# Patient Record
Sex: MALE | Race: WHITE | Employment: OTHER | URBAN - METROPOLITAN AREA
[De-identification: names, ages, dates, MRNs, and addresses within clinical notes are randomized per-mention and may not be internally consistent; named-entity substitution may affect disease eponyms.]

---

## 2017-01-13 ENCOUNTER — TELEPHONE (OUTPATIENT)
Dept: ORTHOPEDIC SURGERY | Age: 62
End: 2017-01-13

## 2017-01-13 ENCOUNTER — OFFICE VISIT (OUTPATIENT)
Dept: ORTHOPEDIC SURGERY | Age: 62
End: 2017-01-13

## 2017-01-13 ENCOUNTER — TELEPHONE (OUTPATIENT)
Dept: FAMILY MEDICINE CLINIC | Age: 62
End: 2017-01-13

## 2017-01-13 VITALS — BODY MASS INDEX: 38.64 KG/M2 | WEIGHT: 276 LBS | HEIGHT: 71 IN

## 2017-01-13 DIAGNOSIS — M48.10 DISH (DIFFUSE IDIOPATHIC SKELETAL HYPEROSTOSIS): Primary | ICD-10-CM

## 2017-01-13 PROCEDURE — 99203 OFFICE O/P NEW LOW 30 MIN: CPT | Performed by: ORTHOPAEDIC SURGERY

## 2017-01-18 ENCOUNTER — OFFICE VISIT (OUTPATIENT)
Dept: PAIN MANAGEMENT | Age: 62
End: 2017-01-18

## 2017-01-18 ENCOUNTER — TELEPHONE (OUTPATIENT)
Dept: FAMILY MEDICINE CLINIC | Age: 62
End: 2017-01-18

## 2017-01-18 VITALS
DIASTOLIC BLOOD PRESSURE: 83 MMHG | BODY MASS INDEX: 36.4 KG/M2 | HEART RATE: 83 BPM | WEIGHT: 260 LBS | SYSTOLIC BLOOD PRESSURE: 138 MMHG | HEIGHT: 71 IN

## 2017-01-18 DIAGNOSIS — F51.01 PRIMARY INSOMNIA: ICD-10-CM

## 2017-01-18 DIAGNOSIS — M47.816 FACET ARTHROPATHY, LUMBAR: ICD-10-CM

## 2017-01-18 DIAGNOSIS — M50.30 DDD (DEGENERATIVE DISC DISEASE), CERVICAL: ICD-10-CM

## 2017-01-18 DIAGNOSIS — M51.9 LUMBAR DISC DISEASE: ICD-10-CM

## 2017-01-18 DIAGNOSIS — M51.35 DDD (DEGENERATIVE DISC DISEASE), THORACOLUMBAR: ICD-10-CM

## 2017-01-18 DIAGNOSIS — F11.20 CHRONIC NARCOTIC DEPENDENCE (HCC): ICD-10-CM

## 2017-01-18 DIAGNOSIS — G89.4 CHRONIC PAIN SYNDROME: Primary | ICD-10-CM

## 2017-01-18 PROCEDURE — 99215 OFFICE O/P EST HI 40 MIN: CPT | Performed by: NURSE PRACTITIONER

## 2017-01-18 RX ORDER — QUETIAPINE FUMARATE 25 MG/1
25 TABLET, FILM COATED ORAL 2 TIMES DAILY
Qty: 60 TABLET | Refills: 0 | Status: SHIPPED | OUTPATIENT
Start: 2017-01-18 | End: 2017-02-15

## 2017-01-18 RX ORDER — GABAPENTIN 400 MG/1
400 CAPSULE ORAL 4 TIMES DAILY
Qty: 90 CAPSULE | Refills: 3 | Status: SHIPPED | OUTPATIENT
Start: 2017-01-18 | End: 2017-02-15 | Stop reason: SDUPTHER

## 2017-01-18 RX ORDER — OXYCODONE HYDROCHLORIDE AND ACETAMINOPHEN 5; 325 MG/1; MG/1
1 TABLET ORAL EVERY 6 HOURS PRN
Qty: 64 TABLET | Refills: 0 | Status: SHIPPED | OUTPATIENT
Start: 2017-01-18 | End: 2017-02-15 | Stop reason: SDUPTHER

## 2017-01-30 DIAGNOSIS — I10 ESSENTIAL HYPERTENSION: ICD-10-CM

## 2017-01-31 RX ORDER — METFORMIN HYDROCHLORIDE 500 MG/1
TABLET, EXTENDED RELEASE ORAL
Qty: 360 TABLET | Refills: 1 | Status: SHIPPED | OUTPATIENT
Start: 2017-01-31 | End: 2017-05-08 | Stop reason: SDUPTHER

## 2017-01-31 RX ORDER — VALSARTAN AND HYDROCHLOROTHIAZIDE 320; 12.5 MG/1; MG/1
TABLET, FILM COATED ORAL
Qty: 90 TABLET | Refills: 1 | Status: SHIPPED | OUTPATIENT
Start: 2017-01-31 | End: 2017-05-08 | Stop reason: SDUPTHER

## 2017-01-31 RX ORDER — CARVEDILOL 3.12 MG/1
TABLET ORAL
Qty: 180 TABLET | Refills: 1 | Status: SHIPPED | OUTPATIENT
Start: 2017-01-31 | End: 2017-05-08 | Stop reason: SDUPTHER

## 2017-01-31 RX ORDER — ALPRAZOLAM 0.5 MG/1
TABLET ORAL
Qty: 270 TABLET | Refills: 0 | Status: SHIPPED | OUTPATIENT
Start: 2017-01-31 | End: 2017-05-16 | Stop reason: SDUPTHER

## 2017-01-31 RX ORDER — ATORVASTATIN CALCIUM 80 MG/1
TABLET, FILM COATED ORAL
Qty: 90 TABLET | Refills: 1 | Status: SHIPPED | OUTPATIENT
Start: 2017-01-31 | End: 2017-05-08 | Stop reason: SDUPTHER

## 2017-02-15 ENCOUNTER — OFFICE VISIT (OUTPATIENT)
Dept: PAIN MANAGEMENT | Age: 62
End: 2017-02-15

## 2017-02-15 VITALS
HEART RATE: 100 BPM | DIASTOLIC BLOOD PRESSURE: 69 MMHG | SYSTOLIC BLOOD PRESSURE: 116 MMHG | BODY MASS INDEX: 37.52 KG/M2 | WEIGHT: 269 LBS

## 2017-02-15 DIAGNOSIS — M50.30 DDD (DEGENERATIVE DISC DISEASE), CERVICAL: ICD-10-CM

## 2017-02-15 DIAGNOSIS — F41.9 ANXIETY: ICD-10-CM

## 2017-02-15 DIAGNOSIS — M51.35 DDD (DEGENERATIVE DISC DISEASE), THORACOLUMBAR: ICD-10-CM

## 2017-02-15 DIAGNOSIS — M48.10 DISH (DIFFUSE IDIOPATHIC SKELETAL HYPEROSTOSIS): ICD-10-CM

## 2017-02-15 DIAGNOSIS — M51.9 LUMBAR DISC DISEASE: ICD-10-CM

## 2017-02-15 DIAGNOSIS — G89.4 CHRONIC PAIN SYNDROME: Primary | ICD-10-CM

## 2017-02-15 DIAGNOSIS — M47.816 FACET ARTHROPATHY, LUMBAR: ICD-10-CM

## 2017-02-15 DIAGNOSIS — F51.01 PRIMARY INSOMNIA: ICD-10-CM

## 2017-02-15 PROCEDURE — 99213 OFFICE O/P EST LOW 20 MIN: CPT | Performed by: NURSE PRACTITIONER

## 2017-02-15 RX ORDER — TRAZODONE HYDROCHLORIDE 50 MG/1
50 TABLET ORAL NIGHTLY
Qty: 60 TABLET | Refills: 0 | Status: SHIPPED | OUTPATIENT
Start: 2017-02-15 | End: 2017-03-15

## 2017-02-15 RX ORDER — GABAPENTIN 400 MG/1
400 CAPSULE ORAL 4 TIMES DAILY
Qty: 90 CAPSULE | Refills: 3 | Status: SHIPPED | OUTPATIENT
Start: 2017-02-15 | End: 2017-03-15 | Stop reason: SDUPTHER

## 2017-02-15 RX ORDER — OXYCODONE HYDROCHLORIDE AND ACETAMINOPHEN 5; 325 MG/1; MG/1
1 TABLET ORAL EVERY 6 HOURS PRN
Qty: 84 TABLET | Refills: 0 | Status: SHIPPED | OUTPATIENT
Start: 2017-02-15 | End: 2017-03-15 | Stop reason: SDUPTHER

## 2017-03-14 RX ORDER — CARISOPRODOL 350 MG/1
TABLET ORAL
Qty: 90 TABLET | Refills: 0 | Status: SHIPPED | OUTPATIENT
Start: 2017-03-14 | End: 2017-04-14

## 2017-03-15 ENCOUNTER — OFFICE VISIT (OUTPATIENT)
Dept: PAIN MANAGEMENT | Age: 62
End: 2017-03-15

## 2017-03-15 ENCOUNTER — TELEPHONE (OUTPATIENT)
Dept: PAIN MANAGEMENT | Age: 62
End: 2017-03-15

## 2017-03-15 VITALS
BODY MASS INDEX: 39.05 KG/M2 | HEART RATE: 92 BPM | WEIGHT: 280 LBS | SYSTOLIC BLOOD PRESSURE: 140 MMHG | DIASTOLIC BLOOD PRESSURE: 84 MMHG

## 2017-03-15 DIAGNOSIS — G89.4 CHRONIC PAIN SYNDROME: Primary | ICD-10-CM

## 2017-03-15 DIAGNOSIS — F11.20 CHRONIC NARCOTIC DEPENDENCE (HCC): ICD-10-CM

## 2017-03-15 DIAGNOSIS — M51.35 DDD (DEGENERATIVE DISC DISEASE), THORACOLUMBAR: ICD-10-CM

## 2017-03-15 DIAGNOSIS — M47.816 FACET ARTHROPATHY, LUMBAR: ICD-10-CM

## 2017-03-15 DIAGNOSIS — N52.1 ERECTILE DYSFUNCTION DUE TO DISEASES CLASSIFIED ELSEWHERE: ICD-10-CM

## 2017-03-15 DIAGNOSIS — F41.9 ANXIETY: ICD-10-CM

## 2017-03-15 DIAGNOSIS — M51.9 LUMBAR DISC DISEASE: ICD-10-CM

## 2017-03-15 DIAGNOSIS — M50.30 DDD (DEGENERATIVE DISC DISEASE), CERVICAL: ICD-10-CM

## 2017-03-15 PROCEDURE — 99213 OFFICE O/P EST LOW 20 MIN: CPT | Performed by: NURSE PRACTITIONER

## 2017-03-15 RX ORDER — GABAPENTIN 400 MG/1
400 CAPSULE ORAL 4 TIMES DAILY
Qty: 90 CAPSULE | Refills: 3 | Status: SHIPPED | OUTPATIENT
Start: 2017-03-15 | End: 2017-04-12 | Stop reason: SDUPTHER

## 2017-03-15 RX ORDER — QUETIAPINE FUMARATE 25 MG/1
25-50 TABLET, FILM COATED ORAL NIGHTLY
Qty: 60 TABLET | Refills: 0 | Status: SHIPPED | OUTPATIENT
Start: 2017-03-15 | End: 2017-04-12

## 2017-03-15 RX ORDER — OXYCODONE HYDROCHLORIDE AND ACETAMINOPHEN 5; 325 MG/1; MG/1
1 TABLET ORAL EVERY 6 HOURS PRN
Qty: 112 TABLET | Refills: 0 | Status: SHIPPED | OUTPATIENT
Start: 2017-03-15 | End: 2017-04-12 | Stop reason: SDUPTHER

## 2017-03-15 RX ORDER — COVID-19 ANTIGEN TEST
1 KIT MISCELLANEOUS 2 TIMES DAILY PRN
Qty: 60 CAPSULE | Refills: 0
Start: 2017-03-15 | End: 2017-06-07 | Stop reason: SDUPTHER

## 2017-03-20 RX ORDER — NORTRIPTYLINE HYDROCHLORIDE 50 MG/1
50 CAPSULE ORAL NIGHTLY
Qty: 30 CAPSULE | Refills: 0 | Status: SHIPPED | OUTPATIENT
Start: 2017-03-20 | End: 2017-04-12

## 2017-04-10 RX ORDER — MOMETASONE FUROATE 1 MG/G
OINTMENT TOPICAL
Qty: 90 G | Refills: 5 | Status: SHIPPED | OUTPATIENT
Start: 2017-04-10 | End: 2019-08-29

## 2017-04-12 ENCOUNTER — OFFICE VISIT (OUTPATIENT)
Dept: PAIN MANAGEMENT | Age: 62
End: 2017-04-12

## 2017-04-12 VITALS
HEART RATE: 73 BPM | SYSTOLIC BLOOD PRESSURE: 159 MMHG | BODY MASS INDEX: 39.22 KG/M2 | WEIGHT: 281.2 LBS | DIASTOLIC BLOOD PRESSURE: 93 MMHG

## 2017-04-12 DIAGNOSIS — F51.01 PRIMARY INSOMNIA: ICD-10-CM

## 2017-04-12 DIAGNOSIS — M47.816 FACET ARTHROPATHY, LUMBAR: ICD-10-CM

## 2017-04-12 DIAGNOSIS — S46.811A SUBSCAPULARIS (MUSCLE) SPRAIN, RIGHT, INITIAL ENCOUNTER: ICD-10-CM

## 2017-04-12 DIAGNOSIS — M50.30 DDD (DEGENERATIVE DISC DISEASE), CERVICAL: ICD-10-CM

## 2017-04-12 DIAGNOSIS — M51.35 DDD (DEGENERATIVE DISC DISEASE), THORACOLUMBAR: ICD-10-CM

## 2017-04-12 DIAGNOSIS — G89.4 CHRONIC PAIN SYNDROME: Primary | ICD-10-CM

## 2017-04-12 PROCEDURE — 99213 OFFICE O/P EST LOW 20 MIN: CPT | Performed by: NURSE PRACTITIONER

## 2017-04-12 RX ORDER — OXYCODONE HYDROCHLORIDE AND ACETAMINOPHEN 5; 325 MG/1; MG/1
1 TABLET ORAL EVERY 6 HOURS PRN
Qty: 112 TABLET | Refills: 0 | Status: SHIPPED | OUTPATIENT
Start: 2017-04-12 | End: 2017-05-10 | Stop reason: SDUPTHER

## 2017-04-12 RX ORDER — GABAPENTIN 400 MG/1
400 CAPSULE ORAL 4 TIMES DAILY
Qty: 90 CAPSULE | Refills: 3 | Status: SHIPPED | OUTPATIENT
Start: 2017-04-12 | End: 2017-05-10 | Stop reason: SDUPTHER

## 2017-04-14 RX ORDER — CARISOPRODOL 350 MG/1
TABLET ORAL
Qty: 90 TABLET | Refills: 2 | Status: SHIPPED | OUTPATIENT
Start: 2017-04-14 | End: 2017-05-17 | Stop reason: SDUPTHER

## 2017-04-28 ENCOUNTER — TELEPHONE (OUTPATIENT)
Dept: PAIN MANAGEMENT | Age: 62
End: 2017-04-28

## 2017-05-01 RX ORDER — SILDENAFIL CITRATE 20 MG/1
20 TABLET ORAL PRN
Qty: 30 TABLET | Refills: 0 | OUTPATIENT
Start: 2017-05-01 | End: 2017-07-05

## 2017-05-08 DIAGNOSIS — I10 ESSENTIAL HYPERTENSION: ICD-10-CM

## 2017-05-08 RX ORDER — VALSARTAN AND HYDROCHLOROTHIAZIDE 320; 12.5 MG/1; MG/1
TABLET, FILM COATED ORAL
Qty: 90 TABLET | Refills: 1 | Status: SHIPPED | OUTPATIENT
Start: 2017-05-08 | End: 2017-12-27 | Stop reason: SDUPTHER

## 2017-05-08 RX ORDER — ATORVASTATIN CALCIUM 80 MG/1
TABLET, FILM COATED ORAL
Qty: 90 TABLET | Refills: 1 | Status: SHIPPED | OUTPATIENT
Start: 2017-05-08 | End: 2018-01-13 | Stop reason: SDUPTHER

## 2017-05-08 RX ORDER — METFORMIN HYDROCHLORIDE 500 MG/1
TABLET, EXTENDED RELEASE ORAL
Qty: 360 TABLET | Refills: 1 | Status: SHIPPED | OUTPATIENT
Start: 2017-05-08 | End: 2018-01-13 | Stop reason: SDUPTHER

## 2017-05-08 RX ORDER — CARVEDILOL 3.12 MG/1
TABLET ORAL
Qty: 180 TABLET | Refills: 1 | Status: SHIPPED | OUTPATIENT
Start: 2017-05-08 | End: 2017-12-27 | Stop reason: SDUPTHER

## 2017-05-10 ENCOUNTER — OFFICE VISIT (OUTPATIENT)
Dept: PAIN MANAGEMENT | Age: 62
End: 2017-05-10

## 2017-05-10 VITALS
DIASTOLIC BLOOD PRESSURE: 88 MMHG | HEART RATE: 90 BPM | WEIGHT: 281 LBS | SYSTOLIC BLOOD PRESSURE: 136 MMHG | BODY MASS INDEX: 39.19 KG/M2

## 2017-05-10 DIAGNOSIS — N52.1 ERECTILE DYSFUNCTION DUE TO DISEASES CLASSIFIED ELSEWHERE: ICD-10-CM

## 2017-05-10 DIAGNOSIS — F41.9 ANXIETY: ICD-10-CM

## 2017-05-10 DIAGNOSIS — F11.20 CHRONIC NARCOTIC DEPENDENCE (HCC): ICD-10-CM

## 2017-05-10 DIAGNOSIS — M51.9 LUMBAR DISC DISEASE: ICD-10-CM

## 2017-05-10 DIAGNOSIS — M50.30 DDD (DEGENERATIVE DISC DISEASE), CERVICAL: ICD-10-CM

## 2017-05-10 DIAGNOSIS — G89.4 CHRONIC PAIN SYNDROME: Primary | ICD-10-CM

## 2017-05-10 DIAGNOSIS — M47.816 FACET ARTHROPATHY, LUMBAR: ICD-10-CM

## 2017-05-10 DIAGNOSIS — E66.01 OBESITY, CLASS III, BMI 40-49.9 (MORBID OBESITY) (HCC): ICD-10-CM

## 2017-05-10 DIAGNOSIS — M51.35 DDD (DEGENERATIVE DISC DISEASE), THORACOLUMBAR: ICD-10-CM

## 2017-05-10 PROCEDURE — 99213 OFFICE O/P EST LOW 20 MIN: CPT | Performed by: NURSE PRACTITIONER

## 2017-05-10 RX ORDER — OXYCODONE HYDROCHLORIDE AND ACETAMINOPHEN 5; 325 MG/1; MG/1
1 TABLET ORAL EVERY 6 HOURS PRN
Qty: 112 TABLET | Refills: 0 | Status: SHIPPED | OUTPATIENT
Start: 2017-05-10 | End: 2017-06-07 | Stop reason: SDUPTHER

## 2017-05-10 RX ORDER — SILDENAFIL 50 MG/1
50 TABLET, FILM COATED ORAL PRN
Qty: 2 TABLET | Refills: 0 | Status: SHIPPED | COMMUNITY
Start: 2017-05-10 | End: 2017-05-10 | Stop reason: SDUPTHER

## 2017-05-10 RX ORDER — GABAPENTIN 400 MG/1
400 CAPSULE ORAL 4 TIMES DAILY
Qty: 90 CAPSULE | Refills: 3 | Status: SHIPPED | OUTPATIENT
Start: 2017-05-10 | End: 2017-06-07 | Stop reason: SDUPTHER

## 2017-05-10 RX ORDER — SILDENAFIL 50 MG/1
50 TABLET, FILM COATED ORAL PRN
Qty: 2 TABLET | Refills: 0 | Status: SHIPPED | COMMUNITY
Start: 2017-05-10 | End: 2017-06-07

## 2017-05-12 ENCOUNTER — TELEPHONE (OUTPATIENT)
Dept: PAIN MANAGEMENT | Age: 62
End: 2017-05-12

## 2017-05-17 RX ORDER — ALPRAZOLAM 0.5 MG/1
TABLET ORAL
Qty: 270 TABLET | Refills: 0 | Status: SHIPPED | OUTPATIENT
Start: 2017-05-17 | End: 2017-06-07 | Stop reason: SDUPTHER

## 2017-05-18 RX ORDER — ALPRAZOLAM 0.5 MG/1
TABLET ORAL
Qty: 270 TABLET | Refills: 0 | Status: SHIPPED | OUTPATIENT
Start: 2017-05-18 | End: 2017-08-08 | Stop reason: SDUPTHER

## 2017-05-18 RX ORDER — CARISOPRODOL 350 MG/1
TABLET ORAL
Qty: 90 TABLET | Refills: 2 | Status: SHIPPED | OUTPATIENT
Start: 2017-05-18 | End: 2017-07-21

## 2017-05-23 ENCOUNTER — TELEPHONE (OUTPATIENT)
Dept: FAMILY MEDICINE CLINIC | Age: 62
End: 2017-05-23

## 2017-05-30 ENCOUNTER — OFFICE VISIT (OUTPATIENT)
Dept: FAMILY MEDICINE CLINIC | Age: 62
End: 2017-05-30

## 2017-05-30 VITALS
TEMPERATURE: 98.3 F | RESPIRATION RATE: 18 BRPM | DIASTOLIC BLOOD PRESSURE: 80 MMHG | BODY MASS INDEX: 38.22 KG/M2 | HEIGHT: 71 IN | SYSTOLIC BLOOD PRESSURE: 124 MMHG | WEIGHT: 273 LBS | HEART RATE: 106 BPM

## 2017-05-30 DIAGNOSIS — R10.9 LEFT FLANK PAIN: ICD-10-CM

## 2017-05-30 DIAGNOSIS — M17.12 ARTHRITIS OF LEFT KNEE: Primary | ICD-10-CM

## 2017-05-30 LAB
CONTROL: POSITIVE
HEMOCCULT STL QL: NEGATIVE

## 2017-05-30 PROCEDURE — 99213 OFFICE O/P EST LOW 20 MIN: CPT | Performed by: NURSE PRACTITIONER

## 2017-05-30 ASSESSMENT — ENCOUNTER SYMPTOMS
COUGH: 0
SHORTNESS OF BREATH: 0
BACK PAIN: 1

## 2017-05-31 ENCOUNTER — TELEPHONE (OUTPATIENT)
Dept: PAIN MANAGEMENT | Age: 62
End: 2017-05-31

## 2017-06-01 ENCOUNTER — TELEPHONE (OUTPATIENT)
Dept: FAMILY MEDICINE CLINIC | Age: 62
End: 2017-06-01

## 2017-06-07 ENCOUNTER — OFFICE VISIT (OUTPATIENT)
Dept: PAIN MANAGEMENT | Age: 62
End: 2017-06-07

## 2017-06-07 VITALS
DIASTOLIC BLOOD PRESSURE: 73 MMHG | BODY MASS INDEX: 38.35 KG/M2 | WEIGHT: 275 LBS | SYSTOLIC BLOOD PRESSURE: 124 MMHG | HEART RATE: 95 BPM

## 2017-06-07 DIAGNOSIS — M51.35 DDD (DEGENERATIVE DISC DISEASE), THORACOLUMBAR: ICD-10-CM

## 2017-06-07 DIAGNOSIS — F11.20 CHRONIC NARCOTIC DEPENDENCE (HCC): ICD-10-CM

## 2017-06-07 DIAGNOSIS — F41.9 ANXIETY: ICD-10-CM

## 2017-06-07 DIAGNOSIS — M51.9 LUMBAR DISC DISEASE: ICD-10-CM
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
## 2017-06-07 DIAGNOSIS — G89.4 CHRONIC PAIN SYNDROME: Primary | ICD-10-CM
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
## 2017-06-07 DIAGNOSIS — M25.562 ACUTE PAIN OF LEFT KNEE: ICD-10-CM

## 2017-06-07 DIAGNOSIS — N52.1 ERECTILE DYSFUNCTION DUE TO DISEASES CLASSIFIED ELSEWHERE: ICD-10-CM

## 2017-06-07 DIAGNOSIS — M50.30 DDD (DEGENERATIVE DISC DISEASE), CERVICAL: ICD-10-CM

## 2017-06-07 DIAGNOSIS — M47.816 FACET ARTHROPATHY, LUMBAR: ICD-10-CM

## 2017-06-07 PROCEDURE — 99213 OFFICE O/P EST LOW 20 MIN: CPT | Performed by: NURSE PRACTITIONER

## 2017-06-07 RX ORDER — SILDENAFIL 50 MG/1
50 TABLET, FILM COATED ORAL PRN
Qty: 30 TABLET | Refills: 0 | Status: SHIPPED | OUTPATIENT
Start: 2017-06-07 | End: 2017-07-05

## 2017-06-07 RX ORDER — GABAPENTIN 400 MG/1
400 CAPSULE ORAL 4 TIMES DAILY
Qty: 90 CAPSULE | Refills: 3 | Status: SHIPPED | OUTPATIENT
Start: 2017-06-07 | End: 2017-09-13 | Stop reason: SDUPTHER

## 2017-06-07 RX ORDER — COVID-19 ANTIGEN TEST
1 KIT MISCELLANEOUS 2 TIMES DAILY PRN
Qty: 60 CAPSULE | Refills: 0 | Status: SHIPPED | OUTPATIENT
Start: 2017-06-07 | End: 2017-07-05 | Stop reason: SDUPTHER

## 2017-06-07 RX ORDER — OXYCODONE HYDROCHLORIDE AND ACETAMINOPHEN 5; 325 MG/1; MG/1
1 TABLET ORAL EVERY 6 HOURS PRN
Qty: 112 TABLET | Refills: 0 | Status: SHIPPED | OUTPATIENT
Start: 2017-06-07 | End: 2017-07-05 | Stop reason: SDUPTHER

## 2017-06-22 ENCOUNTER — TELEPHONE (OUTPATIENT)
Dept: PAIN MANAGEMENT | Age: 62
End: 2017-06-22

## 2017-07-05 ENCOUNTER — OFFICE VISIT (OUTPATIENT)
Dept: PAIN MANAGEMENT | Age: 62
End: 2017-07-05

## 2017-07-05 VITALS
WEIGHT: 278 LBS | HEART RATE: 76 BPM | BODY MASS INDEX: 38.77 KG/M2 | SYSTOLIC BLOOD PRESSURE: 136 MMHG | DIASTOLIC BLOOD PRESSURE: 86 MMHG

## 2017-07-05 DIAGNOSIS — N52.1 ERECTILE DYSFUNCTION DUE TO DISEASES CLASSIFIED ELSEWHERE: ICD-10-CM

## 2017-07-05 DIAGNOSIS — M50.30 DDD (DEGENERATIVE DISC DISEASE), CERVICAL: ICD-10-CM

## 2017-07-05 DIAGNOSIS — M47.816 FACET ARTHROPATHY, LUMBAR: ICD-10-CM

## 2017-07-05 DIAGNOSIS — M51.35 DDD (DEGENERATIVE DISC DISEASE), THORACOLUMBAR: ICD-10-CM

## 2017-07-05 DIAGNOSIS — F51.01 PRIMARY INSOMNIA: ICD-10-CM

## 2017-07-05 DIAGNOSIS — M48.10 DISH (DIFFUSE IDIOPATHIC SKELETAL HYPEROSTOSIS): ICD-10-CM

## 2017-07-05 DIAGNOSIS — F11.20 CHRONIC NARCOTIC DEPENDENCE (HCC): ICD-10-CM

## 2017-07-05 DIAGNOSIS — F41.9 ANXIETY: ICD-10-CM

## 2017-07-05 DIAGNOSIS — G89.4 CHRONIC PAIN SYNDROME: Primary | ICD-10-CM

## 2017-07-05 DIAGNOSIS — G47.30 SLEEP APNEA, UNSPECIFIED TYPE: ICD-10-CM

## 2017-07-05 DIAGNOSIS — E66.01 OBESITY, CLASS III, BMI 40-49.9 (MORBID OBESITY) (HCC): ICD-10-CM

## 2017-07-05 PROCEDURE — 99213 OFFICE O/P EST LOW 20 MIN: CPT | Performed by: NURSE PRACTITIONER

## 2017-07-05 RX ORDER — COVID-19 ANTIGEN TEST
1 KIT MISCELLANEOUS 2 TIMES DAILY PRN
Qty: 60 CAPSULE | Refills: 0 | Status: ON HOLD | OUTPATIENT
Start: 2017-07-05 | End: 2019-03-25 | Stop reason: ALTCHOICE

## 2017-07-05 RX ORDER — OXYCODONE HYDROCHLORIDE AND ACETAMINOPHEN 5; 325 MG/1; MG/1
1 TABLET ORAL EVERY 6 HOURS PRN
Qty: 140 TABLET | Refills: 0 | Status: SHIPPED | OUTPATIENT
Start: 2017-07-05 | End: 2017-08-09 | Stop reason: SDUPTHER

## 2017-07-21 RX ORDER — CARISOPRODOL 350 MG/1
TABLET ORAL
Qty: 90 TABLET | Refills: 1 | Status: SHIPPED | OUTPATIENT
Start: 2017-07-21 | End: 2017-09-22 | Stop reason: SDUPTHER

## 2017-08-03 PROBLEM — Z78.9 HEPATITIS B IMMUNE: Status: ACTIVE | Noted: 2017-08-03

## 2017-08-03 PROBLEM — Z11.1 TUBERCULOSIS SCREENING: Status: ACTIVE | Noted: 2017-08-03

## 2017-08-04 ENCOUNTER — OFFICE VISIT (OUTPATIENT)
Dept: FAMILY MEDICINE CLINIC | Age: 62
End: 2017-08-04

## 2017-08-04 VITALS
HEART RATE: 90 BPM | WEIGHT: 278 LBS | SYSTOLIC BLOOD PRESSURE: 128 MMHG | TEMPERATURE: 98.7 F | HEIGHT: 71 IN | RESPIRATION RATE: 18 BRPM | BODY MASS INDEX: 38.92 KG/M2 | DIASTOLIC BLOOD PRESSURE: 80 MMHG

## 2017-08-04 DIAGNOSIS — E78.5 HYPERLIPIDEMIA WITH TARGET LDL LESS THAN 100: ICD-10-CM

## 2017-08-04 DIAGNOSIS — Z11.4 SCREENING FOR HIV (HUMAN IMMUNODEFICIENCY VIRUS): ICD-10-CM

## 2017-08-04 DIAGNOSIS — E11.9 TYPE 2 DIABETES MELLITUS WITHOUT COMPLICATION, WITHOUT LONG-TERM CURRENT USE OF INSULIN (HCC): ICD-10-CM

## 2017-08-04 DIAGNOSIS — R74.8 ELEVATED LIVER ENZYMES: ICD-10-CM

## 2017-08-04 DIAGNOSIS — I25.10 CORONARY ARTERY DISEASE INVOLVING NATIVE HEART WITHOUT ANGINA PECTORIS, UNSPECIFIED VESSEL OR LESION TYPE: ICD-10-CM

## 2017-08-04 DIAGNOSIS — Z11.59 NEED FOR HEPATITIS C SCREENING TEST: ICD-10-CM

## 2017-08-04 DIAGNOSIS — I10 ESSENTIAL HYPERTENSION: ICD-10-CM

## 2017-08-04 DIAGNOSIS — R76.8 HEPATITIS B SURFACE ANTIGEN POSITIVE: Primary | ICD-10-CM

## 2017-08-04 DIAGNOSIS — R76.8 HEPATITIS B SURFACE ANTIGEN POSITIVE: ICD-10-CM

## 2017-08-04 LAB
A/G RATIO: 1.5 (ref 1.1–2.2)
ALBUMIN SERPL-MCNC: 4.1 G/DL (ref 3.4–5)
ALP BLD-CCNC: 95 U/L (ref 40–129)
ALT SERPL-CCNC: 93 U/L (ref 10–40)
ANION GAP SERPL CALCULATED.3IONS-SCNC: 16 MMOL/L (ref 3–16)
AST SERPL-CCNC: 59 U/L (ref 15–37)
BILIRUB SERPL-MCNC: 0.6 MG/DL (ref 0–1)
BUN BLDV-MCNC: 19 MG/DL (ref 7–20)
CALCIUM SERPL-MCNC: 9 MG/DL (ref 8.3–10.6)
CHLORIDE BLD-SCNC: 96 MMOL/L (ref 99–110)
CO2: 23 MMOL/L (ref 21–32)
CREAT SERPL-MCNC: 0.7 MG/DL (ref 0.8–1.3)
GFR AFRICAN AMERICAN: >60
GFR NON-AFRICAN AMERICAN: >60
GLOBULIN: 2.7 G/DL
GLUCOSE BLD-MCNC: 282 MG/DL (ref 70–99)
POTASSIUM SERPL-SCNC: 4.5 MMOL/L (ref 3.5–5.1)
SODIUM BLD-SCNC: 135 MMOL/L (ref 136–145)
TOTAL PROTEIN: 6.8 G/DL (ref 6.4–8.2)

## 2017-08-04 PROCEDURE — 99214 OFFICE O/P EST MOD 30 MIN: CPT | Performed by: FAMILY MEDICINE

## 2017-08-05 LAB
ESTIMATED AVERAGE GLUCOSE: 294.8 MG/DL
HBA1C MFR BLD: 11.9 %
HBV SURFACE AB TITR SER: 286.2 MUL/ML
HEPATITIS B SURFACE ANTIGEN INTERPRETATION: REACTIVE
HEPATITIS C ANTIBODY INTERPRETATION: NORMAL

## 2017-08-06 LAB
HAV AB SERPL IA-ACNC: NEGATIVE
HEPATITIS BE ANTIBODY: NEGATIVE

## 2017-08-07 LAB
HEPATITIS BE ANTIGEN: POSITIVE
HIV-1 AND HIV-2 ANTIBODIES: NORMAL

## 2017-08-08 LAB — HEPATITIS B SURFACE ANTIGEN CONFIRMATION: POSITIVE

## 2017-08-09 ENCOUNTER — OFFICE VISIT (OUTPATIENT)
Dept: PAIN MANAGEMENT | Age: 62
End: 2017-08-09

## 2017-08-09 VITALS
DIASTOLIC BLOOD PRESSURE: 82 MMHG | BODY MASS INDEX: 39.61 KG/M2 | SYSTOLIC BLOOD PRESSURE: 139 MMHG | WEIGHT: 284 LBS | HEART RATE: 88 BPM

## 2017-08-09 DIAGNOSIS — M51.9 LUMBAR DISC DISEASE: ICD-10-CM

## 2017-08-09 DIAGNOSIS — L40.9 PSORIASIS: ICD-10-CM

## 2017-08-09 DIAGNOSIS — F11.20 CHRONIC NARCOTIC DEPENDENCE (HCC): ICD-10-CM

## 2017-08-09 DIAGNOSIS — N52.1 ERECTILE DYSFUNCTION DUE TO DISEASES CLASSIFIED ELSEWHERE: ICD-10-CM

## 2017-08-09 DIAGNOSIS — E66.01 OBESITY, CLASS III, BMI 40-49.9 (MORBID OBESITY) (HCC): ICD-10-CM

## 2017-08-09 DIAGNOSIS — F41.9 ANXIETY: ICD-10-CM

## 2017-08-09 DIAGNOSIS — M50.30 DDD (DEGENERATIVE DISC DISEASE), CERVICAL: ICD-10-CM

## 2017-08-09 DIAGNOSIS — Z78.9 HEPATITIS B IMMUNE: Primary | ICD-10-CM

## 2017-08-09 DIAGNOSIS — M47.816 FACET ARTHROPATHY, LUMBAR: ICD-10-CM

## 2017-08-09 DIAGNOSIS — M51.35 DDD (DEGENERATIVE DISC DISEASE), THORACOLUMBAR: ICD-10-CM

## 2017-08-09 DIAGNOSIS — G89.4 CHRONIC PAIN SYNDROME: Primary | ICD-10-CM

## 2017-08-09 LAB
HEPATITIS B CORE IGM ANTIBODY: NORMAL
HEPATITIS B CORE TOTAL ANTIBODY: POSITIVE

## 2017-08-09 PROCEDURE — 99213 OFFICE O/P EST LOW 20 MIN: CPT | Performed by: NURSE PRACTITIONER

## 2017-08-09 RX ORDER — OXYCODONE HYDROCHLORIDE AND ACETAMINOPHEN 5; 325 MG/1; MG/1
1 TABLET ORAL EVERY 6 HOURS PRN
Qty: 140 TABLET | Refills: 0 | Status: SHIPPED | OUTPATIENT
Start: 2017-08-09 | End: 2017-09-13 | Stop reason: SDUPTHER

## 2017-08-09 RX ORDER — ALPRAZOLAM 0.5 MG/1
TABLET ORAL
Qty: 270 TABLET | Refills: 0 | Status: SHIPPED | OUTPATIENT
Start: 2017-08-09 | End: 2017-10-11 | Stop reason: SDUPTHER

## 2017-08-10 ENCOUNTER — OFFICE VISIT (OUTPATIENT)
Dept: FAMILY MEDICINE CLINIC | Age: 62
End: 2017-08-10

## 2017-08-10 VITALS
DIASTOLIC BLOOD PRESSURE: 68 MMHG | TEMPERATURE: 98.1 F | SYSTOLIC BLOOD PRESSURE: 126 MMHG | HEART RATE: 96 BPM | HEIGHT: 71 IN | RESPIRATION RATE: 16 BRPM | WEIGHT: 280 LBS | BODY MASS INDEX: 39.2 KG/M2

## 2017-08-10 DIAGNOSIS — B18.1 CHRONIC ACTIVE VIRAL HEPATITIS B (HCC): ICD-10-CM

## 2017-08-10 DIAGNOSIS — B18.1 CHRONIC ACTIVE VIRAL HEPATITIS B (HCC): Primary | ICD-10-CM

## 2017-08-10 LAB
APTT: 35.9 SEC (ref 24.1–34.9)
BASOPHILS ABSOLUTE: 0 K/UL (ref 0–0.2)
BASOPHILS RELATIVE PERCENT: 0.5 %
EOSINOPHILS ABSOLUTE: 0.2 K/UL (ref 0–0.6)
EOSINOPHILS RELATIVE PERCENT: 2.8 %
FERRITIN: 412.5 NG/ML (ref 30–400)
HCT VFR BLD CALC: 44.4 % (ref 40.5–52.5)
HEMOGLOBIN: 15.2 G/DL (ref 13.5–17.5)
INR BLD: 0.97 (ref 0.85–1.15)
LYMPHOCYTES ABSOLUTE: 1.7 K/UL (ref 1–5.1)
LYMPHOCYTES RELATIVE PERCENT: 24.2 %
MCH RBC QN AUTO: 32.2 PG (ref 26–34)
MCHC RBC AUTO-ENTMCNC: 34.2 G/DL (ref 31–36)
MCV RBC AUTO: 94.1 FL (ref 80–100)
MONOCYTES ABSOLUTE: 0.6 K/UL (ref 0–1.3)
MONOCYTES RELATIVE PERCENT: 8.6 %
NEUTROPHILS ABSOLUTE: 4.4 K/UL (ref 1.7–7.7)
NEUTROPHILS RELATIVE PERCENT: 63.9 %
PDW BLD-RTO: 13.1 % (ref 12.4–15.4)
PLATELET # BLD: 165 K/UL (ref 135–450)
PMV BLD AUTO: 8.3 FL (ref 5–10.5)
PROTHROMBIN TIME: 11 SEC (ref 9.6–13)
RBC # BLD: 4.72 M/UL (ref 4.2–5.9)
WBC # BLD: 6.9 K/UL (ref 4–11)

## 2017-08-10 PROCEDURE — 90471 IMMUNIZATION ADMIN: CPT | Performed by: FAMILY MEDICINE

## 2017-08-10 PROCEDURE — 99214 OFFICE O/P EST MOD 30 MIN: CPT | Performed by: FAMILY MEDICINE

## 2017-08-10 PROCEDURE — 90632 HEPA VACCINE ADULT IM: CPT | Performed by: FAMILY MEDICINE

## 2017-08-12 LAB
AFP: 4.9 UG/L
HBV DNA QNT I/U: ABNORMAL IU/ML
HEPATITIS B DNA, PCR (QUANT): DETECTED
LOG 10 HBV AS IU/ML: >8.2 LOG IU

## 2017-08-14 ENCOUNTER — TELEPHONE (OUTPATIENT)
Dept: PAIN MANAGEMENT | Age: 62
End: 2017-08-14

## 2017-08-14 RX ORDER — SILDENAFIL CITRATE 20 MG/1
TABLET ORAL
Qty: 30 TABLET | Refills: 0 | OUTPATIENT
Start: 2017-08-14

## 2017-08-14 RX ORDER — SILDENAFIL CITRATE 20 MG/1
20 TABLET ORAL DAILY
Qty: 30 TABLET | Refills: 0 | Status: SHIPPED | OUTPATIENT
Start: 2017-08-14 | End: 2017-10-11 | Stop reason: SDUPTHER

## 2017-08-15 ENCOUNTER — HOSPITAL ENCOUNTER (OUTPATIENT)
Dept: ULTRASOUND IMAGING | Age: 62
Discharge: HOME | End: 2017-08-15
Attending: FAMILY MEDICINE | Admitting: FAMILY MEDICINE

## 2017-08-15 DIAGNOSIS — B18.1 CHRONIC ACTIVE VIRAL HEPATITIS B (HCC): ICD-10-CM

## 2017-08-15 DIAGNOSIS — B18.1 CHRONIC VIRAL HEPATITIS B WITHOUT DELTA-AGENT (HCC): ICD-10-CM

## 2017-09-13 ENCOUNTER — OFFICE VISIT (OUTPATIENT)
Dept: PAIN MANAGEMENT | Age: 62
End: 2017-09-13

## 2017-09-13 VITALS
WEIGHT: 276 LBS | SYSTOLIC BLOOD PRESSURE: 118 MMHG | BODY MASS INDEX: 38.49 KG/M2 | DIASTOLIC BLOOD PRESSURE: 78 MMHG | HEART RATE: 93 BPM

## 2017-09-13 DIAGNOSIS — M50.30 DDD (DEGENERATIVE DISC DISEASE), CERVICAL: ICD-10-CM

## 2017-09-13 DIAGNOSIS — M47.816 FACET ARTHROPATHY, LUMBAR: ICD-10-CM

## 2017-09-13 DIAGNOSIS — F41.9 ANXIETY: ICD-10-CM

## 2017-09-13 DIAGNOSIS — E66.01 OBESITY, CLASS III, BMI 40-49.9 (MORBID OBESITY) (HCC): ICD-10-CM

## 2017-09-13 DIAGNOSIS — M51.35 DDD (DEGENERATIVE DISC DISEASE), THORACOLUMBAR: ICD-10-CM

## 2017-09-13 DIAGNOSIS — G89.4 CHRONIC PAIN SYNDROME: Primary | ICD-10-CM

## 2017-09-13 PROCEDURE — 99213 OFFICE O/P EST LOW 20 MIN: CPT | Performed by: NURSE PRACTITIONER

## 2017-09-13 RX ORDER — GABAPENTIN 400 MG/1
400 CAPSULE ORAL 3 TIMES DAILY
Qty: 90 CAPSULE | Refills: 0 | Status: SHIPPED | OUTPATIENT
Start: 2017-09-13 | End: 2017-10-11 | Stop reason: SDUPTHER

## 2017-09-13 RX ORDER — OXYCODONE HYDROCHLORIDE AND ACETAMINOPHEN 5; 325 MG/1; MG/1
1 TABLET ORAL EVERY 6 HOURS PRN
Qty: 112 TABLET | Refills: 0 | Status: SHIPPED | OUTPATIENT
Start: 2017-09-13 | End: 2017-10-11 | Stop reason: SDUPTHER

## 2017-09-18 ENCOUNTER — TELEPHONE (OUTPATIENT)
Dept: FAMILY MEDICINE CLINIC | Age: 62
End: 2017-09-18

## 2017-09-19 ENCOUNTER — TELEPHONE (OUTPATIENT)
Dept: FAMILY MEDICINE CLINIC | Age: 62
End: 2017-09-19

## 2017-09-23 RX ORDER — CARISOPRODOL 350 MG/1
TABLET ORAL
Qty: 90 TABLET | Refills: 1 | Status: SHIPPED | OUTPATIENT
Start: 2017-09-23 | End: 2017-11-21 | Stop reason: SDUPTHER

## 2017-10-09 ENCOUNTER — TELEPHONE (OUTPATIENT)
Dept: FAMILY MEDICINE CLINIC | Age: 62
End: 2017-10-09

## 2017-10-09 NOTE — TELEPHONE ENCOUNTER
Patient advised that samples are ready for  at the  and that it is ok to cancel appt for 10/10/17 due to having appt on 11/16/2017.

## 2017-10-11 ENCOUNTER — OFFICE VISIT (OUTPATIENT)
Dept: PAIN MANAGEMENT | Age: 62
End: 2017-10-11

## 2017-10-11 VITALS
WEIGHT: 282 LBS | SYSTOLIC BLOOD PRESSURE: 122 MMHG | BODY MASS INDEX: 39.33 KG/M2 | HEART RATE: 92 BPM | DIASTOLIC BLOOD PRESSURE: 79 MMHG

## 2017-10-11 DIAGNOSIS — G89.4 CHRONIC PAIN SYNDROME: Primary | ICD-10-CM

## 2017-10-11 DIAGNOSIS — L40.9 PSORIASIS: ICD-10-CM

## 2017-10-11 DIAGNOSIS — M50.30 DDD (DEGENERATIVE DISC DISEASE), CERVICAL: ICD-10-CM

## 2017-10-11 DIAGNOSIS — M47.816 FACET ARTHROPATHY, LUMBAR: ICD-10-CM

## 2017-10-11 DIAGNOSIS — M51.9 LUMBAR DISC DISEASE: ICD-10-CM

## 2017-10-11 DIAGNOSIS — M51.35 DDD (DEGENERATIVE DISC DISEASE), THORACOLUMBAR: ICD-10-CM

## 2017-10-11 DIAGNOSIS — E66.01 OBESITY, CLASS III, BMI 40-49.9 (MORBID OBESITY) (HCC): ICD-10-CM

## 2017-10-11 DIAGNOSIS — F41.9 ANXIETY: ICD-10-CM

## 2017-10-11 PROCEDURE — 99213 OFFICE O/P EST LOW 20 MIN: CPT | Performed by: NURSE PRACTITIONER

## 2017-10-11 RX ORDER — OXYCODONE HYDROCHLORIDE AND ACETAMINOPHEN 5; 325 MG/1; MG/1
1 TABLET ORAL EVERY 6 HOURS PRN
Qty: 112 TABLET | Refills: 0 | Status: SHIPPED | OUTPATIENT
Start: 2017-10-11 | End: 2017-11-08 | Stop reason: SDUPTHER

## 2017-10-11 RX ORDER — GABAPENTIN 400 MG/1
400 CAPSULE ORAL 3 TIMES DAILY
Qty: 90 CAPSULE | Refills: 0 | Status: SHIPPED | OUTPATIENT
Start: 2017-10-11 | End: 2017-11-08 | Stop reason: SDUPTHER

## 2017-10-11 RX ORDER — SILDENAFIL CITRATE 20 MG/1
TABLET ORAL
Qty: 30 TABLET | Refills: 0 | Status: SHIPPED | OUTPATIENT
Start: 2017-10-11 | End: 2017-11-11 | Stop reason: SDUPTHER

## 2017-10-11 RX ORDER — ALPRAZOLAM 0.5 MG/1
TABLET ORAL
Qty: 270 TABLET | Refills: 0
Start: 2017-10-11 | End: 2018-01-22 | Stop reason: SDUPTHER

## 2017-10-11 NOTE — PATIENT INSTRUCTIONS
Patient Education        Back Stretches: Exercises  Your Care Instructions  Here are some examples of exercises for stretching your back. Start each exercise slowly. Ease off the exercise if you start to have pain. Your doctor or physical therapist will tell you when you can start these exercises and which ones will work best for you. How to do the exercises  Overhead stretch    1. Stand comfortably with your feet shoulder-width apart. 2. Looking straight ahead, raise both arms over your head and reach toward the ceiling. Do not allow your head to tilt back. 3. Hold for 15 to 30 seconds, then lower your arms to your sides. 4. Repeat 2 to 4 times. Side stretch    1. Stand comfortably with your feet shoulder-width apart. 2. Raise one arm over your head, and then lean to the other side. 3. Slide your hand down your leg as you let the weight of your arm gently stretch your side muscles. Hold for 15 to 30 seconds. 4. Repeat 2 to 4 times on each side. Press-up    1. Lie on your stomach, supporting your body with your forearms. 2. Press your elbows down into the floor to raise your upper back. As you do this, relax your stomach muscles and allow your back to arch without using your back muscles. As your press up, do not let your hips or pelvis come off the floor. 3. Hold for 15 to 30 seconds, then relax. 4. Repeat 2 to 4 times. Relax and rest    1. Lie on your back with a rolled towel under your neck and a pillow under your knees. Extend your arms comfortably to your sides. 2. Relax and breathe normally. 3. Remain in this position for about 10 minutes. 4. If you can, do this 2 or 3 times each day. Follow-up care is a key part of your treatment and safety. Be sure to make and go to all appointments, and call your doctor if you are having problems. It's also a good idea to know your test results and keep a list of the medicines you take. Where can you learn more?   Go to [email or web address] and sign in to your [employer] account. Enter E416 in the addwishhire box to learn more about \"Back Stretches: Exercises. \"     If you do not have an account, please click on the \"Sign Up Now\" link. Current as of: March 21, 2017  Content Version: 11.3  © 0278-0911 [employer], [employer]. Care instructions adapted under license by [facility], [place] ([facility]). If you have questions about a medical condition or this instruction, always ask your healthcare professional. [address] any warranty or liability for your use of this information.

## 2017-10-12 NOTE — PROGRESS NOTES
Delene Boast  1955  [id number]      HISTORY OF PRESENT ILLNESS:  Mr. Venessa Carr is a 65 y.o. male returns for a follow up visit for pain management  He has a diagnosis of   1. Chronic pain syndrome    2. Lumbar disc disease    3. Facet arthropathy, lumbar ([facility] [address]    4. DDD (degenerative disc disease), thoracolumbar    5. DDD (degenerative disc disease), cervical    6. Anxiety    7. Psoriasis    8. Obesity, Class III, BMI 40-49.9 (morbid obesity) ([address]    . He complains of pain in the neck, lower back. He rates the pain 6/10 and describes it as aching with increased physical activities. Current treatment regimen has helped relieve about 40% of the pain. He denies any side effects from the current pain regimen. Patient reports that since the last follow up visit the physical functioning is unchanged, family/social relationships are unchanged, mood is unchanged sleep patterns are unchanged, and that the overall functioning is unchanged. Patient denies misusing/abusing his narcotic pain medications or using any illegal drugs. Patient states that pain is manageable on current pain therapy. He reports that stress is low now that his wife has had a knee replacement surgery, and is on the mend. His mood is stable without anxiety, sleep is fair with an average of 5 hours. He is tolerating activities with moderate tenderness to the lower back. He denies having issues with constipation    ALLERGIES: Patients list of allergies were reviewed     MEDICATIONS: Mr. Venessa Carr list of medications were reviewed. His current medications are   Outpatient Medications Prior to Visit   Medication Sig Dispense Refill    carisoprodol (SOMA) 350 MG tablet Take 1 tablet by mouth 3 times daily as needed for Muscle spasms 90 tablet 1    Naproxen Sodium (ALEVE) 220 MG CAPS Take 1 tablet by mouth 2 times daily as needed for Pain 60 capsule 0    diclofenac sodium (VOLTAREN) 1 % GEL Apply 2-4 g topically 2 times daily 5 Tube 0    valsartan-hydrochlorothiazide (DIOVAN-HCT) 320-12.5 MG per tablet TAKE 1 TABLET EVERY DAY 90 tablet 1    atorvastatin (LIPITOR) 80 MG tablet TAKE 1 TABLET EVERY DAY 90 tablet 1    carvedilol (COREG) 3.125 MG tablet TAKE 1 TABLET TWICE DAILY WITH MEALS 180 tablet 1    metFORMIN (GLUCOPHAGE-XR) 500 MG extended release tablet TAKE 4 TABLETS EVERY DAY WITH BREAKFAST 360 tablet 1    mometasone (ELOCON) 0.1 % ointment Apply topically twice daily. 90 g 5    canagliflozin (INVOKANA) 300 MG TABS tablet Take 1 tablet by mouth every morning (before breakfast) 30 tablet 2    Insulin Pen Needle (B-D UF III MINI PEN NEEDLES) 31G X 5 MM MISC 1 each by Does not apply route 2 times daily. 200 each 6    aspirin EC 81 MG EC tablet Take 1 tablet by mouth daily. 30 tablet 11    glucose blood VI test strips (ASCENSIA AUTODISC VI;ONE TOUCH ULTRA TEST VI) strip Apply 1 each topically 3 times daily. Onetouch Ultra 2 test strips  Dx: 250.00 300 strip 3    ONETOUCH DELICA LANCETS MISC 1 each by Does not apply route 3 times daily. 300 each 3    oxyCODONE-acetaminophen (PERCOCET) 5-325 MG per tablet Take 1 tablet by mouth every 6 hours as needed for Pain .  112 tablet 0    gabapentin (NEURONTIN) 400 MG capsule Take 1 capsule by mouth 3 times daily 90 capsule 0    sildenafil (REVATIO) 20 MG tablet Take 1 tablet by mouth daily 30 tablet 0    ALPRAZolam (XANAX) 0.5 MG tablet TAKE 1 TABLET BY MOUTH THREE TIMES DAILY AS NEEDED FOR SLEEP OR ANXIETY 270 tablet 0    Dulaglutide 0.75 MG/0.5ML SOPN Inject 0.75 mg into the skin once a week For a month then increase to 1.5 4 Pen 0    Dulaglutide 1.5 MG/0.5ML SOPN Inject 1.5 mg into the skin once a week (START after 4 weeks on the 0.75) 4 Pen 2    albuterol sulfate  (90 BASE) MCG/ACT inhaler Inhale 2 puffs into the lungs every 4 hours as needed for Wheezing May substitute for insurance preferred (Ventolin, Proventil, ProAir) 1 Inhaler 3    fluocinonide (LIDEX) 0.05 % cream Apply topically 2 times daily. 200 g 3     No facility-administered medications prior to visit. SOCIAL/FAMILY/PAST MEDICAL HISTORY: Mr. Danni Diallo, family and past medical history was reviewed. REVIEW OF SYSTEMS:    Respiratory: Negative for apnea, chest tightness and shortness of breath or change in baseline breathing. Gastrointestinal: Negative for nausea, vomiting, abdominal pain, diarrhea, constipation, blood in stool and abdominal distention. PHYSICAL EXAM:   Nursing note and vitals reviewed. /79   Pulse 92   Wt 282 lb (127.9 kg)   BMI 39.33 kg/m²   Constitutional: He appears well-developed and well-nourished. No acute distress. Skin: Skin is warm and dry, good turgor. No rash noted. He is not diaphoretic. Cardiovascular: Normal rate, regular rhythm, normal heart sounds, and does not have murmur. Pulmonary/Chest: Effort normal. No respiratory distress. He does not have wheezes in the lung fields. He has no rales. Neurological/Psychiatric:He is alert and oriented to person, place, and time. Coordination is  normal. His mood isAppropriate and affect is Flat/blunted . IMPRESSION:   1. Chronic pain syndrome    2. Lumbar disc disease    3. Facet arthropathy, lumbar ([address]    4. DDD (degenerative disc disease), thoracolumbar    5. DDD (degenerative disc disease), cervical    6. Anxiety    7. Psoriasis    8. Obesity, Class III, BMI 40-49.9 (morbid obesity) ([facility])        PLAN:  Informed verbal consent was obtained  -Continue with Percocet, Neurontin  -Decreased Xanax to 0.5 mg po daily PRN, He reports taking it only as needed.   -Education provided on taking benzodiazepines with narcotics, as the side effects can be dangerous as benzodiazepines intensify the effects of opioids, which can lead to central nervous depression, confusion or delirium.  Patient verbalized understanding.  -He was advised weight reduction, diet changes- 800-1200 jarrod diet, diet diary, exercising, nutritional consult increased physical activity as tolerated   -CBT techniques- relaxation therapies such as biofeedback, mindfulness based stress reduction, imagery, cognitive restructuring, problem solving discussed with patient  -Last UDS passed  -Return in about 4 weeks (around 11/8/2017). Current Outpatient Prescriptions   Medication Sig Dispense Refill    oxyCODONE-acetaminophen (PERCOCET) 5-325 MG per tablet Take 1 tablet by mouth every 6 hours as needed for Pain . 112 tablet 0    gabapentin (NEURONTIN) 400 MG capsule Take 1 capsule by mouth 3 times daily 90 capsule 0    ALPRAZolam (XANAX) 0.5 MG tablet TAKE 1 TABLET BY MOUTH ONCE DAILY AS NEEDED FOR ANXIETY 270 tablet 0    carisoprodol (SOMA) 350 MG tablet Take 1 tablet by mouth 3 times daily as needed for Muscle spasms 90 tablet 1    Naproxen Sodium (ALEVE) 220 MG CAPS Take 1 tablet by mouth 2 times daily as needed for Pain 60 capsule 0    diclofenac sodium (VOLTAREN) 1 % GEL Apply 2-4 g topically 2 times daily 5 Tube 0    valsartan-hydrochlorothiazide (DIOVAN-HCT) 320-12.5 MG per tablet TAKE 1 TABLET EVERY DAY 90 tablet 1    atorvastatin (LIPITOR) 80 MG tablet TAKE 1 TABLET EVERY DAY 90 tablet 1    carvedilol (COREG) 3.125 MG tablet TAKE 1 TABLET TWICE DAILY WITH MEALS 180 tablet 1    metFORMIN (GLUCOPHAGE-XR) 500 MG extended release tablet TAKE 4 TABLETS EVERY DAY WITH BREAKFAST 360 tablet 1    mometasone (ELOCON) 0.1 % ointment Apply topically twice daily. 90 g 5    canagliflozin (INVOKANA) 300 MG TABS tablet Take 1 tablet by mouth every morning (before breakfast) 30 tablet 2    Insulin Pen Needle (B-D UF III MINI PEN NEEDLES) 31G X 5 MM MISC 1 each by Does not apply route 2 times daily. 200 each 6    aspirin EC 81 MG EC tablet Take 1 tablet by mouth daily. 30 tablet 11    glucose blood VI test strips (ASCENSIA AUTODISC VI;ONE TOUCH ULTRA TEST VI) strip Apply 1 each topically 3 times daily.  Onetouch Ultra 2 test strips  Dx: 250.00 300 strip 3   

## 2017-11-08 ENCOUNTER — OFFICE VISIT (OUTPATIENT)
Dept: PAIN MANAGEMENT | Age: 62
End: 2017-11-08

## 2017-11-08 VITALS
HEART RATE: 105 BPM | SYSTOLIC BLOOD PRESSURE: 120 MMHG | BODY MASS INDEX: 38.63 KG/M2 | DIASTOLIC BLOOD PRESSURE: 84 MMHG | WEIGHT: 277 LBS

## 2017-11-08 DIAGNOSIS — M50.30 DDD (DEGENERATIVE DISC DISEASE), CERVICAL: ICD-10-CM

## 2017-11-08 DIAGNOSIS — F41.9 ANXIETY: ICD-10-CM

## 2017-11-08 DIAGNOSIS — M51.9 LUMBAR DISC DISEASE: ICD-10-CM

## 2017-11-08 DIAGNOSIS — G89.4 CHRONIC PAIN SYNDROME: Primary | ICD-10-CM

## 2017-11-08 DIAGNOSIS — M47.816 FACET ARTHROPATHY, LUMBAR: ICD-10-CM

## 2017-11-08 DIAGNOSIS — M51.35 DDD (DEGENERATIVE DISC DISEASE), THORACOLUMBAR: ICD-10-CM

## 2017-11-08 DIAGNOSIS — E66.01 OBESITY, CLASS III, BMI 40-49.9 (MORBID OBESITY) (HCC): ICD-10-CM

## 2017-11-08 PROCEDURE — 99213 OFFICE O/P EST LOW 20 MIN: CPT | Performed by: NURSE PRACTITIONER

## 2017-11-08 PROCEDURE — 1036F TOBACCO NON-USER: CPT | Performed by: NURSE PRACTITIONER

## 2017-11-08 PROCEDURE — G8484 FLU IMMUNIZE NO ADMIN: HCPCS | Performed by: NURSE PRACTITIONER

## 2017-11-08 PROCEDURE — 3017F COLORECTAL CA SCREEN DOC REV: CPT | Performed by: NURSE PRACTITIONER

## 2017-11-08 PROCEDURE — G8417 CALC BMI ABV UP PARAM F/U: HCPCS | Performed by: NURSE PRACTITIONER

## 2017-11-08 PROCEDURE — G8599 NO ASA/ANTIPLAT THER USE RNG: HCPCS | Performed by: NURSE PRACTITIONER

## 2017-11-08 PROCEDURE — G8427 DOCREV CUR MEDS BY ELIG CLIN: HCPCS | Performed by: NURSE PRACTITIONER

## 2017-11-08 RX ORDER — OXYCODONE HYDROCHLORIDE AND ACETAMINOPHEN 5; 325 MG/1; MG/1
1 TABLET ORAL EVERY 6 HOURS PRN
Qty: 112 TABLET | Refills: 0 | Status: SHIPPED | OUTPATIENT
Start: 2017-11-08 | End: 2017-12-06 | Stop reason: SDUPTHER

## 2017-11-08 RX ORDER — GABAPENTIN 400 MG/1
400 CAPSULE ORAL 3 TIMES DAILY
Qty: 90 CAPSULE | Refills: 0 | Status: SHIPPED | OUTPATIENT
Start: 2017-11-08 | End: 2017-12-06 | Stop reason: SDUPTHER

## 2017-11-08 NOTE — PATIENT INSTRUCTIONS
Patient Education        Back Stretches: Exercises  Your Care Instructions  Here are some examples of exercises for stretching your back. Start each exercise slowly. Ease off the exercise if you start to have pain. Your doctor or physical therapist will tell you when you can start these exercises and which ones will work best for you. How to do the exercises  Overhead stretch    1. Stand comfortably with your feet shoulder-width apart. 2. Looking straight ahead, raise both arms over your head and reach toward the ceiling. Do not allow your head to tilt back. 3. Hold for 15 to 30 seconds, then lower your arms to your sides. 4. Repeat 2 to 4 times. Side stretch    1. Stand comfortably with your feet shoulder-width apart. 2. Raise one arm over your head, and then lean to the other side. 3. Slide your hand down your leg as you let the weight of your arm gently stretch your side muscles. Hold for 15 to 30 seconds. 4. Repeat 2 to 4 times on each side. Press-up    1. Lie on your stomach, supporting your body with your forearms. 2. Press your elbows down into the floor to raise your upper back. As you do this, relax your stomach muscles and allow your back to arch without using your back muscles. As your press up, do not let your hips or pelvis come off the floor. 3. Hold for 15 to 30 seconds, then relax. 4. Repeat 2 to 4 times. Relax and rest    1. Lie on your back with a rolled towel under your neck and a pillow under your knees. Extend your arms comfortably to your sides. 2. Relax and breathe normally. 3. Remain in this position for about 10 minutes. 4. If you can, do this 2 or 3 times each day. Follow-up care is a key part of your treatment and safety. Be sure to make and go to all appointments, and call your doctor if you are having problems. It's also a good idea to know your test results and keep a list of the medicines you take. Where can you learn more?   Go to

## 2017-11-08 NOTE — PROGRESS NOTES
(ASCENSIA AUTODISC VI;ONE TOUCH ULTRA TEST VI) strip Apply 1 each topically 3 times daily. Onetouch Ultra 2 test strips  Dx: 250.00 300 strip 3    ONETOUCH DELICA LANCETS MISC 1 each by Does not apply route 3 times daily. 300 each 3     No current facility-administered medications for this visit. I will continue his current medication regimen  which is part of the above treatment schedule. It has been helping with Mr. Alcon High chronic  medical problems which for this visit include: The primary encounter diagnosis was Chronic pain syndrome. Diagnoses of DDD (degenerative disc disease), thoracolumbar, Facet arthropathy, lumbar, DDD (degenerative disc disease), cervical, Lumbar disc disease, Anxiety, and Obesity, Class III, BMI 40-49.9 (morbid obesity) ([facility] [address] were also pertinent to this visit. Risks and benefits of the medications and other alternative treatments  including no treatment were discussed with the patient. The common side effects of these medications were also explained to the patient. Informed verbal consent was obtained. Goals of current treatment regimen include improvement in pain, restoration of functioning- with focus on improvement in physical performance, general activity, work or disability,emotional distress, health care utilization and  decreased medication consumption. Will continue to monitor progress towards achieving/maintaining therapeutic goals with special emphasis on  1. Improvement in perceived interfernce  of pain with ADL's. Ability to do home exercises independently. Ability to do household chores indoor and/or outdoor work and social and leisure activities. Improve psychosocial and physical functioning. - he is showing progression towards this treatment goal with the current regimen.     He was advised against drinking alcohol with the narcotic pain medicines, advised against driving or handling machinery while adjusting the dose of medicines or if having cognitive  issues

## 2017-11-11 DIAGNOSIS — G89.4 CHRONIC PAIN SYNDROME: Primary | ICD-10-CM

## 2017-11-11 DIAGNOSIS — I10 ESSENTIAL HYPERTENSION: ICD-10-CM

## 2017-11-13 ENCOUNTER — TELEPHONE (OUTPATIENT)
Dept: FAMILY MEDICINE CLINIC | Age: 62
End: 2017-11-13

## 2017-11-13 RX ORDER — SILDENAFIL CITRATE 20 MG/1
TABLET ORAL
Qty: 30 TABLET | Refills: 0 | Status: SHIPPED | OUTPATIENT
Start: 2017-11-13 | End: 2017-12-14 | Stop reason: SDUPTHER

## 2017-11-16 ENCOUNTER — OFFICE VISIT (OUTPATIENT)
Dept: FAMILY MEDICINE CLINIC | Age: 62
End: 2017-11-16

## 2017-11-16 VITALS
RESPIRATION RATE: 16 BRPM | BODY MASS INDEX: 39.62 KG/M2 | DIASTOLIC BLOOD PRESSURE: 60 MMHG | HEIGHT: 71 IN | TEMPERATURE: 99.1 F | SYSTOLIC BLOOD PRESSURE: 112 MMHG | HEART RATE: 86 BPM | WEIGHT: 283 LBS

## 2017-11-16 DIAGNOSIS — I25.10 CORONARY ARTERY DISEASE INVOLVING NATIVE HEART WITHOUT ANGINA PECTORIS, UNSPECIFIED VESSEL OR LESION TYPE: ICD-10-CM

## 2017-11-16 DIAGNOSIS — Z12.5 SCREENING PSA (PROSTATE SPECIFIC ANTIGEN): ICD-10-CM

## 2017-11-16 DIAGNOSIS — E66.01 OBESITY, CLASS III, BMI 40-49.9 (MORBID OBESITY) (HCC): ICD-10-CM

## 2017-11-16 DIAGNOSIS — Z23 NEED FOR TETANUS BOOSTER: ICD-10-CM

## 2017-11-16 DIAGNOSIS — B18.1 CHRONIC ACTIVE VIRAL HEPATITIS B (HCC): ICD-10-CM

## 2017-11-16 DIAGNOSIS — I10 ESSENTIAL HYPERTENSION: ICD-10-CM

## 2017-11-16 DIAGNOSIS — K76.0 FATTY LIVER: ICD-10-CM

## 2017-11-16 DIAGNOSIS — F11.20 CHRONIC NARCOTIC DEPENDENCE (HCC): ICD-10-CM

## 2017-11-16 DIAGNOSIS — R74.8 ELEVATED LIVER ENZYMES: ICD-10-CM

## 2017-11-16 DIAGNOSIS — E78.5 HYPERLIPIDEMIA WITH TARGET LDL LESS THAN 100: ICD-10-CM

## 2017-11-16 DIAGNOSIS — Z00.00 WELL ADULT HEALTH CHECK: Primary | ICD-10-CM

## 2017-11-16 DIAGNOSIS — Z23 NEEDS FLU SHOT: ICD-10-CM

## 2017-11-16 PROCEDURE — G0008 ADMIN INFLUENZA VIRUS VAC: HCPCS | Performed by: FAMILY MEDICINE

## 2017-11-16 PROCEDURE — G0439 PPPS, SUBSEQ VISIT: HCPCS | Performed by: FAMILY MEDICINE

## 2017-11-16 PROCEDURE — 3017F COLORECTAL CA SCREEN DOC REV: CPT | Performed by: FAMILY MEDICINE

## 2017-11-16 PROCEDURE — G8427 DOCREV CUR MEDS BY ELIG CLIN: HCPCS | Performed by: FAMILY MEDICINE

## 2017-11-16 PROCEDURE — 90630 INFLUENZA, QUADV, 18-64 YRS, ID, PF, MICRO INJ, 0.1ML (FLUZONE QUADV, PF): CPT | Performed by: FAMILY MEDICINE

## 2017-11-16 PROCEDURE — 99214 OFFICE O/P EST MOD 30 MIN: CPT | Performed by: FAMILY MEDICINE

## 2017-11-16 PROCEDURE — 3046F HEMOGLOBIN A1C LEVEL >9.0%: CPT | Performed by: FAMILY MEDICINE

## 2017-11-16 PROCEDURE — 90471 IMMUNIZATION ADMIN: CPT | Performed by: FAMILY MEDICINE

## 2017-11-16 PROCEDURE — 1036F TOBACCO NON-USER: CPT | Performed by: FAMILY MEDICINE

## 2017-11-16 PROCEDURE — G8417 CALC BMI ABV UP PARAM F/U: HCPCS | Performed by: FAMILY MEDICINE

## 2017-11-16 PROCEDURE — 90715 TDAP VACCINE 7 YRS/> IM: CPT | Performed by: FAMILY MEDICINE

## 2017-11-16 PROCEDURE — G8599 NO ASA/ANTIPLAT THER USE RNG: HCPCS | Performed by: FAMILY MEDICINE

## 2017-11-16 PROCEDURE — G8484 FLU IMMUNIZE NO ADMIN: HCPCS | Performed by: FAMILY MEDICINE

## 2017-11-16 RX ORDER — TRIAMCINOLONE ACETONIDE 1 MG/G
CREAM TOPICAL 2 TIMES DAILY
COMMUNITY
End: 2017-11-16 | Stop reason: SDUPTHER

## 2017-11-16 ASSESSMENT — PATIENT HEALTH QUESTIONNAIRE - PHQ9: SUM OF ALL RESPONSES TO PHQ QUESTIONS 1-9: 0

## 2017-11-16 ASSESSMENT — LIFESTYLE VARIABLES: HOW OFTEN DO YOU HAVE A DRINK CONTAINING ALCOHOL: 0

## 2017-11-16 ASSESSMENT — ANXIETY QUESTIONNAIRES: GAD7 TOTAL SCORE: 0

## 2017-11-16 NOTE — PATIENT INSTRUCTIONS
FYI: While Medicare provides you with a FREE ANNUAL PREVENTIVE PHYSICAL, this visit does NOT include management of chronic medical problems or physical examination. Dr. Mirian Guthrie usually does a combination visit if you have other medical problems so you don't have to come back for another visit. However, this means that there will be a co-pay. INSTRUCTIONS  NEXT APPOINTMENT: Please schedule check-up in 3 months. PLEASE GET FASTING BLOODWORK DRAWN SOON. Lab is on first floor in [address]. Hours Monday to Friday 7 AM to 5 PM.  Take orders with you. RESULTS- most blood tests back in couple days. We will call you if any problems. If bloodwork good, you will get letter in mail or notified thru [address] (if signed up) within 2 weeks. If you do not, please call office. Call with name of hep B medication. Will see if able to get shingles vaccine  Please get annual dilated eye exam to screen for diabetic retinopathy which can lead to vision loss. Ask for report to be faxed to [phone].   ·   Patient Education        Personalized Preventive Plan  Health Maintenance Due   Topic Date Due    Diabetic retinal exam  1966    DTaP/Tdap/Td vaccine (1 - Tdap) 2006    Zostavax vaccine  2016    Diabetic foot exam  2016    Lipid screen  2017    Diabetic microalbuminuria test  2017    PSA counseling  2017    Flu vaccine (1) 2017    Diabetic hemoglobin A1C test  2017     Other Preventive Recommendations:  · A preventive eye exam performed by an eye specialist is recommended every 1-2 years to screen for glaucoma; cataracts, macular degeneration, and other eye disorders  · A preventive dental visit is recommended every 6 months  · Try to get at least 150 minutes of exercise per week or 10,000 steps per day on a pedometer  · Order FREE \"Exercise and Physical Activity\" book from the [employer] on Agin1-245.240.1170 or fluids given through a vein (IV). Understanding these treatments will help you decide whether you want them. · You may choose to have these life-supporting treatments for a limited time. This allows a trial period to see whether they will help you. You may also decide that you want your doctor to take only certain measures to keep you alive. It is important to spell out these conditions so that your doctor and family understand them. · Talk to your doctor about how long you are likely to live. He or she may be able to give you an idea of what usually happens with your specific illness. · Think about preparing papers that state your wishes. This way there will not be any confusion about what you want. You can change your instructions at any time. Which papers should you prepare? Advance directives are legal papers that tell doctors how you want to be cared for at the end of your life. You do not need a  to write these papers. Ask your doctor or your state [facility] department for information on how to write your advance directives. They may have the forms for each of these types of papers. Make sure your doctor has a copy of these on file, and give a copy to a family member or close friend. · Consider a do-not-resuscitate order (DNR). This order asks that no extra treatments be done if your heart stops or you stop breathing. Extra treatments may include cardiopulmonary resuscitation (CPR), electrical shock to restart your heart, or a machine to breathe for you. If you decide to have a DNR order, ask your doctor to explain and write it. Place the order in your home where everyone can easily see it. · Consider a living will. A living will explains your wishes about life support and other treatments at the end of your life if you become unable to speak for yourself. Living emery tell doctors to use or not use treatments that would keep you alive.  You must have one or two witnesses or a notary present when you sign this form. · Consider a durable power of  for health care. This allows you to name a person to make decisions about your care if you are not able to. Most people ask a close friend or family member. Talk to this person about the kinds of treatments you want and those that you do not want. Make sure this person understands your wishes. These legal papers are simple to change. Tell your doctor what you want to change, and ask him or her to make a note in your medical file. Give your family updated copies of the papers. Where can you learn more? Go to [email or web address] and sign in to your [employer] account. Enter P184 in the [facility] box to learn more about \"Advance Care Planning: Care Instructions. \"     If you do not have an account, please click on the \"Sign Up Now\" link. Current as of: November 17, 2016  Content Version: 11.3  © 9840-6822 "[employer]. Care instructions adapted under license by [facility] [place] ([facility]). If you have questions about a medical condition or this instruction, always ask your healthcare professional. [place] [address] any warranty or liability for your use of this information. Patient Education        Advance Care Planning: Care Instructions  Your Care Instructions  It can be hard to live with an illness that cannot be cured. But if your health is getting worse, you may want to make decisions about end-of-life care. Planning for the end of your life does not mean that you are giving up. It is a way to make sure that your wishes are met. Clearly stating your wishes can make it easier for your loved ones. Making plans while you are still able may also ease your mind and make your final days less stressful and more meaningful. Follow-up care is a key part of your treatment and safety. Be sure to make and go to all appointments, and call your doctor if you are having problems.  It's also a good idea to know your test results and keep a list of the medicines you take. What can you do to plan for the end of life? · You can bring these issues up with your doctor. You do not need to wait until your doctor starts the conversation. You might start with \"I would not be willing to live with . Chales Minus Chales Minus Chales Minus \" When you complete this sentence it helps your doctor understand your wishes. · Talk openly and honestly with your doctor. This is the best way to understand the decisions you will need to make as your health changes. Know that you can always change your mind. · Ask your doctor about commonly used life-support measures. These include tube feedings, breathing machines, and fluids given through a vein (IV). Understanding these treatments will help you decide whether you want them. · You may choose to have these life-supporting treatments for a limited time. This allows a trial period to see whether they will help you. You may also decide that you want your doctor to take only certain measures to keep you alive. It is important to spell out these conditions so that your doctor and family understand them. · Talk to your doctor about how long you are likely to live. He or she may be able to give you an idea of what usually happens with your specific illness. · Think about preparing papers that state your wishes. This way there will not be any confusion about what you want. You can change your instructions at any time. Which papers should you prepare? Advance directives are legal papers that tell doctors how you want to be cared for at the end of your life. You do not need a  to write these papers. Ask your doctor or your state health department for information on how to write your advance directives. They may have the forms for each of these types of papers. Make sure your doctor has a copy of these on file, and give a copy to a family member or close friend. · Consider a do-not-resuscitate order (DNR).  This order asks that no extra treatments be done if your heart stops or you stop breathing. Extra treatments may include cardiopulmonary resuscitation (CPR), electrical shock to restart your heart, or a machine to breathe for you. If you decide to have a DNR order, ask your doctor to explain and write it. Place the order in your home where everyone can easily see it. · Consider a living will. A living will explains your wishes about life support and other treatments at the end of your life if you become unable to speak for yourself. Living emery tell doctors to use or not use treatments that would keep you alive. You must have one or two witnesses or a notary present when you sign this form. · Consider a durable power of  for health care. This allows you to name a person to make decisions about your care if you are not able to. Most people ask a close friend or family member. Talk to this person about the kinds of treatments you want and those that you do not want. Make sure this person understands your wishes. These legal papers are simple to change. Tell your doctor what you want to change, and ask him or her to make a note in your medical file. Give your family updated copies of the papers. Where can you learn more? Go to [email or web address] and sign in to your [employer] account. Enter P184 in the [facility] box to learn more about \"Advance Care Planning: Care Instructions. \"     If you do not have an account, please click on the \"Sign Up Now\" link. Current as of: November 17, 2016  Content Version: 11.3  © 2010-0133 [employer]. Care instructions adapted under license by [facility] ([facility]). If you have questions about a medical condition or this instruction, always ask your healthcare professional. [place] [address] any warranty or liability for your use of this information.        Patient Education        Advance Care Planning: Care Instructions  Your Care in to your [employer] account. Enter P184 in the [facility] box to learn more about \"Advance Care Planning: Care Instructions. \"     If you do not have an account, please click on the \"Sign Up Now\" link. Current as of: November 17, 2016  Content Version: 11.3  © 6592-3669 [employer] Care instructions adapted under license by [facility] ([facility]). If you have questions about a medical condition or this instruction, always ask your healthcare professional. [address] any warranty or liability for your use of this information. Patient Education        Preventing Falls: Care Instructions  Your Care Instructions  Getting around your home safely can be a challenge if you have injuries or health problems that make it easy for you to fall. Loose rugs and furniture in walkways are among the dangers for many older people who have problems walking or who have poor eyesight. People who have conditions such as arthritis, osteoporosis, or dementia also have to be careful not to fall. You can make your home safer with a few simple measures. Follow-up care is a key part of your treatment and safety. Be sure to make and go to all appointments, and call your doctor if you are having problems. It's also a good idea to know your test results and keep a list of the medicines you take. How can you care for yourself at home? Taking care of yourself  · You may get dizzy if you do not drink enough water. To prevent dehydration, drink plenty of fluids, enough so that your urine is light yellow or clear like water. Choose water and other caffeine-free clear liquids. If you have kidney, heart, or liver disease and have to limit fluids, talk with your doctor before you increase the amount of fluids you drink. · Exercise regularly to improve your strength, muscle tone, and balance. Walk if you can. Swimming may be a good choice if you cannot walk easily.   · Have your vision and hearing English
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
## 2017-11-17 ENCOUNTER — TELEPHONE (OUTPATIENT)
Dept: FAMILY MEDICINE CLINIC | Age: 62
End: 2017-11-17

## 2017-11-17 DIAGNOSIS — Z12.5 SCREENING PSA (PROSTATE SPECIFIC ANTIGEN): ICD-10-CM

## 2017-11-17 DIAGNOSIS — E78.5 HYPERLIPIDEMIA WITH TARGET LDL LESS THAN 100: ICD-10-CM

## 2017-11-17 LAB
CHOLESTEROL, TOTAL: 156 MG/DL (ref 0–199)
CREATININE URINE: 54.9 MG/DL (ref 39–259)
ESTIMATED AVERAGE GLUCOSE: 214.5 MG/DL
HBA1C MFR BLD: 9.1 %
HDLC SERPL-MCNC: 35 MG/DL (ref 40–60)
LDL CHOLESTEROL CALCULATED: 77 MG/DL
MICROALBUMIN UR-MCNC: <1.2 MG/DL
MICROALBUMIN/CREAT UR-RTO: NORMAL MG/G (ref 0–30)
PROSTATE SPECIFIC ANTIGEN: 2.47 NG/ML (ref 0–4)
TRIGL SERPL-MCNC: 218 MG/DL (ref 0–150)
VLDLC SERPL CALC-MCNC: 44 MG/DL

## 2017-11-17 RX ORDER — ENTECAVIR 1 MG/1
1 TABLET, FILM COATED ORAL DAILY
Qty: 30 TABLET | Refills: 3 | COMMUNITY
Start: 2017-11-17 | End: 2019-01-21

## 2017-11-17 RX ORDER — TRIAMCINOLONE ACETONIDE 1 MG/G
CREAM TOPICAL 2 TIMES DAILY
Qty: 80 G | Refills: 3 | Status: SHIPPED | OUTPATIENT
Start: 2017-11-17 | End: 2020-01-08

## 2017-11-17 NOTE — TELEPHONE ENCOUNTER
PATIENT STOPPED BY REQUESTING Rx:      DYLON ENTECAVIR TABLETS 0.5 MG (30 TABS)         triamcinolone (KENALOG) 0.1 % cream      PLEASE ADVISE   THANK YOU

## 2017-11-17 NOTE — TELEPHONE ENCOUNTER
triamcinolone (KENALOG) 0.1 % cream     Pt said Dr. Promise Bennett was going to call in a cream for him. I see this in his chart but it says hist selma Bobo is checking with Dr. Promise Bennett for me.     Pt is in the lab and

## 2017-11-21 ENCOUNTER — TELEPHONE (OUTPATIENT)
Dept: FAMILY MEDICINE CLINIC | Age: 62
End: 2017-11-21

## 2017-11-22 RX ORDER — CARISOPRODOL 350 MG/1
TABLET ORAL
Qty: 90 TABLET | Refills: 1 | Status: SHIPPED | OUTPATIENT
Start: 2017-11-22 | End: 2017-12-22 | Stop reason: SDUPTHER

## 2017-12-06 ENCOUNTER — OFFICE VISIT (OUTPATIENT)
Dept: PAIN MANAGEMENT | Age: 62
End: 2017-12-06

## 2017-12-06 VITALS — SYSTOLIC BLOOD PRESSURE: 125 MMHG | OXYGEN SATURATION: 93 % | DIASTOLIC BLOOD PRESSURE: 77 MMHG | HEART RATE: 74 BPM

## 2017-12-06 DIAGNOSIS — G47.30 SLEEP APNEA, UNSPECIFIED TYPE: ICD-10-CM

## 2017-12-06 DIAGNOSIS — E66.01 OBESITY, CLASS III, BMI 40-49.9 (MORBID OBESITY) (HCC): ICD-10-CM

## 2017-12-06 DIAGNOSIS — M50.30 DDD (DEGENERATIVE DISC DISEASE), CERVICAL: ICD-10-CM

## 2017-12-06 DIAGNOSIS — M51.35 DDD (DEGENERATIVE DISC DISEASE), THORACOLUMBAR: ICD-10-CM

## 2017-12-06 DIAGNOSIS — G89.4 CHRONIC PAIN SYNDROME: Primary | ICD-10-CM

## 2017-12-06 DIAGNOSIS — F41.9 ANXIETY: ICD-10-CM

## 2017-12-06 DIAGNOSIS — M47.816 FACET ARTHROPATHY, LUMBAR: ICD-10-CM

## 2017-12-06 PROCEDURE — 1036F TOBACCO NON-USER: CPT | Performed by: NURSE PRACTITIONER

## 2017-12-06 PROCEDURE — 3017F COLORECTAL CA SCREEN DOC REV: CPT | Performed by: NURSE PRACTITIONER

## 2017-12-06 PROCEDURE — G8599 NO ASA/ANTIPLAT THER USE RNG: HCPCS | Performed by: NURSE PRACTITIONER

## 2017-12-06 PROCEDURE — G8427 DOCREV CUR MEDS BY ELIG CLIN: HCPCS | Performed by: NURSE PRACTITIONER

## 2017-12-06 PROCEDURE — G8417 CALC BMI ABV UP PARAM F/U: HCPCS | Performed by: NURSE PRACTITIONER

## 2017-12-06 PROCEDURE — G8484 FLU IMMUNIZE NO ADMIN: HCPCS | Performed by: NURSE PRACTITIONER

## 2017-12-06 PROCEDURE — 99213 OFFICE O/P EST LOW 20 MIN: CPT | Performed by: NURSE PRACTITIONER

## 2017-12-06 RX ORDER — OXYCODONE HYDROCHLORIDE AND ACETAMINOPHEN 5; 325 MG/1; MG/1
1 TABLET ORAL EVERY 6 HOURS PRN
Qty: 112 TABLET | Refills: 0 | Status: SHIPPED | OUTPATIENT
Start: 2017-12-06 | End: 2018-01-03

## 2017-12-06 RX ORDER — GABAPENTIN 400 MG/1
400 CAPSULE ORAL 3 TIMES DAILY
Qty: 90 CAPSULE | Refills: 0 | Status: SHIPPED | OUTPATIENT
Start: 2017-12-06 | End: 2018-01-03 | Stop reason: SDUPTHER

## 2017-12-07 NOTE — PROGRESS NOTES
Apply topically 2 times daily Apply topically 2 times daily. 80 g 3    entecavir (BARACLUDE) 1 MG tablet Take 1 tablet by mouth daily 30 tablet 3    sildenafil (REVATIO) 20 MG tablet TAKE 1 TABLET BY MOUTH DAILY AS NEEDED 30 MINUTES PRIOR TO ACTIVITY 30 tablet 0    ALPRAZolam (XANAX) 0.5 MG tablet TAKE 1 TABLET BY MOUTH ONCE DAILY AS NEEDED FOR ANXIETY 270 tablet 0    Naproxen Sodium (ALEVE) 220 MG CAPS Take 1 tablet by mouth 2 times daily as needed for Pain 60 capsule 0    diclofenac sodium (VOLTAREN) 1 % GEL Apply 2-4 g topically 2 times daily 5 Tube 0    valsartan-hydrochlorothiazide (DIOVAN-HCT) 320-12.5 MG per tablet TAKE 1 TABLET EVERY DAY 90 tablet 1    atorvastatin (LIPITOR) 80 MG tablet TAKE 1 TABLET EVERY DAY 90 tablet 1    carvedilol (COREG) 3.125 MG tablet TAKE 1 TABLET TWICE DAILY WITH MEALS 180 tablet 1    metFORMIN (GLUCOPHAGE-XR) 500 MG extended release tablet TAKE 4 TABLETS EVERY DAY WITH BREAKFAST 360 tablet 1    mometasone (ELOCON) 0.1 % ointment Apply topically twice daily. 90 g 5    canagliflozin (INVOKANA) 300 MG TABS tablet Take 1 tablet by mouth every morning (before breakfast) 30 tablet 2    aspirin EC 81 MG EC tablet Take 1 tablet by mouth daily. 30 tablet 11    glucose blood VI test strips (ASCENSIA AUTODISC VI;ONE TOUCH ULTRA TEST VI) strip Apply 1 each topically 3 times daily. Onetouch Ultra 2 test strips  Dx: 250.00 300 strip 3    ONETOUCH DELICA LANCETS MISC 1 each by Does not apply route 3 times daily. 300 each 3    oxyCODONE-acetaminophen (PERCOCET) 5-325 MG per tablet Take 1 tablet by mouth every 6 hours as needed for Pain . 112 tablet 0    gabapentin (NEURONTIN) 400 MG capsule Take 1 capsule by mouth 3 times daily 90 capsule 0     No facility-administered medications prior to visit. SOCIAL/FAMILY/PAST MEDICAL HISTORY: Mr. Margareth Cunningham, family and past medical history was reviewed.      REVIEW OF SYSTEMS:    Respiratory: Negative for apnea, chest tightness and shortness of breath or change in baseline breathing. Gastrointestinal: Negative for nausea, vomiting, abdominal pain, diarrhea, constipation, blood in stool and abdominal distention. PHYSICAL EXAM:   Nursing note and vitals reviewed. /77   Pulse 74   SpO2 93%   Constitutional: He appears well-developed and well-nourished. No acute distress. Skin: Skin is warm and dry, good turgor. No rash noted. He is not diaphoretic. Cardiovascular: Normal rate, regular rhythm, normal heart sounds, and does not have murmur. Pulmonary/Chest: Effort normal. No respiratory distress. He does not have wheezes in the lung fields. He has no rales. Neurological/Psychiatric:He is alert and oriented to person, place, and time. Coordination is  normal. His mood isAppropriate and affect is Flat/blunted . IMPRESSION:   1. Chronic pain syndrome    2. DDD (degenerative disc disease), cervical    3. DDD (degenerative disc disease), thoracolumbar    4. Facet arthropathy, lumbar    5. Anxiety    6. Sleep apnea, unspecified type    7. Obesity, Class III, BMI 40-49.9 (morbid obesity) ([facility])        PLAN:  Informed verbal consent was obtained  -Continue with Percocet, Neurontin, Xanax, Aleve, v.gel  -D/c Revatio  -Opted for home exercises  -He was advised weight reduction, diet changes- 800-1200 jarord diet, diet diary, exercising, nutritional  consult increased physical activity as tolerated  -CBT techniques- relaxation therapies such as biofeedback, mindfulness based stress reduction, imagery, cognitive restructuring, problem solving discussed with patient  -Last UDS consistent  -Return in about 4 weeks (around 1/3/2018). Current Outpatient Prescriptions   Medication Sig Dispense Refill    oxyCODONE-acetaminophen (PERCOCET) 5-325 MG per tablet Take 1 tablet by mouth every 6 hours as needed for Pain .  112 tablet 0    gabapentin (NEURONTIN) 400 MG capsule Take 1 capsule by mouth 3 times daily 90 capsule 0    terms of it's contents,there may have been some errors made inadvertently. Thank you for allowing me to participate in the care of this patient. Marveen Cooks CNP.     Cc: Alma Garcia MD

## 2017-12-14 DIAGNOSIS — G89.4 CHRONIC PAIN SYNDROME: ICD-10-CM

## 2017-12-14 DIAGNOSIS — I10 ESSENTIAL HYPERTENSION: ICD-10-CM

## 2017-12-14 RX ORDER — SILDENAFIL CITRATE 20 MG/1
TABLET ORAL
Qty: 30 TABLET | Refills: 0 | Status: SHIPPED | OUTPATIENT
Start: 2017-12-14 | End: 2018-01-31 | Stop reason: SDUPTHER

## 2017-12-19 ENCOUNTER — TELEPHONE (OUTPATIENT)
Dept: FAMILY MEDICINE CLINIC | Age: 62
End: 2017-12-19

## 2017-12-19 NOTE — TELEPHONE ENCOUNTER
Pt is calling to see if he can  some invakana samples. If so can you please call the pt. Pt CB# [phone]    Please advise,    Thanks.

## 2017-12-22 RX ORDER — CARISOPRODOL 350 MG/1
TABLET ORAL
Qty: 90 TABLET | Refills: 1 | Status: SHIPPED | OUTPATIENT
Start: 2017-12-22 | End: 2018-02-23 | Stop reason: SDUPTHER

## 2017-12-22 NOTE — TELEPHONE ENCOUNTER
Pt will like to  a hard copy of this medication to take to his pharmacy please. carisoprodol (SOMA) 350 MG tablet     Please advise,    Pt will like a call back when this is ready to be picked up please.     CB# [phone]

## 2017-12-27 DIAGNOSIS — I10 ESSENTIAL HYPERTENSION: ICD-10-CM

## 2017-12-27 RX ORDER — CARVEDILOL 3.12 MG/1
TABLET ORAL
Qty: 180 TABLET | Refills: 1 | Status: SHIPPED | OUTPATIENT
Start: 2017-12-27 | End: 2018-06-28 | Stop reason: SDUPTHER

## 2017-12-27 RX ORDER — VALSARTAN AND HYDROCHLOROTHIAZIDE 320; 12.5 MG/1; MG/1
TABLET, FILM COATED ORAL
Qty: 90 TABLET | Refills: 1 | Status: SHIPPED | OUTPATIENT
Start: 2017-12-27 | End: 2018-06-28 | Stop reason: SDUPTHER

## 2018-01-03 ENCOUNTER — OFFICE VISIT (OUTPATIENT)
Dept: PAIN MANAGEMENT | Age: 63
End: 2018-01-03

## 2018-01-03 VITALS — HEART RATE: 83 BPM | SYSTOLIC BLOOD PRESSURE: 119 MMHG | OXYGEN SATURATION: 95 % | DIASTOLIC BLOOD PRESSURE: 73 MMHG

## 2018-01-03 DIAGNOSIS — M54.41 CHRONIC BILATERAL LOW BACK PAIN WITH BILATERAL SCIATICA: ICD-10-CM

## 2018-01-03 DIAGNOSIS — M51.35 DDD (DEGENERATIVE DISC DISEASE), THORACOLUMBAR: ICD-10-CM

## 2018-01-03 DIAGNOSIS — G89.29 CHRONIC BILATERAL LOW BACK PAIN WITH BILATERAL SCIATICA: ICD-10-CM

## 2018-01-03 DIAGNOSIS — E66.01 OBESITY, CLASS III, BMI 40-49.9 (MORBID OBESITY) (HCC): ICD-10-CM

## 2018-01-03 DIAGNOSIS — M47.816 FACET ARTHROPATHY, LUMBAR: ICD-10-CM

## 2018-01-03 DIAGNOSIS — G89.4 CHRONIC PAIN SYNDROME: Primary | ICD-10-CM

## 2018-01-03 DIAGNOSIS — M54.42 CHRONIC BILATERAL LOW BACK PAIN WITH BILATERAL SCIATICA: ICD-10-CM

## 2018-01-03 DIAGNOSIS — F41.9 ANXIETY: ICD-10-CM

## 2018-01-03 DIAGNOSIS — M50.30 DDD (DEGENERATIVE DISC DISEASE), CERVICAL: ICD-10-CM

## 2018-01-03 PROCEDURE — G8599 NO ASA/ANTIPLAT THER USE RNG: HCPCS | Performed by: NURSE PRACTITIONER

## 2018-01-03 PROCEDURE — G8427 DOCREV CUR MEDS BY ELIG CLIN: HCPCS | Performed by: NURSE PRACTITIONER

## 2018-01-03 PROCEDURE — G8484 FLU IMMUNIZE NO ADMIN: HCPCS | Performed by: NURSE PRACTITIONER

## 2018-01-03 PROCEDURE — 1036F TOBACCO NON-USER: CPT | Performed by: NURSE PRACTITIONER

## 2018-01-03 PROCEDURE — G8417 CALC BMI ABV UP PARAM F/U: HCPCS | Performed by: NURSE PRACTITIONER

## 2018-01-03 PROCEDURE — 3017F COLORECTAL CA SCREEN DOC REV: CPT | Performed by: NURSE PRACTITIONER

## 2018-01-03 PROCEDURE — 99213 OFFICE O/P EST LOW 20 MIN: CPT | Performed by: NURSE PRACTITIONER

## 2018-01-03 RX ORDER — OXYCODONE AND ACETAMINOPHEN 7.5; 325 MG/1; MG/1
1 TABLET ORAL EVERY 6 HOURS PRN
Qty: 112 TABLET | Refills: 0 | Status: SHIPPED | OUTPATIENT
Start: 2018-01-03 | End: 2018-01-31 | Stop reason: SDUPTHER

## 2018-01-03 RX ORDER — GABAPENTIN 400 MG/1
400 CAPSULE ORAL 3 TIMES DAILY
Qty: 84 CAPSULE | Refills: 0 | Status: SHIPPED | OUTPATIENT
Start: 2018-01-03 | End: 2018-01-31 | Stop reason: SDUPTHER

## 2018-01-03 NOTE — PROGRESS NOTES
TAKE 1 TABLET EVERY DAY 90 tablet 1    carisoprodol (SOMA) 350 MG tablet Take 1 tablet by mouth 3 times daily as needed for Muscle spasms. 90 tablet 1    sildenafil (REVATIO) 20 MG tablet TAKE 1 TABLET BY MOUTH DAILY AS NEEDED 30 MINUTES PRIOR TO ACTIVITY 30 tablet 0    insulin detemir (LEVEMIR FLEXTOUCH) 100 UNIT/ML injection pen Inject 15 Units into the skin nightly 5 Pen 3    Insulin Pen Needle (B-D UF III MINI PEN NEEDLES) 31G X 5 MM MISC 1 each by Does not apply route daily 100 each 6    triamcinolone (KENALOG) 0.1 % cream Apply topically 2 times daily Apply topically 2 times daily. 80 g 3    entecavir (BARACLUDE) 1 MG tablet Take 1 tablet by mouth daily 30 tablet 3    ALPRAZolam (XANAX) 0.5 MG tablet TAKE 1 TABLET BY MOUTH ONCE DAILY AS NEEDED FOR ANXIETY 270 tablet 0    Naproxen Sodium (ALEVE) 220 MG CAPS Take 1 tablet by mouth 2 times daily as needed for Pain 60 capsule 0    diclofenac sodium (VOLTAREN) 1 % GEL Apply 2-4 g topically 2 times daily 5 Tube 0    atorvastatin (LIPITOR) 80 MG tablet TAKE 1 TABLET EVERY DAY 90 tablet 1    metFORMIN (GLUCOPHAGE-XR) 500 MG extended release tablet TAKE 4 TABLETS EVERY DAY WITH BREAKFAST 360 tablet 1    mometasone (ELOCON) 0.1 % ointment Apply topically twice daily. 90 g 5    canagliflozin (INVOKANA) 300 MG TABS tablet Take 1 tablet by mouth every morning (before breakfast) 30 tablet 2    aspirin EC 81 MG EC tablet Take 1 tablet by mouth daily. 30 tablet 11    glucose blood VI test strips (ASCENSIA AUTODISC VI;ONE TOUCH ULTRA TEST VI) strip Apply 1 each topically 3 times daily. Onetouch Ultra 2 test strips  Dx: 250.00 300 strip 3    ONETOUCH DELICA LANCETS MISC 1 each by Does not apply route 3 times daily. 300 each 3    oxyCODONE-acetaminophen (PERCOCET) 5-325 MG per tablet Take 1 tablet by mouth every 6 hours as needed for Pain .  112 tablet 0    gabapentin (NEURONTIN) 400 MG capsule Take 1 capsule by mouth 3 times daily 90 capsule 0     No facility-administered medications prior to visit. SOCIAL/FAMILY/PAST MEDICAL HISTORY: Mr. Ghassan White, family and past medical history was reviewed. REVIEW OF SYSTEMS:    Respiratory: Negative for apnea, chest tightness and shortness of breath or change in baseline breathing. Gastrointestinal: Negative for nausea, vomiting, abdominal pain, diarrhea, constipation, blood in stool and abdominal distention. PHYSICAL EXAM:   Nursing note and vitals reviewed. /73   Pulse 83   SpO2 95%   Constitutional: He appears well-developed and well-nourished. No acute distress. Skin: Skin is warm and dry, good turgor. No rash noted. He is not diaphoretic. Cardiovascular: Normal rate, regular rhythm, normal heart sounds, and does not have murmur. Pulmonary/Chest: Effort normal. No respiratory distress. He does not have wheezes in the lung fields. He has no rales. Neurological/Psychiatric:He is alert and oriented to person, place, and time. Coordination is  normal. Tenderness noted mainly to the lumbar region with palpation, flexion. His mood isAppropriate and affect is Flat/blunted . IMPRESSION:   1. Chronic pain syndrome    2. DDD (degenerative disc disease), thoracolumbar    3. DDD (degenerative disc disease), cervical    4. Facet arthropathy, lumbar    5. Chronic bilateral low back pain with bilateral sciatica    6. Anxiety    7.  Obesity, Class III, BMI 40-49.9 (morbid obesity) ([facility] [address]        PLAN:  Informed verbal consent was obtained  -Continue with Neurontin  -Take Percocet 7.5-325 mg po no more than 4 times a day  -Start PT  -Education provided on an CHEPE's, patient reports he has had them in the past and it provided no relief of pain  -He was advised weight reduction, diet changes- 800-1200 jarrod diet, diet diary, exercising, nutritional  consult increased physical activity as tolerated  -CBT techniques- relaxation therapies such as biofeedback, mindfulness based stress reduction,

## 2018-01-08 ENCOUNTER — HOSPITAL ENCOUNTER (OUTPATIENT)
Dept: OTHER | Age: 63
Discharge: HOME | End: 2018-01-31
Attending: NURSE PRACTITIONER | Admitting: NURSE PRACTITIONER

## 2018-01-08 NOTE — PLAN OF CARE
Outpatient Physical Therapy  [] [facility]    Phone: [phone]   Fax: [phone]   [x] [facility]  Phone: [phone]              Fax: [phone]  [] [facility]   Phone: [phone]   Fax: [phone]     To: Referring Practitioner: Charlene Field      Patient: Vic Arias   : 1955   MRN: [id number]  Evaluation Date: 2018      Diagnosis Information:  · Diagnosis: Chronic pain syndrome (G89.4 ICD-10-.4 ICD-9-CM); DDD (degenerative disc disease), thoracolumbar (M51.35 ICD-10-.51 ICD-9-CM); DDD (degenerative disc disease), cervical (M50.30 ICD-10-.4 ICD-9-CM); Facet arthropathy, lumbar (M12.88 ICD-10-.3 ICD-9-CM)   · Treatment Diagnosis: ADL dysfunction due to deconditioned, weak and hypomobile TLS spine/ core     Physical Therapy Certification/Re-Certification Form  Dear Charlene Field  The following patient has been evaluated for physical therapy services and for therapy to continue, Medicare requires monthly physician review of the treatment plan. Please review the attached evaluation and/or summary of the patient's plan of care, and verify that you agree therapy should continue by signing the attached document and sending it back to our office. Plan of Care/Treatment to date:  [x] Therapeutic Exercise    [x] Modalities:  [x] Therapeutic Activity     [x] Ultrasound  [x] Electrical Stimulation  [] Gait Training      [] Cervical Traction [x] Lumbar Traction  [x] Neuromuscular Re-education    [x] Cold/hotpack [] Iontophoresis   [x] Instruction in HEP     Other:  [x] Manual Therapy      []             [] Aquatic Therapy      []           ? Frequency/Duration:  # Days per week: [] 1 day # Weeks: [] 1 week [] 5 weeks     [x] 2 days?    [] 2 weeks [] 6 weeks     [] 3 days   [] 3 weeks [] 7 weeks     [] 4 days   [x] 4 weeks [] 8 weeks    Rehab Potential: [] Excellent [] Good [x] Fair  [x] Poor       Electronically signed by:  Tre Adam, PT      If you have any questions or concerns, please don't hesitate to call.   Thank you for your referral.      Physician Signature:________________________________Date:__________________  By signing above, therapists plan is approved by physician

## 2018-01-08 NOTE — PLAN OF CARE
Oswestry Disability Index 2.0    Patient:  Shara Wood     :   1955      MRN:  [id number]  Date: 2018  Electronically Signed by: Flora James [id number]    Please Read: Could you please complete this questionnaire. It is designed to give us information as to how your back (or leg) trouble has affected your ability to manage in everyday life. Please answer every section. Mynor one box only in each section that most closely describes you today  SECTION 1 - Pain Intensity  [ ]A. The pain comes and goes and is very mild. [ ]B. The pain is mild and does not vary much. [ Lelo Greene. The pain comes and goes and moderate.  [ ]D. The pain is moderate and does not vary much.  [ ]E. The pain comes and goes and is severe. [x ]F. The pain is severe and does not vary much. SECTION 6 - Standing   [ ]A. I can stand as long as I want without extra pain  [ ]B. I have some pain while standing, but it does not increase with time  [x ]C. I cannot stand for longer than one hour without increasing pain. [ ]D. I cannot stand for longer than 1/2 hour without increasing pain. [ Roberto Lima. I cannot stand for longer than ten minutes without increasing pain. [ ]F. I avoid standing, because it increases the pain straight away   SECTION 2 - Personal Care [employer], etc.)  [x ]A. I would not have to change my way of washing or dressing in order to avoid pain  [ ]B. I do not normally change my way of washing or dressing even thought it causes some pain  [ Lelo Greene. Washing and dressing increases pain, but I manage not to change my way of doing it.   [ ]D. Washing and dressing increases pain and I find it necessary to change my way of doing it.   [ ]E. Because of pain, I am unable to do some washing and dressing without help  [ ]F. Because of pain, I am unable to do any washing and dressing without help SECTION 7 - Sleeping  [ ]A. I get no pain in bed. [ ]B. I get pain in bed, but it does not prevent me from sleeping well.   [ Lelo Greene.

## 2018-01-08 NOTE — FLOWSHEET NOTE
Resistance/Repetitions Other comments                       PPT  With alt march           Fall outs                              NS Activity     Seated  Upright in chair without support of chairs back 01/08/18    Standing ADD                        HEP     LTR mid rom 2 min 01/08   PPT 10\" x 10 01/08    SKTC 30\" x 3 L/R  01/08   Partial bridges     Seated hams stretch     Partial sit ups            Other Therapeutic Activities:  01/08/18 The revised Oswestry was discussed with and applied to the pt. The patient both verbalized function and understanding of it's purpose. 01/08/18 Neutral Spine (NS) Instruction. The patient demonstrated good tolerance, technique  and verbalized understanding with and of NS instruction respectively. Home Exercise Program:    01/08/18 The patient demonstrated good tolerance to and understanding of the HEP. Manual Treatments:    DTM with alyce to dorsal LS PVM's    Modalities:  PTx 65# 2 steps up/ down 12 min total      Timed Code Treatment Minutes:  40    Total Treatment Minutes: 75     Treatment/Activity Tolerance:  [x] Patient tolerated treatment well [] Patient limited by fatigue  [] Patient limited by pain  [] Patient limited by other medical complications  [x] Other:  Pt reports he is not motivated to exercise or for PT for that matter    Prognosis: [] Good [x] Fair  [x] Poor    Patient Requires Follow-up: [] Yes  [] No    Plan:   [x] Continue per plan of care [] Alter current plan (see comments)  [x] Plan of care initiated [] Hold pending MD visit [] Discharge    Plan for Next Session:  Review/ add to HEP, man Rx may include hams  And piriformis stretching, review and add to NS activity.     Electronically signed by:  Payton Kimbrough PT

## 2018-01-08 NOTE — PROGRESS NOTES
1: Independent with seated neutral spine  Short term goal 2: decrease c/o LBP 25% with standing and walking  Long term goals  Time Frame for Long term goals : 4 weeks  Long term goal 1: independent with neutral spine standing and carrying 10#  Long term goal 2: Decrease c/o pain 50% or better with standing and walking  Long term goal 3: improve core strength to 4/5 or better   Patient Goals   Patient goals :  To ease back pain       Therapy Time   Individual Concurrent Group Co-treatment   Time In  1530         Time Out  1645         Minutes  [address] [place] [address]

## 2018-01-10 DIAGNOSIS — G89.4 CHRONIC PAIN SYNDROME: ICD-10-CM

## 2018-01-10 DIAGNOSIS — I10 ESSENTIAL HYPERTENSION: ICD-10-CM

## 2018-01-10 RX ORDER — SILDENAFIL CITRATE 20 MG/1
TABLET ORAL
Qty: 30 TABLET | Refills: 0 | OUTPATIENT
Start: 2018-01-10

## 2018-01-15 RX ORDER — ATORVASTATIN CALCIUM 80 MG/1
TABLET, FILM COATED ORAL
Qty: 90 TABLET | Refills: 1 | Status: SHIPPED | OUTPATIENT
Start: 2018-01-15 | End: 2018-08-28 | Stop reason: SDUPTHER

## 2018-01-15 RX ORDER — METFORMIN HYDROCHLORIDE 500 MG/1
TABLET, EXTENDED RELEASE ORAL
Qty: 360 TABLET | Refills: 1 | Status: SHIPPED | OUTPATIENT
Start: 2018-01-15 | End: 2018-11-02 | Stop reason: SDUPTHER

## 2018-01-23 RX ORDER — ALPRAZOLAM 0.5 MG/1
TABLET ORAL
Qty: 270 TABLET | Refills: 0 | Status: SHIPPED | OUTPATIENT
Start: 2018-01-23 | End: 2018-04-14 | Stop reason: SDUPTHER

## 2018-01-26 ENCOUNTER — TELEPHONE (OUTPATIENT)
Dept: FAMILY MEDICINE CLINIC | Age: 63
End: 2018-01-26

## 2018-01-26 NOTE — TELEPHONE ENCOUNTER
canagliflozin (INVOKANA) 300 MG TABS tablet     Wants to know if we have any samples he can get for this. Please call pt back.

## 2018-01-31 ENCOUNTER — OFFICE VISIT (OUTPATIENT)
Dept: PAIN MANAGEMENT | Age: 63
End: 2018-01-31

## 2018-01-31 VITALS
HEART RATE: 90 BPM | BODY MASS INDEX: 40.31 KG/M2 | OXYGEN SATURATION: 97 % | DIASTOLIC BLOOD PRESSURE: 90 MMHG | SYSTOLIC BLOOD PRESSURE: 150 MMHG | WEIGHT: 289 LBS

## 2018-01-31 DIAGNOSIS — G89.4 CHRONIC PAIN SYNDROME: ICD-10-CM

## 2018-01-31 DIAGNOSIS — I10 ESSENTIAL HYPERTENSION: ICD-10-CM

## 2018-01-31 DIAGNOSIS — M54.42 CHRONIC BILATERAL LOW BACK PAIN WITH BILATERAL SCIATICA: ICD-10-CM

## 2018-01-31 DIAGNOSIS — M54.41 CHRONIC BILATERAL LOW BACK PAIN WITH BILATERAL SCIATICA: ICD-10-CM

## 2018-01-31 DIAGNOSIS — M47.816 FACET ARTHROPATHY, LUMBAR: ICD-10-CM

## 2018-01-31 DIAGNOSIS — G89.4 CHRONIC PAIN SYNDROME: Primary | ICD-10-CM

## 2018-01-31 DIAGNOSIS — M50.30 DDD (DEGENERATIVE DISC DISEASE), CERVICAL: ICD-10-CM

## 2018-01-31 DIAGNOSIS — M51.35 DDD (DEGENERATIVE DISC DISEASE), THORACOLUMBAR: ICD-10-CM

## 2018-01-31 DIAGNOSIS — E66.01 OBESITY, CLASS III, BMI 40-49.9 (MORBID OBESITY) (HCC): ICD-10-CM

## 2018-01-31 DIAGNOSIS — F41.9 ANXIETY: ICD-10-CM

## 2018-01-31 DIAGNOSIS — G89.29 CHRONIC BILATERAL LOW BACK PAIN WITH BILATERAL SCIATICA: ICD-10-CM

## 2018-01-31 PROCEDURE — G8427 DOCREV CUR MEDS BY ELIG CLIN: HCPCS | Performed by: NURSE PRACTITIONER

## 2018-01-31 PROCEDURE — 1036F TOBACCO NON-USER: CPT | Performed by: NURSE PRACTITIONER

## 2018-01-31 PROCEDURE — G8484 FLU IMMUNIZE NO ADMIN: HCPCS | Performed by: NURSE PRACTITIONER

## 2018-01-31 PROCEDURE — 99213 OFFICE O/P EST LOW 20 MIN: CPT | Performed by: NURSE PRACTITIONER

## 2018-01-31 PROCEDURE — G8417 CALC BMI ABV UP PARAM F/U: HCPCS | Performed by: NURSE PRACTITIONER

## 2018-01-31 PROCEDURE — G8599 NO ASA/ANTIPLAT THER USE RNG: HCPCS | Performed by: NURSE PRACTITIONER

## 2018-01-31 PROCEDURE — 3017F COLORECTAL CA SCREEN DOC REV: CPT | Performed by: NURSE PRACTITIONER

## 2018-01-31 RX ORDER — GABAPENTIN 400 MG/1
400 CAPSULE ORAL 3 TIMES DAILY
Qty: 84 CAPSULE | Refills: 0 | Status: SHIPPED | OUTPATIENT
Start: 2018-01-31 | End: 2018-02-28 | Stop reason: SDUPTHER

## 2018-01-31 RX ORDER — SILDENAFIL CITRATE 20 MG/1
TABLET ORAL
Qty: 30 TABLET | Refills: 0 | Status: SHIPPED | OUTPATIENT
Start: 2018-01-31 | End: 2018-03-29

## 2018-01-31 RX ORDER — OXYCODONE AND ACETAMINOPHEN 7.5; 325 MG/1; MG/1
1 TABLET ORAL EVERY 6 HOURS PRN
Qty: 112 TABLET | Refills: 0 | Status: SHIPPED | OUTPATIENT
Start: 2018-01-31 | End: 2018-02-28 | Stop reason: SDUPTHER

## 2018-01-31 NOTE — PROGRESS NOTES
completed for his work  -Continue to f/u with PCP for weight loss options  -Monitor BP, f/u with PCP if it continues to stay elevated or he becomes symptomatic with SOB, CP, syncopy. he is currently asymptomatic  -Diet changes- 800-1200 jarrod diet, diet diary, exercising, nutritional  consult increased physical activity as tolerated were recommended  -CBT techniques- relaxation therapies such as biofeedback, mindfulness based stress reduction, imagery, cognitive restructuring, problem solving discussed with patient  -[place] consistent  -Return in about 4 weeks (around 2/28/2018). Current Outpatient Prescriptions   Medication Sig Dispense Refill    gabapentin (NEURONTIN) 400 MG capsule Take 1 capsule by mouth 3 times daily for 28 days. 84 capsule 0    oxyCODONE-acetaminophen (PERCOCET) 7.5-325 MG per tablet Take 1 tablet by mouth every 6 hours as needed for Pain for up to 28 days. 112 tablet 0    ALPRAZolam (XANAX) 0.5 MG tablet TAKE 1 TABLET BY MOUTH THREE TIMES DAILY AS NEEDED FOR SLEEP OR ANXIETY 270 tablet 0    metFORMIN (GLUCOPHAGE-XR) 500 MG extended release tablet TAKE 4 TABLETS EVERY DAY WITH BREAKFAST 360 tablet 1    atorvastatin (LIPITOR) 80 MG tablet TAKE 1 TABLET EVERY DAY 90 tablet 1    carvedilol (COREG) 3.125 MG tablet TAKE 1 TABLET TWICE DAILY WITH MEALS 180 tablet 1    valsartan-hydrochlorothiazide (DIOVAN-HCT) 320-12.5 MG per tablet TAKE 1 TABLET EVERY DAY 90 tablet 1    carisoprodol (SOMA) 350 MG tablet Take 1 tablet by mouth 3 times daily as needed for Muscle spasms. 90 tablet 1    insulin detemir (LEVEMIR FLEXTOUCH) 100 UNIT/ML injection pen Inject 15 Units into the skin nightly 5 Pen 3    Insulin Pen Needle (B-D UF III MINI PEN NEEDLES) 31G X 5 MM MISC 1 each by Does not apply route daily 100 each 6    triamcinolone (KENALOG) 0.1 % cream Apply topically 2 times daily Apply topically 2 times daily.  80 g 3    entecavir (BARACLUDE) 1 MG tablet Take 1 tablet by mouth daily 30 tablet 3

## 2018-02-01 ENCOUNTER — HOSPITAL ENCOUNTER (OUTPATIENT)
Dept: OTHER | Age: 63
Discharge: HOME | End: 2018-02-28
Attending: NURSE PRACTITIONER | Admitting: NURSE PRACTITIONER

## 2018-02-19 ENCOUNTER — OFFICE VISIT (OUTPATIENT)
Dept: FAMILY MEDICINE CLINIC | Age: 63
End: 2018-02-19

## 2018-02-19 VITALS
BODY MASS INDEX: 38.36 KG/M2 | HEART RATE: 78 BPM | RESPIRATION RATE: 16 BRPM | WEIGHT: 274 LBS | DIASTOLIC BLOOD PRESSURE: 70 MMHG | TEMPERATURE: 98.5 F | HEIGHT: 71 IN | SYSTOLIC BLOOD PRESSURE: 118 MMHG

## 2018-02-19 DIAGNOSIS — I25.10 CORONARY ARTERY DISEASE INVOLVING NATIVE HEART WITHOUT ANGINA PECTORIS, UNSPECIFIED VESSEL OR LESION TYPE: ICD-10-CM

## 2018-02-19 DIAGNOSIS — B18.1 CHRONIC ACTIVE VIRAL HEPATITIS B (HCC): ICD-10-CM

## 2018-02-19 DIAGNOSIS — F11.20 CHRONIC NARCOTIC DEPENDENCE (HCC): ICD-10-CM

## 2018-02-19 DIAGNOSIS — I10 ESSENTIAL HYPERTENSION: ICD-10-CM

## 2018-02-19 DIAGNOSIS — E78.5 HYPERLIPIDEMIA WITH TARGET LDL LESS THAN 100: ICD-10-CM

## 2018-02-19 DIAGNOSIS — F11.20 NARCOTIC DEPENDENCE (HCC): ICD-10-CM

## 2018-02-19 LAB
A/G RATIO: 1.5 (ref 1.1–2.2)
ALBUMIN SERPL-MCNC: 4.4 G/DL (ref 3.4–5)
ALP BLD-CCNC: 79 U/L (ref 40–129)
ALT SERPL-CCNC: 65 U/L (ref 10–40)
ANION GAP SERPL CALCULATED.3IONS-SCNC: 16 MMOL/L (ref 3–16)
AST SERPL-CCNC: 36 U/L (ref 15–37)
BILIRUB SERPL-MCNC: 0.6 MG/DL (ref 0–1)
BUN BLDV-MCNC: 24 MG/DL (ref 7–20)
CALCIUM SERPL-MCNC: 9.7 MG/DL (ref 8.3–10.6)
CHLORIDE BLD-SCNC: 98 MMOL/L (ref 99–110)
CO2: 22 MMOL/L (ref 21–32)
CREAT SERPL-MCNC: 0.8 MG/DL (ref 0.8–1.3)
GFR AFRICAN AMERICAN: >60
GFR NON-AFRICAN AMERICAN: >60
GLOBULIN: 2.9 G/DL
GLUCOSE BLD-MCNC: 275 MG/DL (ref 70–99)
POTASSIUM SERPL-SCNC: 4.4 MMOL/L (ref 3.5–5.1)
SODIUM BLD-SCNC: 136 MMOL/L (ref 136–145)
TOTAL PROTEIN: 7.3 G/DL (ref 6.4–8.2)

## 2018-02-19 PROCEDURE — 3017F COLORECTAL CA SCREEN DOC REV: CPT | Performed by: FAMILY MEDICINE

## 2018-02-19 PROCEDURE — 1036F TOBACCO NON-USER: CPT | Performed by: FAMILY MEDICINE

## 2018-02-19 PROCEDURE — G8417 CALC BMI ABV UP PARAM F/U: HCPCS | Performed by: FAMILY MEDICINE

## 2018-02-19 PROCEDURE — G8427 DOCREV CUR MEDS BY ELIG CLIN: HCPCS | Performed by: FAMILY MEDICINE

## 2018-02-19 PROCEDURE — 3046F HEMOGLOBIN A1C LEVEL >9.0%: CPT | Performed by: FAMILY MEDICINE

## 2018-02-19 PROCEDURE — G8599 NO ASA/ANTIPLAT THER USE RNG: HCPCS | Performed by: FAMILY MEDICINE

## 2018-02-19 PROCEDURE — G8484 FLU IMMUNIZE NO ADMIN: HCPCS | Performed by: FAMILY MEDICINE

## 2018-02-19 PROCEDURE — 99214 OFFICE O/P EST MOD 30 MIN: CPT | Performed by: FAMILY MEDICINE

## 2018-02-19 NOTE — PATIENT INSTRUCTIONS
INSTRUCTIONS  NEXT APPOINTMENT: Please schedule check-up in 3 months. · PLEASE TAKE THIS FORM TO CHECK-OUT WINDOW TO SCHEDULE NEXT VISIT.   · REFILL POLICY:  If not getting refills today, then PLEASE make next appointment on way out today. Will need to see that future appointment scheudled when pharmacy contacts Dr. Saeid Nino for a refill. · PLEASE GET BLOODWORK DRAWN TODAY ON FIRST FLOOR in 170. Take orders with you. RESULTS- most blood tests back in couple days. We will call you if any problems. If bloodwork good, you will get letter in mail or notified thru [address] (if signed up) within 2 weeks. If you do not, please call office. · Please get annual dilated eye exam to screen for diabetic retinopathy which can lead to vision loss. Ask for report to be faxed to [phone]. · Medicare part D patients:  Please take Rx for Zostavax to pharmacy (such as [email or web address] or [facility]) to get shingles vaccine.

## 2018-02-19 NOTE — PROGRESS NOTES
long-term current use of insulin (HCC)    Fatty liver    Chronic active viral hepatitis B ([facility] [address]     Current Outpatient Prescriptions   Medication Sig Dispense Refill    zoster vaccine live, PF, (ZOSTAVAX) 35699 UNT/0.65ML injection Inject 0.65 mLs into the skin once for 1 dose Medicare part D patients:  Please take Rx for Zostavax to pharmacy (such as [facility] or [facility]) to get shingles vaccine. 0.65 mL 0    gabapentin (NEURONTIN) 400 MG capsule Take 1 capsule by mouth 3 times daily for 28 days. 84 capsule 0    oxyCODONE-acetaminophen (PERCOCET) 7.5-325 MG per tablet Take 1 tablet by mouth every 6 hours as needed for Pain for up to 28 days. 112 tablet 0    sildenafil (REVATIO) 20 MG tablet TAKE 1 TABLET BY MOUTH DAILY AS NEEDED 30 MINUTES PRIOR TO ACTIVITY 30 tablet 0    ALPRAZolam (XANAX) 0.5 MG tablet TAKE 1 TABLET BY MOUTH THREE TIMES DAILY AS NEEDED FOR SLEEP OR ANXIETY 270 tablet 0    metFORMIN (GLUCOPHAGE-XR) 500 MG extended release tablet TAKE 4 TABLETS EVERY DAY WITH BREAKFAST 360 tablet 1    atorvastatin (LIPITOR) 80 MG tablet TAKE 1 TABLET EVERY DAY 90 tablet 1    carvedilol (COREG) 3.125 MG tablet TAKE 1 TABLET TWICE DAILY WITH MEALS 180 tablet 1    valsartan-hydrochlorothiazide (DIOVAN-HCT) 320-12.5 MG per tablet TAKE 1 TABLET EVERY DAY 90 tablet 1    carisoprodol (SOMA) 350 MG tablet Take 1 tablet by mouth 3 times daily as needed for Muscle spasms. 90 tablet 1    insulin detemir (LEVEMIR FLEXTOUCH) 100 UNIT/ML injection pen Inject 15 Units into the skin nightly 5 Pen 3    Insulin Pen Needle (B-D UF III MINI PEN NEEDLES) 31G X 5 MM MISC 1 each by Does not apply route daily 100 each 6    triamcinolone (KENALOG) 0.1 % cream Apply topically 2 times daily Apply topically 2 times daily.  80 g 3    entecavir (BARACLUDE) 1 MG tablet Take 1 tablet by mouth daily 30 tablet 3    Naproxen Sodium (ALEVE) 220 MG CAPS Take 1 tablet by mouth 2 times daily as needed for Pain 60 capsule 0    diclofenac Pressure: 118 mmHg      Is BP treated: Yes      HDL Cholesterol: 35 mg/dL      Total Cholesterol: 156 mg/dL    Subjective:      Chief Complaint   Patient presents with    Diabetes     Check up      Martha Cole is an 59 y.o. male who presents for follow up of following chronic problems:  1. Uncontrolled type 2 diabetes mellitus without complication, without long-term current use of insulin ([facility]    2. Coronary artery disease involving native heart without angina pectoris, unspecified vessel or lesion type    3. Essential hypertension    4. Chronic narcotic dependence ([facility]- needs contact and drug testing    5. Hyperlipidemia with target LDL less than 100    6. Chronic active viral hepatitis B ([facility]      · Patient checks sugars Test once every 2 weeks   time(s) daily. Average: N/A. Range: N/A. · Patent follows diabetic diet? Sometimes  · Exercise: walking intermittently  · Taking medicines daily as directed? Yes  · Is the patient reporting any side effects of medications? No  · Patient checks feet daily? Yes  · Is aspirin on med list? No  · Tobacco history updated:  reports that he has never smoked. He has never used smokeless tobacco.  · Blood pressure taken correctly and will be repeated if > 130/80  · See Health maintenance list above for last retinal exam and microalbumin. ROS:  General ROS: fever? No   night sweats? No  Ophthalmic ROS:blurry vision or decreased vision? No  Endocrine ROS:malaise/lethargy? No   unexpected weight changes? No  Respiratory ROS: cough? No   shortness of breath? No  Cardiovascular ROS:chest pain? No   shortness of breath with exertion? No  Gastrointestinal ROS: abdominal pain? No   change in stools? No  Genito-Urinary ROS: painful urination? No   trouble voiding? No  Neurological ROS: TIA or stroke symptoms? No   numbness/tingling in feet? No  Dermatological ROS: rash or sores on feet?  No     HISTORY:  Patient's medications, allergies, past medical, surgical, social and family histories were reviewed and updated as appropriate (See above). Objective:   PHYSICAL EXAM   /70 (Site: Right Arm, Position: Sitting, Cuff Size: Large Adult)   Pulse 78   Temp 98.5 °F (36.9 °C) (Oral)   Resp 16   Ht 5' 11\" (1.803 m)   Wt 274 lb (124.3 kg)   BMI 38.22 kg/m²   Blood pressure is good. BP Readings from Last 5 Encounters:   02/19/18 118/70   01/31/18 (!) 150/90   01/03/18 119/73   12/06/17 125/77   11/16/17 112/60     Weight is decreased. Wt Readings from Last 5 Encounters:   02/19/18 274 lb (124.3 kg)   01/31/18 289 lb (131.1 kg)   11/16/17 283 lb (128.4 kg)   11/08/17 277 lb (125.6 kg)   10/11/17 282 lb (127.9 kg)      GENERAL:   · well-developed, well-nourished, alert, no distress. EYES:   · External findings: lids and lashes normal and conjunctivae and sclerae normal  LUNGS:    · Breathing unlabored  · clear to auscultation bilaterally and good air movement  · Palpation: Normal  CARDIOVASC:   · regular rate and rhythm, S1, S2 normal. No murmur, click, rub or gallop  · Apical impulse normal  · LEGS:  Lower extremity edema: none    SKIN: warm and dry  PSYCH:    · Alert and oriented  · Normal reasoning, insight good  · Facial expressions full, mood appropriate  · No memory disturbance noted  MUSCULOSKEL:    · No significant finger or nail findings  NEURO:   · CN 2-12 intact  Diabetic Foot Exam  · Foot exam reveals normal cap refill  · Feet normal skin color, some callouses, diffuse keratoses and SKs  · Feet- no deformities  · sensation grossly normal to light touch in feet. FEET:Monofilament Sensation:  normal      Assessment and Plan:     1. Uncontrolled type 2 diabetes mellitus without complication, without long-term current use of insulin ([facility])   DIABETES FOOT EXAM     DIABETES EYE EXAM    Comprehensive Metabolic Panel    Hemoglobin A1C   2. Coronary artery disease involving native heart without angina pectoris, unspecified vessel or lesion type     3.  Essential

## 2018-02-20 ENCOUNTER — HOSPITAL ENCOUNTER (OUTPATIENT)
Dept: OTHER | Age: 63
Discharge: HOME | End: 2018-02-28
Attending: FAMILY MEDICINE | Admitting: FAMILY MEDICINE

## 2018-02-20 LAB
ESTIMATED AVERAGE GLUCOSE: 217.3 MG/DL
HBA1C MFR BLD: 9.2 %

## 2018-02-21 ENCOUNTER — TELEPHONE (OUTPATIENT)
Dept: FAMILY MEDICINE CLINIC | Age: 63
End: 2018-02-21

## 2018-02-22 LAB
HBV DNA QNT I/U: ABNORMAL IU/ML
HEPATITIS B DNA, PCR (QUANT): DETECTED
LOG 10 HBV AS IU/ML: 3.7 LOG IU

## 2018-02-23 ENCOUNTER — TELEPHONE (OUTPATIENT)
Dept: FAMILY MEDICINE CLINIC | Age: 63
End: 2018-02-23

## 2018-02-23 RX ORDER — CARISOPRODOL 350 MG/1
TABLET ORAL
Qty: 90 TABLET | Refills: 0 | Status: SHIPPED | OUTPATIENT
Start: 2018-02-23 | End: 2018-03-28 | Stop reason: SDUPTHER

## 2018-02-28 ENCOUNTER — OFFICE VISIT (OUTPATIENT)
Dept: PAIN MANAGEMENT | Age: 63
End: 2018-02-28

## 2018-02-28 VITALS
HEART RATE: 89 BPM | WEIGHT: 279 LBS | SYSTOLIC BLOOD PRESSURE: 134 MMHG | DIASTOLIC BLOOD PRESSURE: 86 MMHG | BODY MASS INDEX: 38.91 KG/M2 | OXYGEN SATURATION: 93 %

## 2018-02-28 DIAGNOSIS — M54.42 CHRONIC BILATERAL LOW BACK PAIN WITH BILATERAL SCIATICA: ICD-10-CM

## 2018-02-28 DIAGNOSIS — M50.30 DDD (DEGENERATIVE DISC DISEASE), CERVICAL: ICD-10-CM

## 2018-02-28 DIAGNOSIS — F41.9 ANXIETY: ICD-10-CM

## 2018-02-28 DIAGNOSIS — E66.01 OBESITY, CLASS III, BMI 40-49.9 (MORBID OBESITY) (HCC): ICD-10-CM

## 2018-02-28 DIAGNOSIS — G89.4 CHRONIC PAIN SYNDROME: Primary | ICD-10-CM

## 2018-02-28 DIAGNOSIS — M47.816 FACET ARTHROPATHY, LUMBAR: ICD-10-CM

## 2018-02-28 DIAGNOSIS — M54.41 CHRONIC BILATERAL LOW BACK PAIN WITH BILATERAL SCIATICA: ICD-10-CM

## 2018-02-28 DIAGNOSIS — M51.35 DDD (DEGENERATIVE DISC DISEASE), THORACOLUMBAR: ICD-10-CM

## 2018-02-28 DIAGNOSIS — G89.29 CHRONIC BILATERAL LOW BACK PAIN WITH BILATERAL SCIATICA: ICD-10-CM

## 2018-02-28 PROCEDURE — 3017F COLORECTAL CA SCREEN DOC REV: CPT | Performed by: NURSE PRACTITIONER

## 2018-02-28 PROCEDURE — G8484 FLU IMMUNIZE NO ADMIN: HCPCS | Performed by: NURSE PRACTITIONER

## 2018-02-28 PROCEDURE — G8417 CALC BMI ABV UP PARAM F/U: HCPCS | Performed by: NURSE PRACTITIONER

## 2018-02-28 PROCEDURE — 99213 OFFICE O/P EST LOW 20 MIN: CPT | Performed by: NURSE PRACTITIONER

## 2018-02-28 PROCEDURE — G8427 DOCREV CUR MEDS BY ELIG CLIN: HCPCS | Performed by: NURSE PRACTITIONER

## 2018-02-28 PROCEDURE — G8599 NO ASA/ANTIPLAT THER USE RNG: HCPCS | Performed by: NURSE PRACTITIONER

## 2018-02-28 PROCEDURE — 1036F TOBACCO NON-USER: CPT | Performed by: NURSE PRACTITIONER

## 2018-02-28 RX ORDER — OXYCODONE AND ACETAMINOPHEN 7.5; 325 MG/1; MG/1
1 TABLET ORAL EVERY 6 HOURS PRN
Qty: 112 TABLET | Refills: 0 | Status: SHIPPED | OUTPATIENT
Start: 2018-02-28 | End: 2018-03-28 | Stop reason: SDUPTHER

## 2018-02-28 RX ORDER — GABAPENTIN 400 MG/1
400 CAPSULE ORAL 3 TIMES DAILY
Qty: 84 CAPSULE | Refills: 0 | Status: SHIPPED | OUTPATIENT
Start: 2018-02-28 | End: 2018-03-28 | Stop reason: SDUPTHER

## 2018-02-28 NOTE — PATIENT INSTRUCTIONS
Patient Education        Back Stretches: Exercises  Your Care Instructions  Here are some examples of exercises for stretching your back. Start each exercise slowly. Ease off the exercise if you start to have pain. Your doctor or physical therapist will tell you when you can start these exercises and which ones will work best for you. How to do the exercises  Overhead stretch    1. Stand comfortably with your feet shoulder-width apart. 2. Looking straight ahead, raise both arms over your head and reach toward the ceiling. Do not allow your head to tilt back. 3. Hold for 15 to 30 seconds, then lower your arms to your sides. 4. Repeat 2 to 4 times. Side stretch    1. Stand comfortably with your feet shoulder-width apart. 2. Raise one arm over your head, and then lean to the other side. 3. Slide your hand down your leg as you let the weight of your arm gently stretch your side muscles. Hold for 15 to 30 seconds. 4. Repeat 2 to 4 times on each side. Press-up    1. Lie on your stomach, supporting your body with your forearms. 2. Press your elbows down into the floor to raise your upper back. As you do this, relax your stomach muscles and allow your back to arch without using your back muscles. As your press up, do not let your hips or pelvis come off the floor. 3. Hold for 15 to 30 seconds, then relax. 4. Repeat 2 to 4 times. Relax and rest    1. Lie on your back with a rolled towel under your neck and a pillow under your knees. Extend your arms comfortably to your sides. 2. Relax and breathe normally. 3. Remain in this position for about 10 minutes. 4. If you can, do this 2 or 3 times each day. Follow-up care is a key part of your treatment and safety. Be sure to make and go to all appointments, and call your doctor if you are having problems. It's also a good idea to know your test results and keep a list of the medicines you take. Where can you learn more?   Go to [email or web address] and sign in to your [employer] account. Enter D826 in the [employer] box to learn more about \"Back Stretches: Exercises. \"     If you do not have an account, please click on the \"Sign Up Now\" link. Current as of: March 21, 2017  Content Version: 11.5  © 6607-1990 Healthwise, [employer]. Care instructions adapted under license by [facility] ([facility]). If you have questions about a medical condition or this instruction, always ask your healthcare professional. [address] any warranty or liability for your use of this information.

## 2018-03-01 ENCOUNTER — HOSPITAL ENCOUNTER (OUTPATIENT)
Dept: OTHER | Age: 63
Discharge: HOME | End: 2018-03-31
Attending: FAMILY MEDICINE | Admitting: FAMILY MEDICINE

## 2018-03-01 NOTE — PROGRESS NOTES
sildenafil (REVATIO) 20 MG tablet TAKE 1 TABLET BY MOUTH DAILY AS NEEDED 30 MINUTES PRIOR TO ACTIVITY 30 tablet 0    ALPRAZolam (XANAX) 0.5 MG tablet TAKE 1 TABLET BY MOUTH THREE TIMES DAILY AS NEEDED FOR SLEEP OR ANXIETY 270 tablet 0    metFORMIN (GLUCOPHAGE-XR) 500 MG extended release tablet TAKE 4 TABLETS EVERY DAY WITH BREAKFAST 360 tablet 1    atorvastatin (LIPITOR) 80 MG tablet TAKE 1 TABLET EVERY DAY 90 tablet 1    carvedilol (COREG) 3.125 MG tablet TAKE 1 TABLET TWICE DAILY WITH MEALS 180 tablet 1    valsartan-hydrochlorothiazide (DIOVAN-HCT) 320-12.5 MG per tablet TAKE 1 TABLET EVERY DAY 90 tablet 1    Insulin Pen Needle (B-D UF III MINI PEN NEEDLES) 31G X 5 MM MISC 1 each by Does not apply route daily 100 each 6    triamcinolone (KENALOG) 0.1 % cream Apply topically 2 times daily Apply topically 2 times daily. 80 g 3    entecavir (BARACLUDE) 1 MG tablet Take 1 tablet by mouth daily 30 tablet 3    Naproxen Sodium (ALEVE) 220 MG CAPS Take 1 tablet by mouth 2 times daily as needed for Pain 60 capsule 0    diclofenac sodium (VOLTAREN) 1 % GEL Apply 2-4 g topically 2 times daily 5 Tube 0    mometasone (ELOCON) 0.1 % ointment Apply topically twice daily. 90 g 5    canagliflozin (INVOKANA) 300 MG TABS tablet Take 1 tablet by mouth every morning (before breakfast) 30 tablet 2    aspirin EC 81 MG EC tablet Take 1 tablet by mouth daily. 30 tablet 11    glucose blood VI test strips (ASCENSIA AUTODISC VI;ONE TOUCH ULTRA TEST VI) strip Apply 1 each topically 3 times daily. Onetouch Ultra 2 test strips  Dx: 250.00 300 strip 3    ONETOUCH DELICA LANCETS MISC 1 each by Does not apply route 3 times daily. 300 each 3    gabapentin (NEURONTIN) 400 MG capsule Take 1 capsule by mouth 3 times daily for 28 days. 84 capsule 0    oxyCODONE-acetaminophen (PERCOCET) 7.5-325 MG per tablet Take 1 tablet by mouth every 6 hours as needed for Pain for up to 28 days.  112 tablet 0     No facility-administered medications prior to visit. SOCIAL/FAMILY/PAST MEDICAL HISTORY: Mr. Jerod Lynn, family and past medical history was reviewed. REVIEW OF SYSTEMS:    Respiratory: Negative for apnea, chest tightness and shortness of breath or change in baseline breathing. Gastrointestinal: Negative for nausea, vomiting, abdominal pain, diarrhea, constipation, blood in stool and abdominal distention. PHYSICAL EXAM:   Nursing note and vitals reviewed. /86   Pulse 89   Wt 279 lb (126.6 kg)   SpO2 93%   BMI 38.91 kg/m²   Constitutional: He appears well-developed and well-nourished. No acute distress. Skin: Skin is warm and dry, good turgor. No rash noted. He is not diaphoretic. Cardiovascular: Normal rate, regular rhythm, normal heart sounds, and does not have murmur. Pulmonary/Chest: Effort normal. No respiratory distress. He does not have wheezes in the lung fields. He has no rales. Neurological/Psychiatric:He is alert and oriented to person, place, and time. Coordination is  normal. His mood isAppropriate and affect is Neutral/Euthymic(normal) . IMPRESSION:   1. Chronic pain syndrome    2. DDD (degenerative disc disease), thoracolumbar    3. Facet arthropathy, lumbar    4. Chronic bilateral low back pain with bilateral sciatica    5. DDD (degenerative disc disease), cervical    6. Anxiety    7.  Obesity, Class III, BMI 40-49.9 (morbid obesity) ([facility])        PLAN:  Informed verbal consent was obtained  -Continue with Percocet, Neurontin, Aleve, Voltaren gel  -D/c Revatio  -Opted for home exercises, Renard exercise as recommended  -He was advised weight reduction, diet changes- 800-1200 jarrod diet, diet diary, exercising, nutritional  consult increased physical activity as tolerated  -CBT techniques- relaxation therapies such as biofeedback, mindfulness based stress reduction, imagery, cognitive restructuring, problem solving discussed with patient  -Last UDS consistent  -Return in about 4 weeks (around (VOLTAREN) 1 % GEL Apply 2-4 g topically 2 times daily 5 Tube 0    mometasone (ELOCON) 0.1 % ointment Apply topically twice daily. 90 g 5    canagliflozin (INVOKANA) 300 MG TABS tablet Take 1 tablet by mouth every morning (before breakfast) 30 tablet 2    aspirin EC 81 MG EC tablet Take 1 tablet by mouth daily. 30 tablet 11    glucose blood VI test strips (ASCENSIA AUTODISC VI;ONE TOUCH ULTRA TEST VI) strip Apply 1 each topically 3 times daily. Onetouch Ultra 2 test strips  Dx: 250.00 300 strip 3    ONETOUCH DELICA LANCETS MISC 1 each by Does not apply route 3 times daily. 300 each 3     No current facility-administered medications for this visit. I will continue his current medication regimen  which is part of the above treatment schedule. It has been helping with Mr. Andi Lo chronic  medical problems which for this visit include: The primary encounter diagnosis was Chronic pain syndrome. Diagnoses of DDD (degenerative disc disease), thoracolumbar, Facet arthropathy, lumbar, Chronic bilateral low back pain with bilateral sciatica, DDD (degenerative disc disease), cervical, Anxiety, and Obesity, Class III, BMI 40-49.9 (morbid obesity) ([facility] [address] were also pertinent to this visit. Risks and benefits of the medications and other alternative treatments  including no treatment were discussed with the patient. The common side effects of these medications were also explained to the patient. Informed verbal consent was obtained. Goals of current treatment regimen include improvement in pain, restoration of functioning- with focus on improvement in physical performance, general activity, work or disability,emotional distress, health care utilization and  decreased medication consumption. Will continue to monitor progress towards achieving/maintaining therapeutic goals with special emphasis on  1. Improvement in perceived interfernce  of pain with ADL's. Ability to do home exercises independently.  Ability to do household chores indoor and/or outdoor work and social and leisure activities. Improve psychosocial and physical functioning. - he is showing progression towards this treatment goal with the current regimen. He was advised against drinking alcohol with the narcotic pain medicines, advised against driving or handling machinery while adjusting the dose of medicines or if having cognitive  issues related to the current medications. Risk of overdose and death, if medicines not taken as prescribed, were also discussed. If the patient develops new symptoms or if the symptoms worsen, the patient should call the office. While transcribing every attempt was made to maintain the accuracy of the note in terms of it's contents,there may have been some errors made inadvertently. Thank you for allowing me to participate in the care of this patient. Jay Bumpers CNP.     Cc: Rika Marley MD

## 2018-03-09 ENCOUNTER — TELEPHONE (OUTPATIENT)
Dept: FAMILY MEDICINE CLINIC | Age: 63
End: 2018-03-09

## 2018-03-09 NOTE — TELEPHONE ENCOUNTER
Spoke with pt. We do have samples of Invokana, samples signed out and placed up front. Pt. Fer Goddard he will come to  samples today.

## 2018-03-28 ENCOUNTER — OFFICE VISIT (OUTPATIENT)
Dept: PAIN MANAGEMENT | Age: 63
End: 2018-03-28

## 2018-03-28 VITALS — SYSTOLIC BLOOD PRESSURE: 146 MMHG | DIASTOLIC BLOOD PRESSURE: 91 MMHG | HEART RATE: 90 BPM | OXYGEN SATURATION: 94 %

## 2018-03-28 DIAGNOSIS — M50.30 DDD (DEGENERATIVE DISC DISEASE), CERVICAL: ICD-10-CM

## 2018-03-28 DIAGNOSIS — M54.41 CHRONIC BILATERAL LOW BACK PAIN WITH BILATERAL SCIATICA: ICD-10-CM

## 2018-03-28 DIAGNOSIS — M54.42 CHRONIC BILATERAL LOW BACK PAIN WITH BILATERAL SCIATICA: ICD-10-CM

## 2018-03-28 DIAGNOSIS — M47.816 FACET ARTHROPATHY, LUMBAR: ICD-10-CM

## 2018-03-28 DIAGNOSIS — M51.35 DDD (DEGENERATIVE DISC DISEASE), THORACOLUMBAR: ICD-10-CM

## 2018-03-28 DIAGNOSIS — F41.9 ANXIETY: ICD-10-CM

## 2018-03-28 DIAGNOSIS — G89.29 CHRONIC BILATERAL LOW BACK PAIN WITH BILATERAL SCIATICA: ICD-10-CM

## 2018-03-28 DIAGNOSIS — M48.10 DISH (DIFFUSE IDIOPATHIC SKELETAL HYPEROSTOSIS): ICD-10-CM

## 2018-03-28 DIAGNOSIS — G89.4 CHRONIC PAIN SYNDROME: Primary | ICD-10-CM

## 2018-03-28 DIAGNOSIS — E66.01 OBESITY, CLASS III, BMI 40-49.9 (MORBID OBESITY) (HCC): ICD-10-CM

## 2018-03-28 PROCEDURE — G8482 FLU IMMUNIZE ORDER/ADMIN: HCPCS | Performed by: NURSE PRACTITIONER

## 2018-03-28 PROCEDURE — 99213 OFFICE O/P EST LOW 20 MIN: CPT | Performed by: NURSE PRACTITIONER

## 2018-03-28 PROCEDURE — G8427 DOCREV CUR MEDS BY ELIG CLIN: HCPCS | Performed by: NURSE PRACTITIONER

## 2018-03-28 PROCEDURE — G8417 CALC BMI ABV UP PARAM F/U: HCPCS | Performed by: NURSE PRACTITIONER

## 2018-03-28 PROCEDURE — 1036F TOBACCO NON-USER: CPT | Performed by: NURSE PRACTITIONER

## 2018-03-28 PROCEDURE — 3017F COLORECTAL CA SCREEN DOC REV: CPT | Performed by: NURSE PRACTITIONER

## 2018-03-28 PROCEDURE — G8599 NO ASA/ANTIPLAT THER USE RNG: HCPCS | Performed by: NURSE PRACTITIONER

## 2018-03-28 RX ORDER — CARISOPRODOL 350 MG/1
TABLET ORAL
Qty: 90 TABLET | Refills: 3 | Status: SHIPPED | OUTPATIENT
Start: 2018-03-28 | End: 2018-04-27

## 2018-03-28 RX ORDER — OXYCODONE AND ACETAMINOPHEN 7.5; 325 MG/1; MG/1
1 TABLET ORAL EVERY 6 HOURS PRN
Qty: 112 TABLET | Refills: 0 | Status: SHIPPED | OUTPATIENT
Start: 2018-03-28 | End: 2018-05-01 | Stop reason: SDUPTHER

## 2018-03-28 RX ORDER — GABAPENTIN 400 MG/1
400 CAPSULE ORAL 3 TIMES DAILY
Qty: 84 CAPSULE | Refills: 0 | Status: SHIPPED | OUTPATIENT
Start: 2018-03-28 | End: 2018-05-03 | Stop reason: SDUPTHER

## 2018-03-28 NOTE — PATIENT INSTRUCTIONS
[email or web address] and sign in to your [employer] account. Enter U381 in the [employer] box to learn more about \"Back Stretches: Exercises. \"     If you do not have an account, please click on the \"Sign Up Now\" link. Current as of: March 21, 2017  Content Version: 11.5  © 6457-4922 Healthwise, [employer]. Care instructions adapted under license by [facility] ([facility]). If you have questions about a medical condition or this instruction, always ask your healthcare professional. [address] any warranty or liability for your use of this information.

## 2018-03-28 NOTE — PROGRESS NOTES
Olga Curry  1955  [id number]      HISTORY OF PRESENT ILLNESS: Mr. Homar Lucas is a 59 y.o. male returns for a follow up visit for pain management  He has a diagnosis of   1. Chronic pain syndrome    2. DDD (degenerative disc disease), thoracolumbar    3. Facet arthropathy, lumbar    4. Chronic bilateral low back pain with bilateral sciatica    5. DDD (degenerative disc disease), cervical    6. DISH (diffuse idiopathic skeletal hyperostosis)    7. Anxiety    8. Obesity, Class III, BMI 40-49.9 (morbid obesity) ([facility] [address]    . As per Information Obtained from the PADT (Patient Assessment and Documentation Tool)    He complains of pain in the neck, lower back. He rates the pain 4/10 and describes it as aching, burning with increased physical activities. Current treatment regimen has helped relieve about 40% of the pain. He denies any side effects from the current pain regimen. Patient reports that since the last follow up visit the physical functioning is unchanged, family/social relationships are unchanged, mood is unchanged sleep patterns are unchanged, and that the overall functioning is unchanged. Patient denies misusing/abusing his narcotic pain medications or using any illegal drugs. Upon obtaining medical history from Mr. Stover states that pain is manageable on current pain therapy. Reports that he gets pain in the joints with increased physical activities, otherwise stable on pain medication. Mood is stable without anxiety. Sleep is fair with an average of 5-6 hours. Denies having issues of constipation. Tolerating activities/house chores with moderate tenderness to the lower back. Overall fairing well    ALLERGIES: Patients list of allergies were reviewed     MEDICATIONS: Mr. Homar Lucas list of medications were reviewed. His current medications are   Outpatient Medications Prior to Visit   Medication Sig Dispense Refill    insulin detemir (LEVEMIR FLEXTOUCH) 100 UNIT/ML injection pen Inject 30 Units into the skin nightly 10 pen 3    sildenafil (REVATIO) 20 MG tablet TAKE 1 TABLET BY MOUTH DAILY AS NEEDED 30 MINUTES PRIOR TO ACTIVITY 30 tablet 0    ALPRAZolam (XANAX) 0.5 MG tablet TAKE 1 TABLET BY MOUTH THREE TIMES DAILY AS NEEDED FOR SLEEP OR ANXIETY 270 tablet 0    metFORMIN (GLUCOPHAGE-XR) 500 MG extended release tablet TAKE 4 TABLETS EVERY DAY WITH BREAKFAST 360 tablet 1    atorvastatin (LIPITOR) 80 MG tablet TAKE 1 TABLET EVERY DAY 90 tablet 1    carvedilol (COREG) 3.125 MG tablet TAKE 1 TABLET TWICE DAILY WITH MEALS 180 tablet 1    valsartan-hydrochlorothiazide (DIOVAN-HCT) 320-12.5 MG per tablet TAKE 1 TABLET EVERY DAY 90 tablet 1    Insulin Pen Needle (B-D UF III MINI PEN NEEDLES) 31G X 5 MM MISC 1 each by Does not apply route daily 100 each 6    triamcinolone (KENALOG) 0.1 % cream Apply topically 2 times daily Apply topically 2 times daily. 80 g 3    entecavir (BARACLUDE) 1 MG tablet Take 1 tablet by mouth daily 30 tablet 3    Naproxen Sodium (ALEVE) 220 MG CAPS Take 1 tablet by mouth 2 times daily as needed for Pain 60 capsule 0    diclofenac sodium (VOLTAREN) 1 % GEL Apply 2-4 g topically 2 times daily 5 Tube 0    mometasone (ELOCON) 0.1 % ointment Apply topically twice daily. 90 g 5    canagliflozin (INVOKANA) 300 MG TABS tablet Take 1 tablet by mouth every morning (before breakfast) 30 tablet 2    aspirin EC 81 MG EC tablet Take 1 tablet by mouth daily. 30 tablet 11    glucose blood VI test strips (ASCENSIA AUTODISC VI;ONE TOUCH ULTRA TEST VI) strip Apply 1 each topically 3 times daily. Onetouch Ultra 2 test strips  Dx: 250.00 300 strip 3    ONETOUCH DELICA LANCETS MISC 1 each by Does not apply route 3 times daily. 300 each 3    oxyCODONE-acetaminophen (PERCOCET) 7.5-325 MG per tablet Take 1 tablet by mouth every 6 hours as needed for Pain for up to 28 days. 112 tablet 0    gabapentin (NEURONTIN) 400 MG capsule Take 1 capsule by mouth 3 times daily for 28 days.  84 capsule 0     No

## 2018-04-02 ENCOUNTER — TELEPHONE (OUTPATIENT)
Dept: FAMILY MEDICINE CLINIC | Age: 63
End: 2018-04-02

## 2018-04-03 ENCOUNTER — TELEPHONE (OUTPATIENT)
Dept: FAMILY MEDICINE CLINIC | Age: 63
End: 2018-04-03

## 2018-04-05 DIAGNOSIS — G89.4 CHRONIC PAIN SYNDROME: ICD-10-CM

## 2018-04-05 DIAGNOSIS — I10 ESSENTIAL HYPERTENSION: ICD-10-CM

## 2018-04-05 RX ORDER — SILDENAFIL CITRATE 20 MG/1
TABLET ORAL
Qty: 30 TABLET | Refills: 0 | OUTPATIENT
Start: 2018-04-05

## 2018-04-05 RX ORDER — SILDENAFIL CITRATE 20 MG/1
20 TABLET ORAL DAILY
Qty: 30 TABLET | Refills: 0 | Status: ON HOLD | OUTPATIENT
Start: 2018-04-05 | End: 2019-03-25 | Stop reason: ALTCHOICE

## 2018-04-06 DIAGNOSIS — G89.4 CHRONIC PAIN SYNDROME: ICD-10-CM

## 2018-04-06 DIAGNOSIS — I10 ESSENTIAL HYPERTENSION: ICD-10-CM

## 2018-04-06 RX ORDER — SILDENAFIL CITRATE 20 MG/1
TABLET ORAL
Qty: 30 TABLET | Refills: 0 | Status: SHIPPED | OUTPATIENT
Start: 2018-04-06 | End: 2018-06-14 | Stop reason: SDUPTHER

## 2018-04-12 PROBLEM — Z11.1 TUBERCULOSIS SCREENING: Status: RESOLVED | Noted: 2017-08-03 | Resolved: 2018-04-12

## 2018-04-16 RX ORDER — ALPRAZOLAM 0.5 MG/1
TABLET ORAL
Qty: 270 TABLET | Refills: 0 | Status: SHIPPED | OUTPATIENT
Start: 2018-04-16 | End: 2018-06-28 | Stop reason: SDUPTHER

## 2018-05-01 ENCOUNTER — OFFICE VISIT (OUTPATIENT)
Dept: PAIN MANAGEMENT | Age: 63
End: 2018-05-01

## 2018-05-01 VITALS
SYSTOLIC BLOOD PRESSURE: 116 MMHG | DIASTOLIC BLOOD PRESSURE: 80 MMHG | OXYGEN SATURATION: 94 % | HEART RATE: 93 BPM | WEIGHT: 277 LBS | BODY MASS INDEX: 38.63 KG/M2

## 2018-05-01 DIAGNOSIS — G89.4 CHRONIC PAIN SYNDROME: Primary | ICD-10-CM

## 2018-05-01 DIAGNOSIS — M50.30 DDD (DEGENERATIVE DISC DISEASE), CERVICAL: ICD-10-CM

## 2018-05-01 DIAGNOSIS — G89.29 CHRONIC BILATERAL LOW BACK PAIN WITH BILATERAL SCIATICA: ICD-10-CM

## 2018-05-01 DIAGNOSIS — M54.41 CHRONIC BILATERAL LOW BACK PAIN WITH BILATERAL SCIATICA: ICD-10-CM

## 2018-05-01 DIAGNOSIS — M54.42 CHRONIC BILATERAL LOW BACK PAIN WITH BILATERAL SCIATICA: ICD-10-CM

## 2018-05-01 DIAGNOSIS — M51.35 DDD (DEGENERATIVE DISC DISEASE), THORACOLUMBAR: ICD-10-CM

## 2018-05-01 DIAGNOSIS — M48.10 DISH (DIFFUSE IDIOPATHIC SKELETAL HYPEROSTOSIS): ICD-10-CM

## 2018-05-01 DIAGNOSIS — F41.9 ANXIETY: ICD-10-CM

## 2018-05-01 DIAGNOSIS — E66.01 OBESITY, CLASS III, BMI 40-49.9 (MORBID OBESITY) (HCC): ICD-10-CM

## 2018-05-01 DIAGNOSIS — M47.816 FACET ARTHROPATHY, LUMBAR: ICD-10-CM

## 2018-05-01 PROCEDURE — 1036F TOBACCO NON-USER: CPT | Performed by: NURSE PRACTITIONER

## 2018-05-01 PROCEDURE — G8427 DOCREV CUR MEDS BY ELIG CLIN: HCPCS | Performed by: NURSE PRACTITIONER

## 2018-05-01 PROCEDURE — G8599 NO ASA/ANTIPLAT THER USE RNG: HCPCS | Performed by: NURSE PRACTITIONER

## 2018-05-01 PROCEDURE — 3017F COLORECTAL CA SCREEN DOC REV: CPT | Performed by: NURSE PRACTITIONER

## 2018-05-01 PROCEDURE — 99213 OFFICE O/P EST LOW 20 MIN: CPT | Performed by: NURSE PRACTITIONER

## 2018-05-01 PROCEDURE — G8417 CALC BMI ABV UP PARAM F/U: HCPCS | Performed by: NURSE PRACTITIONER

## 2018-05-01 RX ORDER — OXYCODONE AND ACETAMINOPHEN 7.5; 325 MG/1; MG/1
1 TABLET ORAL EVERY 6 HOURS PRN
Qty: 28 TABLET | Refills: 0 | Status: SHIPPED | OUTPATIENT
Start: 2018-05-01 | End: 2018-06-05 | Stop reason: SDUPTHER

## 2018-05-01 RX ORDER — OXYCODONE AND ACETAMINOPHEN 7.5; 325 MG/1; MG/1
1 TABLET ORAL EVERY 6 HOURS PRN
Qty: 120 TABLET | Refills: 0 | Status: SHIPPED | OUTPATIENT
Start: 2018-05-01 | End: 2018-05-01 | Stop reason: SDUPTHER

## 2018-05-03 ENCOUNTER — TELEPHONE (OUTPATIENT)
Dept: PAIN MANAGEMENT | Age: 63
End: 2018-05-03

## 2018-05-03 DIAGNOSIS — M54.41 CHRONIC BILATERAL LOW BACK PAIN WITH BILATERAL SCIATICA: ICD-10-CM

## 2018-05-03 DIAGNOSIS — G89.4 CHRONIC PAIN SYNDROME: ICD-10-CM

## 2018-05-03 DIAGNOSIS — G89.29 CHRONIC BILATERAL LOW BACK PAIN WITH BILATERAL SCIATICA: ICD-10-CM

## 2018-05-03 DIAGNOSIS — M54.42 CHRONIC BILATERAL LOW BACK PAIN WITH BILATERAL SCIATICA: ICD-10-CM

## 2018-05-03 RX ORDER — GABAPENTIN 400 MG/1
400 CAPSULE ORAL 3 TIMES DAILY
Qty: 90 CAPSULE | Refills: 0 | Status: SHIPPED | OUTPATIENT
Start: 2018-05-03 | End: 2018-06-05 | Stop reason: SDUPTHER

## 2018-05-10 ENCOUNTER — TELEPHONE (OUTPATIENT)
Dept: FAMILY MEDICINE CLINIC | Age: 63
End: 2018-05-10

## 2018-06-05 ENCOUNTER — OFFICE VISIT (OUTPATIENT)
Dept: PAIN MANAGEMENT | Age: 63
End: 2018-06-05

## 2018-06-05 VITALS
DIASTOLIC BLOOD PRESSURE: 83 MMHG | OXYGEN SATURATION: 95 % | HEART RATE: 76 BPM | SYSTOLIC BLOOD PRESSURE: 137 MMHG | WEIGHT: 272 LBS | BODY MASS INDEX: 37.94 KG/M2

## 2018-06-05 DIAGNOSIS — M50.30 DDD (DEGENERATIVE DISC DISEASE), CERVICAL: ICD-10-CM

## 2018-06-05 DIAGNOSIS — M54.41 CHRONIC BILATERAL LOW BACK PAIN WITH BILATERAL SCIATICA: ICD-10-CM

## 2018-06-05 DIAGNOSIS — F41.9 ANXIETY: ICD-10-CM

## 2018-06-05 DIAGNOSIS — M47.816 FACET ARTHROPATHY, LUMBAR: ICD-10-CM

## 2018-06-05 DIAGNOSIS — E66.01 OBESITY, CLASS III, BMI 40-49.9 (MORBID OBESITY) (HCC): ICD-10-CM

## 2018-06-05 DIAGNOSIS — G89.29 CHRONIC BILATERAL LOW BACK PAIN WITH BILATERAL SCIATICA: ICD-10-CM

## 2018-06-05 DIAGNOSIS — M54.42 CHRONIC BILATERAL LOW BACK PAIN WITH BILATERAL SCIATICA: ICD-10-CM

## 2018-06-05 DIAGNOSIS — M51.35 DDD (DEGENERATIVE DISC DISEASE), THORACOLUMBAR: ICD-10-CM

## 2018-06-05 DIAGNOSIS — M48.10 DISH (DIFFUSE IDIOPATHIC SKELETAL HYPEROSTOSIS): ICD-10-CM

## 2018-06-05 DIAGNOSIS — G89.4 CHRONIC PAIN SYNDROME: Primary | ICD-10-CM

## 2018-06-05 PROCEDURE — G8599 NO ASA/ANTIPLAT THER USE RNG: HCPCS | Performed by: NURSE PRACTITIONER

## 2018-06-05 PROCEDURE — G8427 DOCREV CUR MEDS BY ELIG CLIN: HCPCS | Performed by: NURSE PRACTITIONER

## 2018-06-05 PROCEDURE — 3017F COLORECTAL CA SCREEN DOC REV: CPT | Performed by: NURSE PRACTITIONER

## 2018-06-05 PROCEDURE — 1036F TOBACCO NON-USER: CPT | Performed by: NURSE PRACTITIONER

## 2018-06-05 PROCEDURE — 99213 OFFICE O/P EST LOW 20 MIN: CPT | Performed by: NURSE PRACTITIONER

## 2018-06-05 PROCEDURE — G8417 CALC BMI ABV UP PARAM F/U: HCPCS | Performed by: NURSE PRACTITIONER

## 2018-06-05 RX ORDER — OXYCODONE AND ACETAMINOPHEN 7.5; 325 MG/1; MG/1
1 TABLET ORAL EVERY 6 HOURS PRN
Qty: 112 TABLET | Refills: 0 | Status: SHIPPED | OUTPATIENT
Start: 2018-06-05 | End: 2018-07-03 | Stop reason: SDUPTHER

## 2018-06-05 RX ORDER — GABAPENTIN 400 MG/1
400 CAPSULE ORAL 3 TIMES DAILY
Qty: 90 CAPSULE | Refills: 0 | Status: SHIPPED | OUTPATIENT
Start: 2018-06-05 | End: 2018-08-01 | Stop reason: SDUPTHER

## 2018-06-12 DIAGNOSIS — Z78.9 HEPATITIS B IMMUNE: ICD-10-CM

## 2018-06-12 LAB — HEPATITIS B CORE IGM ANTIBODY: NORMAL

## 2018-06-14 ENCOUNTER — OFFICE VISIT (OUTPATIENT)
Dept: FAMILY MEDICINE CLINIC | Age: 63
End: 2018-06-14

## 2018-06-14 VITALS
SYSTOLIC BLOOD PRESSURE: 112 MMHG | HEART RATE: 88 BPM | OXYGEN SATURATION: 96 % | HEIGHT: 71 IN | WEIGHT: 273 LBS | DIASTOLIC BLOOD PRESSURE: 78 MMHG | BODY MASS INDEX: 38.22 KG/M2 | RESPIRATION RATE: 16 BRPM

## 2018-06-14 DIAGNOSIS — I25.10 CORONARY ARTERY DISEASE INVOLVING NATIVE HEART WITHOUT ANGINA PECTORIS, UNSPECIFIED VESSEL OR LESION TYPE: ICD-10-CM

## 2018-06-14 DIAGNOSIS — Z12.11 SCREENING FOR COLON CANCER: ICD-10-CM

## 2018-06-14 DIAGNOSIS — B18.1 CHRONIC ACTIVE VIRAL HEPATITIS B (HCC): ICD-10-CM

## 2018-06-14 DIAGNOSIS — E66.01 OBESITY, CLASS III, BMI 40-49.9 (MORBID OBESITY) (HCC): ICD-10-CM

## 2018-06-14 DIAGNOSIS — I10 ESSENTIAL HYPERTENSION: ICD-10-CM

## 2018-06-14 LAB — HBA1C MFR BLD: 8 %

## 2018-06-14 PROCEDURE — G8417 CALC BMI ABV UP PARAM F/U: HCPCS | Performed by: FAMILY MEDICINE

## 2018-06-14 PROCEDURE — 3017F COLORECTAL CA SCREEN DOC REV: CPT | Performed by: FAMILY MEDICINE

## 2018-06-14 PROCEDURE — 99214 OFFICE O/P EST MOD 30 MIN: CPT | Performed by: FAMILY MEDICINE

## 2018-06-14 PROCEDURE — 3045F PR MOST RECENT HEMOGLOBIN A1C LEVEL 7.0-9.0%: CPT | Performed by: FAMILY MEDICINE

## 2018-06-14 PROCEDURE — 83036 HEMOGLOBIN GLYCOSYLATED A1C: CPT | Performed by: FAMILY MEDICINE

## 2018-06-14 PROCEDURE — 1036F TOBACCO NON-USER: CPT | Performed by: FAMILY MEDICINE

## 2018-06-14 PROCEDURE — 2022F DILAT RTA XM EVC RTNOPTHY: CPT | Performed by: FAMILY MEDICINE

## 2018-06-14 PROCEDURE — G8427 DOCREV CUR MEDS BY ELIG CLIN: HCPCS | Performed by: FAMILY MEDICINE

## 2018-06-14 PROCEDURE — G8599 NO ASA/ANTIPLAT THER USE RNG: HCPCS | Performed by: FAMILY MEDICINE

## 2018-06-15 LAB — HEPATITIS B CORE TOTAL ANTIBODY: POSITIVE

## 2018-06-28 DIAGNOSIS — I10 ESSENTIAL HYPERTENSION: ICD-10-CM

## 2018-06-28 DIAGNOSIS — F41.9 ANXIETY: Primary | ICD-10-CM

## 2018-06-29 RX ORDER — ALPRAZOLAM 0.5 MG/1
TABLET ORAL
Qty: 270 TABLET | Refills: 0 | Status: SHIPPED | OUTPATIENT
Start: 2018-06-29 | End: 2018-07-03 | Stop reason: SDUPTHER

## 2018-06-29 RX ORDER — VALSARTAN AND HYDROCHLOROTHIAZIDE 320; 12.5 MG/1; MG/1
TABLET, FILM COATED ORAL
Qty: 90 TABLET | Refills: 1 | Status: SHIPPED | OUTPATIENT
Start: 2018-06-29 | End: 2019-01-03

## 2018-06-29 RX ORDER — CARVEDILOL 3.12 MG/1
TABLET ORAL
Qty: 180 TABLET | Refills: 1 | Status: ON HOLD | OUTPATIENT
Start: 2018-06-29 | End: 2019-04-03 | Stop reason: HOSPADM

## 2018-07-03 ENCOUNTER — OFFICE VISIT (OUTPATIENT)
Dept: PAIN MANAGEMENT | Age: 63
End: 2018-07-03

## 2018-07-03 VITALS
OXYGEN SATURATION: 94 % | DIASTOLIC BLOOD PRESSURE: 78 MMHG | HEART RATE: 87 BPM | WEIGHT: 280 LBS | BODY MASS INDEX: 39.05 KG/M2 | SYSTOLIC BLOOD PRESSURE: 129 MMHG

## 2018-07-03 DIAGNOSIS — E66.01 OBESITY, CLASS III, BMI 40-49.9 (MORBID OBESITY) (HCC): ICD-10-CM

## 2018-07-03 DIAGNOSIS — M51.35 DDD (DEGENERATIVE DISC DISEASE), THORACOLUMBAR: ICD-10-CM

## 2018-07-03 DIAGNOSIS — G89.29 CHRONIC BILATERAL LOW BACK PAIN WITH BILATERAL SCIATICA: ICD-10-CM

## 2018-07-03 DIAGNOSIS — M47.816 FACET ARTHROPATHY, LUMBAR: ICD-10-CM

## 2018-07-03 DIAGNOSIS — M48.10 DISH (DIFFUSE IDIOPATHIC SKELETAL HYPEROSTOSIS): ICD-10-CM

## 2018-07-03 DIAGNOSIS — M50.30 DDD (DEGENERATIVE DISC DISEASE), CERVICAL: ICD-10-CM

## 2018-07-03 DIAGNOSIS — G89.4 CHRONIC PAIN SYNDROME: Primary | ICD-10-CM

## 2018-07-03 DIAGNOSIS — M54.41 CHRONIC BILATERAL LOW BACK PAIN WITH BILATERAL SCIATICA: ICD-10-CM

## 2018-07-03 DIAGNOSIS — F41.9 ANXIETY: ICD-10-CM

## 2018-07-03 DIAGNOSIS — M54.42 CHRONIC BILATERAL LOW BACK PAIN WITH BILATERAL SCIATICA: ICD-10-CM

## 2018-07-03 PROCEDURE — 99213 OFFICE O/P EST LOW 20 MIN: CPT | Performed by: NURSE PRACTITIONER

## 2018-07-03 PROCEDURE — G8599 NO ASA/ANTIPLAT THER USE RNG: HCPCS | Performed by: NURSE PRACTITIONER

## 2018-07-03 PROCEDURE — 3017F COLORECTAL CA SCREEN DOC REV: CPT | Performed by: NURSE PRACTITIONER

## 2018-07-03 PROCEDURE — G8417 CALC BMI ABV UP PARAM F/U: HCPCS | Performed by: NURSE PRACTITIONER

## 2018-07-03 PROCEDURE — 1036F TOBACCO NON-USER: CPT | Performed by: NURSE PRACTITIONER

## 2018-07-03 PROCEDURE — G8427 DOCREV CUR MEDS BY ELIG CLIN: HCPCS | Performed by: NURSE PRACTITIONER

## 2018-07-03 RX ORDER — ALPRAZOLAM 0.5 MG/1
0.5 TABLET ORAL DAILY PRN
Qty: 30 TABLET | Refills: 0
Start: 2018-07-03 | End: 2018-08-02

## 2018-07-03 RX ORDER — OXYCODONE AND ACETAMINOPHEN 7.5; 325 MG/1; MG/1
1 TABLET ORAL EVERY 6 HOURS PRN
Qty: 112 TABLET | Refills: 0 | Status: SHIPPED | OUTPATIENT
Start: 2018-07-03 | End: 2018-07-31

## 2018-07-03 NOTE — PROGRESS NOTES
oxycodone in her UDS. Will continue to monitor closely and call them in for pill count  -He was advised weight reduction, diet changes- 800-1200 jarrod diet, diet diary, exercising, nutritional  consult increased physical activity as tolerated  -CBT techniques- relaxation therapies such as biofeedback, mindfulness based stress reduction, imagery, cognitive restructuring, problem solving discussed with patient  -Last UDS consistent  -Return in about 4 weeks (around 7/31/2018). Current Outpatient Prescriptions   Medication Sig Dispense Refill    oxyCODONE-acetaminophen (PERCOCET) 7.5-325 MG per tablet Take 1 tablet by mouth every 6 hours as needed for Pain for up to 28 days. . 112 tablet 0    ALPRAZolam (XANAX) 0.5 MG tablet Take 1 tablet by mouth daily as needed for Anxiety for up to 30 days. . 30 tablet 0    valsartan-hydrochlorothiazide (DIOVAN-HCT) 320-12.5 MG per tablet TAKE 1 TABLET EVERY DAY 90 tablet 1    carvedilol (COREG) 3.125 MG tablet TAKE 1 TABLET TWICE DAILY WITH MEALS 180 tablet 1    insulin detemir (LEVEMIR FLEXTOUCH) 100 UNIT/ML injection pen Inject 25 Units into the skin nightly 10 pen 3    gabapentin (NEURONTIN) 400 MG capsule Take 1 capsule by mouth 3 times daily for 30 days. . 90 capsule 0    sildenafil (REVATIO) 20 MG tablet Take 1 tablet by mouth daily 30 tablet 0    metFORMIN (GLUCOPHAGE-XR) 500 MG extended release tablet TAKE 4 TABLETS EVERY DAY WITH BREAKFAST 360 tablet 1    atorvastatin (LIPITOR) 80 MG tablet TAKE 1 TABLET EVERY DAY 90 tablet 1    Insulin Pen Needle (B-D UF III MINI PEN NEEDLES) 31G X 5 MM MISC 1 each by Does not apply route daily 100 each 6    triamcinolone (KENALOG) 0.1 % cream Apply topically 2 times daily Apply topically 2 times daily.  80 g 3    entecavir (BARACLUDE) 1 MG tablet Take 1 tablet by mouth daily 30 tablet 3    Naproxen Sodium (ALEVE) 220 MG CAPS Take 1 tablet by mouth 2 times daily as needed for Pain 60 capsule 0    diclofenac sodium (VOLTAREN) 1 % GEL Apply 2-4 g topically 2 times daily 5 Tube 0    mometasone (ELOCON) 0.1 % ointment Apply topically twice daily. 90 g 5    canagliflozin (INVOKANA) 300 MG TABS tablet Take 1 tablet by mouth every morning (before breakfast) 30 tablet 2    aspirin EC 81 MG EC tablet Take 1 tablet by mouth daily. 30 tablet 11    glucose blood VI test strips (ASCENSIA AUTODISC VI;ONE TOUCH ULTRA TEST VI) strip Apply 1 each topically 3 times daily. Onetouch Ultra 2 test strips  Dx: 250.00 300 strip 3    ONETOUCH DELICA LANCETS MISC 1 each by Does not apply route 3 times daily. 300 each 3     No current facility-administered medications for this visit. I will continue his current medication regimen  which is part of the above treatment schedule. It has been helping with Mr. Gm Suarez chronic  medical problems which for this visit include: The primary encounter diagnosis was Chronic pain syndrome. Diagnoses of DDD (degenerative disc disease), thoracolumbar, Facet arthropathy, lumbar, DDD (degenerative disc disease), cervical, Chronic bilateral low back pain with bilateral sciatica, DISH (diffuse idiopathic skeletal hyperostosis), Anxiety, and Obesity, Class III, BMI 40-49.9 (morbid obesity) ([facility] [address] were also pertinent to this visit. Risks and benefits of the medications and other alternative treatments  including no treatment were discussed with the patient. The common side effects of these medications were also explained to the patient. Informed verbal consent was obtained. Goals of current treatment regimen include improvement in pain, restoration of functioning- with focus on improvement in physical performance, general activity, work or disability,emotional distress, health care utilization and  decreased medication consumption. Will continue to monitor progress towards achieving/maintaining therapeutic goals with special emphasis on  1. Improvement in perceived interfernce  of pain with ADL's.  Ability to do home exercises independently. Ability to do household chores indoor and/or outdoor work and social and leisure activities. Improve psychosocial and physical functioning. - he is showing progression towards this treatment goal with the current regimen. He was advised against drinking alcohol with the narcotic pain medicines, advised against driving or handling machinery while adjusting the dose of medicines or if having cognitive  issues related to the current medications. Risk of overdose and death, if medicines not taken as prescribed, were also discussed. If the patient develops new symptoms or if the symptoms worsen, the patient should call the office. While transcribing every attempt was made to maintain the accuracy of the note in terms of it's contents,there may have been some errors made inadvertently. Thank you for allowing me to participate in the care of this patient.     Ori Vidales CNP    Cc: Nila Wagner MD

## 2018-07-10 ENCOUNTER — TELEPHONE (OUTPATIENT)
Dept: FAMILY MEDICINE CLINIC | Age: 63
End: 2018-07-10

## 2018-07-10 NOTE — TELEPHONE ENCOUNTER
Pt is calling to see if we have any invokana samples for him, if so the pt will like to  some if this is ok. Can we please reach out to the pt when this is ready for . CB# [phone]    Please advise,     Thank you.

## 2018-07-14 PROBLEM — Z12.11 SCREENING FOR COLON CANCER: Status: RESOLVED | Noted: 2018-06-14 | Resolved: 2018-07-14

## 2018-07-18 ENCOUNTER — TELEPHONE (OUTPATIENT)
Dept: PAIN MANAGEMENT | Age: 63
End: 2018-07-18

## 2018-07-18 NOTE — LETTER
[facility]  [facility]  [address]  [address] [facility] [place] [address]  Dept: [phone]  Dept Fax: [phone]      7/23/2018    Dear Angeli Dai,    I find it necessary to inform you that I must withdraw my professional commitment to you as a pain physician due to a breakdown in the doctor/patient relationship. It is essential that you continue to receive medical care for your condition. Therefore, I recommend you make immediate arrangements with another physician to provide the needed care. For emergency medical services, please go to the nearest emergency room for treatment. If you wish, I will continue to treat your urgent medical needs which may develop for the following thirty (30) days from today's date. Enclosed is a form authorizing me to release a copy of your medical records to your new treating physician. I will forward your records promptly upon receipt of this form, signed by you, with completed name and address of the physician to receive your records. If you have any questions regarding the contents of this letter, you may reach me at my office during normal business hours.     Respectfully,        CARRIE ELIZABETH, KARYN - CNP

## 2018-07-19 NOTE — TELEPHONE ENCOUNTER
This is the second attempt to call this pt into the office for a pill count. He will need to come into the office today with ALL his medications, pain medications included, for review. If he is unable to come in today, he will need to come into the [employer] office tomorrow, or the [employer] office the day after that. In the event he is out of town or is unable to get to the office, he will need to go to his local pharmacy to complete the pill count there. Results will need to be on pharmacy letter head with the name and signature of the pharmacist performing the count and will need to be faxed to [phone]. He was not available, a message was left.

## 2018-07-20 NOTE — TELEPHONE ENCOUNTER
Pt calling, states the earliest he can get there is Monday since [facility] will not be there all day today. He is picking his mother up from the hospital and will not be able to make it by noon today.

## 2018-07-23 NOTE — TELEPHONE ENCOUNTER
Pt calling, wanted to advise that his wife was not coming in for her pill count and wanted to check when his appt was. Advised when his appt was and he said ok and was about to get off the phone, then I asked him if he was still coming in today. He asked why he would come in. Advised that I spoke with him Friday about coming in today for his pill count. As soon as I said this, he opted to cancel upcoming appt and no longer be seen then come in for a pill count. Should a dismissal letter be sent?

## 2018-07-31 ENCOUNTER — TELEPHONE (OUTPATIENT)
Dept: FAMILY MEDICINE CLINIC | Age: 63
End: 2018-07-31

## 2018-07-31 DIAGNOSIS — G89.29 CHRONIC BILATERAL LOW BACK PAIN WITH BILATERAL SCIATICA: ICD-10-CM

## 2018-07-31 DIAGNOSIS — G89.4 CHRONIC PAIN SYNDROME: ICD-10-CM

## 2018-07-31 DIAGNOSIS — M54.41 CHRONIC BILATERAL LOW BACK PAIN WITH BILATERAL SCIATICA: ICD-10-CM

## 2018-07-31 DIAGNOSIS — M54.42 CHRONIC BILATERAL LOW BACK PAIN WITH BILATERAL SCIATICA: ICD-10-CM

## 2018-07-31 NOTE — TELEPHONE ENCOUNTER
Patient advised of that Cindy Chilel is out of the office until 8/1/2018. Advised that she will address this request at that time. Patient states that he can not afford to keep seeing the pain management doctor at this time.

## 2018-08-01 DIAGNOSIS — Z12.11 SCREENING FOR COLON CANCER: ICD-10-CM

## 2018-08-01 LAB
CONTROL: NORMAL
HEMOCCULT STL QL: NORMAL

## 2018-08-01 PROCEDURE — 82274 ASSAY TEST FOR BLOOD FECAL: CPT | Performed by: FAMILY MEDICINE

## 2018-08-01 RX ORDER — GABAPENTIN 400 MG/1
400 CAPSULE ORAL 3 TIMES DAILY
Qty: 90 CAPSULE | Refills: 3 | Status: SHIPPED | OUTPATIENT
Start: 2018-08-01 | End: 2018-09-13 | Stop reason: SDUPTHER

## 2018-08-10 ENCOUNTER — TELEPHONE (OUTPATIENT)
Dept: FAMILY MEDICINE CLINIC | Age: 63
End: 2018-08-10

## 2018-08-28 LAB
ALBUMIN SERPL-MCNC: 4.6 G/DL (ref 3.4–5)
ALP BLD-CCNC: 96 U/L (ref 40–129)
ALT SERPL-CCNC: 68 U/L (ref 10–40)
AST SERPL-CCNC: 41 U/L (ref 15–37)
BILIRUB SERPL-MCNC: 0.5 MG/DL (ref 0–1)
BILIRUBIN DIRECT: <0.2 MG/DL (ref 0–0.3)
BILIRUBIN, INDIRECT: ABNORMAL MG/DL (ref 0–1)
INR BLD: 0.96 (ref 0.86–1.14)
PROTHROMBIN TIME: 11 SEC (ref 9.8–13)
TOTAL PROTEIN: 7.6 G/DL (ref 6.4–8.2)

## 2018-08-29 LAB
HBV SURFACE AB TITR SER: 144.3 MIU/ML
HEPATITIS B SURFACE ANTIGEN INTERPRETATION: REACTIVE

## 2018-08-29 RX ORDER — ATORVASTATIN CALCIUM 80 MG/1
TABLET, FILM COATED ORAL
Qty: 90 TABLET | Refills: 1 | Status: SHIPPED | OUTPATIENT
Start: 2018-08-29 | End: 2019-01-21

## 2018-08-30 LAB — HEPATITIS B SURFACE ANTIGEN CONFIRMATION: POSITIVE

## 2018-09-01 LAB
HBV DNA QNT I/U: ABNORMAL IU/ML
HEPATITIS B DNA, PCR (QUANT): DETECTED
LOG 10 HBV AS IU/ML: 3.2 LOG IU

## 2018-09-05 ENCOUNTER — OFFICE VISIT (OUTPATIENT)
Dept: PAIN MANAGEMENT | Age: 63
End: 2018-09-05

## 2018-09-05 VITALS
SYSTOLIC BLOOD PRESSURE: 122 MMHG | OXYGEN SATURATION: 94 % | WEIGHT: 276.6 LBS | HEART RATE: 91 BPM | BODY MASS INDEX: 38.58 KG/M2 | DIASTOLIC BLOOD PRESSURE: 84 MMHG

## 2018-09-05 DIAGNOSIS — M54.41 CHRONIC BILATERAL LOW BACK PAIN WITH BILATERAL SCIATICA: ICD-10-CM

## 2018-09-05 DIAGNOSIS — G89.4 CHRONIC PAIN SYNDROME: ICD-10-CM

## 2018-09-05 DIAGNOSIS — M48.10 DISH (DIFFUSE IDIOPATHIC SKELETAL HYPEROSTOSIS): ICD-10-CM

## 2018-09-05 DIAGNOSIS — M47.816 FACET ARTHROPATHY, LUMBAR: ICD-10-CM

## 2018-09-05 DIAGNOSIS — M54.42 CHRONIC BILATERAL LOW BACK PAIN WITH BILATERAL SCIATICA: ICD-10-CM

## 2018-09-05 DIAGNOSIS — M51.35 DDD (DEGENERATIVE DISC DISEASE), THORACOLUMBAR: ICD-10-CM

## 2018-09-05 DIAGNOSIS — F41.9 ANXIETY: ICD-10-CM

## 2018-09-05 DIAGNOSIS — G89.29 CHRONIC BILATERAL LOW BACK PAIN WITH BILATERAL SCIATICA: ICD-10-CM

## 2018-09-05 DIAGNOSIS — M50.30 DDD (DEGENERATIVE DISC DISEASE), CERVICAL: ICD-10-CM

## 2018-09-05 DIAGNOSIS — E66.01 OBESITY, CLASS III, BMI 40-49.9 (MORBID OBESITY) (HCC): ICD-10-CM

## 2018-09-05 PROCEDURE — 3017F COLORECTAL CA SCREEN DOC REV: CPT | Performed by: NURSE PRACTITIONER

## 2018-09-05 PROCEDURE — G8427 DOCREV CUR MEDS BY ELIG CLIN: HCPCS | Performed by: NURSE PRACTITIONER

## 2018-09-05 PROCEDURE — 1036F TOBACCO NON-USER: CPT | Performed by: NURSE PRACTITIONER

## 2018-09-05 PROCEDURE — G8417 CALC BMI ABV UP PARAM F/U: HCPCS | Performed by: NURSE PRACTITIONER

## 2018-09-05 PROCEDURE — G8599 NO ASA/ANTIPLAT THER USE RNG: HCPCS | Performed by: NURSE PRACTITIONER

## 2018-09-05 PROCEDURE — 99213 OFFICE O/P EST LOW 20 MIN: CPT | Performed by: NURSE PRACTITIONER

## 2018-09-05 RX ORDER — BUPRENORPHINE 10 UG/H
1 PATCH TRANSDERMAL WEEKLY
Qty: 4 PATCH | Refills: 0 | Status: SHIPPED | OUTPATIENT
Start: 2018-09-05 | End: 2018-10-03

## 2018-09-05 NOTE — PATIENT INSTRUCTIONS
Patient Education        Back Stretches: Exercises  Your Care Instructions  Here are some examples of exercises for stretching your back. Start each exercise slowly. Ease off the exercise if you start to have pain. Your doctor or physical therapist will tell you when you can start these exercises and which ones will work best for you. How to do the exercises  Overhead stretch    1. Stand comfortably with your feet shoulder-width apart. 2. Looking straight ahead, raise both arms over your head and reach toward the ceiling. Do not allow your head to tilt back. 3. Hold for 15 to 30 seconds, then lower your arms to your sides. 4. Repeat 2 to 4 times. Side stretch    1. Stand comfortably with your feet shoulder-width apart. 2. Raise one arm over your head, and then lean to the other side. 3. Slide your hand down your leg as you let the weight of your arm gently stretch your side muscles. Hold for 15 to 30 seconds. 4. Repeat 2 to 4 times on each side. Press-up    1. Lie on your stomach, supporting your body with your forearms. 2. Press your elbows down into the floor to raise your upper back. As you do this, relax your stomach muscles and allow your back to arch without using your back muscles. As your press up, do not let your hips or pelvis come off the floor. 3. Hold for 15 to 30 seconds, then relax. 4. Repeat 2 to 4 times. Relax and rest    1. Lie on your back with a rolled towel under your neck and a pillow under your knees. Extend your arms comfortably to your sides. 2. Relax and breathe normally. 3. Remain in this position for about 10 minutes. 4. If you can, do this 2 or 3 times each day. Follow-up care is a key part of your treatment and safety. Be sure to make and go to all appointments, and call your doctor if you are having problems. It's also a good idea to know your test results and keep a list of the medicines you take. Where can you learn more?   Go to [email or web address] and sign in to your [employer] account. Enter A722 in the [employer] box to learn more about \"Back Stretches: Exercises. \"     If you do not have an account, please click on the \"Sign Up Now\" link. Current as of: November 29, 2017  Content Version: 11.7  © 4318-3456 [employer]. Care instructions adapted under license by [facility] ([facility]). If you have questions about a medical condition or this instruction, always ask your healthcare professional. [address] any warranty or liability for your use of this information.

## 2018-09-05 NOTE — PROGRESS NOTES
[facility]  1955  [id number]      HISTORY OF PRESENT ILLNESS:  Mr. Stephenie Grady is a 59 y.o. male returns for a follow up visit for pain management  He has a diagnosis of   1. Chronic pain syndrome    2. DDD (degenerative disc disease), thoracolumbar    3. Facet arthropathy, lumbar ([facility] [address]    4. DDD (degenerative disc disease), cervical    5. Chronic bilateral low back pain with bilateral sciatica    6. DISH (diffuse idiopathic skeletal hyperostosis)    7. Anxiety    8. Obesity, Class III, BMI 40-49.9 (morbid obesity) ([facility] [address]    . He complains of pain in the bilateral lower back, bilateral mid-back and bilateral upper back He rates the pain 8/10 and describes it as sharp, aching. Current treatment regimen has helped relieve about 50% of the pain. He denies any side effects from the current pain regimen. Patient reports that since the last follow up visit the physical functioning is worse, family/social relationships are worse, mood is worse sleep patterns are worse, and that the overall functioning is worse. Patient denies misusing/abusing his narcotic pain medications or using any illegal drugs. There are No indicators for possible drug abuse, addiction or diversion problems. Upon obtaining medical history from Mr. Stover states that pain is not manageable on current pain therapy. Reports increased stress due to his mothers poor health related to pancreatic cancer. He was not seen at the clinic last week, and failed to appear for a pill count. Mood is stable without anxiety. Sleep is fair with an average of 5-6 hours. Denies to having issues of constipation. Tolerating activities/house chores with moderate tenderness to the lower back. ALLERGIES: Patients list of allergies were reviewed     MEDICATIONS: Mr. Stephenie Grady list of medications were reviewed. His current medications are   Outpatient Medications Prior to Visit   Medication Sig Dispense Refill    atorvastatin (LIPITOR) 80 MG tablet TAKE 1 TABLET breathing. Gastrointestinal: Negative for nausea, vomiting, abdominal pain, diarrhea, constipation, blood in stool and abdominal distention. PHYSICAL EXAM:   Nursing note and vitals reviewed. /84   Pulse 91   Wt 276 lb 9.6 oz (125.5 kg)   SpO2 94%   BMI 38.58 kg/m²   Constitutional: He appears well-developed and well-nourished. No acute distress. Skin: Skin is warm and dry, good turgor. No rash noted. He is not diaphoretic. Cardiovascular: Normal rate, regular rhythm, normal heart sounds, and does not have murmur. Pulmonary/Chest: Effort normal. No respiratory distress. He does not have wheezes in the lung fields. He has no rales. Neurological/Psychiatric:He is alert and oriented to person, place, and time. Coordination is  normal. His mood isAppropriate and affect is Flat/blunted . IMPRESSION:   1. Chronic pain syndrome    2. DDD (degenerative disc disease), thoracolumbar    3. Facet arthropathy, lumbar ([address]    4. DDD (degenerative disc disease), cervical    5. Chronic bilateral low back pain with bilateral sciatica    6. DISH (diffuse idiopathic skeletal hyperostosis)    7. Anxiety    8. Obesity, Class III, BMI 40-49.9 (morbid obesity) ([facility])        PLAN:  Informed verbal consent was obtained  -Start taking Butrans 10 mcg/hr on to the skin weekly  -Discontinue Percocet  -Renard exercises  -Discussed positive copying skills for stress related to his mothers health  -Coaching completed on failed pill count, he and his wife failed to appear for pill count. Reports that he was caught in the middle of running up and down with his mother, and decided that his wife probably does not need opioids any more. He will no longer be a candidate for oral opioids under my care given the failed pill count. He cn otherwise be treated with non-oral opioids.  He verbalized understanding  -He was advised weight reduction, diet changes- 800-1200 jarrod diet, diet diary, exercising, nutritional  consult increased physical activity as tolerated  -CBT techniques- relaxation therapies such as biofeedback, mindfulness based stress reduction, imagery, cognitive restructuring, problem solving discussed with patient  -Last UDS consistent/UDS today  -Return in about 4 weeks (around 10/3/2018). -[place] record was obtained and reviewed  for the last one year and no indicators of drug misuse  were found. Any other controlled substance prescriptions  seen on the record have been accounted for, I am aware of the patient receiving these medications. Roge Martinez [place] record will be rechecked as part of office protocol. Current Outpatient Prescriptions   Medication Sig Dispense Refill    buprenorphine (BUPRENEX) 10 MCG/HR PTWK Place 1 patch onto the skin once a week for 30 days. . 4 patch 0    atorvastatin (LIPITOR) 80 MG tablet TAKE 1 TABLET EVERY DAY 90 tablet 1    valsartan-hydrochlorothiazide (DIOVAN-HCT) 320-12.5 MG per tablet TAKE 1 TABLET EVERY DAY 90 tablet 1    carvedilol (COREG) 3.125 MG tablet TAKE 1 TABLET TWICE DAILY WITH MEALS 180 tablet 1    insulin detemir (LEVEMIR FLEXTOUCH) 100 UNIT/ML injection pen Inject 25 Units into the skin nightly 10 pen 3    sildenafil (REVATIO) 20 MG tablet Take 1 tablet by mouth daily 30 tablet 0    metFORMIN (GLUCOPHAGE-XR) 500 MG extended release tablet TAKE 4 TABLETS EVERY DAY WITH BREAKFAST 360 tablet 1    Insulin Pen Needle (B-D UF III MINI PEN NEEDLES) 31G X 5 MM MISC 1 each by Does not apply route daily 100 each 6    triamcinolone (KENALOG) 0.1 % cream Apply topically 2 times daily Apply topically 2 times daily. 80 g 3    entecavir (BARACLUDE) 1 MG tablet Take 1 tablet by mouth daily 30 tablet 3    Naproxen Sodium (ALEVE) 220 MG CAPS Take 1 tablet by mouth 2 times daily as needed for Pain 60 capsule 0    diclofenac sodium (VOLTAREN) 1 % GEL Apply 2-4 g topically 2 times daily 5 Tube 0    mometasone (ELOCON) 0.1 % ointment Apply topically twice daily.  90 g 5    canagliflozin (INVOKANA) 300 MG TABS tablet Take 1 tablet by mouth every morning (before breakfast) 30 tablet 2    aspirin EC 81 MG EC tablet Take 1 tablet by mouth daily. 30 tablet 11    glucose blood VI test strips (ASCENSIA AUTODISC VI;ONE TOUCH ULTRA TEST VI) strip Apply 1 each topically 3 times daily. Onetouch Ultra 2 test strips  Dx: 250.00 300 strip 3    ONETOUCH DELICA LANCETS MISC 1 each by Does not apply route 3 times daily. 300 each 3    gabapentin (NEURONTIN) 400 MG capsule Take 1 capsule by mouth 3 times daily for 30 days. . 90 capsule 3     No current facility-administered medications for this visit. I will continue his current medication regimen  which is part of the above treatment schedule. It has been helping with Mr. Da Curry chronic  medical problems which for this visit include:   Diagnoses of Chronic pain syndrome, DDD (degenerative disc disease), thoracolumbar, Facet arthropathy, lumbar ([address] DDD (degenerative disc disease), cervical, Chronic bilateral low back pain with bilateral sciatica, DISH (diffuse idiopathic skeletal hyperostosis), Anxiety, and Obesity, Class III, BMI 40-49.9 (morbid obesity) ([address] were pertinent to this visit. Risks and benefits of the medications and other alternative treatments  including no treatment were discussed with the patient. The common side effects of these medications were also explained to the patient. Informed verbal consent was obtained. Goals of current treatment regimen include improvement in pain, restoration of functioning- with focus on improvement in physical performance, general activity, work or disability,emotional distress, health care utilization and  decreased medication consumption. Will continue to monitor progress towards achieving/maintaining therapeutic goals with special emphasis on  1. Improvement in perceived interfernce  of pain with ADL's. Ability to do home exercises independently.  Ability to do household chores indoor and/or outdoor work and social and leisure activities. Improve psychosocial and physical functioning. - he is not showing any significant progress/or showing regression  towards this goal and reassessment and adjustment of goals/treatment have been made. He was advised against drinking alcohol with the narcotic pain medicines, advised against driving or handling machinery while adjusting the dose of medicines or if having cognitive  issues related to the current medications. Risk of overdose and death, if medicines not taken as prescribed, were also discussed. If the patient develops new symptoms or if the symptoms worsen, the patient should call the office. While transcribing every attempt was made to maintain the accuracy of the note in terms of it's contents,there may have been some errors made inadvertently. Thank you for allowing me to participate in the care of this patient. Mariano Walls. Sav COBOS.     Cc: Alen Torres MD

## 2018-09-06 ENCOUNTER — TELEPHONE (OUTPATIENT)
Dept: FAMILY MEDICINE CLINIC | Age: 63
End: 2018-09-06

## 2018-09-07 ENCOUNTER — TELEPHONE (OUTPATIENT)
Dept: PAIN MANAGEMENT | Age: 63
End: 2018-09-07

## 2018-09-07 NOTE — TELEPHONE ENCOUNTER
Prior Authorization(s) completed and submitted on CMM. The insurance company should provide an automated phone call to the patient as well as fax a determination to the PA department.

## 2018-09-10 DIAGNOSIS — F41.9 ANXIETY: ICD-10-CM

## 2018-09-11 RX ORDER — ALPRAZOLAM 0.5 MG/1
TABLET ORAL
Qty: 270 TABLET | Refills: 0 | Status: SHIPPED | OUTPATIENT
Start: 2018-09-11 | End: 2018-12-10

## 2018-09-13 DIAGNOSIS — M54.42 CHRONIC BILATERAL LOW BACK PAIN WITH BILATERAL SCIATICA: ICD-10-CM

## 2018-09-13 DIAGNOSIS — G89.4 CHRONIC PAIN SYNDROME: ICD-10-CM

## 2018-09-13 DIAGNOSIS — G89.29 CHRONIC BILATERAL LOW BACK PAIN WITH BILATERAL SCIATICA: ICD-10-CM

## 2018-09-13 DIAGNOSIS — M54.41 CHRONIC BILATERAL LOW BACK PAIN WITH BILATERAL SCIATICA: ICD-10-CM

## 2018-09-14 RX ORDER — GABAPENTIN 400 MG/1
400 CAPSULE ORAL 3 TIMES DAILY
Qty: 90 CAPSULE | Refills: 3 | Status: SHIPPED | OUTPATIENT
Start: 2018-09-14 | End: 2019-01-03 | Stop reason: SDUPTHER

## 2018-10-01 ENCOUNTER — TELEPHONE (OUTPATIENT)
Dept: FAMILY MEDICINE CLINIC | Age: 63
End: 2018-10-01

## 2018-10-03 ENCOUNTER — OFFICE VISIT (OUTPATIENT)
Dept: PAIN MANAGEMENT | Age: 63
End: 2018-10-03
Payer: MEDICARE

## 2018-10-03 ENCOUNTER — OFFICE VISIT (OUTPATIENT)
Dept: FAMILY MEDICINE CLINIC | Age: 63
End: 2018-10-03
Payer: MEDICARE

## 2018-10-03 VITALS
DIASTOLIC BLOOD PRESSURE: 81 MMHG | WEIGHT: 281.2 LBS | SYSTOLIC BLOOD PRESSURE: 139 MMHG | HEART RATE: 93 BPM | BODY MASS INDEX: 39.22 KG/M2 | OXYGEN SATURATION: 94 %

## 2018-10-03 VITALS
DIASTOLIC BLOOD PRESSURE: 78 MMHG | RESPIRATION RATE: 18 BRPM | SYSTOLIC BLOOD PRESSURE: 124 MMHG | BODY MASS INDEX: 39.34 KG/M2 | TEMPERATURE: 98.6 F | HEIGHT: 71 IN | WEIGHT: 281 LBS | HEART RATE: 84 BPM

## 2018-10-03 DIAGNOSIS — M50.30 DDD (DEGENERATIVE DISC DISEASE), CERVICAL: ICD-10-CM

## 2018-10-03 DIAGNOSIS — M76.61 ACHILLES TENDINITIS OF RIGHT LOWER EXTREMITY: ICD-10-CM

## 2018-10-03 DIAGNOSIS — E66.01 OBESITY, CLASS III, BMI 40-49.9 (MORBID OBESITY) (HCC): ICD-10-CM

## 2018-10-03 DIAGNOSIS — M47.816 FACET ARTHROPATHY, LUMBAR: ICD-10-CM

## 2018-10-03 DIAGNOSIS — M54.42 CHRONIC BILATERAL LOW BACK PAIN WITH BILATERAL SCIATICA: ICD-10-CM

## 2018-10-03 DIAGNOSIS — M54.41 CHRONIC BILATERAL LOW BACK PAIN WITH BILATERAL SCIATICA: ICD-10-CM

## 2018-10-03 DIAGNOSIS — I10 ESSENTIAL HYPERTENSION: ICD-10-CM

## 2018-10-03 DIAGNOSIS — G89.29 CHRONIC BILATERAL LOW BACK PAIN WITH BILATERAL SCIATICA: ICD-10-CM

## 2018-10-03 DIAGNOSIS — M48.10 DISH (DIFFUSE IDIOPATHIC SKELETAL HYPEROSTOSIS): ICD-10-CM

## 2018-10-03 DIAGNOSIS — Z23 NEEDS FLU SHOT: ICD-10-CM

## 2018-10-03 DIAGNOSIS — G89.4 CHRONIC PAIN SYNDROME: ICD-10-CM

## 2018-10-03 DIAGNOSIS — M51.35 DDD (DEGENERATIVE DISC DISEASE), THORACOLUMBAR: ICD-10-CM

## 2018-10-03 DIAGNOSIS — E78.5 HYPERLIPIDEMIA WITH TARGET LDL LESS THAN 100: ICD-10-CM

## 2018-10-03 DIAGNOSIS — F41.9 ANXIETY: ICD-10-CM

## 2018-10-03 PROCEDURE — 3045F PR MOST RECENT HEMOGLOBIN A1C LEVEL 7.0-9.0%: CPT | Performed by: FAMILY MEDICINE

## 2018-10-03 PROCEDURE — G8599 NO ASA/ANTIPLAT THER USE RNG: HCPCS | Performed by: NURSE PRACTITIONER

## 2018-10-03 PROCEDURE — G8417 CALC BMI ABV UP PARAM F/U: HCPCS | Performed by: FAMILY MEDICINE

## 2018-10-03 PROCEDURE — G8417 CALC BMI ABV UP PARAM F/U: HCPCS | Performed by: NURSE PRACTITIONER

## 2018-10-03 PROCEDURE — 83036 HEMOGLOBIN GLYCOSYLATED A1C: CPT | Performed by: FAMILY MEDICINE

## 2018-10-03 PROCEDURE — 1036F TOBACCO NON-USER: CPT | Performed by: FAMILY MEDICINE

## 2018-10-03 PROCEDURE — G8482 FLU IMMUNIZE ORDER/ADMIN: HCPCS | Performed by: FAMILY MEDICINE

## 2018-10-03 PROCEDURE — 1036F TOBACCO NON-USER: CPT | Performed by: NURSE PRACTITIONER

## 2018-10-03 PROCEDURE — G8427 DOCREV CUR MEDS BY ELIG CLIN: HCPCS | Performed by: FAMILY MEDICINE

## 2018-10-03 PROCEDURE — 2022F DILAT RTA XM EVC RTNOPTHY: CPT | Performed by: FAMILY MEDICINE

## 2018-10-03 PROCEDURE — G8427 DOCREV CUR MEDS BY ELIG CLIN: HCPCS | Performed by: NURSE PRACTITIONER

## 2018-10-03 PROCEDURE — 99214 OFFICE O/P EST MOD 30 MIN: CPT | Performed by: FAMILY MEDICINE

## 2018-10-03 PROCEDURE — 3017F COLORECTAL CA SCREEN DOC REV: CPT | Performed by: NURSE PRACTITIONER

## 2018-10-03 PROCEDURE — 99213 OFFICE O/P EST LOW 20 MIN: CPT | Performed by: NURSE PRACTITIONER

## 2018-10-03 PROCEDURE — G0008 ADMIN INFLUENZA VIRUS VAC: HCPCS | Performed by: FAMILY MEDICINE

## 2018-10-03 PROCEDURE — G8482 FLU IMMUNIZE ORDER/ADMIN: HCPCS | Performed by: NURSE PRACTITIONER

## 2018-10-03 PROCEDURE — 90682 RIV4 VACC RECOMBINANT DNA IM: CPT | Performed by: FAMILY MEDICINE

## 2018-10-03 PROCEDURE — G8599 NO ASA/ANTIPLAT THER USE RNG: HCPCS | Performed by: FAMILY MEDICINE

## 2018-10-03 PROCEDURE — 3017F COLORECTAL CA SCREEN DOC REV: CPT | Performed by: FAMILY MEDICINE

## 2018-10-03 RX ORDER — INSULIN GLARGINE 100 [IU]/ML
40 INJECTION, SOLUTION SUBCUTANEOUS NIGHTLY
Qty: 3 VIAL | Refills: 3 | Status: SHIPPED | OUTPATIENT
Start: 2018-10-03 | End: 2018-10-08 | Stop reason: SDUPTHER

## 2018-10-03 RX ORDER — BUPRENORPHINE 15 UG/H
1 PATCH TRANSDERMAL WEEKLY
Qty: 4 PATCH | Refills: 0 | Status: SHIPPED | OUTPATIENT
Start: 2018-10-03 | End: 2018-11-06 | Stop reason: SDUPTHER

## 2018-10-03 RX ORDER — IBUPROFEN 200 MG
1 TABLET ORAL DAILY
Qty: 100 EACH | Refills: 3 | Status: SHIPPED | OUTPATIENT
Start: 2018-10-03 | End: 2019-08-23 | Stop reason: SDUPTHER

## 2018-10-03 RX ORDER — INSULIN GLARGINE 100 [IU]/ML
40 INJECTION, SOLUTION SUBCUTANEOUS NIGHTLY
Qty: 3 VIAL | Refills: 3 | Status: SHIPPED | OUTPATIENT
Start: 2018-10-03 | End: 2018-10-03 | Stop reason: SDUPTHER

## 2018-10-03 ASSESSMENT — PATIENT HEALTH QUESTIONNAIRE - PHQ9
1. LITTLE INTEREST OR PLEASURE IN DOING THINGS: 0
SUM OF ALL RESPONSES TO PHQ QUESTIONS 1-9: 0
2. FEELING DOWN, DEPRESSED OR HOPELESS: 0
SUM OF ALL RESPONSES TO PHQ QUESTIONS 1-9: 0
SUM OF ALL RESPONSES TO PHQ9 QUESTIONS 1 & 2: 0

## 2018-10-03 NOTE — PROGRESS NOTES
Vaccine Information Sheet, \"Influenza - Inactivated\"  given to Emilio Lvey, or parent/legal guardian of  Emilio Levy and verbalized understanding. Patient responses:    Have you ever had a reaction to a flu vaccine? No  Are you able to eat eggs without adverse effects? Yes  Do you have any current illness? No  Have you ever had Guillian Bandana Syndrome? No    Flu vaccine given per order. Please see immunization tab.
lashes normal and conjunctivae and sclerae normal  LUNGS:    · Breathing unlabored  · clear to auscultation bilaterally and good air movement  CARDIOVASC:   · regular rate and rhythm, S1, S2 normal. No murmur, click, rub or gallop  · Apical impulse normal  · LEGS:  Lower extremity edema: none    SKIN: warm and dry  PSYCH:    · Alert and oriented  · Normal reasoning, insight good  · Facial expressions full, mood appropriate  · No memory disturbance noted  MUSCULOSKEL:    · No significant finger or nail findings  NEURO:   · CN 2-12 intact     Assessment and Plan:      Diagnosis Orders   1. Uncontrolled type 2 diabetes mellitus without complication, without long-term current use of insulin (HCC)  Improved but still not to goal. Increase insulin  HM DIABETES EYE EXAM    POCT glycosylated hemoglobin (Hb A1C)   2. Hyperlipidemia with target LDL less than 100  Last test showed control to be well controlled. No significant medication side effects noted. Continue current therapy. Labs as below. 3. Essential hypertension  Good control. Current treatment plan is effective, continue same. 4. Needs flu shot  INFLUENZA, QUADV, RECOMBINANT, 18 YRS AND OLDER, IM, PF, PREFILL SYR OR SDV, 0.5ML (FLUBLOK QUADV, PF)   5. Achilles tendinitis of right lower extremity  NEW- try home exercise program    Previous labs reviewed as above.     INSTRUCTIONS  NEXT APPOINTMENT: Please schedule annual complete physical (30 minutes) in 3 months. · PLEASE TAKE THIS FORM TO CHECK-OUT WINDOW TO SCHEDULE NEXT VISIT. PLEASE GET FASTING BLOODWORK DRAWN SOON. Lab is on first floor in [address]. Hours Monday to Friday 7 AM to 5 PM.  Take orders with you. · Please get annual dilated eye exam to screen for diabetic retinopathy which can lead to vision loss. Ask for report to be faxed to [phone]. · Use heat 20 minutes on painful joint/muscle. Then do stretches. May ice any sore spots or for swelling afterwards.     · INCREASE Lantus to
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
## 2018-10-08 LAB — HBA1C MFR BLD: 8.5 %

## 2018-10-08 RX ORDER — INSULIN GLARGINE 100 [IU]/ML
50 INJECTION, SOLUTION SUBCUTANEOUS NIGHTLY
Qty: 3 VIAL | Refills: 3
Start: 2018-10-08 | End: 2019-01-21

## 2018-10-23 ENCOUNTER — OFFICE VISIT (OUTPATIENT)
Dept: FAMILY MEDICINE CLINIC | Age: 63
End: 2018-10-23
Payer: MEDICARE

## 2018-10-23 VITALS
DIASTOLIC BLOOD PRESSURE: 80 MMHG | HEIGHT: 71 IN | HEART RATE: 80 BPM | TEMPERATURE: 98.4 F | RESPIRATION RATE: 18 BRPM | SYSTOLIC BLOOD PRESSURE: 124 MMHG | WEIGHT: 263 LBS | BODY MASS INDEX: 36.82 KG/M2

## 2018-10-23 DIAGNOSIS — R51.9 TEMPLE TENDERNESS: Primary | ICD-10-CM

## 2018-10-23 DIAGNOSIS — R51.9 TEMPLE TENDERNESS: ICD-10-CM

## 2018-10-23 LAB
C-REACTIVE PROTEIN: 1.2 MG/L (ref 0–5.1)
SEDIMENTATION RATE, ERYTHROCYTE: 25 MM/HR (ref 0–20)

## 2018-10-23 PROCEDURE — G8417 CALC BMI ABV UP PARAM F/U: HCPCS | Performed by: FAMILY MEDICINE

## 2018-10-23 PROCEDURE — 1036F TOBACCO NON-USER: CPT | Performed by: FAMILY MEDICINE

## 2018-10-23 PROCEDURE — 3017F COLORECTAL CA SCREEN DOC REV: CPT | Performed by: FAMILY MEDICINE

## 2018-10-23 PROCEDURE — G8427 DOCREV CUR MEDS BY ELIG CLIN: HCPCS | Performed by: FAMILY MEDICINE

## 2018-10-23 PROCEDURE — 99213 OFFICE O/P EST LOW 20 MIN: CPT | Performed by: FAMILY MEDICINE

## 2018-10-23 PROCEDURE — G8599 NO ASA/ANTIPLAT THER USE RNG: HCPCS | Performed by: FAMILY MEDICINE

## 2018-10-23 PROCEDURE — G8482 FLU IMMUNIZE ORDER/ADMIN: HCPCS | Performed by: FAMILY MEDICINE

## 2018-10-23 NOTE — PROGRESS NOTES
Scribe documentation: Shana Paul am scribing for Gladis Yi MD. Electronically signed by Diana Kwan  on 10/23/2018 at 11:39 AM  MD Attestation: Batsheva Pipo,  personally performed the services described in this documentation, as scribed by the user listed above, and it is both accurate and complete. CHART REVIEW  Health Maintenance   Topic Date Due    Diabetic retinal exam  01/03/1966    Shingles Vaccine (1 of 2 - 2 Dose Series) 01/03/2006    PSA counseling  11/17/2018    Diabetic microalbuminuria test  11/17/2018    Lipid screen  11/17/2018    Diabetic foot exam  02/19/2019    Potassium monitoring  02/19/2019    Creatinine monitoring  02/19/2019    Colon Cancer Screen FIT/FOBT  08/01/2019    A1C test (Diabetic or Prediabetic)  10/03/2019    DTaP/Tdap/Td vaccine (2 - Td) 11/16/2027    Flu vaccine  Completed    Pneumococcal med risk  Completed    Hepatitis C screen  Completed    HIV screen  Completed     Social History     Social History    Marital status:      Spouse name: N/A    Number of children: N/A    Years of education: N/A     Occupational History    Disabled       Social History Main Topics    Smoking status: Never Smoker    Smokeless tobacco: Never Used      Comment: advised not to start    Alcohol use Yes      Comment: rare    Drug use: No    Sexual activity: Yes      Comment:      Other Topics Concern    None     Social History Narrative    Lives with spouse .     Exercise: walking every other day    Seatbelt use: Always    Living will: no,   additional information provided    Self-testicular exams: Yes      The 10-year ASCVD risk score ([address] et al., 2013) is: 21%    Values used to calculate the score:      Age: 59 years      Sex: Male      Is Non- : No      Diabetic: Yes      Tobacco smoker: No      Systolic Blood Pressure: 016 mmHg      Is BP treated: Yes      HDL Cholesterol: 35 mg/dL

## 2018-11-02 ENCOUNTER — TELEPHONE (OUTPATIENT)
Dept: FAMILY MEDICINE CLINIC | Age: 63
End: 2018-11-02

## 2018-11-02 RX ORDER — METFORMIN HYDROCHLORIDE 500 MG/1
TABLET, EXTENDED RELEASE ORAL
Qty: 120 TABLET | Refills: 0 | Status: SHIPPED | OUTPATIENT
Start: 2018-11-02 | End: 2018-11-02 | Stop reason: SDUPTHER

## 2018-11-02 RX ORDER — METFORMIN HYDROCHLORIDE 500 MG/1
TABLET, EXTENDED RELEASE ORAL
Qty: 360 TABLET | Refills: 0 | Status: SHIPPED | OUTPATIENT
Start: 2018-11-02 | End: 2018-11-06 | Stop reason: SDUPTHER

## 2018-11-06 ENCOUNTER — OFFICE VISIT (OUTPATIENT)
Dept: PAIN MANAGEMENT | Age: 63
End: 2018-11-06
Payer: MEDICARE

## 2018-11-06 ENCOUNTER — TELEPHONE (OUTPATIENT)
Dept: FAMILY MEDICINE CLINIC | Age: 63
End: 2018-11-06

## 2018-11-06 VITALS
DIASTOLIC BLOOD PRESSURE: 80 MMHG | BODY MASS INDEX: 38.8 KG/M2 | OXYGEN SATURATION: 92 % | HEART RATE: 84 BPM | WEIGHT: 278.2 LBS | SYSTOLIC BLOOD PRESSURE: 152 MMHG

## 2018-11-06 DIAGNOSIS — F41.9 ANXIETY: ICD-10-CM

## 2018-11-06 DIAGNOSIS — M54.42 CHRONIC BILATERAL LOW BACK PAIN WITH BILATERAL SCIATICA: ICD-10-CM

## 2018-11-06 DIAGNOSIS — M50.30 DDD (DEGENERATIVE DISC DISEASE), CERVICAL: ICD-10-CM

## 2018-11-06 DIAGNOSIS — M54.41 CHRONIC BILATERAL LOW BACK PAIN WITH BILATERAL SCIATICA: ICD-10-CM

## 2018-11-06 DIAGNOSIS — G89.29 CHRONIC BILATERAL LOW BACK PAIN WITH BILATERAL SCIATICA: ICD-10-CM

## 2018-11-06 DIAGNOSIS — M47.816 FACET ARTHROPATHY, LUMBAR: ICD-10-CM

## 2018-11-06 DIAGNOSIS — G89.4 CHRONIC PAIN SYNDROME: ICD-10-CM

## 2018-11-06 DIAGNOSIS — M48.10 DISH (DIFFUSE IDIOPATHIC SKELETAL HYPEROSTOSIS): ICD-10-CM

## 2018-11-06 DIAGNOSIS — M51.35 DDD (DEGENERATIVE DISC DISEASE), THORACOLUMBAR: ICD-10-CM

## 2018-11-06 DIAGNOSIS — E66.01 OBESITY, CLASS III, BMI 40-49.9 (MORBID OBESITY) (HCC): ICD-10-CM

## 2018-11-06 PROCEDURE — G8427 DOCREV CUR MEDS BY ELIG CLIN: HCPCS | Performed by: NURSE PRACTITIONER

## 2018-11-06 PROCEDURE — 1036F TOBACCO NON-USER: CPT | Performed by: NURSE PRACTITIONER

## 2018-11-06 PROCEDURE — G8482 FLU IMMUNIZE ORDER/ADMIN: HCPCS | Performed by: NURSE PRACTITIONER

## 2018-11-06 PROCEDURE — G8417 CALC BMI ABV UP PARAM F/U: HCPCS | Performed by: NURSE PRACTITIONER

## 2018-11-06 PROCEDURE — 3017F COLORECTAL CA SCREEN DOC REV: CPT | Performed by: NURSE PRACTITIONER

## 2018-11-06 PROCEDURE — G8599 NO ASA/ANTIPLAT THER USE RNG: HCPCS | Performed by: NURSE PRACTITIONER

## 2018-11-06 PROCEDURE — 99213 OFFICE O/P EST LOW 20 MIN: CPT | Performed by: NURSE PRACTITIONER

## 2018-11-06 RX ORDER — BUPRENORPHINE 15 UG/H
1 PATCH TRANSDERMAL WEEKLY
Qty: 4 PATCH | Refills: 0 | Status: SHIPPED | OUTPATIENT
Start: 2018-11-06 | End: 2018-12-04 | Stop reason: SDUPTHER

## 2018-11-06 RX ORDER — METFORMIN HYDROCHLORIDE 500 MG/1
TABLET, EXTENDED RELEASE ORAL
Qty: 360 TABLET | Refills: 0 | Status: SHIPPED | OUTPATIENT
Start: 2018-11-06 | End: 2019-04-09 | Stop reason: SDUPTHER

## 2018-11-06 NOTE — PROGRESS NOTES
apply  -A list with other pain providers was givent o the patient. Advised to maintain compliance with pain guidelines should he get in to another pain clinic  -He was advised weight reduction, diet changes- 800-1200 jarrod diet, diet diary, exercising, nutritional  consult increased physical activity as tolerated  -CBT techniques- relaxation therapies such as biofeedback, mindfulness based stress reduction, imagery, cognitive restructuring, problem solving discussed with patient  -Last UDS consistent  -Return in about 4 weeks (around 12/4/2018). Current Outpatient Prescriptions   Medication Sig Dispense Refill    buprenorphine (BUPRENEX) 15 MCG/HR PTWK Place 1 patch onto the skin once a week for 30 days. . 4 patch 0    insulin glargine (LANTUS) 100 UNIT/ML injection vial Inject 50 Units into the skin nightly 3 vial 3    INSULIN SYRINGE .5CC/29G ([employer] INS SYR .5CC/29G) 29G X 1/2\" 0.5 ML MISC 1 each by Does not apply route daily 100 each 3    gabapentin (NEURONTIN) 400 MG capsule Take 1 capsule by mouth 3 times daily for 30 days. . 90 capsule 3    ALPRAZolam (XANAX) 0.5 MG tablet TAKE 1 TABLET BY MOUTH THREE TIMES DAILY AS NEEDED FOR SLEEP OR ANXIETY 270 tablet 0    atorvastatin (LIPITOR) 80 MG tablet TAKE 1 TABLET EVERY DAY 90 tablet 1    valsartan-hydrochlorothiazide (DIOVAN-HCT) 320-12.5 MG per tablet TAKE 1 TABLET EVERY DAY 90 tablet 1    carvedilol (COREG) 3.125 MG tablet TAKE 1 TABLET TWICE DAILY WITH MEALS 180 tablet 1    sildenafil (REVATIO) 20 MG tablet Take 1 tablet by mouth daily 30 tablet 0    Insulin Pen Needle (B-D UF III MINI PEN NEEDLES) 31G X 5 MM MISC 1 each by Does not apply route daily 100 each 6    triamcinolone (KENALOG) 0.1 % cream Apply topically 2 times daily Apply topically 2 times daily.  80 g 3    entecavir (BARACLUDE) 1 MG tablet Take 1 tablet by mouth daily 30 tablet 3    Naproxen Sodium (ALEVE) 220 MG CAPS Take 1 tablet by mouth 2 times daily as needed for Pain 60 capsule 0

## 2018-12-04 ENCOUNTER — OFFICE VISIT (OUTPATIENT)
Dept: PAIN MANAGEMENT | Age: 63
End: 2018-12-04
Payer: MEDICARE

## 2018-12-04 VITALS
DIASTOLIC BLOOD PRESSURE: 66 MMHG | BODY MASS INDEX: 38.72 KG/M2 | OXYGEN SATURATION: 93 % | HEART RATE: 71 BPM | SYSTOLIC BLOOD PRESSURE: 136 MMHG | WEIGHT: 277.6 LBS

## 2018-12-04 DIAGNOSIS — M50.30 DDD (DEGENERATIVE DISC DISEASE), CERVICAL: ICD-10-CM

## 2018-12-04 DIAGNOSIS — M51.35 DDD (DEGENERATIVE DISC DISEASE), THORACOLUMBAR: ICD-10-CM

## 2018-12-04 DIAGNOSIS — M54.41 CHRONIC BILATERAL LOW BACK PAIN WITH BILATERAL SCIATICA: ICD-10-CM

## 2018-12-04 DIAGNOSIS — G89.4 CHRONIC PAIN SYNDROME: ICD-10-CM

## 2018-12-04 DIAGNOSIS — E66.01 OBESITY, CLASS III, BMI 40-49.9 (MORBID OBESITY) (HCC): ICD-10-CM

## 2018-12-04 DIAGNOSIS — F41.9 ANXIETY: ICD-10-CM

## 2018-12-04 DIAGNOSIS — M47.816 FACET ARTHROPATHY, LUMBAR: ICD-10-CM

## 2018-12-04 DIAGNOSIS — M54.42 CHRONIC BILATERAL LOW BACK PAIN WITH BILATERAL SCIATICA: ICD-10-CM

## 2018-12-04 DIAGNOSIS — G89.29 CHRONIC BILATERAL LOW BACK PAIN WITH BILATERAL SCIATICA: ICD-10-CM

## 2018-12-04 DIAGNOSIS — M48.10 DISH (DIFFUSE IDIOPATHIC SKELETAL HYPEROSTOSIS): ICD-10-CM

## 2018-12-04 PROCEDURE — G8482 FLU IMMUNIZE ORDER/ADMIN: HCPCS | Performed by: NURSE PRACTITIONER

## 2018-12-04 PROCEDURE — G8427 DOCREV CUR MEDS BY ELIG CLIN: HCPCS | Performed by: NURSE PRACTITIONER

## 2018-12-04 PROCEDURE — 99213 OFFICE O/P EST LOW 20 MIN: CPT | Performed by: NURSE PRACTITIONER

## 2018-12-04 PROCEDURE — 1036F TOBACCO NON-USER: CPT | Performed by: NURSE PRACTITIONER

## 2018-12-04 PROCEDURE — G8417 CALC BMI ABV UP PARAM F/U: HCPCS | Performed by: NURSE PRACTITIONER

## 2018-12-04 PROCEDURE — G8599 NO ASA/ANTIPLAT THER USE RNG: HCPCS | Performed by: NURSE PRACTITIONER

## 2018-12-04 PROCEDURE — 3017F COLORECTAL CA SCREEN DOC REV: CPT | Performed by: NURSE PRACTITIONER

## 2018-12-04 RX ORDER — BUPRENORPHINE 15 UG/H
1 PATCH TRANSDERMAL WEEKLY
Qty: 4 PATCH | Refills: 0 | Status: SHIPPED | OUTPATIENT
Start: 2018-12-04 | End: 2019-01-03

## 2018-12-17 DIAGNOSIS — F41.9 ANXIETY: Primary | ICD-10-CM

## 2018-12-17 RX ORDER — ALPRAZOLAM 0.5 MG/1
0.5 TABLET ORAL 3 TIMES DAILY
COMMUNITY
End: 2018-12-17 | Stop reason: SDUPTHER

## 2018-12-19 RX ORDER — ALPRAZOLAM 0.5 MG/1
0.5 TABLET ORAL 3 TIMES DAILY
Qty: 90 TABLET | Refills: 2 | Status: SHIPPED | OUTPATIENT
Start: 2018-12-19 | End: 2019-01-21 | Stop reason: SDUPTHER

## 2018-12-20 ENCOUNTER — TELEPHONE (OUTPATIENT)
Dept: PAIN MANAGEMENT | Age: 63
End: 2018-12-20

## 2018-12-20 DIAGNOSIS — G89.4 CHRONIC PAIN SYNDROME: ICD-10-CM

## 2018-12-20 NOTE — TELEPHONE ENCOUNTER
Per PKA she needs to know what is going on first, did patient have a fall? If this is a urgent issue, patient needs to go to the ER for evaluation so that they can give the correct test needed. Called patient to advise, no answer, left message to call the office back.

## 2018-12-26 DIAGNOSIS — G89.4 CHRONIC PAIN SYNDROME: ICD-10-CM

## 2018-12-26 RX ORDER — BACLOFEN 10 MG/1
10 TABLET ORAL DAILY
Qty: 30 TABLET | Refills: 0 | Status: SHIPPED | OUTPATIENT
Start: 2018-12-26 | End: 2018-12-26 | Stop reason: SDUPTHER

## 2019-01-03 ENCOUNTER — TELEPHONE (OUTPATIENT)
Dept: FAMILY MEDICINE CLINIC | Age: 64
End: 2019-01-03

## 2019-01-03 DIAGNOSIS — G89.29 CHRONIC BILATERAL LOW BACK PAIN WITH BILATERAL SCIATICA: ICD-10-CM

## 2019-01-03 DIAGNOSIS — M54.42 CHRONIC BILATERAL LOW BACK PAIN WITH BILATERAL SCIATICA: ICD-10-CM

## 2019-01-03 DIAGNOSIS — M54.41 CHRONIC BILATERAL LOW BACK PAIN WITH BILATERAL SCIATICA: ICD-10-CM

## 2019-01-03 DIAGNOSIS — G89.4 CHRONIC PAIN SYNDROME: ICD-10-CM

## 2019-01-03 RX ORDER — GABAPENTIN 400 MG/1
CAPSULE ORAL
Qty: 90 CAPSULE | Refills: 0 | Status: SHIPPED | OUTPATIENT
Start: 2019-01-03 | End: 2019-01-28 | Stop reason: SDUPTHER

## 2019-01-03 RX ORDER — HYDROCHLOROTHIAZIDE 12.5 MG/1
12.5 CAPSULE, GELATIN COATED ORAL EVERY MORNING
Qty: 90 CAPSULE | Refills: 0 | Status: ON HOLD | OUTPATIENT
Start: 2019-01-03 | End: 2019-03-25

## 2019-01-03 RX ORDER — CANDESARTAN 32 MG/1
32 TABLET ORAL DAILY
Qty: 30 TABLET | Refills: 5 | Status: SHIPPED | OUTPATIENT
Start: 2019-01-03 | End: 2019-01-03 | Stop reason: SDUPTHER

## 2019-01-03 RX ORDER — CANDESARTAN 32 MG/1
32 TABLET ORAL DAILY
Qty: 90 TABLET | Refills: 0 | Status: ON HOLD | OUTPATIENT
Start: 2019-01-03 | End: 2019-03-25 | Stop reason: ALTCHOICE

## 2019-01-03 RX ORDER — HYDROCHLOROTHIAZIDE 12.5 MG/1
12.5 CAPSULE, GELATIN COATED ORAL EVERY MORNING
Qty: 90 CAPSULE | Refills: 1 | Status: SHIPPED | OUTPATIENT
Start: 2019-01-03 | End: 2019-01-03 | Stop reason: SDUPTHER

## 2019-01-07 ENCOUNTER — TELEPHONE (OUTPATIENT)
Dept: FAMILY MEDICINE CLINIC | Age: 64
End: 2019-01-07

## 2019-01-10 ENCOUNTER — TELEPHONE (OUTPATIENT)
Dept: FAMILY MEDICINE CLINIC | Age: 64
End: 2019-01-10

## 2019-01-14 RX ORDER — BACLOFEN 10 MG/1
10 TABLET ORAL DAILY
Qty: 90 TABLET | Refills: 0 | Status: SHIPPED | OUTPATIENT
Start: 2019-01-14 | End: 2019-01-21

## 2019-01-15 ENCOUNTER — TELEPHONE (OUTPATIENT)
Dept: PAIN MANAGEMENT | Age: 64
End: 2019-01-15

## 2019-01-21 ENCOUNTER — OFFICE VISIT (OUTPATIENT)
Dept: FAMILY MEDICINE CLINIC | Age: 64
End: 2019-01-21
Payer: MEDICARE

## 2019-01-21 VITALS
WEIGHT: 281 LBS | HEART RATE: 98 BPM | RESPIRATION RATE: 18 BRPM | BODY MASS INDEX: 39.34 KG/M2 | SYSTOLIC BLOOD PRESSURE: 130 MMHG | HEIGHT: 71 IN | TEMPERATURE: 97.9 F | DIASTOLIC BLOOD PRESSURE: 80 MMHG

## 2019-01-21 DIAGNOSIS — E78.5 HYPERLIPIDEMIA WITH TARGET LDL LESS THAN 100: ICD-10-CM

## 2019-01-21 DIAGNOSIS — G89.29 CHRONIC BILATERAL LOW BACK PAIN WITH BILATERAL SCIATICA: ICD-10-CM

## 2019-01-21 DIAGNOSIS — I10 ESSENTIAL HYPERTENSION: ICD-10-CM

## 2019-01-21 DIAGNOSIS — M54.42 CHRONIC BILATERAL LOW BACK PAIN WITH BILATERAL SCIATICA: ICD-10-CM

## 2019-01-21 DIAGNOSIS — B18.1 CHRONIC ACTIVE VIRAL HEPATITIS B (HCC): ICD-10-CM

## 2019-01-21 DIAGNOSIS — Z23 NEED FOR ZOSTER VACCINATION: ICD-10-CM

## 2019-01-21 DIAGNOSIS — E66.01 OBESITY, CLASS III, BMI 40-49.9 (MORBID OBESITY) (HCC): ICD-10-CM

## 2019-01-21 DIAGNOSIS — K76.0 FATTY LIVER: ICD-10-CM

## 2019-01-21 DIAGNOSIS — I25.10 CORONARY ARTERY DISEASE INVOLVING NATIVE HEART WITHOUT ANGINA PECTORIS, UNSPECIFIED VESSEL OR LESION TYPE: ICD-10-CM

## 2019-01-21 DIAGNOSIS — Z12.5 ENCOUNTER FOR SCREENING FOR MALIGNANT NEOPLASM OF PROSTATE: ICD-10-CM

## 2019-01-21 DIAGNOSIS — R74.8 ELEVATED LIVER ENZYMES: ICD-10-CM

## 2019-01-21 DIAGNOSIS — M54.41 CHRONIC BILATERAL LOW BACK PAIN WITH BILATERAL SCIATICA: ICD-10-CM

## 2019-01-21 DIAGNOSIS — F41.9 ANXIETY: ICD-10-CM

## 2019-01-21 DIAGNOSIS — Z00.00 MEDICARE ANNUAL WELLNESS VISIT, SUBSEQUENT: Primary | ICD-10-CM

## 2019-01-21 LAB — HBA1C MFR BLD: 8.7 %

## 2019-01-21 PROCEDURE — 3045F PR MOST RECENT HEMOGLOBIN A1C LEVEL 7.0-9.0%: CPT | Performed by: FAMILY MEDICINE

## 2019-01-21 PROCEDURE — G8417 CALC BMI ABV UP PARAM F/U: HCPCS | Performed by: FAMILY MEDICINE

## 2019-01-21 PROCEDURE — 2022F DILAT RTA XM EVC RTNOPTHY: CPT | Performed by: FAMILY MEDICINE

## 2019-01-21 PROCEDURE — G8427 DOCREV CUR MEDS BY ELIG CLIN: HCPCS | Performed by: FAMILY MEDICINE

## 2019-01-21 PROCEDURE — 83036 HEMOGLOBIN GLYCOSYLATED A1C: CPT | Performed by: FAMILY MEDICINE

## 2019-01-21 PROCEDURE — G0439 PPPS, SUBSEQ VISIT: HCPCS | Performed by: FAMILY MEDICINE

## 2019-01-21 PROCEDURE — 1036F TOBACCO NON-USER: CPT | Performed by: FAMILY MEDICINE

## 2019-01-21 PROCEDURE — G8599 NO ASA/ANTIPLAT THER USE RNG: HCPCS | Performed by: FAMILY MEDICINE

## 2019-01-21 PROCEDURE — 99214 OFFICE O/P EST MOD 30 MIN: CPT | Performed by: FAMILY MEDICINE

## 2019-01-21 PROCEDURE — 3017F COLORECTAL CA SCREEN DOC REV: CPT | Performed by: FAMILY MEDICINE

## 2019-01-21 PROCEDURE — G8482 FLU IMMUNIZE ORDER/ADMIN: HCPCS | Performed by: FAMILY MEDICINE

## 2019-01-21 RX ORDER — ALPRAZOLAM 0.5 MG/1
0.5 TABLET ORAL 3 TIMES DAILY
Qty: 90 TABLET | Refills: 0 | Status: SHIPPED | OUTPATIENT
Start: 2019-01-21 | End: 2019-04-21

## 2019-01-21 RX ORDER — ENTECAVIR 1 MG/1
1 TABLET, FILM COATED ORAL DAILY
Qty: 30 TABLET | Refills: 3 | COMMUNITY
Start: 2019-01-21

## 2019-01-21 ASSESSMENT — PATIENT HEALTH QUESTIONNAIRE - PHQ9
SUM OF ALL RESPONSES TO PHQ QUESTIONS 1-9: 0
SUM OF ALL RESPONSES TO PHQ QUESTIONS 1-9: 0

## 2019-01-21 ASSESSMENT — LIFESTYLE VARIABLES: HOW OFTEN DO YOU HAVE A DRINK CONTAINING ALCOHOL: 0

## 2019-01-21 ASSESSMENT — ANXIETY QUESTIONNAIRES: GAD7 TOTAL SCORE: 0

## 2019-01-28 DIAGNOSIS — M54.41 CHRONIC BILATERAL LOW BACK PAIN WITH BILATERAL SCIATICA: ICD-10-CM

## 2019-01-28 DIAGNOSIS — G89.29 CHRONIC BILATERAL LOW BACK PAIN WITH BILATERAL SCIATICA: ICD-10-CM

## 2019-01-28 DIAGNOSIS — M54.42 CHRONIC BILATERAL LOW BACK PAIN WITH BILATERAL SCIATICA: ICD-10-CM

## 2019-01-28 DIAGNOSIS — G89.4 CHRONIC PAIN SYNDROME: ICD-10-CM

## 2019-01-30 RX ORDER — GABAPENTIN 400 MG/1
CAPSULE ORAL
Qty: 90 CAPSULE | Refills: 0 | Status: SHIPPED | OUTPATIENT
Start: 2019-01-30 | End: 2019-02-19 | Stop reason: SDUPTHER

## 2019-02-19 DIAGNOSIS — G89.4 CHRONIC PAIN SYNDROME: ICD-10-CM

## 2019-02-19 DIAGNOSIS — M54.42 CHRONIC BILATERAL LOW BACK PAIN WITH BILATERAL SCIATICA: ICD-10-CM

## 2019-02-19 DIAGNOSIS — M54.41 CHRONIC BILATERAL LOW BACK PAIN WITH BILATERAL SCIATICA: ICD-10-CM

## 2019-02-19 DIAGNOSIS — G89.29 CHRONIC BILATERAL LOW BACK PAIN WITH BILATERAL SCIATICA: ICD-10-CM

## 2019-02-20 RX ORDER — GABAPENTIN 400 MG/1
CAPSULE ORAL
Qty: 90 CAPSULE | Refills: 0 | Status: SHIPPED | OUTPATIENT
Start: 2019-02-20 | End: 2019-03-18 | Stop reason: SDUPTHER

## 2019-02-25 ENCOUNTER — TELEPHONE (OUTPATIENT)
Dept: FAMILY MEDICINE CLINIC | Age: 64
End: 2019-02-25

## 2019-03-18 DIAGNOSIS — G89.4 CHRONIC PAIN SYNDROME: ICD-10-CM

## 2019-03-18 DIAGNOSIS — M54.42 CHRONIC BILATERAL LOW BACK PAIN WITH BILATERAL SCIATICA: ICD-10-CM

## 2019-03-18 DIAGNOSIS — M54.41 CHRONIC BILATERAL LOW BACK PAIN WITH BILATERAL SCIATICA: ICD-10-CM

## 2019-03-18 DIAGNOSIS — G89.29 CHRONIC BILATERAL LOW BACK PAIN WITH BILATERAL SCIATICA: ICD-10-CM

## 2019-03-19 RX ORDER — GABAPENTIN 400 MG/1
CAPSULE ORAL
Qty: 90 CAPSULE | Refills: 0 | Status: SHIPPED | OUTPATIENT
Start: 2019-03-19 | End: 2019-04-29 | Stop reason: SDUPTHER

## 2019-03-24 PROCEDURE — 93005 ELECTROCARDIOGRAM TRACING: CPT | Performed by: FAMILY MEDICINE

## 2019-03-25 ENCOUNTER — APPOINTMENT (OUTPATIENT)
Dept: GENERAL RADIOLOGY | Age: 64
DRG: 232 | End: 2019-03-25
Payer: MEDICARE

## 2019-03-25 ENCOUNTER — ANESTHESIA EVENT (OUTPATIENT)
Dept: OPERATING ROOM | Age: 64
DRG: 232 | End: 2019-03-25
Payer: MEDICARE

## 2019-03-25 ENCOUNTER — HOSPITAL ENCOUNTER (INPATIENT)
Age: 64
LOS: 9 days | Discharge: HOME | DRG: 232 | End: 2019-04-03
Attending: FAMILY MEDICINE | Admitting: THORACIC SURGERY (CARDIOTHORACIC VASCULAR SURGERY)
Payer: MEDICARE

## 2019-03-25 DIAGNOSIS — I21.3 ST ELEVATION MYOCARDIAL INFARCTION (STEMI), UNSPECIFIED ARTERY (HCC): ICD-10-CM

## 2019-03-25 DIAGNOSIS — Z95.1 S/P CABG X 5: Primary | ICD-10-CM

## 2019-03-25 PROBLEM — I21.19 ACUTE INFERIOR MYOCARDIAL INFARCTION (HCC): Status: ACTIVE | Noted: 2019-03-25

## 2019-03-25 LAB
A/G RATIO: 1.2 (ref 1.1–2.2)
ABO/RH: NORMAL
ALBUMIN SERPL-MCNC: 4.2 G/DL (ref 3.4–5)
ALP BLD-CCNC: 93 U/L (ref 40–129)
ALT SERPL-CCNC: 68 U/L (ref 10–40)
ANION GAP SERPL CALCULATED.3IONS-SCNC: 18 MMOL/L (ref 3–16)
ANTIBODY SCREEN: NORMAL
APTT: 56.1 SEC (ref 26–36)
APTT: 60.7 SEC (ref 26–36)
AST SERPL-CCNC: 47 U/L (ref 15–37)
BASE EXCESS ARTERIAL: 1.9 MMOL/L (ref -3–3)
BASOPHILS ABSOLUTE: 0.1 K/UL (ref 0–0.2)
BASOPHILS RELATIVE PERCENT: 0.5 %
BILIRUB SERPL-MCNC: 0.4 MG/DL (ref 0–1)
BILIRUBIN URINE: NEGATIVE
BLOOD, URINE: NEGATIVE
BUN BLDV-MCNC: 19 MG/DL (ref 7–20)
CALCIUM SERPL-MCNC: 9.8 MG/DL (ref 8.3–10.6)
CARBOXYHEMOGLOBIN ARTERIAL: 1.2 % (ref 0–1.5)
CHLORIDE BLD-SCNC: 94 MMOL/L (ref 99–110)
CHOLESTEROL, TOTAL: 229 MG/DL (ref 0–199)
CLARITY: CLEAR
CO2: 24 MMOL/L (ref 21–32)
COLOR: YELLOW
CREAT SERPL-MCNC: 0.8 MG/DL (ref 0.8–1.3)
D DIMER: <200 NG/ML DDU (ref 0–229)
EOSINOPHILS ABSOLUTE: 0.2 K/UL (ref 0–0.6)
EOSINOPHILS RELATIVE PERCENT: 2 %
EPITHELIAL CELLS, UA: 1 /HPF (ref 0–5)
ESTIMATED AVERAGE GLUCOSE: 188.6 MG/DL
FIBRINOGEN: 389 MG/DL (ref 200–397)
GFR AFRICAN AMERICAN: >60
GFR NON-AFRICAN AMERICAN: >60
GLOBULIN: 3.6 G/DL
GLUCOSE BLD-MCNC: 124 MG/DL (ref 70–99)
GLUCOSE BLD-MCNC: 134 MG/DL (ref 70–99)
GLUCOSE BLD-MCNC: 139 MG/DL (ref 70–99)
GLUCOSE BLD-MCNC: 149 MG/DL (ref 70–99)
GLUCOSE BLD-MCNC: 165 MG/DL (ref 70–99)
GLUCOSE URINE: >=1000 MG/DL
HBA1C MFR BLD: 8.2 %
HCO3 ARTERIAL: 26.5 MMOL/L (ref 21–29)
HCT VFR BLD CALC: 51.1 % (ref 40.5–52.5)
HDLC SERPL-MCNC: 39 MG/DL (ref 40–60)
HEMOGLOBIN, ART, EXTENDED: 16.1 G/DL (ref 13.5–17.5)
HEMOGLOBIN: 17.3 G/DL (ref 13.5–17.5)
HYALINE CASTS: 0 /LPF (ref 0–8)
INR BLD: 0.93 (ref 0.86–1.14)
KETONES, URINE: 15 MG/DL
LDL CHOLESTEROL CALCULATED: ABNORMAL MG/DL
LDL CHOLESTEROL DIRECT: 114 MG/DL
LEFT VENTRICULAR EJECTION FRACTION HIGH VALUE: 55 %
LEFT VENTRICULAR EJECTION FRACTION MODE: NORMAL
LEUKOCYTE ESTERASE, URINE: ABNORMAL
LIPASE: 78 U/L (ref 13–60)
LYMPHOCYTES ABSOLUTE: 2.6 K/UL (ref 1–5.1)
LYMPHOCYTES RELATIVE PERCENT: 26.5 %
MAGNESIUM: 2.1 MG/DL (ref 1.8–2.4)
MCH RBC QN AUTO: 32.7 PG (ref 26–34)
MCHC RBC AUTO-ENTMCNC: 33.8 G/DL (ref 31–36)
MCV RBC AUTO: 96.7 FL (ref 80–100)
METHEMOGLOBIN ARTERIAL: 0.9 %
MICROSCOPIC EXAMINATION: YES
MONOCYTES ABSOLUTE: 0.8 K/UL (ref 0–1.3)
MONOCYTES RELATIVE PERCENT: 8.2 %
NEUTROPHILS ABSOLUTE: 6 K/UL (ref 1.7–7.7)
NEUTROPHILS RELATIVE PERCENT: 62.8 %
NITRITE, URINE: NEGATIVE
O2 CONTENT ARTERIAL: 21 ML/DL
O2 SAT, ARTERIAL: 95.1 %
O2 THERAPY: ABNORMAL
PCO2 ARTERIAL: 43 MMHG (ref 35–45)
PDW BLD-RTO: 13.5 % (ref 12.4–15.4)
PERFORMED ON: ABNORMAL
PH ARTERIAL: 7.41 (ref 7.35–7.45)
PH UA: 5.5 (ref 5–8)
PLATELET # BLD: 209 K/UL (ref 135–450)
PMV BLD AUTO: 8.2 FL (ref 5–10.5)
PO2 ARTERIAL: 72.3 MMHG (ref 75–108)
POC ACT LR: 219 SEC
POC ACT LR: 222 SEC
POC ACT LR: 266 SEC
POTASSIUM SERPL-SCNC: 4.4 MMOL/L (ref 3.5–5.1)
PRO-BNP: 825 PG/ML (ref 0–124)
PROTEIN UA: NEGATIVE MG/DL
PROTHROMBIN TIME: 10.6 SEC (ref 9.8–13)
RBC # BLD: 5.28 M/UL (ref 4.2–5.9)
RBC UA: 1 /HPF (ref 0–4)
SODIUM BLD-SCNC: 136 MMOL/L (ref 136–145)
SPECIFIC GRAVITY UA: >1.03 (ref 1–1.03)
TCO2 ARTERIAL: 27.8 MMOL/L
TOTAL PROTEIN: 7.8 G/DL (ref 6.4–8.2)
TRIGL SERPL-MCNC: 456 MG/DL (ref 0–150)
TROPONIN: 0.16 NG/ML
URINE TYPE: ABNORMAL
UROBILINOGEN, URINE: 0.2 E.U./DL
VLDLC SERPL CALC-MCNC: ABNORMAL MG/DL
WBC # BLD: 9.6 K/UL (ref 4–11)
WBC UA: 16 /HPF (ref 0–5)
YEAST: PRESENT /HPF

## 2019-03-25 PROCEDURE — 99223 1ST HOSP IP/OBS HIGH 75: CPT | Performed by: THORACIC SURGERY (CARDIOTHORACIC VASCULAR SURGERY)

## 2019-03-25 PROCEDURE — 94799 UNLISTED PULMONARY SVC/PX: CPT

## 2019-03-25 PROCEDURE — 6360000002 HC RX W HCPCS: Performed by: ANESTHESIOLOGY

## 2019-03-25 PROCEDURE — 86900 BLOOD TYPING SEROLOGIC ABO: CPT

## 2019-03-25 PROCEDURE — 84484 ASSAY OF TROPONIN QUANT: CPT

## 2019-03-25 PROCEDURE — C1887 CATHETER, GUIDING: HCPCS

## 2019-03-25 PROCEDURE — 93458 L HRT ARTERY/VENTRICLE ANGIO: CPT

## 2019-03-25 PROCEDURE — 85379 FIBRIN DEGRADATION QUANT: CPT

## 2019-03-25 PROCEDURE — 02703ZZ DILATION OF CORONARY ARTERY, ONE ARTERY, PERCUTANEOUS APPROACH: ICD-10-PCS | Performed by: INTERNAL MEDICINE

## 2019-03-25 PROCEDURE — 6370000000 HC RX 637 (ALT 250 FOR IP): Performed by: INTERNAL MEDICINE

## 2019-03-25 PROCEDURE — 4A023N7 MEASUREMENT OF CARDIAC SAMPLING AND PRESSURE, LEFT HEART, PERCUTANEOUS APPROACH: ICD-10-PCS | Performed by: INTERNAL MEDICINE

## 2019-03-25 PROCEDURE — 94664 DEMO&/EVAL PT USE INHALER: CPT

## 2019-03-25 PROCEDURE — 99152 MOD SED SAME PHYS/QHP 5/>YRS: CPT

## 2019-03-25 PROCEDURE — 6360000002 HC RX W HCPCS

## 2019-03-25 PROCEDURE — C1894 INTRO/SHEATH, NON-LASER: HCPCS

## 2019-03-25 PROCEDURE — 92941 PRQ TRLML REVSC TOT OCCL AMI: CPT

## 2019-03-25 PROCEDURE — 36415 COLL VENOUS BLD VENIPUNCTURE: CPT

## 2019-03-25 PROCEDURE — 86901 BLOOD TYPING SEROLOGIC RH(D): CPT

## 2019-03-25 PROCEDURE — 6360000004 HC RX CONTRAST MEDICATION: Performed by: INTERNAL MEDICINE

## 2019-03-25 PROCEDURE — 027135Z DILATION OF CORONARY ARTERY, TWO ARTERIES WITH TWO DRUG-ELUTING INTRALUMINAL DEVICES, PERCUTANEOUS APPROACH: ICD-10-PCS | Performed by: INTERNAL MEDICINE

## 2019-03-25 PROCEDURE — 93970 EXTREMITY STUDY: CPT

## 2019-03-25 PROCEDURE — 6370000000 HC RX 637 (ALT 250 FOR IP)

## 2019-03-25 PROCEDURE — 96365 THER/PROPH/DIAG IV INF INIT: CPT

## 2019-03-25 PROCEDURE — 87081 CULTURE SCREEN ONLY: CPT

## 2019-03-25 PROCEDURE — 85347 COAGULATION TIME ACTIVATED: CPT

## 2019-03-25 PROCEDURE — 99024 POSTOP FOLLOW-UP VISIT: CPT | Performed by: INTERNAL MEDICINE

## 2019-03-25 PROCEDURE — 96366 THER/PROPH/DIAG IV INF ADDON: CPT

## 2019-03-25 PROCEDURE — 2580000003 HC RX 258: Performed by: FAMILY MEDICINE

## 2019-03-25 PROCEDURE — 6370000000 HC RX 637 (ALT 250 FOR IP): Performed by: THORACIC SURGERY (CARDIOTHORACIC VASCULAR SURGERY)

## 2019-03-25 PROCEDURE — B2111ZZ FLUOROSCOPY OF MULTIPLE CORONARY ARTERIES USING LOW OSMOLAR CONTRAST: ICD-10-PCS | Performed by: INTERNAL MEDICINE

## 2019-03-25 PROCEDURE — 2100000000 HC CCU R&B

## 2019-03-25 PROCEDURE — C8929 TTE W OR WO FOL WCON,DOPPLER: HCPCS

## 2019-03-25 PROCEDURE — C1769 GUIDE WIRE: HCPCS

## 2019-03-25 PROCEDURE — 83721 ASSAY OF BLOOD LIPOPROTEIN: CPT

## 2019-03-25 PROCEDURE — 85025 COMPLETE CBC W/AUTO DIFF WBC: CPT

## 2019-03-25 PROCEDURE — 2709999900 HC NON-CHARGEABLE SUPPLY

## 2019-03-25 PROCEDURE — 80061 LIPID PANEL: CPT

## 2019-03-25 PROCEDURE — 6360000004 HC RX CONTRAST MEDICATION: Performed by: THORACIC SURGERY (CARDIOTHORACIC VASCULAR SURGERY)

## 2019-03-25 PROCEDURE — 80053 COMPREHEN METABOLIC PANEL: CPT

## 2019-03-25 PROCEDURE — 6360000002 HC RX W HCPCS: Performed by: INTERNAL MEDICINE

## 2019-03-25 PROCEDURE — 6360000002 HC RX W HCPCS: Performed by: FAMILY MEDICINE

## 2019-03-25 PROCEDURE — 81001 URINALYSIS AUTO W/SCOPE: CPT

## 2019-03-25 PROCEDURE — 85610 PROTHROMBIN TIME: CPT

## 2019-03-25 PROCEDURE — 99153 MOD SED SAME PHYS/QHP EA: CPT

## 2019-03-25 PROCEDURE — 83735 ASSAY OF MAGNESIUM: CPT

## 2019-03-25 PROCEDURE — 71045 X-RAY EXAM CHEST 1 VIEW: CPT

## 2019-03-25 PROCEDURE — 2500000003 HC RX 250 WO HCPCS

## 2019-03-25 PROCEDURE — 96375 TX/PRO/DX INJ NEW DRUG ADDON: CPT

## 2019-03-25 PROCEDURE — C1760 CLOSURE DEV, VASC: HCPCS

## 2019-03-25 PROCEDURE — 2500000003 HC RX 250 WO HCPCS: Performed by: FAMILY MEDICINE

## 2019-03-25 PROCEDURE — C1725 CATH, TRANSLUMIN NON-LASER: HCPCS

## 2019-03-25 PROCEDURE — 83690 ASSAY OF LIPASE: CPT

## 2019-03-25 PROCEDURE — 86850 RBC ANTIBODY SCREEN: CPT

## 2019-03-25 PROCEDURE — 99291 CRITICAL CARE FIRST HOUR: CPT

## 2019-03-25 PROCEDURE — 93880 EXTRACRANIAL BILAT STUDY: CPT

## 2019-03-25 PROCEDURE — 83036 HEMOGLOBIN GLYCOSYLATED A1C: CPT

## 2019-03-25 PROCEDURE — 82803 BLOOD GASES ANY COMBINATION: CPT

## 2019-03-25 PROCEDURE — 6370000000 HC RX 637 (ALT 250 FOR IP): Performed by: FAMILY MEDICINE

## 2019-03-25 PROCEDURE — C1874 STENT, COATED/COV W/DEL SYS: HCPCS

## 2019-03-25 PROCEDURE — 99223 1ST HOSP IP/OBS HIGH 75: CPT | Performed by: INTERNAL MEDICINE

## 2019-03-25 PROCEDURE — 85730 THROMBOPLASTIN TIME PARTIAL: CPT

## 2019-03-25 PROCEDURE — 94760 N-INVAS EAR/PLS OXIMETRY 1: CPT

## 2019-03-25 PROCEDURE — 83880 ASSAY OF NATRIURETIC PEPTIDE: CPT

## 2019-03-25 PROCEDURE — 2580000003 HC RX 258: Performed by: INTERNAL MEDICINE

## 2019-03-25 PROCEDURE — 93005 ELECTROCARDIOGRAM TRACING: CPT | Performed by: FAMILY MEDICINE

## 2019-03-25 PROCEDURE — 93458 L HRT ARTERY/VENTRICLE ANGIO: CPT | Performed by: INTERNAL MEDICINE

## 2019-03-25 PROCEDURE — 36600 WITHDRAWAL OF ARTERIAL BLOOD: CPT

## 2019-03-25 PROCEDURE — 85384 FIBRINOGEN ACTIVITY: CPT

## 2019-03-25 RX ORDER — INSULIN GLARGINE 100 [IU]/ML
25 INJECTION, SOLUTION SUBCUTANEOUS NIGHTLY
Status: DISCONTINUED | OUTPATIENT
Start: 2019-03-25 | End: 2019-03-26

## 2019-03-25 RX ORDER — ATORVASTATIN CALCIUM 40 MG/1
40 TABLET, FILM COATED ORAL NIGHTLY
Status: DISCONTINUED | OUTPATIENT
Start: 2019-03-25 | End: 2019-03-26

## 2019-03-25 RX ORDER — DEXTROSE MONOHYDRATE 25 G/50ML
12.5 INJECTION, SOLUTION INTRAVENOUS PRN
Status: DISCONTINUED | OUTPATIENT
Start: 2019-03-25 | End: 2019-03-25

## 2019-03-25 RX ORDER — DEXTROSE MONOHYDRATE 25 G/50ML
12.5 INJECTION, SOLUTION INTRAVENOUS PRN
Status: DISCONTINUED | OUTPATIENT
Start: 2019-03-25 | End: 2019-03-26 | Stop reason: SDUPTHER

## 2019-03-25 RX ORDER — ASPIRIN 81 MG/1
81 TABLET ORAL DAILY
Status: DISCONTINUED | OUTPATIENT
Start: 2019-03-25 | End: 2019-03-25 | Stop reason: SDUPTHER

## 2019-03-25 RX ORDER — NITROGLYCERIN 20 MG/100ML
5 INJECTION INTRAVENOUS CONTINUOUS
Status: DISCONTINUED | OUTPATIENT
Start: 2019-03-25 | End: 2019-03-26 | Stop reason: SDUPTHER

## 2019-03-25 RX ORDER — ASPIRIN 325 MG
325 TABLET ORAL DAILY
Status: DISCONTINUED | OUTPATIENT
Start: 2019-03-26 | End: 2019-03-26

## 2019-03-25 RX ORDER — HEPARIN SODIUM AND DEXTROSE 10000; 5 [USP'U]/100ML; G/100ML
1000 INJECTION INTRAVENOUS CONTINUOUS
Status: DISCONTINUED | OUTPATIENT
Start: 2019-03-25 | End: 2019-03-25 | Stop reason: SDUPTHER

## 2019-03-25 RX ORDER — CALCIUM CARBONATE 200(500)MG
500 TABLET,CHEWABLE ORAL 3 TIMES DAILY PRN
Status: DISCONTINUED | OUTPATIENT
Start: 2019-03-25 | End: 2019-03-26

## 2019-03-25 RX ORDER — MORPHINE SULFATE 2 MG/ML
2 INJECTION, SOLUTION INTRAMUSCULAR; INTRAVENOUS
Status: ACTIVE | OUTPATIENT
Start: 2019-03-25 | End: 2019-03-25

## 2019-03-25 RX ORDER — ACETAMINOPHEN 325 MG/1
650 TABLET ORAL EVERY 4 HOURS PRN
Status: DISCONTINUED | OUTPATIENT
Start: 2019-03-25 | End: 2019-03-26

## 2019-03-25 RX ORDER — CARVEDILOL 3.12 MG/1
3.12 TABLET ORAL 2 TIMES DAILY WITH MEALS
Status: DISCONTINUED | OUTPATIENT
Start: 2019-03-25 | End: 2019-03-26

## 2019-03-25 RX ORDER — SODIUM CHLORIDE, SODIUM LACTATE, POTASSIUM CHLORIDE, CALCIUM CHLORIDE 600; 310; 30; 20 MG/100ML; MG/100ML; MG/100ML; MG/100ML
INJECTION, SOLUTION INTRAVENOUS CONTINUOUS
Status: DISCONTINUED | OUTPATIENT
Start: 2019-03-26 | End: 2019-03-26

## 2019-03-25 RX ORDER — ALPRAZOLAM 0.5 MG/1
0.5 TABLET ORAL 3 TIMES DAILY PRN
Status: DISCONTINUED | OUTPATIENT
Start: 2019-03-25 | End: 2019-03-25

## 2019-03-25 RX ORDER — HEPARIN SODIUM 10000 [USP'U]/100ML
10 INJECTION, SOLUTION INTRAVENOUS CONTINUOUS
Status: DISCONTINUED | OUTPATIENT
Start: 2019-03-25 | End: 2019-03-26

## 2019-03-25 RX ORDER — ALPRAZOLAM 0.5 MG/1
0.5 TABLET ORAL 3 TIMES DAILY
Status: DISCONTINUED | OUTPATIENT
Start: 2019-03-25 | End: 2019-03-26

## 2019-03-25 RX ORDER — ATORVASTATIN CALCIUM 80 MG/1
80 TABLET, FILM COATED ORAL DAILY
COMMUNITY
End: 2019-04-09 | Stop reason: SDUPTHER

## 2019-03-25 RX ORDER — 0.9 % SODIUM CHLORIDE 0.9 %
1000 INTRAVENOUS SOLUTION INTRAVENOUS ONCE
Status: COMPLETED | OUTPATIENT
Start: 2019-03-25 | End: 2019-03-25

## 2019-03-25 RX ORDER — GABAPENTIN 400 MG/1
400 CAPSULE ORAL 3 TIMES DAILY
Status: DISCONTINUED | OUTPATIENT
Start: 2019-03-25 | End: 2019-04-03 | Stop reason: HOSPADM

## 2019-03-25 RX ORDER — SODIUM CHLORIDE 0.9 % (FLUSH) 0.9 %
10 SYRINGE (ML) INJECTION PRN
Status: DISCONTINUED | OUTPATIENT
Start: 2019-03-25 | End: 2019-03-26

## 2019-03-25 RX ORDER — METOCLOPRAMIDE HYDROCHLORIDE 5 MG/ML
10 INJECTION INTRAMUSCULAR; INTRAVENOUS ONCE
Status: COMPLETED | OUTPATIENT
Start: 2019-03-26 | End: 2019-03-26

## 2019-03-25 RX ORDER — ONDANSETRON 2 MG/ML
4 INJECTION INTRAMUSCULAR; INTRAVENOUS EVERY 6 HOURS PRN
Status: DISCONTINUED | OUTPATIENT
Start: 2019-03-25 | End: 2019-03-26 | Stop reason: SDUPTHER

## 2019-03-25 RX ORDER — SODIUM CHLORIDE 9 MG/ML
INJECTION, SOLUTION INTRAVENOUS CONTINUOUS
Status: ACTIVE | OUTPATIENT
Start: 2019-03-25 | End: 2019-03-25

## 2019-03-25 RX ORDER — INSULIN GLARGINE 100 [IU]/ML
50 INJECTION, SOLUTION SUBCUTANEOUS NIGHTLY
COMMUNITY
End: 2019-08-23

## 2019-03-25 RX ORDER — CHLORHEXIDINE GLUCONATE 0.12 MG/ML
15 RINSE ORAL ONCE
Status: COMPLETED | OUTPATIENT
Start: 2019-03-26 | End: 2019-03-26

## 2019-03-25 RX ORDER — HEPARIN SODIUM 1000 [USP'U]/ML
5000 INJECTION, SOLUTION INTRAVENOUS; SUBCUTANEOUS ONCE
Status: COMPLETED | OUTPATIENT
Start: 2019-03-25 | End: 2019-03-25

## 2019-03-25 RX ORDER — SODIUM CHLORIDE 0.9 % (FLUSH) 0.9 %
10 SYRINGE (ML) INJECTION EVERY 12 HOURS SCHEDULED
Status: DISCONTINUED | OUTPATIENT
Start: 2019-03-25 | End: 2019-03-25 | Stop reason: SDUPTHER

## 2019-03-25 RX ORDER — DEXTROSE MONOHYDRATE 50 MG/ML
100 INJECTION, SOLUTION INTRAVENOUS PRN
Status: DISCONTINUED | OUTPATIENT
Start: 2019-03-25 | End: 2019-03-25

## 2019-03-25 RX ORDER — FAMOTIDINE 20 MG/1
20 TABLET, FILM COATED ORAL 2 TIMES DAILY
Status: DISCONTINUED | OUTPATIENT
Start: 2019-03-25 | End: 2019-03-26

## 2019-03-25 RX ORDER — NICOTINE POLACRILEX 4 MG
15 LOZENGE BUCCAL PRN
Status: DISCONTINUED | OUTPATIENT
Start: 2019-03-25 | End: 2019-03-26 | Stop reason: SDUPTHER

## 2019-03-25 RX ORDER — SODIUM CHLORIDE 0.9 % (FLUSH) 0.9 %
10 SYRINGE (ML) INJECTION EVERY 12 HOURS SCHEDULED
Status: DISCONTINUED | OUTPATIENT
Start: 2019-03-25 | End: 2019-03-26

## 2019-03-25 RX ORDER — FENTANYL CITRATE 50 UG/ML
25 INJECTION, SOLUTION INTRAMUSCULAR; INTRAVENOUS ONCE
Status: COMPLETED | OUTPATIENT
Start: 2019-03-25 | End: 2019-03-25

## 2019-03-25 RX ORDER — VALSARTAN AND HYDROCHLOROTHIAZIDE 320; 12.5 MG/1; MG/1
1 TABLET, FILM COATED ORAL DAILY
Status: ON HOLD | COMMUNITY
End: 2019-04-03 | Stop reason: HOSPADM

## 2019-03-25 RX ORDER — ATROPINE SULFATE 0.4 MG/ML
0.5 AMPUL (ML) INJECTION
Status: ACTIVE | OUTPATIENT
Start: 2019-03-25 | End: 2019-03-25

## 2019-03-25 RX ORDER — ENTECAVIR 1 MG/1
1 TABLET, FILM COATED ORAL DAILY
Status: DISCONTINUED | OUTPATIENT
Start: 2019-03-25 | End: 2019-04-03 | Stop reason: HOSPADM

## 2019-03-25 RX ORDER — DEXTROSE MONOHYDRATE 50 MG/ML
100 INJECTION, SOLUTION INTRAVENOUS PRN
Status: DISCONTINUED | OUTPATIENT
Start: 2019-03-25 | End: 2019-03-26 | Stop reason: SDUPTHER

## 2019-03-25 RX ORDER — HEPARIN SODIUM 1000 [USP'U]/ML
2000 INJECTION, SOLUTION INTRAVENOUS; SUBCUTANEOUS ONCE
Status: COMPLETED | OUTPATIENT
Start: 2019-03-25 | End: 2019-03-25

## 2019-03-25 RX ORDER — ASPIRIN 81 MG/1
324 TABLET, CHEWABLE ORAL ONCE
Status: COMPLETED | OUTPATIENT
Start: 2019-03-25 | End: 2019-03-25

## 2019-03-25 RX ORDER — NICOTINE POLACRILEX 4 MG
15 LOZENGE BUCCAL PRN
Status: DISCONTINUED | OUTPATIENT
Start: 2019-03-25 | End: 2019-03-25

## 2019-03-25 RX ORDER — NITROGLYCERIN 0.4 MG/1
0.4 TABLET SUBLINGUAL EVERY 5 MIN PRN
Status: DISCONTINUED | OUTPATIENT
Start: 2019-03-25 | End: 2019-03-26

## 2019-03-25 RX ORDER — CHLORHEXIDINE GLUCONATE 4 G/100ML
SOLUTION TOPICAL 2 TIMES DAILY
Status: DISCONTINUED | OUTPATIENT
Start: 2019-03-25 | End: 2019-03-26

## 2019-03-25 RX ORDER — ATORVASTATIN CALCIUM 80 MG/1
80 TABLET, FILM COATED ORAL ONCE
Status: COMPLETED | OUTPATIENT
Start: 2019-03-25 | End: 2019-03-25

## 2019-03-25 RX ADMIN — GABAPENTIN 400 MG: 400 CAPSULE ORAL at 10:26

## 2019-03-25 RX ADMIN — HEPARIN SODIUM 1000 ML/HR: 10000 INJECTION, SOLUTION INTRAVENOUS at 06:24

## 2019-03-25 RX ADMIN — ANTACID TABLETS 500 MG: 500 TABLET, CHEWABLE ORAL at 14:51

## 2019-03-25 RX ADMIN — ALPRAZOLAM 0.5 MG: 0.5 TABLET ORAL at 09:06

## 2019-03-25 RX ADMIN — CARVEDILOL 3.12 MG: 3.12 TABLET, FILM COATED ORAL at 17:35

## 2019-03-25 RX ADMIN — TIROFIBAN 0.15 MCG/KG/MIN: 5 INJECTION, SOLUTION INTRAVENOUS at 09:37

## 2019-03-25 RX ADMIN — PERFLUTREN 3 ML: 6.52 INJECTION, SUSPENSION INTRAVENOUS at 14:09

## 2019-03-25 RX ADMIN — HEPARIN SODIUM 10 ML/HR: 10000 INJECTION, SOLUTION INTRAVENOUS at 21:52

## 2019-03-25 RX ADMIN — ALPRAZOLAM 0.5 MG: 0.5 TABLET ORAL at 14:51

## 2019-03-25 RX ADMIN — ATORVASTATIN CALCIUM 40 MG: 40 TABLET, FILM COATED ORAL at 21:52

## 2019-03-25 RX ADMIN — NITROGLYCERIN 5 MCG/MIN: 20 INJECTION INTRAVENOUS at 02:19

## 2019-03-25 RX ADMIN — HEPARIN SODIUM 2000 UNITS: 1000 INJECTION INTRAVENOUS; SUBCUTANEOUS at 06:23

## 2019-03-25 RX ADMIN — INSULIN LISPRO 2 UNITS: 100 INJECTION, SOLUTION INTRAVENOUS; SUBCUTANEOUS at 09:06

## 2019-03-25 RX ADMIN — SODIUM CHLORIDE: 9 INJECTION, SOLUTION INTRAVENOUS at 10:23

## 2019-03-25 RX ADMIN — ATORVASTATIN CALCIUM 80 MG: 80 TABLET, FILM COATED ORAL at 02:09

## 2019-03-25 RX ADMIN — INSULIN GLARGINE 25 UNITS: 100 INJECTION, SOLUTION SUBCUTANEOUS at 21:52

## 2019-03-25 RX ADMIN — MUPIROCIN: 20 OINTMENT TOPICAL at 21:52

## 2019-03-25 RX ADMIN — IOPAMIDOL 290 ML: 755 INJECTION, SOLUTION INTRAVENOUS at 04:28

## 2019-03-25 RX ADMIN — FENTANYL CITRATE 25 MCG: 50 INJECTION, SOLUTION INTRAMUSCULAR; INTRAVENOUS at 02:29

## 2019-03-25 RX ADMIN — SODIUM CHLORIDE 1000 ML: 9 INJECTION, SOLUTION INTRAVENOUS at 02:00

## 2019-03-25 RX ADMIN — SODIUM CHLORIDE: 9 INJECTION, SOLUTION INTRAVENOUS at 05:01

## 2019-03-25 RX ADMIN — CARVEDILOL 3.12 MG: 3.12 TABLET, FILM COATED ORAL at 09:06

## 2019-03-25 RX ADMIN — GABAPENTIN 400 MG: 400 CAPSULE ORAL at 14:51

## 2019-03-25 RX ADMIN — FAMOTIDINE 20 MG: 20 TABLET ORAL at 21:52

## 2019-03-25 RX ADMIN — SODIUM CHLORIDE 1000 ML: 9 INJECTION, SOLUTION INTRAVENOUS at 01:59

## 2019-03-25 RX ADMIN — ASPIRIN 81 MG 324 MG: 81 TABLET ORAL at 01:55

## 2019-03-25 RX ADMIN — FAMOTIDINE 20 MG: 20 TABLET ORAL at 09:06

## 2019-03-25 RX ADMIN — ANTISEPTIC SURGICAL SCRUB: 0.04 SOLUTION TOPICAL at 22:45

## 2019-03-25 RX ADMIN — HEPARIN SODIUM 5000 UNITS: 1000 INJECTION INTRAVENOUS; SUBCUTANEOUS at 02:07

## 2019-03-25 RX ADMIN — ENTECAVIR 1 MG: 1 TABLET ORAL at 11:36

## 2019-03-25 RX ADMIN — ALPRAZOLAM 0.5 MG: 0.5 TABLET ORAL at 21:52

## 2019-03-25 RX ADMIN — FENTANYL CITRATE 25 MCG: 50 INJECTION, SOLUTION INTRAMUSCULAR; INTRAVENOUS at 02:08

## 2019-03-25 RX ADMIN — GABAPENTIN 400 MG: 400 CAPSULE ORAL at 21:52

## 2019-03-25 RX ADMIN — ANTACID TABLETS 500 MG: 500 TABLET, CHEWABLE ORAL at 17:35

## 2019-03-25 RX ADMIN — TIROFIBAN 0.15 MCG/KG/MIN: 5 INJECTION, SOLUTION INTRAVENOUS at 20:25

## 2019-03-25 ASSESSMENT — PAIN DESCRIPTION - PAIN TYPE
TYPE: ACUTE PAIN
TYPE: ACUTE PAIN

## 2019-03-25 ASSESSMENT — PAIN DESCRIPTION - ORIENTATION: ORIENTATION: LEFT

## 2019-03-25 ASSESSMENT — PAIN SCALES - GENERAL
PAINLEVEL_OUTOF10: 0
PAINLEVEL_OUTOF10: 6
PAINLEVEL_OUTOF10: 9
PAINLEVEL_OUTOF10: 0
PAINLEVEL_OUTOF10: 6
PAINLEVEL_OUTOF10: 9
PAINLEVEL_OUTOF10: 0
PAINLEVEL_OUTOF10: 0

## 2019-03-25 ASSESSMENT — LIFESTYLE VARIABLES: SMOKING_STATUS: 0

## 2019-03-25 ASSESSMENT — PAIN - FUNCTIONAL ASSESSMENT: PAIN_FUNCTIONAL_ASSESSMENT: ACTIVITIES ARE NOT PREVENTED

## 2019-03-25 ASSESSMENT — PAIN DESCRIPTION - DESCRIPTORS
DESCRIPTORS: ACHING;PRESSURE
DESCRIPTORS: PATIENT UNABLE TO DESCRIBE

## 2019-03-25 ASSESSMENT — PAIN DESCRIPTION - FREQUENCY: FREQUENCY: CONTINUOUS

## 2019-03-25 ASSESSMENT — PAIN DESCRIPTION - LOCATION
LOCATION: CHEST
LOCATION: CHEST

## 2019-03-25 ASSESSMENT — PAIN DESCRIPTION - PROGRESSION: CLINICAL_PROGRESSION: NOT CHANGED

## 2019-03-25 ASSESSMENT — ENCOUNTER SYMPTOMS: SHORTNESS OF BREATH: 0

## 2019-03-25 NOTE — PROGRESS NOTES
Clinical Pharmacy Note  Heparin Dosing       Lab Results   Component Value Date    APTT 56.1 03/25/2019     Lab Results   Component Value Date    HGB 17.3 03/25/2019    HCT 51.1 03/25/2019     03/25/2019    INR 0.93 03/25/2019       Current Infusion Rate: 10 mL/hr    Plan:  Rate: 10 mL/hr  Next aPTT: 0600 3/26/19    Pharmacy will continue to monitor and adjust based on aPTT results.

## 2019-03-25 NOTE — PROGRESS NOTES
8228- report given by Juwan Fuentes from cath lab  9211- patient arrived at the unit, NAD, RA, PIVx 2 -NS/agrosta, NSR, Ax4,denies any pain, SOB, N&V, R groin site assessed-no hematoma, bleeding, redness noted-site closed with angio seal, peripheral pulses palpable, denies numbness/tingling, 4 eyes with Octavio Martines RN, initial vitals by [facility], patient weighed. 0500- assessment complete, VSS, NAD, denies pain, SOB, R groin site without ay complications. 0- Dr Dayana Chowdhury at bedside with family.   0530- new orders placed, patient to be NPO  0535- Dr Dayana Chowdhury called for labs and verified new orders  [phone]- Dr Vivienne Bourbon called for new orders  0600- patient in bed asleep  0715- handoff with Neha Welch RN
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
## 2019-03-25 NOTE — PROGRESS NOTES
Medication Reconciliation    List of medications patient is currently taking is complete. Source(s) of information:   1. Conversation with patient at bedside  2. [facility] records     Allergies: Buspar [buspirone] and Zoloft     Notes regarding home medications:   1. Has not started taking aspirin yet. 2. Patient was taking Candesartan + HCTZ for a short period of time, but reports being back on valsartan/HCTZ. 3. Reports no longer taking sildenafil (last taken 10-12 months ago) - removed from medication list.  4. Plavix stopped 2 years ago. 5. Patient stopped all medications for chronic pain except gabapentin several months ago.  Patient understands that he will receive analgesics/opioids during the perioperative period and agrees to take them if needed while he is here in the hospital.    Tia Do, PharmD, [facility]

## 2019-03-25 NOTE — PROCEDURES
[address] [place] [address]                            CARDIAC CATHETERIZATION    PATIENT NAME: Rosa Chand                      :        1955  MED REC NO:   [id number]                          ROOM:       [address] [place] [address]  ACCOUNT NO:   [de-identified]                           ADMIT DATE: 2019  PROVIDER:     Geraldo Dumont. Goyo Conley MD    DATE OF PROCEDURE:  2019    PROCEDURE PERFORMED:  Diagnostic angiography, percutaneous coronary  intervention. INDICATION FOR PROCEDURE:  The patient presents with acute inferior wall  MI. He was in the emergency room initially. He had no ST elevation on  his initial EKG around midnight. Then he had another electrocardiogram  gathered at 0200 because his pain became worse and he showed inferior ST  elevation. The Cath Lab was summoned. PROCEDURE:  Prepped and draped in the sterile manner, locally  anesthetized with 2% lidocaine in the right femoral artery area. a  6-[place] sheath was placed in a retrograde manner in the right femoral  artery. With guidewire, JL4, JR4, and pigtail catheters were used  during the procedure. A 6-[place] JL4 guide was used during the  procedure for more diagnostic injections. A 6-[place] JR4 guide was used  for the interventional procedure. Angled pigtail catheter was used. An  AL1 diagnostic catheter was used with a straight wire to get across the  aortic valve as well. FINDINGS:  The left main coronary artery has a 60% ostial stenosis. The  ostial circumflex has a 98% stenosis. There is STIVEN 3 flow. The  remainder of the circumflex is relatively unremarkable. The LAD  proximally has diffuse moderate disease. There appeared to be stents in  the LAD. In the mid-distal LAD, he has a 98% stenosis. STIVEN 3 flow is  still present. The 6-[place] JR4 guiding catheter was used to engage the right coronary  artery.   Injection shows a dominant right coronary artery. There are  multiple stents in the right coronary artery. In the mid vessel, there  is an 80% in-stent stenotic lesion. Proximal to that, there is 80%  focal stenosis, and then in the distal right coronary artery, there is a  98% stenosis, and beyond that stenosis, there is STIVEN grade 2 flow. The  artery flow is not as robust into the right posterior descending  coronary artery and the right posterolateral branch of the right  coronary artery. The right posterolateral branch has a focal 75%  stenosis. There is STIVEN 3 flow there. That did not seem to be the  culprit in my medical opinion. I elected to proceed with wiring this  with a [employer] guidewire. We gave heparin and Aggrastat per protocol. The  190 BMW guidewire was advanced with difficulty beyond the 99% stenosis  in the distal right coronary artery. I elected to use a 2.5 mm x 12-mm  Euphora balloon. The Euphora balloon was placed across the lesion. There were two inflations to 12 atmospheres x30 seconds each. STIVEN 3  flow was reestablished. I then had some time to evaluate the artery and  I decided that I needed a rather long stent. I chose a 3.0 x 23-mm  length Xience Darcy drug-eluting stent. I was able to get it into the  distal right coronary artery with some difficulty and maneuvering of the  guide. After positioning was confirmed, the stent was deployed at 16  atmospheres x30 seconds. There was STIVEN grade 3 flow with nearly 0%  residual stenosis. Next, my attention was turned to the proximal  lesion. On a bend, there appeared to be an 80% stenosis. I decided to  primarily stent it with a 3.5 mm x 12-mm Xience Darcy drug-eluting  stent. After positioning was confirmed that we were covering the  blockage, the stenosis, it was deployed at 14 atmospheres x30 seconds. There was STIVEN 3 flow with 0% residual stenosis.   Just beyond that,  there appeared to be a stent in the right coronary artery, and in the  distal aspect of that stent, there was in-stent stenosis. I elected to  take that 3.5-mm balloon from the last deployed stent and I placed it  within the previous stent and inflated it to 12 atmospheres then 14  atmospheres with repositioning. We were ensured that we were within the  stent before we deployed the balloon once again. There was STIVEN 3 blood  flow with less than 10% residual stenosis at the in-stent stenotic  segment. After that, the angioplasty hardware was removed and the  patient's chest pain was 0/10. I was concerned that he needed further  diagnostic picture, so I took a JL4, 6-[place] guide and placed it in the  left main coronary artery. Injections revealed that there was some  ventricularization of pressure with deep seating of the guide and there  was almost normal seating of the guide and there was what appeared to be  a 60% stenosis of the distal left main. I elected to conclude the  coronary angiography procedure at that juncture. Next, I tried to get across the aortic valve into the left ventricle and  I could not do that with the ordinary measures. I had to use an AL1  diagnostic catheter for directionality and a straight wire and then I  was able to get into the left ventricle. I then placed the AL1 into the  left ventricle. I used an exchange length J-wire and then I removed the  AL1 leaving the wire in the left ventricle and putting an angled pigtail  catheter into the left ventricle. The LVEDP was about 10 mmHg. Power  injection AMOR projection shows LV ejection fraction of 50%. The basal  inferior wall was nearly akinetic. The remaining segments have nearly  normal contractility. There was a 10-mm gradient peak-to-peak upon  pullback across the aortic valve. There was no mitral regurgitation  seen. Injection of the femoral sheath showed that it was above the bifurcation  and a normal right common femoral artery.   For patient safety and  comfort, a 6-[place] Angio-[facility]al Doc#: [id number]    CC:  Vitor Jordan.  MD Celeste Chakraborty MD       [facility] Emergency Room
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
## 2019-03-25 NOTE — PROGRESS NOTES
3/25/19  1930-Shift handoff completed with Rodríguez Garcia RN. R groin site assessed. Site soft, no bleeding, oozing, or hematoma. Dressing intact with old drainage. All infusing gtts signed off at bedside. Pt denies needs at this time. Call light within reach. 1942-Urinal emptied. Output noted. 2139-Assessment completed, see flow sheet. VSS. Pt alert and oriented x4. Denies pain, indigestion, SOB, N/V. Heparin gtt infusing @ 10 mL/hr, Nitro gtt infusing @ 20 mcg/min, and Aggrastat gtt infusing @ 0.15 mcg/kg/min. Pt voids per urinal, which is at bedside within reach. R groin site continues to ooze, unchanged from previous assessment. Hub applied to 18g PIV in L hand. [id number], No Insulin admin per order parameters. 2152-Scheduled PM medications explained and admin. 2200-Pt assisted up to bathroom to have BM. Tolerated well. Remains pain and indigestion free. Hibiclens scrub completed in bathroom. Gown and complete linen change performed. Pt assisted back to bed. Tolerated well, no issues. Call light in reach. Instructed to call for assistance, verbalizes understanding. Will monitor. 3/26/19  0000-Assessment completed, no changes. VSS. Pt continues to deny pain, SOB, N/V. Drinks taken away, Pt informed he is now NPO for surgery in the AM, verbalizes understanding. Aggrastat gtt stopped per order. Pt asked more questions about surgery and visiting policy. This RN answered all questions and reassured pt that he will be under General Anesthesia and will not feel or remember anything during the actual surgery. Informed pt that this RN will inform family in the morning of visitation policy post-op, no further questions at this time. Call light within reach. Will cont to monitor. 0513-Assessment unchanged from previous. VSS. Pt denies any type of pain or discomfort. No changes to R groin site. Oral care performed, Upper dentures removed. Chlorhexidine Rinse admin.   Lactated Ringers started @ 50 mL/hr. Reglan and Bactroban admin per order. Urinal emptied. Intake documented, IV pumps cleared. Wedding ring removed; placed in bag with cell phone and glasses. Pt has no complaints at this time. Call light in reach. Will cont to monitor. 0630-Pt transferred to lining room, 1303. Connected to monitors. Bedside report given to CVOR RN, Sofi.  All questions answered. Pt left in stable condition.     Electronically signed by Elaine Dailey RN on 3/26/2019 at 6:45 AM

## 2019-03-25 NOTE — PROGRESS NOTES
4 Eyes Skin Assessment     The patient is being assess for  Admission    I agree that 2 RN's have performed a thorough Head to Toe Skin Assessment on the patient. ALL assessment sites listed below have been assessed. Areas assessed by both nurses: Juan C/Evelin  [x]   Head, Face, and Ears   [x]   Shoulders, Back, and Chest  [x]   Arms, Elbows, and Hands   [x]   Coccyx, Sacrum, and IschIum  [x]   Legs, Feet, and Heels        Does the Patient have Skin Breakdown?   No         [place] Prevention initiated:  Yes   Wound Care Orders initiated:  No      [facility] nurse consulted for Pressure Injury (Stage 3,4, Unstageable, DTI, NWPT, and Complex wounds), New and Established Ostomies:  No      Nurse 1 eSignature: Electronically signed by Laurence Beltran RN on 3/25/19 at 5:43 AM    **SHARE this note so that the co-signing nurse is able to place an eSignature**    Nurse 2 eSignature: Electronically signed by Clive Cole RN on 3/25/19 at 5:44 AM

## 2019-03-25 NOTE — ED NOTES
EKG shows ACUTE MI. Cath lab notified. This nurse removed remaining clothing from pt and placed shoes, socks, shorts, shirt, and phone in belonging bag. Pt hooked up to crash cart. Second IV started to left hand by fransico Ocampo. NSS pressure bagged into pt per verbal order from Dr. Vianca Whitman.       Ashley Munoz RN  03/25/19 [id number]

## 2019-03-25 NOTE — H&P
[address]                              HISTORY AND PHYSICAL    PATIENT NAME: Jacquelynne Epley                      :        1955  MED REC NO:   [id number]                          ROOM:       [address] [place] [address]  ACCOUNT NO:   [de-identified]                           ADMIT DATE: 2019  PROVIDER:     Thierno Arboleda. Candido hSay MD    INDICATION FOR PROCEDURE:  The patient presented apparently around  midnight to the emergency room complaining of indigestion. He had been  triaged and initial EKG did not show any acute ST-elevation. On further  evaluation, his chest pain became worse and another EKG was gathered at  about 0200 which showed inferior ST elevation. At that point, I was  summoned and the cath lab team was called. PAST MEDICAL HISTORY:  Including coronary artery disease with stenting,  diabetes mellitus, hyperlipidemia, hypertension. Other problems include  chronic pain syndrome, low back pain, back injury. He apparently has an  old history of myocardial infarction as well. He has had coronary  artery stenting. His other medical problem is psoriasis. CURRENT MEDICATIONS:  His medicines include 50 units of Lantus  subcutaneously nightly, 70/30 insulin 25 units 2 times a day before  meals, Baraclude 1 mg daily, alprazolam 0.5 mg t.i.d., Atacand 32 mg  daily, hydrochlorothiazide 12.5 mg daily, apparently Glucophage had been  a medicine, Coreg 3.125 mg a day, triamcinolone and Elocon cream,  Invokana 300 mg daily before breakfast, aspirin. PHYSICAL EXAMINATION:  GENERAL:  He is an age-appropriate male in moderate to severe distress. VITAL SIGNS:  Blood pressure is about 120/80, heart rate is 100,  respiratory rate is 22, afebrile. HEENT:  Nonfocal.  Normocephalic, atraumatic. Mucous membranes moist.   Poor dentition. Extraocular muscles intact. NECK:  No lymphadenopathy.   CARDIAC:  S1, S2 normal. Regular rate and rhythm, tachycardic slightly. LUNGS:  Clear anteriorly and laterally. ABDOMEN:  Protuberant, nondistended. Hypoactive bowel sounds. EXTREMITIES:  Trace to 1+ edema. No cyanosis. NEUROLOGIC:  Nonfocal.  Cranial nerves appear to be grossly intact. No  gross motor or sensory deficits. The patient was examined on the table briefly. LABORATORY DATA:  EKG: Inferior ST elevation at 0200 with sinus rhythm. IMPRESSION:  Acute inferior wall ST-elevation MI, history of coronary  artery disease with stenting, multiple risk factors including diabetes,  hypertension, hyperlipidemia. I presume he has an occluded right  coronary artery. PLAN:  My plan will be to try and recanalize his right coronary artery. He is considered critically ill and should proceed with ICU admission  post procedure.           Kenzie Alfonso MD    D: 03/25/2019 4:35:46       T: 03/25/2019 7:38:33     [email or web address]  Job#: [id number]     Doc#: [id number]    CC:

## 2019-03-25 NOTE — PROGRESS NOTES
0730- Report received from Nelson Watts, patient resting quietly in bed. Denies pain or SOB at this time. Vital signs stable. Right groin stable, small amount of oozing noted, no hematoma. 0830- Dr Arie Hodgkin at bedside for eval. Home medications ordered. Patient without needs at this time. Awaiting Dr Janine Matthews to see patient. 56- Dr Janine Matthews at bedside for eval and discussion of bypass surgery. Dressing to right groin changed. 1200- assessment unchanged from previous. Patient resting quietly in bed. Pre-op testing completed at bedside. Vital signs remain stable. Patient able to eat meal, tolerating well. Will monitor. 1400- Family at bedside. Patient complaining of indigestion, similar to when he presented to ED. Concern this is related to cardiac issues, NTG drip started at 10 mcg/min, then titrated up to 20 mcg/min. Patient would also like to try oral medication of indigestion, message sent to hospitalist.     1600- patient states indigestion improved. States would like to get up to take a shower. Discussed with patient need to maintain IV medications. Discussed typical pre-op routine. 1825- patient resting quietly in bed. Consents for procedure, blood, and anesthesia obtained. Patient denies questions about surgery. Will monitor. 1341- report given to Danuta Chao RN to assume care.
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
## 2019-03-25 NOTE — PROGRESS NOTES
Clinical Pharmacy Note  Heparin Dosing       Lab Results   Component Value Date    APTT 60.7 03/25/2019     Lab Results   Component Value Date    HGB 17.3 03/25/2019    HCT 51.1 03/25/2019     03/25/2019    INR 0.93 03/25/2019       Current Infusion Rate: 10 mL/hr    Plan:  Continue same rate: 10 mL/hr  Next aPTT: 3/25/19 1800    Pharmacy will continue to monitor and adjust based on aPTT results.
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
## 2019-03-25 NOTE — ED NOTES
Spoke with cath lab at this time. Gave update on pt and status. Cath lab ready for pt.       Mireya العراقي RN  03/25/19 0366

## 2019-03-25 NOTE — ED NOTES
This nurse in room to give pt ordered medications. This nurse noticed on the monitor that pt's rhythm had changed, showing ST elevation. Pt states that the pain in his chest is much more severe than it's been, 9/10. This nurse called tech to do a STAT EKG and alerted Dr. Raquel Arnold that I believed pt was actively having an MI. Pt placed on 2L NC.       Michele Giang RN  03/25/19 8192

## 2019-03-25 NOTE — CONSULTS
Medication Sig Start Date End Date Taking? Authorizing Provider   insulin glargine (LANTUS) 100 UNIT/ML injection vial Inject 50 Units into the skin nightly   Yes Historical Provider, MD   gabapentin (NEURONTIN) 400 MG capsule TAKE 1 CAPSULE BY MOUTH THREE TIMES DAILY 3/19/19 4/18/19 Yes Teri Sinha APRN - CNP   insulin 70-30 (HUMULIN 70/30) (70-30) 100 UNIT per ML injection vial Inject 25 Units into the skin 2 times daily (before meals) 1/21/19  Yes Renate Rahman MD   entecavir (BARACLUDE) 1 MG tablet Take 1 tablet by mouth daily 1/21/19  Yes Renate Rahman MD   ALPRAZolam [place]) 0.5 MG tablet Take 1 tablet by mouth three times daily for 90 days. . 1/21/19 4/21/19 Yes Renate Rahman MD   candesartan (ATACAND) 32 MG tablet Take 1 tablet by mouth daily 1/3/19  Yes Beau Patrick MD   hydrochlorothiazide (MICROZIDE) 12.5 MG capsule Take 1 capsule by mouth every morning 1/3/19  Yes Rayna Acosta MD   metFORMIN (GLUCOPHAGE-XR) 500 MG extended release tablet TAKE 4 TABLETS BY MOUTH EVERY DAY WITH BREAKFAST 11/6/18  Yes Rneate Rahman MD   carvedilol (COREG) 3.125 MG tablet TAKE 1 TABLET TWICE DAILY WITH MEALS 6/29/18  Yes Renate Rahman MD   triamcinolone (KENALOG) 0.1 % cream Apply topically 2 times daily Apply topically 2 times daily. 11/17/17  Yes Renate Rahman MD   mometasone (ELOCON) 0.1 % ointment Apply topically twice daily.  4/10/17  Yes Renate Rahman MD   canagliflozin (INVOKANA) 300 MG TABS tablet Take 1 tablet by mouth every morning (before breakfast) 12/8/16  Yes Renate Rahman MD   INSULIN SYRINGE .5CC/29G (Marry Marcia INS SYR .5CC/29G) 29G X 1/2\" 0.5 ML MISC 1 each by Does not apply route daily 10/3/18   Renate Rahman MD   sildenafil (REVATIO) 20 MG tablet Take 1 tablet by mouth daily 4/5/18   KARYN Newton - CNP   Insulin Pen Needle (B-D UF III MINI PEN NEEDLES) 31G X 5 MM MISC 1 each by Does not apply route daily 11/21/17   Renate Rahman MD   Naproxen Sodium (ALEVE) 220 MG CAPS Take 1 tablet by mouth 2 47*   ALT 68*   BILITOT 0.4   ALKPHOS 93     No results for input(s): INR in the last 72 hours. Recent Labs     03/25/19  0050   TROPONINI 0.16*       Urinalysis:    No results found for: [place] Bud[place], BACTERIA, RBCUA, BLOODU, SPECGRAV, GLUCOSEU    Radiology:         XR CHEST PORTABLE   Final Result   No acute process. ASSESSMENT:    Active Hospital Problems    Diagnosis Date Noted    Acute inferior myocardial infarction ([facility] [address] [I21.19] 03/25/2019         PLAN:    1. STEMI, status post stenting, cardiothoracic surgery consulted for further recommendation. 2.  Diabetes mellitus, patient on Lantus and metformin, hold metformin for now, patient is nothing by mouth at this time we'll use 50% of his Lantus at this time. 3.  Hepatitis B, resume treatment. 4.  Anxiety, on home Xanax, will resume same dose. 5.  Essential hypertension, by mouth medication. Diet: DIET CARDIAC;  Code Status: Full Code        Dispo - icu       Patricia Amos MD    Thank you John Ferrer MD for the opportunity to be involved in this patient's care. If you have any questions or concerns please feel free to contact me at [phone].

## 2019-03-25 NOTE — PROGRESS NOTES
[facility]  Inpatient Progress Note    CC:       STEMI       HPI:   This is a 62 y.o. male was admitted with severe indigestion. He was found to have RCA occlusion that was stented. In addition he has left main and circumflex as well as LAD disease disease. Patient is presently on heparin Nitrostat and scheduled to undergo CABG tomorrow    Had mild indigestion this morning. Nitroglycerin drip was started. He is doing fine          Review of Systems -   Constitutional: Negative for weight gain/loss; malaise, fever  Respiratory: Negative for Asthma;  cough and hemoptysis  Cardiovascular: Negative for palpitations,dizziness   Gastrointestinal: Negative for abd.pain; constipation/diarrhea;    Genitourinary: Negative for stones; hematuria; frequency hesitancy  Integumentt: Negative for rash or pruritis  Hematologic/lymphatic: Negative for blood dyscrasia; leukemia/lymphoma  Musculoskeletal: Negative for Connective tissue disease  Neurological:  Negative for Seizure   Behavioral/Psych:Negative for Bipolar disorder, Schizophrenia; Dementia  Endocrine: negative for thyroid, parathyroid disease      Intake/Output Summary (Last 24 hours) at 3/25/2019 1753  Last data filed at 3/25/2019 1530  Gross per 24 hour   Intake 649.7 ml   Output 2500 ml   Net -1850.3 ml         Physical Examination:    /75   Pulse 84   Temp 97.8 °F (36.6 °C) (Oral)   Resp 19   Ht 6' (1.829 m)   Wt 282 lb 6.6 oz (128.1 kg)   SpO2 95%   BMI 38.30 kg/m²   HEENT:  Face: Atraumatic, Conjunctiva: Pink; non icteric,  Mucous Memb:  Moist, No thyromegaly or Lymphadenopathy  Respiratory:  Resp Assessment: normal, Resp Auscultation: clear   Cardiovascular: Auscultation: nl S1 & S2, Palpation:  Nl PMI;  No heaves or thrills, JVP:  normal  Abdomen: Soft, non-tender, Normal bowel sounds,  No organomegaly  Extremities: No Cyanosis or Clubbing; Edema none  Neurological: Oriented to time, place, and person, Non-anxious  Psychiatric: Normal mood and affect  Skin: Warm and dry,  No rash seen    Current Facility-Administered Medications: aluminum & magnesium hydroxide-simethicone (MAALOX) 30 mL, lidocaine viscous (XYLOCAINE) 5 mL (GI COCKTAIL), , Oral, Once  nitroGLYCERIN 50 mg in dextrose 5% 250 mL infusion, 5 mcg/min, Intravenous, Continuous  carvedilol (COREG) tablet 3.125 mg, 3.125 mg, Oral, BID WC  sodium chloride flush 0.9 % injection 10 mL, 10 mL, Intravenous, 2 times per day  sodium chloride flush 0.9 % injection 10 mL, 10 mL, Intravenous, PRN  acetaminophen (TYLENOL) tablet 650 mg, 650 mg, Oral, Q4H PRN  atropine injection 0.5 mg, 0.5 mg, Intravenous, Once PRN  morphine (PF) injection 2 mg, 2 mg, Intravenous, Once PRN  magnesium hydroxide (MILK OF MAGNESIA) 400 MG/5ML suspension 30 mL, 30 mL, Oral, Daily PRN  ondansetron (ZOFRAN) injection 4 mg, 4 mg, Intravenous, Q6H PRN  famotidine (PEPCID) tablet 20 mg, 20 mg, Oral, BID  atorvastatin (LIPITOR) tablet 40 mg, 40 mg, Oral, Nightly  [START ON 3/26/2019] aspirin tablet 325 mg, 325 mg, Oral, Daily  tirofiban (AGGRASTAT) 50 mcg/mL infusion, 0.15 mcg/kg/min, Intravenous, Continuous  insulin lispro (HUMALOG) injection vial 0-12 Units, 0-12 Units, Subcutaneous, TID WC  insulin lispro (HUMALOG) injection vial 0-6 Units, 0-6 Units, Subcutaneous, Nightly  glucose (GLUTOSE) 40 % oral gel 15 g, 15 g, Oral, PRN  dextrose 50 % solution 12.5 g, 12.5 g, Intravenous, PRN  glucagon (rDNA) injection 1 mg, 1 mg, Intramuscular, PRN  dextrose 5 % solution, 100 mL/hr, Intravenous, PRN  nitroglycerin (NITRO-BID) 2 % ointment 1 inch, 1 inch, Topical, PRN  heparin 25,000 units in dextrose 5% 250 mL infusion, 10 mL/hr, Intravenous, Continuous  insulin glargine (LANTUS) injection vial 25 Units, 25 Units, Subcutaneous, Nightly  ALPRAZolam (XANAX) tablet 0.5 mg, 0.5 mg, Oral, TID  entecavir (BARACLUDE) tablet 1 mg, 1 mg, Oral, Daily  gabapentin (NEURONTIN) capsule 400 mg, 400 mg, Oral, TID  [START ON 3/26/2019] lactated ringers infusion, , Intravenous, Continuous  sodium chloride flush 0.9 % injection 10 mL, 10 mL, Intravenous, 2 times per day  sodium chloride flush 0.9 % injection 10 mL, 10 mL, Intravenous, PRN  nitroGLYCERIN (NITROSTAT) SL tablet 0.4 mg, 0.4 mg, Sublingual, Q5 Min PRN  calcium carbonate (TUMS) chewable tablet 500 mg, 500 mg, Oral, TID PRN         Labs:   Recent Labs     03/25/19 0050   WBC 9.6   HGB 17.3   HCT 51.1        Recent Labs     03/25/19 0050      K 4.4   CO2 24   BUN 19   CREATININE 0.8   LABGLOM >60     No results for input(s): BNP in the last 72 hours. Recent Labs     03/25/19 0050   PROTIME 10.6   INR 0.93     Recent Labs     03/25/19 0050   TROPONINI 0.16*             ECHO:   Left ventricular cavity size is normal.  There is mild concentric left ventricular hypertrophy and low normal  systolic function. Ejection fraction is visually estimated to be 50%. There is mild hypokinesis of the inferior wall. The right ventricle is not well visualized but appears to have normal size  and function. There are no significant valvular abnormalities appreciated in this study. Corornary angiogram  & Intervention:  The left main coronary artery has a 60% ostial stenosis. The ostial circumflex has a 98% stenosis. There is STIVEN 3 flow. The  remainder of the circumflex is relatively unremarkable. The LAD proximally has diffuse moderate disease. There appeared to be stents in  the LAD. In the mid-distal LAD, he has a 98% stenosis. STIVEN 3 flow is still present. 1.  Successful recanalization of a STIVEN 1 to 2 flow, distal right coronary artery, with stenting of a 99% distal right coronary artery  lesion with a 3.0 x 23-mm length Xience Darcy drug-eluting stent. In the early mid-right coronary artery, there was a focal 80% stenosis that  was stented with a 3.5 mm x 12-mm Xience Darcy drug-eluting stent and this stent was deployed at 14 atmospheres with nearly 0% residual  stenosis. That same 3.5-mm balloon was then used to deliver therapy and reduce an in-stent stenotic lesion of about 80% to less than 10%  residual stenosis after inflation of that balloon to 12 atmospheres and then 14 atmospheres x30 seconds each. STIVEN 3 flow resulted. 2.  There appears to be a 60% ostial left main stenosis. There is a 99% ostial circumflex stenosis. There is a mid to distal 95% LAD stenosis. 3.  LV ejection fraction of 50% with inferior basal akinesia in the AMOR projection. 4. LVEDP 10 mmHg with a 10-mm gradient peak-to-peak upon pullback across the aortic valve. 5.  Successful Angio-Seal, right femoral arteriotomy.          ASSESSMENT AND PLAN:      81-year-old gentleman with severe multivessel disease presenting with inferior MI treated with primary PCI and stenting  Has been found to have left main and multivessel disease requiring CABG, which is scheduled for tomorrow  He is on heparin, Aggrastat and nitroglycerin drip and stable      Sven Martinez M.D  3/25/2019

## 2019-03-26 ENCOUNTER — ANESTHESIA (OUTPATIENT)
Dept: OPERATING ROOM | Age: 64
DRG: 232 | End: 2019-03-26
Payer: MEDICARE

## 2019-03-26 ENCOUNTER — APPOINTMENT (OUTPATIENT)
Dept: GENERAL RADIOLOGY | Age: 64
DRG: 232 | End: 2019-03-26
Payer: MEDICARE

## 2019-03-26 VITALS — RESPIRATION RATE: 18 BRPM | OXYGEN SATURATION: 99 % | TEMPERATURE: 99.3 F

## 2019-03-26 LAB
ANION GAP SERPL CALCULATED.3IONS-SCNC: 13 MMOL/L (ref 3–16)
ANION GAP SERPL CALCULATED.3IONS-SCNC: 16 MMOL/L (ref 3–16)
APTT: 35.5 SEC (ref 26–36)
BUN BLDV-MCNC: 14 MG/DL (ref 7–20)
BUN BLDV-MCNC: 16 MG/DL (ref 7–20)
CALCIUM SERPL-MCNC: 7.7 MG/DL (ref 8.3–10.6)
CALCIUM SERPL-MCNC: 8.7 MG/DL (ref 8.3–10.6)
CHLORIDE BLD-SCNC: 101 MMOL/L (ref 99–110)
CHLORIDE BLD-SCNC: 106 MMOL/L (ref 99–110)
CO2: 20 MMOL/L (ref 21–32)
CO2: 25 MMOL/L (ref 21–32)
CREAT SERPL-MCNC: 0.7 MG/DL (ref 0.8–1.3)
CREAT SERPL-MCNC: 0.8 MG/DL (ref 0.8–1.3)
EKG ATRIAL RATE: 90 BPM
EKG ATRIAL RATE: 93 BPM
EKG ATRIAL RATE: 97 BPM
EKG DIAGNOSIS: NORMAL
EKG P AXIS: 20 DEGREES
EKG P AXIS: 21 DEGREES
EKG P AXIS: 71 DEGREES
EKG P-R INTERVAL: 128 MS
EKG P-R INTERVAL: 152 MS
EKG P-R INTERVAL: 160 MS
EKG Q-T INTERVAL: 332 MS
EKG Q-T INTERVAL: 336 MS
EKG Q-T INTERVAL: 352 MS
EKG QRS DURATION: 66 MS
EKG QRS DURATION: 74 MS
EKG QRS DURATION: 82 MS
EKG QTC CALCULATION (BAZETT): 411 MS
EKG QTC CALCULATION (BAZETT): 421 MS
EKG QTC CALCULATION (BAZETT): 437 MS
EKG R AXIS: -16 DEGREES
EKG R AXIS: 12 DEGREES
EKG R AXIS: 9 DEGREES
EKG T AXIS: 38 DEGREES
EKG T AXIS: 57 DEGREES
EKG T AXIS: 76 DEGREES
EKG VENTRICULAR RATE: 90 BPM
EKG VENTRICULAR RATE: 93 BPM
EKG VENTRICULAR RATE: 97 BPM
GFR AFRICAN AMERICAN: >60
GFR AFRICAN AMERICAN: >60
GFR NON-AFRICAN AMERICAN: >60
GFR NON-AFRICAN AMERICAN: >60
GLUCOSE BLD-MCNC: 126 MG/DL (ref 70–99)
GLUCOSE BLD-MCNC: 177 MG/DL (ref 70–99)
HCT VFR BLD CALC: 34 % (ref 40.5–52.5)
HCT VFR BLD CALC: 45.5 % (ref 40.5–52.5)
HEMOGLOBIN: 11.4 G/DL (ref 13.5–17.5)
HEMOGLOBIN: 15.4 G/DL (ref 13.5–17.5)
INR BLD: 1.24 (ref 0.86–1.14)
MAGNESIUM: 2.3 MG/DL (ref 1.8–2.4)
MCH RBC QN AUTO: 32.8 PG (ref 26–34)
MCH RBC QN AUTO: 32.9 PG (ref 26–34)
MCHC RBC AUTO-ENTMCNC: 33.4 G/DL (ref 31–36)
MCHC RBC AUTO-ENTMCNC: 33.8 G/DL (ref 31–36)
MCV RBC AUTO: 97.2 FL (ref 80–100)
MCV RBC AUTO: 98.3 FL (ref 80–100)
PDW BLD-RTO: 13.6 % (ref 12.4–15.4)
PDW BLD-RTO: 13.9 % (ref 12.4–15.4)
PHOSPHORUS: 3.8 MG/DL (ref 2.5–4.9)
PLATELET # BLD: 111 K/UL (ref 135–450)
PLATELET # BLD: 179 K/UL (ref 135–450)
PMV BLD AUTO: 7.8 FL (ref 5–10.5)
PMV BLD AUTO: 7.9 FL (ref 5–10.5)
POTASSIUM REFLEX MAGNESIUM: 4.1 MMOL/L (ref 3.5–5.1)
POTASSIUM SERPL-SCNC: 4.1 MMOL/L (ref 3.5–5.1)
PROTHROMBIN TIME: 14.1 SEC (ref 9.8–13)
RBC # BLD: 3.46 M/UL (ref 4.2–5.9)
RBC # BLD: 4.68 M/UL (ref 4.2–5.9)
SODIUM BLD-SCNC: 139 MMOL/L (ref 136–145)
SODIUM BLD-SCNC: 142 MMOL/L (ref 136–145)
WBC # BLD: 17 K/UL (ref 4–11)
WBC # BLD: 7 K/UL (ref 4–11)

## 2019-03-26 PROCEDURE — 6370000000 HC RX 637 (ALT 250 FOR IP): Performed by: THORACIC SURGERY (CARDIOTHORACIC VASCULAR SURGERY)

## 2019-03-26 PROCEDURE — 2700000000 HC OXYGEN THERAPY PER DAY

## 2019-03-26 PROCEDURE — 33521 CABG ARTERY-VEIN FOUR: CPT | Performed by: THORACIC SURGERY (CARDIOTHORACIC VASCULAR SURGERY)

## 2019-03-26 PROCEDURE — 2500000003 HC RX 250 WO HCPCS

## 2019-03-26 PROCEDURE — 2720000010 HC SURG SUPPLY STERILE: Performed by: THORACIC SURGERY (CARDIOTHORACIC VASCULAR SURGERY)

## 2019-03-26 PROCEDURE — 2580000003 HC RX 258: Performed by: ANESTHESIOLOGY

## 2019-03-26 PROCEDURE — 2580000003 HC RX 258: Performed by: NURSE PRACTITIONER

## 2019-03-26 PROCEDURE — 36415 COLL VENOUS BLD VENIPUNCTURE: CPT

## 2019-03-26 PROCEDURE — 84100 ASSAY OF PHOSPHORUS: CPT

## 2019-03-26 PROCEDURE — 82330 ASSAY OF CALCIUM: CPT

## 2019-03-26 PROCEDURE — 84295 ASSAY OF SERUM SODIUM: CPT

## 2019-03-26 PROCEDURE — 5A1221Z PERFORMANCE OF CARDIAC OUTPUT, CONTINUOUS: ICD-10-PCS | Performed by: THORACIC SURGERY (CARDIOTHORACIC VASCULAR SURGERY)

## 2019-03-26 PROCEDURE — 94664 DEMO&/EVAL PT USE INHALER: CPT

## 2019-03-26 PROCEDURE — 85730 THROMBOPLASTIN TIME PARTIAL: CPT

## 2019-03-26 PROCEDURE — 94002 VENT MGMT INPAT INIT DAY: CPT

## 2019-03-26 PROCEDURE — 3700000001 HC ADD 15 MINUTES (ANESTHESIA): Performed by: THORACIC SURGERY (CARDIOTHORACIC VASCULAR SURGERY)

## 2019-03-26 PROCEDURE — 2500000003 HC RX 250 WO HCPCS: Performed by: ANESTHESIOLOGY

## 2019-03-26 PROCEDURE — 82947 ASSAY GLUCOSE BLOOD QUANT: CPT

## 2019-03-26 PROCEDURE — 85610 PROTHROMBIN TIME: CPT

## 2019-03-26 PROCEDURE — 33572 OPEN CORONARY ENDARTERECTOMY: CPT | Performed by: THORACIC SURGERY (CARDIOTHORACIC VASCULAR SURGERY)

## 2019-03-26 PROCEDURE — 4A133BC MONITORING OF ARTERIAL PRESSURE, CORONARY, PERCUTANEOUS APPROACH: ICD-10-PCS | Performed by: ANESTHESIOLOGY

## 2019-03-26 PROCEDURE — 86923 COMPATIBILITY TEST ELECTRIC: CPT

## 2019-03-26 PROCEDURE — 7100000001 HC PACU RECOVERY - ADDTL 15 MIN

## 2019-03-26 PROCEDURE — 6360000002 HC RX W HCPCS: Performed by: ANESTHESIOLOGY

## 2019-03-26 PROCEDURE — 6370000000 HC RX 637 (ALT 250 FOR IP): Performed by: NURSE PRACTITIONER

## 2019-03-26 PROCEDURE — 6360000002 HC RX W HCPCS

## 2019-03-26 PROCEDURE — 93010 ELECTROCARDIOGRAM REPORT: CPT | Performed by: INTERNAL MEDICINE

## 2019-03-26 PROCEDURE — 94762 N-INVAS EAR/PLS OXIMTRY CONT: CPT

## 2019-03-26 PROCEDURE — 36592 COLLECT BLOOD FROM PICC: CPT

## 2019-03-26 PROCEDURE — 3700000000 HC ANESTHESIA ATTENDED CARE: Performed by: THORACIC SURGERY (CARDIOTHORACIC VASCULAR SURGERY)

## 2019-03-26 PROCEDURE — 6360000002 HC RX W HCPCS: Performed by: THORACIC SURGERY (CARDIOTHORACIC VASCULAR SURGERY)

## 2019-03-26 PROCEDURE — 2580000003 HC RX 258: Performed by: THORACIC SURGERY (CARDIOTHORACIC VASCULAR SURGERY)

## 2019-03-26 PROCEDURE — 85027 COMPLETE CBC AUTOMATED: CPT

## 2019-03-26 PROCEDURE — 2100000000 HC CCU R&B

## 2019-03-26 PROCEDURE — 82803 BLOOD GASES ANY COMBINATION: CPT

## 2019-03-26 PROCEDURE — 85347 COAGULATION TIME ACTIVATED: CPT

## 2019-03-26 PROCEDURE — C1713 ANCHOR/SCREW BN/BN,TIS/BN: HCPCS | Performed by: THORACIC SURGERY (CARDIOTHORACIC VASCULAR SURGERY)

## 2019-03-26 PROCEDURE — 84132 ASSAY OF SERUM POTASSIUM: CPT

## 2019-03-26 PROCEDURE — C1751 CATH, INF, PER/CENT/MIDLINE: HCPCS | Performed by: THORACIC SURGERY (CARDIOTHORACIC VASCULAR SURGERY)

## 2019-03-26 PROCEDURE — 02100Z9 BYPASS CORONARY ARTERY, ONE ARTERY FROM LEFT INTERNAL MAMMARY, OPEN APPROACH: ICD-10-PCS | Performed by: THORACIC SURGERY (CARDIOTHORACIC VASCULAR SURGERY)

## 2019-03-26 PROCEDURE — 3600000008 HC SURGERY OHS BASE: Performed by: THORACIC SURGERY (CARDIOTHORACIC VASCULAR SURGERY)

## 2019-03-26 PROCEDURE — 80048 BASIC METABOLIC PNL TOTAL CA: CPT

## 2019-03-26 PROCEDURE — 3600000018 HC SURGERY OHS ADDTL 15MIN: Performed by: THORACIC SURGERY (CARDIOTHORACIC VASCULAR SURGERY)

## 2019-03-26 PROCEDURE — 83735 ASSAY OF MAGNESIUM: CPT

## 2019-03-26 PROCEDURE — 2500000003 HC RX 250 WO HCPCS: Performed by: THORACIC SURGERY (CARDIOTHORACIC VASCULAR SURGERY)

## 2019-03-26 PROCEDURE — 85014 HEMATOCRIT: CPT

## 2019-03-26 PROCEDURE — 7100000000 HC PACU RECOVERY - FIRST 15 MIN

## 2019-03-26 PROCEDURE — 6360000002 HC RX W HCPCS: Performed by: NURSE PRACTITIONER

## 2019-03-26 PROCEDURE — 021309W BYPASS CORONARY ARTERY, FOUR OR MORE ARTERIES FROM AORTA WITH AUTOLOGOUS VENOUS TISSUE, OPEN APPROACH: ICD-10-PCS | Performed by: THORACIC SURGERY (CARDIOTHORACIC VASCULAR SURGERY)

## 2019-03-26 PROCEDURE — 33533 CABG ARTERIAL SINGLE: CPT | Performed by: THORACIC SURGERY (CARDIOTHORACIC VASCULAR SURGERY)

## 2019-03-26 PROCEDURE — 06BP4ZZ EXCISION OF RIGHT SAPHENOUS VEIN, PERCUTANEOUS ENDOSCOPIC APPROACH: ICD-10-PCS | Performed by: THORACIC SURGERY (CARDIOTHORACIC VASCULAR SURGERY)

## 2019-03-26 PROCEDURE — 33508 ENDOSCOPIC VEIN HARVEST: CPT | Performed by: THORACIC SURGERY (CARDIOTHORACIC VASCULAR SURGERY)

## 2019-03-26 PROCEDURE — 2580000003 HC RX 258

## 2019-03-26 PROCEDURE — 88305 TISSUE EXAM BY PATHOLOGIST: CPT

## 2019-03-26 PROCEDURE — P9041 ALBUMIN (HUMAN),5%, 50ML: HCPCS

## 2019-03-26 PROCEDURE — B246ZZ4 ULTRASONOGRAPHY OF RIGHT AND LEFT HEART, TRANSESOPHAGEAL: ICD-10-PCS | Performed by: THORACIC SURGERY (CARDIOTHORACIC VASCULAR SURGERY)

## 2019-03-26 PROCEDURE — C1729 CATH, DRAINAGE: HCPCS | Performed by: THORACIC SURGERY (CARDIOTHORACIC VASCULAR SURGERY)

## 2019-03-26 PROCEDURE — 71045 X-RAY EXAM CHEST 1 VIEW: CPT

## 2019-03-26 PROCEDURE — 2500000003 HC RX 250 WO HCPCS: Performed by: NURSE PRACTITIONER

## 2019-03-26 PROCEDURE — 83605 ASSAY OF LACTIC ACID: CPT

## 2019-03-26 PROCEDURE — 93005 ELECTROCARDIOGRAM TRACING: CPT | Performed by: INTERNAL MEDICINE

## 2019-03-26 PROCEDURE — 2709999900 HC NON-CHARGEABLE SUPPLY: Performed by: THORACIC SURGERY (CARDIOTHORACIC VASCULAR SURGERY)

## 2019-03-26 PROCEDURE — 94640 AIRWAY INHALATION TREATMENT: CPT

## 2019-03-26 DEVICE — IMPLANTABLE DEVICE: Type: IMPLANTABLE DEVICE | Site: STERNUM | Status: FUNCTIONAL

## 2019-03-26 DEVICE — MATERIAL IMPL BNE HEMSTAS 3.5 GM ALKYLENE STRL OSTENE: Type: IMPLANTABLE DEVICE | Site: CHEST | Status: FUNCTIONAL

## 2019-03-26 DEVICE — PLATE BNE 100DEG 4 H L SHP STERNALOCK BLU PRI CLSR SYS: Type: IMPLANTABLE DEVICE | Site: STERNUM | Status: FUNCTIONAL

## 2019-03-26 RX ORDER — CALCIUM CHLORIDE 100 MG/ML
INJECTION INTRAVENOUS; INTRAVENTRICULAR PRN
Status: DISCONTINUED | OUTPATIENT
Start: 2019-03-26 | End: 2019-03-26 | Stop reason: SDUPTHER

## 2019-03-26 RX ORDER — KETOROLAC TROMETHAMINE 15 MG/ML
15 INJECTION, SOLUTION INTRAMUSCULAR; INTRAVENOUS EVERY 6 HOURS
Status: DISCONTINUED | OUTPATIENT
Start: 2019-03-26 | End: 2019-03-27

## 2019-03-26 RX ORDER — SODIUM CHLORIDE 0.9 % (FLUSH) 0.9 %
10 SYRINGE (ML) INJECTION PRN
Status: DISCONTINUED | OUTPATIENT
Start: 2019-03-26 | End: 2019-04-03 | Stop reason: HOSPADM

## 2019-03-26 RX ORDER — FUROSEMIDE 10 MG/ML
40 INJECTION INTRAMUSCULAR; INTRAVENOUS PRN
Status: DISCONTINUED | OUTPATIENT
Start: 2019-03-26 | End: 2019-04-02

## 2019-03-26 RX ORDER — ACETAMINOPHEN 325 MG/1
650 TABLET ORAL EVERY 4 HOURS PRN
Status: DISCONTINUED | OUTPATIENT
Start: 2019-03-26 | End: 2019-04-03 | Stop reason: HOSPADM

## 2019-03-26 RX ORDER — ROCURONIUM BROMIDE 10 MG/ML
INJECTION, SOLUTION INTRAVENOUS PRN
Status: DISCONTINUED | OUTPATIENT
Start: 2019-03-26 | End: 2019-03-26 | Stop reason: SDUPTHER

## 2019-03-26 RX ORDER — DEXTROSE MONOHYDRATE 25 G/50ML
12.5 INJECTION, SOLUTION INTRAVENOUS PRN
Status: DISCONTINUED | OUTPATIENT
Start: 2019-03-26 | End: 2019-04-03 | Stop reason: HOSPADM

## 2019-03-26 RX ORDER — FONDAPARINUX SODIUM 2.5 MG/.5ML
2.5 INJECTION SUBCUTANEOUS DAILY
Status: DISCONTINUED | OUTPATIENT
Start: 2019-03-27 | End: 2019-03-28

## 2019-03-26 RX ORDER — REMIFENTANIL HYDROCHLORIDE 1 MG/ML
INJECTION, POWDER, LYOPHILIZED, FOR SOLUTION INTRAVENOUS CONTINUOUS PRN
Status: DISCONTINUED | OUTPATIENT
Start: 2019-03-26 | End: 2019-03-26 | Stop reason: SDUPTHER

## 2019-03-26 RX ORDER — PROTAMINE SULFATE 10 MG/ML
50 INJECTION, SOLUTION INTRAVENOUS
Status: ACTIVE | OUTPATIENT
Start: 2019-03-26 | End: 2019-03-26

## 2019-03-26 RX ORDER — 0.9 % SODIUM CHLORIDE 0.9 %
500 INTRAVENOUS SOLUTION INTRAVENOUS CONTINUOUS PRN
Status: DISCONTINUED | OUTPATIENT
Start: 2019-03-26 | End: 2019-04-03 | Stop reason: HOSPADM

## 2019-03-26 RX ORDER — MAGNESIUM SULFATE IN WATER 40 MG/ML
2 INJECTION, SOLUTION INTRAVENOUS PRN
Status: DISCONTINUED | OUTPATIENT
Start: 2019-03-26 | End: 2019-04-03 | Stop reason: HOSPADM

## 2019-03-26 RX ORDER — ATORVASTATIN CALCIUM 40 MG/1
40 TABLET, FILM COATED ORAL NIGHTLY
Status: DISCONTINUED | OUTPATIENT
Start: 2019-03-27 | End: 2019-03-26

## 2019-03-26 RX ORDER — SODIUM CHLORIDE 0.9 % (FLUSH) 0.9 %
10 SYRINGE (ML) INJECTION EVERY 12 HOURS SCHEDULED
Status: DISCONTINUED | OUTPATIENT
Start: 2019-03-26 | End: 2019-04-03 | Stop reason: HOSPADM

## 2019-03-26 RX ORDER — LORAZEPAM 2 MG/ML
1 INJECTION INTRAMUSCULAR ONCE
Status: DISCONTINUED | OUTPATIENT
Start: 2019-03-26 | End: 2019-03-26

## 2019-03-26 RX ORDER — KETAMINE HCL IN NACL, ISO-OSM 100MG/10ML
SYRINGE (ML) INJECTION PRN
Status: DISCONTINUED | OUTPATIENT
Start: 2019-03-26 | End: 2019-03-26 | Stop reason: SDUPTHER

## 2019-03-26 RX ORDER — CEFAZOLIN SODIUM 1 G/3ML
INJECTION, POWDER, FOR SOLUTION INTRAMUSCULAR; INTRAVENOUS PRN
Status: DISCONTINUED | OUTPATIENT
Start: 2019-03-26 | End: 2019-03-26 | Stop reason: SDUPTHER

## 2019-03-26 RX ORDER — MIDAZOLAM HYDROCHLORIDE 1 MG/ML
INJECTION INTRAMUSCULAR; INTRAVENOUS PRN
Status: DISCONTINUED | OUTPATIENT
Start: 2019-03-26 | End: 2019-03-26 | Stop reason: SDUPTHER

## 2019-03-26 RX ORDER — ALBUMIN, HUMAN INJ 5% 5 %
SOLUTION INTRAVENOUS
Status: COMPLETED
Start: 2019-03-26 | End: 2019-03-26

## 2019-03-26 RX ORDER — SODIUM CHLORIDE, SODIUM LACTATE, POTASSIUM CHLORIDE, CALCIUM CHLORIDE 600; 310; 30; 20 MG/100ML; MG/100ML; MG/100ML; MG/100ML
INJECTION, SOLUTION INTRAVENOUS CONTINUOUS PRN
Status: DISCONTINUED | OUTPATIENT
Start: 2019-03-26 | End: 2019-03-26 | Stop reason: SDUPTHER

## 2019-03-26 RX ORDER — ONDANSETRON 2 MG/ML
4 INJECTION INTRAMUSCULAR; INTRAVENOUS EVERY 8 HOURS PRN
Status: DISCONTINUED | OUTPATIENT
Start: 2019-03-26 | End: 2019-04-03 | Stop reason: HOSPADM

## 2019-03-26 RX ORDER — POTASSIUM CHLORIDE 29.8 MG/ML
20 INJECTION INTRAVENOUS PRN
Status: DISCONTINUED | OUTPATIENT
Start: 2019-03-26 | End: 2019-03-29

## 2019-03-26 RX ORDER — LIDOCAINE HYDROCHLORIDE 10 MG/ML
INJECTION, SOLUTION EPIDURAL; INFILTRATION; INTRACAUDAL; PERINEURAL
Status: DISCONTINUED
Start: 2019-03-26 | End: 2019-03-26

## 2019-03-26 RX ORDER — FAMOTIDINE 20 MG/1
20 TABLET, FILM COATED ORAL 2 TIMES DAILY
Status: DISCONTINUED | OUTPATIENT
Start: 2019-03-27 | End: 2019-03-27

## 2019-03-26 RX ORDER — BUPIVACAINE HYDROCHLORIDE 5 MG/ML
INJECTION, SOLUTION EPIDURAL; INTRACAUDAL
Status: COMPLETED | OUTPATIENT
Start: 2019-03-26 | End: 2019-03-26

## 2019-03-26 RX ORDER — MIDAZOLAM HYDROCHLORIDE 1 MG/ML
1 INJECTION INTRAMUSCULAR; INTRAVENOUS
Status: ACTIVE | OUTPATIENT
Start: 2019-03-26 | End: 2019-03-27

## 2019-03-26 RX ORDER — IPRATROPIUM BROMIDE AND ALBUTEROL SULFATE 2.5; .5 MG/3ML; MG/3ML
1 SOLUTION RESPIRATORY (INHALATION)
Status: DISCONTINUED | OUTPATIENT
Start: 2019-03-26 | End: 2019-04-03 | Stop reason: HOSPADM

## 2019-03-26 RX ORDER — POTASSIUM CHLORIDE 750 MG/1
10 TABLET, FILM COATED, EXTENDED RELEASE ORAL
Status: DISCONTINUED | OUTPATIENT
Start: 2019-03-28 | End: 2019-04-03 | Stop reason: HOSPADM

## 2019-03-26 RX ORDER — OXYCODONE HYDROCHLORIDE 5 MG/1
5 TABLET ORAL EVERY 4 HOURS PRN
Status: DISCONTINUED | OUTPATIENT
Start: 2019-03-26 | End: 2019-04-03 | Stop reason: HOSPADM

## 2019-03-26 RX ORDER — LISINOPRIL 5 MG/1
2.5 TABLET ORAL
Status: DISCONTINUED | OUTPATIENT
Start: 2019-03-27 | End: 2019-03-27

## 2019-03-26 RX ORDER — HEPARIN SODIUM 1000 [USP'U]/ML
INJECTION, SOLUTION INTRAVENOUS; SUBCUTANEOUS PRN
Status: DISCONTINUED | OUTPATIENT
Start: 2019-03-26 | End: 2019-03-26 | Stop reason: SDUPTHER

## 2019-03-26 RX ORDER — MEPERIDINE HYDROCHLORIDE 50 MG/ML
25 INJECTION INTRAMUSCULAR; INTRAVENOUS; SUBCUTANEOUS
Status: ACTIVE | OUTPATIENT
Start: 2019-03-26 | End: 2019-03-26

## 2019-03-26 RX ORDER — FENTANYL CITRATE 50 UG/ML
25 INJECTION, SOLUTION INTRAMUSCULAR; INTRAVENOUS
Status: DISCONTINUED | OUTPATIENT
Start: 2019-03-26 | End: 2019-04-03 | Stop reason: HOSPADM

## 2019-03-26 RX ORDER — PROTAMINE SULFATE 10 MG/ML
INJECTION, SOLUTION INTRAVENOUS PRN
Status: DISCONTINUED | OUTPATIENT
Start: 2019-03-26 | End: 2019-03-26 | Stop reason: SDUPTHER

## 2019-03-26 RX ORDER — KETOROLAC TROMETHAMINE 30 MG/ML
30 INJECTION, SOLUTION INTRAMUSCULAR; INTRAVENOUS ONCE
Status: COMPLETED | OUTPATIENT
Start: 2019-03-26 | End: 2019-03-26

## 2019-03-26 RX ORDER — MILRINONE LACTATE 0.2 MG/ML
INJECTION, SOLUTION INTRAVENOUS CONTINUOUS PRN
Status: DISCONTINUED | OUTPATIENT
Start: 2019-03-26 | End: 2019-03-26 | Stop reason: SDUPTHER

## 2019-03-26 RX ORDER — MILRINONE LACTATE 0.2 MG/ML
0.38 INJECTION, SOLUTION INTRAVENOUS CONTINUOUS PRN
Status: DISCONTINUED | OUTPATIENT
Start: 2019-03-26 | End: 2019-03-27

## 2019-03-26 RX ORDER — NITROGLYCERIN 40 MG/1
1 PATCH TRANSDERMAL DAILY
Status: COMPLETED | OUTPATIENT
Start: 2019-03-27 | End: 2019-03-29

## 2019-03-26 RX ORDER — SUFENTANIL CITRATE 50 UG/ML
INJECTION EPIDURAL; INTRAVENOUS PRN
Status: DISCONTINUED | OUTPATIENT
Start: 2019-03-26 | End: 2019-03-26 | Stop reason: SDUPTHER

## 2019-03-26 RX ORDER — LANOLIN ALCOHOL/MO/W.PET/CERES
400 CREAM (GRAM) TOPICAL 2 TIMES DAILY
Status: DISCONTINUED | OUTPATIENT
Start: 2019-03-27 | End: 2019-04-03 | Stop reason: HOSPADM

## 2019-03-26 RX ORDER — DIPHENHYDRAMINE HCL 25 MG
25 TABLET ORAL NIGHTLY PRN
Status: DISCONTINUED | OUTPATIENT
Start: 2019-03-27 | End: 2019-04-03 | Stop reason: HOSPADM

## 2019-03-26 RX ORDER — LORAZEPAM 2 MG/ML
INJECTION INTRAMUSCULAR
Status: COMPLETED
Start: 2019-03-26 | End: 2019-03-26

## 2019-03-26 RX ORDER — SODIUM CHLORIDE 9 MG/ML
INJECTION, SOLUTION INTRAVENOUS CONTINUOUS
Status: DISCONTINUED | OUTPATIENT
Start: 2019-03-26 | End: 2019-03-27

## 2019-03-26 RX ORDER — INSULIN GLARGINE 100 [IU]/ML
0.15 INJECTION, SOLUTION SUBCUTANEOUS NIGHTLY
Status: DISCONTINUED | OUTPATIENT
Start: 2019-03-27 | End: 2019-04-02

## 2019-03-26 RX ORDER — CALCIUM CHLORIDE 100 MG/ML
1 INJECTION INTRAVENOUS; INTRAVENTRICULAR ONCE
Status: COMPLETED | OUTPATIENT
Start: 2019-03-26 | End: 2019-03-26

## 2019-03-26 RX ORDER — FENTANYL CITRATE 50 UG/ML
INJECTION, SOLUTION INTRAMUSCULAR; INTRAVENOUS PRN
Status: DISCONTINUED | OUTPATIENT
Start: 2019-03-26 | End: 2019-03-26 | Stop reason: SDUPTHER

## 2019-03-26 RX ORDER — CARVEDILOL 3.12 MG/1
3.12 TABLET ORAL 2 TIMES DAILY
Status: DISCONTINUED | OUTPATIENT
Start: 2019-03-27 | End: 2019-03-27

## 2019-03-26 RX ORDER — FUROSEMIDE 10 MG/ML
40 INJECTION INTRAMUSCULAR; INTRAVENOUS 2 TIMES DAILY
Status: DISCONTINUED | OUTPATIENT
Start: 2019-03-27 | End: 2019-03-27

## 2019-03-26 RX ORDER — PROPOFOL 10 MG/ML
INJECTION, EMULSION INTRAVENOUS PRN
Status: DISCONTINUED | OUTPATIENT
Start: 2019-03-26 | End: 2019-03-26 | Stop reason: SDUPTHER

## 2019-03-26 RX ORDER — NICOTINE POLACRILEX 4 MG
15 LOZENGE BUCCAL PRN
Status: DISCONTINUED | OUTPATIENT
Start: 2019-03-26 | End: 2019-04-03 | Stop reason: HOSPADM

## 2019-03-26 RX ORDER — OXYCODONE HYDROCHLORIDE 10 MG/1
10 TABLET ORAL EVERY 4 HOURS PRN
Status: DISCONTINUED | OUTPATIENT
Start: 2019-03-26 | End: 2019-04-03 | Stop reason: HOSPADM

## 2019-03-26 RX ORDER — NITROGLYCERIN 20 MG/100ML
10 INJECTION INTRAVENOUS CONTINUOUS PRN
Status: DISCONTINUED | OUTPATIENT
Start: 2019-03-26 | End: 2019-03-29

## 2019-03-26 RX ORDER — CHLORHEXIDINE GLUCONATE 0.12 MG/ML
15 RINSE ORAL 2 TIMES DAILY
Status: DISCONTINUED | OUTPATIENT
Start: 2019-03-26 | End: 2019-04-03 | Stop reason: HOSPADM

## 2019-03-26 RX ORDER — SUCCINYLCHOLINE/SOD CL,ISO/PF 200MG/10ML
SYRINGE (ML) INTRAVENOUS PRN
Status: DISCONTINUED | OUTPATIENT
Start: 2019-03-26 | End: 2019-03-26 | Stop reason: SDUPTHER

## 2019-03-26 RX ORDER — AMINOCAPROIC ACID 250 MG/ML
INJECTION, SOLUTION INTRAVENOUS PRN
Status: DISCONTINUED | OUTPATIENT
Start: 2019-03-26 | End: 2019-03-26 | Stop reason: SDUPTHER

## 2019-03-26 RX ORDER — DEXTROSE MONOHYDRATE 50 MG/ML
100 INJECTION, SOLUTION INTRAVENOUS PRN
Status: DISCONTINUED | OUTPATIENT
Start: 2019-03-26 | End: 2019-04-03 | Stop reason: HOSPADM

## 2019-03-26 RX ORDER — DOCUSATE SODIUM 100 MG/1
100 CAPSULE, LIQUID FILLED ORAL 2 TIMES DAILY
Status: DISCONTINUED | OUTPATIENT
Start: 2019-03-26 | End: 2019-04-03 | Stop reason: HOSPADM

## 2019-03-26 RX ORDER — METOCLOPRAMIDE HYDROCHLORIDE 5 MG/ML
10 INJECTION INTRAMUSCULAR; INTRAVENOUS EVERY 6 HOURS PRN
Status: DISCONTINUED | OUTPATIENT
Start: 2019-03-26 | End: 2019-04-03 | Stop reason: HOSPADM

## 2019-03-26 RX ORDER — PAPAVERINE HYDROCHLORIDE 30 MG/ML
INJECTION INTRAMUSCULAR; INTRAVENOUS
Status: COMPLETED | OUTPATIENT
Start: 2019-03-26 | End: 2019-03-26

## 2019-03-26 RX ORDER — IPRATROPIUM BROMIDE AND ALBUTEROL SULFATE 2.5; .5 MG/3ML; MG/3ML
1 SOLUTION RESPIRATORY (INHALATION) EVERY 4 HOURS PRN
Status: DISCONTINUED | OUTPATIENT
Start: 2019-03-26 | End: 2019-04-03 | Stop reason: HOSPADM

## 2019-03-26 RX ORDER — HYDRALAZINE HYDROCHLORIDE 20 MG/ML
5 INJECTION INTRAMUSCULAR; INTRAVENOUS
Status: DISCONTINUED | OUTPATIENT
Start: 2019-03-26 | End: 2019-04-03 | Stop reason: HOSPADM

## 2019-03-26 RX ORDER — ALBUMIN, HUMAN INJ 5% 5 %
25 SOLUTION INTRAVENOUS PRN
Status: DISCONTINUED | OUTPATIENT
Start: 2019-03-26 | End: 2019-03-31

## 2019-03-26 RX ORDER — MILRINONE LACTATE 0.2 MG/ML
0.38 INJECTION, SOLUTION INTRAVENOUS CONTINUOUS PRN
Status: DISCONTINUED | OUTPATIENT
Start: 2019-03-26 | End: 2019-03-26

## 2019-03-26 RX ORDER — LORAZEPAM 2 MG/ML
0.5 INJECTION INTRAMUSCULAR ONCE
Status: COMPLETED | OUTPATIENT
Start: 2019-03-26 | End: 2019-03-26

## 2019-03-26 RX ADMIN — SUFENTANIL CITRATE 100 MCG: 50 INJECTION EPIDURAL; INTRAVENOUS at 08:43

## 2019-03-26 RX ADMIN — REMIFENTANIL HYDROCHLORIDE 0.15 MCG/KG/MIN: 1 INJECTION, POWDER, LYOPHILIZED, FOR SOLUTION INTRAVENOUS at 12:55

## 2019-03-26 RX ADMIN — ALBUMIN (HUMAN) 50 G: 12.5 INJECTION, SOLUTION INTRAVENOUS at 17:19

## 2019-03-26 RX ADMIN — FENTANYL CITRATE 25 MCG: 50 INJECTION INTRAMUSCULAR; INTRAVENOUS at 15:45

## 2019-03-26 RX ADMIN — DOCUSATE SODIUM 100 MG: 100 CAPSULE, LIQUID FILLED ORAL at 20:16

## 2019-03-26 RX ADMIN — METOCLOPRAMIDE 10 MG: 5 INJECTION, SOLUTION INTRAMUSCULAR; INTRAVENOUS at 05:14

## 2019-03-26 RX ADMIN — VANCOMYCIN HYDROCHLORIDE 2000 MG: 1 INJECTION, POWDER, LYOPHILIZED, FOR SOLUTION INTRAVENOUS at 08:00

## 2019-03-26 RX ADMIN — KETOROLAC TROMETHAMINE 15 MG: 15 INJECTION, SOLUTION INTRAMUSCULAR; INTRAVENOUS at 20:16

## 2019-03-26 RX ADMIN — Medication 30 MG: at 08:43

## 2019-03-26 RX ADMIN — LORAZEPAM 0.5 MG: 2 INJECTION INTRAMUSCULAR at 16:31

## 2019-03-26 RX ADMIN — Medication 3 G: at 20:16

## 2019-03-26 RX ADMIN — MILRINONE LACTATE IN DEXTROSE 0.3 MCG/KG/MIN: 200 INJECTION, SOLUTION INTRAVENOUS at 18:44

## 2019-03-26 RX ADMIN — FENTANYL CITRATE 25 MCG: 50 INJECTION INTRAMUSCULAR; INTRAVENOUS at 18:55

## 2019-03-26 RX ADMIN — SODIUM CHLORIDE 500 ML: 9 INJECTION, SOLUTION INTRAVENOUS at 18:52

## 2019-03-26 RX ADMIN — OXYCODONE HYDROCHLORIDE 10 MG: 10 TABLET ORAL at 18:21

## 2019-03-26 RX ADMIN — MIDAZOLAM 5 MG: 1 INJECTION INTRAMUSCULAR; INTRAVENOUS at 12:55

## 2019-03-26 RX ADMIN — Medication 50 MEQ: at 17:58

## 2019-03-26 RX ADMIN — Medication 50 MEQ: at 17:06

## 2019-03-26 RX ADMIN — CEFAZOLIN SODIUM 3000 MG: 1 INJECTION, POWDER, FOR SOLUTION INTRAMUSCULAR; INTRAVENOUS at 12:57

## 2019-03-26 RX ADMIN — ROCURONIUM BROMIDE 50 MG: 10 INJECTION INTRAVENOUS at 12:55

## 2019-03-26 RX ADMIN — KETOROLAC TROMETHAMINE 30 MG: 30 INJECTION, SOLUTION INTRAMUSCULAR at 15:58

## 2019-03-26 RX ADMIN — ROCURONIUM BROMIDE 100 MG: 10 INJECTION INTRAVENOUS at 10:39

## 2019-03-26 RX ADMIN — IPRATROPIUM BROMIDE AND ALBUTEROL SULFATE 1 AMPULE: .5; 3 SOLUTION RESPIRATORY (INHALATION) at 15:58

## 2019-03-26 RX ADMIN — MILRINONE LACTATE IN DEXTROSE 0.38 MCG/KG/MIN: 200 INJECTION, SOLUTION INTRAVENOUS at 12:55

## 2019-03-26 RX ADMIN — Medication 50 MEQ: at 15:20

## 2019-03-26 RX ADMIN — Medication 3 G: at 08:44

## 2019-03-26 RX ADMIN — ROCURONIUM BROMIDE 100 MG: 10 INJECTION INTRAVENOUS at 08:51

## 2019-03-26 RX ADMIN — FENTANYL CITRATE 25 MCG: 50 INJECTION INTRAMUSCULAR; INTRAVENOUS at 21:20

## 2019-03-26 RX ADMIN — SODIUM BICARBONATE 50 MEQ: 84 INJECTION, SOLUTION INTRAVENOUS at 13:54

## 2019-03-26 RX ADMIN — SODIUM CHLORIDE, POTASSIUM CHLORIDE, SODIUM LACTATE AND CALCIUM CHLORIDE: 600; 310; 30; 20 INJECTION, SOLUTION INTRAVENOUS at 05:14

## 2019-03-26 RX ADMIN — POTASSIUM CHLORIDE 20 MEQ: 29.8 INJECTION, SOLUTION INTRAVENOUS at 17:06

## 2019-03-26 RX ADMIN — CHLORHEXIDINE GLUCONATE 0.12% ORAL RINSE 15 ML: 1.2 LIQUID ORAL at 20:29

## 2019-03-26 RX ADMIN — SUGAMMADEX 200 MG: 100 INJECTION, SOLUTION INTRAVENOUS at 15:32

## 2019-03-26 RX ADMIN — AMINOCAPROIC ACID 5000 MG: 250 INJECTION, SOLUTION INTRAVENOUS at 13:44

## 2019-03-26 RX ADMIN — CALCIUM CHLORIDE 1 G: 100 INJECTION, SOLUTION INTRAVENOUS at 17:06

## 2019-03-26 RX ADMIN — GABAPENTIN 400 MG: 400 CAPSULE ORAL at 20:15

## 2019-03-26 RX ADMIN — OXYCODONE HYDROCHLORIDE 10 MG: 10 TABLET ORAL at 22:52

## 2019-03-26 RX ADMIN — FENTANYL CITRATE 25 MCG: 50 INJECTION INTRAMUSCULAR; INTRAVENOUS at 17:06

## 2019-03-26 RX ADMIN — FAMOTIDINE 20 MG: 10 INJECTION, SOLUTION INTRAVENOUS at 20:16

## 2019-03-26 RX ADMIN — SODIUM CHLORIDE, SODIUM LACTATE, POTASSIUM CHLORIDE, AND CALCIUM CHLORIDE: .6; .31; .03; .02 INJECTION, SOLUTION INTRAVENOUS at 08:30

## 2019-03-26 RX ADMIN — HEPARIN SODIUM 47000 UNITS: 1000 INJECTION, SOLUTION INTRAVENOUS; SUBCUTANEOUS at 10:08

## 2019-03-26 RX ADMIN — PROTAMINE SULFATE 400 MG: 10 INJECTION, SOLUTION INTRAVENOUS at 13:37

## 2019-03-26 RX ADMIN — FENTANYL CITRATE 100 MCG: 50 INJECTION INTRAMUSCULAR; INTRAVENOUS at 13:59

## 2019-03-26 RX ADMIN — Medication 200 MG: at 08:43

## 2019-03-26 RX ADMIN — IPRATROPIUM BROMIDE AND ALBUTEROL SULFATE 1 AMPULE: .5; 3 SOLUTION RESPIRATORY (INHALATION) at 20:54

## 2019-03-26 RX ADMIN — CALCIUM CHLORIDE 1 G: 100 INJECTION, SOLUTION INTRAVENOUS; INTRAVENTRICULAR at 13:37

## 2019-03-26 RX ADMIN — Medication 30 MG: at 13:13

## 2019-03-26 RX ADMIN — AMINOCAPROIC ACID 5000 MG: 250 INJECTION, SOLUTION INTRAVENOUS at 09:05

## 2019-03-26 RX ADMIN — ALBUMIN, HUMAN INJ 5% 50 G: 5 SOLUTION at 17:19

## 2019-03-26 RX ADMIN — FENTANYL CITRATE 25 MCG: 50 INJECTION INTRAMUSCULAR; INTRAVENOUS at 18:04

## 2019-03-26 RX ADMIN — LORAZEPAM 0.5 MG: 2 INJECTION INTRAMUSCULAR; INTRAVENOUS at 16:31

## 2019-03-26 RX ADMIN — Medication 2 UNITS/HR: at 09:13

## 2019-03-26 RX ADMIN — VANCOMYCIN HYDROCHLORIDE 2000 MG: 1 INJECTION, POWDER, LYOPHILIZED, FOR SOLUTION INTRAVENOUS at 21:04

## 2019-03-26 RX ADMIN — MIDAZOLAM 5 MG: 1 INJECTION INTRAMUSCULAR; INTRAVENOUS at 08:43

## 2019-03-26 RX ADMIN — Medication 15 ML: at 05:13

## 2019-03-26 RX ADMIN — PROPOFOL 50 MG: 10 INJECTION, EMULSION INTRAVENOUS at 08:43

## 2019-03-26 RX ADMIN — SUFENTANIL CITRATE 100 MCG: 50 INJECTION EPIDURAL; INTRAVENOUS at 09:03

## 2019-03-26 RX ADMIN — MUPIROCIN: 20 OINTMENT TOPICAL at 05:13

## 2019-03-26 RX ADMIN — SODIUM CHLORIDE: 9 INJECTION, SOLUTION INTRAVENOUS at 15:30

## 2019-03-26 RX ADMIN — MUPIROCIN: 20 OINTMENT TOPICAL at 20:16

## 2019-03-26 RX ADMIN — CALCIUM CHLORIDE 1 G: 100 INJECTION, SOLUTION INTRAVENOUS at 18:39

## 2019-03-26 RX ADMIN — HEPARIN SODIUM 2500 UNITS: 1000 INJECTION, SOLUTION INTRAVENOUS; SUBCUTANEOUS at 09:05

## 2019-03-26 ASSESSMENT — PULMONARY FUNCTION TESTS
PIF_VALUE: 21
PIF_VALUE: 25
PIF_VALUE: 1
PIF_VALUE: 19
PIF_VALUE: 1
PIF_VALUE: 1
PIF_VALUE: 25
PIF_VALUE: 1
PIF_VALUE: 2
PIF_VALUE: 22
PIF_VALUE: 27
PIF_VALUE: 1
PIF_VALUE: 24
PIF_VALUE: 1
PIF_VALUE: 24
PIF_VALUE: 1
PIF_VALUE: 1
PIF_VALUE: 21
PIF_VALUE: 24
PIF_VALUE: 19
PIF_VALUE: 24
PIF_VALUE: 20
PIF_VALUE: 1
PIF_VALUE: 19
PIF_VALUE: 23
PIF_VALUE: 27
PIF_VALUE: 26
PIF_VALUE: 24
PIF_VALUE: 1
PIF_VALUE: 26
PIF_VALUE: 23
PIF_VALUE: 1
PIF_VALUE: 25
PIF_VALUE: 24
PIF_VALUE: 19
PIF_VALUE: 1
PIF_VALUE: 19
PIF_VALUE: 27
PIF_VALUE: 1
PIF_VALUE: 28
PIF_VALUE: 4
PIF_VALUE: 21
PIF_VALUE: 24
PIF_VALUE: 23
PIF_VALUE: 1
PIF_VALUE: 25
PIF_VALUE: 1
PIF_VALUE: 1
PIF_VALUE: 2
PIF_VALUE: 27
PIF_VALUE: 25
PIF_VALUE: 1
PIF_VALUE: 1
PIF_VALUE: 21
PIF_VALUE: 21
PIF_VALUE: 1
PIF_VALUE: 18
PIF_VALUE: 1
PIF_VALUE: 24
PIF_VALUE: 19
PIF_VALUE: 19
PIF_VALUE: 24
PIF_VALUE: 1
PIF_VALUE: 21
PIF_VALUE: 2
PIF_VALUE: 19
PIF_VALUE: 21
PIF_VALUE: 1
PIF_VALUE: 21
PIF_VALUE: 1
PIF_VALUE: 22
PIF_VALUE: 1
PIF_VALUE: 28
PIF_VALUE: 1
PIF_VALUE: 19
PIF_VALUE: 24
PIF_VALUE: 25
PIF_VALUE: 1
PIF_VALUE: 19
PIF_VALUE: 1
PIF_VALUE: 27
PIF_VALUE: 21
PIF_VALUE: 1
PIF_VALUE: 19
PIF_VALUE: 21
PIF_VALUE: 1
PIF_VALUE: 1
PIF_VALUE: 23
PIF_VALUE: 22
PIF_VALUE: 24
PIF_VALUE: 26
PIF_VALUE: 24
PIF_VALUE: 21
PIF_VALUE: 27
PIF_VALUE: 28
PIF_VALUE: 18
PIF_VALUE: 21
PIF_VALUE: 1
PIF_VALUE: 25
PIF_VALUE: 21
PIF_VALUE: 19
PIF_VALUE: 1
PIF_VALUE: 2
PIF_VALUE: 23
PIF_VALUE: 1
PIF_VALUE: 27
PIF_VALUE: 27
PIF_VALUE: 1
PIF_VALUE: 19
PIF_VALUE: 21
PIF_VALUE: 1
PIF_VALUE: 1
PIF_VALUE: 25
PIF_VALUE: 1
PIF_VALUE: 24
PIF_VALUE: 22
PIF_VALUE: 1
PIF_VALUE: 19
PIF_VALUE: 24
PIF_VALUE: 1
PIF_VALUE: 26
PIF_VALUE: 1
PIF_VALUE: 19
PIF_VALUE: 21
PIF_VALUE: 22
PIF_VALUE: 21
PIF_VALUE: 1
PIF_VALUE: 25
PIF_VALUE: 22
PIF_VALUE: 27
PIF_VALUE: 1
PIF_VALUE: 24
PIF_VALUE: 2
PIF_VALUE: 21
PIF_VALUE: 19
PIF_VALUE: 19
PIF_VALUE: 25
PIF_VALUE: 20
PIF_VALUE: 24
PIF_VALUE: 23
PIF_VALUE: 1
PIF_VALUE: 1
PIF_VALUE: 21
PIF_VALUE: 27
PIF_VALUE: 24
PIF_VALUE: 23
PIF_VALUE: 1
PIF_VALUE: 18
PIF_VALUE: 22
PIF_VALUE: 23
PIF_VALUE: 1
PIF_VALUE: 25
PIF_VALUE: 24
PIF_VALUE: 1
PIF_VALUE: 1
PIF_VALUE: 23
PIF_VALUE: 1
PIF_VALUE: 23
PIF_VALUE: 19
PIF_VALUE: 1
PIF_VALUE: 1
PIF_VALUE: 24
PIF_VALUE: 25
PIF_VALUE: 25
PIF_VALUE: 19
PIF_VALUE: 1
PIF_VALUE: 28
PIF_VALUE: 2
PIF_VALUE: 1
PIF_VALUE: 1
PIF_VALUE: 2
PIF_VALUE: 25
PIF_VALUE: 22
PIF_VALUE: 19
PIF_VALUE: 18
PIF_VALUE: 24
PIF_VALUE: 1
PIF_VALUE: 1
PIF_VALUE: 23
PIF_VALUE: 21
PIF_VALUE: 24
PIF_VALUE: 18
PIF_VALUE: 24
PIF_VALUE: 27
PIF_VALUE: 23
PIF_VALUE: 19
PIF_VALUE: 1
PIF_VALUE: 19
PIF_VALUE: 25
PIF_VALUE: 26
PIF_VALUE: 22
PIF_VALUE: 24
PIF_VALUE: 1
PIF_VALUE: 27
PIF_VALUE: 25
PIF_VALUE: 25
PIF_VALUE: 22
PIF_VALUE: 2
PIF_VALUE: 23
PIF_VALUE: 22
PIF_VALUE: 18
PIF_VALUE: 25
PIF_VALUE: 1
PIF_VALUE: 25
PIF_VALUE: 24
PIF_VALUE: 19
PIF_VALUE: 25
PIF_VALUE: 1
PIF_VALUE: 23
PIF_VALUE: 19
PIF_VALUE: 18
PIF_VALUE: 20
PIF_VALUE: 1
PIF_VALUE: 1
PIF_VALUE: 21
PIF_VALUE: 1
PIF_VALUE: 24
PIF_VALUE: 21
PIF_VALUE: 19
PIF_VALUE: 1
PIF_VALUE: 1
PIF_VALUE: 23
PIF_VALUE: 19
PIF_VALUE: 21
PIF_VALUE: 26
PIF_VALUE: 21
PIF_VALUE: 18
PIF_VALUE: 21
PIF_VALUE: 20
PIF_VALUE: 1
PIF_VALUE: 1
PIF_VALUE: 33
PIF_VALUE: 21
PIF_VALUE: 18
PIF_VALUE: 1
PIF_VALUE: 1
PIF_VALUE: 24
PIF_VALUE: 22
PIF_VALUE: 2
PIF_VALUE: 24
PIF_VALUE: 1
PIF_VALUE: 19
PIF_VALUE: 1
PIF_VALUE: 25
PIF_VALUE: 21
PIF_VALUE: 1
PIF_VALUE: 17
PIF_VALUE: 21
PIF_VALUE: 1
PIF_VALUE: 21
PIF_VALUE: 21
PIF_VALUE: 29
PIF_VALUE: 18
PIF_VALUE: 1
PIF_VALUE: 2
PIF_VALUE: 3
PIF_VALUE: 19
PIF_VALUE: 1
PIF_VALUE: 19
PIF_VALUE: 24
PIF_VALUE: 1
PIF_VALUE: 19
PIF_VALUE: 1
PIF_VALUE: 19
PIF_VALUE: 21
PIF_VALUE: 21
PIF_VALUE: 20
PIF_VALUE: 25
PIF_VALUE: 24
PIF_VALUE: 1
PIF_VALUE: 1
PIF_VALUE: 19
PIF_VALUE: 1
PIF_VALUE: 21
PIF_VALUE: 2
PIF_VALUE: 21
PIF_VALUE: 24
PIF_VALUE: 19
PIF_VALUE: 27
PIF_VALUE: 25
PIF_VALUE: 23
PIF_VALUE: 20
PIF_VALUE: 23
PIF_VALUE: 4
PIF_VALUE: 26
PIF_VALUE: 19
PIF_VALUE: 2
PIF_VALUE: 1
PIF_VALUE: 1
PIF_VALUE: 22
PIF_VALUE: 19
PIF_VALUE: 24
PIF_VALUE: 19
PIF_VALUE: 25
PIF_VALUE: 1
PIF_VALUE: 21
PIF_VALUE: 2
PIF_VALUE: 23
PIF_VALUE: 21
PIF_VALUE: 1
PIF_VALUE: 27
PIF_VALUE: 19
PIF_VALUE: 23
PIF_VALUE: 23
PIF_VALUE: 1
PIF_VALUE: 1
PIF_VALUE: 27
PIF_VALUE: 24
PIF_VALUE: 26
PIF_VALUE: 1
PIF_VALUE: 2
PIF_VALUE: 1
PIF_VALUE: 1
PIF_VALUE: 21
PIF_VALUE: 1
PIF_VALUE: 22
PIF_VALUE: 22
PIF_VALUE: 25
PIF_VALUE: 1
PIF_VALUE: 18
PIF_VALUE: 24
PIF_VALUE: 24
PIF_VALUE: 22
PIF_VALUE: 19
PIF_VALUE: 20
PIF_VALUE: 23
PIF_VALUE: 2
PIF_VALUE: 21
PIF_VALUE: 1
PIF_VALUE: 1
PIF_VALUE: 19
PIF_VALUE: 29
PIF_VALUE: 27
PIF_VALUE: 24
PIF_VALUE: 1
PIF_VALUE: 27
PIF_VALUE: 1
PIF_VALUE: 21
PIF_VALUE: 1
PIF_VALUE: 22
PIF_VALUE: 6
PIF_VALUE: 20
PIF_VALUE: 25
PIF_VALUE: 18
PIF_VALUE: 24
PIF_VALUE: 2
PIF_VALUE: 21
PIF_VALUE: 28
PIF_VALUE: 21
PIF_VALUE: 2
PIF_VALUE: 1
PIF_VALUE: 23
PIF_VALUE: 1
PIF_VALUE: 25
PIF_VALUE: 26
PIF_VALUE: 1
PIF_VALUE: 19
PIF_VALUE: 19
PIF_VALUE: 21
PIF_VALUE: 1
PIF_VALUE: 6
PIF_VALUE: 1
PIF_VALUE: 21
PIF_VALUE: 24
PIF_VALUE: 1
PIF_VALUE: 24
PIF_VALUE: 21
PIF_VALUE: 19
PIF_VALUE: 1
PIF_VALUE: 21
PIF_VALUE: 1
PIF_VALUE: 26
PIF_VALUE: 19
PIF_VALUE: 1
PIF_VALUE: 21

## 2019-03-26 ASSESSMENT — PAIN DESCRIPTION - LOCATION
LOCATION: CHEST;ARM
LOCATION: STERNUM

## 2019-03-26 ASSESSMENT — PAIN DESCRIPTION - ORIENTATION
ORIENTATION: ANTERIOR;MID
ORIENTATION: ANTERIOR
ORIENTATION: ANTERIOR;MID
ORIENTATION: ANTERIOR;MID

## 2019-03-26 ASSESSMENT — PAIN DESCRIPTION - PAIN TYPE
TYPE: SURGICAL PAIN

## 2019-03-26 ASSESSMENT — PAIN DESCRIPTION - ONSET
ONSET: GRADUAL

## 2019-03-26 ASSESSMENT — PAIN SCALES - GENERAL
PAINLEVEL_OUTOF10: 6
PAINLEVEL_OUTOF10: 7
PAINLEVEL_OUTOF10: 7
PAINLEVEL_OUTOF10: 5
PAINLEVEL_OUTOF10: 0
PAINLEVEL_OUTOF10: 10
PAINLEVEL_OUTOF10: 7
PAINLEVEL_OUTOF10: 10
PAINLEVEL_OUTOF10: 6
PAINLEVEL_OUTOF10: 7
PAINLEVEL_OUTOF10: 0
PAINLEVEL_OUTOF10: 9
PAINLEVEL_OUTOF10: 10
PAINLEVEL_OUTOF10: 8

## 2019-03-26 ASSESSMENT — PAIN DESCRIPTION - DESCRIPTORS
DESCRIPTORS: ACHING;PRESSURE

## 2019-03-26 ASSESSMENT — PAIN DESCRIPTION - PROGRESSION
CLINICAL_PROGRESSION: NOT CHANGED

## 2019-03-26 ASSESSMENT — PAIN DESCRIPTION - FREQUENCY
FREQUENCY: CONTINUOUS

## 2019-03-26 ASSESSMENT — PAIN - FUNCTIONAL ASSESSMENT: PAIN_FUNCTIONAL_ASSESSMENT: ACTIVITIES ARE NOT PREVENTED

## 2019-03-26 NOTE — PROGRESS NOTES
INPATIENT CONSULTATION:    IDENTIFYING DATA/REASON FOR CONSULTATION   PATIENT:  Gonzalez Borges  MRN:  [id number]  ADMIT DATE: 3/25/2019  TIME OF EVALUATION: 3/26/2019 1:00 PM  HOSPITAL STAY:   LOS: 1 day   CONSULTING PHYSICIAN: No att. providers found   REASON FOR CONSULTATION: hepatitis B    Subjective:    Patient seen in follow up this morning. Pt had no complaints. CABG scheduled today    MEDICATIONS   SCHEDULED:    lidocaine PF     albumin human     GI cocktail  Once   carvedilol 3.125 mg BID WC   famotidine 20 mg BID   atorvastatin 40 mg Nightly   aspirin 325 mg Daily   insulin lispro 0-12 Units TID WC   insulin lispro 0-6 Units Nightly   insulin glargine 25 Units Nightly   ALPRAZolam 0.5 mg TID   entecavir 1 mg Daily   gabapentin 400 mg TID   sodium chloride flush 10 mL 2 times per day   chlorhexidine  BID     FLUIDS/DRIPS:     nitroGLYCERIN Stopped (03/26/19 0828)    dextrose      heparin (porcine) Stopped (03/26/19 0828)    lactated ringers Stopped (03/26/19 6161)     PRNs:   sodium chloride flush 10 mL PRN   acetaminophen 650 mg Q4H PRN   magnesium hydroxide 30 mL Daily PRN   ondansetron 4 mg Q6H PRN   glucose 15 g PRN   dextrose 12.5 g PRN   glucagon (rDNA) 1 mg PRN   dextrose 100 mL/hr PRN   nitroglycerin 1 inch PRN   sodium chloride flush 10 mL PRN   nitroGLYCERIN 0.4 mg Q5 Min PRN   calcium carbonate 500 mg TID PRN     ALLERGIES:    Allergies   Allergen Reactions    Buspar [Buspirone]      Not work    Zoloft      hyper         PHYSICAL EXAM     Vitals:    03/26/19 0810 03/26/19 0815 03/26/19 0820 03/26/19 0825   BP:       Pulse: 86 87 86 87   Resp: 17 14 15 21   Temp:       TempSrc:       SpO2: 98% 97% 98% 98%   Weight:       Height:           I/O last 3 completed shifts: In: 3481.4 [P.O.:1200; I.V.:1837.1; Other:444.3]  Out: 3400 [Urine:3400]    Physical Exam:  Gen: Resting in bed, NAD   HEENT: normocephalic, atraumatic. No scleral icterus.    CV: RRR no MRG   Pul: CTAB   Abd: Good bowel sounds throughout, soft, NT/ND, no masses, no HSM   Ext: No edema   Neuro: No asterixis   Skin: No jaundice, spider angiomas, dickson erythema     LABS AND IMAGING     Recent Results (from the past 24 hour(s))   Blood gas, arterial    Collection Time: 03/25/19  1:13 PM   Result Value Ref Range    pH, Arterial 7.406 7.350 - 7.450    pCO2, Arterial 43.0 35.0 - 45.0 mmHg    pO2, Arterial 72.3 (L) 75.0 - 108.0 mmHg    HCO3, Arterial 26.5 21.0 - 29.0 mmol/L    Base Excess, Arterial 1.9 -3.0 - 3.0 mmol/L    Hemoglobin, Art, Extended 16.1 13.5 - 17.5 g/dL    O2 Sat, Arterial 95.1 >92 %    Carboxyhgb, Arterial 1.2 0.0 - 1.5 %    Methemoglobin, Arterial 0.9 <1.5 %    TCO2, Arterial 27.8 Not Established mmol/L    O2 Content, Arterial 21 Not Established mL/dL    O2 Therapy See comment    MRSA Screening Culture Only    Collection Time: 03/25/19  2:15 PM   Result Value Ref Range    MRSA Culture Only Further report to follow    Urinalysis    Collection Time: 03/25/19  3:35 PM   Result Value Ref Range    Color, UA YELLOW Straw/Yellow    Clarity, UA Clear Clear    Glucose, Ur >=1000 (A) Negative mg/dL    Bilirubin Urine Negative Negative    Ketones, Urine 15 (A) Negative mg/dL    Specific Gravity, UA >1.030 1.005 - 1.030    Blood, Urine Negative Negative    pH, UA 5.5 5.0 - 8.0    Protein, UA Negative Negative mg/dL    Urobilinogen, Urine 0.2 <2.0 E.U./dL    Nitrite, Urine Negative Negative    Leukocyte Esterase, Urine SMALL (A) Negative    Microscopic Examination YES     Urine Type Not Specified    Microscopic Urinalysis    Collection Time: 03/25/19  3:35 PM   Result Value Ref Range    Yeast, UA Present (A) /HPF    Hyaline Casts, UA 0 0 - 8 /LPF    WBC, UA 16 (H) 0 - 5 /HPF    RBC, UA 1 0 - 4 /HPF    Epi Cells 1 0 - 5 /HPF   POCT Glucose    Collection Time: 03/25/19  5:32 PM   Result Value Ref Range    POC Glucose 139 (H) 70 - 99 mg/dl    Performed on ACCU-CHEK    APTT    Collection Time: 03/25/19  5:35 PM   Result Value Ref Range    aPTT 56.1 (H) 26.0 - 36.0 sec   POCT Glucose    Collection Time: 03/25/19  9:44 PM   Result Value Ref Range    POC Glucose 134 (H) 70 - 99 mg/dl    Performed on ACCU-CHEK    EKG 12 lead    Collection Time: 03/26/19  4:54 AM   Result Value Ref Range    Ventricular Rate 93 BPM    Atrial Rate 93 BPM    P-R Interval 160 ms    QRS Duration 82 ms    Q-T Interval 352 ms    QTc Calculation (Bazett) 437 ms    P Axis 71 degrees    R Axis -16 degrees    T Axis 57 degrees    Diagnosis       Normal sinus rhythmInferior infarctConfirmed by Sonal GIBBONS MD ([id number]) on 3/26/2019 10:40:16 AM   Basic Metabolic Panel w/ Reflex to MG    Collection Time: 03/26/19  5:03 AM   Result Value Ref Range    Sodium 139 136 - 145 mmol/L    Potassium reflex Magnesium 4.1 3.5 - 5.1 mmol/L    Chloride 101 99 - 110 mmol/L    CO2 25 21 - 32 mmol/L    Anion Gap 13 3 - 16    Glucose 126 (H) 70 - 99 mg/dL    BUN 16 7 - 20 mg/dL    CREATININE 0.7 (L) 0.8 - 1.3 mg/dL    GFR Non-African American >60 >60    GFR African American >60 >60    Calcium 8.7 8.3 - 10.6 mg/dL   CBC    Collection Time: 03/26/19  5:03 AM   Result Value Ref Range    WBC 7.0 4.0 - 11.0 K/uL    RBC 4.68 4.20 - 5.90 M/uL    Hemoglobin 15.4 13.5 - 17.5 g/dL    Hematocrit 45.5 40.5 - 52.5 %    MCV 97.2 80.0 - 100.0 fL    MCH 32.9 26.0 - 34.0 pg    MCHC 33.8 31.0 - 36.0 g/dL    RDW 13.9 12.4 - 15.4 %    Platelets 376 916 - 654 K/uL    MPV 7.8 5.0 - 10.5 fL     Other Labs    Imaging  Vascular pre-op vein mapping         Vascular carotid duplex bilateral         XR CHEST PORTABLE   Final Result   No acute process. Endoscopy      ASSESSMENT AND RECOMMENDATIONS   Dena Cormier is a 62 y.o. male with PMH of Chronic Hep B, CAD with stenting, DM, HTN, HLD, obesity, sleep apnea and psoriasis who presented on 3/25/2019 with STEMI. Underwent PCI to RCA and is scheduled for CABG. We have been consulted regarding management of his Hep B therapy. Impression:  1.  Chronic Hepatitis B:  On Entecavir 1 mg daily. His last viral load 9/2018 was 1500 IU (from 4700 IU 2/2018). He has no s/s of confusion/encephalopathy. 2. Hepatic fibrosis:  Elastrography 11/2017 with F3 (severe) fibrosis. No prior liver bx so unknown if cirrhotic. Synthetic liver function appears intact- no jaundice or encephalopathy, coags and platelet count normal.  Alb 4.2.    3. STEMI  4. CAD:  CABG today    RECOMMENDATIONS:    Continue Entecavir 1 mg daily  Monitoring for s/s of liver decompensation    If you have any questions or need any further information, please feel free to contact us [phone]. Thank you for allowing us to participate in the care of Paulie Dawson. Tom WYNN    Attending physician addendum:    I have personally seen and examined the patient, reviewed the patient's medical record and pertinent labs and clinical imaging. I have personally staffed the case with Tom WYNN. I agree with her consultation note, exam findings, assessment and plans  as written above. I have made appropriate modifications and edited her assessment and plan where needed to reflect my impression and plans for this patient. Paulie Dawson is a 63 YO male with a PMH of Chronic Hep B, CAD with stenting, DM, HTN, HLD, obesity, sleep apnea and psoriasis who presented on 3/25/2019 with STEMI. Underwent PCI to RCA and is scheduled for CABG tomorrow. We have been consulted regarding management of his Hep B therapy. Patient has a history of chronic Hepatitis B on Entecavir. Followed by Dr. Allen Art. He has previously had a Fibroscan with F3 fibrosis in 11/17. Dr. Allen Art had recommended a biopsy at that time but patient refused due to cost so unknown if truly cirrhotic. Patient has no signs or symptoms of advanced liver disease or decompensated cirrhosis. Recommend continuing on Entecavir on a daily basis. Hep B PCR was low level when last checked in 9/18. Patient is scheduled for CABG today.  Will follow post-op. Thank you for allowing me to participate in this patient's care. If there are any questions or concerns regarding this patient, or the plan we have set in place, please feel free to contact me at [phone].      Hilary Willoughby MD

## 2019-03-26 NOTE — BRIEF OP NOTE
Date: 3/26/2019  Patient:  Guadalupe Calero    Brief Op Note    Pre-op Diagnosis:  cad    Post-op Diagnosis:  same    Procedure:    1. MELISSA  2. R greater saphenous EVH  3. Urgent cabgx5 (LIMA-LAD, YAI-H9-OA-OM1, SVG-PDA)  4. LAD endarterectomy  5. Sternal stabilization ([employer] SternaLock)    Surgeon: Katja Wray    Assistant(s):  Lisette Lopez    Anesthesia:  General    EBL: n/a    XC: 120        CPB:  147    Specimens:  LAD plaque - pathology    Findings:  preEF 27, postEF 39    Drains:  Left pleural, mediastinal    Drips:  Milrinone, norepi, epi    Complications:  none    Disposition: To CVU in stable condition    Post-Op Note:  Dictated.      Monica Ward MD  3/26/2019  2:47 PM

## 2019-03-26 NOTE — PLAN OF CARE
Problem: Pain:  Description  Pain management should include both nonpharmacologic and pharmacologic interventions. Goal: Pain level will decrease  Description  Pain level will decrease  Outcome: Met This Shift  Note:   Pt alert and oriented x4. Able to recognize and communicate pain appropriately. Pt has denied chest pain, SOB, indigestion, abdominal pain, and headache throughout shift. Will monitor. Goal: Control of acute pain  Description  Control of acute pain  Outcome: Met This Shift  Goal: Control of chronic pain  Description  Control of chronic pain  3/26/2019 0245 by Kelly Mathur RN  Outcome: Met This Shift  3/25/2019 2014 by Gage Bernard RN  Note:   Pt alert and oriented. Pt able to communicate present pain and use the pain scale appropriately. Nonpharmacological pain reducers and pain medication offered as needed. Will cont to monitor. Problem: Falls - Risk of:  Goal: Will remain free from falls  Description  Will remain free from falls  3/26/2019 0245 by Kelly Mathur RN  Outcome: Met This Shift  Note:   Pt free of falls this shift. Fall risk precautions in place. Bed in low and locked position. Nonskid footwear on at all times. [employer] dome light illuminated. Pt instructed to call for assistance, verbalizes understanding. Will monitor. 3/25/2019 2014 by Gage Bernard RN  Note:   Pt remains free of falls. Fall risk protocol in place. Bed locked in lowest position. Call light in reach. Pt instructed to call for assistance, verbalizes understanding. Will continue to monitor. Goal: Absence of physical injury  Description  Absence of physical injury  Outcome: Met This Shift     Problem: Preoperative Routine:  Description  WHO Universal Protocol. Consider the implementation of a low glycemic index diet prior to surgery. Consider the implementation of an infection control program.Consider the preoperative use of intra-aortic balloon pump counterpulsation.   Goal: Will comply with preparation for surgery or procedure  Description  Will comply with preparation for surgery or procedure  3/26/2019 0245 by Morelia Cross RN  Outcome: Completed  Note:   Pre-Op education completed with pt. Total of 45 minutes spent explaining lining room procedures, incisions, medications, lines/tubes, post-op care. All questions answered.   3/25/2019 2014 by Leonid Maya RN  Note:   Discussed typical pre-op routine, testing completed     Electronically signed by Morelia Cross RN on 3/26/2019 at 2:49 AM

## 2019-03-26 NOTE — PLAN OF CARE
Problem: Preoperative Routine:  Description  WHO Universal Protocol. Consider the implementation of a low glycemic index diet prior to surgery. Consider the implementation of an infection control program.Consider the preoperative use of intra-aortic balloon pump counterpulsation. Goal: Will comply with preparation for surgery or procedure  Note:   Discussed typical pre-op routine, testing completed     Problem: Pain:  Description  Pain management should include both nonpharmacologic and pharmacologic interventions. Goal: Control of chronic pain  Note:   Pt alert and oriented. Pt able to communicate present pain and use the pain scale appropriately. Nonpharmacological pain reducers and pain medication offered as needed. Will cont to monitor. Problem: Falls - Risk of:  Goal: Will remain free from falls  Note:   Pt remains free of falls. Fall risk protocol in place. Bed locked in lowest position. Call light in reach. Pt instructed to call for assistance, verbalizes understanding. Will continue to monitor.

## 2019-03-26 NOTE — PROGRESS NOTES
Hospitalist Progress Note      PCP: Yrn Walker MD    Date of Admission: 3/25/2019        Subjective: feels ok, no chest pain, nausea, vomiting or diarrhea, no blurry vision. Medications:  Reviewed    Infusion Medications    nitroGLYCERIN 20 mcg/min (03/25/19 1410)    dextrose      heparin (porcine) 10 mL/hr (03/25/19 2152)    lactated ringers 50 mL/hr at 03/26/19 0514     Scheduled Medications    lidocaine PF        GI cocktail   Oral Once    carvedilol  3.125 mg Oral BID WC    famotidine  20 mg Oral BID    atorvastatin  40 mg Oral Nightly    aspirin  325 mg Oral Daily    insulin lispro  0-12 Units Subcutaneous TID WC    insulin lispro  0-6 Units Subcutaneous Nightly    insulin glargine  25 Units Subcutaneous Nightly    ALPRAZolam  0.5 mg Oral TID    entecavir  1 mg Oral Daily    gabapentin  400 mg Oral TID    sodium chloride flush  10 mL Intravenous 2 times per day    vancomycin (VANCOCIN) IV  2,000 mg Intravenous On Call to OR    chlorhexidine   Topical BID    ceFAZolin (ANCEF) IVPB  3 g Intravenous On Call to OR     PRN Meds: sodium chloride flush, acetaminophen, magnesium hydroxide, ondansetron, glucose, dextrose, glucagon (rDNA), dextrose, nitroglycerin, sodium chloride flush, nitroGLYCERIN, calcium carbonate      Intake/Output Summary (Last 24 hours) at 3/26/2019 0718  Last data filed at 3/26/2019 0513  Gross per 24 hour   Intake 3481.38 ml   Output 3400 ml   Net 81.38 ml       Physical Exam Performed:    /73   Pulse 86   Temp 98.9 °F (37.2 °C) (Oral)   Resp 15   Ht 6' (1.829 m)   Wt 278 lb (126.1 kg)   SpO2 100%   BMI 37.70 kg/m²     General appearance: No apparent distress  Neck: Supple  Respiratory:  Normal respiratory effort. Clear to auscultation, bilaterally without Rales/Wheezes/Rhonchi. Cardiovascular: Regular rate and rhythm with normal S1/S2 without murmurs, rubs or gallops.   Abdomen: Soft, non-tender  Musculoskeletal: No clubbing  Skin: Skin color, texture, turgor normal.  No rashes or lesions. Neurologic:  No focal weakness   Psychiatric: Alert and oriented  Capillary Refill: Brisk,< 3 seconds         Labs:   Recent Labs     03/25/19  0050 03/26/19  0503   WBC 9.6 7.0   HGB 17.3 15.4   HCT 51.1 45.5    179     Recent Labs     03/25/19  0050 03/26/19  0503    139   K 4.4 4.1   CL 94* 101   CO2 24 25   BUN 19 16   CREATININE 0.8 0.7*   CALCIUM 9.8 8.7     Recent Labs     03/25/19  0050   AST 47*   ALT 68*   BILITOT 0.4   ALKPHOS 93     Recent Labs     03/25/19  0050   INR 0.93     Recent Labs     03/25/19  0050   TROPONINI 0.16*       Urinalysis:      Lab Results   Component Value Date    NITRU Negative 03/25/2019    WBCUA 16 03/25/2019    RBCUA 1 03/25/2019    BLOODU Negative 03/25/2019    SPECGRAV >1.030 03/25/2019    GLUCOSEU >=1000 03/25/2019       Radiology:  Vascular pre-op vein mapping         Vascular carotid duplex bilateral         XR CHEST PORTABLE   Final Result   No acute process. Assessment/Plan:    Active Hospital Problems    Diagnosis Date Noted    Acute inferior myocardial infarction [facility]) [I21.19] 03/25/2019    ST elevation myocardial infarction (STEMI) ([facility] [address] [I21.3]      1. STEMI, status post stenting, triple vessel disease for CABG today. 2.  Diabetes mellitus, patient on Lantus and metformin, hold metformin for now. 3.  Hepatitis B, resume treatment. 4.  Anxiety, on home Xanax, will resume same dose. 5.  Essential hypertension, by mouth medication.           Diet: Diet NPO Time Specified  Code Status: Full Code    Dispo - ICU    Germán Guo MD
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
## 2019-03-26 NOTE — PROCEDURES
[facility] Department of Anesthesiology  Procedure Note - Pulmonary Artery Catheter Insertion       Name:  Emily Tello                                         Age:  62 y.o. MRN:  [id number]     ALLERGIES: Buspar [buspirone] and Zoloft    Indication: Perioperative evaluation of cardiac function via cardiac output/index monitoring and evaluation of pulmonary artery and central venous pressures. Central administration of vasoactive medications. Time-Out: A time-out was completed verifying correct patient, procedure, site, positioning, and special equipment if applicable. Procedure: Patient supine. Landmarks identified. Sterile prep and drape. Lidocaine 1% skin wheal over right internal jugular vein approximately 4 cm cephalad of the clavicle. Ultrasound used to locate the IJ vein; an 18G needle was then directed in the same plane into the right internal jugular vein. A guidewire was then passed easily via the needle and the needle was removed. A 9Fr introducer was then passed easily over the wire and the wire and dilator introducer were then removed. The introducer was secured after verifying good blood return and injectability. A pulmonary artery catheter was then introduced and passed easily through the introducer; a pulmonary artery waveform was obtained and the catheter was secured at 50 cm after being withdrawn 2 cm once the catheter balloon was deflated. There were no apparent complications and the procedure was tolerated well. Terry Israel MD  March 26, 2019  7:29 AM      [facility] Department of Anesthesiology  Procedure Note - Arterial Line Placement       Name:  Emily Tello                                         Age:  62 y.o. MRN:  [id number]     ALLERGIES: Buspar [buspirone] and Zoloft    Indication: Perioperative hemodynamic monitoring and access.     Time-Out: A time-out was completed verifying correct patient, procedure, site, positioning, and special equipment if applicable. Procedure: Right brachial artery prepped with ChloraPrep and draped aseptically. 1% lidocaine SW. 20g hollow tip needle used at antecubital region under ultrasound guidance with sterile sleeve, then guide wire applied using Seldinger technique for placement of 5 inch angiocath. Line was secured in place and sterile dressing applied. An appropriate arterial waveform was seen on the monitor and the line withdrew bright red blood and flushed without difficulty.  Perfusion to the extremity distal to the point of catheter insertion was checked and found to be adequate    Complications: none      Dayo Georges MD  March 26, 2019  7:29 AM

## 2019-03-26 NOTE — PROGRESS NOTES
Pt verified information regarding surgery, name, birth date, surgeon, procedure and allergies: Buspar and Zoloft. Consent and labs reviewed. 2 units of RBC's on call to OR. Patient transferred to CV preop for surgery. Appropriate antibiotics ordered: Ancef 3g and Vancomycin 2g. Beta blocker: Coreg 3.125mng. DVT prophyaxis: Heparin drip stopped at 0828. Hair clipped, pt washed with 2% chlorhexidine gluconate skin prep and alcohol wipe down. Mepilex sacral border applied. Patient and family educated about surgery and pain management. Arterial line placed in right brachial at 0708 and TLC introducer in right internal jugular with swan at 0722 by Dr. Yandel Carrington. VSS. Tolerated well. To surgery per bed at 0828.

## 2019-03-27 LAB
ACTIVATED CLOTTING TIME: 100 SEC (ref 99–130)
ACTIVATED CLOTTING TIME: 104 SEC (ref 99–130)
ACTIVATED CLOTTING TIME: 483 SEC (ref 99–130)
ACTIVATED CLOTTING TIME: 493 SEC (ref 99–130)
ACTIVATED CLOTTING TIME: 501 SEC (ref 99–130)
ACTIVATED CLOTTING TIME: 512 SEC (ref 99–130)
ACTIVATED CLOTTING TIME: 521 SEC (ref 99–130)
ACTIVATED CLOTTING TIME: 540 SEC (ref 99–130)
ACTIVATED CLOTTING TIME: 803 SEC (ref 99–130)
ALBUMIN SERPL-MCNC: 3.5 G/DL (ref 3.4–5)
ALP BLD-CCNC: 34 U/L (ref 40–129)
ALT SERPL-CCNC: 31 U/L (ref 10–40)
ANION GAP SERPL CALCULATED.3IONS-SCNC: 10 MMOL/L (ref 3–16)
AST SERPL-CCNC: 87 U/L (ref 15–37)
BASE EXCESS ARTERIAL: -1 (ref -3–3)
BASE EXCESS ARTERIAL: -2 (ref -3–3)
BASE EXCESS ARTERIAL: -4 (ref -3–3)
BASE EXCESS ARTERIAL: -4 (ref -3–3)
BASE EXCESS ARTERIAL: -5 (ref -3–3)
BASE EXCESS ARTERIAL: -5 (ref -3–3)
BASE EXCESS ARTERIAL: 0 (ref -3–3)
BASE EXCESS ARTERIAL: 1 (ref -3–3)
BILIRUB SERPL-MCNC: 0.7 MG/DL (ref 0–1)
BILIRUBIN DIRECT: <0.2 MG/DL (ref 0–0.3)
BILIRUBIN, INDIRECT: ABNORMAL MG/DL (ref 0–1)
BLOOD BANK DISPENSE STATUS: NORMAL
BLOOD BANK PRODUCT CODE: NORMAL
BPU ID: NORMAL
BUN BLDV-MCNC: 9 MG/DL (ref 7–20)
CALCIUM IONIZED: 1.02 MMOL/L (ref 1.12–1.32)
CALCIUM IONIZED: 1.04 MMOL/L (ref 1.12–1.32)
CALCIUM IONIZED: 1.04 MMOL/L (ref 1.12–1.32)
CALCIUM IONIZED: 1.05 MMOL/L (ref 1.12–1.32)
CALCIUM IONIZED: 1.06 MMOL/L (ref 1.12–1.32)
CALCIUM IONIZED: 1.07 MMOL/L (ref 1.12–1.32)
CALCIUM IONIZED: 1.09 MMOL/L (ref 1.12–1.32)
CALCIUM IONIZED: 1.1 MMOL/L (ref 1.12–1.32)
CALCIUM IONIZED: 1.12 MMOL/L (ref 1.12–1.32)
CALCIUM IONIZED: 1.16 MMOL/L (ref 1.12–1.32)
CALCIUM IONIZED: 1.16 MMOL/L (ref 1.12–1.32)
CALCIUM IONIZED: 1.21 MMOL/L (ref 1.12–1.32)
CALCIUM SERPL-MCNC: 9 MG/DL (ref 8.3–10.6)
CHLORIDE BLD-SCNC: 103 MMOL/L (ref 99–110)
CO2: 26 MMOL/L (ref 21–32)
CREAT SERPL-MCNC: 0.9 MG/DL (ref 0.8–1.3)
DESCRIPTION BLOOD BANK: NORMAL
GFR AFRICAN AMERICAN: >60
GFR NON-AFRICAN AMERICAN: >60
GLUCOSE BLD-MCNC: 100 MG/DL (ref 70–99)
GLUCOSE BLD-MCNC: 101 MG/DL (ref 70–99)
GLUCOSE BLD-MCNC: 106 MG/DL (ref 70–99)
GLUCOSE BLD-MCNC: 112 MG/DL (ref 70–99)
GLUCOSE BLD-MCNC: 116 MG/DL (ref 70–99)
GLUCOSE BLD-MCNC: 128 MG/DL (ref 70–99)
GLUCOSE BLD-MCNC: 129 MG/DL (ref 70–99)
GLUCOSE BLD-MCNC: 132 MG/DL (ref 70–99)
GLUCOSE BLD-MCNC: 133 MG/DL (ref 70–99)
GLUCOSE BLD-MCNC: 134 MG/DL (ref 70–99)
GLUCOSE BLD-MCNC: 139 MG/DL (ref 70–99)
GLUCOSE BLD-MCNC: 145 MG/DL (ref 70–99)
GLUCOSE BLD-MCNC: 149 MG/DL (ref 70–99)
GLUCOSE BLD-MCNC: 149 MG/DL (ref 70–99)
GLUCOSE BLD-MCNC: 152 MG/DL (ref 70–99)
GLUCOSE BLD-MCNC: 153 MG/DL (ref 70–99)
GLUCOSE BLD-MCNC: 159 MG/DL (ref 70–99)
GLUCOSE BLD-MCNC: 162 MG/DL (ref 70–99)
GLUCOSE BLD-MCNC: 163 MG/DL (ref 70–99)
GLUCOSE BLD-MCNC: 164 MG/DL (ref 70–99)
GLUCOSE BLD-MCNC: 165 MG/DL (ref 70–99)
GLUCOSE BLD-MCNC: 170 MG/DL (ref 70–99)
GLUCOSE BLD-MCNC: 171 MG/DL (ref 70–99)
GLUCOSE BLD-MCNC: 173 MG/DL (ref 70–99)
GLUCOSE BLD-MCNC: 175 MG/DL (ref 70–99)
GLUCOSE BLD-MCNC: 175 MG/DL (ref 70–99)
GLUCOSE BLD-MCNC: 193 MG/DL (ref 70–99)
GLUCOSE BLD-MCNC: 194 MG/DL (ref 70–99)
GLUCOSE BLD-MCNC: 196 MG/DL (ref 70–99)
GLUCOSE BLD-MCNC: 196 MG/DL (ref 70–99)
GLUCOSE BLD-MCNC: 96 MG/DL (ref 70–99)
GLUCOSE BLD-MCNC: 99 MG/DL (ref 70–99)
HCO3 ARTERIAL: 20.8 MMOL/L (ref 21–29)
HCO3 ARTERIAL: 21.8 MMOL/L (ref 21–29)
HCO3 ARTERIAL: 22.4 MMOL/L (ref 21–29)
HCO3 ARTERIAL: 22.8 MMOL/L (ref 21–29)
HCO3 ARTERIAL: 22.8 MMOL/L (ref 21–29)
HCO3 ARTERIAL: 23.2 MMOL/L (ref 21–29)
HCO3 ARTERIAL: 23.4 MMOL/L (ref 21–29)
HCO3 ARTERIAL: 23.8 MMOL/L (ref 21–29)
HCO3 ARTERIAL: 24.6 MMOL/L (ref 21–29)
HCO3 ARTERIAL: 24.7 MMOL/L (ref 21–29)
HCO3 ARTERIAL: 25 MMOL/L (ref 21–29)
HCO3 ARTERIAL: 25.1 MMOL/L (ref 21–29)
HCO3 ARTERIAL: 25.3 MMOL/L (ref 21–29)
HCT VFR BLD CALC: 29.4 % (ref 40.5–52.5)
HEMOGLOBIN: 10.1 GM/DL (ref 13.5–17.5)
HEMOGLOBIN: 10.2 GM/DL (ref 13.5–17.5)
HEMOGLOBIN: 10.7 GM/DL (ref 13.5–17.5)
HEMOGLOBIN: 10.7 GM/DL (ref 13.5–17.5)
HEMOGLOBIN: 11 GM/DL (ref 13.5–17.5)
HEMOGLOBIN: 11.1 GM/DL (ref 13.5–17.5)
HEMOGLOBIN: 11.1 GM/DL (ref 13.5–17.5)
HEMOGLOBIN: 11.2 GM/DL (ref 13.5–17.5)
HEMOGLOBIN: 11.4 GM/DL (ref 13.5–17.5)
HEMOGLOBIN: 11.5 GM/DL (ref 13.5–17.5)
HEMOGLOBIN: 12.1 GM/DL (ref 13.5–17.5)
HEMOGLOBIN: 14.8 GM/DL (ref 13.5–17.5)
HEMOGLOBIN: 15.1 GM/DL (ref 13.5–17.5)
HEMOGLOBIN: 9.8 G/DL (ref 13.5–17.5)
INR BLD: 1.16 (ref 0.86–1.14)
LACTATE: 0.74 MMOL/L (ref 0.4–2)
LACTATE: 0.94 MMOL/L (ref 0.4–2)
LACTATE: 0.99 MMOL/L (ref 0.4–2)
LACTATE: 1.2 MMOL/L (ref 0.4–2)
LACTATE: 1.3 MMOL/L (ref 0.4–2)
LACTATE: 1.43 MMOL/L (ref 0.4–2)
LACTATE: 1.92 MMOL/L (ref 0.4–2)
LACTATE: 2.77 MMOL/L (ref 0.4–2)
LACTATE: 3.35 MMOL/L (ref 0.4–2)
LACTATE: 3.41 MMOL/L (ref 0.4–2)
LACTATE: 3.59 MMOL/L (ref 0.4–2)
LACTATE: 3.67 MMOL/L (ref 0.4–2)
LACTATE: 4.28 MMOL/L (ref 0.4–2)
MAGNESIUM: 2.2 MG/DL (ref 1.8–2.4)
MCH RBC QN AUTO: 32.5 PG (ref 26–34)
MCHC RBC AUTO-ENTMCNC: 33.4 G/DL (ref 31–36)
MCV RBC AUTO: 97.3 FL (ref 80–100)
MRSA CULTURE ONLY: NORMAL
O2 SAT, ARTERIAL: 100 % (ref 93–100)
O2 SAT, ARTERIAL: 95 % (ref 93–100)
O2 SAT, ARTERIAL: 96 % (ref 93–100)
O2 SAT, ARTERIAL: 98 % (ref 93–100)
O2 SAT, ARTERIAL: 99 % (ref 93–100)
PCO2 ARTERIAL: 35.8 MM HG (ref 35–45)
PCO2 ARTERIAL: 36.5 MM HG (ref 35–45)
PCO2 ARTERIAL: 37.2 MM HG (ref 35–45)
PCO2 ARTERIAL: 38.8 MM HG (ref 35–45)
PCO2 ARTERIAL: 39.8 MM HG (ref 35–45)
PCO2 ARTERIAL: 39.8 MM HG (ref 35–45)
PCO2 ARTERIAL: 40.5 MM HG (ref 35–45)
PCO2 ARTERIAL: 41.3 MM HG (ref 35–45)
PCO2 ARTERIAL: 42.9 MM HG (ref 35–45)
PCO2 ARTERIAL: 43.2 MM HG (ref 35–45)
PCO2 ARTERIAL: 43.3 MM HG (ref 35–45)
PCO2 ARTERIAL: 44.3 MM HG (ref 35–45)
PCO2 ARTERIAL: 47 MM HG (ref 35–45)
PDW BLD-RTO: 13.9 % (ref 12.4–15.4)
PERFORMED ON: ABNORMAL
PERFORMED ON: NORMAL
PERFORMED ON: NORMAL
PH ARTERIAL: 7.29 (ref 7.35–7.45)
PH ARTERIAL: 7.31 (ref 7.35–7.45)
PH ARTERIAL: 7.31 (ref 7.35–7.45)
PH ARTERIAL: 7.36 (ref 7.35–7.45)
PH ARTERIAL: 7.37 (ref 7.35–7.45)
PH ARTERIAL: 7.38 (ref 7.35–7.45)
PH ARTERIAL: 7.39 (ref 7.35–7.45)
PH ARTERIAL: 7.39 (ref 7.35–7.45)
PH ARTERIAL: 7.4 (ref 7.35–7.45)
PH ARTERIAL: 7.41 (ref 7.35–7.45)
PH ARTERIAL: 7.44 (ref 7.35–7.45)
PLATELET # BLD: 76 K/UL (ref 135–450)
PMV BLD AUTO: 8 FL (ref 5–10.5)
PO2 ARTERIAL: 129.7 MM HG (ref 75–108)
PO2 ARTERIAL: 306.1 MM HG (ref 75–108)
PO2 ARTERIAL: 314.9 MM HG (ref 75–108)
PO2 ARTERIAL: 327.8 MM HG (ref 75–108)
PO2 ARTERIAL: 333.7 MM HG (ref 75–108)
PO2 ARTERIAL: 353.7 MM HG (ref 75–108)
PO2 ARTERIAL: 398.3 MM HG (ref 75–108)
PO2 ARTERIAL: 412.3 MM HG (ref 75–108)
PO2 ARTERIAL: 449.1 MM HG (ref 75–108)
PO2 ARTERIAL: 488.1 MM HG (ref 75–108)
PO2 ARTERIAL: 81.9 MM HG (ref 75–108)
PO2 ARTERIAL: 84.9 MM HG (ref 75–108)
PO2 ARTERIAL: 98.1 MM HG (ref 75–108)
POC HEMATOCRIT: 30 % (ref 40.5–52.5)
POC HEMATOCRIT: 30 % (ref 40.5–52.5)
POC HEMATOCRIT: 31 % (ref 40.5–52.5)
POC HEMATOCRIT: 32 % (ref 40.5–52.5)
POC HEMATOCRIT: 32 % (ref 40.5–52.5)
POC HEMATOCRIT: 33 % (ref 40.5–52.5)
POC HEMATOCRIT: 34 % (ref 40.5–52.5)
POC HEMATOCRIT: 34 % (ref 40.5–52.5)
POC HEMATOCRIT: 36 % (ref 40.5–52.5)
POC HEMATOCRIT: 43 % (ref 40.5–52.5)
POC HEMATOCRIT: 44 % (ref 40.5–52.5)
POC PATIENT TEMP: 99
POC PATIENT TEMP: 99
POC POTASSIUM: 3.5 MMOL/L (ref 3.5–5.1)
POC POTASSIUM: 3.6 MMOL/L (ref 3.5–5.1)
POC POTASSIUM: 3.8 MMOL/L (ref 3.5–5.1)
POC POTASSIUM: 3.9 MMOL/L (ref 3.5–5.1)
POC POTASSIUM: 3.9 MMOL/L (ref 3.5–5.1)
POC POTASSIUM: 4 MMOL/L (ref 3.5–5.1)
POC POTASSIUM: 4.1 MMOL/L (ref 3.5–5.1)
POC POTASSIUM: 4.1 MMOL/L (ref 3.5–5.1)
POC POTASSIUM: 4.2 MMOL/L (ref 3.5–5.1)
POC POTASSIUM: 4.2 MMOL/L (ref 3.5–5.1)
POC POTASSIUM: 4.4 MMOL/L (ref 3.5–5.1)
POC POTASSIUM: 5 MMOL/L (ref 3.5–5.1)
POC SAMPLE TYPE: ABNORMAL
POC SODIUM: 136 MMOL/L (ref 136–145)
POC SODIUM: 137 MMOL/L (ref 136–145)
POC SODIUM: 138 MMOL/L (ref 136–145)
POC SODIUM: 140 MMOL/L (ref 136–145)
POC SODIUM: 141 MMOL/L (ref 136–145)
POC SODIUM: 142 MMOL/L (ref 136–145)
POC SODIUM: 144 MMOL/L (ref 136–145)
POC SODIUM: 144 MMOL/L (ref 136–145)
POC SODIUM: 146 MMOL/L (ref 136–145)
POTASSIUM SERPL-SCNC: 4.9 MMOL/L (ref 3.5–5.1)
PROTHROMBIN TIME: 13.2 SEC (ref 9.8–13)
RBC # BLD: 3.02 M/UL (ref 4.2–5.9)
SODIUM BLD-SCNC: 139 MMOL/L (ref 136–145)
TCO2 ARTERIAL: 22 MMOL/L
TCO2 ARTERIAL: 23 MMOL/L
TCO2 ARTERIAL: 24 MMOL/L
TCO2 ARTERIAL: 25 MMOL/L
TCO2 ARTERIAL: 26 MMOL/L
TCO2 ARTERIAL: 27 MMOL/L
TOTAL PROTEIN: 5 G/DL (ref 6.4–8.2)
WBC # BLD: 7.7 K/UL (ref 4–11)

## 2019-03-27 PROCEDURE — 99024 POSTOP FOLLOW-UP VISIT: CPT | Performed by: THORACIC SURGERY (CARDIOTHORACIC VASCULAR SURGERY)

## 2019-03-27 PROCEDURE — 2500000003 HC RX 250 WO HCPCS: Performed by: THORACIC SURGERY (CARDIOTHORACIC VASCULAR SURGERY)

## 2019-03-27 PROCEDURE — 6360000002 HC RX W HCPCS: Performed by: THORACIC SURGERY (CARDIOTHORACIC VASCULAR SURGERY)

## 2019-03-27 PROCEDURE — 84132 ASSAY OF SERUM POTASSIUM: CPT

## 2019-03-27 PROCEDURE — 94640 AIRWAY INHALATION TREATMENT: CPT

## 2019-03-27 PROCEDURE — 85027 COMPLETE CBC AUTOMATED: CPT

## 2019-03-27 PROCEDURE — 94762 N-INVAS EAR/PLS OXIMTRY CONT: CPT

## 2019-03-27 PROCEDURE — 6370000000 HC RX 637 (ALT 250 FOR IP): Performed by: NURSE PRACTITIONER

## 2019-03-27 PROCEDURE — 97530 THERAPEUTIC ACTIVITIES: CPT

## 2019-03-27 PROCEDURE — 2100000000 HC CCU R&B

## 2019-03-27 PROCEDURE — 82947 ASSAY GLUCOSE BLOOD QUANT: CPT

## 2019-03-27 PROCEDURE — 6370000000 HC RX 637 (ALT 250 FOR IP): Performed by: THORACIC SURGERY (CARDIOTHORACIC VASCULAR SURGERY)

## 2019-03-27 PROCEDURE — 85610 PROTHROMBIN TIME: CPT

## 2019-03-27 PROCEDURE — 2700000000 HC OXYGEN THERAPY PER DAY

## 2019-03-27 PROCEDURE — 97162 PT EVAL MOD COMPLEX 30 MIN: CPT

## 2019-03-27 PROCEDURE — 80048 BASIC METABOLIC PNL TOTAL CA: CPT

## 2019-03-27 PROCEDURE — 97116 GAIT TRAINING THERAPY: CPT

## 2019-03-27 PROCEDURE — 83735 ASSAY OF MAGNESIUM: CPT

## 2019-03-27 PROCEDURE — 37799 UNLISTED PX VASCULAR SURGERY: CPT

## 2019-03-27 PROCEDURE — 2580000003 HC RX 258: Performed by: THORACIC SURGERY (CARDIOTHORACIC VASCULAR SURGERY)

## 2019-03-27 PROCEDURE — 82330 ASSAY OF CALCIUM: CPT

## 2019-03-27 PROCEDURE — 6360000002 HC RX W HCPCS: Performed by: NURSE PRACTITIONER

## 2019-03-27 PROCEDURE — 80076 HEPATIC FUNCTION PANEL: CPT

## 2019-03-27 RX ORDER — TRAMADOL HYDROCHLORIDE 50 MG/1
50 TABLET ORAL EVERY 6 HOURS
Status: DISCONTINUED | OUTPATIENT
Start: 2019-03-27 | End: 2019-04-03 | Stop reason: HOSPADM

## 2019-03-27 RX ORDER — CARVEDILOL 3.12 MG/1
3.12 TABLET ORAL 2 TIMES DAILY WITH MEALS
Status: DISCONTINUED | OUTPATIENT
Start: 2019-03-27 | End: 2019-03-30

## 2019-03-27 RX ORDER — FUROSEMIDE 10 MG/ML
40 INJECTION INTRAMUSCULAR; INTRAVENOUS 2 TIMES DAILY
Status: DISCONTINUED | OUTPATIENT
Start: 2019-03-27 | End: 2019-03-30

## 2019-03-27 RX ORDER — ALPRAZOLAM 0.5 MG/1
0.5 TABLET ORAL 3 TIMES DAILY PRN
Status: DISCONTINUED | OUTPATIENT
Start: 2019-03-27 | End: 2019-04-03 | Stop reason: HOSPADM

## 2019-03-27 RX ORDER — PANTOPRAZOLE SODIUM 40 MG/1
40 TABLET, DELAYED RELEASE ORAL
Status: DISCONTINUED | OUTPATIENT
Start: 2019-03-28 | End: 2019-04-03 | Stop reason: HOSPADM

## 2019-03-27 RX ADMIN — GABAPENTIN 400 MG: 400 CAPSULE ORAL at 22:19

## 2019-03-27 RX ADMIN — VANCOMYCIN HYDROCHLORIDE 2000 MG: 1 INJECTION, POWDER, LYOPHILIZED, FOR SOLUTION INTRAVENOUS at 08:01

## 2019-03-27 RX ADMIN — TRAMADOL HYDROCHLORIDE 50 MG: 50 TABLET ORAL at 15:25

## 2019-03-27 RX ADMIN — Medication 10 ML: at 22:19

## 2019-03-27 RX ADMIN — CHLORHEXIDINE GLUCONATE 0.12% ORAL RINSE 15 ML: 1.2 LIQUID ORAL at 22:18

## 2019-03-27 RX ADMIN — OXYCODONE HYDROCHLORIDE 10 MG: 10 TABLET ORAL at 13:22

## 2019-03-27 RX ADMIN — DOCUSATE SODIUM 100 MG: 100 CAPSULE, LIQUID FILLED ORAL at 07:53

## 2019-03-27 RX ADMIN — FENTANYL CITRATE 25 MCG: 50 INJECTION INTRAMUSCULAR; INTRAVENOUS at 20:10

## 2019-03-27 RX ADMIN — Medication 400 MG: at 07:53

## 2019-03-27 RX ADMIN — FENTANYL CITRATE 25 MCG: 50 INJECTION INTRAMUSCULAR; INTRAVENOUS at 14:36

## 2019-03-27 RX ADMIN — FENTANYL CITRATE 25 MCG: 50 INJECTION INTRAMUSCULAR; INTRAVENOUS at 09:05

## 2019-03-27 RX ADMIN — FUROSEMIDE 40 MG: 10 INJECTION, SOLUTION INTRAMUSCULAR; INTRAVENOUS at 12:13

## 2019-03-27 RX ADMIN — IPRATROPIUM BROMIDE AND ALBUTEROL SULFATE 1 AMPULE: .5; 3 SOLUTION RESPIRATORY (INHALATION) at 20:00

## 2019-03-27 RX ADMIN — POTASSIUM CHLORIDE 20 MEQ: 29.8 INJECTION, SOLUTION INTRAVENOUS at 00:21

## 2019-03-27 RX ADMIN — FENTANYL CITRATE 25 MCG: 50 INJECTION INTRAMUSCULAR; INTRAVENOUS at 13:22

## 2019-03-27 RX ADMIN — MUPIROCIN: 20 OINTMENT TOPICAL at 08:00

## 2019-03-27 RX ADMIN — ENTECAVIR 1 MG: 1 TABLET ORAL at 07:54

## 2019-03-27 RX ADMIN — INSULIN GLARGINE 19 UNITS: 100 INJECTION, SOLUTION SUBCUTANEOUS at 22:26

## 2019-03-27 RX ADMIN — GABAPENTIN 400 MG: 400 CAPSULE ORAL at 13:22

## 2019-03-27 RX ADMIN — IPRATROPIUM BROMIDE AND ALBUTEROL SULFATE 1 AMPULE: .5; 3 SOLUTION RESPIRATORY (INHALATION) at 08:09

## 2019-03-27 RX ADMIN — POTASSIUM CHLORIDE 20 MEQ: 29.8 INJECTION, SOLUTION INTRAVENOUS at 03:08

## 2019-03-27 RX ADMIN — FENTANYL CITRATE 25 MCG: 50 INJECTION INTRAMUSCULAR; INTRAVENOUS at 17:49

## 2019-03-27 RX ADMIN — IPRATROPIUM BROMIDE AND ALBUTEROL SULFATE 1 AMPULE: .5; 3 SOLUTION RESPIRATORY (INHALATION) at 12:23

## 2019-03-27 RX ADMIN — SODIUM CHLORIDE 3.3 UNITS/HR: 9 INJECTION, SOLUTION INTRAVENOUS at 06:57

## 2019-03-27 RX ADMIN — Medication 3 G: at 23:04

## 2019-03-27 RX ADMIN — OXYCODONE HYDROCHLORIDE 10 MG: 10 TABLET ORAL at 07:54

## 2019-03-27 RX ADMIN — ALPRAZOLAM 0.5 MG: 0.5 TABLET ORAL at 08:42

## 2019-03-27 RX ADMIN — Medication 3 G: at 06:10

## 2019-03-27 RX ADMIN — FENTANYL CITRATE 25 MCG: 50 INJECTION INTRAMUSCULAR; INTRAVENOUS at 07:54

## 2019-03-27 RX ADMIN — ALPRAZOLAM 0.5 MG: 0.5 TABLET ORAL at 22:22

## 2019-03-27 RX ADMIN — Medication 3 G: at 14:13

## 2019-03-27 RX ADMIN — MUPIROCIN: 20 OINTMENT TOPICAL at 22:20

## 2019-03-27 RX ADMIN — FENTANYL CITRATE 25 MCG: 50 INJECTION INTRAMUSCULAR; INTRAVENOUS at 11:21

## 2019-03-27 RX ADMIN — CALCIUM CHLORIDE 1 G: 100 INJECTION, SOLUTION INTRAVENOUS; INTRAVENTRICULAR at 01:42

## 2019-03-27 RX ADMIN — GABAPENTIN 400 MG: 400 CAPSULE ORAL at 07:54

## 2019-03-27 RX ADMIN — IPRATROPIUM BROMIDE AND ALBUTEROL SULFATE 1 AMPULE: .5; 3 SOLUTION RESPIRATORY (INHALATION) at 16:38

## 2019-03-27 RX ADMIN — Medication 400 MG: at 22:19

## 2019-03-27 RX ADMIN — OXYCODONE HYDROCHLORIDE 10 MG: 10 TABLET ORAL at 03:58

## 2019-03-27 RX ADMIN — KETOROLAC TROMETHAMINE 15 MG: 15 INJECTION, SOLUTION INTRAMUSCULAR; INTRAVENOUS at 01:44

## 2019-03-27 RX ADMIN — TRAMADOL HYDROCHLORIDE 50 MG: 50 TABLET ORAL at 10:01

## 2019-03-27 RX ADMIN — FAMOTIDINE 20 MG: 20 TABLET ORAL at 07:54

## 2019-03-27 RX ADMIN — TRAMADOL HYDROCHLORIDE 50 MG: 50 TABLET ORAL at 22:15

## 2019-03-27 RX ADMIN — DOCUSATE SODIUM 100 MG: 100 CAPSULE, LIQUID FILLED ORAL at 22:18

## 2019-03-27 ASSESSMENT — PAIN DESCRIPTION - DESCRIPTORS
DESCRIPTORS: ACHING;PRESSURE
DESCRIPTORS: ACHING
DESCRIPTORS: ACHING;PRESSURE
DESCRIPTORS: ACHING

## 2019-03-27 ASSESSMENT — PAIN DESCRIPTION - PAIN TYPE
TYPE: SURGICAL PAIN

## 2019-03-27 ASSESSMENT — PAIN DESCRIPTION - FREQUENCY
FREQUENCY: CONTINUOUS

## 2019-03-27 ASSESSMENT — PAIN SCALES - GENERAL
PAINLEVEL_OUTOF10: 9
PAINLEVEL_OUTOF10: 4
PAINLEVEL_OUTOF10: 5
PAINLEVEL_OUTOF10: 8
PAINLEVEL_OUTOF10: 5
PAINLEVEL_OUTOF10: 5
PAINLEVEL_OUTOF10: 8
PAINLEVEL_OUTOF10: 9
PAINLEVEL_OUTOF10: 10
PAINLEVEL_OUTOF10: 7
PAINLEVEL_OUTOF10: 8
PAINLEVEL_OUTOF10: 10
PAINLEVEL_OUTOF10: 10
PAINLEVEL_OUTOF10: 5
PAINLEVEL_OUTOF10: 8
PAINLEVEL_OUTOF10: 3
PAINLEVEL_OUTOF10: 8
PAINLEVEL_OUTOF10: 6
PAINLEVEL_OUTOF10: 8
PAINLEVEL_OUTOF10: 5
PAINLEVEL_OUTOF10: 7
PAINLEVEL_OUTOF10: 8

## 2019-03-27 ASSESSMENT — PAIN DESCRIPTION - LOCATION
LOCATION: STERNUM

## 2019-03-27 ASSESSMENT — PAIN DESCRIPTION - PROGRESSION
CLINICAL_PROGRESSION: NOT CHANGED

## 2019-03-27 ASSESSMENT — PAIN DESCRIPTION - ONSET
ONSET: GRADUAL

## 2019-03-27 ASSESSMENT — PAIN DESCRIPTION - ORIENTATION
ORIENTATION: ANTERIOR;MID
ORIENTATION: MID
ORIENTATION: MID
ORIENTATION: ANTERIOR;MID
ORIENTATION: MID

## 2019-03-27 ASSESSMENT — PAIN - FUNCTIONAL ASSESSMENT
PAIN_FUNCTIONAL_ASSESSMENT: ACTIVITIES ARE NOT PREVENTED

## 2019-03-27 NOTE — PROGRESS NOTES
1506- Patient admitted to CVU from [place] [address] and attached to ventilator and monitors. Report received from anesthesiologist.  Chest x-ray ordered. Labs drawn and sent. Assessment complete. Hemodymanics stable and will continue to monitor, epi/levo/milrinone drips infusing. Chest tubes with sanguinous drainage. 1525- patient awake, fighting ventilator, attempting to pull at ETT. ABG and xray reviewed. Ok per Dr Rajendra Collins to extubate. Patient suctioned and extubated to NRB mask. Tolerated well. O2 saturations stable. Will monitor. 1545- patient with complaints of intense pain in chest and bilateral arms. Medicated with fentanyl per PRN order. 1600- patient continues with intense pain. Ok per Dr Latha Dey to begin toradol IVP as unable to give ofirmev. Will monitor chest tube drainage closely for bleeding. Ok to begin weaning milrinone 1 mL every 2 hours as tolerated. Patient with continued requests for water. Oral care provided. Will begin giving ice chips. Discussed rationale for monitoring for tolerance of clear liquids, patient agreeable. 1700- patient states pain slightly improved, tolerating sips of water. Will continue to medicate for pain as required. Discussed pain control options with patient. Vital signs stable. Continue to wean pressors and inotropic agents as able. 200- Dr Latha Dey at bedside for eval. Discussed weaning of drips, recent ABG/labs. No new orders at this time, will continue with current plan. Patient without needs at this time. 1915- Report given to PRABHAKAR Arciniega to assume care.

## 2019-03-27 NOTE — CONSULTS
Nutrition Assessment    Type and Reason for Visit: Initial, Consult(per heart surgery protocol)    Nutrition Recommendations: Add chocolate Glucerna bid to start  Encouraged pt to contact Dietitian should any questions arise regarding diet    Nutrition Assessment: Pt at risk for nutrition compromise r/t increased needs, altered labs, decreased po post op r/t surgery, cardiac & endocrine dysfunction, surgical pain adversly affecting po. Diet advanced to cardiac / 2000 ml per day. With decreased intake at this time, pt agreed to adding chocolate Glucerna bid to start. Malnutrition Assessment:  · Malnutrition Status: No malnutrition    Nutrition Risk Level: Moderate    Nutrient Needs:  · Estimated Daily Total Kcal: 9890-3782 (8-15 x ABW of 137 kg)  · Estimated Daily Protein (g): 162 (2 x IBW of 81 kg)  · Estimated Daily Total Fluid (ml/day): 2000 per MD    Nutrition Diagnosis:   · Problem: Increased nutrient needs  · Etiology: related to Increased demand for energy/nutrients     Signs and symptoms:  as evidenced by Presence of wounds    Objective Information:  · Nutrition-Focused Physical Findings: BM 3/25. Colace on board. Pt +1 liters. Noted +1 generalized edema.   · Wound Type: Surgical Wound  · Current Nutrition Therapies:  · Oral Diet Orders: Cardiac, Fluid Restriction   · Oral Diet intake: 1-25%(% pre op)  · Anthropometric Measures:  · Ht: 6' (182.9 cm)   · Current Body Wt: 302 lb (137 kg)  · Admission Body Wt: 289 lb (131.1 kg)  · Ideal Body Wt: 178 lb (80.7 kg), % Ideal Body    · BMI Classification: BMI > or equal to 40.0 Obese Class III    Nutrition Interventions:   Continue current diet, Start ONS  Continued Inpatient Monitoring, Education Completed    Nutrition Evaluation:   · Evaluation: Goals set   · Goals: tolerate most appropriate form of nutrition    · Monitoring: Meal Intake, Supplement Intake, Diet Tolerance, Wound Healing, Weight, Pertinent Labs, Diarrhea, Constipation      Electronically signed by Asael Stern RD, NICKY on 3/27/19 at 11:58 AM    Contact Number: [phone]

## 2019-03-27 NOTE — PROGRESS NOTES
0730- Report received from Demian, patient up in chair. Vital signs stable off pressors. Noted to have complaints of pain, discussed typical pain after surgery. Chest tubes in place, sanguinous drainage noted. Discussed plan of care with patient, questions answered. 0930- Dr Talbert Presume at beside for eval, new orders received. Will turn off milrinone now, transition around 1100. Discussed pain control issues, will add ultram as toradol is discontinued. 1030- Patient without needs at this time. Report given to PRABHAKAR Álvarez to assume care.

## 2019-03-27 NOTE — PROGRESS NOTES
INPATIENT CONSULTATION:    IDENTIFYING DATA/REASON FOR CONSULTATION   PATIENT:  Aniya Sommer  MRN:  [id number]  ADMIT DATE: 3/25/2019  TIME OF EVALUATION: 3/27/2019 6:19 AM  HOSPITAL STAY:   LOS: 2 days   CONSULTING PHYSICIAN:  REASON FOR CONSULTATION:    Subjective:    Patient sitting in chair. Patient reports pain in chest and arms but otherwise feeling well.      MEDICATIONS   SCHEDULED:    sodium chloride flush 10 mL 2 times per day   ceFAZolin (ANCEF) IVPB 3 g Q8H   vancomycin (VANCOCIN) IV 2,000 mg Q12H   docusate sodium 100 mg BID   famotidine 20 mg BID   famotidine (PEPCID) injection 20 mg BID   lisinopril 2.5 mg Lunch   chlorhexidine 15 mL BID   furosemide 40 mg BID   magnesium oxide 400 mg BID   mupirocin  BID   nitroGLYCERIN 1 patch Daily   [START ON 3/28/2019] potassium chloride 10 mEq TID WC   fondaparinux 2.5 mg Daily   aspirin 325 mg Daily   insulin glargine 0.15 Units/kg Nightly   [START ON 3/28/2019] insulin lispro 0-12 Units TID WC   [START ON 3/28/2019] insulin lispro 0-6 Units Nightly   carvedilol 3.125 mg BID   ipratropium-albuterol 1 ampule Q4H WA   ketorolac 15 mg Q6H   entecavir 1 mg Daily   gabapentin 400 mg TID     FLUIDS/DRIPS:     sodium chloride 50 mL/hr at 03/26/19 1530    sodium chloride Stopped (03/26/19 1948)    nitroGLYCERIN      niCARdipine      insulin (HUMAN R) non-weight based infusion 3.12 Units/hr (03/27/19 0604)    dextrose      norepinephrine Stopped (03/27/19 0308)    EPINEPHrine infusion Stopped (03/26/19 2226)    milrinone 0.1 mcg/kg/min (03/27/19 0600)     PRNs:   sodium chloride flush 10 mL PRN   potassium chloride 20 mEq PRN   magnesium sulfate 2 g PRN   calcium chloride IVPB 1 g PRN   calcium chloride IVPB 2 g PRN   acetaminophen 650 mg Q4H PRN   oxyCODONE 5 mg Q4H PRN   Or     oxyCODONE 10 mg Q4H PRN   fentanNYL 25 mcg Q1H PRN   midazolam 1 mg Q1H PRN   diphenhydrAMINE 25 mg Nightly PRN   ondansetron 4 mg Q8H PRN   metoclopramide 10 mg Q6H PRN   hydrALAZINE 5 mg Q1H PRN   albumin human 25 g PRN   sodium chloride 500 mL Continuous PRN   furosemide 40 mg PRN   nitroGLYCERIN 10 mcg/min Continuous PRN   niCARdipine 5 mg/hr Continuous PRN   glucose 15 g PRN   dextrose 12.5 g PRN   glucagon (rDNA) 1 mg PRN   dextrose 100 mL/hr PRN   norepinephrine 2 mcg/min Continuous PRN   EPINEPHrine infusion 1 mcg/min Continuous PRN   milrinone 0.375 mcg/kg/min (Order-Specific) Continuous PRN   ipratropium-albuterol 1 ampule Q4H PRN     ALLERGIES:  He [unfilled]      PHYSICAL EXAM   [unfilled]   I/O last 3 completed shifts: In: 3935.1 [P.O.:700; I.V.:588.7; Blood:1050;  Other:96.4; IV Piggyback:1500]  Out: 3121 [Urine:3085; Chest Tube:321]  Oxygen Therapy:  @[email or web address]([id number])@  @[email or web address]([id number])@  @[id number])@    Physical Exam:  Gen: Resting in bed, NAD   CV: RRR no MRG   Pul: CTAB   Abd: Good bowel sounds throughout, no scars, soft, NT/ND, no masses, no HSM   Ext: Trace edema   Neuro: No asterixis   Skin: No jaundice, spider angiomas, dickson erythema     LABS AND IMAGING     Recent Results (from the past 24 hour(s))   CBC    Collection Time: 03/26/19  3:15 PM   Result Value Ref Range    WBC 17.0 (H) 4.0 - 11.0 K/uL    RBC 3.46 (L) 4.20 - 5.90 M/uL    Hemoglobin 11.4 (L) 13.5 - 17.5 g/dL    Hematocrit 34.0 (L) 40.5 - 52.5 %    MCV 98.3 80.0 - 100.0 fL    MCH 32.8 26.0 - 34.0 pg    MCHC 33.4 31.0 - 36.0 g/dL    RDW 13.6 12.4 - 15.4 %    Platelets 683 (L) 695 - 450 K/uL    MPV 7.9 5.0 - 10.5 fL   Protime-INR    Collection Time: 03/26/19  3:15 PM   Result Value Ref Range    Protime 14.1 (H) 9.8 - 13.0 sec    INR 1.24 (H) 0.86 - 1.14   APTT    Collection Time: 03/26/19  3:15 PM   Result Value Ref Range    aPTT 35.5 26.0 - 36.0 sec   Magnesium    Collection Time: 03/26/19  3:15 PM   Result Value Ref Range    Magnesium 2.30 1.80 - 2.40 mg/dL   Basic Metabolic Panel    Collection Time: 03/26/19  3:15 PM   Result Value Ref Range    Sodium 142 136 - 145 mmol/L
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
## 2019-03-27 NOTE — PROGRESS NOTES
Hospitalist Progress Note      PCP: Clau Martinez MD    Date of Admission: 3/25/2019        Subjective: up to chair, feels ok, some chest wall soreness, no abdominal pain, no palpitation, no blurry or double vision.         Medications:  Reviewed    Infusion Medications    sodium chloride 50 mL/hr at 03/26/19 1530    sodium chloride Stopped (03/26/19 1948)    nitroGLYCERIN      niCARdipine      insulin (HUMAN R) non-weight based infusion 3.3 Units/hr (03/27/19 0657)    dextrose      norepinephrine Stopped (03/27/19 0308)    EPINEPHrine infusion Stopped (03/26/19 2226)    milrinone 0.1 mcg/kg/min (03/27/19 0600)     Scheduled Medications    sodium chloride flush  10 mL Intravenous 2 times per day    ceFAZolin (ANCEF) IVPB  3 g Intravenous Q8H    vancomycin (VANCOCIN) IV  2,000 mg Intravenous Q12H    docusate sodium  100 mg Oral BID    famotidine  20 mg Oral BID    famotidine (PEPCID) injection  20 mg Intravenous BID    lisinopril  2.5 mg Oral Lunch    chlorhexidine  15 mL Mouth/Throat BID    furosemide  40 mg Intravenous BID    magnesium oxide  400 mg Oral BID    mupirocin   Nasal BID    nitroGLYCERIN  1 patch Transdermal Daily    [START ON 3/28/2019] potassium chloride  10 mEq Oral TID WC    fondaparinux  2.5 mg Subcutaneous Daily    aspirin  325 mg Oral Daily    insulin glargine  0.15 Units/kg Subcutaneous Nightly    [START ON 3/28/2019] insulin lispro  0-12 Units Subcutaneous TID WC    [START ON 3/28/2019] insulin lispro  0-6 Units Subcutaneous Nightly    carvedilol  3.125 mg Oral BID    ipratropium-albuterol  1 ampule Inhalation Q4H WA    ketorolac  15 mg Intravenous Q6H    entecavir  1 mg Oral Daily    gabapentin  400 mg Oral TID     PRN Meds: sodium chloride flush, potassium chloride, magnesium sulfate, calcium chloride IVPB, calcium chloride IVPB, acetaminophen, oxyCODONE **OR** oxyCODONE, fentanNYL, midazolam, diphenhydrAMINE, ondansetron, metoclopramide, hydrALAZINE, Radiology:  XR CHEST PORTABLE   Final Result   Status post median sternotomy with support apparatus, as described above. Low lung volume study with mild asymmetric left perihilar edema. Vascular pre-op vein mapping   Final Result      Vascular carotid duplex bilateral   Final Result      XR CHEST PORTABLE   Final Result   No acute process. Assessment/Plan:    Active Hospital Problems    Diagnosis Date Noted    S/P CABG x 5 [Z95.1]     Acute inferior myocardial infarction [facility]) [I21.19] 03/25/2019    ST elevation myocardial infarction (STEMI) ([facility] [address] [I21.3]    1.  STEMI, status post stenting, S/P CABG, postop day 1. Doing fine, low grade fever. 2.  Diabetes mellitus, patient on Lantus and metformin, metformin on hold for now. 3.  Hepatitis B, resume treatment. 4.  Anxiety, on home Xanax, will resume same dose. 5.  Essential hypertension, by mouth medication. 6. Anemia, expected    Discussed CTS team, will sign off for now, please call with any question.            Diet: DIET CARDIAC; Daily Fluid Restriction: 2000 ml  Code Status: Full Code        Sylvia Jerez MD

## 2019-03-27 NOTE — OP NOTE
[address] [address]                                OPERATIVE REPORT    PATIENT NAME: Bar Almaguer                      :        1955  MED REC NO:   [id number]                          ROOM:       1310  ACCOUNT NO:   [de-identified]                           ADMIT DATE: 2019  PROVIDER:     Lori Curran MD      DATE OF PROCEDURE:  2019    PREOPERATIVE DIAGNOSIS:  Coronary artery disease. POSTOPERATIVE DIAGNOSIS:  Coronary artery disease. OPERATION PERFORMED:  1.  MELISSA. 2.  Right greater saphenous endoscopic vein harvesting. 3.  Urgent coronary artery bypass grafting x5 (LIMA to LAD, SVG  sequentially to D1, ramus intermedius and OM1, SVG to PDA). 4.  LAD endarterectomy. 5.  Sternal stabilization ([employer] SternaLock). SURGEON:  Lori Curran MD    ASSISTANTS:  Flo Fitzgerald SA; Paulie Arguelles    ANESTHESIA:  General.    INDICATIONS:  The patient is a 81-year-old with history of coronary  artery disease, status post PCI and stenting to the mid and distal LAD  as well as mid and distal RCA in ; PDA the following year, and  circumflex, LAD and diagonal in . The patient presented with three  days of mid epigastric discomfort comparable to that which he had  experienced with prior myocardial infarctions. He ruled in for an  ST-elevation myocardial infarction and underwent cardiac catheterization  demonstrating diffuse three-vessel coronary artery disease. PCI to the  mid and distal RCA was performed. The patient presents today for urgent  coronary artery bypass grafting. He is aware of the risks and possible  complications of the procedure and wishes to proceed. OPERATIVE FINDINGS:  The preprocedural MELISSA demonstrated an ejection  fraction roughly 30%. There was sclerosis of the aortic valve, which  was trileaflet and was in keeping with roughly moderate stenosis.    Saphenous vein conduit was taken from the right leg and was of good  caliber and quality. The left internal mammary artery was a good size  vessel with excellent flow. The bones were noted to be extremely  brittle even in retracting the sternum manually. The manubrium  fractured on the left side. There was also disarticulation of the  costochondral junction of the third rib, which was reduced. The  coronary targets were heavily and diffusely diseased. The posterior  descending artery was grafted in the only available soft spot. It was  about 1.5 mm in diameter. The LAD required endarterectomy. The first  diagonal vessel was about 1.5 to 2 mm in diameter. The ramus  intermedius branch was grafted just as it became intramyocardial and was  about 2.5 mm in diameter. The first obtuse marginal branch was about 2  mm in diameter and was grafted fairly distally beyond visible and  palpable plaque. The completion MELISSA demonstrated some improvement in  the ejection fraction to 45%. There was no valvular insufficiency. OPERATIVE PROCEDURE:  The patient was brought to the operating room and  placed supine on the operating table. General anesthesia was induced  without complication. The patient was prepped and draped in the usual  sterile fashion. After assuring that preoperative antibiotics had been  given, the right greater saphenous vein was harvested endoscopically. The wounds were closed with 0-Vicryl and 2-0 Vicryl suture and 4-0  Monocryl running subcuticular closure. Simultaneously, a median  sternotomy was performed. The left internal mammary artery was  dissected off the anterior chest wall. It was placed in a  papaverine-soaked sponge. The pericardium was opened and the cradle  raised. 3-0 Boston-Raul ascending aortic cannulation stitches were placed  followed by a 2-0 Ethibond atrial cannulation stitch.   After assuring an  adequate ACT, a Soft-Flow aortic cannula was introduced followed by a  dual-stage venous cannula. A retrograde cardioplegia cannula was placed  into the coronary sinus. After preparing the procured conduit, the patient was placed on  cardiopulmonary bypass, and the temperature cooled to 33 degrees. An  ascending aortic cardioplegia root vent was inserted. An aortic  cross-clamp was applied along with topical iced saline. 500 mL of cold  blood antegrade cardioplegia was given with prompt arrest.  This was  followed by an equivalent amount of retrograde cardioplegia. Attention was turned to the lateral wall, where the obtuse marginal  branch was dissected out and opened longitudinally. The saphenous vein  conduit was reversed and anastomosed to it in an end-to-side fashion. A  side-to-side transverse anastomosis was constructed between the same  segment of saphenous vein and the ramus intermedius branch. An  additional dose of retrograde cardioplegia was given. An additional  side-to-side transverse anastomosis was constructed between the same  segment of saphenous vein and the first diagonal vessel. The proximal  anastomosis was then performed to the left side of the ascending aorta  and an additional dose of retrograde cardioplegia was given. After excluding a segment of weepy vein, posterior descending artery was  dissected out and opened longitudinally. The remaining saphenous vein  conduit was reversed and anastomosed to it in an end-to-side fashion. The proximal anastomosis was performed to the right side of the  ascending aorta and an additional dose of retrograde cardioplegia given. The left internal mammary artery was brought through the lateral  pericardium and trimmed and spatulated appropriately. It was  anastomosed to the distal left anterior descending artery in an  end-to-side fashion following 3-cm endarterectomy. The left internal  mammary artery was widely spatulated to act as a patch angioplasty. The  patient was systemically rewarmed.   A hot shot of cardioplegia was given  in a retrograde fashion. The aortic cross-clamp was removed and there  was spontaneous return of sinus rhythm. After an appropriate recovery period, the patient was slowly weaned from  cardiopulmonary bypass on milrinone and norepinephrine drips. Epinephrine was also required. Protamine sulfate was administered  without complication and all cannulas were removed. Cannulation sites  were oversewn with 3-0 Prolene suture. After assuring adequate  hemostasis, left pleural and mediastinal chest tubes were placed in  addition to a ventricular pacing wire. Platelet-poor gel was applied to the mammary bed. It was ensured that  the costochondral cartilage was properly reduced. The pericardium was  closed. The sternum was approximated with #7 sternal wires after  application of platelet-rich gel and FloSeal to the oozing bony edges. The wound was irrigated with warm antibiotic solution. Each plate was  affixed to the manubrium with 14-mm screws. Two L-plates were affixed  to the body of the sternum with 12-mm screws. The wound was irrigated  again with warm antibiotic solution and closed in layers with 0-Vicryl  and 2-0 Vicryl sutures and 4-0 Monocryl running subcuticular closure. Sterile dressings were applied. The patient tolerated the procedure well without any complications. Cross-clamp time was 120 minutes. Cardiopulmonary bypass time was 147  minutes. The patient was transferred intubated in stable condition on  milrinone, norepinephrine, and epinephrine drips to the cardiovascular  unit. Sponge and needle count was correct at the end of the case.         Ct Hanna MD    D: 03/26/2019 15:38:05       T: 03/26/2019 17:07:36     [email or web address]  Job#: [id number]     Doc#: [id number]    CC:

## 2019-03-27 NOTE — PROGRESS NOTES
Progress Note    S/P cabgx5 3/26/19    Vital Signs:                                                 /73   Pulse 108   Temp 98.6 °F (37 °C) (Oral)   Resp 21   Ht 6' (1.829 m)   Wt (!) 302 lb 0.5 oz (137 kg)   SpO2 94%   BMI 40.96 kg/m²  O2 Flow Rate (L/min): 2 L/min   NSR  CVP (Mean): 10 mmHg  PAP: 43/18  PAP (Mean): 28 mmHg  SVR (Using ABP Mean): 541.94 dyne*sec/cm5  CCI: 3.8 L/min  SVO2 (%): 52 %  Admission Weight: 281 lb 15.5 oz (127.9 kg)      Drips:  Mil 0.06, insulin    I/O:      Intake/Output Summary (Last 24 hours) at 3/27/2019 0918  Last data filed at 3/27/2019 0830  Gross per 24 hour   Intake 5995.71 ml   Output 4021 ml   Net 1974.71 ml     Chest Tube: 321, 420    CV: reg, wound c/d/i  Pulm: decreased  Abd: soft  Ext: warm, 1+ edema    Data Review:  CBC:   Recent Labs     03/26/19  0503  03/26/19  1515  03/26/19  1740 03/26/19  1829 03/27/19  0408   WBC 7.0  --  17.0*  --   --   --  7.7   HGB 15.4   < > 11.4*   < > 10.1* 10.7* 9.8*   HCT 45.5  --  34.0*  --   --   --  29.4*   MCV 97.2  --  98.3  --   --   --  97.3     --  111*  --   --   --  76*    < > = values in this interval not displayed. BMP:   Recent Labs     03/25/19  0050 03/26/19  0503 03/26/19  1515 03/27/19  0408    139 142 139   K 4.4 4.1 4.1 4.9   CL 94* 101 106 103   CO2 24 25 20* 26   PHOS  --   --  3.8  --    BUN 19 16 14 9   CREATININE 0.8 0.7* 0.8 0.9   CALCIUM 9.8 8.7 7.7* 9.0   MG 2.10  --  2.30 2.20     Cardiac Enzymes:   Recent Labs     03/25/19  0050   TROPONINI 0.16*     PT/INR:   Recent Labs     03/25/19  0050 03/26/19  1515 03/27/19  0408   PROTIME 10.6 14.1* 13.2*   INR 0.93 1.24* 1.16*     APTT:   Recent Labs     03/25/19  1148 03/25/19  1735 03/26/19  1515   APTT 60.7* 56.1* 35.5       Assessment/Plan:  CV - stable in SR   - wean milrinone, transition later   - hold BB given marginal bp.  No ACE. ntg patch.   - no statin just yet while following liver function  pulm - pulm toilet, wean O2  Renal - Cr nl. Start diuresing later today   - retain Cee for accurate I+O  GI - hep B. LFTs relatively stable. Heme - acute blood loss anemia. - thrombocytopenia. hold ASA, arixtra. D/c pepcid. - scds dvt prophylaxis  Pain - oxy. No more toradol given low plts.  Will try ultram. No acet while follow Chang Lindo MD  3/27/2019  9:18 AM

## 2019-03-27 NOTE — PROGRESS NOTES
1200: Assumed care for patient from [facility]. Bedside handoff complete. Assessment complete. VSS. Orders to transition. Hold BB, given Lasix. Urine lightening in color. Patient medicated for pain. Will monitor.

## 2019-03-28 LAB
ALBUMIN SERPL-MCNC: 3.6 G/DL (ref 3.4–5)
ALP BLD-CCNC: 44 U/L (ref 40–129)
ALT SERPL-CCNC: 46 U/L (ref 10–40)
ANION GAP SERPL CALCULATED.3IONS-SCNC: 7 MMOL/L (ref 3–16)
AST SERPL-CCNC: 103 U/L (ref 15–37)
BILIRUB SERPL-MCNC: 0.4 MG/DL (ref 0–1)
BILIRUBIN DIRECT: <0.2 MG/DL (ref 0–0.3)
BILIRUBIN, INDIRECT: ABNORMAL MG/DL (ref 0–1)
BUN BLDV-MCNC: 17 MG/DL (ref 7–20)
CALCIUM SERPL-MCNC: 8.4 MG/DL (ref 8.3–10.6)
CHLORIDE BLD-SCNC: 100 MMOL/L (ref 99–110)
CO2: 29 MMOL/L (ref 21–32)
CREAT SERPL-MCNC: 0.8 MG/DL (ref 0.8–1.3)
GFR AFRICAN AMERICAN: >60
GFR NON-AFRICAN AMERICAN: >60
GLUCOSE BLD-MCNC: 118 MG/DL (ref 70–99)
GLUCOSE BLD-MCNC: 139 MG/DL (ref 70–99)
GLUCOSE BLD-MCNC: 156 MG/DL (ref 70–99)
GLUCOSE BLD-MCNC: 169 MG/DL (ref 70–99)
GLUCOSE BLD-MCNC: 186 MG/DL (ref 70–99)
GLUCOSE BLD-MCNC: 217 MG/DL (ref 70–99)
GLUCOSE BLD-MCNC: 268 MG/DL (ref 70–99)
GLUCOSE BLD-MCNC: 85 MG/DL (ref 70–99)
GLUCOSE BLD-MCNC: 87 MG/DL (ref 70–99)
HCT VFR BLD CALC: 29.7 % (ref 40.5–52.5)
HEMOGLOBIN: 9.9 G/DL (ref 13.5–17.5)
HEPARIN INDUCED PLATELET ANTIBODY: NEGATIVE
INR BLD: 1.13 (ref 0.86–1.14)
MCH RBC QN AUTO: 32.7 PG (ref 26–34)
MCHC RBC AUTO-ENTMCNC: 33.3 G/DL (ref 31–36)
MCV RBC AUTO: 98 FL (ref 80–100)
PDW BLD-RTO: 14.2 % (ref 12.4–15.4)
PERFORMED ON: ABNORMAL
PERFORMED ON: NORMAL
PLATELET # BLD: 77 K/UL (ref 135–450)
PMV BLD AUTO: 8.4 FL (ref 5–10.5)
POTASSIUM SERPL-SCNC: 4.3 MMOL/L (ref 3.5–5.1)
PROTHROMBIN TIME: 12.9 SEC (ref 9.8–13)
RBC # BLD: 3.03 M/UL (ref 4.2–5.9)
SODIUM BLD-SCNC: 136 MMOL/L (ref 136–145)
TOTAL PROTEIN: 5.7 G/DL (ref 6.4–8.2)
WBC # BLD: 10 K/UL (ref 4–11)

## 2019-03-28 PROCEDURE — 2100000000 HC CCU R&B

## 2019-03-28 PROCEDURE — 80076 HEPATIC FUNCTION PANEL: CPT

## 2019-03-28 PROCEDURE — 2580000003 HC RX 258: Performed by: THORACIC SURGERY (CARDIOTHORACIC VASCULAR SURGERY)

## 2019-03-28 PROCEDURE — 94762 N-INVAS EAR/PLS OXIMTRY CONT: CPT

## 2019-03-28 PROCEDURE — 86022 PLATELET ANTIBODIES: CPT

## 2019-03-28 PROCEDURE — 6370000000 HC RX 637 (ALT 250 FOR IP): Performed by: NURSE PRACTITIONER

## 2019-03-28 PROCEDURE — 85610 PROTHROMBIN TIME: CPT

## 2019-03-28 PROCEDURE — 94640 AIRWAY INHALATION TREATMENT: CPT

## 2019-03-28 PROCEDURE — 37799 UNLISTED PX VASCULAR SURGERY: CPT

## 2019-03-28 PROCEDURE — 99024 POSTOP FOLLOW-UP VISIT: CPT | Performed by: THORACIC SURGERY (CARDIOTHORACIC VASCULAR SURGERY)

## 2019-03-28 PROCEDURE — 85027 COMPLETE CBC AUTOMATED: CPT

## 2019-03-28 PROCEDURE — 80048 BASIC METABOLIC PNL TOTAL CA: CPT

## 2019-03-28 PROCEDURE — 6360000002 HC RX W HCPCS: Performed by: NURSE PRACTITIONER

## 2019-03-28 PROCEDURE — 97116 GAIT TRAINING THERAPY: CPT

## 2019-03-28 PROCEDURE — 6370000000 HC RX 637 (ALT 250 FOR IP): Performed by: THORACIC SURGERY (CARDIOTHORACIC VASCULAR SURGERY)

## 2019-03-28 PROCEDURE — 97110 THERAPEUTIC EXERCISES: CPT

## 2019-03-28 PROCEDURE — 97530 THERAPEUTIC ACTIVITIES: CPT

## 2019-03-28 PROCEDURE — 2700000000 HC OXYGEN THERAPY PER DAY

## 2019-03-28 PROCEDURE — 36592 COLLECT BLOOD FROM PICC: CPT

## 2019-03-28 PROCEDURE — 6360000002 HC RX W HCPCS: Performed by: THORACIC SURGERY (CARDIOTHORACIC VASCULAR SURGERY)

## 2019-03-28 RX ADMIN — ENTECAVIR 1 MG: 1 TABLET ORAL at 08:31

## 2019-03-28 RX ADMIN — FUROSEMIDE 40 MG: 10 INJECTION, SOLUTION INTRAMUSCULAR; INTRAVENOUS at 18:44

## 2019-03-28 RX ADMIN — ALPRAZOLAM 0.5 MG: 0.5 TABLET ORAL at 21:56

## 2019-03-28 RX ADMIN — MUPIROCIN: 20 OINTMENT TOPICAL at 07:55

## 2019-03-28 RX ADMIN — PANTOPRAZOLE SODIUM 40 MG: 40 TABLET, DELAYED RELEASE ORAL at 06:10

## 2019-03-28 RX ADMIN — OXYCODONE HYDROCHLORIDE 10 MG: 10 TABLET ORAL at 12:04

## 2019-03-28 RX ADMIN — CHLORHEXIDINE GLUCONATE 0.12% ORAL RINSE 15 ML: 1.2 LIQUID ORAL at 07:48

## 2019-03-28 RX ADMIN — GABAPENTIN 400 MG: 400 CAPSULE ORAL at 07:48

## 2019-03-28 RX ADMIN — CHLORHEXIDINE GLUCONATE 0.12% ORAL RINSE 15 ML: 1.2 LIQUID ORAL at 21:52

## 2019-03-28 RX ADMIN — INSULIN LISPRO 2 UNITS: 100 INJECTION, SOLUTION INTRAVENOUS; SUBCUTANEOUS at 07:53

## 2019-03-28 RX ADMIN — GABAPENTIN 400 MG: 400 CAPSULE ORAL at 14:15

## 2019-03-28 RX ADMIN — TRAMADOL HYDROCHLORIDE 50 MG: 50 TABLET ORAL at 04:02

## 2019-03-28 RX ADMIN — Medication 10 ML: at 22:04

## 2019-03-28 RX ADMIN — DOCUSATE SODIUM 100 MG: 100 CAPSULE, LIQUID FILLED ORAL at 21:52

## 2019-03-28 RX ADMIN — DOCUSATE SODIUM 100 MG: 100 CAPSULE, LIQUID FILLED ORAL at 07:49

## 2019-03-28 RX ADMIN — TRAMADOL HYDROCHLORIDE 50 MG: 50 TABLET ORAL at 15:55

## 2019-03-28 RX ADMIN — CARVEDILOL 3.12 MG: 3.12 TABLET, FILM COATED ORAL at 16:19

## 2019-03-28 RX ADMIN — INSULIN LISPRO 2 UNITS: 100 INJECTION, SOLUTION INTRAVENOUS; SUBCUTANEOUS at 12:17

## 2019-03-28 RX ADMIN — FENTANYL CITRATE 25 MCG: 50 INJECTION INTRAMUSCULAR; INTRAVENOUS at 05:15

## 2019-03-28 RX ADMIN — INSULIN GLARGINE 19 UNITS: 100 INJECTION, SOLUTION SUBCUTANEOUS at 22:04

## 2019-03-28 RX ADMIN — POTASSIUM CHLORIDE 10 MEQ: 750 TABLET, FILM COATED, EXTENDED RELEASE ORAL at 16:19

## 2019-03-28 RX ADMIN — FENTANYL CITRATE 25 MCG: 50 INJECTION INTRAMUSCULAR; INTRAVENOUS at 00:10

## 2019-03-28 RX ADMIN — INSULIN LISPRO 3 UNITS: 100 INJECTION, SOLUTION INTRAVENOUS; SUBCUTANEOUS at 22:04

## 2019-03-28 RX ADMIN — CHLORHEXIDINE GLUCONATE 0.12% ORAL RINSE 15 ML: 1.2 LIQUID ORAL at 21:50

## 2019-03-28 RX ADMIN — IPRATROPIUM BROMIDE AND ALBUTEROL SULFATE 1 AMPULE: .5; 3 SOLUTION RESPIRATORY (INHALATION) at 08:04

## 2019-03-28 RX ADMIN — TRAMADOL HYDROCHLORIDE 50 MG: 50 TABLET ORAL at 21:50

## 2019-03-28 RX ADMIN — OXYCODONE HYDROCHLORIDE 10 MG: 10 TABLET ORAL at 07:48

## 2019-03-28 RX ADMIN — VANCOMYCIN HYDROCHLORIDE 2000 MG: 1 INJECTION, POWDER, LYOPHILIZED, FOR SOLUTION INTRAVENOUS at 00:12

## 2019-03-28 RX ADMIN — MUPIROCIN: 20 OINTMENT TOPICAL at 22:02

## 2019-03-28 RX ADMIN — Medication 10 ML: at 07:49

## 2019-03-28 RX ADMIN — TRAMADOL HYDROCHLORIDE 50 MG: 50 TABLET ORAL at 10:31

## 2019-03-28 RX ADMIN — OXYCODONE HYDROCHLORIDE 5 MG: 5 TABLET ORAL at 18:42

## 2019-03-28 RX ADMIN — FUROSEMIDE 40 MG: 10 INJECTION, SOLUTION INTRAMUSCULAR; INTRAVENOUS at 07:49

## 2019-03-28 RX ADMIN — IPRATROPIUM BROMIDE AND ALBUTEROL SULFATE 1 AMPULE: .5; 3 SOLUTION RESPIRATORY (INHALATION) at 21:29

## 2019-03-28 RX ADMIN — POTASSIUM CHLORIDE 10 MEQ: 750 TABLET, FILM COATED, EXTENDED RELEASE ORAL at 07:48

## 2019-03-28 RX ADMIN — ALPRAZOLAM 0.5 MG: 0.5 TABLET ORAL at 07:48

## 2019-03-28 RX ADMIN — Medication 400 MG: at 22:04

## 2019-03-28 RX ADMIN — IPRATROPIUM BROMIDE AND ALBUTEROL SULFATE 1 AMPULE: .5; 3 SOLUTION RESPIRATORY (INHALATION) at 15:15

## 2019-03-28 RX ADMIN — Medication 400 MG: at 07:48

## 2019-03-28 RX ADMIN — Medication 3 G: at 06:11

## 2019-03-28 RX ADMIN — CARVEDILOL 3.12 MG: 3.12 TABLET, FILM COATED ORAL at 07:48

## 2019-03-28 RX ADMIN — POTASSIUM CHLORIDE 10 MEQ: 750 TABLET, FILM COATED, EXTENDED RELEASE ORAL at 12:04

## 2019-03-28 RX ADMIN — GABAPENTIN 400 MG: 400 CAPSULE ORAL at 21:52

## 2019-03-28 RX ADMIN — INSULIN LISPRO 12 UNITS: 100 INJECTION, SOLUTION INTRAVENOUS; SUBCUTANEOUS at 16:18

## 2019-03-28 ASSESSMENT — PAIN SCALES - GENERAL
PAINLEVEL_OUTOF10: 4
PAINLEVEL_OUTOF10: 6
PAINLEVEL_OUTOF10: 8
PAINLEVEL_OUTOF10: 6
PAINLEVEL_OUTOF10: 8
PAINLEVEL_OUTOF10: 0
PAINLEVEL_OUTOF10: 8
PAINLEVEL_OUTOF10: 3
PAINLEVEL_OUTOF10: 4
PAINLEVEL_OUTOF10: 8
PAINLEVEL_OUTOF10: 0
PAINLEVEL_OUTOF10: 8
PAINLEVEL_OUTOF10: 6
PAINLEVEL_OUTOF10: 0
PAINLEVEL_OUTOF10: 4

## 2019-03-28 ASSESSMENT — PAIN DESCRIPTION - LOCATION
LOCATION: STERNUM

## 2019-03-28 ASSESSMENT — PAIN DESCRIPTION - PAIN TYPE
TYPE: SURGICAL PAIN

## 2019-03-28 ASSESSMENT — PAIN DESCRIPTION - ORIENTATION
ORIENTATION: MID

## 2019-03-28 ASSESSMENT — PAIN DESCRIPTION - DESCRIPTORS
DESCRIPTORS: ACHING

## 2019-03-28 ASSESSMENT — PAIN DESCRIPTION - FREQUENCY
FREQUENCY: CONTINUOUS
FREQUENCY: CONTINUOUS

## 2019-03-28 NOTE — CARE COORDINATION
LSW reviewed chart. LSW met with  Patient to introduce  role and to initiate discussion regarding DC planning. Patient reports his goal is to return home with wife and his sister. They live in a house that is level entry and one floor plan. He was totally independent prior to admit. He is active with PCP, Dr Cyril Burnett. LSW informed them of possible need for home care visit either by RN or PT.  LSW provided a list of home care agencies for his review and he reports he would use [facility] as they were involved in his mother's care. He states his wife works for [employer], transporter). He is agreeable to what MD orders for him. Wife came into room during this visit and LSW informed them all of recommendation for someone to be with him 24/7. LSW detailed this is for emergency only. The family confirmed they will be able to do this. LSW following for dc disposition.   [facility]     Case Management   [phone]    3/28/2019  4:01 PM

## 2019-03-28 NOTE — PROGRESS NOTES
INPATIENT CONSULTATION:    IDENTIFYING DATA/REASON FOR CONSULTATION   PATIENT:  Zoran Boucher  MRN:  [id number]  ADMIT DATE: 3/25/2019  TIME OF EVALUATION: 3/28/2019 7:40 AM  HOSPITAL STAY:   LOS: 3 days   CONSULTING PHYSICIAN:  REASON FOR CONSULTATION: Hepatitis B    Subjective:    Patient resting in bed. He has no complaints this morning. MEDICATIONS   SCHEDULED:      pantoprazole 40 mg QAM AC   carvedilol 3.125 mg BID WC   furosemide 40 mg BID   traMADol 50 mg Q6H   sodium chloride flush 10 mL 2 times per day   docusate sodium 100 mg BID   chlorhexidine 15 mL BID   magnesium oxide 400 mg BID   mupirocin  BID   nitroGLYCERIN 1 patch Daily   potassium chloride 10 mEq TID WC   insulin glargine 0.15 Units/kg Nightly   insulin lispro 0-12 Units TID WC   insulin lispro 0-6 Units Nightly   ipratropium-albuterol 1 ampule Q4H WA   entecavir 1 mg Daily   gabapentin 400 mg TID     FLUIDS/DRIPS:     sodium chloride Stopped (03/26/19 1948)    nitroGLYCERIN      dextrose      norepinephrine Stopped (03/27/19 0308)     PRNs:     ALPRAZolam 0.5 mg TID PRN   sodium chloride flush 10 mL PRN   potassium chloride 20 mEq PRN   magnesium sulfate 2 g PRN   calcium chloride IVPB 1 g PRN   calcium chloride IVPB 2 g PRN   acetaminophen 650 mg Q4H PRN   oxyCODONE 5 mg Q4H PRN   Or     oxyCODONE 10 mg Q4H PRN   fentanNYL 25 mcg Q1H PRN   diphenhydrAMINE 25 mg Nightly PRN   ondansetron 4 mg Q8H PRN   metoclopramide 10 mg Q6H PRN   hydrALAZINE 5 mg Q1H PRN   albumin human 25 g PRN   sodium chloride 500 mL Continuous PRN   furosemide 40 mg PRN   nitroGLYCERIN 10 mcg/min Continuous PRN   glucose 15 g PRN   dextrose 12.5 g PRN   glucagon (rDNA) 1 mg PRN   dextrose 100 mL/hr PRN   norepinephrine 2 mcg/min Continuous PRN   ipratropium-albuterol 1 ampule Q4H PRN     ALLERGIES:  He [unfilled]      PHYSICAL EXAM   [unfilled]   I/O last 3 completed shifts:   In: 1900.1 [P.O.:780; I.V.:1120.1]  Out: 3115 [Urine:2815; Chest Tube:300]  Oxygen Therapy:  @[id number])@  @[id number])@  @[id number])@    Physical Exam:  Gen: Resting in bed, NAD   CV: RRR no MRG   Pul: CTAB   Abd: Good bowel sounds throughout, no scars, soft, NT/ND, no masses, no HSM   Ext: Trace edema   Neuro: No asterixis   Skin: No jaundice, spider angiomas, dickson erythema     LABS AND IMAGING     Recent Results (from the past 24 hour(s))   POCT Glucose    Collection Time: 03/27/19  7:44 AM   Result Value Ref Range    POC Glucose 133 (H) 70 - 99 mg/dl    Performed on ACCU-CHEK    POCT Glucose    Collection Time: 03/27/19  8:47 AM   Result Value Ref Range    POC Glucose 173 (H) 70 - 99 mg/dl    Performed on ACCU-CHEK    POCT Glucose    Collection Time: 03/27/19  9:57 AM   Result Value Ref Range    POC Glucose 162 (H) 70 - 99 mg/dl    Performed on ACCU-CHEK    POCT Glucose    Collection Time: 03/27/19 11:17 AM   Result Value Ref Range    POC Glucose 132 (H) 70 - 99 mg/dl    Performed on ACCU-CHEK    POCT Glucose    Collection Time: 03/27/19 12:15 PM   Result Value Ref Range    POC Glucose 171 (H) 70 - 99 mg/dl    Performed on ACCU-CHEK    POCT Glucose    Collection Time: 03/27/19  1:11 PM   Result Value Ref Range    POC Glucose 152 (H) 70 - 99 mg/dl    Performed on ACCU-CHEK    POCT Glucose    Collection Time: 03/27/19  2:16 PM   Result Value Ref Range    POC Glucose 129 (H) 70 - 99 mg/dl    Performed on ACCU-CHEK    POCT Glucose    Collection Time: 03/27/19  3:21 PM   Result Value Ref Range    POC Glucose 128 (H) 70 - 99 mg/dl    Performed on ACCU-CHEK    POCT Glucose    Collection Time: 03/27/19  4:43 PM   Result Value Ref Range    POC Glucose 96 70 - 99 mg/dl    Performed on ACCU-CHEK    POCT Glucose    Collection Time: 03/27/19  5:47 PM   Result Value Ref Range    POC Glucose 101 (H) 70 - 99 mg/dl    Performed on ACCU-CHEK    POCT Glucose    Collection Time: 03/27/19  8:54 PM   Result Value Ref Range    POC Glucose 112 (H) 70 - 99 mg/dl    Performed on <0.2 0.0 - 0.3 mg/dL    Bilirubin, Indirect see below 0.0 - 1.0 mg/dL     Other Labs    Imaging    ASSESSMENT AND RECOMMENDATIONS   Guadalupe Calero is a 63 YO male with a PMH of Chronic Hep B, CAD with stenting, DM, HTN, HLD, obesity, sleep apnea and psoriasis who presented on 3/25/2019 with STEMI.  Underwent PCI to RCA. He is s/p CABG on 3/26. We have been consulted regarding management of his Hep B therapy.  Patient has a history of chronic Hepatitis B on Entecavir. Followed by Dr. Gulshan Manrique. He has previously had a Fibroscan with F3 fibrosis in 11/17. Dr. Gulshan Manrique had recommended a biopsy at that time but patient refused due to cost so unknown if truly cirrhotic. Patient has no signs or symptoms of advanced liver disease or decompensated cirrhosis. Impression:  1. Chronic Hepatitis B:  On Entecavir 1 mg daily. His last viral load 9/2018 was 1500 IU (from 4700 IU 2/2018).  He has no s/s of confusion/encephalopathy.    2. Hepatic fibrosis:  Elastrography 11/2017 with F3 (severe) fibrosis.  No prior liver bx so unknown if cirrhotic. Alk phos slightly elevated after surgery which can be expected. Synthetic liver function appears intact- no jaundice or encephalopathy, coags and platelet count normal.  Alb 3.6    3. STEMI  4. CAD:  CABG on 3/26     RECOMMENDATIONS:    -Continue Entecavir 1 mg daily  -Continue to monitor LFTs daily. No signs or symptoms of liver decompensation. If you have any questions or need any further information, please feel free to contact anyone on our consult team.  Thank you for allowing us to participate in the care of Guadalupe Calero. Emilie Fernandez PA-C    Attending physician addendum:    I have personally seen and examined the patient, reviewed the patient's medical record and pertinent labs and clinical imaging. I have personally staffed the case with Emilie WYNN. I agree with her consultation note, exam findings, assessment and plans  as written above.   I have made appropriate modifications and edited her assessment and plan where needed to reflect my impression and plans for this patient. Sarai oMon is a 63 YO male with a PMH of Chronic Hep B, CAD with stenting, DM, HTN, HLD, obesity, sleep apnea and psoriasis who presented on 3/25/2019 with STEMI.  Underwent PCI to RCA. He is s/p CABG on 3/26. We have been consulted regarding management of his Hep B therapy.  Patient has a history of chronic Hepatitis B on Entecavir. Followed by Dr. Liana Madden. He has previously had a Fibroscan with F3 fibrosis in 11/17. Dr. Liana Madden had recommended a biopsy at that time but patient refused due to cost so unknown if truly cirrhotic. Patient has no signs or symptoms of advanced liver disease or decompensated cirrhosis. LFT remain stable. Thrombocytopenia noted. HIT panel sent. Await results. No other signs or symptoms of liver decompensation. Thank you for allowing me to participate in this patient's care. If there are any questions or concerns regarding this patient, or the plan we have set in place, please feel free to contact me at [phone].      Abiodun Conn MD

## 2019-03-28 NOTE — PROGRESS NOTES
Progress Note    S/P cabgx5 3/26/19    Vital Signs:                                                 BP (!) 90/59   Pulse 107   Temp 98.6 °F (37 °C) (Oral)   Resp 20   Ht 6' (1.829 m)   Wt (!) 301 lb 9.4 oz (136.8 kg)   SpO2 96%   BMI 40.90 kg/m²  O2 Flow Rate (L/min): 1 L/min   NSR  Admission Weight: 281 lb 15.5 oz (127.9 kg)      I/O:      Intake/Output Summary (Last 24 hours) at 3/28/2019 0655  Last data filed at 3/28/2019 4285  Gross per 24 hour   Intake 1900.12 ml   Output 3115 ml   Net -1214.88 ml     Chest Tube: 190, 40, 70    CV: reg, wound c/d/i  Pulm: decreased  Abd: soft  Ext: warm, 1+ edema    Data Review:  CBC:   Recent Labs     03/26/19  1515  03/26/19  1829 03/27/19  0408 03/28/19  0348   WBC 17.0*  --   --  7.7 10.0   HGB 11.4*   < > 10.7* 9.8* 9.9*   HCT 34.0*  --   --  29.4* 29.7*   MCV 98.3  --   --  97.3 98.0   *  --   --  76* 77*    < > = values in this interval not displayed. BMP:   Recent Labs     03/26/19  1515 03/27/19  0408 03/28/19  0348    139 136   K 4.1 4.9 4.3    103 100   CO2 20* 26 29   PHOS 3.8  --   --    BUN 14 9 17   CREATININE 0.8 0.9 0.8   CALCIUM 7.7* 9.0 8.4   MG 2.30 2.20  --      Cardiac Enzymes:   No results for input(s): CKTOTAL, CKMB, CKMBINDEX, TROPONINI in the last 72 hours. PT/INR:   Recent Labs     03/26/19  1515 03/27/19  0408 03/28/19  0348   PROTIME 14.1* 13.2* 12.9   INR 1.24* 1.16* 1.13     APTT:   Recent Labs     03/25/19  1148 03/25/19  1735 03/26/19  1515   APTT 60.7* 56.1* 35.5       Assessment/Plan:  CV - stable in SR. Leave pacing wires in for now since hasn't had BB and plts low   - if BB if bp can tolerate. No ACE. ntg patch.   - no statin just yet while following liver function  pulm - pulm toilet, wean O2  Renal - Cr nl.    - retain Cee for accurate I+O  GI - hep B. LFTs relatively stable. Heme - acute blood loss anemia. - thrombocytopenia.  Send HIT panel.   - scds dvt prophylaxis  Pain - oxy,
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
## 2019-03-29 LAB
ALBUMIN SERPL-MCNC: 3.1 G/DL (ref 3.4–5)
ALP BLD-CCNC: 58 U/L (ref 40–129)
ALT SERPL-CCNC: 61 U/L (ref 10–40)
ANION GAP SERPL CALCULATED.3IONS-SCNC: 9 MMOL/L (ref 3–16)
AST SERPL-CCNC: 105 U/L (ref 15–37)
BILIRUB SERPL-MCNC: 0.5 MG/DL (ref 0–1)
BILIRUBIN DIRECT: <0.2 MG/DL (ref 0–0.3)
BILIRUBIN, INDIRECT: ABNORMAL MG/DL (ref 0–1)
BLOOD BANK DISPENSE STATUS: NORMAL
BLOOD BANK DISPENSE STATUS: NORMAL
BLOOD BANK PRODUCT CODE: NORMAL
BLOOD BANK PRODUCT CODE: NORMAL
BPU ID: NORMAL
BPU ID: NORMAL
BUN BLDV-MCNC: 21 MG/DL (ref 7–20)
CALCIUM SERPL-MCNC: 7.7 MG/DL (ref 8.3–10.6)
CHLORIDE BLD-SCNC: 98 MMOL/L (ref 99–110)
CO2: 27 MMOL/L (ref 21–32)
CREAT SERPL-MCNC: 0.7 MG/DL (ref 0.8–1.3)
DESCRIPTION BLOOD BANK: NORMAL
DESCRIPTION BLOOD BANK: NORMAL
EKG ATRIAL RATE: 95 BPM
EKG DIAGNOSIS: NORMAL
EKG P AXIS: 68 DEGREES
EKG P-R INTERVAL: 148 MS
EKG Q-T INTERVAL: 352 MS
EKG QRS DURATION: 100 MS
EKG QTC CALCULATION (BAZETT): 442 MS
EKG R AXIS: 23 DEGREES
EKG T AXIS: 81 DEGREES
EKG VENTRICULAR RATE: 95 BPM
GFR AFRICAN AMERICAN: >60
GFR NON-AFRICAN AMERICAN: >60
GLUCOSE BLD-MCNC: 139 MG/DL (ref 70–99)
GLUCOSE BLD-MCNC: 155 MG/DL (ref 70–99)
GLUCOSE BLD-MCNC: 225 MG/DL (ref 70–99)
GLUCOSE BLD-MCNC: 237 MG/DL (ref 70–99)
GLUCOSE BLD-MCNC: 240 MG/DL (ref 70–99)
HCT VFR BLD CALC: 28.6 % (ref 40.5–52.5)
HEMOGLOBIN: 9.6 G/DL (ref 13.5–17.5)
INR BLD: 1.18 (ref 0.86–1.14)
MAGNESIUM: 2.3 MG/DL (ref 1.8–2.4)
MCH RBC QN AUTO: 33.3 PG (ref 26–34)
MCHC RBC AUTO-ENTMCNC: 33.7 G/DL (ref 31–36)
MCV RBC AUTO: 98.9 FL (ref 80–100)
PDW BLD-RTO: 13.7 % (ref 12.4–15.4)
PERFORMED ON: ABNORMAL
PLATELET # BLD: 95 K/UL (ref 135–450)
PMV BLD AUTO: 8 FL (ref 5–10.5)
POTASSIUM SERPL-SCNC: 4 MMOL/L (ref 3.5–5.1)
PROTHROMBIN TIME: 13.4 SEC (ref 9.8–13)
RBC # BLD: 2.89 M/UL (ref 4.2–5.9)
SODIUM BLD-SCNC: 134 MMOL/L (ref 136–145)
TOTAL PROTEIN: 5.6 G/DL (ref 6.4–8.2)
WBC # BLD: 7.7 K/UL (ref 4–11)

## 2019-03-29 PROCEDURE — 83735 ASSAY OF MAGNESIUM: CPT

## 2019-03-29 PROCEDURE — 85610 PROTHROMBIN TIME: CPT

## 2019-03-29 PROCEDURE — 2100000000 HC CCU R&B

## 2019-03-29 PROCEDURE — 97110 THERAPEUTIC EXERCISES: CPT

## 2019-03-29 PROCEDURE — 2580000003 HC RX 258: Performed by: THORACIC SURGERY (CARDIOTHORACIC VASCULAR SURGERY)

## 2019-03-29 PROCEDURE — 93010 ELECTROCARDIOGRAM REPORT: CPT | Performed by: INTERNAL MEDICINE

## 2019-03-29 PROCEDURE — 93005 ELECTROCARDIOGRAM TRACING: CPT | Performed by: THORACIC SURGERY (CARDIOTHORACIC VASCULAR SURGERY)

## 2019-03-29 PROCEDURE — 6370000000 HC RX 637 (ALT 250 FOR IP): Performed by: THORACIC SURGERY (CARDIOTHORACIC VASCULAR SURGERY)

## 2019-03-29 PROCEDURE — 97116 GAIT TRAINING THERAPY: CPT

## 2019-03-29 PROCEDURE — 2500000003 HC RX 250 WO HCPCS: Performed by: NURSE PRACTITIONER

## 2019-03-29 PROCEDURE — 94664 DEMO&/EVAL PT USE INHALER: CPT

## 2019-03-29 PROCEDURE — 80076 HEPATIC FUNCTION PANEL: CPT

## 2019-03-29 PROCEDURE — 94667 MNPJ CHEST WALL 1ST: CPT

## 2019-03-29 PROCEDURE — 97530 THERAPEUTIC ACTIVITIES: CPT

## 2019-03-29 PROCEDURE — 6370000000 HC RX 637 (ALT 250 FOR IP): Performed by: NURSE PRACTITIONER

## 2019-03-29 PROCEDURE — 94762 N-INVAS EAR/PLS OXIMTRY CONT: CPT

## 2019-03-29 PROCEDURE — 36592 COLLECT BLOOD FROM PICC: CPT

## 2019-03-29 PROCEDURE — 2580000003 HC RX 258: Performed by: NURSE PRACTITIONER

## 2019-03-29 PROCEDURE — 6360000002 HC RX W HCPCS: Performed by: NURSE PRACTITIONER

## 2019-03-29 PROCEDURE — 80048 BASIC METABOLIC PNL TOTAL CA: CPT

## 2019-03-29 PROCEDURE — 2700000000 HC OXYGEN THERAPY PER DAY

## 2019-03-29 PROCEDURE — 94640 AIRWAY INHALATION TREATMENT: CPT

## 2019-03-29 PROCEDURE — 85027 COMPLETE CBC AUTOMATED: CPT

## 2019-03-29 RX ORDER — POTASSIUM CHLORIDE 20 MEQ/1
20 TABLET, EXTENDED RELEASE ORAL ONCE
Status: COMPLETED | OUTPATIENT
Start: 2019-03-29 | End: 2019-03-29

## 2019-03-29 RX ORDER — CARVEDILOL 3.12 MG/1
3.12 TABLET ORAL ONCE
Status: COMPLETED | OUTPATIENT
Start: 2019-03-29 | End: 2019-03-29

## 2019-03-29 RX ORDER — FONDAPARINUX SODIUM 2.5 MG/.5ML
2.5 INJECTION SUBCUTANEOUS DAILY
Status: DISCONTINUED | OUTPATIENT
Start: 2019-03-30 | End: 2019-04-03 | Stop reason: HOSPADM

## 2019-03-29 RX ADMIN — METFORMIN HYDROCHLORIDE 500 MG: 500 TABLET ORAL at 18:26

## 2019-03-29 RX ADMIN — CARVEDILOL 3.12 MG: 3.12 TABLET, FILM COATED ORAL at 08:34

## 2019-03-29 RX ADMIN — Medication 400 MG: at 08:34

## 2019-03-29 RX ADMIN — PANTOPRAZOLE SODIUM 40 MG: 40 TABLET, DELAYED RELEASE ORAL at 06:02

## 2019-03-29 RX ADMIN — Medication 10 ML: at 08:35

## 2019-03-29 RX ADMIN — INSULIN LISPRO 9 UNITS: 100 INJECTION, SOLUTION INTRAVENOUS; SUBCUTANEOUS at 18:26

## 2019-03-29 RX ADMIN — MUPIROCIN: 20 OINTMENT TOPICAL at 20:32

## 2019-03-29 RX ADMIN — DOCUSATE SODIUM 100 MG: 100 CAPSULE, LIQUID FILLED ORAL at 20:32

## 2019-03-29 RX ADMIN — OXYCODONE HYDROCHLORIDE 5 MG: 5 TABLET ORAL at 16:58

## 2019-03-29 RX ADMIN — CHLORHEXIDINE GLUCONATE 0.12% ORAL RINSE 15 ML: 1.2 LIQUID ORAL at 20:32

## 2019-03-29 RX ADMIN — Medication 400 MG: at 20:32

## 2019-03-29 RX ADMIN — POTASSIUM CHLORIDE 10 MEQ: 750 TABLET, FILM COATED, EXTENDED RELEASE ORAL at 08:34

## 2019-03-29 RX ADMIN — GABAPENTIN 400 MG: 400 CAPSULE ORAL at 08:34

## 2019-03-29 RX ADMIN — POTASSIUM CHLORIDE 10 MEQ: 750 TABLET, FILM COATED, EXTENDED RELEASE ORAL at 14:12

## 2019-03-29 RX ADMIN — TRAMADOL HYDROCHLORIDE 50 MG: 50 TABLET ORAL at 11:23

## 2019-03-29 RX ADMIN — OXYCODONE HYDROCHLORIDE 10 MG: 10 TABLET ORAL at 00:15

## 2019-03-29 RX ADMIN — CARVEDILOL 3.12 MG: 3.12 TABLET, FILM COATED ORAL at 12:11

## 2019-03-29 RX ADMIN — INSULIN GLARGINE 19 UNITS: 100 INJECTION, SOLUTION SUBCUTANEOUS at 20:33

## 2019-03-29 RX ADMIN — ENTECAVIR 1 MG: 1 TABLET ORAL at 09:26

## 2019-03-29 RX ADMIN — DOCUSATE SODIUM 100 MG: 100 CAPSULE, LIQUID FILLED ORAL at 08:34

## 2019-03-29 RX ADMIN — INSULIN LISPRO 3 UNITS: 100 INJECTION, SOLUTION INTRAVENOUS; SUBCUTANEOUS at 20:32

## 2019-03-29 RX ADMIN — ALPRAZOLAM 0.5 MG: 0.5 TABLET ORAL at 12:08

## 2019-03-29 RX ADMIN — OXYCODONE HYDROCHLORIDE 5 MG: 5 TABLET ORAL at 11:23

## 2019-03-29 RX ADMIN — IPRATROPIUM BROMIDE AND ALBUTEROL SULFATE 1 AMPULE: .5; 3 SOLUTION RESPIRATORY (INHALATION) at 19:52

## 2019-03-29 RX ADMIN — INSULIN LISPRO 9 UNITS: 100 INJECTION, SOLUTION INTRAVENOUS; SUBCUTANEOUS at 14:15

## 2019-03-29 RX ADMIN — METFORMIN HYDROCHLORIDE 500 MG: 500 TABLET ORAL at 08:34

## 2019-03-29 RX ADMIN — POTASSIUM CHLORIDE 10 MEQ: 750 TABLET, FILM COATED, EXTENDED RELEASE ORAL at 18:25

## 2019-03-29 RX ADMIN — INSULIN LISPRO 3 UNITS: 100 INJECTION, SOLUTION INTRAVENOUS; SUBCUTANEOUS at 08:40

## 2019-03-29 RX ADMIN — CARVEDILOL 3.12 MG: 3.12 TABLET, FILM COATED ORAL at 18:26

## 2019-03-29 RX ADMIN — GABAPENTIN 400 MG: 400 CAPSULE ORAL at 14:11

## 2019-03-29 RX ADMIN — MUPIROCIN: 20 OINTMENT TOPICAL at 08:35

## 2019-03-29 RX ADMIN — OXYCODONE HYDROCHLORIDE 5 MG: 5 TABLET ORAL at 21:20

## 2019-03-29 RX ADMIN — GABAPENTIN 400 MG: 400 CAPSULE ORAL at 20:32

## 2019-03-29 RX ADMIN — Medication 10 ML: at 08:34

## 2019-03-29 RX ADMIN — OXYCODONE HYDROCHLORIDE 5 MG: 5 TABLET ORAL at 06:02

## 2019-03-29 RX ADMIN — TRAMADOL HYDROCHLORIDE 50 MG: 50 TABLET ORAL at 21:55

## 2019-03-29 RX ADMIN — Medication 10 ML: at 20:32

## 2019-03-29 RX ADMIN — ALPRAZOLAM 0.5 MG: 0.5 TABLET ORAL at 21:55

## 2019-03-29 RX ADMIN — CHLORHEXIDINE GLUCONATE 0.12% ORAL RINSE 15 ML: 1.2 LIQUID ORAL at 08:34

## 2019-03-29 RX ADMIN — FUROSEMIDE 40 MG: 10 INJECTION, SOLUTION INTRAMUSCULAR; INTRAVENOUS at 08:34

## 2019-03-29 RX ADMIN — TRAMADOL HYDROCHLORIDE 50 MG: 50 TABLET ORAL at 05:05

## 2019-03-29 RX ADMIN — TRAMADOL HYDROCHLORIDE 50 MG: 50 TABLET ORAL at 16:58

## 2019-03-29 RX ADMIN — CALCIUM CHLORIDE 1 G: 100 INJECTION, SOLUTION INTRAVENOUS; INTRAVENTRICULAR at 07:59

## 2019-03-29 RX ADMIN — POTASSIUM CHLORIDE 20 MEQ: 20 TABLET, EXTENDED RELEASE ORAL at 08:34

## 2019-03-29 RX ADMIN — FUROSEMIDE 40 MG: 10 INJECTION, SOLUTION INTRAMUSCULAR; INTRAVENOUS at 18:26

## 2019-03-29 RX ADMIN — IPRATROPIUM BROMIDE AND ALBUTEROL SULFATE 1 AMPULE: .5; 3 SOLUTION RESPIRATORY (INHALATION) at 11:41

## 2019-03-29 RX ADMIN — IPRATROPIUM BROMIDE AND ALBUTEROL SULFATE 1 AMPULE: .5; 3 SOLUTION RESPIRATORY (INHALATION) at 07:42

## 2019-03-29 ASSESSMENT — PAIN DESCRIPTION - ORIENTATION
ORIENTATION: MID
ORIENTATION: LOWER;MID
ORIENTATION: MID
ORIENTATION: MID

## 2019-03-29 ASSESSMENT — PAIN SCALES - GENERAL
PAINLEVEL_OUTOF10: 5
PAINLEVEL_OUTOF10: 1
PAINLEVEL_OUTOF10: 6
PAINLEVEL_OUTOF10: 4
PAINLEVEL_OUTOF10: 6
PAINLEVEL_OUTOF10: 0
PAINLEVEL_OUTOF10: 0
PAINLEVEL_OUTOF10: 2
PAINLEVEL_OUTOF10: 7
PAINLEVEL_OUTOF10: 5
PAINLEVEL_OUTOF10: 6

## 2019-03-29 ASSESSMENT — PAIN DESCRIPTION - LOCATION
LOCATION: STERNUM
LOCATION: CHEST
LOCATION: STERNUM
LOCATION: CHEST
LOCATION: STERNUM
LOCATION: BACK;CHEST
LOCATION: CHEST

## 2019-03-29 ASSESSMENT — PAIN DESCRIPTION - PAIN TYPE
TYPE: SURGICAL PAIN
TYPE: CHRONIC PAIN;SURGICAL PAIN
TYPE: SURGICAL PAIN

## 2019-03-29 ASSESSMENT — PAIN - FUNCTIONAL ASSESSMENT
PAIN_FUNCTIONAL_ASSESSMENT: ACTIVITIES ARE NOT PREVENTED

## 2019-03-29 ASSESSMENT — PAIN DESCRIPTION - DESCRIPTORS
DESCRIPTORS: SORE
DESCRIPTORS: DISCOMFORT;SORE
DESCRIPTORS: ACHING
DESCRIPTORS: SORE;ACHING
DESCRIPTORS: SORE
DESCRIPTORS: ACHING

## 2019-03-29 ASSESSMENT — PAIN DESCRIPTION - FREQUENCY
FREQUENCY: INTERMITTENT
FREQUENCY: CONTINUOUS

## 2019-03-29 ASSESSMENT — PAIN DESCRIPTION - PROGRESSION: CLINICAL_PROGRESSION: GRADUALLY WORSENING

## 2019-03-29 ASSESSMENT — PAIN DESCRIPTION - ONSET: ONSET: ON-GOING

## 2019-03-29 NOTE — PROGRESS NOTES
Criteria met per clinical pathway to remove epicardial pacing wires & MD order received. Pacing wire site within normal limits. Cleansed site with Chloroprep. Ventricular pacing wires removed per policy without difficulty. Dry sterile dressing applied. Vital signs will be monitored and recorded per open heart protocol (every 15 minutes X 2, every 30 minutes X 1, and every hour X 1). Patient tolerated procedure well, heart tones easily auscultated, oxygen saturation within normal limits. Signs/symtoms for cardiac tamponade will be monitored closely.

## 2019-03-29 NOTE — PROGRESS NOTES
3/27/2019  1930 Bedside handoff completed with [facility]. 2000 Pt resting comfortably in bed. Chest tubes continue to be hooked to -20 suction. Assessment completed. VSS. Pt complains of pain at the surgical site. 2010 Pt given pain meds. See MAR.  2215 Pt complains of pain at the surgical site again. He is given his scheduled Tramadol. 3/28/2019  0010 Pt once again complaining of surgical pain. Pt given pain meds see MAR.   0015 Pt resting in bed. Uncomfortable from pain. Chest tubes continue at -20 suction. Assessment completed. Pt has no other complaints at this time. 0358 Pt resting in bed. Pt once again complains of surgical pain. Chest tubes continue at -20 suction. Assessment completed and unchanged. 0402. Pt is having the same pain as before noted. Pt given scheduled Tramadol. See MAR.   0515 Pt is still having surgical pain. Pt given pain meds See MAR.   0730 Bedside handoff completed with [facility].
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
## 2019-03-29 NOTE — PLAN OF CARE
Problem: Falls - Risk of:  Goal: Will remain free from falls  Description  Will remain free from falls  3/28/2019 2116 by Nallely Webb RN  Outcome: Met This Shift    Goal: Absence of physical injury  Description  Absence of physical injury    Problem: Pain:  Goal: Control of acute pain  Description  Control of acute pain  3/28/2019 2117 by Nallely Webb RN  Outcome: Ongoing   Pain assessed using 0-10 pain scale. Abdominal binder in place around chest and splinting pillow used in addition to scheduled and PRN analgesics.       Problem: Skin Integrity:  Goal: Absence of new skin breakdown  Description  Absence of new skin breakdown  Outcome: Met This Shift

## 2019-03-29 NOTE — PROGRESS NOTES
0700-Report received from [facility] MARYANA BURCH. 0800-Rosangela PT in to work with patient. Patient walked the halls. 0830-Up to chair. Assessment complete. VSS. Attempted to place on room air but o2 sat dropped to 88%. Patient remains on 1 liter with an o2 sat of 94%. Using IS. Pain goal a 4. C/O of some pain at this time after working with therapy. Not scheduled for any pain medication at this time. Asked patient if he wanted the nurse to ask for additional pain med interventions and he stated no and that he was okay to wait for pain medication and take it when its due. 0945-CTS at the bedside. New orders placed. Okay to take out pacer wires with platelet count 95 per Spenser Whitlock NP. Acapella ordered. 1100-Cee removed per order. Assisted patient back to bed. 1145-pacer wires and a-line removed. Patient tolerated well. See notes. TLC dressing changed   1300-Patient resting in bed. VSS. No complaints  1430-Patient ambulated in the fabian. Tolerated well. Up in chair. Lunch ordered. 1600-Patient bathed with chlorohexidine wipes. Gown changed. 1650-Up to bathroom. Voided in toilet. Returned to bed  1730-Patient asleep. Wants to get some rest prior to ordering dinner due to having a late lunch. .      1830-Patient up to walk in the halls. Tolerated well. Patient up to the chair. No complaints at this time. Dinner ordered.     1900-Report given to [employer]

## 2019-03-29 NOTE — PROGRESS NOTES
Progress Note    S/P cabgx5 3/26/19    Vital Signs:                                                 /77   Pulse 94   Temp 98.6 °F (37 °C) (Oral)   Resp 13   Ht 6' (1.829 m)   Wt 296 lb 8.3 oz (134.5 kg)   SpO2 98%   BMI 40.22 kg/m²  O2 Flow Rate (L/min): 1 L/min   NSR  Admission Weight: 281 lb 15.5 oz (127.9 kg)      I/O:      Intake/Output Summary (Last 24 hours) at 3/29/2019 0725  Last data filed at 3/29/2019 0600  Gross per 24 hour   Intake 1920 ml   Output 4105 ml   Net -2185 ml     Chest Tube: 290, 160, 60    General: awake, alert and oriented, c/o sternal incision pain  CV: reg, wound c/d/i  Pulm: decreased  Abd: soft, + BS  Ext: warm, 1+ edema  Neuro: No focal deficits, moves all extremities with equal strength bilat    Data Review:  CBC:   Recent Labs     03/27/19  0408 03/28/19 0348 03/29/19 0447   WBC 7.7 10.0 7.7   HGB 9.8* 9.9* 9.6*   HCT 29.4* 29.7* 28.6*   MCV 97.3 98.0 98.9   PLT 76* 77* 95*     BMP:   Recent Labs     03/26/19  1515 03/27/19  0408 03/28/19 0348 03/29/19 0447 03/29/19 0448    139 136  --  134*   K 4.1 4.9 4.3  --  4.0    103 100  --  98*   CO2 20* 26 29  --  27   PHOS 3.8  --   --   --   --    BUN 14 9 17  --  21*   CREATININE 0.8 0.9 0.8  --  0.7*   CALCIUM 7.7* 9.0 8.4  --  7.7*   MG 2.30 2.20  --  2.30  --      Cardiac Enzymes:   No results for input(s): CKTOTAL, CKMB, CKMBINDEX, TROPONINI in the last 72 hours. PT/INR:   Recent Labs     03/27/19  0408 03/28/19 0348 03/29/19 0447   PROTIME 13.2* 12.9 13.4*   INR 1.16* 1.13 1.18*     APTT:   Recent Labs     03/26/19  1515   APTT 35.5       Assessment/Plan:  CV - stable in SR   - BB, ntg patch   - no statin due to elevated LFT's              - replete calcium  pulm - pulm toilet, wean O2              - remove arterial line              - remove chest tubes when criteria is met  Renal - Cr nl.    - remove cazares catheter  GI - hep B.  LFTs slightly elevated today but stable  Heme - acute blood loss anemia. - thrombocytopenia improving.  HIT negative; remove pacing wires   - scds dvt prophylaxis              - ASA and arixtra later today  Pain - oxy, ultram. No acet due to elevated lfts    Increase activity as tolerated; ambulate in the hallways TID and GONZALEZN      KARYN Melendrez - CNP  3/29/2019  7:25 AM

## 2019-03-29 NOTE — PROGRESS NOTES
INPATIENT CONSULTATION:    IDENTIFYING DATA/REASON FOR CONSULTATION   PATIENT:  Sarai Moon  MRN:  [id number]  ADMIT DATE: 3/25/2019  TIME OF EVALUATION: 3/29/2019 8:09 AM  HOSPITAL STAY:   LOS: 4 days   CONSULTING PHYSICIAN:  REASON FOR CONSULTATION: Hepatitis B    Subjective:    Pt has no GI complaints. No confusion. No abdominal swelling/distention    MEDICATIONS   SCHEDULED:      calcium chloride IVPB 1 g Once   metFORMIN 500 mg BID WC   potassium chloride 20 mEq Once   insulin lispro 0-9 Units Nightly   insulin lispro 0-18 Units TID WC   pantoprazole 40 mg QAM AC   carvedilol 3.125 mg BID WC   furosemide 40 mg BID   traMADol 50 mg Q6H   sodium chloride flush 10 mL 2 times per day   docusate sodium 100 mg BID   chlorhexidine 15 mL BID   magnesium oxide 400 mg BID   mupirocin  BID   nitroGLYCERIN 1 patch Daily   potassium chloride 10 mEq TID WC   insulin glargine 0.15 Units/kg Nightly   ipratropium-albuterol 1 ampule Q4H WA   entecavir 1 mg Daily   gabapentin 400 mg TID     FLUIDS/DRIPS:     sodium chloride Stopped (03/26/19 1948)    dextrose       PRNs:     ALPRAZolam 0.5 mg TID PRN   sodium chloride flush 10 mL PRN   magnesium sulfate 2 g PRN   calcium chloride IVPB 1 g PRN   calcium chloride IVPB 2 g PRN   acetaminophen 650 mg Q4H PRN   oxyCODONE 5 mg Q4H PRN   Or     oxyCODONE 10 mg Q4H PRN   fentanNYL 25 mcg Q1H PRN   diphenhydrAMINE 25 mg Nightly PRN   ondansetron 4 mg Q8H PRN   metoclopramide 10 mg Q6H PRN   hydrALAZINE 5 mg Q1H PRN   albumin human 25 g PRN   sodium chloride 500 mL Continuous PRN   furosemide 40 mg PRN   glucose 15 g PRN   dextrose 12.5 g PRN   glucagon (rDNA) 1 mg PRN   dextrose 100 mL/hr PRN   ipratropium-albuterol 1 ampule Q4H PRN     ALLERGIES:  He [unfilled]      PHYSICAL EXAM   [unfilled]   I/O last 3 completed shifts:   In: 1920 [P.O.:1920]  Out: 4105 [NRMLX:2563; Chest Tube:510]  Oxygen Therapy:  @[email or web address]([id number])@  @[email or web address]([id number])@  @[email or web address]([id number])@    Physical Exam:  Gen: Resting in bed, NAD   CV: RRR no MRG   Pul: CTAB   Abd: Good bowel sounds throughout, no scars, soft, NT/ND, no masses, no HSM   Ext: Trace edema   Neuro: No asterixis   Skin: No jaundice, spider angiomas, dickson erythema     LABS AND IMAGING     Recent Results (from the past 24 hour(s))   HEPARIN INDUCED PLATELET ANTIBODY (HIT SCREENING), REFLEX TO MARY GRACE    Collection Time: 03/28/19  8:15 AM   Result Value Ref Range    Heparin Induced Plt Ab Negative Negative   POCT Glucose    Collection Time: 03/28/19 12:13 PM   Result Value Ref Range    POC Glucose 186 (H) 70 - 99 mg/dl    Performed on ACCU-CHEK    POCT Glucose    Collection Time: 03/28/19  4:15 PM   Result Value Ref Range    POC Glucose 268 (H) 70 - 99 mg/dl    Performed on ACCU-CHEK    POCT Glucose    Collection Time: 03/28/19 10:02 PM   Result Value Ref Range    POC Glucose 217 (H) 70 - 99 mg/dl    Performed on ACCU-CHEK    CBC    Collection Time: 03/29/19  4:47 AM   Result Value Ref Range    WBC 7.7 4.0 - 11.0 K/uL    RBC 2.89 (L) 4.20 - 5.90 M/uL    Hemoglobin 9.6 (L) 13.5 - 17.5 g/dL    Hematocrit 28.6 (L) 40.5 - 52.5 %    MCV 98.9 80.0 - 100.0 fL    MCH 33.3 26.0 - 34.0 pg    MCHC 33.7 31.0 - 36.0 g/dL    RDW 13.7 12.4 - 15.4 %    Platelets 95 (L) 714 - 450 K/uL    MPV 8.0 5.0 - 10.5 fL   Protime-INR    Collection Time: 03/29/19  4:47 AM   Result Value Ref Range    Protime 13.4 (H) 9.8 - 13.0 sec    INR 1.18 (H) 0.86 - 1.14   Magnesium    Collection Time: 03/29/19  4:47 AM   Result Value Ref Range    Magnesium 2.30 1.80 - 2.40 mg/dL   Basic Metabolic Panel    Collection Time: 03/29/19  4:48 AM   Result Value Ref Range    Sodium 134 (L) 136 - 145 mmol/L    Potassium 4.0 3.5 - 5.1 mmol/L    Chloride 98 (L) 99 - 110 mmol/L    CO2 27 21 - 32 mmol/L    Anion Gap 9 3 - 16    Glucose 155 (H) 70 - 99 mg/dL    BUN 21 (H) 7 - 20 mg/dL    CREATININE 0.7 Alk phos slightly elevated after surgery which can be expected. Synthetic liver function appears intact- no jaundice or encephalopathy, coags and platelet count normal.  Alb 3.6   3. Thrombocytopenia:  HIT negative. Possibly due to some liver decompensation. Count improved today  4. STEMI  5. CAD:  CABG on 3/26     RECOMMENDATIONS:    -Continue Entecavir 1 mg daily  -Continue to monitor LFTs daily and for signs or symptoms of liver decompensation. If you have any questions or need any further information, please feel free to contact anyone on our consult team.  Thank you for allowing us to participate in the care of Paulie Dawson. Aniya Fraire PA-C    Attending physician addendum:    I have personally seen and examined the patient, reviewed the patient's medical record and pertinent labs and clinical imaging. I have personally staffed the case with Aniya WYNN. I agree with her consultation note, exam findings, assessment and plans  as written above. I have made appropriate modifications and edited her assessment and plan where needed to reflect my impression and plans for this patient. Paulie Dawson is a 65 YO male with a PMH of Chronic Hep B, CAD with stenting, DM, HTN, HLD, obesity, sleep apnea and psoriasis who presented on 3/25/2019 with STEMI.  Underwent PCI to RCA. He is s/p CABG on 3/26. We have been consulted regarding management of his Hep B therapy.  Patient has a history of chronic Hepatitis B on Entecavir. Followed by Dr. Annie Sanchez. He has previously had a Fibroscan with F3 fibrosis in 11/17. Dr. Annie Sanchez had recommended a biopsy at that time but patient refused due to cost so unknown if truly cirrhotic. Patient has no signs or symptoms of advanced liver disease or decompensated cirrhosis. LFT remain stable. Continue Entecavir. Thrombocytopenia noted. HIT panel negative. Continue to monitor. No other signs or symptoms of liver decompensation.      Thank you for allowing me to

## 2019-03-29 NOTE — PROGRESS NOTES
A-line removed. Pressure held for 15 minutes. No complications, no excessive bleeding. Site assessed for hematoma, negative. Cap refill less than 3 seconds, full sensation in hand. Pressure dressing applied to site.

## 2019-03-30 LAB
ALBUMIN SERPL-MCNC: 3.2 G/DL (ref 3.4–5)
ALP BLD-CCNC: 85 U/L (ref 40–129)
ALT SERPL-CCNC: 62 U/L (ref 10–40)
ANION GAP SERPL CALCULATED.3IONS-SCNC: 8 MMOL/L (ref 3–16)
AST SERPL-CCNC: 77 U/L (ref 15–37)
BILIRUB SERPL-MCNC: 0.4 MG/DL (ref 0–1)
BILIRUBIN DIRECT: <0.2 MG/DL (ref 0–0.3)
BILIRUBIN, INDIRECT: ABNORMAL MG/DL (ref 0–1)
BUN BLDV-MCNC: 17 MG/DL (ref 7–20)
CALCIUM SERPL-MCNC: 8.1 MG/DL (ref 8.3–10.6)
CALCIUM SERPL-MCNC: 8.2 MG/DL (ref 8.3–10.6)
CHLORIDE BLD-SCNC: 97 MMOL/L (ref 99–110)
CO2: 30 MMOL/L (ref 21–32)
CREAT SERPL-MCNC: 0.8 MG/DL (ref 0.8–1.3)
GFR AFRICAN AMERICAN: >60
GFR NON-AFRICAN AMERICAN: >60
GLUCOSE BLD-MCNC: 132 MG/DL (ref 70–99)
GLUCOSE BLD-MCNC: 161 MG/DL (ref 70–99)
GLUCOSE BLD-MCNC: 167 MG/DL (ref 70–99)
GLUCOSE BLD-MCNC: 194 MG/DL (ref 70–99)
GLUCOSE BLD-MCNC: 210 MG/DL (ref 70–99)
HCT VFR BLD CALC: 26.6 % (ref 40.5–52.5)
HEMOGLOBIN: 9.1 G/DL (ref 13.5–17.5)
INR BLD: 1.11 (ref 0.86–1.14)
MAGNESIUM: 2.1 MG/DL (ref 1.8–2.4)
MAGNESIUM: 2.4 MG/DL (ref 1.8–2.4)
MCH RBC QN AUTO: 33.3 PG (ref 26–34)
MCHC RBC AUTO-ENTMCNC: 34.3 G/DL (ref 31–36)
MCV RBC AUTO: 97.1 FL (ref 80–100)
PDW BLD-RTO: 13.6 % (ref 12.4–15.4)
PERFORMED ON: ABNORMAL
PLATELET # BLD: 147 K/UL (ref 135–450)
PMV BLD AUTO: 7.9 FL (ref 5–10.5)
POTASSIUM SERPL-SCNC: 3.9 MMOL/L (ref 3.5–5.1)
POTASSIUM SERPL-SCNC: 4 MMOL/L (ref 3.5–5.1)
PROTHROMBIN TIME: 12.7 SEC (ref 9.8–13)
RBC # BLD: 2.74 M/UL (ref 4.2–5.9)
SODIUM BLD-SCNC: 135 MMOL/L (ref 136–145)
TOTAL PROTEIN: 5.8 G/DL (ref 6.4–8.2)
WBC # BLD: 8.2 K/UL (ref 4–11)

## 2019-03-30 PROCEDURE — 84132 ASSAY OF SERUM POTASSIUM: CPT

## 2019-03-30 PROCEDURE — 2700000000 HC OXYGEN THERAPY PER DAY

## 2019-03-30 PROCEDURE — 2580000003 HC RX 258: Performed by: NURSE PRACTITIONER

## 2019-03-30 PROCEDURE — 6360000002 HC RX W HCPCS: Performed by: NURSE PRACTITIONER

## 2019-03-30 PROCEDURE — 6370000000 HC RX 637 (ALT 250 FOR IP): Performed by: THORACIC SURGERY (CARDIOTHORACIC VASCULAR SURGERY)

## 2019-03-30 PROCEDURE — 2500000003 HC RX 250 WO HCPCS: Performed by: NURSE PRACTITIONER

## 2019-03-30 PROCEDURE — 82310 ASSAY OF CALCIUM: CPT

## 2019-03-30 PROCEDURE — 36592 COLLECT BLOOD FROM PICC: CPT

## 2019-03-30 PROCEDURE — 85610 PROTHROMBIN TIME: CPT

## 2019-03-30 PROCEDURE — 83735 ASSAY OF MAGNESIUM: CPT

## 2019-03-30 PROCEDURE — 94762 N-INVAS EAR/PLS OXIMTRY CONT: CPT

## 2019-03-30 PROCEDURE — 94668 MNPJ CHEST WALL SBSQ: CPT

## 2019-03-30 PROCEDURE — 6370000000 HC RX 637 (ALT 250 FOR IP): Performed by: NURSE PRACTITIONER

## 2019-03-30 PROCEDURE — 80076 HEPATIC FUNCTION PANEL: CPT

## 2019-03-30 PROCEDURE — 80048 BASIC METABOLIC PNL TOTAL CA: CPT

## 2019-03-30 PROCEDURE — 85027 COMPLETE CBC AUTOMATED: CPT

## 2019-03-30 PROCEDURE — 94640 AIRWAY INHALATION TREATMENT: CPT

## 2019-03-30 PROCEDURE — 2580000003 HC RX 258: Performed by: THORACIC SURGERY (CARDIOTHORACIC VASCULAR SURGERY)

## 2019-03-30 PROCEDURE — 2140000000 HC CCU INTERMEDIATE R&B

## 2019-03-30 RX ORDER — SODIUM PHOSPHATE, DIBASIC AND SODIUM PHOSPHATE, MONOBASIC 7; 19 G/133ML; G/133ML
1 ENEMA RECTAL
Status: DISPENSED | OUTPATIENT
Start: 2019-03-30 | End: 2019-03-30

## 2019-03-30 RX ORDER — BISACODYL 10 MG
10 SUPPOSITORY, RECTAL RECTAL DAILY PRN
Status: DISCONTINUED | OUTPATIENT
Start: 2019-03-30 | End: 2019-04-03 | Stop reason: HOSPADM

## 2019-03-30 RX ORDER — POLYETHYLENE GLYCOL 3350 17 G/17G
17 POWDER, FOR SOLUTION ORAL DAILY
Status: DISCONTINUED | OUTPATIENT
Start: 2019-03-30 | End: 2019-04-03 | Stop reason: HOSPADM

## 2019-03-30 RX ORDER — LACTULOSE 10 G/15ML
20 SOLUTION ORAL 3 TIMES DAILY
Status: DISCONTINUED | OUTPATIENT
Start: 2019-03-30 | End: 2019-03-30

## 2019-03-30 RX ORDER — CALCIUM CARBONATE 200(500)MG
500 TABLET,CHEWABLE ORAL 3 TIMES DAILY
Status: DISCONTINUED | OUTPATIENT
Start: 2019-03-30 | End: 2019-04-03 | Stop reason: HOSPADM

## 2019-03-30 RX ORDER — FUROSEMIDE 10 MG/ML
40 INJECTION INTRAMUSCULAR; INTRAVENOUS 3 TIMES DAILY
Status: DISCONTINUED | OUTPATIENT
Start: 2019-03-30 | End: 2019-04-03 | Stop reason: HOSPADM

## 2019-03-30 RX ORDER — POTASSIUM CHLORIDE 750 MG/1
20 TABLET, FILM COATED, EXTENDED RELEASE ORAL ONCE
Status: COMPLETED | OUTPATIENT
Start: 2019-03-30 | End: 2019-03-30

## 2019-03-30 RX ORDER — CARVEDILOL 6.25 MG/1
6.25 TABLET ORAL 2 TIMES DAILY WITH MEALS
Status: DISCONTINUED | OUTPATIENT
Start: 2019-03-30 | End: 2019-04-03 | Stop reason: HOSPADM

## 2019-03-30 RX ADMIN — METFORMIN HYDROCHLORIDE 500 MG: 500 TABLET ORAL at 17:24

## 2019-03-30 RX ADMIN — TRAMADOL HYDROCHLORIDE 50 MG: 50 TABLET ORAL at 09:09

## 2019-03-30 RX ADMIN — CARVEDILOL 3.12 MG: 3.12 TABLET, FILM COATED ORAL at 09:09

## 2019-03-30 RX ADMIN — CALCIUM CHLORIDE 1 G: 100 INJECTION, SOLUTION INTRAVENOUS at 09:10

## 2019-03-30 RX ADMIN — GABAPENTIN 400 MG: 400 CAPSULE ORAL at 21:18

## 2019-03-30 RX ADMIN — CHLORHEXIDINE GLUCONATE 0.12% ORAL RINSE 15 ML: 1.2 LIQUID ORAL at 09:10

## 2019-03-30 RX ADMIN — GABAPENTIN 400 MG: 400 CAPSULE ORAL at 09:09

## 2019-03-30 RX ADMIN — Medication 400 MG: at 09:09

## 2019-03-30 RX ADMIN — CARVEDILOL 6.25 MG: 6.25 TABLET, FILM COATED ORAL at 17:24

## 2019-03-30 RX ADMIN — POTASSIUM CHLORIDE 20 MEQ: 750 TABLET, EXTENDED RELEASE ORAL at 12:26

## 2019-03-30 RX ADMIN — POLYETHYLENE GLYCOL 3350 17 G: 17 POWDER, FOR SOLUTION ORAL at 12:33

## 2019-03-30 RX ADMIN — ALPRAZOLAM 0.5 MG: 0.5 TABLET ORAL at 17:24

## 2019-03-30 RX ADMIN — INSULIN LISPRO 6 UNITS: 100 INJECTION, SOLUTION INTRAVENOUS; SUBCUTANEOUS at 09:19

## 2019-03-30 RX ADMIN — TRAMADOL HYDROCHLORIDE 50 MG: 50 TABLET ORAL at 15:01

## 2019-03-30 RX ADMIN — ENTECAVIR 1 MG: 1 TABLET ORAL at 09:14

## 2019-03-30 RX ADMIN — POTASSIUM CHLORIDE 10 MEQ: 750 TABLET, FILM COATED, EXTENDED RELEASE ORAL at 09:10

## 2019-03-30 RX ADMIN — MUPIROCIN: 20 OINTMENT TOPICAL at 21:19

## 2019-03-30 RX ADMIN — IPRATROPIUM BROMIDE AND ALBUTEROL SULFATE 1 AMPULE: .5; 3 SOLUTION RESPIRATORY (INHALATION) at 12:34

## 2019-03-30 RX ADMIN — Medication 10 ML: at 09:14

## 2019-03-30 RX ADMIN — INSULIN GLARGINE 19 UNITS: 100 INJECTION, SOLUTION SUBCUTANEOUS at 22:16

## 2019-03-30 RX ADMIN — PANTOPRAZOLE SODIUM 40 MG: 40 TABLET, DELAYED RELEASE ORAL at 09:19

## 2019-03-30 RX ADMIN — GABAPENTIN 400 MG: 400 CAPSULE ORAL at 15:01

## 2019-03-30 RX ADMIN — ALPRAZOLAM 0.5 MG: 0.5 TABLET ORAL at 22:18

## 2019-03-30 RX ADMIN — OXYCODONE HYDROCHLORIDE 10 MG: 10 TABLET ORAL at 01:19

## 2019-03-30 RX ADMIN — Medication 10 ML: at 21:19

## 2019-03-30 RX ADMIN — FUROSEMIDE 40 MG: 10 INJECTION, SOLUTION INTRAMUSCULAR; INTRAVENOUS at 09:10

## 2019-03-30 RX ADMIN — METFORMIN HYDROCHLORIDE 500 MG: 500 TABLET ORAL at 09:10

## 2019-03-30 RX ADMIN — IPRATROPIUM BROMIDE AND ALBUTEROL SULFATE 1 AMPULE: .5; 3 SOLUTION RESPIRATORY (INHALATION) at 15:56

## 2019-03-30 RX ADMIN — MUPIROCIN: 20 OINTMENT TOPICAL at 12:26

## 2019-03-30 RX ADMIN — CHLORHEXIDINE GLUCONATE 0.12% ORAL RINSE 15 ML: 1.2 LIQUID ORAL at 21:19

## 2019-03-30 RX ADMIN — FUROSEMIDE 40 MG: 10 INJECTION, SOLUTION INTRAMUSCULAR; INTRAVENOUS at 21:19

## 2019-03-30 RX ADMIN — INSULIN LISPRO 9 UNITS: 100 INJECTION, SOLUTION INTRAVENOUS; SUBCUTANEOUS at 12:26

## 2019-03-30 RX ADMIN — OXYCODONE HYDROCHLORIDE 5 MG: 5 TABLET ORAL at 17:26

## 2019-03-30 RX ADMIN — FONDAPARINUX SODIUM 2.5 MG: 2.5 INJECTION, SOLUTION SUBCUTANEOUS at 09:10

## 2019-03-30 RX ADMIN — ANTACID TABLETS 500 MG: 500 TABLET, CHEWABLE ORAL at 15:01

## 2019-03-30 RX ADMIN — TRAMADOL HYDROCHLORIDE 50 MG: 50 TABLET ORAL at 04:31

## 2019-03-30 RX ADMIN — POTASSIUM CHLORIDE 10 MEQ: 750 TABLET, FILM COATED, EXTENDED RELEASE ORAL at 17:24

## 2019-03-30 RX ADMIN — IPRATROPIUM BROMIDE AND ALBUTEROL SULFATE 1 AMPULE: .5; 3 SOLUTION RESPIRATORY (INHALATION) at 08:02

## 2019-03-30 RX ADMIN — FUROSEMIDE 40 MG: 10 INJECTION, SOLUTION INTRAMUSCULAR; INTRAVENOUS at 15:02

## 2019-03-30 RX ADMIN — ANTACID TABLETS 500 MG: 500 TABLET, CHEWABLE ORAL at 21:19

## 2019-03-30 RX ADMIN — DOCUSATE SODIUM 100 MG: 100 CAPSULE, LIQUID FILLED ORAL at 09:09

## 2019-03-30 RX ADMIN — IPRATROPIUM BROMIDE AND ALBUTEROL SULFATE 1 AMPULE: .5; 3 SOLUTION RESPIRATORY (INHALATION) at 20:39

## 2019-03-30 RX ADMIN — POTASSIUM CHLORIDE 10 MEQ: 750 TABLET, FILM COATED, EXTENDED RELEASE ORAL at 15:01

## 2019-03-30 RX ADMIN — ANTACID TABLETS 500 MG: 500 TABLET, CHEWABLE ORAL at 09:09

## 2019-03-30 RX ADMIN — INSULIN LISPRO 9 UNITS: 100 INJECTION, SOLUTION INTRAVENOUS; SUBCUTANEOUS at 17:24

## 2019-03-30 RX ADMIN — Medication 400 MG: at 21:19

## 2019-03-30 RX ADMIN — OXYCODONE HYDROCHLORIDE 10 MG: 10 TABLET ORAL at 22:18

## 2019-03-30 RX ADMIN — TRAMADOL HYDROCHLORIDE 50 MG: 50 TABLET ORAL at 21:18

## 2019-03-30 RX ADMIN — ASPIRIN 325 MG: 325 TABLET, DELAYED RELEASE ORAL at 09:10

## 2019-03-30 RX ADMIN — DOCUSATE SODIUM 100 MG: 100 CAPSULE, LIQUID FILLED ORAL at 21:18

## 2019-03-30 RX ADMIN — INSULIN LISPRO 5 UNITS: 100 INJECTION, SOLUTION INTRAVENOUS; SUBCUTANEOUS at 22:16

## 2019-03-30 ASSESSMENT — PAIN SCALES - GENERAL
PAINLEVEL_OUTOF10: 8
PAINLEVEL_OUTOF10: 5
PAINLEVEL_OUTOF10: 10
PAINLEVEL_OUTOF10: 8
PAINLEVEL_OUTOF10: 0
PAINLEVEL_OUTOF10: 7
PAINLEVEL_OUTOF10: 6
PAINLEVEL_OUTOF10: 7
PAINLEVEL_OUTOF10: 6
PAINLEVEL_OUTOF10: 6
PAINLEVEL_OUTOF10: 8

## 2019-03-30 ASSESSMENT — PAIN DESCRIPTION - FREQUENCY
FREQUENCY: CONTINUOUS

## 2019-03-30 ASSESSMENT — PAIN DESCRIPTION - ONSET
ONSET: ON-GOING

## 2019-03-30 ASSESSMENT — PAIN DESCRIPTION - DESCRIPTORS
DESCRIPTORS: ACHING
DESCRIPTORS: ACHING
DESCRIPTORS: SORE;ACHING
DESCRIPTORS: SORE;ACHING
DESCRIPTORS: ACHING
DESCRIPTORS: SORE;ACHING

## 2019-03-30 ASSESSMENT — PAIN DESCRIPTION - ORIENTATION
ORIENTATION: MID

## 2019-03-30 ASSESSMENT — PAIN DESCRIPTION - PROGRESSION
CLINICAL_PROGRESSION: GRADUALLY WORSENING

## 2019-03-30 ASSESSMENT — PAIN DESCRIPTION - LOCATION
LOCATION: STERNUM

## 2019-03-30 ASSESSMENT — PAIN DESCRIPTION - PAIN TYPE
TYPE: SURGICAL PAIN

## 2019-03-30 ASSESSMENT — PAIN - FUNCTIONAL ASSESSMENT
PAIN_FUNCTIONAL_ASSESSMENT: ACTIVITIES ARE NOT PREVENTED

## 2019-03-30 NOTE — PROGRESS NOTES
Progress Note    S/P cabgx5 3/26/19    Vital Signs:                                                 /60   Pulse 94   Temp 98.4 °F (36.9 °C) (Oral)   Resp 17   Ht 6' (1.829 m)   Wt 291 lb 14.2 oz (132.4 kg)   SpO2 93%   BMI 39.59 kg/m²  O2 Flow Rate (L/min): 1 L/min   NSR  Admission Weight: 281 lb 15.5 oz (127.9 kg)      I/O:      Intake/Output Summary (Last 24 hours) at 3/30/2019 0654  Last data filed at 3/30/2019 0600  Gross per 24 hour   Intake 1910 ml   Output 3670 ml   Net -1760 ml     Chest Tube: 300, 80, 90, serous, no air leak    General: awake, alert and oriented, no complaints  CV: reg, wound c/d/i  Pulm: decreased  Abd: soft, + BS  Ext: warm, 1+ edema  Neuro: No focal deficits, moves all extremities with equal strength bilat    Data Review:  CBC:   Recent Labs     03/28/19 0348 03/29/19 0447 03/30/19 0428   WBC 10.0 7.7 8.2   HGB 9.9* 9.6* 9.1*   HCT 29.7* 28.6* 26.6*   MCV 98.0 98.9 97.1   PLT 77* 95* 147     BMP:   Recent Labs     03/28/19 0348 03/29/19 0447 03/29/19 0448 03/30/19 0428     --  134* 135*   K 4.3  --  4.0 4.0     --  98* 97*   CO2 29  --  27 30   BUN 17  --  21* 17   CREATININE 0.8  --  0.7* 0.8   CALCIUM 8.4  --  7.7* 8.1*   MG  --  2.30  --  2.40     Cardiac Enzymes:   No results for input(s): CKTOTAL, CKMB, CKMBINDEX, TROPONINI in the last 72 hours. PT/INR:   Recent Labs     03/28/19 0348 03/29/19 0447 03/30/19 0428   PROTIME 12.9 13.4* 12.7   INR 1.13 1.18* 1.11     APTT:   No results for input(s): APTT in the last 72 hours. Assessment/Plan:  CV - stable in SR   - BB, ntg patch, No ACE due to hypotension   - no statin due to elevated LFT's              - replete calcium  pulm - pulm toilet, wean O2              - remove chest tubes when criteria is met  Renal - Cr nl. Diurese  GI - hep B. LFTs improving  Heme - acute blood loss anemia. - thrombocytopenia improving.  HIT negative   - scds dvt prophylaxis              - ASA and

## 2019-03-30 NOTE — PLAN OF CARE
Problem: Falls - Risk of:  Goal: Will remain free from falls  Description  Will remain free from falls  Outcome: Met This Shift  Goal: Absence of physical injury  Description  Absence of physical injury  Outcome: Met This Shift     Problem: Skin Integrity:  Goal: Absence of new skin breakdown  Description  Absence of new skin breakdown  Outcome: Met This Shift  Note:   Pt has Psoriasis, Inter Dry placed under breasts. Bathed daily. Repositioned frequently. Heels and elbows elevated off of bed. No signs of new skin breakdown noted. Will monitor. Problem: Tissue Perfusion - Cardiopulmonary, Altered:  Goal: Absence of angina  Description  Absence of angina  Outcome: Met This Shift  Goal: Will show no evidence of cardiac arrhythmias  Description  Will show no evidence of cardiac arrhythmias  Outcome: Met This Shift     Problem: Pain:  Description  Pain management should include both nonpharmacologic and pharmacologic interventions. Goal: Pain level will decrease  Description  Pain level will decrease  Outcome: Ongoing  Note:   Pt alert and oriented. Pt able to communicate present pain and use the pain scale appropriately. Nonpharmacological pain reducers (repositioning, emotional support, sternal splinting) and pain medication offered as needed. Will cont to monitor. Goal: Control of acute pain  Description  Control of acute pain  Outcome: Ongoing  Goal: Control of chronic pain  Description  Control of chronic pain  Outcome: Ongoing     Problem: Activity Intolerance:  Goal: Able to perform prescribed physical activity  Description  Able to perform prescribed physical activity  Outcome: Ongoing  Goal: Ability to tolerate increased activity will improve  Description  Ability to tolerate increased activity will improve  Outcome: Ongoing  Note:   Pt ambulates in room and fabian x1 assist with four wheel walker. Tolerates well. Continues to improve daily. Up to chair for meals. Will monitor.      Problem: Cardiac Output - Decreased:  Goal: Cardiac output within specified parameters  Description  Cardiac output within specified parameters  Outcome: Ongoing  Goal: Hemodynamic stability will improve  Description  Hemodynamic stability will improve  Outcome: Ongoing     Problem: Fluid Volume - Imbalance:  Goal: Ability to achieve a balanced intake and output will improve  Description  Ability to achieve a balanced intake and output will improve  Outcome: Ongoing  Note:   Pt is following a 2L fluid restriction. Continued education performed throughout shift, pt understands. Will monitor. Goal: Chest tube drainage is within specified parameters  Description  Chest tube drainage is within specified parameters  Outcome: Ongoing     Problem: Gas Exchange - Impaired:  Goal: Levels of oxygenation will improve  Description  Levels of oxygenation will improve  Outcome: Ongoing  Note:   On 1PM nasal cannula to maintain O2 sat> 90%. Pt denies SOB. Dyspnea on exertion noted. Pursed lip breathing encouraged. On room air O2 sat decreased to 87-89%. Will monitor.   Goal: Ability to maintain adequate ventilation will improve  Description  Ability to maintain adequate ventilation will improve  Outcome: Ongoing     Problem: Tissue Perfusion - Cardiopulmonary, Altered:  Goal: Hemodynamic stability will improve  Description  Hemodynamic stability will improve  Outcome: Ongoing     Electronically signed by Osman Ortiz RN on 3/30/2019 at 5:35 AM

## 2019-03-30 NOTE — PROGRESS NOTES
INPATIENT CONSULTATION:    IDENTIFYING DATA/REASON FOR CONSULTATION   PATIENT:  Kayli Alex  MRN:  [id number]  ADMIT DATE: 3/25/2019  TIME OF EVALUATION: 3/30/2019 9:04 AM  HOSPITAL STAY:   LOS: 5 days   CONSULTING PHYSICIAN:  REASON FOR CONSULTATION:    Subjective:    -No acute events overnight. Patient improving. No confusing     MEDICATIONS   SCHEDULED:    calcium chloride IVPB 1 g Once   calcium carbonate 500 mg TID   furosemide 40 mg TID   metFORMIN 500 mg BID WC   aspirin 325 mg Daily   fondaparinux 2.5 mg Daily   insulin lispro 0-9 Units Nightly   insulin lispro 0-18 Units TID WC   pantoprazole 40 mg QAM AC   carvedilol 3.125 mg BID WC   traMADol 50 mg Q6H   sodium chloride flush 10 mL 2 times per day   docusate sodium 100 mg BID   chlorhexidine 15 mL BID   magnesium oxide 400 mg BID   mupirocin  BID   potassium chloride 10 mEq TID WC   insulin glargine 0.15 Units/kg Nightly   ipratropium-albuterol 1 ampule Q4H WA   entecavir 1 mg Daily   gabapentin 400 mg TID     FLUIDS/DRIPS:     sodium chloride Stopped (03/26/19 1948)    dextrose       PRNs:   ALPRAZolam 0.5 mg TID PRN   sodium chloride flush 10 mL PRN   magnesium sulfate 2 g PRN   calcium chloride IVPB 1 g PRN   calcium chloride IVPB 2 g PRN   acetaminophen 650 mg Q4H PRN   oxyCODONE 5 mg Q4H PRN   Or     oxyCODONE 10 mg Q4H PRN   fentanNYL 25 mcg Q1H PRN   diphenhydrAMINE 25 mg Nightly PRN   ondansetron 4 mg Q8H PRN   metoclopramide 10 mg Q6H PRN   hydrALAZINE 5 mg Q1H PRN   albumin human 25 g PRN   sodium chloride 500 mL Continuous PRN   furosemide 40 mg PRN   glucose 15 g PRN   dextrose 12.5 g PRN   glucagon (rDNA) 1 mg PRN   dextrose 100 mL/hr PRN   ipratropium-albuterol 1 ampule Q4H PRN     ALLERGIES:  He [unfilled]      PHYSICAL EXAM   [unfilled]   I/O last 3 completed shifts: In: 1910 [P.O.:1910]  Out: 4377 [Urine:3200;  Chest Tube:470]  Oxygen Therapy:  @[email or web address]([id number])@  @[email or web address]([id number])@  @[id number])@    Physical Exam:  Gen: Resting in bed, NAD   CV: RRR no MRG   Pul: CTAB   Abd: Good bowel sounds throughout, no scars, soft, NT/ND, no masses, no HSM   Ext: Trace edema   Neuro: No asterixis   Skin: No jaundice, spider angiomas, dickson erythema     LABS AND IMAGING     Recent Results (from the past 24 hour(s))   POCT Glucose    Collection Time: 03/29/19  2:10 PM   Result Value Ref Range    POC Glucose 237 (H) 70 - 99 mg/dl    Performed on ACCU-CHEK    POCT Glucose    Collection Time: 03/29/19  6:16 PM   Result Value Ref Range    POC Glucose 225 (H) 70 - 99 mg/dl    Performed on ACCU-CHEK    POCT Glucose    Collection Time: 03/29/19  8:23 PM   Result Value Ref Range    POC Glucose 240 (H) 70 - 99 mg/dl    Performed on ACCU-CHEK    Basic Metabolic Panel    Collection Time: 03/30/19  4:28 AM   Result Value Ref Range    Sodium 135 (L) 136 - 145 mmol/L    Potassium 4.0 3.5 - 5.1 mmol/L    Chloride 97 (L) 99 - 110 mmol/L    CO2 30 21 - 32 mmol/L    Anion Gap 8 3 - 16    Glucose 161 (H) 70 - 99 mg/dL    BUN 17 7 - 20 mg/dL    CREATININE 0.8 0.8 - 1.3 mg/dL    GFR Non-African American >60 >60    GFR African American >60 >60    Calcium 8.1 (L) 8.3 - 10.6 mg/dL   CBC    Collection Time: 03/30/19  4:28 AM   Result Value Ref Range    WBC 8.2 4.0 - 11.0 K/uL    RBC 2.74 (L) 4.20 - 5.90 M/uL    Hemoglobin 9.1 (L) 13.5 - 17.5 g/dL    Hematocrit 26.6 (L) 40.5 - 52.5 %    MCV 97.1 80.0 - 100.0 fL    MCH 33.3 26.0 - 34.0 pg    MCHC 34.3 31.0 - 36.0 g/dL    RDW 13.6 12.4 - 15.4 %    Platelets 100 404 - 452 K/uL    MPV 7.9 5.0 - 10.5 fL   Protime-INR    Collection Time: 03/30/19  4:28 AM   Result Value Ref Range    Protime 12.7 9.8 - 13.0 sec    INR 1.11 0.86 - 1.14   Hepatic Function Panel    Collection Time: 03/30/19  4:28 AM   Result Value Ref Range    Total Protein 5.8 (L) 6.4 - 8.2 g/dL    Alb 3.2 (L) 3.4 - 5.0 g/dL    Alkaline Phosphatase Ck.     Mart Zhao MD  [address] [facility]

## 2019-03-30 NOTE — PROGRESS NOTES
0720: Handoff with A. Luers RN     0900: Dr. Andi Townsend and Ania Hunter NP rounding. New orders to continue to wean O2, and split chest tubes. Lasix frequency increase to TID.     7421: Shift assessment complete and noted in chart. VSS. Pt c/o 7/10 Surgical pain. Is asking when next dose of pain medication is due. Pt has tramadol scheduled at this time and will be given. Blood glucose 132, was given 6 units of coverage per sliding scale that has been increased today. Med pass complete and explained to patient. Chest tubes split with Ania Hunter NP. Medialstinal marked as CT#2 and Pleural marked as CT #1 (there is number to written on the bottom meaning second atrium. Was replaced this am by night shift RN.) Pt breakfast at bedside. Ate %. Wants to hold off on walk for now to let pain subside. No other needs. 1217: reassessment complete at this time. VSS. Remains unchanged. Blood sugar 194, covered with 9 units of insulin. O2 wean to RA. RT at bedside for tx. Pt also OOB. Ambulated in hallway 150 ft. Tolerated well. Back to chair. Lunch ordered. No other needs at this time. 1420: Pt placed call light on needed to have a BM. Pt assisted to the bathroom. 1530: Monitor alarming v-tach. In to assess the situation. Pt is alert and orient. Does not feel like his heart is racing. 30 seconds noted when looking back. Pt remained alert entire time. STAT Mg, K and Ca drawn and sent to lab. Waiting for results. 1645: Dr. Pablo Pen on the floor for a code in another room. Showed strip and informed on assessment. States that it is just artifact.      1900: Handoff with Verdis Bottoms

## 2019-03-30 NOTE — PROGRESS NOTES
3/29/19  1910-Shift report received from [facility]. Pt sitting up in chair watching television. VSS.  2022-Pt up to bathroom to void and returned to chair. Unable to have BM. Output noted. Shift assessment completed, see flow sheet. VSS. Pt alert and oriented x4. ST on monitor. Remains on 2LNC to maintain O2 sat > 90%. Denies SOB, N/V. Rates pain 4/10, no oral pain medications available at this time. Pt verbalizes \"I'll wait, the pain is tolerable right now. \"  Chest tubes x2 to -20 suction, no air leak present. Able to complete IS x10 achieving 1000mL. Strong, non-productive spontaneous cough noted. Surgical incisions healing appropriately, no complications. Will monitor. [id number].  2032-Scheduled PM medications admin per order. 2120-5 mg Oxycodone admin for 6/10 pain per PRN order. 2155-PRN Xanax and scheduled Tramadol admin. 2200-Pt assisted into bed. Tolerated well. Repositioned. Urinal, personal belongings, IS, and call light within reach. Bed alarm active. No needs verbalized. Will cont to monitor. 3/30/19  0020-VSS. Nasal cannula decreased to 1LPM.  Chest tube output noted. Pt rates pain 5/10. Repositioned. Informed he can have oral pain medications @ 0100, verbalizes understanding. No further needs at this time. Call light within reach. Bed alarm active. Will cont to monitor. 0200-Turned and repositioned. 0430-AM labs drawn and sent. Turned and repositioned. Rates pain 6/10; Scheduled Tramadol admin. 0439-VSS. NSR on monitor. Remains on 1LPM.  On room air, O2 decreases to 87-89%. Denies SOB. Assessment unchanged from previous. Site care performed to [facility]. Chest tube dressings changed per protocol. Output noted. Atrium full, @ 2000 mL. Atrium changed; New atrium, #2 labeled and connected to -20 suction. Daily weight obtained. Education provided on fluid restriction, verbalizes understanding. No complaints. Call light within reach.   Will cont to monitor. 0715-Shift report given to oncoming RNCROW. Pt left in stable condition.     Electronically signed by Rolm Mortimer, RN on 3/30/2019 at 7:21 AM

## 2019-03-31 ENCOUNTER — APPOINTMENT (OUTPATIENT)
Dept: GENERAL RADIOLOGY | Age: 64
DRG: 232 | End: 2019-03-31
Payer: MEDICARE

## 2019-03-31 LAB
ALBUMIN SERPL-MCNC: 3.2 G/DL (ref 3.4–5)
ALP BLD-CCNC: 92 U/L (ref 40–129)
ALT SERPL-CCNC: 59 U/L (ref 10–40)
ANION GAP SERPL CALCULATED.3IONS-SCNC: 14 MMOL/L (ref 3–16)
AST SERPL-CCNC: 56 U/L (ref 15–37)
BILIRUB SERPL-MCNC: 0.4 MG/DL (ref 0–1)
BILIRUBIN DIRECT: <0.2 MG/DL (ref 0–0.3)
BILIRUBIN, INDIRECT: ABNORMAL MG/DL (ref 0–1)
BUN BLDV-MCNC: 27 MG/DL (ref 7–20)
CALCIUM SERPL-MCNC: 8.3 MG/DL (ref 8.3–10.6)
CHLORIDE BLD-SCNC: 95 MMOL/L (ref 99–110)
CO2: 28 MMOL/L (ref 21–32)
CREAT SERPL-MCNC: 0.8 MG/DL (ref 0.8–1.3)
GFR AFRICAN AMERICAN: >60
GFR NON-AFRICAN AMERICAN: >60
GLUCOSE BLD-MCNC: 166 MG/DL (ref 70–99)
GLUCOSE BLD-MCNC: 176 MG/DL (ref 70–99)
GLUCOSE BLD-MCNC: 195 MG/DL (ref 70–99)
GLUCOSE BLD-MCNC: 197 MG/DL (ref 70–99)
GLUCOSE BLD-MCNC: 215 MG/DL (ref 70–99)
HCT VFR BLD CALC: 28.7 % (ref 40.5–52.5)
HEMOGLOBIN: 9.8 G/DL (ref 13.5–17.5)
INR BLD: 1.1 (ref 0.86–1.14)
MAGNESIUM: 2.1 MG/DL (ref 1.8–2.4)
MCH RBC QN AUTO: 33.4 PG (ref 26–34)
MCHC RBC AUTO-ENTMCNC: 34.2 G/DL (ref 31–36)
MCV RBC AUTO: 97.6 FL (ref 80–100)
PDW BLD-RTO: 13.9 % (ref 12.4–15.4)
PERFORMED ON: ABNORMAL
PLATELET # BLD: 197 K/UL (ref 135–450)
PMV BLD AUTO: 7.7 FL (ref 5–10.5)
POTASSIUM SERPL-SCNC: 3.6 MMOL/L (ref 3.5–5.1)
PROTHROMBIN TIME: 12.5 SEC (ref 9.8–13)
RBC # BLD: 2.94 M/UL (ref 4.2–5.9)
SODIUM BLD-SCNC: 137 MMOL/L (ref 136–145)
TOTAL PROTEIN: 6.2 G/DL (ref 6.4–8.2)
WBC # BLD: 8.3 K/UL (ref 4–11)

## 2019-03-31 PROCEDURE — 6370000000 HC RX 637 (ALT 250 FOR IP): Performed by: NURSE PRACTITIONER

## 2019-03-31 PROCEDURE — 94640 AIRWAY INHALATION TREATMENT: CPT

## 2019-03-31 PROCEDURE — 94762 N-INVAS EAR/PLS OXIMTRY CONT: CPT

## 2019-03-31 PROCEDURE — 2140000000 HC CCU INTERMEDIATE R&B

## 2019-03-31 PROCEDURE — 85027 COMPLETE CBC AUTOMATED: CPT

## 2019-03-31 PROCEDURE — 2580000003 HC RX 258: Performed by: THORACIC SURGERY (CARDIOTHORACIC VASCULAR SURGERY)

## 2019-03-31 PROCEDURE — 85610 PROTHROMBIN TIME: CPT

## 2019-03-31 PROCEDURE — 6360000002 HC RX W HCPCS: Performed by: THORACIC SURGERY (CARDIOTHORACIC VASCULAR SURGERY)

## 2019-03-31 PROCEDURE — 36592 COLLECT BLOOD FROM PICC: CPT

## 2019-03-31 PROCEDURE — 6370000000 HC RX 637 (ALT 250 FOR IP): Performed by: THORACIC SURGERY (CARDIOTHORACIC VASCULAR SURGERY)

## 2019-03-31 PROCEDURE — 94668 MNPJ CHEST WALL SBSQ: CPT

## 2019-03-31 PROCEDURE — 6360000002 HC RX W HCPCS: Performed by: NURSE PRACTITIONER

## 2019-03-31 PROCEDURE — 80048 BASIC METABOLIC PNL TOTAL CA: CPT

## 2019-03-31 PROCEDURE — 83735 ASSAY OF MAGNESIUM: CPT

## 2019-03-31 PROCEDURE — 80076 HEPATIC FUNCTION PANEL: CPT

## 2019-03-31 PROCEDURE — 71045 X-RAY EXAM CHEST 1 VIEW: CPT

## 2019-03-31 PROCEDURE — 94760 N-INVAS EAR/PLS OXIMETRY 1: CPT

## 2019-03-31 RX ORDER — POTASSIUM CHLORIDE 750 MG/1
40 TABLET, FILM COATED, EXTENDED RELEASE ORAL ONCE
Status: COMPLETED | OUTPATIENT
Start: 2019-03-31 | End: 2019-03-31

## 2019-03-31 RX ADMIN — DOCUSATE SODIUM 100 MG: 100 CAPSULE, LIQUID FILLED ORAL at 09:38

## 2019-03-31 RX ADMIN — GABAPENTIN 400 MG: 400 CAPSULE ORAL at 09:38

## 2019-03-31 RX ADMIN — OXYCODONE HYDROCHLORIDE 10 MG: 10 TABLET ORAL at 22:30

## 2019-03-31 RX ADMIN — TRAMADOL HYDROCHLORIDE 50 MG: 50 TABLET ORAL at 21:03

## 2019-03-31 RX ADMIN — OXYCODONE HYDROCHLORIDE 10 MG: 10 TABLET ORAL at 12:21

## 2019-03-31 RX ADMIN — INSULIN LISPRO 12 UNITS: 100 INJECTION, SOLUTION INTRAVENOUS; SUBCUTANEOUS at 16:03

## 2019-03-31 RX ADMIN — ANTACID TABLETS 500 MG: 500 TABLET, CHEWABLE ORAL at 09:38

## 2019-03-31 RX ADMIN — ALPRAZOLAM 0.5 MG: 0.5 TABLET ORAL at 05:07

## 2019-03-31 RX ADMIN — MUPIROCIN: 20 OINTMENT TOPICAL at 09:40

## 2019-03-31 RX ADMIN — Medication 10 ML: at 21:03

## 2019-03-31 RX ADMIN — FUROSEMIDE 40 MG: 10 INJECTION, SOLUTION INTRAMUSCULAR; INTRAVENOUS at 09:37

## 2019-03-31 RX ADMIN — ENTECAVIR 1 MG: 1 TABLET ORAL at 09:38

## 2019-03-31 RX ADMIN — Medication 10 ML: at 09:40

## 2019-03-31 RX ADMIN — INSULIN LISPRO 9 UNITS: 100 INJECTION, SOLUTION INTRAVENOUS; SUBCUTANEOUS at 09:37

## 2019-03-31 RX ADMIN — IPRATROPIUM BROMIDE AND ALBUTEROL SULFATE 1 AMPULE: .5; 3 SOLUTION RESPIRATORY (INHALATION) at 20:36

## 2019-03-31 RX ADMIN — ANTACID TABLETS 500 MG: 500 TABLET, CHEWABLE ORAL at 21:02

## 2019-03-31 RX ADMIN — POTASSIUM CHLORIDE 40 MEQ: 750 TABLET, EXTENDED RELEASE ORAL at 09:39

## 2019-03-31 RX ADMIN — CARVEDILOL 6.25 MG: 6.25 TABLET, FILM COATED ORAL at 16:03

## 2019-03-31 RX ADMIN — Medication 400 MG: at 21:02

## 2019-03-31 RX ADMIN — IPRATROPIUM BROMIDE AND ALBUTEROL SULFATE 1 AMPULE: .5; 3 SOLUTION RESPIRATORY (INHALATION) at 15:42

## 2019-03-31 RX ADMIN — POTASSIUM CHLORIDE 10 MEQ: 750 TABLET, FILM COATED, EXTENDED RELEASE ORAL at 16:03

## 2019-03-31 RX ADMIN — OXYCODONE HYDROCHLORIDE 10 MG: 10 TABLET ORAL at 06:08

## 2019-03-31 RX ADMIN — POLYETHYLENE GLYCOL 3350 17 G: 17 POWDER, FOR SOLUTION ORAL at 09:37

## 2019-03-31 RX ADMIN — IPRATROPIUM BROMIDE AND ALBUTEROL SULFATE 1 AMPULE: .5; 3 SOLUTION RESPIRATORY (INHALATION) at 11:56

## 2019-03-31 RX ADMIN — ASPIRIN 325 MG: 325 TABLET, DELAYED RELEASE ORAL at 09:38

## 2019-03-31 RX ADMIN — ANTACID TABLETS 500 MG: 500 TABLET, CHEWABLE ORAL at 14:03

## 2019-03-31 RX ADMIN — FENTANYL CITRATE 25 MCG: 50 INJECTION INTRAMUSCULAR; INTRAVENOUS at 08:49

## 2019-03-31 RX ADMIN — GABAPENTIN 400 MG: 400 CAPSULE ORAL at 21:02

## 2019-03-31 RX ADMIN — TRAMADOL HYDROCHLORIDE 50 MG: 50 TABLET ORAL at 09:41

## 2019-03-31 RX ADMIN — PANTOPRAZOLE SODIUM 40 MG: 40 TABLET, DELAYED RELEASE ORAL at 06:08

## 2019-03-31 RX ADMIN — FONDAPARINUX SODIUM 2.5 MG: 2.5 INJECTION, SOLUTION SUBCUTANEOUS at 09:37

## 2019-03-31 RX ADMIN — POTASSIUM CHLORIDE 10 MEQ: 750 TABLET, FILM COATED, EXTENDED RELEASE ORAL at 12:20

## 2019-03-31 RX ADMIN — GABAPENTIN 400 MG: 400 CAPSULE ORAL at 14:03

## 2019-03-31 RX ADMIN — DOCUSATE SODIUM 100 MG: 100 CAPSULE, LIQUID FILLED ORAL at 21:02

## 2019-03-31 RX ADMIN — FUROSEMIDE 40 MG: 10 INJECTION, SOLUTION INTRAMUSCULAR; INTRAVENOUS at 21:03

## 2019-03-31 RX ADMIN — ALPRAZOLAM 0.5 MG: 0.5 TABLET ORAL at 22:30

## 2019-03-31 RX ADMIN — CARVEDILOL 6.25 MG: 6.25 TABLET, FILM COATED ORAL at 09:38

## 2019-03-31 RX ADMIN — CHLORHEXIDINE GLUCONATE 0.12% ORAL RINSE 15 ML: 1.2 LIQUID ORAL at 21:03

## 2019-03-31 RX ADMIN — METFORMIN HYDROCHLORIDE 500 MG: 500 TABLET ORAL at 09:38

## 2019-03-31 RX ADMIN — TRAMADOL HYDROCHLORIDE 50 MG: 50 TABLET ORAL at 04:01

## 2019-03-31 RX ADMIN — CHLORHEXIDINE GLUCONATE 0.12% ORAL RINSE 15 ML: 1.2 LIQUID ORAL at 09:37

## 2019-03-31 RX ADMIN — ACETAMINOPHEN 650 MG: 325 TABLET, FILM COATED ORAL at 06:07

## 2019-03-31 RX ADMIN — INSULIN LISPRO 9 UNITS: 100 INJECTION, SOLUTION INTRAVENOUS; SUBCUTANEOUS at 12:20

## 2019-03-31 RX ADMIN — Medication 10 ML: at 04:39

## 2019-03-31 RX ADMIN — IPRATROPIUM BROMIDE AND ALBUTEROL SULFATE 1 AMPULE: .5; 3 SOLUTION RESPIRATORY (INHALATION) at 08:26

## 2019-03-31 RX ADMIN — METFORMIN HYDROCHLORIDE 500 MG: 500 TABLET ORAL at 16:03

## 2019-03-31 RX ADMIN — OXYCODONE HYDROCHLORIDE 10 MG: 10 TABLET ORAL at 02:06

## 2019-03-31 RX ADMIN — INSULIN GLARGINE 19 UNITS: 100 INJECTION, SOLUTION SUBCUTANEOUS at 22:37

## 2019-03-31 RX ADMIN — ALPRAZOLAM 0.5 MG: 0.5 TABLET ORAL at 14:03

## 2019-03-31 RX ADMIN — FUROSEMIDE 40 MG: 10 INJECTION, SOLUTION INTRAMUSCULAR; INTRAVENOUS at 14:03

## 2019-03-31 RX ADMIN — POTASSIUM CHLORIDE 10 MEQ: 750 TABLET, FILM COATED, EXTENDED RELEASE ORAL at 12:19

## 2019-03-31 RX ADMIN — ACETAMINOPHEN 650 MG: 325 TABLET, FILM COATED ORAL at 02:06

## 2019-03-31 RX ADMIN — INSULIN LISPRO 3 UNITS: 100 INJECTION, SOLUTION INTRAVENOUS; SUBCUTANEOUS at 22:36

## 2019-03-31 RX ADMIN — OXYCODONE HYDROCHLORIDE 10 MG: 10 TABLET ORAL at 18:23

## 2019-03-31 RX ADMIN — Medication 400 MG: at 09:38

## 2019-03-31 RX ADMIN — TRAMADOL HYDROCHLORIDE 50 MG: 50 TABLET ORAL at 16:03

## 2019-03-31 ASSESSMENT — PAIN DESCRIPTION - DESCRIPTORS
DESCRIPTORS: SHARP
DESCRIPTORS: SHARP;OTHER (COMMENT)
DESCRIPTORS: SHARP

## 2019-03-31 ASSESSMENT — PAIN SCALES - GENERAL
PAINLEVEL_OUTOF10: 10
PAINLEVEL_OUTOF10: 7
PAINLEVEL_OUTOF10: 8
PAINLEVEL_OUTOF10: 6
PAINLEVEL_OUTOF10: 0
PAINLEVEL_OUTOF10: 8
PAINLEVEL_OUTOF10: 8
PAINLEVEL_OUTOF10: 7
PAINLEVEL_OUTOF10: 8
PAINLEVEL_OUTOF10: 0
PAINLEVEL_OUTOF10: 7
PAINLEVEL_OUTOF10: 8
PAINLEVEL_OUTOF10: 8
PAINLEVEL_OUTOF10: 0
PAINLEVEL_OUTOF10: 8
PAINLEVEL_OUTOF10: 8

## 2019-03-31 ASSESSMENT — PAIN DESCRIPTION - PAIN TYPE
TYPE: SURGICAL PAIN

## 2019-03-31 ASSESSMENT — PAIN - FUNCTIONAL ASSESSMENT
PAIN_FUNCTIONAL_ASSESSMENT: ACTIVITIES ARE NOT PREVENTED

## 2019-03-31 ASSESSMENT — PAIN DESCRIPTION - ORIENTATION
ORIENTATION: MID
ORIENTATION: MID;LOWER
ORIENTATION: MID;LOWER
ORIENTATION: MID
ORIENTATION: MID
ORIENTATION: MID;LOWER
ORIENTATION: MID

## 2019-03-31 ASSESSMENT — PAIN DESCRIPTION - LOCATION
LOCATION: STERNUM

## 2019-03-31 ASSESSMENT — PAIN DESCRIPTION - ONSET
ONSET: ON-GOING

## 2019-03-31 ASSESSMENT — PAIN DESCRIPTION - FREQUENCY
FREQUENCY: INTERMITTENT
FREQUENCY: CONTINUOUS
FREQUENCY: CONTINUOUS

## 2019-03-31 ASSESSMENT — PAIN DESCRIPTION - PROGRESSION
CLINICAL_PROGRESSION: GRADUALLY IMPROVING
CLINICAL_PROGRESSION: GRADUALLY WORSENING

## 2019-03-31 NOTE — PROGRESS NOTES
03/30/2019 1921 - Bedside shift hand-off done w/ off-going RN CROW Rob. Pt is alert and oriented, SR on the monitor, on O2 @ 1L/min per nasal cannula, not in any distress, w/ chest tubes x 2 on continuous wall suction (-20 cm H2O at the Atrium), and w/ intact RIJ TLC. Call light, urinal, and bedside table are in reach. 2115 - Shift assessment completed. This RN noticed that he uses the sternal splinting pillow appropriately, but there are times he pushes/ pulls himself w/ his hands/ arms. He was re-informed that he can't push or pull himself to promote sternal wound healing and to prevent sternal injury. He agreed. He was able to do an average of 800 cc on the IS. He was re-educated about doing the IS 10 times every hour when he's awake to prevent lung atelectasis/ pulmonary complications. He verbalized understanding. 2218 - Has an \"8\"/10 mid-sternal pain. Oxycodone IR 10 mg PO given. Xanax 0.5 mg PO was also given per his request.    2310 - Asleep. 2343 - Just called-out to ask for help in using the urinal. When asked about his pain, he said that it didn't change since the last time. This RN offered Tylenol while waiting for the PRN Oxycodone to be due. He refused to take the Tylenol and said that he's ok to wait for the next Oxycodone dose @ 2 AM. This RN offered Benadryl to help him sleep. He verbalized, \"I'm not a fan of mixing things. \" He refused to take the Benadryl. No other needs right now. 03/31/2019    0206 - Oxycodone IR 10 mg and Tylenol 650 mg PO given for an \"8\"/10 \"sharp and excruciating\" surgical mid-sternal pain. Warm blanket applied as he requested. 1770 - Surgical mid-sternal pain is gradually improving and is now a \"7\"/10. He was informed that the next pain med is due @ 0400. He is agreeable w/ this. 0330 - PCA assisted him to the bathroom to do AM care/ bath. 3259 - Portable CXR done. 0401 - Reassessment unchanged. 4300 - Sitting on the bedside recliner.     1394 -

## 2019-03-31 NOTE — PLAN OF CARE
Problem: Pain:  Goal: Pain level will decrease  Description  Pain level will decrease  Outcome: Ongoing  A: Pain assessment. Administer scheduled and PRN pain meds. Non-pharmacological interventions such as splinting, rest, repositioning, and emotional support. Pain reassessment after an hour. Informing the MD of unmanaged pain. Problem: Falls - Risk of:  Goal: Will remain free from falls  Description  Will remain free from falls  Outcome: Ongoing  A: Fall prevention education. Call light, bedside table, and urinal are in reach. Non-skid socks on. Bed locked at the lowest position. Bed/ chair alarm activated. Intentional rounding. Room free of clutter.     Electronically signed by Roxie Lennon RN on 3/31/2019 at 12:52 AM

## 2019-03-31 NOTE — PROGRESS NOTES
Progress Note    S/P cabgx5 3/26/19    Vital Signs:                                                 /88   Pulse 92   Temp 97.6 °F (36.4 °C) (Oral)   Resp 17   Ht 5' 11\" (1.803 m)   Wt 290 lb 12.6 oz (131.9 kg)   SpO2 (!) 88%   BMI 40.56 kg/m²  O2 Flow Rate (L/min): 1 L/min   NSR  Admission Weight: 281 lb 15.5 oz (127.9 kg)      I/O:      Intake/Output Summary (Last 24 hours) at 3/31/2019 0701  Last data filed at 3/31/2019 0532  Gross per 24 hour   Intake 1760 ml   Output 4804 ml   Net -3044 ml     Chest Tube:   Med: 80, 84, 136  Pleural: 45, 5, 4    General: awake, alert and oriented  CV: reg, wound c/d/i  Pulm: decreased  Abd: soft, + BS  Ext: warm, 1+ edema  Neuro: No focal deficits, moves all extremities with equal strength bilat    Data Review:  CBC:   Recent Labs     03/29/19 0447 03/30/19 0428 03/31/19 0442   WBC 7.7 8.2 8.3   HGB 9.6* 9.1* 9.8*   HCT 28.6* 26.6* 28.7*   MCV 98.9 97.1 97.6   PLT 95* 147 197     BMP:   Recent Labs     03/29/19  0448 03/30/19  0428 03/30/19  1556 03/31/19  0442   * 135*  --  137   K 4.0 4.0 3.9 3.6   CL 98* 97*  --  95*   CO2 27 30  --  28   BUN 21* 17  --  27*   CREATININE 0.7* 0.8  --  0.8   CALCIUM 7.7* 8.1* 8.2* 8.3   MG  --  2.40 2.10 2.10     Cardiac Enzymes:   No results for input(s): CKTOTAL, CKMB, CKMBINDEX, TROPONINI in the last 72 hours. PT/INR:   Recent Labs     03/29/19 0447 03/30/19 0428 03/31/19  0442   PROTIME 13.4* 12.7 12.5   INR 1.18* 1.11 1.10     APTT:   No results for input(s): APTT in the last 72 hours. Assessment/Plan:  CV - stable in SR   - BB, ntg patch, No ACE due to hypotension   - no statin due to elevated LFT's  pulm - pulm toilet, wean O2              - remove pleural chest tube  Renal - Cr nl. Diurese with TID lasix  GI - hep B. LFTs stable  Heme - acute blood loss anemia. - thrombocytopenia improving.  HIT negative   - scds dvt prophylaxis              - ASA and arixtra   Pain - oxy, ultram. No acet due to elevated lfts  Dispo  - eventual discharge to home with home health      KARYN Martins CNP  3/31/2019  7:01 AM

## 2019-03-31 NOTE — PROGRESS NOTES
0720:Handoff with Walter Walker, pt currently sleeping in bed. 0840: Dr. Kika Hubbard and Lili Orozco NP rounding for CTS. New orders to remove mediastinal chest tube. 0920: Mediastinal removed per order. Lili Orozco NP at bedside for chest tube removal. Pt instructed on procedure. Pt premedicated with 25 mcg of fentanyl. Dressings removed. Incision within normal limits. Site cleansed and prepped per protocol. Chest tubes x1 removed without difficulty. Vaseline gauze and dry sterile dressing applied. Bilateral breath sounds audible. O2Sats 94% on room air. Patient tolerated well. 5871: Shift assessment complete at this time and noted in chart. VSS. P/c of 8/10 pain. Schedule tramadol was given. Blood glucose 194. 9 units of insulin coverage given. Med pass also complete. Pt up OOB and to the chair. Assisted pt to use urinal after getting up. Output noted. Pt repositioned in the chair. No other needs at this time     1215: Vitals obtained. Blood glucose 176. 9 units of coverage given. Requesting pain medication for pain 10/10 triggered by coughing. Was given 10 mg of oxycodone for pain score. No other needs family at bedside. 1400: pt requesting to get back to bed. Also requesting xanax. Pt encouraged to walk. States he doesn't really feel like it but will walked 200 ft. Tolerated well. Assisted to the bathroom. No BM just gas. Repositioned back in bed. Xanax given and other scheduled meds given see mar. No other needs. \    1600: Pt placed on call light to use the urinal, output noted. Reassessment complete. Remains unchanged. Blood sugar 215, covered with 12 units of insulin. Tramadol given for 8/10 pain. No other needs. 1715: Pt o2 sat fluctuating between low 80's-90's. Pt sleeping with mouth open breathing through mouth. Place 1L on while asleep.     1919:Handoff with Aneta Vega RN

## 2019-04-01 LAB
ALBUMIN SERPL-MCNC: 3 G/DL (ref 3.4–5)
ALP BLD-CCNC: 79 U/L (ref 40–129)
ALT SERPL-CCNC: 45 U/L (ref 10–40)
ANION GAP SERPL CALCULATED.3IONS-SCNC: 8 MMOL/L (ref 3–16)
AST SERPL-CCNC: 34 U/L (ref 15–37)
BILIRUB SERPL-MCNC: 0.4 MG/DL (ref 0–1)
BILIRUBIN DIRECT: <0.2 MG/DL (ref 0–0.3)
BILIRUBIN, INDIRECT: ABNORMAL MG/DL (ref 0–1)
BUN BLDV-MCNC: 14 MG/DL (ref 7–20)
CALCIUM SERPL-MCNC: 8.2 MG/DL (ref 8.3–10.6)
CHLORIDE BLD-SCNC: 101 MMOL/L (ref 99–110)
CO2: 31 MMOL/L (ref 21–32)
CREAT SERPL-MCNC: 0.7 MG/DL (ref 0.8–1.3)
GFR AFRICAN AMERICAN: >60
GFR NON-AFRICAN AMERICAN: >60
GLUCOSE BLD-MCNC: 135 MG/DL (ref 70–99)
GLUCOSE BLD-MCNC: 140 MG/DL (ref 70–99)
GLUCOSE BLD-MCNC: 149 MG/DL (ref 70–99)
GLUCOSE BLD-MCNC: 204 MG/DL (ref 70–99)
GLUCOSE BLD-MCNC: 264 MG/DL (ref 70–99)
HCT VFR BLD CALC: 28.4 % (ref 40.5–52.5)
HEMOGLOBIN: 9.4 G/DL (ref 13.5–17.5)
INR BLD: 1.1 (ref 0.86–1.14)
MAGNESIUM: 2.2 MG/DL (ref 1.8–2.4)
MCH RBC QN AUTO: 32.7 PG (ref 26–34)
MCHC RBC AUTO-ENTMCNC: 33.2 G/DL (ref 31–36)
MCV RBC AUTO: 98.4 FL (ref 80–100)
PDW BLD-RTO: 13.8 % (ref 12.4–15.4)
PERFORMED ON: ABNORMAL
PLATELET # BLD: 223 K/UL (ref 135–450)
PMV BLD AUTO: 7.5 FL (ref 5–10.5)
POTASSIUM SERPL-SCNC: 3.9 MMOL/L (ref 3.5–5.1)
PROTHROMBIN TIME: 12.5 SEC (ref 9.8–13)
RBC # BLD: 2.89 M/UL (ref 4.2–5.9)
SODIUM BLD-SCNC: 140 MMOL/L (ref 136–145)
TOTAL PROTEIN: 5.9 G/DL (ref 6.4–8.2)
WBC # BLD: 6.9 K/UL (ref 4–11)

## 2019-04-01 PROCEDURE — 85610 PROTHROMBIN TIME: CPT

## 2019-04-01 PROCEDURE — 80048 BASIC METABOLIC PNL TOTAL CA: CPT

## 2019-04-01 PROCEDURE — 80076 HEPATIC FUNCTION PANEL: CPT

## 2019-04-01 PROCEDURE — 94667 MNPJ CHEST WALL 1ST: CPT

## 2019-04-01 PROCEDURE — 6370000000 HC RX 637 (ALT 250 FOR IP): Performed by: NURSE PRACTITIONER

## 2019-04-01 PROCEDURE — 94762 N-INVAS EAR/PLS OXIMTRY CONT: CPT

## 2019-04-01 PROCEDURE — 6370000000 HC RX 637 (ALT 250 FOR IP): Performed by: THORACIC SURGERY (CARDIOTHORACIC VASCULAR SURGERY)

## 2019-04-01 PROCEDURE — 97116 GAIT TRAINING THERAPY: CPT

## 2019-04-01 PROCEDURE — 97530 THERAPEUTIC ACTIVITIES: CPT

## 2019-04-01 PROCEDURE — 6360000002 HC RX W HCPCS: Performed by: NURSE PRACTITIONER

## 2019-04-01 PROCEDURE — 99024 POSTOP FOLLOW-UP VISIT: CPT | Performed by: THORACIC SURGERY (CARDIOTHORACIC VASCULAR SURGERY)

## 2019-04-01 PROCEDURE — 2140000000 HC CCU INTERMEDIATE R&B

## 2019-04-01 PROCEDURE — 36592 COLLECT BLOOD FROM PICC: CPT

## 2019-04-01 PROCEDURE — 85027 COMPLETE CBC AUTOMATED: CPT

## 2019-04-01 PROCEDURE — 83735 ASSAY OF MAGNESIUM: CPT

## 2019-04-01 PROCEDURE — 2580000003 HC RX 258: Performed by: THORACIC SURGERY (CARDIOTHORACIC VASCULAR SURGERY)

## 2019-04-01 PROCEDURE — 94640 AIRWAY INHALATION TREATMENT: CPT

## 2019-04-01 RX ORDER — LACTULOSE 10 G/15ML
20 SOLUTION ORAL 3 TIMES DAILY
Status: DISCONTINUED | OUTPATIENT
Start: 2019-04-01 | End: 2019-04-03 | Stop reason: HOSPADM

## 2019-04-01 RX ADMIN — Medication 10 ML: at 05:06

## 2019-04-01 RX ADMIN — ANTACID TABLETS 500 MG: 500 TABLET, CHEWABLE ORAL at 16:28

## 2019-04-01 RX ADMIN — INSULIN LISPRO 9 UNITS: 100 INJECTION, SOLUTION INTRAVENOUS; SUBCUTANEOUS at 16:28

## 2019-04-01 RX ADMIN — METFORMIN HYDROCHLORIDE 500 MG: 500 TABLET ORAL at 16:28

## 2019-04-01 RX ADMIN — POLYETHYLENE GLYCOL 3350 17 G: 17 POWDER, FOR SOLUTION ORAL at 09:52

## 2019-04-01 RX ADMIN — INSULIN LISPRO 5 UNITS: 100 INJECTION, SOLUTION INTRAVENOUS; SUBCUTANEOUS at 20:40

## 2019-04-01 RX ADMIN — ENTECAVIR 1 MG: 1 TABLET ORAL at 09:47

## 2019-04-01 RX ADMIN — Medication 400 MG: at 20:47

## 2019-04-01 RX ADMIN — IPRATROPIUM BROMIDE AND ALBUTEROL SULFATE 1 AMPULE: .5; 3 SOLUTION RESPIRATORY (INHALATION) at 20:14

## 2019-04-01 RX ADMIN — Medication 10 ML: at 20:48

## 2019-04-01 RX ADMIN — DOCUSATE SODIUM 100 MG: 100 CAPSULE, LIQUID FILLED ORAL at 09:46

## 2019-04-01 RX ADMIN — CHLORHEXIDINE GLUCONATE 0.12% ORAL RINSE 15 ML: 1.2 LIQUID ORAL at 09:46

## 2019-04-01 RX ADMIN — INSULIN GLARGINE 19 UNITS: 100 INJECTION, SOLUTION SUBCUTANEOUS at 20:40

## 2019-04-01 RX ADMIN — FUROSEMIDE 40 MG: 10 INJECTION, SOLUTION INTRAMUSCULAR; INTRAVENOUS at 09:46

## 2019-04-01 RX ADMIN — IPRATROPIUM BROMIDE AND ALBUTEROL SULFATE 1 AMPULE: .5; 3 SOLUTION RESPIRATORY (INHALATION) at 07:38

## 2019-04-01 RX ADMIN — ASPIRIN 325 MG: 325 TABLET, DELAYED RELEASE ORAL at 09:46

## 2019-04-01 RX ADMIN — ANTACID TABLETS 500 MG: 500 TABLET, CHEWABLE ORAL at 20:47

## 2019-04-01 RX ADMIN — METFORMIN HYDROCHLORIDE 500 MG: 500 TABLET ORAL at 09:45

## 2019-04-01 RX ADMIN — POTASSIUM CHLORIDE 10 MEQ: 750 TABLET, FILM COATED, EXTENDED RELEASE ORAL at 09:46

## 2019-04-01 RX ADMIN — PANTOPRAZOLE SODIUM 40 MG: 40 TABLET, DELAYED RELEASE ORAL at 06:41

## 2019-04-01 RX ADMIN — POTASSIUM CHLORIDE 10 MEQ: 750 TABLET, FILM COATED, EXTENDED RELEASE ORAL at 16:20

## 2019-04-01 RX ADMIN — INSULIN LISPRO 15 UNITS: 100 INJECTION, SOLUTION INTRAVENOUS; SUBCUTANEOUS at 12:45

## 2019-04-01 RX ADMIN — INSULIN LISPRO 9 UNITS: 100 INJECTION, SOLUTION INTRAVENOUS; SUBCUTANEOUS at 09:47

## 2019-04-01 RX ADMIN — OXYCODONE HYDROCHLORIDE 5 MG: 5 TABLET ORAL at 20:47

## 2019-04-01 RX ADMIN — TRAMADOL HYDROCHLORIDE 50 MG: 50 TABLET ORAL at 03:43

## 2019-04-01 RX ADMIN — IPRATROPIUM BROMIDE AND ALBUTEROL SULFATE 1 AMPULE: .5; 3 SOLUTION RESPIRATORY (INHALATION) at 16:48

## 2019-04-01 RX ADMIN — CARVEDILOL 6.25 MG: 6.25 TABLET, FILM COATED ORAL at 16:27

## 2019-04-01 RX ADMIN — TRAMADOL HYDROCHLORIDE 50 MG: 50 TABLET ORAL at 09:46

## 2019-04-01 RX ADMIN — GABAPENTIN 400 MG: 400 CAPSULE ORAL at 20:46

## 2019-04-01 RX ADMIN — TRAMADOL HYDROCHLORIDE 50 MG: 50 TABLET ORAL at 22:37

## 2019-04-01 RX ADMIN — POTASSIUM CHLORIDE 10 MEQ: 750 TABLET, FILM COATED, EXTENDED RELEASE ORAL at 12:45

## 2019-04-01 RX ADMIN — Medication 400 MG: at 09:46

## 2019-04-01 RX ADMIN — Medication 10 ML: at 05:12

## 2019-04-01 RX ADMIN — LACTULOSE 20 G: 20 SOLUTION ORAL at 16:31

## 2019-04-01 RX ADMIN — ANTACID TABLETS 500 MG: 500 TABLET, CHEWABLE ORAL at 09:46

## 2019-04-01 RX ADMIN — GABAPENTIN 400 MG: 400 CAPSULE ORAL at 09:46

## 2019-04-01 RX ADMIN — OXYCODONE HYDROCHLORIDE 10 MG: 10 TABLET ORAL at 06:41

## 2019-04-01 RX ADMIN — OXYCODONE HYDROCHLORIDE 10 MG: 10 TABLET ORAL at 02:24

## 2019-04-01 RX ADMIN — CHLORHEXIDINE GLUCONATE 0.12% ORAL RINSE 15 ML: 1.2 LIQUID ORAL at 20:56

## 2019-04-01 RX ADMIN — ALPRAZOLAM 0.5 MG: 0.5 TABLET ORAL at 03:43

## 2019-04-01 RX ADMIN — FUROSEMIDE 40 MG: 10 INJECTION, SOLUTION INTRAMUSCULAR; INTRAVENOUS at 16:21

## 2019-04-01 RX ADMIN — IPRATROPIUM BROMIDE AND ALBUTEROL SULFATE 1 AMPULE: .5; 3 SOLUTION RESPIRATORY (INHALATION) at 11:58

## 2019-04-01 RX ADMIN — FONDAPARINUX SODIUM 2.5 MG: 2.5 INJECTION, SOLUTION SUBCUTANEOUS at 09:46

## 2019-04-01 RX ADMIN — CARVEDILOL 6.25 MG: 6.25 TABLET, FILM COATED ORAL at 09:45

## 2019-04-01 RX ADMIN — TRAMADOL HYDROCHLORIDE 50 MG: 50 TABLET ORAL at 16:21

## 2019-04-01 RX ADMIN — GABAPENTIN 400 MG: 400 CAPSULE ORAL at 16:21

## 2019-04-01 RX ADMIN — Medication 10 ML: at 09:52

## 2019-04-01 RX ADMIN — LACTULOSE 20 G: 20 SOLUTION ORAL at 09:46

## 2019-04-01 RX ADMIN — FUROSEMIDE 40 MG: 10 INJECTION, SOLUTION INTRAMUSCULAR; INTRAVENOUS at 20:46

## 2019-04-01 RX ADMIN — OXYCODONE HYDROCHLORIDE 5 MG: 5 TABLET ORAL at 12:45

## 2019-04-01 RX ADMIN — ALPRAZOLAM 0.5 MG: 0.5 TABLET ORAL at 12:45

## 2019-04-01 ASSESSMENT — PAIN DESCRIPTION - PAIN TYPE
TYPE: SURGICAL PAIN

## 2019-04-01 ASSESSMENT — PAIN DESCRIPTION - FREQUENCY
FREQUENCY: CONTINUOUS
FREQUENCY: INTERMITTENT
FREQUENCY: CONTINUOUS

## 2019-04-01 ASSESSMENT — PAIN DESCRIPTION - DESCRIPTORS
DESCRIPTORS: STABBING
DESCRIPTORS: DISCOMFORT
DESCRIPTORS: SHARP
DESCRIPTORS: DISCOMFORT
DESCRIPTORS: SHARP
DESCRIPTORS: CONSTANT;SHARP
DESCRIPTORS: SHARP

## 2019-04-01 ASSESSMENT — PAIN DESCRIPTION - ORIENTATION
ORIENTATION: MID;LOWER
ORIENTATION: MID
ORIENTATION: MID
ORIENTATION: MID;LOWER
ORIENTATION: MID
ORIENTATION: MID;LOWER

## 2019-04-01 ASSESSMENT — PAIN SCALES - GENERAL
PAINLEVEL_OUTOF10: 4
PAINLEVEL_OUTOF10: 6
PAINLEVEL_OUTOF10: 4
PAINLEVEL_OUTOF10: 8
PAINLEVEL_OUTOF10: 7
PAINLEVEL_OUTOF10: 6
PAINLEVEL_OUTOF10: 5
PAINLEVEL_OUTOF10: 8
PAINLEVEL_OUTOF10: 2
PAINLEVEL_OUTOF10: 7
PAINLEVEL_OUTOF10: 6
PAINLEVEL_OUTOF10: 7
PAINLEVEL_OUTOF10: 0
PAINLEVEL_OUTOF10: 3

## 2019-04-01 ASSESSMENT — PAIN DESCRIPTION - LOCATION
LOCATION: STERNUM

## 2019-04-01 ASSESSMENT — PAIN - FUNCTIONAL ASSESSMENT
PAIN_FUNCTIONAL_ASSESSMENT: ACTIVITIES ARE NOT PREVENTED

## 2019-04-01 ASSESSMENT — PAIN DESCRIPTION - ONSET
ONSET: ON-GOING
ONSET: ON-GOING

## 2019-04-01 NOTE — PROGRESS NOTES
Progress Note    S/P cabgx5 3/26/19    Vital Signs:                                                 BP (!) 100/58   Pulse 86   Temp 97.7 °F (36.5 °C) (Oral)   Resp 20   Ht 5' 11\" (1.803 m)   Wt 285 lb 15 oz (129.7 kg)   SpO2 95%   BMI 39.88 kg/m²  O2 Flow Rate (L/min): 1 L/min(placed on room air)   NSR  Admission Weight: 281 lb 15.5 oz (127.9 kg)      I/O:      Intake/Output Summary (Last 24 hours) at 4/1/2019 0803  Last data filed at 4/1/2019 1728  Gross per 24 hour   Intake 2124 ml   Output 3845 ml   Net -1721 ml     Chest Tube (pleural): 100, 40, 80    CV: reg, wound c/d/i  Pulm: decreased  Abd: soft  Ext: warm, no edema    Data Review:  CBC:   Recent Labs     03/30/19 0428 03/31/19 0442 04/01/19  0512   WBC 8.2 8.3 6.9   HGB 9.1* 9.8* 9.4*   HCT 26.6* 28.7* 28.4*   MCV 97.1 97.6 98.4    197 223     BMP:   Recent Labs     03/30/19 0428 03/30/19  1556 03/31/19 0442 04/01/19  0512   *  --  137 140   K 4.0 3.9 3.6 3.9   CL 97*  --  95* 101   CO2 30  --  28 31   BUN 17  --  27* 14   CREATININE 0.8  --  0.8 0.7*   CALCIUM 8.1* 8.2* 8.3 8.2*   MG 2.40 2.10 2.10 2.20     Cardiac Enzymes:   No results for input(s): CKTOTAL, CKMB, CKMBINDEX, TROPONINI in the last 72 hours. PT/INR:   Recent Labs     03/30/19 0428 03/31/19 0442 04/01/19  0512   PROTIME 12.7 12.5 12.5   INR 1.11 1.10 1.10     APTT:   No results for input(s): APTT in the last 72 hours. Assessment/Plan:  CV - stable in SR.    - BB. No ACE given marginal bp.   - no statin just yet while following liver function, likely resume at d/c  pulm - pulm toilet, off O2  Renal - Cr nl. GI - hep B. LFTs close to normal.  Heme - acute blood loss anemia.    - scds dvt prophylaxis, arixtra   - ASA  Pain - oxy, ultram. No acet while follow lfts  dispo - d/c planning, likely ready tomorrow      Juan Anaya MD  4/1/2019  8:03 AM

## 2019-04-01 NOTE — PROGRESS NOTES
03/31/2019 1919 - Bedside shift hand-off done w/ off-going PRABHAKAR Navas. Pt. is alert and oriented, SR on the monitor, on O2 @ 1L/min per nasal cannula, and not in any distress. He has one chest tube to continuous wall suction (-20 cm H2O at the Atrium); no air leak seen. Call light, urinal, and bedside table are in reach. He denies any needs right now. 1935 - Shift assessment completed. Plan of care for this shift was explained. He said that his surgical mid-sternal pain is down to a \"6\"/10 from an \"8\" since the Oxycodone was given about an hour ago. 2103 - Scheduled Tramadol 50 mg PO was given, along w/ other bedtime meds. His \"sharp\" surgical pain is at \"8\"/10 right now. He said that he mentioned about his pain when CVTS rounded earlier. 2230 - Oxycodone IR 10 mg PO given for an \"8\"/10 mid-sternal surgical pain. Xanax 0.5 mg PO was also given per his request. He was repositioned in bed. Pt. was informed of when the next scheduled pain med and PRN pain med will be given. He verbalized understanding. He was offered PRN Benadryl for sleep, but he refused. 04/01/2019    0010 - Asleep w/ easy and even respirations. 0224 - Woke-up about 1/2 an hour ago. He requested to have pain med for an \"8\"/10 sharp surgical lower mid-sternal pain. Oxycodone IR 10 mg PO given. He was repositioned in bed. No other needs at the moment. 0789 - He was repositioned, lying on his R side as he requested. Due Tramadol 50 mg PO given. His pain was down to a \"7\"/10 but now, it's \"8\"/10 after repositioning. PRN Xanax 0.5 mg PO was also given. 0505 - O2 sat = 96% on O2 @ 1L. Placed on room air. 0515 - O2 sat = 94% on room air. 0530 - Bath completed w/ Chlorhexidine and bath wipes. 0600 - Resting.    0641 - Oxycodone IR 10 mg PO given for a \"7\"/10 lower mid-sternal surgical pain. He was repositioned in bed. Denies anymore needs. Visitors at the bedside.  He wants to rest in bed a little more.    3367 - Bedside
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
## 2019-04-01 NOTE — PLAN OF CARE
Problem: Falls - Risk of:  Goal: Will remain free from falls  Description  Will remain free from falls  Outcome: Ongoing  A: Fall prevention education. Call light, bedside table, and urinal are in reach. Non-skid socks on. Bed locked at the lowest position. Bed/ chair alarm activated. Intentional rounding. Room free of clutter. Problem: Pain:  Goal: Pain level will decrease  Description  Pain level will decrease  Outcome: Ongoing  A: Pain assessment. Administer scheduled and PRN pain meds. Non-pharmacological interventions such as splinting, rest, repositioning, and emotional support. Pain reassessment after an hour. Informing the MD of unmanaged pain.     Electronically signed by Tristen Kennedy RN on 4/1/2019 at 12:19 AM

## 2019-04-01 NOTE — CARE COORDINATION
[facility] received referral from PT for [address]. Will need PT notes and MD Orders. Will verify patient's insurance and follow up with patient to deliver the ordered item(s) prior to discharge.     Thank you for the referral.  Electronically signed by Phil Stern on 4/1/2019 at 11:19 AM  Cell ph# [phone]

## 2019-04-02 LAB
ALBUMIN SERPL-MCNC: 3.1 G/DL (ref 3.4–5)
ALP BLD-CCNC: 82 U/L (ref 40–129)
ALT SERPL-CCNC: 38 U/L (ref 10–40)
ANION GAP SERPL CALCULATED.3IONS-SCNC: 9 MMOL/L (ref 3–16)
AST SERPL-CCNC: 28 U/L (ref 15–37)
BILIRUB SERPL-MCNC: 0.4 MG/DL (ref 0–1)
BILIRUBIN DIRECT: <0.2 MG/DL (ref 0–0.3)
BILIRUBIN, INDIRECT: ABNORMAL MG/DL (ref 0–1)
BUN BLDV-MCNC: 20 MG/DL (ref 7–20)
CALCIUM SERPL-MCNC: 8.6 MG/DL (ref 8.3–10.6)
CHLORIDE BLD-SCNC: 96 MMOL/L (ref 99–110)
CO2: 31 MMOL/L (ref 21–32)
CREAT SERPL-MCNC: 0.7 MG/DL (ref 0.8–1.3)
GFR AFRICAN AMERICAN: >60
GFR NON-AFRICAN AMERICAN: >60
GLUCOSE BLD-MCNC: 154 MG/DL (ref 70–99)
GLUCOSE BLD-MCNC: 186 MG/DL (ref 70–99)
GLUCOSE BLD-MCNC: 200 MG/DL (ref 70–99)
GLUCOSE BLD-MCNC: 227 MG/DL (ref 70–99)
GLUCOSE BLD-MCNC: 247 MG/DL (ref 70–99)
HCT VFR BLD CALC: 30.5 % (ref 40.5–52.5)
HEMOGLOBIN: 10.3 G/DL (ref 13.5–17.5)
INR BLD: 1.11 (ref 0.86–1.14)
MAGNESIUM: 2.3 MG/DL (ref 1.8–2.4)
MCH RBC QN AUTO: 33.1 PG (ref 26–34)
MCHC RBC AUTO-ENTMCNC: 33.8 G/DL (ref 31–36)
MCV RBC AUTO: 98 FL (ref 80–100)
PDW BLD-RTO: 14 % (ref 12.4–15.4)
PERFORMED ON: ABNORMAL
PLATELET # BLD: 287 K/UL (ref 135–450)
PMV BLD AUTO: 7.2 FL (ref 5–10.5)
POTASSIUM SERPL-SCNC: 4 MMOL/L (ref 3.5–5.1)
PROTHROMBIN TIME: 12.7 SEC (ref 9.8–13)
RBC # BLD: 3.11 M/UL (ref 4.2–5.9)
SODIUM BLD-SCNC: 136 MMOL/L (ref 136–145)
TOTAL PROTEIN: 6.2 G/DL (ref 6.4–8.2)
WBC # BLD: 7.5 K/UL (ref 4–11)

## 2019-04-02 PROCEDURE — 6370000000 HC RX 637 (ALT 250 FOR IP): Performed by: NURSE PRACTITIONER

## 2019-04-02 PROCEDURE — 94640 AIRWAY INHALATION TREATMENT: CPT

## 2019-04-02 PROCEDURE — 80076 HEPATIC FUNCTION PANEL: CPT

## 2019-04-02 PROCEDURE — 2140000000 HC CCU INTERMEDIATE R&B

## 2019-04-02 PROCEDURE — 6370000000 HC RX 637 (ALT 250 FOR IP): Performed by: THORACIC SURGERY (CARDIOTHORACIC VASCULAR SURGERY)

## 2019-04-02 PROCEDURE — 80048 BASIC METABOLIC PNL TOTAL CA: CPT

## 2019-04-02 PROCEDURE — 94760 N-INVAS EAR/PLS OXIMETRY 1: CPT

## 2019-04-02 PROCEDURE — 83735 ASSAY OF MAGNESIUM: CPT

## 2019-04-02 PROCEDURE — 6360000002 HC RX W HCPCS: Performed by: THORACIC SURGERY (CARDIOTHORACIC VASCULAR SURGERY)

## 2019-04-02 PROCEDURE — 99024 POSTOP FOLLOW-UP VISIT: CPT | Performed by: THORACIC SURGERY (CARDIOTHORACIC VASCULAR SURGERY)

## 2019-04-02 PROCEDURE — 2580000003 HC RX 258: Performed by: THORACIC SURGERY (CARDIOTHORACIC VASCULAR SURGERY)

## 2019-04-02 PROCEDURE — 6360000002 HC RX W HCPCS: Performed by: NURSE PRACTITIONER

## 2019-04-02 PROCEDURE — 97116 GAIT TRAINING THERAPY: CPT

## 2019-04-02 PROCEDURE — 94761 N-INVAS EAR/PLS OXIMETRY MLT: CPT

## 2019-04-02 PROCEDURE — 85027 COMPLETE CBC AUTOMATED: CPT

## 2019-04-02 PROCEDURE — 85610 PROTHROMBIN TIME: CPT

## 2019-04-02 PROCEDURE — 97530 THERAPEUTIC ACTIVITIES: CPT

## 2019-04-02 RX ORDER — INSULIN GLARGINE 100 [IU]/ML
25 INJECTION, SOLUTION SUBCUTANEOUS NIGHTLY
Status: DISCONTINUED | OUTPATIENT
Start: 2019-04-02 | End: 2019-04-03 | Stop reason: HOSPADM

## 2019-04-02 RX ORDER — ATORVASTATIN CALCIUM 80 MG/1
80 TABLET, FILM COATED ORAL NIGHTLY
Status: DISCONTINUED | OUTPATIENT
Start: 2019-04-02 | End: 2019-04-03 | Stop reason: HOSPADM

## 2019-04-02 RX ADMIN — DOCUSATE SODIUM 100 MG: 100 CAPSULE, LIQUID FILLED ORAL at 22:26

## 2019-04-02 RX ADMIN — FENTANYL CITRATE 25 MCG: 50 INJECTION INTRAMUSCULAR; INTRAVENOUS at 13:34

## 2019-04-02 RX ADMIN — INSULIN GLARGINE 25 UNITS: 100 INJECTION, SOLUTION SUBCUTANEOUS at 22:25

## 2019-04-02 RX ADMIN — LACTULOSE 20 G: 20 SOLUTION ORAL at 22:24

## 2019-04-02 RX ADMIN — FUROSEMIDE 40 MG: 10 INJECTION, SOLUTION INTRAMUSCULAR; INTRAVENOUS at 13:34

## 2019-04-02 RX ADMIN — GABAPENTIN 400 MG: 400 CAPSULE ORAL at 13:34

## 2019-04-02 RX ADMIN — TRAMADOL HYDROCHLORIDE 50 MG: 50 TABLET ORAL at 03:38

## 2019-04-02 RX ADMIN — TRAMADOL HYDROCHLORIDE 50 MG: 50 TABLET ORAL at 09:15

## 2019-04-02 RX ADMIN — OXYCODONE HYDROCHLORIDE 10 MG: 10 TABLET ORAL at 12:10

## 2019-04-02 RX ADMIN — FONDAPARINUX SODIUM 2.5 MG: 2.5 INJECTION, SOLUTION SUBCUTANEOUS at 09:16

## 2019-04-02 RX ADMIN — PANTOPRAZOLE SODIUM 40 MG: 40 TABLET, DELAYED RELEASE ORAL at 09:14

## 2019-04-02 RX ADMIN — TRAMADOL HYDROCHLORIDE 50 MG: 50 TABLET ORAL at 22:27

## 2019-04-02 RX ADMIN — CHLORHEXIDINE GLUCONATE 0.12% ORAL RINSE 15 ML: 1.2 LIQUID ORAL at 22:39

## 2019-04-02 RX ADMIN — FUROSEMIDE 40 MG: 10 INJECTION, SOLUTION INTRAMUSCULAR; INTRAVENOUS at 22:36

## 2019-04-02 RX ADMIN — IPRATROPIUM BROMIDE AND ALBUTEROL SULFATE 1 AMPULE: .5; 3 SOLUTION RESPIRATORY (INHALATION) at 20:42

## 2019-04-02 RX ADMIN — CHLORHEXIDINE GLUCONATE 0.12% ORAL RINSE 15 ML: 1.2 LIQUID ORAL at 09:17

## 2019-04-02 RX ADMIN — IPRATROPIUM BROMIDE AND ALBUTEROL SULFATE 1 AMPULE: .5; 3 SOLUTION RESPIRATORY (INHALATION) at 09:15

## 2019-04-02 RX ADMIN — INSULIN LISPRO 9 UNITS: 100 INJECTION, SOLUTION INTRAVENOUS; SUBCUTANEOUS at 17:11

## 2019-04-02 RX ADMIN — ANTACID TABLETS 500 MG: 500 TABLET, CHEWABLE ORAL at 09:15

## 2019-04-02 RX ADMIN — POTASSIUM CHLORIDE 10 MEQ: 750 TABLET, FILM COATED, EXTENDED RELEASE ORAL at 09:15

## 2019-04-02 RX ADMIN — INSULIN LISPRO 3 UNITS: 100 INJECTION, SOLUTION INTRAVENOUS; SUBCUTANEOUS at 22:25

## 2019-04-02 RX ADMIN — CARVEDILOL 6.25 MG: 6.25 TABLET, FILM COATED ORAL at 17:11

## 2019-04-02 RX ADMIN — Medication 400 MG: at 09:15

## 2019-04-02 RX ADMIN — METFORMIN HYDROCHLORIDE 500 MG: 500 TABLET ORAL at 17:10

## 2019-04-02 RX ADMIN — IPRATROPIUM BROMIDE AND ALBUTEROL SULFATE 1 AMPULE: .5; 3 SOLUTION RESPIRATORY (INHALATION) at 16:09

## 2019-04-02 RX ADMIN — CARVEDILOL 6.25 MG: 6.25 TABLET, FILM COATED ORAL at 09:15

## 2019-04-02 RX ADMIN — INSULIN LISPRO 12 UNITS: 100 INJECTION, SOLUTION INTRAVENOUS; SUBCUTANEOUS at 09:20

## 2019-04-02 RX ADMIN — METFORMIN HYDROCHLORIDE 500 MG: 500 TABLET ORAL at 09:15

## 2019-04-02 RX ADMIN — ASPIRIN 325 MG: 325 TABLET, DELAYED RELEASE ORAL at 09:15

## 2019-04-02 RX ADMIN — ATORVASTATIN CALCIUM 80 MG: 80 TABLET, FILM COATED ORAL at 22:25

## 2019-04-02 RX ADMIN — GABAPENTIN 400 MG: 400 CAPSULE ORAL at 09:14

## 2019-04-02 RX ADMIN — POTASSIUM CHLORIDE 10 MEQ: 750 TABLET, FILM COATED, EXTENDED RELEASE ORAL at 12:09

## 2019-04-02 RX ADMIN — GABAPENTIN 400 MG: 400 CAPSULE ORAL at 22:36

## 2019-04-02 RX ADMIN — TRAMADOL HYDROCHLORIDE 50 MG: 50 TABLET ORAL at 17:12

## 2019-04-02 RX ADMIN — ANTACID TABLETS 500 MG: 500 TABLET, CHEWABLE ORAL at 13:34

## 2019-04-02 RX ADMIN — FUROSEMIDE 40 MG: 10 INJECTION, SOLUTION INTRAMUSCULAR; INTRAVENOUS at 09:16

## 2019-04-02 RX ADMIN — ANTACID TABLETS 500 MG: 500 TABLET, CHEWABLE ORAL at 22:25

## 2019-04-02 RX ADMIN — INSULIN LISPRO 12 UNITS: 100 INJECTION, SOLUTION INTRAVENOUS; SUBCUTANEOUS at 12:10

## 2019-04-02 RX ADMIN — POTASSIUM CHLORIDE 10 MEQ: 750 TABLET, FILM COATED, EXTENDED RELEASE ORAL at 17:10

## 2019-04-02 RX ADMIN — Medication 10 ML: at 22:27

## 2019-04-02 RX ADMIN — Medication 400 MG: at 22:26

## 2019-04-02 RX ADMIN — ENTECAVIR 1 MG: 1 TABLET ORAL at 09:15

## 2019-04-02 RX ADMIN — IPRATROPIUM BROMIDE AND ALBUTEROL SULFATE 1 AMPULE: .5; 3 SOLUTION RESPIRATORY (INHALATION) at 12:27

## 2019-04-02 ASSESSMENT — PAIN DESCRIPTION - ORIENTATION
ORIENTATION: MID
ORIENTATION: MID;LOWER
ORIENTATION: MID
ORIENTATION: MID

## 2019-04-02 ASSESSMENT — PAIN SCALES - GENERAL
PAINLEVEL_OUTOF10: 1
PAINLEVEL_OUTOF10: 6
PAINLEVEL_OUTOF10: 5
PAINLEVEL_OUTOF10: 6
PAINLEVEL_OUTOF10: 0
PAINLEVEL_OUTOF10: 2
PAINLEVEL_OUTOF10: 0
PAINLEVEL_OUTOF10: 0
PAINLEVEL_OUTOF10: 4

## 2019-04-02 ASSESSMENT — PAIN DESCRIPTION - ONSET
ONSET: ON-GOING
ONSET: ON-GOING

## 2019-04-02 ASSESSMENT — PAIN DESCRIPTION - LOCATION
LOCATION: STERNUM

## 2019-04-02 ASSESSMENT — PAIN DESCRIPTION - FREQUENCY
FREQUENCY: CONTINUOUS

## 2019-04-02 ASSESSMENT — PAIN DESCRIPTION - DESCRIPTORS
DESCRIPTORS: PRESSURE
DESCRIPTORS: SHARP

## 2019-04-02 ASSESSMENT — PAIN DESCRIPTION - PAIN TYPE
TYPE: SURGICAL PAIN

## 2019-04-02 ASSESSMENT — PAIN - FUNCTIONAL ASSESSMENT
PAIN_FUNCTIONAL_ASSESSMENT: ACTIVITIES ARE NOT PREVENTED

## 2019-04-02 NOTE — CARE COORDINATION
[employer] rep notified by SW that patient will not need a wheeled walker. [employer] signing off.     Thank you for the referral.  Electronically signed by Silver Villanueva on 4/2/2019 at 10:28 AM Cell ph# [phone]

## 2019-04-02 NOTE — PLAN OF CARE
Continuing to monitor pain and discomfort. Monitoring pain level on scale of 0-10. Non- pharmacological measures encouraged to reduce discomfort/pain. PRN pain meds administeration continues when/if applicable as ordered by physician. Pt remains free of falls. Fall risk protocol in place. Bed locked in lowest position. Call light in reach. Pt instructed to call for assistance, verbalizes understanding. Will continue to monitor. Skin assessment complete, see flowsheet. Pt turned in bed q2h and as needed. Pt encouraged to change positions frequently. Ca care completed frequently. Moisture . Education given on importance of skin care, frequent turns, and moisture protection, pt verbalized understanding. Will continue to monitor.

## 2019-04-02 NOTE — PROGRESS NOTES
to walked in the hallway first. Pt was walked and assisted back to bed. Only had 20 ml out during walk. Okay to remove chest tube. Med pass complete. Was premedicated with 25mcg of fentanyl. 1345: Pleural chest tube removed at this time with Norbert Parker NP at bedside. Incision is C/D/I, no redness or signs of infection. Pt states \"I felt relief on my back after that tube was taken out\". Lunch tray arrived and was set up.     1500: Pt resting with eyes closed    1708: reassessment complete. VSS. Pt states \"man you were right about those chest tubes. No pain, I only have a small amount of pressure\" which was rate 3/10. Blood sugar 186. Was covered with 9 units of insulin. Med pass complete. Pt refusing tramadol at first. Wanted to \"see if I can go without it\" explained to patient that we rather him take the tramadol over the oxycodone. Dinner tray ordered. No other needs.      1905: Handoff with Juan C RADFORD

## 2019-04-02 NOTE — PROGRESS NOTES
Nutrition Assessment    Type and Reason for Visit: Reassess    Nutrition Recommendations:   Encouraged pt to contact Dietitian should any questions arise regarding diet  Continue chocolate Glucerna bid for now    Nutrition Assessment: Pt with improved status but remains at risk for nutrition compromise r/t increased needs, altered labs r/t surgery, cardiac & endocrine dysfunction with risk for delayed healing. Pt reported improved po at %. Pt would like to continue chocolate Glucernas bid for now. Malnutrition Assessment:  · Malnutrition Status: No malnutrition    Nutrition Risk Level: Moderate(r/t wounds)    Nutrient Needs:  · Estimated Daily Total Kcal: 3991-2676 (8-15 x ABW of 137 kg)  · Estimated Daily Protein (g): 162 (2 x IBW of 81 kg)  · Estimated Daily Total Fluid (ml/day): 2000 per MD    Nutrition Diagnosis:   · Problem: Increased nutrient needs  · Etiology: related to Increased demand for energy/nutrients     Signs and symptoms:  as evidenced by Presence of wounds    Objective Information:  · Nutrition-Focused Physical Findings: BM 4/1. Noted need for colace, glycolax & chronulac. Pt -12 liters. Noted some generalized edema.   · Wound Type: (No issues noted to surgical sites including chest tube)  · Current Nutrition Therapies:  · Oral Diet Orders: Cardiac, Fluid Restriction   · Oral Diet intake: %  · Oral Nutrition Supplement (ONS) Orders: Diabetic Oral Supplement  · ONS intake: 51-75%, %  · Anthropometric Measures:  · Ht: 5' 11\" (180.3 cm)   · Current Body Wt: 283 lb (128.4 kg)  · Admission Body Wt: 289 lb (131.1 kg)  · Ideal Body Wt: 178 lb (80.7 kg), % Ideal Body    · BMI Classification: BMI 35.0 - 39.9 Obese Class II    Nutrition Interventions:   Continue current diet, Continue current ONS  Continued Inpatient Monitoring, Education Completed(Pt declined further ed needs)    Nutrition Evaluation:   · Evaluation: Goal achieved   · Goals: tolerate most appropriate form of nutrition    · Monitoring: Meal Intake, Supplement Intake, Wound Healing, Weight, Pertinent Labs, Diarrhea, Constipation      Electronically signed by Garret Vences RD, NICKY on 4/2/19 at 12:16 PM    Contact Number: [phone]

## 2019-04-02 NOTE — PROGRESS NOTES
4/1/19  1925: Shift report received from Hunter Olivera: Initial assessment completed, pt up in chair, helped ambulated in hallway and back to room, tolerated well, education on importance of ambulation provided, pt verbalized understanding,   Prn pain medication given as ordered, pt helped back in bed. 4/2/19  0000: Reassessment unchanged,   0100: Pt has denies any pain right now. 0430: Reassessment remain unchganged,   Scheduled pain medication given, dressing to chest tube site changed, pt weighed, gown changed, bed pad changed,  0540: Pt helped ambulated in room to bathroom, voided and back to bed. CT drainage of 50ml after ambulation. 4019: Pt helped ambulated in room and up in chair. 8956: Shift report given to Brijesh Stratton RN Pt up with PT ambulating in hallway.    Electronically signed by Triston Cabezas RN on 4/2/2019 at 6:06 AM

## 2019-04-02 NOTE — PROGRESS NOTES
Progress Note    S/P cabgx5 3/26/19    Vital Signs:                                                 /67   Pulse 92   Temp 98.7 °F (37.1 °C) (Oral)   Resp 19   Ht 5' 11\" (1.803 m)   Wt 282 lb 13.6 oz (128.3 kg)   SpO2 92%   BMI 39.45 kg/m²  O2 Flow Rate (L/min): 1 L/min(placed on room air)   NSR  Admission Weight: 281 lb 15.5 oz (127.9 kg)      I/O:      Intake/Output Summary (Last 24 hours) at 4/2/2019 0901  Last data filed at 4/2/2019 0600  Gross per 24 hour   Intake 1260 ml   Output 3460 ml   Net -2200 ml     Chest Tube (pleural): 90, 50, 70    CV: reg, wound c/d/i  Pulm: decreased  Abd: soft  Ext: warm, no edema    Data Review:  CBC:   Recent Labs     03/31/19 0442 04/01/19  0512 04/02/19  0500   WBC 8.3 6.9 7.5   HGB 9.8* 9.4* 10.3*   HCT 28.7* 28.4* 30.5*   MCV 97.6 98.4 98.0    223 287     BMP:   Recent Labs     03/31/19 0442 04/01/19  0512 04/02/19  0500    140 136   K 3.6 3.9 4.0   CL 95* 101 96*   CO2 28 31 31   BUN 27* 14 20   CREATININE 0.8 0.7* 0.7*   CALCIUM 8.3 8.2* 8.6   MG 2.10 2.20 2.30     Cardiac Enzymes:   No results for input(s): CKTOTAL, CKMB, CKMBINDEX, TROPONINI in the last 72 hours. PT/INR:   Recent Labs     03/31/19 0442 04/01/19  0512 04/02/19  0500   PROTIME 12.5 12.5 12.7   INR 1.10 1.10 1.11     APTT:   No results for input(s): APTT in the last 72 hours. Assessment/Plan:  CV - stable in SR.    - BB. No ACE given marginal bp.   - resume statin now that lfts normalized  pulm - pulm toilet, off O2   - hopefully pleural tube removal  Renal - Cr nl. Close to preop weight, diurese. GI - hep B. LFTs back to normal.  Heme - acute blood loss anemia.    - scds dvt prophylaxis, arixtra   - ASA  Pain - oxy, ultram.   dispo - d/c planning      Monica Ward MD  4/2/2019  9:01 AM

## 2019-04-03 VITALS
BODY MASS INDEX: 39.1 KG/M2 | WEIGHT: 279.32 LBS | TEMPERATURE: 98.3 F | SYSTOLIC BLOOD PRESSURE: 144 MMHG | DIASTOLIC BLOOD PRESSURE: 70 MMHG | HEIGHT: 71 IN | RESPIRATION RATE: 16 BRPM | OXYGEN SATURATION: 94 % | HEART RATE: 92 BPM

## 2019-04-03 LAB
ANION GAP SERPL CALCULATED.3IONS-SCNC: 11 MMOL/L (ref 3–16)
BUN BLDV-MCNC: 21 MG/DL (ref 7–20)
CALCIUM SERPL-MCNC: 8.3 MG/DL (ref 8.3–10.6)
CHLORIDE BLD-SCNC: 97 MMOL/L (ref 99–110)
CO2: 31 MMOL/L (ref 21–32)
CREAT SERPL-MCNC: 0.7 MG/DL (ref 0.8–1.3)
GFR AFRICAN AMERICAN: >60
GFR NON-AFRICAN AMERICAN: >60
GLUCOSE BLD-MCNC: 168 MG/DL (ref 70–99)
GLUCOSE BLD-MCNC: 198 MG/DL (ref 70–99)
GLUCOSE BLD-MCNC: 253 MG/DL (ref 70–99)
HCT VFR BLD CALC: 30 % (ref 40.5–52.5)
HEMOGLOBIN: 10.2 G/DL (ref 13.5–17.5)
INR BLD: 1.12 (ref 0.86–1.14)
MAGNESIUM: 2 MG/DL (ref 1.8–2.4)
MCH RBC QN AUTO: 32.7 PG (ref 26–34)
MCHC RBC AUTO-ENTMCNC: 34.1 G/DL (ref 31–36)
MCV RBC AUTO: 96.1 FL (ref 80–100)
PDW BLD-RTO: 14 % (ref 12.4–15.4)
PERFORMED ON: ABNORMAL
PERFORMED ON: ABNORMAL
PLATELET # BLD: 325 K/UL (ref 135–450)
PMV BLD AUTO: 7.2 FL (ref 5–10.5)
POTASSIUM SERPL-SCNC: 3.8 MMOL/L (ref 3.5–5.1)
PROTHROMBIN TIME: 12.8 SEC (ref 9.8–13)
RBC # BLD: 3.12 M/UL (ref 4.2–5.9)
SODIUM BLD-SCNC: 139 MMOL/L (ref 136–145)
WBC # BLD: 6.9 K/UL (ref 4–11)

## 2019-04-03 PROCEDURE — 97116 GAIT TRAINING THERAPY: CPT

## 2019-04-03 PROCEDURE — 6370000000 HC RX 637 (ALT 250 FOR IP): Performed by: NURSE PRACTITIONER

## 2019-04-03 PROCEDURE — 97530 THERAPEUTIC ACTIVITIES: CPT

## 2019-04-03 PROCEDURE — 6360000002 HC RX W HCPCS: Performed by: NURSE PRACTITIONER

## 2019-04-03 PROCEDURE — 83735 ASSAY OF MAGNESIUM: CPT

## 2019-04-03 PROCEDURE — 85610 PROTHROMBIN TIME: CPT

## 2019-04-03 PROCEDURE — 99024 POSTOP FOLLOW-UP VISIT: CPT | Performed by: THORACIC SURGERY (CARDIOTHORACIC VASCULAR SURGERY)

## 2019-04-03 PROCEDURE — 2580000003 HC RX 258: Performed by: THORACIC SURGERY (CARDIOTHORACIC VASCULAR SURGERY)

## 2019-04-03 PROCEDURE — 6370000000 HC RX 637 (ALT 250 FOR IP): Performed by: THORACIC SURGERY (CARDIOTHORACIC VASCULAR SURGERY)

## 2019-04-03 PROCEDURE — 80048 BASIC METABOLIC PNL TOTAL CA: CPT

## 2019-04-03 PROCEDURE — 94667 MNPJ CHEST WALL 1ST: CPT

## 2019-04-03 PROCEDURE — 85027 COMPLETE CBC AUTOMATED: CPT

## 2019-04-03 PROCEDURE — 94640 AIRWAY INHALATION TREATMENT: CPT

## 2019-04-03 PROCEDURE — 94761 N-INVAS EAR/PLS OXIMETRY MLT: CPT

## 2019-04-03 RX ORDER — CALCIUM CARBONATE 200(500)MG
500 TABLET,CHEWABLE ORAL 3 TIMES DAILY
Qty: 21 TABLET | Refills: 0 | Status: SHIPPED | OUTPATIENT
Start: 2019-04-03 | End: 2019-04-10

## 2019-04-03 RX ORDER — PANTOPRAZOLE SODIUM 40 MG/1
40 TABLET, DELAYED RELEASE ORAL
Qty: 30 TABLET | Refills: 3 | Status: SHIPPED | OUTPATIENT
Start: 2019-04-04 | End: 2019-08-12 | Stop reason: ALTCHOICE

## 2019-04-03 RX ORDER — LANOLIN ALCOHOL/MO/W.PET/CERES
400 CREAM (GRAM) TOPICAL 2 TIMES DAILY
Qty: 14 TABLET | Refills: 0 | Status: SHIPPED | OUTPATIENT
Start: 2019-04-03 | End: 2019-05-03 | Stop reason: ALTCHOICE

## 2019-04-03 RX ORDER — POTASSIUM CHLORIDE 750 MG/1
10 TABLET, FILM COATED, EXTENDED RELEASE ORAL ONCE
Status: COMPLETED | OUTPATIENT
Start: 2019-04-03 | End: 2019-04-03

## 2019-04-03 RX ORDER — CARVEDILOL 6.25 MG/1
6.25 TABLET ORAL 2 TIMES DAILY WITH MEALS
Qty: 60 TABLET | Refills: 3 | Status: SHIPPED | OUTPATIENT
Start: 2019-04-03 | End: 2019-06-13 | Stop reason: SDUPTHER

## 2019-04-03 RX ORDER — PSEUDOEPHEDRINE HCL 30 MG
100 TABLET ORAL 2 TIMES DAILY PRN
Qty: 14 CAPSULE | Refills: 0 | Status: SHIPPED | OUTPATIENT
Start: 2019-04-03 | End: 2019-05-09 | Stop reason: ALTCHOICE

## 2019-04-03 RX ORDER — OXYCODONE HYDROCHLORIDE 5 MG/1
5 TABLET ORAL EVERY 4 HOURS PRN
Qty: 42 TABLET | Refills: 0 | Status: SHIPPED | OUTPATIENT
Start: 2019-04-03 | End: 2019-04-10

## 2019-04-03 RX ADMIN — Medication 400 MG: at 08:39

## 2019-04-03 RX ADMIN — INSULIN LISPRO 15 UNITS: 100 INJECTION, SOLUTION INTRAVENOUS; SUBCUTANEOUS at 11:41

## 2019-04-03 RX ADMIN — ASPIRIN 325 MG: 325 TABLET, DELAYED RELEASE ORAL at 08:39

## 2019-04-03 RX ADMIN — CARVEDILOL 6.25 MG: 6.25 TABLET, FILM COATED ORAL at 08:39

## 2019-04-03 RX ADMIN — IPRATROPIUM BROMIDE AND ALBUTEROL SULFATE 1 AMPULE: .5; 3 SOLUTION RESPIRATORY (INHALATION) at 12:13

## 2019-04-03 RX ADMIN — POTASSIUM CHLORIDE 10 MEQ: 750 TABLET, FILM COATED, EXTENDED RELEASE ORAL at 11:50

## 2019-04-03 RX ADMIN — IPRATROPIUM BROMIDE AND ALBUTEROL SULFATE 1 AMPULE: .5; 3 SOLUTION RESPIRATORY (INHALATION) at 07:59

## 2019-04-03 RX ADMIN — OXYCODONE HYDROCHLORIDE 5 MG: 5 TABLET ORAL at 13:26

## 2019-04-03 RX ADMIN — POTASSIUM CHLORIDE 10 MEQ: 750 TABLET, FILM COATED, EXTENDED RELEASE ORAL at 08:39

## 2019-04-03 RX ADMIN — GABAPENTIN 400 MG: 400 CAPSULE ORAL at 08:39

## 2019-04-03 RX ADMIN — TRAMADOL HYDROCHLORIDE 50 MG: 50 TABLET ORAL at 10:06

## 2019-04-03 RX ADMIN — CHLORHEXIDINE GLUCONATE 0.12% ORAL RINSE 15 ML: 1.2 LIQUID ORAL at 08:40

## 2019-04-03 RX ADMIN — METFORMIN HYDROCHLORIDE 500 MG: 500 TABLET ORAL at 08:39

## 2019-04-03 RX ADMIN — FUROSEMIDE 40 MG: 10 INJECTION, SOLUTION INTRAMUSCULAR; INTRAVENOUS at 09:58

## 2019-04-03 RX ADMIN — FONDAPARINUX SODIUM 2.5 MG: 2.5 INJECTION, SOLUTION SUBCUTANEOUS at 08:39

## 2019-04-03 RX ADMIN — ANTACID TABLETS 500 MG: 500 TABLET, CHEWABLE ORAL at 08:39

## 2019-04-03 RX ADMIN — PANTOPRAZOLE SODIUM 40 MG: 40 TABLET, DELAYED RELEASE ORAL at 06:32

## 2019-04-03 RX ADMIN — INSULIN LISPRO 9 UNITS: 100 INJECTION, SOLUTION INTRAVENOUS; SUBCUTANEOUS at 08:58

## 2019-04-03 RX ADMIN — ENTECAVIR 1 MG: 1 TABLET ORAL at 09:05

## 2019-04-03 RX ADMIN — POTASSIUM CHLORIDE 10 MEQ: 750 TABLET, EXTENDED RELEASE ORAL at 09:59

## 2019-04-03 RX ADMIN — Medication 10 ML: at 08:40

## 2019-04-03 RX ADMIN — TRAMADOL HYDROCHLORIDE 50 MG: 50 TABLET ORAL at 04:46

## 2019-04-03 ASSESSMENT — PAIN DESCRIPTION - FREQUENCY
FREQUENCY: CONTINUOUS
FREQUENCY: CONTINUOUS

## 2019-04-03 ASSESSMENT — PAIN DESCRIPTION - PROGRESSION
CLINICAL_PROGRESSION: GRADUALLY IMPROVING

## 2019-04-03 ASSESSMENT — PAIN DESCRIPTION - ORIENTATION
ORIENTATION: MID
ORIENTATION: MID

## 2019-04-03 ASSESSMENT — PAIN SCALES - GENERAL
PAINLEVEL_OUTOF10: 4
PAINLEVEL_OUTOF10: 4
PAINLEVEL_OUTOF10: 6
PAINLEVEL_OUTOF10: 3
PAINLEVEL_OUTOF10: 2
PAINLEVEL_OUTOF10: 4

## 2019-04-03 ASSESSMENT — PAIN - FUNCTIONAL ASSESSMENT
PAIN_FUNCTIONAL_ASSESSMENT: ACTIVITIES ARE NOT PREVENTED
PAIN_FUNCTIONAL_ASSESSMENT: ACTIVITIES ARE NOT PREVENTED

## 2019-04-03 ASSESSMENT — PAIN DESCRIPTION - LOCATION
LOCATION: STERNUM
LOCATION: STERNUM

## 2019-04-03 ASSESSMENT — PAIN DESCRIPTION - DESCRIPTORS
DESCRIPTORS: SHARP;PRESSURE
DESCRIPTORS: SHARP;PRESSURE

## 2019-04-03 ASSESSMENT — PAIN DESCRIPTION - PAIN TYPE
TYPE: SURGICAL PAIN
TYPE: SURGICAL PAIN

## 2019-04-03 ASSESSMENT — PAIN DESCRIPTION - ONSET
ONSET: ON-GOING
ONSET: ON-GOING

## 2019-04-03 NOTE — PROGRESS NOTES
1920- hand off with [address]- patient up in chair, denies any  Pain, SOB, PIV intact, white board updated  2100- shift assessment, VSS  0150- reassessed, no changes from last assessment, patient shaved, CHG bath given, CVC dressing changed  0315-  reassessed, no changes from last assessment,   0445- labs drawn  0630- up to bathroom back in chair  0715- handoff with Samantha Brown RN

## 2019-04-03 NOTE — PROGRESS NOTES
12 Handoff report completed with Emiliana Wakefield RN. Patient ambulating in hallway with PT.    0709 Assessment completed. Alert and oriented x 4. Afebrile. VS stable. SpO2 sats 94% on room air. Utilizing IS. Patient using sternal precautions appropriately to splint. MSI clean,dry, intact. Denies nausea, appetite is good. Rates pain 4/10. States this is a tolerable level. Educated on pain management, including pain medication and non pharmacological interventions. Patient states he does not need pain medication at this time. Repositioned for comfort. States he will notify RN if pain level increases. Voiding adequate amounts per urinal. Call light within reach. Able to make own needs known. 7029 Dr Manuel Toribio at the bedside for patient eval. Plan for discharge home today    1100 Patient ambulated in the hallway with steady gait. Tolerated well. 1115 Right IJ CVC removed. Cleaned per protocol. Pressure held to site and dressing applied. Patient tolerated well. [id number] Discharge instructions reviewed with patient and family member. Patient and family verbalized understanding. All home medications have been reviewed, questions answered and patient voiced understanding. All medication side effects reviewed and patient and family verbalized understanding. Patient given prescriptions, discharge instructions, and appointment times. Unable to find dentures prior to discharge. All other belongings taken home with patient. Patient discharged to home with family via private car. Taken to lobby via wheelchair.

## 2019-04-03 NOTE — PROGRESS NOTES
Progress Note    S/P cabgx5 3/26/19    Vital Signs:                                                 BP (!) 144/70   Pulse 92   Temp 98.3 °F (36.8 °C) (Oral)   Resp 16   Ht 5' 11\" (1.803 m)   Wt 279 lb 5.2 oz (126.7 kg)   SpO2 96%   BMI 38.96 kg/m²  O2 Flow Rate (L/min): 1 L/min(placed on room air)   NSR  Admission Weight: 281 lb 15.5 oz (127.9 kg)      I/O:      Intake/Output Summary (Last 24 hours) at 4/3/2019 0813  Last data filed at 4/3/2019 0634  Gross per 24 hour   Intake 1440 ml   Output 3690 ml   Net -2250 ml     CV: reg, wound c/d/i  Pulm: decreased  Abd: soft  Ext: warm, no edema    Data Review:  CBC:   Recent Labs     04/01/19  0512 04/02/19  0500 04/03/19  0445   WBC 6.9 7.5 6.9   HGB 9.4* 10.3* 10.2*   HCT 28.4* 30.5* 30.0*   MCV 98.4 98.0 96.1    287 325     BMP:   Recent Labs     04/01/19  0512 04/02/19  0500 04/03/19  0445    136 139   K 3.9 4.0 3.8    96* 97*   CO2 31 31 31   BUN 14 20 21*   CREATININE 0.7* 0.7* 0.7*   CALCIUM 8.2* 8.6 8.3   MG 2.20 2.30 2.00     Cardiac Enzymes:   No results for input(s): CKTOTAL, CKMB, CKMBINDEX, TROPONINI in the last 72 hours. PT/INR:   Recent Labs     04/01/19  0512 04/02/19  0500 04/03/19  0445   PROTIME 12.5 12.7 12.8   INR 1.10 1.11 1.12     APTT:   No results for input(s): APTT in the last 72 hours. Assessment/Plan:  CV - stable in SR.    - BB. No ACE given marginal bp.   - resume statin now that lfts normalized  pulm - pulm toilet, off O2  Renal - Cr nl. Very close to preop weight. Shouldn't need lasix after d/c   - replete K  GI - hep B. LFTs back to normal.  Heme - acute blood loss anemia.    - scds dvt prophylaxis, arixtra   - ASA  Pain - oxy, ultram.   dispo - d/c home today      Coral Cuellar MD  4/3/2019  8:13 AM
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
## 2019-04-03 NOTE — PROGRESS NOTES
Access: Level entry  Bathroom Shower/Tub: Tub/Shower unit  Bathroom Toilet: Standard  Bathroom Equipment: Shower chair  Bathroom Accessibility: Accessible  ADL Assistance: Independent  Homemaking Assistance: (Shared with wife. )  Ambulation Assistance: Independent(Without assist device PTA. )  Transfer Assistance: Independent  Active : Yes  Occupation: Retired  Type of occupation: Retired - . Additional Comments: Pt reports adequate assist/support for d/c home. Cognition        Objective                   Sensation  Overall Sensation Status: WFL  Bed mobility  Supine to Sit: Minimal assistance(HOB elevated. Use of Cardiac Pillow. )  Transfers  Sit to Stand: Independent(With Cardiac Pillow. )  Stand to sit: Independent(With Cardiac Pillow.)  Ambulation  Ambulation?: Yes  Ambulation 1  Surface: level tile  Device: No Device  Quality of Gait: Pt amb 250' x 2 without assist device Supervision as distance progressed. Ayleen and Endurance improving. No specific LOB noted. Stairs/Curb  Stairs?: Yes  Stairs  # Steps : 4  Rails: Right ascending  Assistance: Stand by assistance  Comment: Pt able to adhere to Sternal Precautions with use of handrail. Plan   Plan  Times per week: D/C Acute Care PT Services.    Current Treatment Recommendations: Functional Mobility Training, Transfer Training, Gait Training, Stair training, Safety Education & Training, Patient/Caregiver Education & Training  Safety Devices  Type of devices: Call light within reach, Left in chair, Nurse notified(Pt aware to call for assist.  PT spoke with pt's nurse Gudelia Hazel). )    G-Code       OutComes Score                                                  AM-PAC Score  AM-PAC Inpatient Mobility Raw Score : 23  AM-PAC Inpatient T-Scale Score : 56.93  Mobility Inpatient CMS 0-100% Score: 11.2  Mobility Inpatient CMS G-Code Modifier : CI          Goals  Short term goals  Time Frame for Short term goals: Upon d/c acute care setting. Short term goal 1: Bed Mob Min assist, adhere Sternal Precautions. 4/3 Goal met. Short term goal 2: Transfers with/without assist device SBA, adhere Sternal Precautions. 4/3 Goal met. Short term goal 3: Amb with/without assist device 200' SBA.  4/3 Goal met. Patient Goals   Patient goals : Get better and go home with wife/family.         Therapy Time   Individual Concurrent Group Co-treatment   Time In 0645         Time Out 0715         Minutes Carlos 1334 Bruce Velazquez, PT

## 2019-04-03 NOTE — CARE COORDINATION
4/3 Patient to be discharge to home with RN from [facility]. Natasha Lo at [facility] notified of discharge. Pt. Will have 24/7 supervision at home and his Sister will be transporting him to home.  Electronically signed by Rubi Znuiga RN on 4/3/2019 at 10:15 AM

## 2019-04-04 ENCOUNTER — CARE COORDINATION (OUTPATIENT)
Dept: CASE MANAGEMENT | Age: 64
End: 2019-04-04

## 2019-04-04 ENCOUNTER — TELEPHONE (OUTPATIENT)
Dept: FAMILY MEDICINE CLINIC | Age: 64
End: 2019-04-04

## 2019-04-04 NOTE — TELEPHONE ENCOUNTER
[facility] Transitions Initial Follow Up Call    Outreach made within 2 business days of discharge: Yes    Patient: Dave Martinez Patient : 1955   MRN: [id number]  Reason for Admission: There are no discharge diagnoses documented for the most recent discharge. Discharge Date: 4/3/19       Spoke with: lvm    Discharge department/facility: [facility]    TCM Interactive Patient Contact:  Was patient able to fill all prescriptions: NA  Was patient instructed to bring all medications to the follow-up visit: NA  Is patient taking all medications as directed in the discharge summary?  NA  Does patient understand their discharge instructions: NA  Does patient have questions or concerns that need addressed prior to 7-14 day follow up office visit: no    Scheduled appointment with PCP within 7-14 days  lvm for pt to [facility] office  Will make second attempt to reach him  Follow Up  Future Appointments   Date Time Provider Nicanor Ruiz   2019  9:45 AM MD HAN Mariscal   2019 10:15 AM Robb Shoemaker MD [facility]       Clayton Presume

## 2019-04-04 NOTE — CARE COORDINATION
[facility] Transitions Initial Follow Up Call    Call within 2 business days of discharge: Yes    Patient: Sumaya Julien Patient : 1955   MRN: [id number]  Reason for Admission:   Discharge Date: 4/3/19 RARS: Readmission Risk Score: 21       Spoke with: no one    Facility: [address] [place] [address] 24 Hour Call    Care Transitions Interventions         Follow Up: Unable to reach patient. Left message. Contact info provided. Requested return call to [facility]. Call placed to [facility] - they have the referral and will reach out to the patient.   Future Appointments   Date Time Provider Nicanor Ruiz   2019  9:45 AM MD HAN Hernandez   2019 10:15 AM Fariha Gutierres MD [facility]       Gregg Villarreal RN

## 2019-04-09 ENCOUNTER — TELEPHONE (OUTPATIENT)
Dept: FAMILY MEDICINE CLINIC | Age: 64
End: 2019-04-09

## 2019-04-09 RX ORDER — ATORVASTATIN CALCIUM 80 MG/1
80 TABLET, FILM COATED ORAL DAILY
Qty: 90 TABLET | Refills: 1 | Status: SHIPPED | OUTPATIENT
Start: 2019-04-09 | End: 2019-04-10 | Stop reason: SDUPTHER

## 2019-04-09 RX ORDER — METFORMIN HYDROCHLORIDE 500 MG/1
TABLET, EXTENDED RELEASE ORAL
Qty: 360 TABLET | Refills: 0 | Status: CANCELLED | OUTPATIENT
Start: 2019-04-09

## 2019-04-09 RX ORDER — METFORMIN HYDROCHLORIDE 500 MG/1
TABLET, EXTENDED RELEASE ORAL
Qty: 120 TABLET | Refills: 0 | Status: SHIPPED | OUTPATIENT
Start: 2019-04-09 | End: 2019-04-09 | Stop reason: SDUPTHER

## 2019-04-09 RX ORDER — METFORMIN HYDROCHLORIDE 500 MG/1
TABLET, EXTENDED RELEASE ORAL
Qty: 360 TABLET | Refills: 0 | Status: SHIPPED | OUTPATIENT
Start: 2019-04-09 | End: 2019-06-13 | Stop reason: SDUPTHER

## 2019-04-09 RX ORDER — ATORVASTATIN CALCIUM 80 MG/1
80 TABLET, FILM COATED ORAL DAILY
Qty: 30 TABLET | Refills: 0 | Status: SHIPPED | OUTPATIENT
Start: 2019-04-09 | End: 2019-04-09 | Stop reason: SDUPTHER

## 2019-04-09 NOTE — TELEPHONE ENCOUNTER
Pt called said he needs a 30 day supply of:  Metformin and atorvastatin.     He said he will be out before his mail order can fill these     Please advise

## 2019-04-10 ENCOUNTER — OFFICE VISIT (OUTPATIENT)
Dept: FAMILY MEDICINE CLINIC | Age: 64
End: 2019-04-10
Payer: MEDICARE

## 2019-04-10 VITALS
HEIGHT: 71 IN | HEART RATE: 85 BPM | WEIGHT: 273 LBS | DIASTOLIC BLOOD PRESSURE: 80 MMHG | RESPIRATION RATE: 16 BRPM | TEMPERATURE: 97.8 F | BODY MASS INDEX: 38.22 KG/M2 | SYSTOLIC BLOOD PRESSURE: 122 MMHG

## 2019-04-10 DIAGNOSIS — Z95.1 S/P CABG X 5: ICD-10-CM

## 2019-04-10 DIAGNOSIS — I25.10 CORONARY ARTERY DISEASE INVOLVING NATIVE HEART WITHOUT ANGINA PECTORIS, UNSPECIFIED VESSEL OR LESION TYPE: ICD-10-CM

## 2019-04-10 DIAGNOSIS — F33.41 RECURRENT MAJOR DEPRESSIVE DISORDER, IN PARTIAL REMISSION (HCC): ICD-10-CM

## 2019-04-10 DIAGNOSIS — I10 HYPERTENSION, ESSENTIAL: ICD-10-CM

## 2019-04-10 DIAGNOSIS — F41.9 ANXIETY: ICD-10-CM

## 2019-04-10 DIAGNOSIS — E78.5 HYPERLIPIDEMIA LDL GOAL <70: ICD-10-CM

## 2019-04-10 DIAGNOSIS — I21.19 ACUTE INFERIOR MYOCARDIAL INFARCTION (HCC): Primary | ICD-10-CM

## 2019-04-10 PROCEDURE — 99495 TRANSJ CARE MGMT MOD F2F 14D: CPT | Performed by: FAMILY MEDICINE

## 2019-04-10 RX ORDER — ESCITALOPRAM OXALATE 5 MG/1
5 TABLET ORAL DAILY
Qty: 30 TABLET | Refills: 0 | Status: SHIPPED | OUTPATIENT
Start: 2019-04-10 | End: 2019-04-10 | Stop reason: SDUPTHER

## 2019-04-10 RX ORDER — ATORVASTATIN CALCIUM 80 MG/1
80 TABLET, FILM COATED ORAL DAILY
Qty: 30 TABLET | Refills: 0 | Status: SHIPPED | OUTPATIENT
Start: 2019-04-10 | End: 2019-04-10 | Stop reason: SDUPTHER

## 2019-04-10 NOTE — PATIENT INSTRUCTIONS
stress  Anxiety   Back pain   Constipation or diarrhea   Depression   Fatigue   Headaches   High blood pressure   Trouble sleeping or insomnia   Problems with relationships   Shortness of breath   Stiff neck or jaw   Upset stomach   Weight gain or loss     What can I do to manage my stress? The first step is to learn to recognize when you're feeling stressed. Early warning signs of stress include tension in your shoulders and neck, or clenching your hands into fists. The next step is to choose a way to deal with your stress. One way is to avoid the event or thing that leads to your stress--but often this is not possible. A second way is to change how you react to stress. This is often the more practical way. Tips for dealing with stress  Don't worry about things you can't control, such as the weather. Solve the little problems. This can help you gain a feeling of control. Prepare to the best of your ability for events you know may be stressful, such as a job interview. Try to look at change as a positive challenge, not as a threat. Work to resolve conflicts with other people. Talk with a trusted friend, family member or counselor. Set realistic goals at home and at work. Avoid overscheduling. Exercise on a regular basis. Eat regular, well-balanced meals and get enough sleep. Meditate. Participate in something you don't find stressful, such as sports, social events or hobbies. Why is exercise useful? Exercise is a good way to deal with stress because it's a healthy way to relieve your pent-up energy and tension. Exercise is known to release feel-good brain chemicals. It also helps you get in better shape, which makes you feel better overall. Steps to deep breathing  Lie down on a flat surface. Place a hand on your stomach, just above your navel. Place the other hand on your chest.   Breathe in slowly and try to make your stomach rise a little. Hold your breath for a second. Breathe out slowly and let your stomach go back down. What is meditation? Meditation is a form of guided thought. It can take many forms. You can do it with exercise that uses the same motions over and over, like walking or swimming. You can meditate by practicing relaxation training, by stretching or by breathing deeply. Relaxation training is simple. Start with one muscle. Hold it tight for a few seconds then relax the muscle. Do this with each of your muscles, beginning with the toes and feet and working your way up through the rest of your body, one muscle group at a time. Stretching can also help relieve tension. Roll your head in a gentle Kake. Reach toward the ceiling and bend side to side slowly. Roll your shoulders. Deep, relaxed breathing by itself may help relieve stress (see the box to the right). This helps you get plenty of oxygen and activates the relaxation response, the bodys antidote to stress. POSITIVE THINKING: Reduce stress by eliminating negative self-talk    Positive thinking helps with stress management and can even improve your health. Practice overcoming negative self-talk with examples provided. Is your glass half-empty or half-full? How you answer this age-old question about positive thinking may reflect your outlook on life, your attitude toward yourself, and whether you're optimistic or pessimistic -- and it may even affect your health. Indeed, some studies show that personality traits like optimism and pessimism can affect many areas of your health and well-being. The positive thinking that typically comes with optimism is a cortés part of effective stress management. And effective stress management is associated with many health benefits. If you tend to be pessimistic, don't despair -- you can learn positive thinking skills. Here's how.      Understanding positive thinking and self-talk  Positive thinking doesn't mean that you keep your head in the sand and great day at work. You completed your tasks ahead of time and were complimented for doing a speedy and thorough job. But you forgot one minor step. That evening, you focus only on your oversight and forget about the compliments you received. Personalizing. When something bad occurs, you automatically blame yourself. For example, you hear that an evening out with friends is canceled, and you assume that the change in plans is because no one wanted to be around you. Catastrophizing. You automatically anticipate the worst. The drive-through coffee shop gets your order wrong and you automatically think that the rest of your day will be a disaster. Polarizing. You see things only as either good or bad, black or white. There is no middle ground. You feel that you have to be perfect or that you're a total failure. Focusing on positive thinking  You can learn to turn negative thinking into positive thinking. The process is simple, but it does take time and practice -- you're creating a new habit, after all. Here are some ways to think and behave in a more positive and optimistic way: Identify areas to change. If you want to become more optimistic and engage in more positive thinking, first identify areas of your life that you typically think negatively about, whether it's work, your daily commute or a relationship, for example. You can start small by focusing on one area to approach in a more positive way. Check yourself. Periodically during the day, stop and evaluate what you're thinking. If you find that your thoughts are mainly negative, try to find a way to put a positive spin on them. Be open to humor. Give yourself permission to smile or laugh, especially during difficult times. Seek humor in everyday happenings. When you can laugh at life, you feel less stressed. Follow a healthy lifestyle. Exercise at least three times a week to positively affect mood and reduce stress.  Follow a healthy diet to fuel your mind and body. And learn to manage stress. Surround yourself with positive people. Make sure those in your life are positive, supportive people you can depend on to give helpful advice and feedback. Negative people may increase your stress level and make you doubt your ability to manage stress in healthy ways. Practice positive self-talk. Start by following one simple rule: Don't say anything to yourself that you wouldn't say to anyone else. Be gentle and encouraging with yourself. If a negative thought enters your mind, evaluate it rationally and respond with affirmations of what is good about you. Here are some examples of negative self-talk and how you can apply a positive thinking twist to them. Negative self-talk Positive thinking   I've never done it before. It's an opportunity to learn something new. It's too complicated. I'll tackle it from a different angle. I don't have the resources. Necessity is the mother of invention. I'm too lazy to get this done. I wasn't able to fit it into my schedule but can re-examine some priorities. There's no way it will work. I can try to make it work. It's too radical a change. Let's take a chance. No one bothers to communicate with me. I'll see if I can open the channels of communication. I'm not going to get any better at this. I'll give it another try. Practicing positive thinking every day  If you tend to have a negative outlook, don't expect to become an optimist overnight. But with practice, eventually your self-talk will contain less self-criticism and more self-acceptance. You may also become less critical of the world around you. Plus, when you share your positive mood and positive experience, both you and those around you enjoy an emotional boost.   Practicing positive self-talk will improve your outlook. When your state of mind is generally optimistic, you're able to handle everyday stress in a more constructive way.  That ability may contribute to the widely observed health benefits of positive thinking.

## 2019-04-10 NOTE — PROGRESS NOTES
([employer] INS SYR .5CC/29G) 29G X 1/2\" 0.5 ML MISC 1 each by Does not apply route daily Yes Tania Knox MD   Insulin Pen Needle (B-D UF III MINI PEN NEEDLES) 31G X 5 MM MISC 1 each by Does not apply route daily Yes Tania Knox MD   triamcinolone (KENALOG) 0.1 % cream Apply topically 2 times daily Apply topically 2 times daily. Yes Tania Knox MD   mometasone (ELOCON) 0.1 % ointment Apply topically twice daily. Yes Tania Knox MD   canagliflozin (INVOKANA) 300 MG TABS tablet Take 1 tablet by mouth every morning (before breakfast) Yes Tania Knox MD   glucose blood VI test strips (ASCENSIA AUTODISC VI;ONE TOUCH ULTRA TEST VI) strip Apply 1 each topically 3 times daily. Onetouch Ultra 2 test strips  Dx: 250.00 Yes Tania Knox MD   [facility] MISC 1 each by Does not apply route 3 times daily. Yes Tania Knox MD      Family History   Problem Relation Age of Onset    Diabetes Mother     Cancer Mother         pancreatic    Heart Failure Father         began age 76,  age 78     Social History     Tobacco Use    Smoking status: Never Smoker    Smokeless tobacco: Never Used    Tobacco comment: advised not to start   Substance Use Topics    Alcohol use: Not Currently     Comment: rare    Drug use: No      LAST LABS  Cholesterol, Total   Date Value Ref Range Status   2019 229 (H) 0 - 199 mg/dL Final     LDL Calculated   Date Value Ref Range Status   2019 see below <100 mg/dL Final     Comment:     When the triglyceride is <30 mg/dL or >300 mg/dL the  calculated LDL and  VLDL are not valid and a measured  LDL is performed.        HDL   Date Value Ref Range Status   2019 39 (L) 40 - 60 mg/dL Final     Triglycerides   Date Value Ref Range Status   2019 456 (H) 0 - 150 mg/dL Final     Lab Results   Component Value Date    GLUCOSE 168 (H) 2019     Lab Results   Component Value Date     2019    K 3.8 2019    CREATININE 0.7 (L) 2019     Lab Results   Component Value Date    WBC 6.9 04/03/2019    HGB 10.2 (L) 04/03/2019    HCT 30.0 (L) 04/03/2019    MCV 96.1 04/03/2019     04/03/2019     Lab Results   Component Value Date    ALT 38 04/02/2019    AST 28 04/02/2019    ALKPHOS 82 04/02/2019    BILITOT 0.4 04/02/2019     No results found for: TSH  Lab Results   Component Value Date    LABA1C 8.2 03/25/2019     Objective:   PHYSICAL EXAM  /80 (Site: Left Upper Arm, Position: Sitting, Cuff Size: Large Adult)   Pulse 85   Temp 97.8 °F (36.6 °C) (Oral)   Resp 16   Ht 5' 11\" (1.803 m)   Wt 273 lb (123.8 kg)   BMI 38.08 kg/m²   BP Readings from Last 5 Encounters:   04/10/19 122/80   04/03/19 (!) 144/70   01/21/19 130/80   12/04/18 136/66   11/06/18 (!) 152/80     Wt Readings from Last 5 Encounters:   04/10/19 273 lb (123.8 kg)   04/03/19 279 lb 5.2 oz (126.7 kg)   01/21/19 281 lb (127.5 kg)   12/04/18 277 lb 9.6 oz (125.9 kg)   11/06/18 278 lb 3.2 oz (126.2 kg)      GENERAL: well-developed, well-nourished, alert, no distress  LUNGS:  Breathing unlabored  · clear to auscultation bilaterally and good air movement  CARDIOVASC: regular rate and rhythm, S1, S2 normal, no murmur, click, rub or gallop   Apical impulse normal   LEGS:  Lower extremity edema: none    Skin- chest wound and drain sites healing well, no signs of infection    PSYCH:    · Alert and oriented, tearful  · Normal reasoning, insight good  · Facial expressions full, mood appropriate  · No memory disturbance noted      Assessment and Plan:      Diagnosis Orders   1. Acute inferior myocardial infarction ([facility] [address]     2. Coronary artery disease involving native heart without angina pectoris, unspecified vessel or lesion type     3. S/P CABG x 5     4. Uncontrolled type 2 diabetes mellitus without complication, without long-term current use of insulin ([facility] [address]  Ambulatory referral to Diabetic Education   5. Hypertension, essential     6. Anxiety     7. Hyperlipidemia LDL goal <70     8. Recurrent major depressive disorder, in partial remission ([facility] [address]     Plan as above and below. Diagnostic test results reviewed. Patient risk of morbidity and mortality: high  Medical Decision Making: moderate complexity    PLEASE TAKE THIS FORM TO CHECK-OUT WINDOW TO SCHEDULE NEXT VISIT. Please schedule check-up in 2 months FASTING. Return sooner if having any problems. TO DO LIST   PLEASE GET BLOODWORK DRAWN TODAY ON FIRST FLOOR in 170. Lab hours Monday to Friday 7:30 AM for 4 PM.  Take orders with you.  Work on portion size.  Start cardiac rehab when released by surgeon   See diabetic educator   Read handouts about stress.  Start lexapro once a day low dose.

## 2019-04-11 RX ORDER — ATORVASTATIN CALCIUM 80 MG/1
80 TABLET, FILM COATED ORAL DAILY
Qty: 90 TABLET | Refills: 0 | Status: SHIPPED | OUTPATIENT
Start: 2019-04-11 | End: 2020-03-26

## 2019-04-11 RX ORDER — ESCITALOPRAM OXALATE 5 MG/1
5 TABLET ORAL DAILY
Qty: 90 TABLET | Refills: 0 | Status: SHIPPED | OUTPATIENT
Start: 2019-04-11 | End: 2019-06-13 | Stop reason: SDUPTHER

## 2019-04-12 ENCOUNTER — OFFICE VISIT (OUTPATIENT)
Dept: CARDIOTHORACIC SURGERY | Age: 64
End: 2019-04-12

## 2019-04-12 VITALS
TEMPERATURE: 97.9 F | WEIGHT: 273 LBS | SYSTOLIC BLOOD PRESSURE: 138 MMHG | BODY MASS INDEX: 36.98 KG/M2 | HEIGHT: 72 IN | HEART RATE: 89 BPM | DIASTOLIC BLOOD PRESSURE: 76 MMHG | OXYGEN SATURATION: 98 %

## 2019-04-12 DIAGNOSIS — Z09 POSTOP CHECK: Primary | ICD-10-CM

## 2019-04-12 DIAGNOSIS — K76.0 FATTY LIVER: ICD-10-CM

## 2019-04-12 DIAGNOSIS — Z12.5 ENCOUNTER FOR SCREENING FOR MALIGNANT NEOPLASM OF PROSTATE: ICD-10-CM

## 2019-04-12 DIAGNOSIS — I10 ESSENTIAL HYPERTENSION: ICD-10-CM

## 2019-04-12 DIAGNOSIS — E78.5 HYPERLIPIDEMIA WITH TARGET LDL LESS THAN 100: ICD-10-CM

## 2019-04-12 LAB
A/G RATIO: 1.2 (ref 1.1–2.2)
ALBUMIN SERPL-MCNC: 3.8 G/DL (ref 3.4–5)
ALP BLD-CCNC: 113 U/L (ref 40–129)
ALT SERPL-CCNC: 20 U/L (ref 10–40)
ANION GAP SERPL CALCULATED.3IONS-SCNC: 11 MMOL/L (ref 3–16)
AST SERPL-CCNC: 21 U/L (ref 15–37)
BILIRUB SERPL-MCNC: 0.3 MG/DL (ref 0–1)
BUN BLDV-MCNC: 19 MG/DL (ref 7–20)
CALCIUM SERPL-MCNC: 9 MG/DL (ref 8.3–10.6)
CHLORIDE BLD-SCNC: 107 MMOL/L (ref 99–110)
CHOLESTEROL, TOTAL: 126 MG/DL (ref 0–199)
CO2: 23 MMOL/L (ref 21–32)
CREAT SERPL-MCNC: 0.8 MG/DL (ref 0.8–1.3)
CREATININE URINE: 81.2 MG/DL (ref 39–259)
GFR AFRICAN AMERICAN: >60
GFR NON-AFRICAN AMERICAN: >60
GLOBULIN: 3.3 G/DL
GLUCOSE BLD-MCNC: 138 MG/DL (ref 70–99)
HDLC SERPL-MCNC: 39 MG/DL (ref 40–60)
LDL CHOLESTEROL CALCULATED: 61 MG/DL
MICROALBUMIN UR-MCNC: <1.2 MG/DL
MICROALBUMIN/CREAT UR-RTO: NORMAL MG/G (ref 0–30)
POTASSIUM SERPL-SCNC: 4.3 MMOL/L (ref 3.5–5.1)
PROSTATE SPECIFIC ANTIGEN: 4.52 NG/ML (ref 0–4)
SODIUM BLD-SCNC: 141 MMOL/L (ref 136–145)
TOTAL PROTEIN: 7.1 G/DL (ref 6.4–8.2)
TRIGL SERPL-MCNC: 130 MG/DL (ref 0–150)
VLDLC SERPL CALC-MCNC: 26 MG/DL

## 2019-04-12 PROCEDURE — 99024 POSTOP FOLLOW-UP VISIT: CPT | Performed by: THORACIC SURGERY (CARDIOTHORACIC VASCULAR SURGERY)

## 2019-04-12 NOTE — LETTER
04/12/19        [facility] ([facility]) Cardiovascular and Thoracic Surgeons  [address]  [address] [place] [address]        RE:    Alison Damian,   1955    Dear Dr. Goyo Conley,    It was my pleasure to see your patient, Alison Damian, in the office today in follow up of cabgx5 3/26/19 at [facility] ORTHOPEDIC AND SPINE [facility] AT [place].    I have attached a copy of the office evaluation. Thank you for allowing me to participate in the care of this patient. Sincerely,     Coral Cuellar MD    CC: Garth Srivastava MD  [address]  [address] [facility] [address] [facility], [facility] 140, [address]

## 2019-04-12 NOTE — PROGRESS NOTES
Progress Note    S/P CABGx5 3/26/19  MSI, CT sites and right leg incision are healing without redness or purulence. Sutures removed without difficulty. Has an appointment with Dr. Jenny Courtney 5/9/19  Has followed up with Dr. Harper Forget  Is ready to start driving again    Vital Signs:                                                 /76 (Site: Left Upper Arm, Position: Sitting)   Pulse 89   Temp 97.9 °F (36.6 °C) (Oral)   Ht 6' (1.829 m)   Wt 273 lb (123.8 kg)   SpO2 98%   BMI 37.03 kg/m²      Preop weight: 281 lb    CV: reg, wound c/d/i, sternum stable  Pulm: clear, decreased at bases  Abd: soft  Ext: warm. No edema. saph incision c/d/i. Medications:  Prior to Admission medications    Medication Sig Start Date End Date Taking?  Authorizing Provider   atorvastatin (LIPITOR) 80 MG tablet TAKE 1 TABLET BY MOUTH DAILY 4/11/19  Yes Renate Rahman MD   escitalopram (LEXAPRO) 5 MG tablet TAKE 1 TABLET BY MOUTH DAILY 4/11/19  Yes Renate aRhman MD   metFORMIN (GLUCOPHAGE-XR) 500 MG extended release tablet TAKE 4 TABLETS BY MOUTH EVERY DAY WITH BREAKFAST 4/9/19  Yes Renate Rahman MD   aspirin 325 MG EC tablet Take 1 tablet by mouth daily 4/4/19  Yes KARYN Sandhu CNP   carvedilol (COREG) 6.25 MG tablet Take 1 tablet by mouth 2 times daily (with meals) 4/3/19  Yes KARYN Sandhu CNP   docusate sodium (COLACE, DULCOLAX) 100 MG CAPS Take 100 mg by mouth 2 times daily as needed for Constipation 4/3/19 3/17/21 Yes KARYN Sandhu CNP   pantoprazole (PROTONIX) 40 MG tablet Take 1 tablet by mouth every morning (before breakfast) 4/4/19 5/4/19 Yes KARYN Sandhu CNP   insulin glargine (LANTUS) 100 UNIT/ML injection vial Inject 50 Units into the skin nightly   Yes Historical Provider, MD   gabapentin (NEURONTIN) 400 MG capsule TAKE 1 CAPSULE BY MOUTH THREE TIMES DAILY 3/19/19 4/18/19 Yes Teri Heckler, APRN - CNP   entecavir (BARACLUDE) 1 MG tablet Take 1 tablet by mouth daily 1/21/19  Yes Mariana Tillman MD   ALPRAZolam [place]) 0.5 MG tablet Take 1 tablet by mouth three times daily for 90 days. . 1/21/19 4/21/19 Yes Mariana Tillman MD   INSULIN SYRINGE .5CC/29G (Samantha Bahena INS SYR .5CC/29G) 29G X 1/2\" 0.5 ML MISC 1 each by Does not apply route daily 10/3/18  Yes Mariana Tillman MD   Insulin Pen Needle (B-D UF III MINI PEN NEEDLES) 31G X 5 MM MISC 1 each by Does not apply route daily 11/21/17  Yes Mariana Tillman MD   triamcinolone (KENALOG) 0.1 % cream Apply topically 2 times daily Apply topically 2 times daily. 11/17/17  Yes Mariana Tillman MD   mometasone (ELOCON) 0.1 % ointment Apply topically twice daily. 4/10/17  Yes Mariana Tillman MD   canagliflozin [facility]) 300 MG TABS tablet Take 1 tablet by mouth every morning (before breakfast) 12/8/16  Yes Mariana Tillman MD   glucose blood VI test strips (ASCENSIA AUTODISC VI;ONE TOUCH ULTRA TEST VI) strip Apply 1 each topically 3 times daily. Onetouch Ultra 2 test strips  Dx: 250.00 3/20/13  Yes Mariana Tillman MD   [facility] MISC 1 each by Does not apply route 3 times daily.  3/7/13  Yes Mariana Tillman MD   magnesium oxide (MAG-OX) 400 (240 Mg) MG tablet Take 1 tablet by mouth 2 times daily for 7 days 4/3/19 4/10/19  KARYN Bruce - CNP        Data Review:  CBC:   Lab Results   Component Value Date    WBC 6.9 04/03/2019    HGB 10.2 04/03/2019    HCT 30.0 04/03/2019    MCV 96.1 04/03/2019     04/03/2019     BMP:   Lab Results   Component Value Date     04/03/2019    K 3.8 04/03/2019    K 4.1 03/26/2019    CL 97 04/03/2019    CO2 31 04/03/2019    PHOS 3.8 03/26/2019    BUN 21 04/03/2019    CREATININE 0.7 04/03/2019    CALCIUM 8.3 04/03/2019    MG 2.00 04/03/2019     PT/INR:   Lab Results   Component Value Date    PROTIME 12.8 04/03/2019    INR 1.12 04/03/2019       Assessment/Plan:  - lifting limit 5 lbs until 4/26, then 10 lbs  - ok to resume driving  - cont , statin in keeping with AHA guideline for postcabg pts  - goal sbp 120s or below.  resume diovan as taking preop  - cardiac rehab per cardiology  - follow up 3 weeks    Tere Enamorado MD  4/12/2019  10:10 AM

## 2019-04-12 NOTE — PATIENT INSTRUCTIONS
PREVENTION OF RECURRENT ATHEROSCLEROSIS:  A MESSAGE TO PATIENTS AND THEIR LOVED ONES FROM YOUR SURGEON    You have recently had successful surgery for atherosclerosis. Now your real work begins. Atherosclerosis (hardening of the arteries by cholesterol and fat deposits) can be slowed or stopped from further blocking your own arteries or the new bypass grafts by following \"risk factor management\". If you follow these principles carefully, you can reduce your chances of having heart attacks, strokes, and the need for future surgery. Your cardiologist and primary care doctor should follow these concepts, but your surgeons believe that this is so important that we want you to begin thinking about and following these concepts now. These are the important risk factors you and your doctors should follow carefully. The marked ones apply to YOU:    [] SMOKING: Absolutely positively never smoke again. Keep your home and work place smoke­ free. Your doctors can prescribe patches, gum or other medications to help. [x] BLOOD PRESSURE CONTROL: Your blood pressure should be less than 140/90. If you have diabetes or kidney disease, your blood pressure should be less than 130/80. Weight reduction, exercise and medications can help you control high blood pressure. [x] CHOLESTEROL AND FATS: A diet low in saturated fat (< 7%) and low in cholesterol (< 200 mg/day), and weight reduction, and exercise, and medications as necessary can help you achieve these goals:  Low density (bad) cholesterol: <70 mg/dL (the lower, the better)   Triglycerides: <150 mg/dL (the lower, the better)   High density (good) cholesterol: >40 mg/dL (the higher, the better)  Make an appointment to see your primary care physician, Dr. Jack Shay 2 months after your surgery to check your cholesterol profile.   [x] EXERCISE: Your cardiac rehabilitation program will help you work up to a regular make you sweat\" program of at least 30 minutes, at least 6 times per week, preferably daily. Make an appointment with your cardiologist Dr. Joann Campos 3-4 weeks after your surgery. [x] WEIGHT REDUCTION: Your ideal body mass index is 18.5-24.9 kg/m2. Your body mass index is Body mass index is 37.03 kg/m². . You may calculate this by using the following formula: (weight in pounds X 0.45) / (height in inches X 0.0254) squared. A waist circumference of < 40 inches for men and < 35 inches for women is recommended. A 10% weight reduction can lower your risk of future events. [x] DIABETES: Your HbA1c should be <7%. Diet, exercise, weight reduction and proper medications can reduce your body's tendency toward high blood sugar. Check with your PCP for assistance. [x] PROPER MEDICATIONS:  [x] Aspirin  [] ACE inhibitor / ARB  (-opril / -sartan)  [x] Beta-blocker (-olol or -ilol)  [x] Statin    Risk factor management works. The person for whom this is most important is YOU. Post this information on your refrigerator or other prominent place as a reminder to you. Please call or write if you have further questions. We want you and your operation to last for many more years.     MAY LIFT MORE THAN 5 LBS ON 4/26/19

## 2019-04-16 PROBLEM — R97.20 ELEVATED PSA: Status: ACTIVE | Noted: 2019-04-16

## 2019-04-24 DIAGNOSIS — F41.9 ANXIETY: ICD-10-CM

## 2019-04-25 RX ORDER — ALPRAZOLAM 0.5 MG/1
TABLET ORAL
Qty: 90 TABLET | Refills: 0 | Status: SHIPPED | OUTPATIENT
Start: 2019-04-25 | End: 2019-05-22 | Stop reason: SDUPTHER

## 2019-04-29 DIAGNOSIS — M54.41 CHRONIC BILATERAL LOW BACK PAIN WITH BILATERAL SCIATICA: ICD-10-CM

## 2019-04-29 DIAGNOSIS — G89.29 CHRONIC BILATERAL LOW BACK PAIN WITH BILATERAL SCIATICA: ICD-10-CM

## 2019-04-29 DIAGNOSIS — G89.4 CHRONIC PAIN SYNDROME: ICD-10-CM

## 2019-04-29 DIAGNOSIS — M54.42 CHRONIC BILATERAL LOW BACK PAIN WITH BILATERAL SCIATICA: ICD-10-CM

## 2019-04-30 RX ORDER — VALSARTAN AND HYDROCHLOROTHIAZIDE 320; 12.5 MG/1; MG/1
TABLET, FILM COATED ORAL
Qty: 90 TABLET | Refills: 0 | Status: SHIPPED | OUTPATIENT
Start: 2019-04-30 | End: 2019-09-27 | Stop reason: SDUPTHER

## 2019-04-30 RX ORDER — GABAPENTIN 400 MG/1
CAPSULE ORAL
Qty: 90 CAPSULE | Refills: 0 | Status: SHIPPED | OUTPATIENT
Start: 2019-04-30 | End: 2019-08-13 | Stop reason: SDUPTHER

## 2019-05-03 ENCOUNTER — OFFICE VISIT (OUTPATIENT)
Dept: CARDIOTHORACIC SURGERY | Age: 64
End: 2019-05-03

## 2019-05-03 VITALS
WEIGHT: 270 LBS | HEART RATE: 104 BPM | SYSTOLIC BLOOD PRESSURE: 132 MMHG | OXYGEN SATURATION: 98 % | BODY MASS INDEX: 36.57 KG/M2 | DIASTOLIC BLOOD PRESSURE: 84 MMHG | TEMPERATURE: 97.6 F | HEIGHT: 72 IN

## 2019-05-03 DIAGNOSIS — Z09 POSTOP CHECK: Primary | ICD-10-CM

## 2019-05-03 PROCEDURE — 99024 POSTOP FOLLOW-UP VISIT: CPT | Performed by: THORACIC SURGERY (CARDIOTHORACIC VASCULAR SURGERY)

## 2019-05-03 NOTE — LETTER
05/03/19        [facility] ([facility]) Cardiovascular and Thoracic Surgeons  [address]  [address] [place] [address]        RE:    Russell Boyer,   1955    Dear Dr. Fanny Acharya,    It was my pleasure to see your patient, Russell Boyer, in the office today in follow up of cabgx5 3/26/19 at [facility].    I have attached a copy of the office evaluation. Thank you for allowing me to participate in the care of this patient.       Sincerely,     Yesica Powell MD    CC: Delfina Aschoff, [address] [facility]  [address] [facility] [address] [facility], [address] [facility]  [address]

## 2019-05-03 NOTE — PROGRESS NOTES
Progress Note    S/P CABGx5 3/26/19  MSI, CT sites and right leg incision are healing without redness or purulence. Has an appointment with Dr. Vikash Gomez 5/9/19. Would like a refill on his Colace. Lost 20 lbs with lifestyle adjustments    Vital Signs:                                                 /84 (Site: Left Upper Arm, Position: Sitting)   Pulse 104   Temp 97.6 °F (36.4 °C) (Oral)   Ht 6' (1.829 m)   Wt 270 lb (122.5 kg)   SpO2 98%   BMI 36.62 kg/m²      Preop weight: 281 lb    CV: reg, wound c/d/i, sternum stable  Pulm: clear, decreased at bases  Abd: soft  Ext: warm. No edema. saph incision c/d/i. Medications:  Prior to Admission medications    Medication Sig Start Date End Date Taking?  Authorizing Provider   valsartan-hydrochlorothiazide (DIOVAN-HCT) 320-12.5 MG per tablet TAKE 1 TABLET EVERY DAY 4/30/19  Yes Frantz Noel MD   ALPRAZolam [place] Evelin) 0.5 MG tablet TAKE 1 TABLET BY MOUTH THREE TIMES DAILY 4/25/19 5/25/19 Yes Frantz Noel MD   atorvastatin (LIPITOR) 80 MG tablet TAKE 1 TABLET BY MOUTH DAILY 4/11/19  Yes Frantz Noel MD   escitalopram (LEXAPRO) 5 MG tablet TAKE 1 TABLET BY MOUTH DAILY 4/11/19  Yes Frantz Noel MD   metFORMIN (GLUCOPHAGE-XR) 500 MG extended release tablet TAKE 4 TABLETS BY MOUTH EVERY DAY WITH BREAKFAST 4/9/19  Yes Frantz Noel MD   aspirin 325 MG EC tablet Take 1 tablet by mouth daily 4/4/19  Yes KARYN Hsu CNP   carvedilol (COREG) 6.25 MG tablet Take 1 tablet by mouth 2 times daily (with meals) 4/3/19  Yes KARYN Hsu CNP   docusate sodium (COLACE, DULCOLAX) 100 MG CAPS Take 100 mg by mouth 2 times daily as needed for Constipation 4/3/19 3/17/21 Yes KARYN Hsu CNP   pantoprazole (PROTONIX) 40 MG tablet Take 1 tablet by mouth every morning (before breakfast) 4/4/19 5/4/19 Yes KARYN Hsu CNP   insulin glargine (LANTUS) 100 UNIT/ML injection vial Inject 50 Units into the skin nightly   Yes Historical MD Bishnu   entecavir (BARACLUDE) 1 MG tablet Take 1 tablet by mouth daily 1/21/19  Yes Holly Dunbar MD   INSULIN SYRINGE .5CC/29G ([facility] INS SYR .5CC/29G) 29G X 1/2\" 0.5 ML MISC 1 each by Does not apply route daily 10/3/18  Yes Holly Dunbar MD   Insulin Pen Needle (B-D UF III MINI PEN NEEDLES) 31G X 5 MM MISC 1 each by Does not apply route daily 11/21/17  Yes Holly Dunbar MD   triamcinolone (KENALOG) 0.1 % cream Apply topically 2 times daily Apply topically 2 times daily. 11/17/17  Yes Holly Dunbar MD   mometasone (ELOCON) 0.1 % ointment Apply topically twice daily. 4/10/17  Yes Holly Dunbar MD   canagliflozin [facility]) 300 MG TABS tablet Take 1 tablet by mouth every morning (before breakfast) 12/8/16  Yes Holly Dunbar MD   glucose blood VI test strips (ASCENSIA AUTODISC VI;ONE TOUCH ULTRA TEST VI) strip Apply 1 each topically 3 times daily. Onetouch Ultra 2 test strips  Dx: 250.00 3/20/13  Yes Holly Dunbar MD   [facility] MISC 1 each by Does not apply route 3 times daily.  3/7/13  Yes Holly Dunbar MD   gabapentin (NEURONTIN) 400 MG capsule TAKE 1 CAPSULE BY MOUTH THREE TIMES DAILY 4/30/19 4/29/20  Holly Dunbar MD        Data Review:  CBC:   Lab Results   Component Value Date    WBC 6.9 04/03/2019    HGB 10.2 04/03/2019    HCT 30.0 04/03/2019    MCV 96.1 04/03/2019     04/03/2019     BMP:   Lab Results   Component Value Date     04/12/2019    K 4.3 04/12/2019    K 4.1 03/26/2019     04/12/2019    CO2 23 04/12/2019    PHOS 3.8 03/26/2019    BUN 19 04/12/2019    CREATININE 0.8 04/12/2019    CALCIUM 9.0 04/12/2019    MG 2.00 04/03/2019     PT/INR:   Lab Results   Component Value Date    PROTIME 12.8 04/03/2019    INR 1.12 04/03/2019       Assessment/Plan:  - lifting limit 10 lbs until 5/26, then as comfort allows  - cont ASA, statin, BB, ARB   - cardiac rehab per cards  - ok to have a cup of coffee daily  - follow up as needed    Bailey Wheatley MD  5/3/2019  3:15 PM

## 2019-05-06 DIAGNOSIS — E11.9 TYPE 2 DIABETES MELLITUS WITHOUT COMPLICATION, WITHOUT LONG-TERM CURRENT USE OF INSULIN (HCC): ICD-10-CM

## 2019-05-09 ENCOUNTER — OFFICE VISIT (OUTPATIENT)
Dept: CARDIOLOGY CLINIC | Age: 64
End: 2019-05-09
Payer: MEDICARE

## 2019-05-09 VITALS
BODY MASS INDEX: 35.49 KG/M2 | SYSTOLIC BLOOD PRESSURE: 110 MMHG | HEART RATE: 86 BPM | DIASTOLIC BLOOD PRESSURE: 66 MMHG | OXYGEN SATURATION: 98 % | WEIGHT: 262 LBS | HEIGHT: 72 IN

## 2019-05-09 DIAGNOSIS — I25.10 CORONARY ARTERY DISEASE INVOLVING NATIVE HEART WITHOUT ANGINA PECTORIS, UNSPECIFIED VESSEL OR LESION TYPE: Primary | ICD-10-CM

## 2019-05-09 DIAGNOSIS — I10 ESSENTIAL HYPERTENSION: ICD-10-CM

## 2019-05-09 PROCEDURE — 99214 OFFICE O/P EST MOD 30 MIN: CPT | Performed by: INTERNAL MEDICINE

## 2019-05-09 PROCEDURE — G8417 CALC BMI ABV UP PARAM F/U: HCPCS | Performed by: INTERNAL MEDICINE

## 2019-05-09 PROCEDURE — 93000 ELECTROCARDIOGRAM COMPLETE: CPT | Performed by: INTERNAL MEDICINE

## 2019-05-09 PROCEDURE — G8427 DOCREV CUR MEDS BY ELIG CLIN: HCPCS | Performed by: INTERNAL MEDICINE

## 2019-05-09 PROCEDURE — 3017F COLORECTAL CA SCREEN DOC REV: CPT | Performed by: INTERNAL MEDICINE

## 2019-05-09 PROCEDURE — G8598 ASA/ANTIPLAT THER USED: HCPCS | Performed by: INTERNAL MEDICINE

## 2019-05-09 PROCEDURE — 1036F TOBACCO NON-USER: CPT | Performed by: INTERNAL MEDICINE
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
## 2019-05-14 ENCOUNTER — TELEPHONE (OUTPATIENT)
Dept: FAMILY MEDICINE CLINIC | Age: 64
End: 2019-05-14

## 2019-05-14 NOTE — TELEPHONE ENCOUNTER
Called pt and we scheduled him at 3:40 and if he can't make it due to other commitments he will call

## 2019-05-15 ENCOUNTER — OFFICE VISIT (OUTPATIENT)
Dept: FAMILY MEDICINE CLINIC | Age: 64
End: 2019-05-15
Payer: MEDICARE

## 2019-05-15 ENCOUNTER — HOSPITAL ENCOUNTER (OUTPATIENT)
Dept: CARDIAC REHAB | Age: 64
Setting detail: THERAPIES SERIES
Discharge: HOME | End: 2019-05-15
Payer: MEDICARE

## 2019-05-15 VITALS
WEIGHT: 260 LBS | DIASTOLIC BLOOD PRESSURE: 80 MMHG | OXYGEN SATURATION: 98 % | SYSTOLIC BLOOD PRESSURE: 126 MMHG | BODY MASS INDEX: 35.26 KG/M2 | HEART RATE: 100 BPM | RESPIRATION RATE: 18 BRPM

## 2019-05-15 DIAGNOSIS — J98.4 RESTRICTIVE LUNG DISEASE: Primary | ICD-10-CM

## 2019-05-15 DIAGNOSIS — M54.41 CHRONIC BILATERAL LOW BACK PAIN WITH BILATERAL SCIATICA: ICD-10-CM

## 2019-05-15 DIAGNOSIS — R05.3 CHRONIC COUGH: ICD-10-CM

## 2019-05-15 DIAGNOSIS — M54.42 CHRONIC BILATERAL LOW BACK PAIN WITH BILATERAL SCIATICA: ICD-10-CM

## 2019-05-15 DIAGNOSIS — G89.29 CHRONIC BILATERAL LOW BACK PAIN WITH BILATERAL SCIATICA: ICD-10-CM

## 2019-05-15 PROBLEM — I21.19 ACUTE INFERIOR MYOCARDIAL INFARCTION (HCC): Status: RESOLVED | Noted: 2019-03-25 | Resolved: 2019-05-15

## 2019-05-15 PROCEDURE — 93798 PHYS/QHP OP CAR RHAB W/ECG: CPT

## 2019-05-15 PROCEDURE — G8417 CALC BMI ABV UP PARAM F/U: HCPCS | Performed by: FAMILY MEDICINE

## 2019-05-15 PROCEDURE — 3017F COLORECTAL CA SCREEN DOC REV: CPT | Performed by: FAMILY MEDICINE

## 2019-05-15 PROCEDURE — 99213 OFFICE O/P EST LOW 20 MIN: CPT | Performed by: FAMILY MEDICINE

## 2019-05-15 PROCEDURE — 1036F TOBACCO NON-USER: CPT | Performed by: FAMILY MEDICINE

## 2019-05-15 PROCEDURE — G8427 DOCREV CUR MEDS BY ELIG CLIN: HCPCS | Performed by: FAMILY MEDICINE

## 2019-05-15 PROCEDURE — G8598 ASA/ANTIPLAT THER USED: HCPCS | Performed by: FAMILY MEDICINE

## 2019-05-15 RX ORDER — CARISOPRODOL 350 MG/1
350 TABLET ORAL 4 TIMES DAILY PRN
Qty: 120 TABLET | Refills: 1 | Status: SHIPPED | OUTPATIENT
Start: 2019-05-15 | End: 2019-07-11 | Stop reason: SDUPTHER

## 2019-05-15 NOTE — PROGRESS NOTES
PROBLEM VISIT NOTE     Subjective:     Chief Complaint   Patient presents with    Cough     x2 years, dry cough, mostly during the day,      Dena Cormier is a 62 y.o. male who presents with chronic cough. Gets better at times and returns. CXR been OK. No due to meds. Inhalers were some help. May be seasonal.Worse with cooler air. Worse when grass gets cut. Recent PFT with moderate restrictive   Patient's medications, allergies, past medical, and social histories were reviewed and updated as appropriate (see below). Back flaring up past 2-3 days. Review of Systems   General ROS: fever? No,    Night sweats? No  Endocrine ROS:malaise/lethargy? No   Unexpected weight changes? No  Respiratory ROS: cough? Yes   Shortness of breath? No  Cardiovascular ROS:chest pain? Yes- chest wall with cough   Shortness of breath with exertion? No    HISTORY:  Patient's medications, allergies, past medical, and social histories were reviewed and updated as appropriate. CHART REVIEW  Health Maintenance   Topic Date Due    Diabetic retinal exam  01/03/1966    Hepatitis B Vaccine (1 of 3 - Risk 3-dose series) 01/03/1975    Shingles Vaccine (1 of 2) 01/03/2006    Hepatitis A vaccine (2 of 2 - Risk 2-dose series) 02/10/2018    Diabetic foot exam  02/19/2019    Colon Cancer Screen FIT/FOBT  08/01/2019    A1C test (Diabetic or Prediabetic)  03/25/2020    Diabetic microalbuminuria test  04/12/2020    Lipid screen  04/12/2020    Potassium monitoring  04/12/2020    Creatinine monitoring  04/12/2020    PSA counseling  04/12/2020    DTaP/Tdap/Td vaccine (2 - Td) 11/16/2027    Flu vaccine  Completed    Pneumococcal 0-64 years Vaccine  Completed    Hepatitis C screen  Completed    HIV screen  Completed     The ASCVD Risk score (Estela Dejesus, et al., 2013) failed to calculate for the following reasons: The patient has a prior MI or stroke diagnosis  Prior to Visit Medications    Medication Sig Taking?  Authorizing Neelam Molina MD   [facility] MISC 1 each by Does not apply route 3 times daily.  Yes [place] Chaney MD      Family History   Problem Relation Age of Onset    Diabetes Mother     Cancer Mother         pancreatic    Heart Failure Father         began age 76,  age 78     Social History     Tobacco Use    Smoking status: Never Smoker    Smokeless tobacco: Never Used    Tobacco comment: advised not to start   Substance Use Topics    Alcohol use: Not Currently     Comment: rare    Drug use: No      LAST LABS  Cholesterol, Total   Date Value Ref Range Status   2019 126 0 - 199 mg/dL Final     LDL Calculated   Date Value Ref Range Status   2019 61 <100 mg/dL Final     HDL   Date Value Ref Range Status   2019 39 (L) 40 - 60 mg/dL Final     Triglycerides   Date Value Ref Range Status   2019 130 0 - 150 mg/dL Final     Lab Results   Component Value Date    GLUCOSE 138 (H) 2019     Lab Results   Component Value Date     2019    K 4.3 2019    CREATININE 0.8 2019     Lab Results   Component Value Date    WBC 6.9 2019    HGB 10.2 (L) 2019    HCT 30.0 (L) 2019    MCV 96.1 2019     2019     Lab Results   Component Value Date    ALT 20 2019    AST 21 2019    ALKPHOS 113 2019    BILITOT 0.3 2019     No results found for: TSH  Lab Results   Component Value Date    LABA1C 8.2 2019      Objective:    PHYSICAL EXAM   /80 (Site: Right Upper Arm, Position: Sitting, Cuff Size: Large Adult)   Pulse 100   Resp 18   Wt 260 lb (117.9 kg)   SpO2 98%   BMI 35.26 kg/m²   BP Readings from Last 5 Encounters:   05/15/19 126/80   19 110/66   19 132/84   19 138/76   04/10/19 122/80     Wt Readings from Last 5 Encounters:   05/15/19 260 lb (117.9 kg)   19 262 lb (118.8 kg)   19 270 lb (122.5 kg)   19 273 lb (123.8 kg)   04/10/19 273 lb (123.8 kg)      GENERAL: · well-developed, well-nourished, alert, no distress. EYES:   · External findings: lids and lashes normal and conjunctivae and sclerae normal  · Eyes: no periorbital cellulitis. LUNGS:    · Breathing unlabored  · clear to auscultation bilaterally and fair air movement  CARDIOVASC:   · regular rate and rhythm, S1, S2 normal. No murmurs noted. SKIN: warm and dry  · No rashes or suspicious lesions  · No nodules or induration     Assessment and Plan:      Diagnosis Orders   1. Restrictive lung disease  CT Chest WO Contrast    Ambulatory referral to Pulmonology   2. Chronic bilateral low back pain with bilateral sciatica  carisoprodol (SOMA) 350 MG tablet   3. Chronic cough  CT Chest WO Contrast    Ambulatory referral to Pulmonology   Plan below. INSTRUCTIONS  · OK to take 2 aleve twice a day with food. · For pain, may take OTC tylenol arthritis (acetaminophen 8 hour) 2 tabs three times per day  · Can take soma instead of baclofen. · IF aleve and tylenol combo not helping in 1-2 weeks, can switch aleve to meloxicam or celebrex. · Get CT scan. May need to see pulmonary next.
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
## 2019-05-16 ENCOUNTER — TELEPHONE (OUTPATIENT)
Dept: PAIN MANAGEMENT | Age: 64
End: 2019-05-16

## 2019-05-16 ENCOUNTER — TELEPHONE (OUTPATIENT)
Dept: FAMILY MEDICINE CLINIC | Age: 64
End: 2019-05-16

## 2019-05-16 DIAGNOSIS — R05.9 COUGH: Primary | ICD-10-CM

## 2019-05-16 RX ORDER — DEXTROMETHORPHAN HYDROBROMIDE AND PROMETHAZINE HYDROCHLORIDE 15; 6.25 MG/5ML; MG/5ML
5 SYRUP ORAL 4 TIMES DAILY PRN
Qty: 140 ML | Refills: 0 | Status: SHIPPED | OUTPATIENT
Start: 2019-05-16 | End: 2019-05-22 | Stop reason: SDUPTHER

## 2019-05-16 NOTE — TELEPHONE ENCOUNTER
Pt is calling about his cough. He saw dr Raiza Jaime yesterday and was not given cough med. He is using a lot of cough drops and it is making his stomach upset. He cannot get in to see the specialist until mid June.   He states he needs to relief now or he is going to have to go to the hospital.     Please advise    Thanks

## 2019-05-16 NOTE — TELEPHONE ENCOUNTER
Sent in promethazine-DM to take QID PRN. This may cause fatigue so please ask him to be cautious. Should keep appt for CT scan next week.

## 2019-05-20 ENCOUNTER — HOSPITAL ENCOUNTER (OUTPATIENT)
Dept: CARDIAC REHAB | Age: 64
Setting detail: THERAPIES SERIES
Discharge: HOME | End: 2019-05-20
Payer: MEDICARE

## 2019-05-20 LAB
GLUCOSE BLD-MCNC: 129 MG/DL (ref 70–99)
GLUCOSE BLD-MCNC: 181 MG/DL (ref 70–99)
PERFORMED ON: ABNORMAL
PERFORMED ON: ABNORMAL

## 2019-05-20 PROCEDURE — 93798 PHYS/QHP OP CAR RHAB W/ECG: CPT

## 2019-05-21 ENCOUNTER — HOSPITAL ENCOUNTER (OUTPATIENT)
Dept: CT IMAGING | Age: 64
Discharge: HOME | End: 2019-05-21
Payer: MEDICARE

## 2019-05-21 DIAGNOSIS — R05.3 CHRONIC COUGH: ICD-10-CM

## 2019-05-21 DIAGNOSIS — J98.4 RESTRICTIVE LUNG DISEASE: ICD-10-CM

## 2019-05-21 PROCEDURE — 71250 CT THORAX DX C-: CPT

## 2019-05-22 ENCOUNTER — TELEPHONE (OUTPATIENT)
Dept: FAMILY MEDICINE CLINIC | Age: 64
End: 2019-05-22

## 2019-05-22 ENCOUNTER — HOSPITAL ENCOUNTER (OUTPATIENT)
Dept: CARDIAC REHAB | Age: 64
Setting detail: THERAPIES SERIES
Discharge: HOME | End: 2019-05-22
Payer: MEDICARE

## 2019-05-22 DIAGNOSIS — F41.9 ANXIETY: ICD-10-CM

## 2019-05-22 LAB
GLUCOSE BLD-MCNC: 222 MG/DL (ref 70–99)
PERFORMED ON: ABNORMAL

## 2019-05-22 PROCEDURE — 93798 PHYS/QHP OP CAR RHAB W/ECG: CPT

## 2019-05-22 RX ORDER — PREDNISONE 10 MG/1
TABLET ORAL
Qty: 21 TABLET | Refills: 0 | Status: SHIPPED | OUTPATIENT
Start: 2019-05-22 | End: 2019-06-03

## 2019-05-22 RX ORDER — DEXTROMETHORPHAN HYDROBROMIDE AND PROMETHAZINE HYDROCHLORIDE 15; 6.25 MG/5ML; MG/5ML
5 SYRUP ORAL 4 TIMES DAILY PRN
Qty: 140 ML | Refills: 0 | Status: SHIPPED | OUTPATIENT
Start: 2019-05-22 | End: 2019-05-29

## 2019-05-23 ENCOUNTER — HOSPITAL ENCOUNTER (OUTPATIENT)
Dept: PULMONOLOGY | Age: 64
Discharge: HOME | End: 2019-05-23
Payer: MEDICARE

## 2019-05-23 DIAGNOSIS — R05.9 COUGH: ICD-10-CM

## 2019-05-23 PROCEDURE — 94729 DIFFUSING CAPACITY: CPT

## 2019-05-23 PROCEDURE — 94640 AIRWAY INHALATION TREATMENT: CPT

## 2019-05-23 PROCEDURE — 94060 EVALUATION OF WHEEZING: CPT

## 2019-05-23 PROCEDURE — 94726 PLETHYSMOGRAPHY LUNG VOLUMES: CPT

## 2019-05-23 PROCEDURE — 94729 DIFFUSING CAPACITY: CPT | Performed by: INTERNAL MEDICINE

## 2019-05-23 PROCEDURE — 94060 EVALUATION OF WHEEZING: CPT | Performed by: INTERNAL MEDICINE

## 2019-05-23 PROCEDURE — 94664 DEMO&/EVAL PT USE INHALER: CPT

## 2019-05-23 PROCEDURE — 94727 GAS DIL/WSHOT DETER LNG VOL: CPT | Performed by: INTERNAL MEDICINE

## 2019-05-23 PROCEDURE — 6370000000 HC RX 637 (ALT 250 FOR IP): Performed by: INTERNAL MEDICINE

## 2019-05-23 RX ORDER — ALBUTEROL SULFATE 90 UG/1
4 AEROSOL, METERED RESPIRATORY (INHALATION) ONCE
Status: COMPLETED | OUTPATIENT
Start: 2019-05-23 | End: 2019-05-23

## 2019-05-23 RX ORDER — ALPRAZOLAM 0.5 MG/1
TABLET ORAL
Qty: 90 TABLET | Refills: 0 | Status: SHIPPED | OUTPATIENT
Start: 2019-05-23 | End: 2019-06-24 | Stop reason: SDUPTHER

## 2019-05-23 RX ADMIN — ALBUTEROL SULFATE 4 PUFF: 90 AEROSOL, METERED RESPIRATORY (INHALATION) at 08:10

## 2019-05-23 NOTE — PROCEDURES
Spirometry was acceptable and reproducible by ATS standards    Spirometry  1. There is no demonstrable obstructive defect. 2. The FVC is diminished suggesting a possible restrictive defect; suggest lung volumes if clinically indicated. Lung Volumes  3. Lung volumes show mild restrictive defect. Bronchodilator  1. There is no response to bronchodilator demonstrated. [Increase in FEV1 and/or FVC => 12% of control and => 200 ml]    Diffusing Capacity  4. Diffusing capacity isdecreased. [>60% and <LLN]  5. This corrects to 97% when attempting to adjust for lower lung volume on diffusion capacity assessment. 6.         Overall Interpretation  PFT does not demonstrates obstruction with FEV1 of 74 %  without bronchodilator response. There is Mild restriction by Total lung capacity assessment with associated decrease in diffusion capacity. Air trapping is not present. Hyperinflation is not present. This consistent with Interstitial Lung Disease  (ILD) vs restriction from obesity . Clinical correlation needed.                        OBSTRUCTION % Predicted FEV1   MILD >70%   MODERATE 60-69%   MODERATELY-SEVERE 50-59%   SEVERE 35-49%   VERY SEVERE <35%         RESTRICTION % Predicted TLC   MILD Less than LLN but > than 70%   MODERATE 60-69%   MODERATELY-SEVERE <60%                 DIFFUSION CAPACITY DL,CO % Pred   MILD >60% AND < LLN   MODERATE 40-60%   SEVERE <40%       PFT data will be scanned into the procedure tab in epic under this encounter    Codi Faustin MD  [facility]

## 2019-05-24 LAB
DLCO %PRED: 62 %
DLCO PRED: NORMAL ML/MIN/MMHG
DLCO/VA %PRED: NORMAL %
DLCO/VA PRED: NORMAL ML/MIN/MMHG
DLCO/VA: NORMAL ML/MIN/MMHG
DLCO: NORMAL ML/MIN/MMHG
EXPIRATORY TIME-POST: NORMAL SEC
EXPIRATORY TIME: NORMAL SEC
FEF 25-75% %CHNG: NORMAL
FEF 25-75% %PRED-POST: NORMAL %
FEF 25-75% %PRED-PRE: NORMAL L/SEC
FEF 25-75% PRED: NORMAL L/SEC
FEF 25-75%-POST: NORMAL L/SEC
FEF 25-75%-PRE: NORMAL L/SEC
FEV1 %PRED-POST: 78 %
FEV1 %PRED-PRE: 74 %
FEV1 PRED: NORMAL L
FEV1-POST: NORMAL L
FEV1-PRE: NORMAL L
FEV1/FVC %PRED-POST: NORMAL %
FEV1/FVC %PRED-PRE: NORMAL %
FEV1/FVC PRED: NORMAL %
FEV1/FVC-POST: 81 %
FEV1/FVC-PRE: 80 %
FVC %PRED-POST: NORMAL L
FVC %PRED-PRE: NORMAL %
FVC PRED: NORMAL L
FVC-POST: NORMAL L
FVC-PRE: NORMAL L
GAW %PRED: NORMAL %
GAW PRED: NORMAL L/S/CMH2O
GAW: NORMAL L/S/CMH2O
IC %PRED: NORMAL %
IC PRED: NORMAL L
IC: NORMAL L
MEP: NORMAL
MIP: NORMAL
MVV %PRED-PRE: NORMAL %
MVV PRED: NORMAL L/MIN
MVV-PRE: NORMAL L/MIN
PEF %PRED-POST: NORMAL %
PEF %PRED-PRE: NORMAL L/SEC
PEF PRED: NORMAL L/SEC
PEF%CHNG: NORMAL
PEF-POST: NORMAL L/SEC
PEF-PRE: NORMAL L/SEC
RAW %PRED: NORMAL %
RAW PRED: NORMAL CMH2O/L/S
RAW: NORMAL CMH2O/L/S
RV %PRED: NORMAL %
RV PRED: NORMAL L
RV: NORMAL L
SVC %PRED: NORMAL %
SVC PRED: NORMAL L
SVC: NORMAL L
TLC %PRED: 74 %
TLC PRED: NORMAL L
TLC: NORMAL L
VA %PRED: NORMAL %
VA PRED: NORMAL L
VA: NORMAL L
VTG %PRED: NORMAL %
VTG PRED: NORMAL L
VTG: NORMAL L

## 2019-05-24 ASSESSMENT — PULMONARY FUNCTION TESTS
FEV1/FVC_PRE: 80
FEV1_PERCENT_PREDICTED_POST: 78
FEV1/FVC_POST: 81
FEV1_PERCENT_PREDICTED_PRE: 74

## 2019-05-30 ENCOUNTER — OFFICE VISIT (OUTPATIENT)
Dept: PULMONOLOGY | Age: 64
End: 2019-05-30
Payer: MEDICARE

## 2019-05-30 VITALS
SYSTOLIC BLOOD PRESSURE: 126 MMHG | TEMPERATURE: 97.6 F | HEIGHT: 72 IN | BODY MASS INDEX: 36.3 KG/M2 | OXYGEN SATURATION: 96 % | DIASTOLIC BLOOD PRESSURE: 72 MMHG | HEART RATE: 76 BPM | WEIGHT: 268 LBS | RESPIRATION RATE: 16 BRPM

## 2019-05-30 DIAGNOSIS — J98.4 RESTRICTIVE LUNG DISEASE: ICD-10-CM

## 2019-05-30 DIAGNOSIS — G47.30 SLEEP APNEA, UNSPECIFIED TYPE: ICD-10-CM

## 2019-05-30 DIAGNOSIS — R05.3 CHRONIC COUGH: Primary | ICD-10-CM

## 2019-05-30 DIAGNOSIS — J90 PLEURAL EFFUSION ON LEFT: ICD-10-CM

## 2019-05-30 PROCEDURE — G8598 ASA/ANTIPLAT THER USED: HCPCS | Performed by: INTERNAL MEDICINE

## 2019-05-30 PROCEDURE — G8427 DOCREV CUR MEDS BY ELIG CLIN: HCPCS | Performed by: INTERNAL MEDICINE

## 2019-05-30 PROCEDURE — 3017F COLORECTAL CA SCREEN DOC REV: CPT | Performed by: INTERNAL MEDICINE

## 2019-05-30 PROCEDURE — 1036F TOBACCO NON-USER: CPT | Performed by: INTERNAL MEDICINE

## 2019-05-30 PROCEDURE — G8417 CALC BMI ABV UP PARAM F/U: HCPCS | Performed by: INTERNAL MEDICINE

## 2019-05-30 PROCEDURE — 99205 OFFICE O/P NEW HI 60 MIN: CPT | Performed by: INTERNAL MEDICINE

## 2019-05-30 RX ORDER — AZELASTINE 1 MG/ML
2 SPRAY, METERED NASAL 2 TIMES DAILY
Qty: 4 BOTTLE | Refills: 1 | Status: SHIPPED | OUTPATIENT
Start: 2019-05-30 | End: 2019-08-13

## 2019-05-30 RX ORDER — FLUTICASONE PROPIONATE 50 MCG
1 SPRAY, SUSPENSION (ML) NASAL 2 TIMES DAILY
Qty: 1 BOTTLE | Refills: 3 | Status: SHIPPED | OUTPATIENT
Start: 2019-05-30 | End: 2019-08-12 | Stop reason: ALTCHOICE

## 2019-05-30 RX ORDER — ALBUTEROL SULFATE 90 UG/1
2 AEROSOL, METERED RESPIRATORY (INHALATION) EVERY 6 HOURS PRN
Qty: 1 INHALER | Refills: 5 | Status: SHIPPED | OUTPATIENT
Start: 2019-05-30 | End: 2019-08-12 | Stop reason: ALTCHOICE
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
## 2019-05-30 NOTE — ASSESSMENT & PLAN NOTE
-ucnlear etiology, possibly multifactorial.  Favor UACS or post nasal drip at this time, but pt would like to trial an inhaler. I agree with empiric bronchodilators given subjective improvement with systemic steroids.    -flonase and azelastine BID  -albuterol PRN with coughing  -currently on protonix but denies reflux sx

## 2019-05-30 NOTE — ASSESSMENT & PLAN NOTE
-mild seen on PFTS, no ocnerning ILD features on recent CT. It is very mild and I suspect due in part to truncal obesity and small effusion. I do not think it is clincially significant at this time.

## 2019-05-31 ENCOUNTER — TELEPHONE (OUTPATIENT)
Dept: FAMILY MEDICINE CLINIC | Age: 64
End: 2019-05-31

## 2019-05-31 DIAGNOSIS — M54.41 CHRONIC BILATERAL LOW BACK PAIN WITH BILATERAL SCIATICA: Primary | ICD-10-CM

## 2019-05-31 DIAGNOSIS — M54.42 CHRONIC BILATERAL LOW BACK PAIN WITH BILATERAL SCIATICA: Primary | ICD-10-CM

## 2019-05-31 DIAGNOSIS — M50.30 DDD (DEGENERATIVE DISC DISEASE), CERVICAL: ICD-10-CM

## 2019-05-31 DIAGNOSIS — M48.10 DISH (DIFFUSE IDIOPATHIC SKELETAL HYPEROSTOSIS): ICD-10-CM

## 2019-05-31 DIAGNOSIS — G89.29 CHRONIC BILATERAL LOW BACK PAIN WITH BILATERAL SCIATICA: Primary | ICD-10-CM

## 2019-05-31 DIAGNOSIS — M51.35 DDD (DEGENERATIVE DISC DISEASE), THORACOLUMBAR: ICD-10-CM

## 2019-05-31 NOTE — TELEPHONE ENCOUNTER
Pt called and would like to be referred to pain management dr. Leanne Dao for his back      Please advise

## 2019-06-05 ENCOUNTER — HOSPITAL ENCOUNTER (OUTPATIENT)
Dept: CARDIAC REHAB | Age: 64
Setting detail: THERAPIES SERIES
Discharge: HOME | End: 2019-06-05
Payer: MEDICARE

## 2019-06-05 LAB
GLUCOSE BLD-MCNC: 133 MG/DL (ref 70–99)
GLUCOSE BLD-MCNC: 138 MG/DL (ref 70–99)
PERFORMED ON: ABNORMAL
PERFORMED ON: ABNORMAL

## 2019-06-05 PROCEDURE — 93798 PHYS/QHP OP CAR RHAB W/ECG: CPT

## 2019-06-07 ENCOUNTER — TELEPHONE (OUTPATIENT)
Dept: PULMONOLOGY | Age: 64
End: 2019-06-07

## 2019-06-07 ENCOUNTER — TELEPHONE (OUTPATIENT)
Dept: CARDIOLOGY CLINIC | Age: 64
End: 2019-06-07

## 2019-06-07 ENCOUNTER — HOSPITAL ENCOUNTER (OUTPATIENT)
Dept: CARDIAC REHAB | Age: 64
Setting detail: THERAPIES SERIES
Discharge: HOME | End: 2019-06-07
Payer: MEDICARE

## 2019-06-07 LAB
GLUCOSE BLD-MCNC: 104 MG/DL (ref 70–99)
GLUCOSE BLD-MCNC: 146 MG/DL (ref 70–99)
GLUCOSE BLD-MCNC: 86 MG/DL (ref 70–99)
PERFORMED ON: ABNORMAL
PERFORMED ON: ABNORMAL
PERFORMED ON: NORMAL

## 2019-06-07 PROCEDURE — 93798 PHYS/QHP OP CAR RHAB W/ECG: CPT

## 2019-06-07 NOTE — TELEPHONE ENCOUNTER
Jamas Pain stopped by the office this morning stating that he is getting ready to do the  [facility]. The program needs him to have some labs done. He is needing Dr. Sally Peterson to order him the labs. TSH  CBC w/ differential   CMP  Lipid Panel    He will be back to [facility] on Monday 6/10/19 to have Rehab, he is wanting to have the labs drown then.      You can reach Jamas Pain at #[phone]

## 2019-06-07 NOTE — TELEPHONE ENCOUNTER
Left message on vm for the patient that he has labs that still need to be drawn.  He can go to [address] at the medical office bldg

## 2019-06-13 ENCOUNTER — TELEPHONE (OUTPATIENT)
Dept: CARDIOLOGY CLINIC | Age: 64
End: 2019-06-13

## 2019-06-13 ENCOUNTER — OFFICE VISIT (OUTPATIENT)
Dept: FAMILY MEDICINE CLINIC | Age: 64
End: 2019-06-13
Payer: MEDICARE

## 2019-06-13 VITALS
WEIGHT: 261 LBS | HEIGHT: 72 IN | BODY MASS INDEX: 35.35 KG/M2 | SYSTOLIC BLOOD PRESSURE: 118 MMHG | HEART RATE: 71 BPM | DIASTOLIC BLOOD PRESSURE: 82 MMHG | TEMPERATURE: 98.1 F | RESPIRATION RATE: 16 BRPM

## 2019-06-13 DIAGNOSIS — E11.9 TYPE 2 DIABETES MELLITUS WITHOUT COMPLICATION, WITHOUT LONG-TERM CURRENT USE OF INSULIN (HCC): Primary | ICD-10-CM

## 2019-06-13 DIAGNOSIS — B18.1 CHRONIC ACTIVE VIRAL HEPATITIS B (HCC): ICD-10-CM

## 2019-06-13 DIAGNOSIS — F33.41 RECURRENT MAJOR DEPRESSIVE DISORDER, IN PARTIAL REMISSION (HCC): ICD-10-CM

## 2019-06-13 DIAGNOSIS — M51.35 DDD (DEGENERATIVE DISC DISEASE), THORACOLUMBAR: ICD-10-CM

## 2019-06-13 DIAGNOSIS — K76.0 FATTY LIVER: ICD-10-CM

## 2019-06-13 DIAGNOSIS — R05.3 CHRONIC COUGH: ICD-10-CM

## 2019-06-13 DIAGNOSIS — I10 HYPERTENSION, ESSENTIAL: ICD-10-CM

## 2019-06-13 DIAGNOSIS — Z95.1 S/P CABG X 5: ICD-10-CM

## 2019-06-13 DIAGNOSIS — Z23 NEED FOR HEPATITIS A VACCINATION: ICD-10-CM

## 2019-06-13 DIAGNOSIS — R74.8 ELEVATED LIVER ENZYMES: ICD-10-CM

## 2019-06-13 DIAGNOSIS — I25.10 CORONARY ARTERY DISEASE INVOLVING NATIVE CORONARY ARTERY OF NATIVE HEART WITHOUT ANGINA PECTORIS: ICD-10-CM

## 2019-06-13 LAB
BASOPHILS ABSOLUTE: 0 K/UL (ref 0–0.2)
BASOPHILS RELATIVE PERCENT: 0.8 %
EOSINOPHILS ABSOLUTE: 0.1 K/UL (ref 0–0.6)
EOSINOPHILS RELATIVE PERCENT: 2.3 %
HBA1C MFR BLD: 7.3 %
HCT VFR BLD CALC: 44.6 % (ref 40.5–52.5)
HEMOGLOBIN: 14.6 G/DL (ref 13.5–17.5)
LYMPHOCYTES ABSOLUTE: 1.6 K/UL (ref 1–5.1)
LYMPHOCYTES RELATIVE PERCENT: 29.5 %
MCH RBC QN AUTO: 30 PG (ref 26–34)
MCHC RBC AUTO-ENTMCNC: 32.7 G/DL (ref 31–36)
MCV RBC AUTO: 91.5 FL (ref 80–100)
MONOCYTES ABSOLUTE: 0.5 K/UL (ref 0–1.3)
MONOCYTES RELATIVE PERCENT: 9.1 %
NEUTROPHILS ABSOLUTE: 3.2 K/UL (ref 1.7–7.7)
NEUTROPHILS RELATIVE PERCENT: 58.3 %
PDW BLD-RTO: 15.1 % (ref 12.4–15.4)
PLATELET # BLD: 210 K/UL (ref 135–450)
PMV BLD AUTO: 8.3 FL (ref 5–10.5)
RBC # BLD: 4.88 M/UL (ref 4.2–5.9)
WBC # BLD: 5.4 K/UL (ref 4–11)

## 2019-06-13 PROCEDURE — G8598 ASA/ANTIPLAT THER USED: HCPCS | Performed by: FAMILY MEDICINE

## 2019-06-13 PROCEDURE — G8417 CALC BMI ABV UP PARAM F/U: HCPCS | Performed by: FAMILY MEDICINE

## 2019-06-13 PROCEDURE — 90632 HEPA VACCINE ADULT IM: CPT | Performed by: FAMILY MEDICINE

## 2019-06-13 PROCEDURE — G8427 DOCREV CUR MEDS BY ELIG CLIN: HCPCS | Performed by: FAMILY MEDICINE

## 2019-06-13 PROCEDURE — 3045F PR MOST RECENT HEMOGLOBIN A1C LEVEL 7.0-9.0%: CPT | Performed by: FAMILY MEDICINE

## 2019-06-13 PROCEDURE — 99214 OFFICE O/P EST MOD 30 MIN: CPT | Performed by: FAMILY MEDICINE

## 2019-06-13 PROCEDURE — 3017F COLORECTAL CA SCREEN DOC REV: CPT | Performed by: FAMILY MEDICINE

## 2019-06-13 PROCEDURE — 2022F DILAT RTA XM EVC RTNOPTHY: CPT | Performed by: FAMILY MEDICINE

## 2019-06-13 PROCEDURE — 90471 IMMUNIZATION ADMIN: CPT | Performed by: FAMILY MEDICINE

## 2019-06-13 PROCEDURE — 83036 HEMOGLOBIN GLYCOSYLATED A1C: CPT | Performed by: FAMILY MEDICINE

## 2019-06-13 PROCEDURE — 1036F TOBACCO NON-USER: CPT | Performed by: FAMILY MEDICINE

## 2019-06-13 RX ORDER — LANCETS 30 GAUGE
1 EACH MISCELLANEOUS 2 TIMES DAILY
Qty: 200 EACH | Refills: 3 | Status: SHIPPED | OUTPATIENT
Start: 2019-06-13

## 2019-06-13 RX ORDER — CLOPIDOGREL BISULFATE 75 MG/1
75 TABLET ORAL DAILY
Qty: 90 TABLET | Refills: 1 | Status: SHIPPED | OUTPATIENT
Start: 2019-06-13 | End: 2019-12-09 | Stop reason: SDUPTHER

## 2019-06-13 RX ORDER — METFORMIN HYDROCHLORIDE 500 MG/1
TABLET, EXTENDED RELEASE ORAL
Qty: 360 TABLET | Refills: 0 | Status: SHIPPED | OUTPATIENT
Start: 2019-06-13 | End: 2019-11-25 | Stop reason: SDUPTHER

## 2019-06-13 RX ORDER — CARVEDILOL 6.25 MG/1
6.25 TABLET ORAL 2 TIMES DAILY WITH MEALS
Qty: 180 TABLET | Refills: 1 | Status: SHIPPED | OUTPATIENT
Start: 2019-06-13 | End: 2019-12-23

## 2019-06-13 RX ORDER — ESCITALOPRAM OXALATE 5 MG/1
5 TABLET ORAL DAILY
Qty: 90 TABLET | Refills: 0 | Status: SHIPPED | OUTPATIENT
Start: 2019-06-13 | End: 2019-09-28

## 2019-06-13 RX ORDER — GLUCOSAMINE HCL/CHONDROITIN SU 500-400 MG
CAPSULE ORAL
Qty: 200 STRIP | Refills: 3 | Status: SHIPPED | OUTPATIENT
Start: 2019-06-13

## 2019-06-13 NOTE — PATIENT INSTRUCTIONS
MA- offer pt meters we have   INSTRUCTIONS  NEXT APPOINTMENT: Please schedule check-up in 3 months. · PLEASE TAKE THIS FORM TO CHECK-OUT WINDOW TO SCHEDULE NEXT VISIT. · Keep up great work with diet.   Patient Education

## 2019-06-13 NOTE — PROGRESS NOTES
- Risk 2-dose series) 02/10/2018    Diabetic foot exam  02/19/2019    Shingles Vaccine (1 of 2) 06/13/2020 (Originally 1/3/2006)    Colon Cancer Screen FIT/FOBT  08/01/2019    A1C test (Diabetic or Prediabetic)  03/25/2020    Diabetic microalbuminuria test  04/12/2020    Lipid screen  04/12/2020    Potassium monitoring  04/12/2020    Creatinine monitoring  04/12/2020    PSA counseling  04/12/2020    DTaP/Tdap/Td vaccine (2 - Td) 11/16/2027    Flu vaccine  Completed    Pneumococcal 0-64 years Vaccine  Completed    Hepatitis C screen  Completed    HIV screen  Completed    Hepatitis B Vaccine  Discontinued     The ASCVD Risk score (Jessica Shelley, et al., 2013) failed to calculate for the following reasons: The patient has a prior MI or stroke diagnosis  Prior to Visit Medications    Medication Sig Taking? Authorizing Provider   clopidogrel (PLAVIX) 75 MG tablet Take 1 tablet by mouth daily Yes Rosemarie Coffman MD   Lancets MISC 1 each by Does not apply route 2 times daily Yes Rosemarie Coffman MD   blood glucose monitor kit and supplies Test 2 times a day & as needed for symptoms of irregular blood glucose. Yes Rosemarie Coffman MD   blood glucose monitor strips Test 2 times a day & as needed for symptoms of irregular blood glucose.  Yes Rosemarie Coffman MD   fluticasone (FLONASE) 50 MCG/ACT nasal spray 1 spray by Nasal route 2 times daily Yes Lesvia Campos MD   azelastine (ASTELIN) 0.1 % nasal spray 2 sprays by Nasal route 2 times daily Use in each nostril as directed Yes Lesvia Campos MD   albuterol sulfate  (90 Base) MCG/ACT inhaler Inhale 2 puffs into the lungs every 6 hours as needed for Wheezing or Shortness of Breath Yes Lesvia Campos MD   ALPRAZolam (XANAX) 0.5 MG tablet TAKE 1 TABLET BY MOUTH THREE TIMES DAILY Yes Rosemarie Coffman MD   canagliflozin (INVOKANA) 300 MG TABS tablet Take 1 tablet by mouth every morning (before breakfast) Yes Rosemarie Coffman MD   valsartan-hydrochlorothiazide (DIOVAN-HCT) 320-12.5 MG per tablet TAKE 1 TABLET EVERY DAY Yes Efe Guerra MD   gabapentin (NEURONTIN) 400 MG capsule TAKE 1 CAPSULE BY MOUTH THREE TIMES DAILY Yes Efe Guerra MD   atorvastatin (LIPITOR) 80 MG tablet TAKE 1 TABLET BY MOUTH DAILY Yes Efe Guerra MD   escitalopram (LEXAPRO) 5 MG tablet TAKE 1 TABLET BY MOUTH DAILY Yes Efe Guerra MD   metFORMIN (GLUCOPHAGE-XR) 500 MG extended release tablet TAKE 4 TABLETS BY MOUTH EVERY DAY WITH BREAKFAST Yes Efe Guerra MD   aspirin 325 MG EC tablet Take 1 tablet by mouth daily Yes KARYN Ontiveros CNP   carvedilol (COREG) 6.25 MG tablet Take 1 tablet by mouth 2 times daily (with meals) Yes KARYN Ontiveros CNP   insulin glargine (LANTUS) 100 UNIT/ML injection vial Inject 50 Units into the skin nightly Yes August Goetz MD   entecavir (BARACLUDE) 1 MG tablet Take 1 tablet by mouth daily Yes Efe Guerra MD   INSULIN SYRINGE .5CC/29G ([facility] INS SYR .5CC/29G) 29G X 1/2\" 0.5 ML MISC 1 each by Does not apply route daily Yes Efe Guerra MD   triamcinolone (KENALOG) 0.1 % cream Apply topically 2 times daily Apply topically 2 times daily. Yes Efe Guerra MD   mometasone (ELOCON) 0.1 % ointment Apply topically twice daily. Yes Efe Guerra MD   glucose blood VI test strips (ASCENSIA AUTODISC VI;ONE TOUCH ULTRA TEST VI) strip Apply 1 each topically 3 times daily. Onetouch Ultra 2 test strips  Dx: 250.00 Yes Efe Guerra MD   [facility] MISC 1 each by Does not apply route 3 times daily.  Yes Efe Guerra MD   pantoprazole (PROTONIX) 40 MG tablet Take 1 tablet by mouth every morning (before breakfast)  KARYN Ontiveros CNP      Family History   Problem Relation Age of Onset    Diabetes Mother     Cancer Mother         pancreatic    Heart Failure Father         began age 76,  age 78     Social History     Tobacco Use    Smoking status: Never Smoker    Smokeless tobacco: Never Used    Tobacco comment: advised not to start Substance Use Topics    Alcohol use: Not Currently     Comment: rare    Drug use: No      LAST LABS  Cholesterol, Total   Date Value Ref Range Status   04/12/2019 126 0 - 199 mg/dL Final     LDL Calculated   Date Value Ref Range Status   04/12/2019 61 <100 mg/dL Final     HDL   Date Value Ref Range Status   04/12/2019 39 (L) 40 - 60 mg/dL Final     Triglycerides   Date Value Ref Range Status   04/12/2019 130 0 - 150 mg/dL Final     Lab Results   Component Value Date    GLUCOSE 138 (H) 04/12/2019     Lab Results   Component Value Date     04/12/2019    K 4.3 04/12/2019    CREATININE 0.8 04/12/2019     Lab Results   Component Value Date    WBC 6.9 04/03/2019    HGB 10.2 (L) 04/03/2019    HCT 30.0 (L) 04/03/2019    MCV 96.1 04/03/2019     04/03/2019     Lab Results   Component Value Date    ALT 20 04/12/2019    AST 21 04/12/2019    ALKPHOS 113 04/12/2019    BILITOT 0.3 04/12/2019     No results found for: TSH  Lab Results   Component Value Date    LABA1C 8.2 03/25/2019 06/16/19   Objective:   PHYSICAL EXAM   /82 (Site: Right Upper Arm, Position: Sitting, Cuff Size: Large Adult)   Pulse 71   Temp 98.1 °F (36.7 °C) (Oral)   Resp 16   Ht 6' (1.829 m)   Wt 261 lb (118.4 kg)   BMI 35.40 kg/m²   BP Readings from Last 5 Encounters:   06/13/19 118/82   05/30/19 126/72   05/15/19 126/80   05/09/19 110/66   05/03/19 132/84     Wt Readings from Last 20 Encounters:   06/13/19 261 lb (118.4 kg)   05/30/19 268 lb (121.6 kg)   05/15/19 260 lb (117.9 kg)   05/09/19 262 lb (118.8 kg)   05/03/19 270 lb (122.5 kg)   04/12/19 273 lb (123.8 kg)   04/10/19 273 lb (123.8 kg)   04/03/19 279 lb 5.2 oz (126.7 kg)   01/21/19 281 lb (127.5 kg)   12/04/18 277 lb 9.6 oz (125.9 kg)   11/06/18 278 lb 3.2 oz (126.2 kg)   10/23/18 263 lb (119.3 kg)   10/03/18 281 lb (127.5 kg)   10/03/18 281 lb 3.2 oz (127.6 kg)   09/05/18 276 lb 9.6 oz (125.5 kg)   07/03/18 280 lb (127 kg)   06/14/18 273 lb (123.8 kg)   06/05/18 272 lb (123.4 kg)   05/01/18 277 lb (125.6 kg)   02/28/18 279 lb (126.6 kg)          GENERAL:   · well-developed, well-nourished, alert, no distress. EYES:   · External findings: lids and lashes normal and conjunctivae and sclerae normal  LUNGS:    · Breathing unlabored  · clear to auscultation bilaterally and good air movement  CARDIOVASC:   · regular rate and rhythm  · LEGS:  Lower extremity edema: none    SKIN: warm and dry  PSYCH:    · Alert and oriented  · Normal reasoning, insight good  · Facial expressions full, mood appropriate  · No memory disturbance noted  MUSCULOSKEL:    · No significant finger or nail findings  NEURO:   · CN 2-12 intact  Diabetic Foot Exam  · Foot exam reveals normal cap refill  · Feet normal skin color, no ulcers, no venous stasis; some blood below L 5th toenail (old injury). Fungus R 1st toe, callous over 1st MTP  · Feet- no deformities  · sensation grossly normal to light touch in feet. FEET:Monofilament Sensation:  normal      Assessment and Plan:      Diagnosis Orders   1. Type 2 diabetes mellitus without complication, without long-term current use of insulin (HCC)   DIABETES FOOT EXAM    Lancets MISC    blood glucose monitor kit and supplies    blood glucose monitor strips   2. CAD (coronary artery disease)  clopidogrel (PLAVIX) 75 MG tablet   3. Elevated liver enzymes  Hep A Vaccine Adult (VAQTA)   4. DDD (degenerative disc disease), thoracolumbar     5. Fatty liver  Hep A Vaccine Adult (VAQTA)   6. Hypertension, essential     7. S/P CABG x 5     8. Need for hepatitis A vaccination  Hep A Vaccine Adult (VAQTA)   9. Chronic active viral hepatitis B (HCC)  Hep A Vaccine Adult (VAQTA)   Stable. Continue current Tx plan. Any changes marked below. MA- offer pt meters we have   INSTRUCTIONS  NEXT APPOINTMENT: Please schedule check-up in 3 months. · PLEASE TAKE THIS FORM TO CHECK-OUT WINDOW TO SCHEDULE NEXT VISIT. · Keep up great work with diet.

## 2019-06-13 NOTE — TELEPHONE ENCOUNTER
Personally I am not familiar with these drugs so I am uncomfortable seeing anything about them  I generally do not recommend such drugs

## 2019-06-13 NOTE — TELEPHONE ENCOUNTER
Pt called stating that he would like to start taking a appetite suppressant and needs clearance from  per Dr. Dash Bravo.      Ankush Milan can be reached at [phone]      Pt can be reached at [phone]

## 2019-06-13 NOTE — TELEPHONE ENCOUNTER
Dr. Sally Peterson,     Pt with CAD, CABG x 5, HTN and DM     Needing clearance to take appetite suppressants. Pt last seen 5/9/19. Please advise, thanks.

## 2019-06-14 NOTE — TELEPHONE ENCOUNTER
Called patient to notify that if the medication is a stimulant, he probably should not take it with his cardiac history. Also informed him that Dr. Alvino Bamberger is not going to research the medication since he doesn't recommend it with patient's cardiac history. The decision is to be made by the prescribing physician and pt as to whether or not the medication will be started. Patient v/u.

## 2019-06-17 ENCOUNTER — HOSPITAL ENCOUNTER (OUTPATIENT)
Dept: GENERAL RADIOLOGY | Age: 64
Discharge: HOME | End: 2019-06-17
Payer: MEDICARE

## 2019-06-17 ENCOUNTER — HOSPITAL ENCOUNTER (OUTPATIENT)
Age: 64
Discharge: HOME | End: 2019-06-17
Payer: MEDICARE

## 2019-06-17 ENCOUNTER — TELEPHONE (OUTPATIENT)
Dept: FAMILY MEDICINE CLINIC | Age: 64
End: 2019-06-17

## 2019-06-17 DIAGNOSIS — M25.511 CHRONIC RIGHT SHOULDER PAIN: ICD-10-CM

## 2019-06-17 DIAGNOSIS — M25.511 ACUTE PAIN OF RIGHT SHOULDER: Primary | ICD-10-CM

## 2019-06-17 DIAGNOSIS — G89.29 CHRONIC RIGHT SHOULDER PAIN: ICD-10-CM

## 2019-06-17 DIAGNOSIS — M25.511 ACUTE PAIN OF RIGHT SHOULDER: ICD-10-CM

## 2019-06-17 PROCEDURE — 73030 X-RAY EXAM OF SHOULDER: CPT

## 2019-06-17 PROCEDURE — 71046 X-RAY EXAM CHEST 2 VIEWS: CPT

## 2019-06-17 RX ORDER — TRAMADOL HYDROCHLORIDE 50 MG/1
50-100 TABLET ORAL EVERY 6 HOURS PRN
Qty: 20 TABLET | Refills: 0 | Status: SHIPPED | OUTPATIENT
Start: 2019-06-17 | End: 2019-06-24

## 2019-06-17 NOTE — TELEPHONE ENCOUNTER
Had chest CT on 5/21. Put in order for chest and shoulder x-rays. Please get in with orthopedics. Shoulder injection may help.

## 2019-06-17 NOTE — TELEPHONE ENCOUNTER
Patient calling about his right shoulder. He states he is having really bad stabbing pain that is unbearable. He says it is his lower part of his shoulder in his shoulder blade. He is having problems driving and doing every day things. He sates the pain got bad on Friday. He states that he had a drainage tube that was pulled out from open heart surgery and he says that is when he started feeling the pain in his shoulder. He would like to have an MRI or x-ray to see what is going on.     Please advise    Thanks

## 2019-06-20 ENCOUNTER — OFFICE VISIT (OUTPATIENT)
Dept: ORTHOPEDIC SURGERY | Age: 64
End: 2019-06-20
Payer: MEDICARE

## 2019-06-20 VITALS
TEMPERATURE: 99.3 F | WEIGHT: 261 LBS | SYSTOLIC BLOOD PRESSURE: 141 MMHG | HEART RATE: 108 BPM | HEIGHT: 72 IN | DIASTOLIC BLOOD PRESSURE: 83 MMHG | BODY MASS INDEX: 35.35 KG/M2

## 2019-06-20 DIAGNOSIS — M75.81 ROTATOR CUFF TENDINITIS, RIGHT: Primary | ICD-10-CM

## 2019-06-20 PROCEDURE — 20610 DRAIN/INJ JOINT/BURSA W/O US: CPT | Performed by: PHYSICIAN ASSISTANT

## 2019-06-20 PROCEDURE — 1036F TOBACCO NON-USER: CPT | Performed by: PHYSICIAN ASSISTANT

## 2019-06-20 PROCEDURE — G8427 DOCREV CUR MEDS BY ELIG CLIN: HCPCS | Performed by: PHYSICIAN ASSISTANT

## 2019-06-20 PROCEDURE — G8598 ASA/ANTIPLAT THER USED: HCPCS | Performed by: PHYSICIAN ASSISTANT

## 2019-06-20 PROCEDURE — G8417 CALC BMI ABV UP PARAM F/U: HCPCS | Performed by: PHYSICIAN ASSISTANT

## 2019-06-20 PROCEDURE — 99202 OFFICE O/P NEW SF 15 MIN: CPT | Performed by: PHYSICIAN ASSISTANT

## 2019-06-20 PROCEDURE — 3017F COLORECTAL CA SCREEN DOC REV: CPT | Performed by: PHYSICIAN ASSISTANT
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
## 2019-06-21 LAB
ALLERGEN ASPERGILLUS ALTERNATA IGE: <0.1 KU/L
ALLERGEN ASPERGILLUS FUMIGATUS IGE: <0.1 KU/L
ALLERGEN BERMUDA GRASS IGE: <0.1 KU/L
ALLERGEN BIRCH IGE: <0.1 KU/L
ALLERGEN CAT DANDER IGE: <0.1 KU/L
ALLERGEN COMMON SHORT RAGWEED IGE: <0.1 KU/L
ALLERGEN COTTONWOOD: <0.1 KU/L
ALLERGEN COW MILK IGE: <0.1 KU/L
ALLERGEN DOG DANDER IGE: <0.1 KU/L
ALLERGEN ELM IGE: <0.1 KU/L
ALLERGEN FUNGI/MOLD M.RACEMOSUS IGE: <0.1 KU/L
ALLERGEN GERMAN COCKROACH IGE: <0.1 KU/L
ALLERGEN HORMODENDRUM HORDEI IGE: <0.1 KU/L
ALLERGEN MAPLE/BOX ELDER IGE: <0.1 KU/L
ALLERGEN MITE DUST FARINAE IGE: <0.1 KU/L
ALLERGEN MITE DUST PTERONYSSINUS IGE: <0.1 KU/L
ALLERGEN MOUNTAIN CEDAR: <0.1 KU/L
ALLERGEN MOUSE EPITHELIA IGE: <0.1 KU/L
ALLERGEN OAK TREE IGE: <0.1 KU/L
ALLERGEN PEANUT (F13) IGE: <0.1 KU/L
ALLERGEN PECAN TREE IGE: <0.1 KU/L
ALLERGEN PENICILLIUM NOTATUM: <0.1 KU/L
ALLERGEN ROUGH PIGWEED (W14) IGE: <0.1 KU/L
ALLERGEN RUSSIAN THISTLE IGE: <0.1 KU/L
ALLERGEN SEE NOTE: NORMAL
ALLERGEN SHEEP SORREL (W18) IGE: <0.1 KU/L
ALLERGEN TIMOTHY GRASS: <0.1 KU/L
ALLERGEN TREE SYCAMORE: <0.1 KU/L
ALLERGEN WALNUT TREE IGE: <0.1 KU/L
ALLERGEN WHITE MULBERRY TREE, IGE: <0.1 KU/L
ALLERGEN, TREE, WHITE ASH IGE: <0.1 KU/L
IGE: 87 KU/L

## 2019-06-22 ENCOUNTER — TELEPHONE (OUTPATIENT)
Dept: FAMILY MEDICINE CLINIC | Age: 64
End: 2019-06-22

## 2019-06-22 DIAGNOSIS — M25.511 ACUTE PAIN OF RIGHT SHOULDER: Primary | ICD-10-CM

## 2019-06-22 RX ORDER — TRAMADOL HYDROCHLORIDE 50 MG/1
50 TABLET ORAL EVERY 6 HOURS PRN
Qty: 20 TABLET | Refills: 0 | Status: SHIPPED | OUTPATIENT
Start: 2019-06-22 | End: 2019-06-27

## 2019-06-22 NOTE — TELEPHONE ENCOUNTER
Dr. Jeannie Tello saw pt for shoulder pain  Got an xray done on the 17  On Thursday dr. Roberto Galloway office gave a Cortizone injection to his right shoulder  Pt had an xray and all they saw arthritis   The Cortizone shot only work until last night  Pain level today is about a 7 he stated  He would like to have something called into his pharm for the pain  [facility] [place] [address]  He would also like another referral to Dr. Erica Dean office   Please advise

## 2019-06-22 NOTE — TELEPHONE ENCOUNTER
DR Kleber Sousa rx'd a small amount of tramadol for his shoulder pain 6/17. I can refill that for short term opioid treatment for this. Inflammation often increases by the 3rd day after a steroid shot, then starts to improve. So the hope is that he will start feeling less shoulder pain by tomorrow.

## 2019-06-22 NOTE — TELEPHONE ENCOUNTER
Dr. Ara Mccain out of office till Monday. Will get addressed at that time. Patient states he needs something for pain until he can get in for appt.  Can't wait for Monday to get something for pain

## 2019-06-22 NOTE — TELEPHONE ENCOUNTER
Informed patient that at [facility] amount of Tramadol was sent to his pharmacy.  If not better by next week, need to be seen by Dr. Kleber Sousa

## 2019-07-09 ENCOUNTER — HOSPITAL ENCOUNTER (EMERGENCY)
Age: 64
Discharge: HOME | End: 2019-07-09
Payer: MEDICARE

## 2019-07-09 ENCOUNTER — TELEPHONE (OUTPATIENT)
Dept: FAMILY MEDICINE CLINIC | Age: 64
End: 2019-07-09

## 2019-07-09 ENCOUNTER — TELEPHONE (OUTPATIENT)
Dept: CARDIOLOGY CLINIC | Age: 64
End: 2019-07-09

## 2019-07-09 VITALS
HEART RATE: 92 BPM | SYSTOLIC BLOOD PRESSURE: 134 MMHG | RESPIRATION RATE: 18 BRPM | WEIGHT: 255.95 LBS | HEIGHT: 72 IN | BODY MASS INDEX: 34.67 KG/M2 | OXYGEN SATURATION: 95 % | TEMPERATURE: 97.7 F | DIASTOLIC BLOOD PRESSURE: 81 MMHG

## 2019-07-09 DIAGNOSIS — S61.215A LACERATION OF LEFT RING FINGER WITHOUT FOREIGN BODY WITHOUT DAMAGE TO NAIL, INITIAL ENCOUNTER: Primary | ICD-10-CM

## 2019-07-09 DIAGNOSIS — S61.217A LACERATION OF LEFT LITTLE FINGER WITHOUT FOREIGN BODY WITHOUT DAMAGE TO NAIL, INITIAL ENCOUNTER: ICD-10-CM

## 2019-07-09 PROCEDURE — 4500000023 HC ED LEVEL 3 PROCEDURE

## 2019-07-09 PROCEDURE — 99283 EMERGENCY DEPT VISIT LOW MDM: CPT

## 2019-07-09 RX ORDER — HYDROCODONE BITARTRATE AND ACETAMINOPHEN 5; 325 MG/1; MG/1
1 TABLET ORAL EVERY 8 HOURS PRN
Qty: 6 TABLET | Refills: 0 | Status: SHIPPED | OUTPATIENT
Start: 2019-07-09 | End: 2019-07-12

## 2019-07-09 ASSESSMENT — PAIN DESCRIPTION - PAIN TYPE: TYPE: ACUTE PAIN

## 2019-07-09 ASSESSMENT — PAIN DESCRIPTION - LOCATION: LOCATION: FINGER (COMMENT WHICH ONE)

## 2019-07-09 ASSESSMENT — PAIN DESCRIPTION - ORIENTATION: ORIENTATION: LEFT

## 2019-07-09 ASSESSMENT — PAIN DESCRIPTION - DESCRIPTORS: DESCRIPTORS: THROBBING

## 2019-07-09 ASSESSMENT — PAIN SCALES - GENERAL
PAINLEVEL_OUTOF10: 7
PAINLEVEL_OUTOF10: 0

## 2019-07-09 NOTE — TELEPHONE ENCOUNTER
Pt would like to know how long he will have to take the protonix. Pt would like to know if it is for life or will he ever be dc from it. You can reach the pt at [phone].

## 2019-07-09 NOTE — TELEPHONE ENCOUNTER
Pt would like to get a new script for Aquaphor to be sent to his pharm  He states that he needs it for shaving too close to his groin area  [facility] [address]  Please advise

## 2019-07-09 NOTE — TELEPHONE ENCOUNTER
Celso Soni know that we Dr Femi Mock did not prescribe the medication. He says that he doesn't think that he needs it and does not want to get a 90 day supply of this medication if its not needed. I let him know that he could do a trial of not taking this for 1 week and see how he feels. He verbalized understanding.
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
## 2019-07-11 DIAGNOSIS — G89.29 CHRONIC BILATERAL LOW BACK PAIN WITH BILATERAL SCIATICA: ICD-10-CM

## 2019-07-11 DIAGNOSIS — M54.42 CHRONIC BILATERAL LOW BACK PAIN WITH BILATERAL SCIATICA: ICD-10-CM

## 2019-07-11 DIAGNOSIS — M54.41 CHRONIC BILATERAL LOW BACK PAIN WITH BILATERAL SCIATICA: ICD-10-CM

## 2019-07-12 RX ORDER — CARISOPRODOL 350 MG/1
TABLET ORAL
Qty: 120 TABLET | Refills: 0 | Status: SHIPPED | OUTPATIENT
Start: 2019-07-12 | End: 2019-08-13 | Stop reason: SDUPTHER

## 2019-07-22 DIAGNOSIS — F41.9 ANXIETY: ICD-10-CM

## 2019-07-23 RX ORDER — ALPRAZOLAM 0.5 MG/1
TABLET ORAL
Qty: 90 TABLET | Refills: 0 | Status: SHIPPED | OUTPATIENT
Start: 2019-07-23 | End: 2019-08-21 | Stop reason: SDUPTHER

## 2019-07-26 ENCOUNTER — HOSPITAL ENCOUNTER (EMERGENCY)
Age: 64
Discharge: HOME | End: 2019-07-26
Payer: MEDICARE

## 2019-07-26 VITALS
TEMPERATURE: 96.9 F | BODY MASS INDEX: 33.4 KG/M2 | WEIGHT: 252 LBS | SYSTOLIC BLOOD PRESSURE: 102 MMHG | HEART RATE: 96 BPM | DIASTOLIC BLOOD PRESSURE: 72 MMHG | OXYGEN SATURATION: 98 % | RESPIRATION RATE: 16 BRPM | HEIGHT: 73 IN

## 2019-07-26 DIAGNOSIS — S61.215A LACERATION OF LEFT RING FINGER WITHOUT FOREIGN BODY WITHOUT DAMAGE TO NAIL, INITIAL ENCOUNTER: ICD-10-CM

## 2019-07-26 DIAGNOSIS — S61.217D LACERATION OF LEFT LITTLE FINGER WITHOUT FOREIGN BODY WITHOUT DAMAGE TO NAIL, SUBSEQUENT ENCOUNTER: Primary | ICD-10-CM

## 2019-07-26 DIAGNOSIS — Z48.02 VISIT FOR SUTURE REMOVAL: ICD-10-CM

## 2019-07-26 PROCEDURE — 4500000002 HC ER NO CHARGE

## 2019-07-26 ASSESSMENT — PAIN SCALES - GENERAL
PAINLEVEL_OUTOF10: 0
PAINLEVEL_OUTOF10: 7

## 2019-07-26 ASSESSMENT — PAIN DESCRIPTION - ORIENTATION: ORIENTATION: LEFT

## 2019-07-26 ASSESSMENT — PAIN DESCRIPTION - DESCRIPTORS: DESCRIPTORS: ACHING

## 2019-07-26 ASSESSMENT — PAIN - FUNCTIONAL ASSESSMENT: PAIN_FUNCTIONAL_ASSESSMENT: ACTIVITIES ARE NOT PREVENTED

## 2019-07-26 ASSESSMENT — PAIN DESCRIPTION - FREQUENCY: FREQUENCY: CONTINUOUS

## 2019-07-26 ASSESSMENT — PAIN DESCRIPTION - PROGRESSION
CLINICAL_PROGRESSION: RESOLVED
CLINICAL_PROGRESSION: NOT CHANGED

## 2019-07-26 ASSESSMENT — PAIN DESCRIPTION - LOCATION: LOCATION: HAND

## 2019-07-26 ASSESSMENT — PAIN DESCRIPTION - ONSET: ONSET: ON-GOING

## 2019-07-26 ASSESSMENT — PAIN DESCRIPTION - PAIN TYPE: TYPE: ACUTE PAIN

## 2019-07-26 NOTE — ED PROVIDER NOTES
1    fluticasone (FLONASE) 50 MCG/ACT nasal spray 1 spray by Nasal route 2 times daily 1 Bottle 3    azelastine (ASTELIN) 0.1 % nasal spray 2 sprays by Nasal route 2 times daily Use in each nostril as directed 4 Bottle 1    albuterol sulfate  (90 Base) MCG/ACT inhaler Inhale 2 puffs into the lungs every 6 hours as needed for Wheezing or Shortness of Breath 1 Inhaler 5    canagliflozin (INVOKANA) 300 MG TABS tablet Take 1 tablet by mouth every morning (before breakfast) 30 tablet 2    valsartan-hydrochlorothiazide (DIOVAN-HCT) 320-12.5 MG per tablet TAKE 1 TABLET EVERY DAY 90 tablet 0    gabapentin (NEURONTIN) 400 MG capsule TAKE 1 CAPSULE BY MOUTH THREE TIMES DAILY 90 capsule 0    atorvastatin (LIPITOR) 80 MG tablet TAKE 1 TABLET BY MOUTH DAILY 90 tablet 0    aspirin 325 MG EC tablet Take 1 tablet by mouth daily 30 tablet 3    pantoprazole (PROTONIX) 40 MG tablet Take 1 tablet by mouth every morning (before breakfast) 30 tablet 3    insulin glargine (LANTUS) 100 UNIT/ML injection vial Inject 50 Units into the skin nightly      entecavir (BARACLUDE) 1 MG tablet Take 1 tablet by mouth daily 30 tablet 3    INSULIN SYRINGE .5CC/29G ([employer] INS SYR .5CC/29G) 29G X 1/2\" 0.5 ML MISC 1 each by Does not apply route daily 100 each 3    triamcinolone (KENALOG) 0.1 % cream Apply topically 2 times daily Apply topically 2 times daily. 80 g 3    mometasone (ELOCON) 0.1 % ointment Apply topically twice daily. 90 g 5    glucose blood VI test strips (ASCENSIA AUTODISC VI;ONE TOUCH ULTRA TEST VI) strip Apply 1 each topically 3 times daily. Onetouch Ultra 2 test strips  Dx: 250.00 300 strip 3    ONETOUCH DELICA LANCETS MISC 1 each by Does not apply route 3 times daily.  300 each 3       ALLERGIES    Allergies   Allergen Reactions    Buspar [Buspirone]      Not work    Zoloft      hyper       PHYSICAL EXAM    VITAL SIGNS: /72   Pulse 96   Resp 16   Ht 6' 1\" (1.854 m)   SpO2 98%   BMI 33.77 kg/m²

## 2019-08-05 ENCOUNTER — TELEPHONE (OUTPATIENT)
Dept: FAMILY MEDICINE CLINIC | Age: 64
End: 2019-08-05

## 2019-08-06 ENCOUNTER — OFFICE VISIT (OUTPATIENT)
Dept: FAMILY MEDICINE CLINIC | Age: 64
End: 2019-08-06
Payer: MEDICARE

## 2019-08-06 VITALS
BODY MASS INDEX: 33.78 KG/M2 | WEIGHT: 256 LBS | RESPIRATION RATE: 18 BRPM | SYSTOLIC BLOOD PRESSURE: 130 MMHG | DIASTOLIC BLOOD PRESSURE: 80 MMHG | HEART RATE: 83 BPM

## 2019-08-06 DIAGNOSIS — S91.101A OPEN WOUND OF RIGHT GREAT TOE, INITIAL ENCOUNTER: Primary | ICD-10-CM

## 2019-08-06 PROCEDURE — G8427 DOCREV CUR MEDS BY ELIG CLIN: HCPCS | Performed by: NURSE PRACTITIONER

## 2019-08-06 PROCEDURE — 3017F COLORECTAL CA SCREEN DOC REV: CPT | Performed by: NURSE PRACTITIONER

## 2019-08-06 PROCEDURE — 1036F TOBACCO NON-USER: CPT | Performed by: NURSE PRACTITIONER

## 2019-08-06 PROCEDURE — 99213 OFFICE O/P EST LOW 20 MIN: CPT | Performed by: NURSE PRACTITIONER

## 2019-08-06 PROCEDURE — G8598 ASA/ANTIPLAT THER USED: HCPCS | Performed by: NURSE PRACTITIONER

## 2019-08-06 PROCEDURE — G8417 CALC BMI ABV UP PARAM F/U: HCPCS | Performed by: NURSE PRACTITIONER

## 2019-08-06 ASSESSMENT — ENCOUNTER SYMPTOMS
SHORTNESS OF BREATH: 0
NAUSEA: 0
VOMITING: 0
COUGH: 0

## 2019-08-06 NOTE — PROGRESS NOTES
TIMES DAILY 90 tablet 0    carisoprodol (SOMA) 350 MG tablet TAKE 1 TABLET BY MOUTH 4 TIMES DAILY AS NEEDED 120 tablet 0    Emollient (AQUAPHOR ADVANCED THERAPY) OINT Apply three times per day as needed 396 g 2    clopidogrel (PLAVIX) 75 MG tablet Take 1 tablet by mouth daily 90 tablet 1    Lancets MISC 1 each by Does not apply route 2 times daily 200 each 3    blood glucose monitor kit and supplies Test 2 times a day & as needed for symptoms of irregular blood glucose. 1 kit 0    blood glucose monitor strips Test 2 times a day & as needed for symptoms of irregular blood glucose.  200 strip 3    escitalopram (LEXAPRO) 5 MG tablet Take 1 tablet by mouth daily 90 tablet 0    metFORMIN (GLUCOPHAGE-XR) 500 MG extended release tablet TAKE 4 TABLETS BY MOUTH EVERY DAY WITH BREAKFAST 360 tablet 0    carvedilol (COREG) 6.25 MG tablet Take 1 tablet by mouth 2 times daily (with meals) 180 tablet 1    fluticasone (FLONASE) 50 MCG/ACT nasal spray 1 spray by Nasal route 2 times daily 1 Bottle 3    azelastine (ASTELIN) 0.1 % nasal spray 2 sprays by Nasal route 2 times daily Use in each nostril as directed 4 Bottle 1    albuterol sulfate  (90 Base) MCG/ACT inhaler Inhale 2 puffs into the lungs every 6 hours as needed for Wheezing or Shortness of Breath 1 Inhaler 5    canagliflozin (INVOKANA) 300 MG TABS tablet Take 1 tablet by mouth every morning (before breakfast) 30 tablet 2    valsartan-hydrochlorothiazide (DIOVAN-HCT) 320-12.5 MG per tablet TAKE 1 TABLET EVERY DAY 90 tablet 0    gabapentin (NEURONTIN) 400 MG capsule TAKE 1 CAPSULE BY MOUTH THREE TIMES DAILY 90 capsule 0    atorvastatin (LIPITOR) 80 MG tablet TAKE 1 TABLET BY MOUTH DAILY 90 tablet 0    aspirin 325 MG EC tablet Take 1 tablet by mouth daily 30 tablet 3    insulin glargine (LANTUS) 100 UNIT/ML injection vial Inject 50 Units into the skin nightly      entecavir (BARACLUDE) 1 MG tablet Take 1 tablet by mouth daily 30 tablet 3    INSULIN

## 2019-08-12 ENCOUNTER — HOSPITAL ENCOUNTER (OUTPATIENT)
Dept: WOUND CARE | Age: 64
Discharge: HOME | End: 2019-08-12
Payer: MEDICARE

## 2019-08-12 VITALS
DIASTOLIC BLOOD PRESSURE: 81 MMHG | HEART RATE: 80 BPM | TEMPERATURE: 97.5 F | SYSTOLIC BLOOD PRESSURE: 124 MMHG | WEIGHT: 257.06 LBS | RESPIRATION RATE: 20 BRPM | BODY MASS INDEX: 33.91 KG/M2

## 2019-08-12 DIAGNOSIS — S91.201A OPEN WOUND OF RIGHT GREAT TOE WITH DAMAGE TO NAIL, INITIAL ENCOUNTER: ICD-10-CM

## 2019-08-12 PROCEDURE — 99202 OFFICE O/P NEW SF 15 MIN: CPT | Performed by: NURSE PRACTITIONER

## 2019-08-12 PROCEDURE — 97597 DBRDMT OPN WND 1ST 20 CM/<: CPT

## 2019-08-12 PROCEDURE — 99213 OFFICE O/P EST LOW 20 MIN: CPT

## 2019-08-12 PROCEDURE — 97597 DBRDMT OPN WND 1ST 20 CM/<: CPT | Performed by: NURSE PRACTITIONER

## 2019-08-12 RX ORDER — BACITRACIN, NEOMYCIN, POLYMYXIN B 400; 3.5; 5 [USP'U]/G; MG/G; [USP'U]/G
OINTMENT TOPICAL ONCE
Status: CANCELLED | OUTPATIENT
Start: 2019-08-12

## 2019-08-12 RX ORDER — BACITRACIN ZINC AND POLYMYXIN B SULFATE 500; 1000 [USP'U]/G; [USP'U]/G
OINTMENT TOPICAL ONCE
Status: CANCELLED | OUTPATIENT
Start: 2019-08-12

## 2019-08-12 RX ORDER — CLOBETASOL PROPIONATE 0.5 MG/G
OINTMENT TOPICAL ONCE
Status: CANCELLED | OUTPATIENT
Start: 2019-08-12

## 2019-08-12 RX ORDER — GINSENG 100 MG
CAPSULE ORAL ONCE
Status: CANCELLED | OUTPATIENT
Start: 2019-08-12

## 2019-08-12 RX ORDER — GENTAMICIN SULFATE 1 MG/G
OINTMENT TOPICAL ONCE
Status: CANCELLED | OUTPATIENT
Start: 2019-08-12

## 2019-08-12 RX ORDER — LIDOCAINE 50 MG/G
OINTMENT TOPICAL PRN
Status: DISCONTINUED | OUTPATIENT
Start: 2019-08-12 | End: 2019-08-13 | Stop reason: HOSPADM

## 2019-08-12 RX ORDER — BETAMETHASONE DIPROPIONATE 0.05 %
OINTMENT (GRAM) TOPICAL ONCE
Status: CANCELLED | OUTPATIENT
Start: 2019-08-12

## 2019-08-12 ASSESSMENT — PAIN SCALES - GENERAL
PAINLEVEL_OUTOF10: 0
PAINLEVEL_OUTOF10: 0

## 2019-08-12 NOTE — LETTER
[address] [facility] OFFICE BLDG 2 [facility] [address] [facility]  [phone]  Dept: [phone]   TODAYS DATE: 8/12/2019    [address] [place] [address],4[address] Floor Wound Care   Appointment Treatment Guidelines  Welcome Mr. Stover to the [address] [place] [address] Pkwy. We appreciate the confidence you have shown in choosing us as your wound care provider. Our goal is to heal your wound(s) as quickly as possible. Please read the items below regarding the nature of your appointments. 1. We will make every effort to schedule appointments that are convenient for you. Certain days and times may not be available, depending on your providers office hours and details of your care. 2. Patients will not necessarily be brought to an exam room in the order in which they arrive. Many providers work out of this office and patients are here for different procedures. Our goal is to serve you as quickly as possible. 3. We acknowledge that your time is valuable. Please remember that wound healing takes time and we appreciate your understanding that the length of each patients appointment will vary depending upon their need. 4. It is for your protection that we ask for insurance cards, photo ID, and new consent forms on your first visit and periodically throughout your treatment at all our facilities. 5. Wound Care treatment is known to be most effective when provided on a regular basis. Missed appointments, and not following the recommended plan of care can result in ineffective treatment and a poor outcome. If you find it difficult to keep appointments or to follow the recommended plan of care, it is your responsibility to let the staff know, so that we can work with you toward a solution. 6. If you need to miss an appointment, please call to let us know. We expect 24 hours notice for all cancellations.  We also expect missed

## 2019-08-13 ENCOUNTER — OFFICE VISIT (OUTPATIENT)
Dept: CARDIOLOGY CLINIC | Age: 64
End: 2019-08-13
Payer: MEDICARE

## 2019-08-13 VITALS
HEIGHT: 72 IN | WEIGHT: 254 LBS | BODY MASS INDEX: 34.4 KG/M2 | SYSTOLIC BLOOD PRESSURE: 108 MMHG | DIASTOLIC BLOOD PRESSURE: 64 MMHG | HEART RATE: 90 BPM | OXYGEN SATURATION: 98 %

## 2019-08-13 DIAGNOSIS — G89.29 CHRONIC BILATERAL LOW BACK PAIN WITH BILATERAL SCIATICA: ICD-10-CM

## 2019-08-13 DIAGNOSIS — E11.9 TYPE 2 DIABETES MELLITUS WITHOUT COMPLICATION, UNSPECIFIED WHETHER LONG TERM INSULIN USE (HCC): ICD-10-CM

## 2019-08-13 DIAGNOSIS — I10 ESSENTIAL HYPERTENSION: ICD-10-CM

## 2019-08-13 DIAGNOSIS — I25.10 CAD IN NATIVE ARTERY: Primary | ICD-10-CM

## 2019-08-13 DIAGNOSIS — Z95.1 S/P CABG X 5: ICD-10-CM

## 2019-08-13 DIAGNOSIS — M54.41 CHRONIC BILATERAL LOW BACK PAIN WITH BILATERAL SCIATICA: ICD-10-CM

## 2019-08-13 DIAGNOSIS — G89.4 CHRONIC PAIN SYNDROME: ICD-10-CM

## 2019-08-13 DIAGNOSIS — M54.42 CHRONIC BILATERAL LOW BACK PAIN WITH BILATERAL SCIATICA: ICD-10-CM

## 2019-08-13 PROCEDURE — 2022F DILAT RTA XM EVC RTNOPTHY: CPT | Performed by: INTERNAL MEDICINE

## 2019-08-13 PROCEDURE — 1036F TOBACCO NON-USER: CPT | Performed by: INTERNAL MEDICINE

## 2019-08-13 PROCEDURE — G8417 CALC BMI ABV UP PARAM F/U: HCPCS | Performed by: INTERNAL MEDICINE

## 2019-08-13 PROCEDURE — G8427 DOCREV CUR MEDS BY ELIG CLIN: HCPCS | Performed by: INTERNAL MEDICINE

## 2019-08-13 PROCEDURE — 3045F PR MOST RECENT HEMOGLOBIN A1C LEVEL 7.0-9.0%: CPT | Performed by: INTERNAL MEDICINE

## 2019-08-13 PROCEDURE — 99214 OFFICE O/P EST MOD 30 MIN: CPT | Performed by: INTERNAL MEDICINE

## 2019-08-13 PROCEDURE — G8598 ASA/ANTIPLAT THER USED: HCPCS | Performed by: INTERNAL MEDICINE

## 2019-08-13 PROCEDURE — 3017F COLORECTAL CA SCREEN DOC REV: CPT | Performed by: INTERNAL MEDICINE

## 2019-08-13 RX ORDER — GABAPENTIN 400 MG/1
CAPSULE ORAL
Qty: 90 CAPSULE | Refills: 0 | Status: SHIPPED | OUTPATIENT
Start: 2019-08-13 | End: 2019-09-13 | Stop reason: SDUPTHER

## 2019-08-13 RX ORDER — CARISOPRODOL 350 MG/1
TABLET ORAL
Qty: 120 TABLET | Refills: 0 | Status: SHIPPED | OUTPATIENT
Start: 2019-08-13 | End: 2019-09-13 | Stop reason: SDUPTHER

## 2019-08-13 RX ORDER — ASPIRIN 81 MG/1
325 TABLET ORAL DAILY
Qty: 30 TABLET | Refills: 5 | Status: SHIPPED
Start: 2019-08-13 | End: 2022-03-21

## 2019-08-21 DIAGNOSIS — F41.9 ANXIETY: ICD-10-CM

## 2019-08-22 DIAGNOSIS — E11.9 TYPE 2 DIABETES MELLITUS WITHOUT COMPLICATION, WITHOUT LONG-TERM CURRENT USE OF INSULIN (HCC): ICD-10-CM

## 2019-08-22 RX ORDER — ALPRAZOLAM 0.5 MG/1
TABLET ORAL
Qty: 90 TABLET | Refills: 0 | Status: SHIPPED | OUTPATIENT
Start: 2019-08-22 | End: 2019-09-15 | Stop reason: SDUPTHER

## 2019-08-23 ENCOUNTER — TELEPHONE (OUTPATIENT)
Dept: FAMILY MEDICINE CLINIC | Age: 64
End: 2019-08-23

## 2019-08-23 DIAGNOSIS — F41.9 ANXIETY: ICD-10-CM

## 2019-08-23 DIAGNOSIS — E11.9 TYPE 2 DIABETES MELLITUS WITHOUT COMPLICATION, WITHOUT LONG-TERM CURRENT USE OF INSULIN (HCC): ICD-10-CM

## 2019-08-23 RX ORDER — CANAGLIFLOZIN 300 MG/1
TABLET, FILM COATED ORAL
Qty: 30 TABLET | Refills: 0 | Status: SHIPPED | OUTPATIENT
Start: 2019-08-23 | End: 2019-08-23 | Stop reason: SDUPTHER

## 2019-08-23 RX ORDER — ALPRAZOLAM 0.5 MG/1
TABLET ORAL
Qty: 90 TABLET | Refills: 0 | OUTPATIENT
Start: 2019-08-23 | End: 2019-09-22

## 2019-08-23 RX ORDER — IBUPROFEN 200 MG
1 TABLET ORAL DAILY
Qty: 100 EACH | Refills: 3 | Status: SHIPPED | OUTPATIENT
Start: 2019-08-23 | End: 2020-01-15 | Stop reason: SDUPTHER

## 2019-08-23 NOTE — TELEPHONE ENCOUNTER
Pt called and needs his novalin filled and also needs syringes as well he said his scripts for these are over 6 months old and needs new ones written    Please call pt when done. Pt wants his invokona to go to [facility] as well.   See previous message

## 2019-08-26 ENCOUNTER — TELEPHONE (OUTPATIENT)
Dept: FAMILY MEDICINE CLINIC | Age: 64
End: 2019-08-26

## 2019-08-29 ENCOUNTER — HOSPITAL ENCOUNTER (OUTPATIENT)
Dept: WOUND CARE | Age: 64
Discharge: HOME | End: 2019-08-29
Payer: MEDICARE

## 2019-08-29 VITALS
HEART RATE: 93 BPM | DIASTOLIC BLOOD PRESSURE: 84 MMHG | TEMPERATURE: 96.9 F | SYSTOLIC BLOOD PRESSURE: 138 MMHG | RESPIRATION RATE: 18 BRPM

## 2019-08-29 PROCEDURE — 99211 OFF/OP EST MAY X REQ PHY/QHP: CPT

## 2019-08-29 RX ORDER — LIDOCAINE 50 MG/G
OINTMENT TOPICAL PRN
Status: DISCONTINUED | OUTPATIENT
Start: 2019-08-29 | End: 2019-08-30 | Stop reason: HOSPADM

## 2019-08-29 ASSESSMENT — PAIN SCALES - GENERAL
PAINLEVEL_OUTOF10: 0
PAINLEVEL_OUTOF10: 0

## 2019-08-30 NOTE — PROGRESS NOTES
70-30 (NOVOLIN 70/30) (70-30) 100 UNIT per ML injection vial 30 units with breakfast and 20 units with dinner 20 mL 3    ALPRAZolam (XANAX) 0.5 MG tablet TAKE 1 TABLET BY MOUTH THREE TIMES DAILY 90 tablet 0    aspirin 81 MG EC tablet Take 4 tablets by mouth daily 30 tablet 5    gabapentin (NEURONTIN) 400 MG capsule TAKE 1 CAPSULE BY MOUTH THREE TIMES DAILY 90 capsule 0    carisoprodol (SOMA) 350 MG tablet TAKE 1 TABLET BY MOUTH FOUR TIMES DAILY AS NEEDED 120 tablet 0    Emollient (AQUAPHOR ADVANCED THERAPY) OINT Apply three times per day as needed 396 g 2    clopidogrel (PLAVIX) 75 MG tablet Take 1 tablet by mouth daily 90 tablet 1    escitalopram (LEXAPRO) 5 MG tablet Take 1 tablet by mouth daily 90 tablet 0    metFORMIN (GLUCOPHAGE-XR) 500 MG extended release tablet TAKE 4 TABLETS BY MOUTH EVERY DAY WITH BREAKFAST 360 tablet 0    carvedilol (COREG) 6.25 MG tablet Take 1 tablet by mouth 2 times daily (with meals) 180 tablet 1    valsartan-hydrochlorothiazide (DIOVAN-HCT) 320-12.5 MG per tablet TAKE 1 TABLET EVERY DAY 90 tablet 0    atorvastatin (LIPITOR) 80 MG tablet TAKE 1 TABLET BY MOUTH DAILY 90 tablet 0    entecavir (BARACLUDE) 1 MG tablet Take 1 tablet by mouth daily 30 tablet 3    triamcinolone (KENALOG) 0.1 % cream Apply topically 2 times daily Apply topically 2 times daily. 80 g 3    INSULIN SYRINGE .5CC/29G ([employer] INS SYR .5CC/29G) 29G X 1/2\" 0.5 ML MISC 1 each by Does not apply route daily 100 each 3    Lancets MISC 1 each by Does not apply route 2 times daily 200 each 3    blood glucose monitor kit and supplies Test 2 times a day & as needed for symptoms of irregular blood glucose. 1 kit 0    blood glucose monitor strips Test 2 times a day & as needed for symptoms of irregular blood glucose. 200 strip 3    glucose blood VI test strips (ASCENSIA AUTODISC VI;ONE TOUCH ULTRA TEST VI) strip Apply 1 each topically 3 times daily.  Onetouch Ultra 2 test strips  Dx: 250.00 300 strip 3   

## 2019-09-01 ENCOUNTER — APPOINTMENT (OUTPATIENT)
Dept: GENERAL RADIOLOGY | Age: 64
End: 2019-09-01
Payer: MEDICARE

## 2019-09-01 ENCOUNTER — HOSPITAL ENCOUNTER (EMERGENCY)
Age: 64
Discharge: HOME | End: 2019-09-02
Payer: MEDICARE

## 2019-09-01 DIAGNOSIS — J18.9 PNEUMONIA OF RIGHT LOWER LOBE DUE TO INFECTIOUS ORGANISM: Primary | ICD-10-CM

## 2019-09-01 LAB
A/G RATIO: 1 (ref 1.1–2.2)
ALBUMIN SERPL-MCNC: 4.1 G/DL (ref 3.4–5)
ALP BLD-CCNC: 104 U/L (ref 40–129)
ALT SERPL-CCNC: 22 U/L (ref 10–40)
ANION GAP SERPL CALCULATED.3IONS-SCNC: 16 MMOL/L (ref 3–16)
AST SERPL-CCNC: 16 U/L (ref 15–37)
BASOPHILS ABSOLUTE: 0 K/UL (ref 0–0.2)
BASOPHILS RELATIVE PERCENT: 0.5 %
BILIRUB SERPL-MCNC: 0.8 MG/DL (ref 0–1)
BUN BLDV-MCNC: 16 MG/DL (ref 7–20)
CALCIUM SERPL-MCNC: 9.4 MG/DL (ref 8.3–10.6)
CHLORIDE BLD-SCNC: 94 MMOL/L (ref 99–110)
CO2: 26 MMOL/L (ref 21–32)
CREAT SERPL-MCNC: 1 MG/DL (ref 0.8–1.3)
EOSINOPHILS ABSOLUTE: 0 K/UL (ref 0–0.6)
EOSINOPHILS RELATIVE PERCENT: 0.5 %
GFR AFRICAN AMERICAN: >60
GFR NON-AFRICAN AMERICAN: >60
GLOBULIN: 4.1 G/DL
GLUCOSE BLD-MCNC: 158 MG/DL (ref 70–99)
HCT VFR BLD CALC: 44.8 % (ref 40.5–52.5)
HEMOGLOBIN: 14.7 G/DL (ref 13.5–17.5)
LACTIC ACID: 1.6 MMOL/L (ref 0.4–2)
LYMPHOCYTES ABSOLUTE: 1.2 K/UL (ref 1–5.1)
LYMPHOCYTES RELATIVE PERCENT: 12.9 %
MCH RBC QN AUTO: 30.1 PG (ref 26–34)
MCHC RBC AUTO-ENTMCNC: 32.8 G/DL (ref 31–36)
MCV RBC AUTO: 91.9 FL (ref 80–100)
MONOCYTES ABSOLUTE: 1 K/UL (ref 0–1.3)
MONOCYTES RELATIVE PERCENT: 10.2 %
NEUTROPHILS ABSOLUTE: 7.3 K/UL (ref 1.7–7.7)
NEUTROPHILS RELATIVE PERCENT: 75.9 %
PDW BLD-RTO: 17.6 % (ref 12.4–15.4)
PLATELET # BLD: 220 K/UL (ref 135–450)
PMV BLD AUTO: 7.3 FL (ref 5–10.5)
POTASSIUM REFLEX MAGNESIUM: 4.3 MMOL/L (ref 3.5–5.1)
PRO-BNP: 314 PG/ML (ref 0–124)
RBC # BLD: 4.87 M/UL (ref 4.2–5.9)
SODIUM BLD-SCNC: 136 MMOL/L (ref 136–145)
TOTAL PROTEIN: 8.2 G/DL (ref 6.4–8.2)
TROPONIN: <0.01 NG/ML
WBC # BLD: 9.7 K/UL (ref 4–11)

## 2019-09-01 PROCEDURE — 6370000000 HC RX 637 (ALT 250 FOR IP): Performed by: NURSE PRACTITIONER

## 2019-09-01 PROCEDURE — 87040 BLOOD CULTURE FOR BACTERIA: CPT

## 2019-09-01 PROCEDURE — 84484 ASSAY OF TROPONIN QUANT: CPT

## 2019-09-01 PROCEDURE — 83605 ASSAY OF LACTIC ACID: CPT

## 2019-09-01 PROCEDURE — 80053 COMPREHEN METABOLIC PANEL: CPT

## 2019-09-01 PROCEDURE — 2580000003 HC RX 258: Performed by: NURSE PRACTITIONER

## 2019-09-01 PROCEDURE — 85025 COMPLETE CBC W/AUTO DIFF WBC: CPT

## 2019-09-01 PROCEDURE — 71046 X-RAY EXAM CHEST 2 VIEWS: CPT

## 2019-09-01 PROCEDURE — 83880 ASSAY OF NATRIURETIC PEPTIDE: CPT

## 2019-09-01 PROCEDURE — 93005 ELECTROCARDIOGRAM TRACING: CPT | Performed by: EMERGENCY MEDICINE

## 2019-09-01 PROCEDURE — 80048 BASIC METABOLIC PNL TOTAL CA: CPT

## 2019-09-01 PROCEDURE — 96360 HYDRATION IV INFUSION INIT: CPT

## 2019-09-01 PROCEDURE — 99284 EMERGENCY DEPT VISIT MOD MDM: CPT

## 2019-09-01 RX ORDER — ACETAMINOPHEN 325 MG/1
650 TABLET ORAL ONCE
Status: COMPLETED | OUTPATIENT
Start: 2019-09-01 | End: 2019-09-01

## 2019-09-01 RX ORDER — 0.9 % SODIUM CHLORIDE 0.9 %
30 INTRAVENOUS SOLUTION INTRAVENOUS ONCE
Status: COMPLETED | OUTPATIENT
Start: 2019-09-01 | End: 2019-09-01

## 2019-09-01 RX ORDER — OXYCODONE HYDROCHLORIDE AND ACETAMINOPHEN 5; 325 MG/1; MG/1
1 TABLET ORAL ONCE
Status: COMPLETED | OUTPATIENT
Start: 2019-09-02 | End: 2019-09-01

## 2019-09-01 RX ADMIN — ACETAMINOPHEN 650 MG: 325 TABLET ORAL at 22:25

## 2019-09-01 RX ADMIN — SODIUM CHLORIDE 2259 ML: 9 INJECTION, SOLUTION INTRAVENOUS at 22:25

## 2019-09-01 RX ADMIN — OXYCODONE HYDROCHLORIDE AND ACETAMINOPHEN 1 TABLET: 5; 325 TABLET ORAL at 23:56

## 2019-09-01 ASSESSMENT — ENCOUNTER SYMPTOMS
ABDOMINAL DISTENTION: 0
ABDOMINAL PAIN: 0
COUGH: 1
NAUSEA: 0
BACK PAIN: 0
VOMITING: 0
SHORTNESS OF BREATH: 1
DIARRHEA: 0

## 2019-09-01 ASSESSMENT — PAIN SCALES - GENERAL
PAINLEVEL_OUTOF10: 8
PAINLEVEL_OUTOF10: 6
PAINLEVEL_OUTOF10: 5

## 2019-09-01 ASSESSMENT — PAIN DESCRIPTION - DESCRIPTORS: DESCRIPTORS: SHARP

## 2019-09-01 ASSESSMENT — PAIN DESCRIPTION - FREQUENCY: FREQUENCY: INTERMITTENT

## 2019-09-01 ASSESSMENT — PAIN DESCRIPTION - PAIN TYPE: TYPE: ACUTE PAIN

## 2019-09-01 ASSESSMENT — PAIN DESCRIPTION - LOCATION: LOCATION: CHEST

## 2019-09-02 VITALS
SYSTOLIC BLOOD PRESSURE: 99 MMHG | WEIGHT: 256.39 LBS | DIASTOLIC BLOOD PRESSURE: 65 MMHG | BODY MASS INDEX: 35.9 KG/M2 | OXYGEN SATURATION: 95 % | HEART RATE: 90 BPM | TEMPERATURE: 98.7 F | RESPIRATION RATE: 18 BRPM | HEIGHT: 71 IN

## 2019-09-02 LAB
EKG ATRIAL RATE: 109 BPM
EKG DIAGNOSIS: NORMAL
EKG P AXIS: 118 DEGREES
EKG P-R INTERVAL: 148 MS
EKG Q-T INTERVAL: 324 MS
EKG QRS DURATION: 84 MS
EKG QTC CALCULATION (BAZETT): 436 MS
EKG R AXIS: -4 DEGREES
EKG T AXIS: 47 DEGREES
EKG VENTRICULAR RATE: 109 BPM
TROPONIN: <0.01 NG/ML

## 2019-09-02 PROCEDURE — 6370000000 HC RX 637 (ALT 250 FOR IP): Performed by: NURSE PRACTITIONER

## 2019-09-02 PROCEDURE — 83690 ASSAY OF LIPASE: CPT

## 2019-09-02 PROCEDURE — 93010 ELECTROCARDIOGRAM REPORT: CPT | Performed by: INTERNAL MEDICINE

## 2019-09-02 PROCEDURE — 84484 ASSAY OF TROPONIN QUANT: CPT

## 2019-09-02 PROCEDURE — 80053 COMPREHEN METABOLIC PANEL: CPT

## 2019-09-02 RX ORDER — DOXYCYCLINE HYCLATE 100 MG
100 TABLET ORAL 2 TIMES DAILY
Qty: 20 TABLET | Refills: 0 | Status: SHIPPED | OUTPATIENT
Start: 2019-09-02 | End: 2019-09-12

## 2019-09-02 RX ORDER — HYDROCODONE BITARTRATE AND ACETAMINOPHEN 5; 325 MG/1; MG/1
1 TABLET ORAL EVERY 6 HOURS PRN
Qty: 8 TABLET | Refills: 0 | Status: SHIPPED | OUTPATIENT
Start: 2019-09-02 | End: 2019-09-04

## 2019-09-02 RX ORDER — DOXYCYCLINE HYCLATE 100 MG
100 TABLET ORAL ONCE
Status: COMPLETED | OUTPATIENT
Start: 2019-09-02 | End: 2019-09-02

## 2019-09-02 RX ADMIN — DOXYCYCLINE HYCLATE 100 MG: 100 TABLET, COATED ORAL at 01:49

## 2019-09-02 ASSESSMENT — PAIN DESCRIPTION - PROGRESSION: CLINICAL_PROGRESSION: GRADUALLY IMPROVING

## 2019-09-02 ASSESSMENT — PAIN DESCRIPTION - LOCATION
LOCATION: BACK
LOCATION: BACK;CHEST

## 2019-09-02 ASSESSMENT — PAIN DESCRIPTION - FREQUENCY
FREQUENCY: INTERMITTENT
FREQUENCY: INTERMITTENT

## 2019-09-02 ASSESSMENT — PAIN SCALES - GENERAL
PAINLEVEL_OUTOF10: 6
PAINLEVEL_OUTOF10: 5

## 2019-09-02 ASSESSMENT — PAIN DESCRIPTION - ONSET: ONSET: ON-GOING

## 2019-09-02 ASSESSMENT — PAIN DESCRIPTION - PAIN TYPE
TYPE: ACUTE PAIN
TYPE: ACUTE PAIN

## 2019-09-02 ASSESSMENT — PAIN DESCRIPTION - DESCRIPTORS
DESCRIPTORS: SHARP
DESCRIPTORS: SHARP
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
## 2019-09-06 ENCOUNTER — OFFICE VISIT (OUTPATIENT)
Dept: FAMILY MEDICINE CLINIC | Age: 64
End: 2019-09-06
Payer: MEDICARE

## 2019-09-06 VITALS
WEIGHT: 256 LBS | RESPIRATION RATE: 16 BRPM | HEIGHT: 71 IN | SYSTOLIC BLOOD PRESSURE: 130 MMHG | TEMPERATURE: 97.9 F | BODY MASS INDEX: 35.84 KG/M2 | DIASTOLIC BLOOD PRESSURE: 80 MMHG | HEART RATE: 80 BPM

## 2019-09-06 DIAGNOSIS — J18.9 COMMUNITY ACQUIRED PNEUMONIA OF RIGHT LOWER LOBE OF LUNG: Primary | ICD-10-CM

## 2019-09-06 DIAGNOSIS — E11.9 TYPE 2 DIABETES MELLITUS WITHOUT COMPLICATION, WITHOUT LONG-TERM CURRENT USE OF INSULIN (HCC): ICD-10-CM

## 2019-09-06 LAB — HBA1C MFR BLD: 6.5 %

## 2019-09-06 PROCEDURE — 3017F COLORECTAL CA SCREEN DOC REV: CPT | Performed by: FAMILY MEDICINE

## 2019-09-06 PROCEDURE — G8598 ASA/ANTIPLAT THER USED: HCPCS | Performed by: FAMILY MEDICINE

## 2019-09-06 PROCEDURE — 3044F HG A1C LEVEL LT 7.0%: CPT | Performed by: FAMILY MEDICINE

## 2019-09-06 PROCEDURE — 1036F TOBACCO NON-USER: CPT | Performed by: FAMILY MEDICINE

## 2019-09-06 PROCEDURE — G8417 CALC BMI ABV UP PARAM F/U: HCPCS | Performed by: FAMILY MEDICINE

## 2019-09-06 PROCEDURE — 83036 HEMOGLOBIN GLYCOSYLATED A1C: CPT | Performed by: FAMILY MEDICINE

## 2019-09-06 PROCEDURE — 2022F DILAT RTA XM EVC RTNOPTHY: CPT | Performed by: FAMILY MEDICINE

## 2019-09-06 PROCEDURE — G8427 DOCREV CUR MEDS BY ELIG CLIN: HCPCS | Performed by: FAMILY MEDICINE

## 2019-09-06 PROCEDURE — 99214 OFFICE O/P EST MOD 30 MIN: CPT | Performed by: FAMILY MEDICINE

## 2019-09-07 LAB — BLOOD CULTURE, ROUTINE: NORMAL

## 2019-09-08 LAB — CULTURE, BLOOD 2: ABNORMAL

## 2019-09-12 ENCOUNTER — TELEPHONE (OUTPATIENT)
Dept: FAMILY MEDICINE CLINIC | Age: 64
End: 2019-09-12

## 2019-09-14 ENCOUNTER — TELEPHONE (OUTPATIENT)
Dept: FAMILY MEDICINE CLINIC | Age: 64
End: 2019-09-14

## 2019-09-14 NOTE — TELEPHONE ENCOUNTER
Pt came into the office today looking for his samples of Rabia . The pt stated that he spoke to [facility] and the samples will be in the fridge ready for . I looked in the fridge and nothing was in their with the pt name on it. I asked the pt to [facility] and she can look into this matter for me. I also advised the pt that we will usual call when samples are ready for . Please advise,    Thanks.

## 2019-09-14 NOTE — TELEPHONE ENCOUNTER
This is just a follow up from the previous note. Martínez Noel looked and found some samples of invokana 300 mg. The pt was given 5 bottles a 30 day supply. Please advise,    Thanks.

## 2019-09-15 DIAGNOSIS — F41.9 ANXIETY: ICD-10-CM

## 2019-09-18 RX ORDER — ALPRAZOLAM 0.5 MG/1
TABLET ORAL
Qty: 90 TABLET | Refills: 0 | Status: SHIPPED | OUTPATIENT
Start: 2019-09-18 | End: 2019-10-15 | Stop reason: SDUPTHER

## 2019-09-22 ENCOUNTER — APPOINTMENT (OUTPATIENT)
Dept: GENERAL RADIOLOGY | Age: 64
End: 2019-09-22
Payer: MEDICARE

## 2019-09-22 ENCOUNTER — APPOINTMENT (OUTPATIENT)
Dept: CT IMAGING | Age: 64
End: 2019-09-22
Payer: MEDICARE

## 2019-09-22 ENCOUNTER — HOSPITAL ENCOUNTER (EMERGENCY)
Age: 64
Discharge: HOME | End: 2019-09-22
Attending: EMERGENCY MEDICINE
Payer: MEDICARE

## 2019-09-22 VITALS
RESPIRATION RATE: 15 BRPM | WEIGHT: 264.33 LBS | SYSTOLIC BLOOD PRESSURE: 123 MMHG | OXYGEN SATURATION: 98 % | BODY MASS INDEX: 37.01 KG/M2 | TEMPERATURE: 98.1 F | HEART RATE: 98 BPM | DIASTOLIC BLOOD PRESSURE: 91 MMHG | HEIGHT: 71 IN

## 2019-09-22 DIAGNOSIS — M25.511 ACUTE PAIN OF RIGHT SHOULDER: Primary | ICD-10-CM

## 2019-09-22 DIAGNOSIS — R91.1 PULMONARY NODULE: ICD-10-CM

## 2019-09-22 LAB
A/G RATIO: 1.2 (ref 1.1–2.2)
ALBUMIN SERPL-MCNC: 4.1 G/DL (ref 3.4–5)
ALP BLD-CCNC: 93 U/L (ref 40–129)
ALT SERPL-CCNC: 28 U/L (ref 10–40)
ANION GAP SERPL CALCULATED.3IONS-SCNC: 15 MMOL/L (ref 3–16)
AST SERPL-CCNC: 23 U/L (ref 15–37)
BASOPHILS ABSOLUTE: 0 K/UL (ref 0–0.2)
BASOPHILS RELATIVE PERCENT: 0.5 %
BILIRUB SERPL-MCNC: 0.4 MG/DL (ref 0–1)
BUN BLDV-MCNC: 26 MG/DL (ref 7–20)
CALCIUM SERPL-MCNC: 9.5 MG/DL (ref 8.3–10.6)
CHLORIDE BLD-SCNC: 99 MMOL/L (ref 99–110)
CO2: 22 MMOL/L (ref 21–32)
CREAT SERPL-MCNC: 0.9 MG/DL (ref 0.8–1.3)
EKG ATRIAL RATE: 105 BPM
EKG DIAGNOSIS: NORMAL
EKG P AXIS: 49 DEGREES
EKG P-R INTERVAL: 144 MS
EKG Q-T INTERVAL: 346 MS
EKG QRS DURATION: 96 MS
EKG QTC CALCULATION (BAZETT): 457 MS
EKG R AXIS: 3 DEGREES
EKG T AXIS: 46 DEGREES
EKG VENTRICULAR RATE: 105 BPM
EOSINOPHILS ABSOLUTE: 0.1 K/UL (ref 0–0.6)
EOSINOPHILS RELATIVE PERCENT: 1.2 %
GFR AFRICAN AMERICAN: >60
GFR NON-AFRICAN AMERICAN: >60
GLOBULIN: 3.4 G/DL
GLUCOSE BLD-MCNC: 193 MG/DL (ref 70–99)
HCT VFR BLD CALC: 43.5 % (ref 40.5–52.5)
HEMOGLOBIN: 14.4 G/DL (ref 13.5–17.5)
LYMPHOCYTES ABSOLUTE: 1.5 K/UL (ref 1–5.1)
LYMPHOCYTES RELATIVE PERCENT: 15.5 %
MCH RBC QN AUTO: 30.1 PG (ref 26–34)
MCHC RBC AUTO-ENTMCNC: 33 G/DL (ref 31–36)
MCV RBC AUTO: 91.1 FL (ref 80–100)
MONOCYTES ABSOLUTE: 1 K/UL (ref 0–1.3)
MONOCYTES RELATIVE PERCENT: 10.4 %
NEUTROPHILS ABSOLUTE: 6.9 K/UL (ref 1.7–7.7)
NEUTROPHILS RELATIVE PERCENT: 72.4 %
PDW BLD-RTO: 16.2 % (ref 12.4–15.4)
PLATELET # BLD: 202 K/UL (ref 135–450)
PMV BLD AUTO: 7.7 FL (ref 5–10.5)
POTASSIUM SERPL-SCNC: 4.5 MMOL/L (ref 3.5–5.1)
PRO-BNP: 110 PG/ML (ref 0–124)
RBC # BLD: 4.77 M/UL (ref 4.2–5.9)
SODIUM BLD-SCNC: 136 MMOL/L (ref 136–145)
TOTAL PROTEIN: 7.5 G/DL (ref 6.4–8.2)
TROPONIN: <0.01 NG/ML
WBC # BLD: 9.6 K/UL (ref 4–11)

## 2019-09-22 PROCEDURE — 96374 THER/PROPH/DIAG INJ IV PUSH: CPT

## 2019-09-22 PROCEDURE — 93010 ELECTROCARDIOGRAM REPORT: CPT | Performed by: INTERNAL MEDICINE

## 2019-09-22 PROCEDURE — 84484 ASSAY OF TROPONIN QUANT: CPT

## 2019-09-22 PROCEDURE — 96375 TX/PRO/DX INJ NEW DRUG ADDON: CPT

## 2019-09-22 PROCEDURE — 2580000003 HC RX 258: Performed by: PHYSICIAN ASSISTANT

## 2019-09-22 PROCEDURE — 83880 ASSAY OF NATRIURETIC PEPTIDE: CPT

## 2019-09-22 PROCEDURE — 6360000002 HC RX W HCPCS: Performed by: PHYSICIAN ASSISTANT

## 2019-09-22 PROCEDURE — 99284 EMERGENCY DEPT VISIT MOD MDM: CPT

## 2019-09-22 PROCEDURE — 6360000004 HC RX CONTRAST MEDICATION

## 2019-09-22 PROCEDURE — 73030 X-RAY EXAM OF SHOULDER: CPT

## 2019-09-22 PROCEDURE — 85025 COMPLETE CBC W/AUTO DIFF WBC: CPT

## 2019-09-22 PROCEDURE — 71260 CT THORAX DX C+: CPT

## 2019-09-22 PROCEDURE — 93005 ELECTROCARDIOGRAM TRACING: CPT | Performed by: PHYSICIAN ASSISTANT

## 2019-09-22 PROCEDURE — 80053 COMPREHEN METABOLIC PANEL: CPT

## 2019-09-22 PROCEDURE — 6370000000 HC RX 637 (ALT 250 FOR IP): Performed by: EMERGENCY MEDICINE

## 2019-09-22 RX ORDER — HYDROCODONE BITARTRATE AND ACETAMINOPHEN 5; 325 MG/1; MG/1
1 TABLET ORAL ONCE
Status: COMPLETED | OUTPATIENT
Start: 2019-09-22 | End: 2019-09-22

## 2019-09-22 RX ORDER — MORPHINE SULFATE 4 MG/ML
4 INJECTION, SOLUTION INTRAMUSCULAR; INTRAVENOUS ONCE
Status: COMPLETED | OUTPATIENT
Start: 2019-09-22 | End: 2019-09-22

## 2019-09-22 RX ORDER — ONDANSETRON 2 MG/ML
4 INJECTION INTRAMUSCULAR; INTRAVENOUS ONCE
Status: COMPLETED | OUTPATIENT
Start: 2019-09-22 | End: 2019-09-22

## 2019-09-22 RX ORDER — 0.9 % SODIUM CHLORIDE 0.9 %
500 INTRAVENOUS SOLUTION INTRAVENOUS ONCE
Status: COMPLETED | OUTPATIENT
Start: 2019-09-22 | End: 2019-09-22

## 2019-09-22 RX ORDER — HYDROCODONE BITARTRATE AND ACETAMINOPHEN 5; 325 MG/1; MG/1
1 TABLET ORAL EVERY 6 HOURS PRN
Qty: 10 TABLET | Refills: 0 | Status: SHIPPED | OUTPATIENT
Start: 2019-09-22 | End: 2019-09-25

## 2019-09-22 RX ADMIN — ONDANSETRON 4 MG: 2 INJECTION INTRAMUSCULAR; INTRAVENOUS at 11:44

## 2019-09-22 RX ADMIN — IOPAMIDOL 75 ML: 755 INJECTION, SOLUTION INTRAVENOUS at 12:10

## 2019-09-22 RX ADMIN — HYDROCODONE BITARTRATE AND ACETAMINOPHEN 1 TABLET: 5; 325 TABLET ORAL at 12:59

## 2019-09-22 RX ADMIN — MORPHINE SULFATE 4 MG: 4 INJECTION, SOLUTION INTRAMUSCULAR; INTRAVENOUS at 11:45

## 2019-09-22 RX ADMIN — SODIUM CHLORIDE 500 ML: 9 INJECTION, SOLUTION INTRAVENOUS at 11:43

## 2019-09-22 ASSESSMENT — PAIN DESCRIPTION - ONSET
ONSET: ON-GOING

## 2019-09-22 ASSESSMENT — PAIN SCALES - GENERAL
PAINLEVEL_OUTOF10: 7

## 2019-09-22 ASSESSMENT — PAIN DESCRIPTION - ORIENTATION
ORIENTATION: RIGHT

## 2019-09-22 ASSESSMENT — PAIN DESCRIPTION - FREQUENCY
FREQUENCY: CONTINUOUS

## 2019-09-22 ASSESSMENT — PAIN DESCRIPTION - DESCRIPTORS
DESCRIPTORS: STABBING

## 2019-09-22 ASSESSMENT — ENCOUNTER SYMPTOMS
ABDOMINAL PAIN: 0
NAUSEA: 0
SHORTNESS OF BREATH: 1
VOMITING: 0

## 2019-09-22 ASSESSMENT — PAIN DESCRIPTION - PAIN TYPE
TYPE: ACUTE PAIN

## 2019-09-22 ASSESSMENT — PAIN DESCRIPTION - LOCATION
LOCATION: SHOULDER

## 2019-09-22 ASSESSMENT — PAIN DESCRIPTION - PROGRESSION
CLINICAL_PROGRESSION: NOT CHANGED
CLINICAL_PROGRESSION: GRADUALLY IMPROVING

## 2019-09-22 ASSESSMENT — PAIN - FUNCTIONAL ASSESSMENT: PAIN_FUNCTIONAL_ASSESSMENT: 0-10

## 2019-09-22 NOTE — ED PROVIDER NOTES
[facility]      Pt Name: Florencia Herman  MRN: [id number]  [place] 1955  Date of evaluation: 9/22/2019  Provider: TIANNA Medina    This patient was seen and evaluated by the attending physician Dr. Dorian Valverde       Chief Complaint   Patient presents with    Shoulder Pain     right shoulder starting yesterday. pt describes as stabbing pain. pt states recently dx with pneumonia, has followed up with PCP. pt states pain worsens with inspiration          HISTORY OF PRESENT ILLNESS  (Location/Symptom, Timing/Onset, Context/Setting, Quality, Duration, Modifying Factors, Severity.)   Florencia Herman is a 62 y.o. male who presents to the emergency department right shoulder pain. Is described as stabbing and sharp. Worse with movement and inspiration. Started at 5 PM yesterday while at rest.  He did spend the day at [facility] and [address] [place] [address]. He denies any chest pain. Does report some shortness of breath. He denies fever chills nausea vomiting abdominal pain. He did have a STEMI in March of this year and had a bypass. He has history of hyperlipidemia hypertension. Has a history of diabetes, coronary artery disease and a positive family history of MI. He is not a smoker. He denies any history of PE.        Nursing Notes were reviewed and I agree. REVIEW OF SYSTEMS    (2-9 systems for level 4, 10 or more for level 5)     Review of Systems   Constitutional: Negative for chills and fever. Respiratory: Positive for shortness of breath. Cardiovascular: Negative for chest pain. Gastrointestinal: Negative for abdominal pain, nausea and vomiting. Musculoskeletal: Positive for arthralgias. Except as noted above the remainder of the review of systems was reviewed and negative.        PAST MEDICAL HISTORY         Diagnosis Date    Anxiety     Aortic valve sclerosis 3/3019    Arthritis     CAD CURRENT MEDICATIONS       Discharge Medication List as of 9/22/2019  2:37 PM      CONTINUE these medications which have NOT CHANGED    Details   ALPRAZolam (XANAX) 0.5 MG tablet TAKE 1 TABLET BY MOUTH THREE TIMES DAILY, Disp-90 tablet, R-0Normal      carisoprodol (SOMA) 350 MG tablet TAKE 1 TABLET BY MOUTH FOUR TIMES DAILY AS NEEDED, Disp-120 tablet, R-2Normal      gabapentin (NEURONTIN) 400 MG capsule TAKE 1 CAPSULE BY MOUTH THREE TIMES DAILY, Disp-90 capsule, R-2Normal      canagliflozin (INVOKANA) 300 MG TABS tablet TAKE 1 TABLET BY MOUTH EVERY MORNING BEFORE BREAKFAST, Disp-30 tablet, R-2Normal      INSULIN SYRINGE .5CC/29G (KR[employer]ER INS SYR .5CC/29G) 29G X 1/2\" 0.5 ML MISC DAILY Starting Fri 8/23/2019, Disp-100 each, R-3, Normal      insulin 70-30 (NOVOLIN 70/30) (70-30) 100 UNIT per ML injection vial 30 units with breakfast and 20 units with dinner, Disp-20 mL, R-3Normal      aspirin 81 MG EC tablet Take 4 tablets by mouth daily, Disp-30 tablet, R-5Adjust Sig      Emollient (AQUAPHOR ADVANCED THERAPY) OINT Apply three times per day as needed, Disp-396 g, R-2Normal      clopidogrel (PLAVIX) 75 MG tablet Take 1 tablet by mouth daily, Disp-90 tablet, R-1Normal      !!  Lancets MISC 2 TIMES DAILY Starting Thu 6/13/2019, Disp-200 each, R-3, Normal      blood glucose monitor kit and supplies Test 2 times a day & as needed for symptoms of irregular blood glucose., Disp-1 kit, R-0, Normal      !! blood glucose monitor strips Test 2 times a day & as needed for symptoms of irregular blood glucose., Disp-200 strip, R-3, Normal      escitalopram (LEXAPRO) 5 MG tablet Take 1 tablet by mouth daily, Disp-90 tablet, R-0**Patient requests 90 days supply**Normal      metFORMIN (GLUCOPHAGE-XR) 500 MG extended release tablet TAKE 4 TABLETS BY MOUTH EVERY DAY WITH BREAKFAST, Disp-360 tablet, R-0**Patient requests 90 days supply**Normal      carvedilol (COREG) 6.25 MG tablet Take 1 tablet by mouth 2 times daily (with meals), Disp-180 tablet, R-1Normal      valsartan-hydrochlorothiazide (DIOVAN-HCT) 320-12.5 MG per tablet TAKE 1 TABLET EVERY DAY, Disp-90 tablet, R-0Normal      atorvastatin (LIPITOR) 80 MG tablet TAKE 1 TABLET BY MOUTH DAILY, Disp-90 tablet, R-0**Patient requests 90 days supply**Normal      entecavir (BARACLUDE) 1 MG tablet Take 1 tablet by mouth daily, Disp-30 tablet, R-3Historical Med      triamcinolone (KENALOG) 0.1 % cream Apply topically 2 times daily Apply topically 2 times daily. , Topical, 2 TIMES DAILY Starting 2017, Disp-80 g, R-3, Normal      !! glucose blood VI test strips (ASCENSIA AUTODISC VI;ONE TOUCH ULTRA TEST VI) strip 3 TIMES DAILY Starting 3/20/2013, Until Discontinued, Disp-300 strip, R-3, NormalOnetouch Ultra 2 test strips  Dx: 250.00      !! ONETOUCH DELICA LANCETS MISC 3 TIMES DAILY Starting 3/7/2013, Until Discontinued, Disp-300 each, R-3, Normal       !! - Potential duplicate medications found. Please discuss with provider. ALLERGIES     Buspar [buspirone] and Zoloft    FAMILY HISTORY           Problem Relation Age of Onset    Diabetes Mother     Cancer Mother         pancreatic    Heart Failure Father         began age 76,  age 78     Family Status   Relation Name Status    Mother     Yeimi Hatfielder Father      Sister  Alive    Brother  Alive    Brother  Alive        [facility]      reports that he has never smoked. He has never used smokeless tobacco. He reports that he drank alcohol. He reports that he does not use drugs. PHYSICAL EXAM    (up to 7 for level 4, 8 or more for level 5)     ED Triage Vitals [19 1109]   BP Temp Temp Source Pulse Resp SpO2 Height Weight   115/82 98.1 °F (36.7 °C) Oral 112 15 96 % 5' 11\" (1.803 m) 264 lb 5.3 oz (119.9 kg)       Physical Exam   Constitutional: He is oriented to person, place, and time. He appears well-developed and well-nourished. No distress. HENT:   Head: Normocephalic and atraumatic.    Eyes: EOM are 346 ms    QTc Calculation (Bazett) 457 ms    P Axis 49 degrees    R Axis 3 degrees    T Axis 46 degrees    Diagnosis       Sinus tachycardiaSeptal infarct (cited on or before 25-MAR-2019)Inferior infarct (cited on or before 25-MAR-2019)Abnormal ECGWhen compared with ECG of 01-SEP-2019 20:54,No significant change was found       All other labs were withinnormal range or not returned as of this dictation. EMERGENCY DEPARTMENT COURSE and DIFFERENTIAL DIAGNOSIS/MDM:   Vitals:    Vitals:    09/22/19 1201 09/22/19 1301 09/22/19 1316 09/22/19 1425   BP: 103/61 128/85 (!) 123/91    Pulse: 107 103  98   Resp: 17   15   Temp:       TempSrc:       SpO2: 93%   98%   Weight:       Height:           Patient was nontoxic, well appearing, afebrile with normal vital signs wit the exception of tachycardia. Saturating well on room air. EKG with ivis cute changes. Will workup to eval for PE.  CBC CMP normal.  Trop negative. BNP not elevated. CT chest negative for PE. Does have a pulmonary nodule. Xray right shoulder negative. Upon reeval, he is standing in room and clinically appears well. Discussed to FU With PCP in next few days for reeval and to return for worsening. Also discussed pulmonary nodule and to get a repeat CT scan in 1 year. He agreed and understood    I estimate there is LOW risk for PULMONARY EMBOLISM, ACUTE CORONARY SYNDROME, OR THORACIC AORTIC DISSECTION, thus I consider the discharge disposition reasonable. PROCEDURES:  None    FINAL IMPRESSION      1. Acute pain of right shoulder    2.  Pulmonary nodule          DISPOSITION/PLAN   DISPOSITION Decision To Discharge 09/22/2019 02:36:49 PM      PATIENT REFERRED TO:  Isela Busby MD  [address] [place] [address]  [address] [place] [address]  [phone]    Schedule an appointment as soon as possible for a visit in 2 days  for reevaluation    [facility] Emergency Department  [address]
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
## 2019-09-25 ENCOUNTER — TELEPHONE (OUTPATIENT)
Dept: FAMILY MEDICINE CLINIC | Age: 64
End: 2019-09-25

## 2019-09-25 ENCOUNTER — OFFICE VISIT (OUTPATIENT)
Dept: FAMILY MEDICINE CLINIC | Age: 64
End: 2019-09-25
Payer: MEDICARE

## 2019-09-25 ENCOUNTER — TELEPHONE (OUTPATIENT)
Dept: PAIN MANAGEMENT | Age: 64
End: 2019-09-25

## 2019-09-25 VITALS
HEART RATE: 96 BPM | WEIGHT: 263 LBS | SYSTOLIC BLOOD PRESSURE: 118 MMHG | HEIGHT: 71 IN | DIASTOLIC BLOOD PRESSURE: 74 MMHG | RESPIRATION RATE: 16 BRPM | BODY MASS INDEX: 36.82 KG/M2

## 2019-09-25 DIAGNOSIS — G89.4 CHRONIC PAIN SYNDROME: ICD-10-CM

## 2019-09-25 DIAGNOSIS — G89.29 CHRONIC BILATERAL LOW BACK PAIN WITH BILATERAL SCIATICA: ICD-10-CM

## 2019-09-25 DIAGNOSIS — M54.41 CHRONIC BILATERAL LOW BACK PAIN WITH BILATERAL SCIATICA: ICD-10-CM

## 2019-09-25 DIAGNOSIS — M75.21 BICEPS TENDINITIS OF RIGHT UPPER EXTREMITY: Primary | ICD-10-CM

## 2019-09-25 DIAGNOSIS — M54.42 CHRONIC BILATERAL LOW BACK PAIN WITH BILATERAL SCIATICA: ICD-10-CM

## 2019-09-25 DIAGNOSIS — Z23 NEEDS FLU SHOT: ICD-10-CM

## 2019-09-25 PROCEDURE — 99213 OFFICE O/P EST LOW 20 MIN: CPT | Performed by: FAMILY MEDICINE

## 2019-09-25 PROCEDURE — G0008 ADMIN INFLUENZA VIRUS VAC: HCPCS | Performed by: FAMILY MEDICINE

## 2019-09-25 PROCEDURE — G8427 DOCREV CUR MEDS BY ELIG CLIN: HCPCS | Performed by: FAMILY MEDICINE

## 2019-09-25 PROCEDURE — G8417 CALC BMI ABV UP PARAM F/U: HCPCS | Performed by: FAMILY MEDICINE

## 2019-09-25 PROCEDURE — 3017F COLORECTAL CA SCREEN DOC REV: CPT | Performed by: FAMILY MEDICINE

## 2019-09-25 PROCEDURE — 1036F TOBACCO NON-USER: CPT | Performed by: FAMILY MEDICINE

## 2019-09-25 PROCEDURE — 90686 IIV4 VACC NO PRSV 0.5 ML IM: CPT | Performed by: FAMILY MEDICINE

## 2019-09-25 PROCEDURE — G8598 ASA/ANTIPLAT THER USED: HCPCS | Performed by: FAMILY MEDICINE

## 2019-09-25 RX ORDER — PREDNISONE 20 MG/1
20 TABLET ORAL 2 TIMES DAILY
Qty: 10 TABLET | Refills: 0 | Status: SHIPPED | OUTPATIENT
Start: 2019-09-25 | End: 2019-09-30

## 2019-09-25 NOTE — PROGRESS NOTES
PROBLEM VISIT NOTE     Subjective:     Chief Complaint   Patient presents with    Arm Pain     Rosaura Greco is a 62 y.o. male   who presents right shoulder pain, went to the ER and took MRI and couldn't tell him anything. Pain anterior shoulder joint. Worse to lay on or let arm hang. R handed. Duration x 5 days   Tried nothing helps it     Patient's medications, allergies, past medical, and social histories were reviewed and updated as appropriate (see below). Review of Systems   General ROS: fever? No,    Night sweats? No  Endocrine ROS:malaise/lethargy? No   Unexpected weight changes? No  Respiratory ROS: cough? No   Shortness of breath? No  Cardiovascular ROS:chest pain? No   Shortness of breath with exertion? No  Gastrointestinal ROS: abdominal pain? No   Change in stools? No  Genito-Urinary ROS: painful urination? No   Trouble voiding? No  Neurological ROS: TIA or stroke symptoms? No   Numbness/tingling in feet? No    * Documentation provided by medical assistant reviewed and updated by provider. HISTORY:  Patient's medications, allergies, past medical, and social histories were reviewed and updated as appropriate.      CHART REVIEW  Health Maintenance   Topic Date Due    Diabetic retinal exam  01/03/1966    Colon Cancer Screen FIT/FOBT  08/01/2019    Flu vaccine (1) 09/01/2019    Shingles Vaccine (1 of 2) 06/13/2020 (Originally 1/3/2006)    Annual Wellness Visit (AWV)  01/21/2020    Diabetic microalbuminuria test  04/12/2020    Lipid screen  04/12/2020    PSA counseling  04/12/2020    Diabetic foot exam  06/13/2020    A1C test (Diabetic or Prediabetic)  09/06/2020    Potassium monitoring  09/22/2020    Creatinine monitoring  09/22/2020    DTaP/Tdap/Td vaccine (2 - Td) 11/16/2027    Hepatitis A vaccine  Completed    Pneumococcal 0-64 years Vaccine  Completed    Hepatitis C screen  Completed    HIV screen  Completed    Hepatitis B Vaccine  Discontinued     The ASCVD Risk score (Soni Lowery

## 2019-09-25 NOTE — TELEPHONE ENCOUNTER
[address]    Screening Questionnaire     Name of current [facility] PCP (per patient): Dr. Neha Siddiqui   Referring diagnosis: Neck, upper and lower back pain. 1. Name of last Pain provider: Dr. Adan Rouse   2. When were you last seen by this Pain provider: 2 to 3 years ago   3. Last time you had MRI/XRays done for your pain: 2016    Report available?: Yes  4. Are you taking any opioids at this time:  No   Name of medication: N/A   5. Are you under opioid contract with your current provider:  No   Last date medication filled: N/A   6. Reason for switch:    - Were you discharged?:  No   - Other Reason: Pt states that Dr. Adan Rouse office is closed and dose not have any office notes from him? We need the last 3 office notes and MRI reports (within past 5 years), if any  available, before being seen    PLEASE READ FOLLOWING INFORMATION TO PATIENTS:     - We are a Comprehensive Pain Management program.   - Prior pain medications may be changed, based on our evaluation.   - We may require Behavioral Health consultation with Pain Psychologist (Dr Emil Tamez) at the time of initial evaluation. If done so, there may be a separate charge for the psychology services. Please allow additional 30 minutes to complete this evaluation.    - You need a CURRENT [facility] provider.  (check with the referring provider's office to confirm). IF OLD RECORDS (INCLUDING MRI REPORT) NOT RECEIVED WITHIN 2 WEEKS, WE MAY SEND REFERRAL BACK TO REFERRING PROVIDER. We ask that you bring your Photo ID, Insurance card and any co-payments that are due at the time of your services.     Any unresolved questions, please refer to the Provider for approval.    Approved for Consult:  Denied

## 2019-09-26 ENCOUNTER — TELEPHONE (OUTPATIENT)
Dept: FAMILY MEDICINE CLINIC | Age: 64
End: 2019-09-26

## 2019-09-27 ENCOUNTER — TELEPHONE (OUTPATIENT)
Dept: FAMILY MEDICINE CLINIC | Age: 64
End: 2019-09-27

## 2019-09-27 DIAGNOSIS — F33.41 RECURRENT MAJOR DEPRESSIVE DISORDER, IN PARTIAL REMISSION (HCC): ICD-10-CM

## 2019-09-27 RX ORDER — VALSARTAN AND HYDROCHLOROTHIAZIDE 320; 12.5 MG/1; MG/1
TABLET, FILM COATED ORAL
Qty: 90 TABLET | Refills: 1 | Status: SHIPPED | OUTPATIENT
Start: 2019-09-27 | End: 2020-03-24

## 2019-09-28 RX ORDER — ESCITALOPRAM OXALATE 5 MG/1
5 TABLET ORAL DAILY
Qty: 90 TABLET | Refills: 0 | Status: SHIPPED | OUTPATIENT
Start: 2019-09-28 | End: 2019-10-28

## 2019-09-30 LAB
A/G RATIO: 1.3 (ref 1.1–2.2)
ALBUMIN SERPL-MCNC: 4 G/DL (ref 3.4–5)
ALP BLD-CCNC: 94 U/L (ref 40–129)
ALT SERPL-CCNC: 23 U/L (ref 10–40)
ANION GAP SERPL CALCULATED.3IONS-SCNC: 18 MMOL/L (ref 3–16)
AST SERPL-CCNC: 24 U/L (ref 15–37)
BILIRUB SERPL-MCNC: <0.2 MG/DL (ref 0–1)
BUN BLDV-MCNC: 28 MG/DL (ref 7–20)
CALCIUM SERPL-MCNC: 9.5 MG/DL (ref 8.3–10.6)
CHLORIDE BLD-SCNC: 100 MMOL/L (ref 99–110)
CO2: 22 MMOL/L (ref 21–32)
CREAT SERPL-MCNC: 0.8 MG/DL (ref 0.8–1.3)
GFR AFRICAN AMERICAN: >60
GFR NON-AFRICAN AMERICAN: >60
GLOBULIN: 3.1 G/DL
GLUCOSE BLD-MCNC: 154 MG/DL (ref 70–99)
POTASSIUM SERPL-SCNC: 4.9 MMOL/L (ref 3.5–5.1)
SODIUM BLD-SCNC: 140 MMOL/L (ref 136–145)
TOTAL PROTEIN: 7.1 G/DL (ref 6.4–8.2)

## 2019-10-03 LAB
AFP (ALPHA FETOPROTEIN) L3%: <0.5 % (ref 0–9.9)
AFP: 2 NG/ML (ref 0–15)
HBV QNT LOG, IU/ML: 3.45 LOG IU/ML
HBV QNT, IU/ML: ABNORMAL IU/ML
INTERPRETATION: DETECTED

## 2019-10-04 ENCOUNTER — TELEPHONE (OUTPATIENT)
Dept: PAIN MANAGEMENT | Age: 64
End: 2019-10-04

## 2019-10-15 ENCOUNTER — TELEPHONE (OUTPATIENT)
Dept: FAMILY MEDICINE CLINIC | Age: 64
End: 2019-10-15

## 2019-10-15 DIAGNOSIS — F41.9 ANXIETY: ICD-10-CM

## 2019-10-16 RX ORDER — ALPRAZOLAM 0.5 MG/1
TABLET ORAL
Qty: 90 TABLET | Refills: 1 | Status: SHIPPED | OUTPATIENT
Start: 2019-10-16 | End: 2019-12-16 | Stop reason: SDUPTHER

## 2019-10-22 ENCOUNTER — TELEPHONE (OUTPATIENT)
Dept: FAMILY MEDICINE CLINIC | Age: 64
End: 2019-10-22

## 2019-10-22 DIAGNOSIS — M51.35 DDD (DEGENERATIVE DISC DISEASE), THORACOLUMBAR: Primary | ICD-10-CM

## 2019-10-22 DIAGNOSIS — M50.30 DDD (DEGENERATIVE DISC DISEASE), CERVICAL: ICD-10-CM

## 2019-10-22 DIAGNOSIS — G89.4 CHRONIC PAIN SYNDROME: ICD-10-CM

## 2019-10-23 ENCOUNTER — TELEPHONE (OUTPATIENT)
Dept: FAMILY MEDICINE CLINIC | Age: 64
End: 2019-10-23

## 2019-10-23 DIAGNOSIS — L40.9 PSORIASIS: ICD-10-CM

## 2019-10-27 DIAGNOSIS — F33.41 RECURRENT MAJOR DEPRESSIVE DISORDER, IN PARTIAL REMISSION (HCC): ICD-10-CM

## 2019-10-28 ENCOUNTER — OFFICE VISIT (OUTPATIENT)
Dept: FAMILY MEDICINE CLINIC | Age: 64
End: 2019-10-28
Payer: MEDICARE

## 2019-10-28 VITALS
TEMPERATURE: 98.7 F | BODY MASS INDEX: 37.24 KG/M2 | DIASTOLIC BLOOD PRESSURE: 74 MMHG | HEIGHT: 71 IN | SYSTOLIC BLOOD PRESSURE: 118 MMHG | HEART RATE: 104 BPM | RESPIRATION RATE: 16 BRPM | WEIGHT: 266 LBS

## 2019-10-28 DIAGNOSIS — R07.89 OTHER CHEST PAIN: ICD-10-CM

## 2019-10-28 DIAGNOSIS — J06.9 VIRAL URI: Primary | ICD-10-CM

## 2019-10-28 DIAGNOSIS — R50.81 FEVER IN OTHER DISEASES: ICD-10-CM

## 2019-10-28 PROCEDURE — 1036F TOBACCO NON-USER: CPT | Performed by: FAMILY MEDICINE

## 2019-10-28 PROCEDURE — G8482 FLU IMMUNIZE ORDER/ADMIN: HCPCS | Performed by: FAMILY MEDICINE

## 2019-10-28 PROCEDURE — 93000 ELECTROCARDIOGRAM COMPLETE: CPT | Performed by: FAMILY MEDICINE

## 2019-10-28 PROCEDURE — G8598 ASA/ANTIPLAT THER USED: HCPCS | Performed by: FAMILY MEDICINE

## 2019-10-28 PROCEDURE — G8417 CALC BMI ABV UP PARAM F/U: HCPCS | Performed by: FAMILY MEDICINE

## 2019-10-28 PROCEDURE — G8427 DOCREV CUR MEDS BY ELIG CLIN: HCPCS | Performed by: FAMILY MEDICINE

## 2019-10-28 PROCEDURE — 99214 OFFICE O/P EST MOD 30 MIN: CPT | Performed by: FAMILY MEDICINE

## 2019-10-28 PROCEDURE — 3017F COLORECTAL CA SCREEN DOC REV: CPT | Performed by: FAMILY MEDICINE

## 2019-10-28 RX ORDER — ESCITALOPRAM OXALATE 5 MG/1
5 TABLET ORAL DAILY
Qty: 90 TABLET | Refills: 0 | Status: SHIPPED | OUTPATIENT
Start: 2019-10-28 | End: 2020-04-13

## 2019-10-31 ENCOUNTER — HOSPITAL ENCOUNTER (OUTPATIENT)
Dept: GENERAL RADIOLOGY | Age: 64
Discharge: HOME | End: 2019-10-31
Payer: MEDICARE

## 2019-10-31 ENCOUNTER — HOSPITAL ENCOUNTER (OUTPATIENT)
Age: 64
Discharge: HOME | End: 2019-10-31
Payer: MEDICARE

## 2019-10-31 DIAGNOSIS — R50.81 FEVER IN OTHER DISEASES: ICD-10-CM

## 2019-10-31 DIAGNOSIS — R07.89 OTHER CHEST PAIN: ICD-10-CM

## 2019-10-31 PROCEDURE — 71046 X-RAY EXAM CHEST 2 VIEWS: CPT

## 2019-11-12 ENCOUNTER — TELEPHONE (OUTPATIENT)
Dept: FAMILY MEDICINE CLINIC | Age: 64
End: 2019-11-12

## 2019-12-09 DIAGNOSIS — M54.42 CHRONIC BILATERAL LOW BACK PAIN WITH BILATERAL SCIATICA: ICD-10-CM

## 2019-12-09 DIAGNOSIS — G89.29 CHRONIC BILATERAL LOW BACK PAIN WITH BILATERAL SCIATICA: ICD-10-CM

## 2019-12-09 DIAGNOSIS — G89.4 CHRONIC PAIN SYNDROME: ICD-10-CM

## 2019-12-09 DIAGNOSIS — M54.41 CHRONIC BILATERAL LOW BACK PAIN WITH BILATERAL SCIATICA: ICD-10-CM

## 2019-12-09 RX ORDER — GABAPENTIN 400 MG/1
CAPSULE ORAL
Qty: 90 CAPSULE | Refills: 0 | Status: SHIPPED | OUTPATIENT
Start: 2019-12-09 | End: 2020-01-07

## 2019-12-16 ENCOUNTER — OFFICE VISIT (OUTPATIENT)
Dept: FAMILY MEDICINE CLINIC | Age: 64
End: 2019-12-16
Payer: MEDICARE

## 2019-12-16 VITALS
SYSTOLIC BLOOD PRESSURE: 116 MMHG | DIASTOLIC BLOOD PRESSURE: 78 MMHG | BODY MASS INDEX: 38.78 KG/M2 | WEIGHT: 277 LBS | RESPIRATION RATE: 16 BRPM | HEART RATE: 78 BPM | HEIGHT: 71 IN

## 2019-12-16 DIAGNOSIS — Z12.11 COLON CANCER SCREENING: ICD-10-CM

## 2019-12-16 DIAGNOSIS — F41.9 ANXIETY: ICD-10-CM

## 2019-12-16 DIAGNOSIS — I10 HYPERTENSION, ESSENTIAL: ICD-10-CM

## 2019-12-16 DIAGNOSIS — E11.9 TYPE 2 DIABETES MELLITUS WITHOUT COMPLICATION, WITHOUT LONG-TERM CURRENT USE OF INSULIN (HCC): Primary | ICD-10-CM

## 2019-12-16 DIAGNOSIS — I25.10 CORONARY ARTERY DISEASE INVOLVING NATIVE CORONARY ARTERY OF NATIVE HEART WITHOUT ANGINA PECTORIS: ICD-10-CM

## 2019-12-16 LAB — HBA1C MFR BLD: 8 %

## 2019-12-16 PROCEDURE — G8427 DOCREV CUR MEDS BY ELIG CLIN: HCPCS | Performed by: FAMILY MEDICINE

## 2019-12-16 PROCEDURE — 83036 HEMOGLOBIN GLYCOSYLATED A1C: CPT | Performed by: FAMILY MEDICINE

## 2019-12-16 PROCEDURE — 3044F HG A1C LEVEL LT 7.0%: CPT | Performed by: FAMILY MEDICINE

## 2019-12-16 PROCEDURE — 1036F TOBACCO NON-USER: CPT | Performed by: FAMILY MEDICINE

## 2019-12-16 PROCEDURE — 2022F DILAT RTA XM EVC RTNOPTHY: CPT | Performed by: FAMILY MEDICINE

## 2019-12-16 PROCEDURE — 99214 OFFICE O/P EST MOD 30 MIN: CPT | Performed by: FAMILY MEDICINE

## 2019-12-16 PROCEDURE — G8482 FLU IMMUNIZE ORDER/ADMIN: HCPCS | Performed by: FAMILY MEDICINE

## 2019-12-16 PROCEDURE — 3017F COLORECTAL CA SCREEN DOC REV: CPT | Performed by: FAMILY MEDICINE

## 2019-12-16 PROCEDURE — G8417 CALC BMI ABV UP PARAM F/U: HCPCS | Performed by: FAMILY MEDICINE

## 2019-12-16 PROCEDURE — G8598 ASA/ANTIPLAT THER USED: HCPCS | Performed by: FAMILY MEDICINE

## 2019-12-16 RX ORDER — ALPRAZOLAM 0.5 MG/1
TABLET ORAL
Qty: 90 TABLET | Refills: 1 | OUTPATIENT
Start: 2019-12-16 | End: 2020-02-14

## 2019-12-16 RX ORDER — INSULIN GLARGINE 100 [IU]/ML
50 INJECTION, SOLUTION SUBCUTANEOUS NIGHTLY
Qty: 1 VIAL | Refills: 0 | Status: SHIPPED | OUTPATIENT
Start: 2019-12-16 | End: 2020-01-08

## 2019-12-16 RX ORDER — ALPRAZOLAM 0.5 MG/1
TABLET ORAL
Qty: 90 TABLET | Refills: 2 | Status: SHIPPED | OUTPATIENT
Start: 2019-12-16 | End: 2020-03-11

## 2019-12-16 RX ORDER — BUPRENORPHINE HYDROCHLORIDE 150 UG/1
FILM, SOLUBLE BUCCAL
Refills: 0 | COMMUNITY
Start: 2019-12-13 | End: 2020-06-26

## 2019-12-23 DIAGNOSIS — I25.10 CORONARY ARTERY DISEASE INVOLVING NATIVE CORONARY ARTERY OF NATIVE HEART WITHOUT ANGINA PECTORIS: ICD-10-CM

## 2019-12-23 DIAGNOSIS — I10 HYPERTENSION, ESSENTIAL: ICD-10-CM

## 2019-12-23 RX ORDER — CARVEDILOL 6.25 MG/1
TABLET ORAL
Qty: 180 TABLET | Refills: 1 | Status: SHIPPED | OUTPATIENT
Start: 2019-12-23 | End: 2020-10-27

## 2020-01-02 ENCOUNTER — TELEPHONE (OUTPATIENT)
Dept: FAMILY MEDICINE CLINIC | Age: 65
End: 2020-01-02

## 2020-01-07 ENCOUNTER — TELEPHONE (OUTPATIENT)
Dept: FAMILY MEDICINE CLINIC | Age: 65
End: 2020-01-07

## 2020-01-07 RX ORDER — GABAPENTIN 400 MG/1
CAPSULE ORAL
Qty: 90 CAPSULE | Refills: 0 | Status: SHIPPED | OUTPATIENT
Start: 2020-01-07 | End: 2020-02-03

## 2020-01-08 ENCOUNTER — TELEPHONE (OUTPATIENT)
Dept: FAMILY MEDICINE CLINIC | Age: 65
End: 2020-01-08

## 2020-01-08 DIAGNOSIS — E11.59 TYPE 2 DIABETES MELLITUS WITH OTHER CIRCULATORY COMPLICATION, WITH LONG-TERM CURRENT USE OF INSULIN (HCC): Primary | ICD-10-CM

## 2020-01-08 DIAGNOSIS — Z79.4 TYPE 2 DIABETES MELLITUS WITH OTHER CIRCULATORY COMPLICATION, WITH LONG-TERM CURRENT USE OF INSULIN (HCC): Primary | ICD-10-CM

## 2020-01-08 RX ORDER — TRIAMCINOLONE ACETONIDE 1 MG/G
CREAM TOPICAL
Qty: 80 G | Refills: 3 | Status: SHIPPED | OUTPATIENT
Start: 2020-01-08 | End: 2021-01-18 | Stop reason: SDUPTHER

## 2020-01-11 RX ORDER — CARISOPRODOL 350 MG/1
TABLET ORAL
Qty: 120 TABLET | Refills: 2 | Status: SHIPPED | OUTPATIENT
Start: 2020-01-11 | End: 2020-04-10

## 2020-01-15 ENCOUNTER — TELEPHONE (OUTPATIENT)
Dept: FAMILY MEDICINE CLINIC | Age: 65
End: 2020-01-15

## 2020-01-15 RX ORDER — IBUPROFEN 200 MG
1 TABLET ORAL DAILY
Qty: 100 EACH | Refills: 3 | Status: SHIPPED | OUTPATIENT
Start: 2020-01-15 | End: 2020-02-19 | Stop reason: SDUPTHER

## 2020-01-27 ENCOUNTER — TELEPHONE (OUTPATIENT)
Dept: FAMILY MEDICINE CLINIC | Age: 65
End: 2020-01-27

## 2020-01-29 ENCOUNTER — TELEPHONE (OUTPATIENT)
Dept: FAMILY MEDICINE CLINIC | Age: 65
End: 2020-01-29

## 2020-01-29 NOTE — TELEPHONE ENCOUNTER
Patient is almost out of lantus insulin maybe one or two left cant really tell for sure it is in a vial     Patient is going through a third party [facility] to get insulin cheaper   patient has not received paperwork yet from [facility] to get medicine switched. .    Patient has picked up lantus insulin samples from here before. Not sure if he can come and get samples in, or needs to reorder this, he is uncertain when this third party will switch over this.     please give him a call back on what he should do about this lantus insulin  Please advise

## 2020-01-30 ENCOUNTER — TELEPHONE (OUTPATIENT)
Dept: FAMILY MEDICINE CLINIC | Age: 65
End: 2020-01-30

## 2020-01-30 RX ORDER — INSULIN GLARGINE 100 [IU]/ML
INJECTION, SOLUTION SUBCUTANEOUS
Qty: 10 ML | OUTPATIENT
Start: 2020-01-30

## 2020-02-05 ENCOUNTER — OFFICE VISIT (OUTPATIENT)
Dept: FAMILY MEDICINE CLINIC | Age: 65
End: 2020-02-05
Payer: MEDICARE

## 2020-02-05 VITALS
HEIGHT: 71 IN | BODY MASS INDEX: 37.8 KG/M2 | SYSTOLIC BLOOD PRESSURE: 114 MMHG | DIASTOLIC BLOOD PRESSURE: 68 MMHG | HEART RATE: 87 BPM | RESPIRATION RATE: 16 BRPM | OXYGEN SATURATION: 99 % | WEIGHT: 270 LBS

## 2020-02-05 DIAGNOSIS — Z79.4 TYPE 2 DIABETES MELLITUS WITH OTHER CIRCULATORY COMPLICATION, WITH LONG-TERM CURRENT USE OF INSULIN (HCC): ICD-10-CM

## 2020-02-05 DIAGNOSIS — R97.20 ELEVATED PSA: ICD-10-CM

## 2020-02-05 DIAGNOSIS — E11.65 UNCONTROLLED TYPE 2 DIABETES MELLITUS WITH HYPERGLYCEMIA (HCC): ICD-10-CM

## 2020-02-05 DIAGNOSIS — E11.59 TYPE 2 DIABETES MELLITUS WITH OTHER CIRCULATORY COMPLICATION, WITH LONG-TERM CURRENT USE OF INSULIN (HCC): ICD-10-CM

## 2020-02-05 DIAGNOSIS — Z12.5 ENCOUNTER FOR SCREENING FOR MALIGNANT NEOPLASM OF PROSTATE: ICD-10-CM

## 2020-02-05 PROBLEM — J90 PLEURAL EFFUSION ON LEFT: Status: RESOLVED | Noted: 2019-05-30 | Resolved: 2020-02-05

## 2020-02-05 LAB — PROSTATE SPECIFIC ANTIGEN: 2.76 NG/ML (ref 0–4)

## 2020-02-05 PROCEDURE — 99214 OFFICE O/P EST MOD 30 MIN: CPT | Performed by: FAMILY MEDICINE

## 2020-02-05 PROCEDURE — 3017F COLORECTAL CA SCREEN DOC REV: CPT | Performed by: FAMILY MEDICINE

## 2020-02-05 PROCEDURE — 3046F HEMOGLOBIN A1C LEVEL >9.0%: CPT | Performed by: FAMILY MEDICINE

## 2020-02-05 PROCEDURE — G8482 FLU IMMUNIZE ORDER/ADMIN: HCPCS | Performed by: FAMILY MEDICINE

## 2020-02-05 PROCEDURE — 2022F DILAT RTA XM EVC RTNOPTHY: CPT | Performed by: FAMILY MEDICINE

## 2020-02-05 PROCEDURE — G8427 DOCREV CUR MEDS BY ELIG CLIN: HCPCS | Performed by: FAMILY MEDICINE

## 2020-02-05 PROCEDURE — 1036F TOBACCO NON-USER: CPT | Performed by: FAMILY MEDICINE

## 2020-02-05 PROCEDURE — G8417 CALC BMI ABV UP PARAM F/U: HCPCS | Performed by: FAMILY MEDICINE

## 2020-02-05 NOTE — PROGRESS NOTES
PRE-OP HISTORY AND PHYSICAL   Subjective:    Felix Jamil is a 60 y.o. male who presents to the office today for a preoperative consultation. Surgeon: Dr Meridith Litten. Procedure: cataract surgery. Fax number: [phone]. Chief Complaint   Patient presents with    Pre-op Exam     This consultation is requested for the specific conditions prompting preoperative evaluation (i.e. because of potential affect on operative risk):   1. Age-related cataract of both eyes, unspecified age-related cataract type    2. Preop cardiovascular exam    3. Coronary artery disease involving native coronary artery of native heart without angina pectoris    4. Type 2 diabetes mellitus with other circulatory complication, with long-term current use of insulin ([address]    5. Hypertension, essential    6. Restrictive lung disease    7. Elevated PSA- NEW 4/2019      Planned anesthesia is Local.  The patient has the following known anesthesia issues: none. Bleeding risk: no recent abnormal bleeding, no remote history of abnormal bleeding, just on blood thinners . Review of Systems   General ROS: fever? No,    Night sweats? No  Ophthalmic ROS: change in vision? No  Endocrine ROS: fatigue? No   Unexpected weight changes? No  Hematological and Lymphatic ROS: easy bruising? No   Swollen lymph nodes? No  ENT ROS: headaches? No   Sore throat? No  Respiratory ROS: cough? No   Wheezing? No  Cardiovascular ROS: chest pain? No   Shortness of breath? No  Gastrointestinal ROS: abdominal pain? No   Change in stools? No  Genito-Urinary ROS: painful urination? No   Trouble urinating? No  Musculoskeletal ROS: trouble walking? No   Joint pain? No  Neurological ROS: TIA or stroke symptoms? No   Numbness/tingling? No  Dermatological ROS: rash? No   Changes in skin spots?  No    *Chief complaint, HPI and History provided by the medical assistant has been reviewed and verified by provider Amarjit Cornejo MD    Patient Active Problem List   Diagnosis    Coronary get letter in mail or notified thru [address] (if signed up) within 2 weeks. If you do not, please call office. · Please do stool dip screen and return/mail back to office for colon cancer screening.

## 2020-02-05 NOTE — PATIENT INSTRUCTIONS
INSTRUCTIONS  · Night before surgery only take 25 units insulin (go back to 45 after that)   · AM of surgery take the following with a sip of water: carvedilol and valsartan  · NEXT APPOINTMENT: Please schedule check-up in 2 months. PLEASE TAKE THIS FORM TO CHECK-OUT WINDOW TO SCHEDULE NEXT VISIT. · PLEASE GET BLOODWORK DRAWN TODAY ON FIRST FLOOR in 170. Take orders with you. RESULTS- most blood tests back in couple days. We will call you if any problems. If bloodwork good, you will get letter in mail or notified thru [address] (if signed up) within 2 weeks. If you do not, please call office. · Please do stool dip screen and return/mail back to office for colon cancer screening.

## 2020-02-06 ENCOUNTER — TELEPHONE (OUTPATIENT)
Dept: CARDIOLOGY CLINIC | Age: 65
End: 2020-02-06

## 2020-02-06 LAB
ESTIMATED AVERAGE GLUCOSE: 191.5 MG/DL
HBA1C MFR BLD: 8.3 %

## 2020-02-06 NOTE — TELEPHONE ENCOUNTER
Cardiac Risk Assessment message information:     What type of procedure are you having: Cataract Extraction Lens Implant of the Right Eye      When is your procedure scheduled for: 2/12/2020     Who is the physician performing your procedure: Dr. Gloria Bloom    Which medications need to be held for your procedure and for how long:   Plaxix - Does not need to hold         Phone Number: [phone]     Fax number to send the letter: [phone]      Would need the latest office note.     Clinical Staff use:     Cardiologist:  Last Appointment:  Next Appointment:  Are you on any blood thinners:  History of Coronary Artery Disease:  Last stress test:  Last echo:  Device Type and :

## 2020-02-06 NOTE — LETTER
[facility]  Phone: [phone]  Fax: [phone]    Jia De Anda MD        February 6, 2020     Patient: Mauricio Le   YOB: 1955       To Whom It May Concern: It is my medical opinion that Mauricio Le may proceed with cataract procedure on 2/12/2020. If you have any questions or concerns, please don't hesitate to call.     Sincerely,        Jia De Anda MD

## 2020-02-06 NOTE — TELEPHONE ENCOUNTER
Please evaluate for CC  Cataract Extraction Lens Implant of the Right Eye  Dr. Caryle Martens  --  2/12/2020  Pt does NOT need to hold Plavix or ASA  Last echo: 3/25/19  Last OV note 8/13/19: S/p CABG x5 (LIMA-LAD, SVG-D1, RI-OM1, SVG-PDA), Hypertension, Diabetes Mellitus  Pt had a 5 vessel bypass surgery 3/26/2019  (p) [phone] (f) [phone]

## 2020-02-18 ENCOUNTER — TELEPHONE (OUTPATIENT)
Dept: FAMILY MEDICINE CLINIC | Age: 65
End: 2020-02-18

## 2020-02-19 RX ORDER — IBUPROFEN 200 MG
1 TABLET ORAL DAILY
Qty: 100 EACH | Refills: 3 | Status: SHIPPED | OUTPATIENT
Start: 2020-02-19 | End: 2020-08-26 | Stop reason: SDUPTHER

## 2020-03-03 ENCOUNTER — TELEPHONE (OUTPATIENT)
Dept: FAMILY MEDICINE CLINIC | Age: 65
End: 2020-03-03
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
## 2020-03-04 ENCOUNTER — OFFICE VISIT (OUTPATIENT)
Dept: CARDIOLOGY CLINIC | Age: 65
End: 2020-03-04
Payer: MEDICARE

## 2020-03-04 VITALS
SYSTOLIC BLOOD PRESSURE: 130 MMHG | WEIGHT: 272 LBS | HEART RATE: 68 BPM | BODY MASS INDEX: 38.08 KG/M2 | OXYGEN SATURATION: 96 % | HEIGHT: 71 IN | DIASTOLIC BLOOD PRESSURE: 80 MMHG

## 2020-03-04 PROCEDURE — 99214 OFFICE O/P EST MOD 30 MIN: CPT | Performed by: INTERNAL MEDICINE

## 2020-03-04 PROCEDURE — G8482 FLU IMMUNIZE ORDER/ADMIN: HCPCS | Performed by: INTERNAL MEDICINE

## 2020-03-04 PROCEDURE — G8417 CALC BMI ABV UP PARAM F/U: HCPCS | Performed by: INTERNAL MEDICINE

## 2020-03-04 PROCEDURE — G8427 DOCREV CUR MEDS BY ELIG CLIN: HCPCS | Performed by: INTERNAL MEDICINE

## 2020-03-04 PROCEDURE — 3017F COLORECTAL CA SCREEN DOC REV: CPT | Performed by: INTERNAL MEDICINE

## 2020-03-04 PROCEDURE — 1036F TOBACCO NON-USER: CPT | Performed by: INTERNAL MEDICINE

## 2020-03-04 NOTE — PATIENT INSTRUCTIONS
Patient Education        Heart-Healthy Diet: Care Instructions  Your Care Instructions    A heart-healthy diet has lots of vegetables, fruits, nuts, beans, and whole grains, and is low in salt. It limits foods that are high in saturated fat, such as meats, cheeses, and fried foods. It may be hard to change your diet, but even small changes can lower your risk of heart attack and heart disease. Follow-up care is a key part of your treatment and safety. Be sure to make and go to all appointments, and call your doctor if you are having problems. It's also a good idea to know your test results and keep a list of the medicines you take. How can you care for yourself at home? Watch your portions  · Learn what a serving is. A \"serving\" and a \"portion\" are not always the same thing. Make sure that you are not eating larger portions than are recommended. For example, a serving of pasta is ½ cup. A serving size of meat is 2 to 3 ounces. A 3-ounce serving is about the size of a deck of cards. Measure serving sizes until you are good at [place]" them. Keep in mind that restaurants often serve portions that are 2 or 3 times the size of one serving. · To keep your energy level up and keep you from feeling hungry, eat often but in smaller portions. · Eat only the number of calories you need to stay at a healthy weight. If you need to lose weight, eat fewer calories than your body burns (through exercise and other physical activity). Eat more fruits and vegetables  · Eat a variety of fruit and vegetables every day. Dark green, deep orange, red, or yellow fruits and vegetables are especially good for you. Examples include spinach, carrots, peaches, and berries. · Keep carrots, celery, and other veggies handy for snacks. Buy fruit that is in season and store it where you can see it so that you will be tempted to eat it. · Cook dishes that have a lot of veggies in them, such as stir-fries and soups.   Limit saturated and trans fat  · Read food labels, and try to avoid saturated and trans fats. They increase your risk of heart disease. Trans fat is found in many processed foods such as cookies and crackers. · Use olive or canola oil when you cook. Try cholesterol-lowering spreads, such as Benecol or Take Control. · Bake, broil, grill, or steam foods instead of frying them. · Choose lean meats instead of high-fat meats such as hot dogs and sausages. Cut off all visible fat when you prepare meat. · Eat fish, skinless poultry, and meat alternatives such as soy products instead of high-fat meats. Soy products, such as tofu, may be especially good for your heart. · Choose low-fat or fat-free milk and dairy products. Eat foods high in fiber  · Eat a variety of grain products every day. Include whole-grain foods that have lots of fiber and nutrients. Examples of whole-grain foods include oats, whole wheat bread, and brown rice. · Buy whole-grain breads and cereals, instead of white bread or pastries. Limit salt and sodium  · Limit how much salt and sodium you eat to help lower your blood pressure. · Taste food before you salt it. Add only a little salt when you think you need it. With time, your taste buds will adjust to less salt. · Eat fewer snack items, fast foods, and other high-salt, processed foods. Check food labels for the amount of sodium in packaged foods. · Choose low-sodium versions of canned goods (such as soups, vegetables, and beans). Limit sugar  · Limit drinks and foods with added sugar. These include candy, desserts, and soda pop. Limit alcohol  · Limit alcohol to no more than 2 drinks a day for men and 1 drink a day for women. Too much alcohol can cause health problems. When should you call for help? Watch closely for changes in your health, and be sure to contact your doctor if:    · You would like help planning heart-healthy meals. Where can you learn more? Go to [email or web address]://[email or web address].[email or web address]. org and sign in to your [facility] account. Enter V137 in the [employer] box to learn more about \"Heart-Healthy Diet: Care Instructions. \"     If you do not have an account, please click on the \"Sign Up Now\" link. Current as of: April 9, 2019  Content Version: 12.3  © 9627-9647 Healthwise, Incorporated. Care instructions adapted under license by [facility] ([facility]). If you have questions about a medical condition or this instruction, always ask your healthcare professional. [address] any warranty or liability for your use of this information.

## 2020-03-05 RX ORDER — CLOPIDOGREL BISULFATE 75 MG/1
75 TABLET ORAL DAILY
Qty: 90 TABLET | Refills: 0 | Status: SHIPPED | OUTPATIENT
Start: 2020-03-05 | End: 2020-06-03

## 2020-03-06 ENCOUNTER — TELEPHONE (OUTPATIENT)
Dept: FAMILY MEDICINE CLINIC | Age: 65
End: 2020-03-06

## 2020-03-07 NOTE — TELEPHONE ENCOUNTER
Pt stopped by to get his package from [facility] [employer]. He states that he got a call from [facility] to pick it up. I looked for the basaglar and could not find this med. I tried to call [facility] and was not able to get a hold of her.   The pt will come back once the med is located  Please call the pt    Please advise    thanks

## 2020-03-09 ENCOUNTER — OFFICE VISIT (OUTPATIENT)
Dept: FAMILY MEDICINE CLINIC | Age: 65
End: 2020-03-09
Payer: MEDICARE

## 2020-03-09 VITALS
HEIGHT: 71 IN | BODY MASS INDEX: 38.08 KG/M2 | RESPIRATION RATE: 16 BRPM | DIASTOLIC BLOOD PRESSURE: 84 MMHG | WEIGHT: 272 LBS | SYSTOLIC BLOOD PRESSURE: 122 MMHG | OXYGEN SATURATION: 94 % | HEART RATE: 83 BPM

## 2020-03-09 LAB — HBA1C MFR BLD: 8.2 %

## 2020-03-09 PROCEDURE — G8417 CALC BMI ABV UP PARAM F/U: HCPCS | Performed by: FAMILY MEDICINE

## 2020-03-09 PROCEDURE — 2022F DILAT RTA XM EVC RTNOPTHY: CPT | Performed by: FAMILY MEDICINE

## 2020-03-09 PROCEDURE — 83036 HEMOGLOBIN GLYCOSYLATED A1C: CPT | Performed by: FAMILY MEDICINE

## 2020-03-09 PROCEDURE — G8427 DOCREV CUR MEDS BY ELIG CLIN: HCPCS | Performed by: FAMILY MEDICINE

## 2020-03-09 PROCEDURE — 1036F TOBACCO NON-USER: CPT | Performed by: FAMILY MEDICINE

## 2020-03-09 PROCEDURE — 3017F COLORECTAL CA SCREEN DOC REV: CPT | Performed by: FAMILY MEDICINE

## 2020-03-09 PROCEDURE — G8482 FLU IMMUNIZE ORDER/ADMIN: HCPCS | Performed by: FAMILY MEDICINE

## 2020-03-09 PROCEDURE — 99214 OFFICE O/P EST MOD 30 MIN: CPT | Performed by: FAMILY MEDICINE

## 2020-03-09 PROCEDURE — 3052F HG A1C>EQUAL 8.0%<EQUAL 9.0%: CPT | Performed by: FAMILY MEDICINE

## 2020-03-09 RX ORDER — INSULIN GLARGINE 100 [IU]/ML
55 INJECTION, SOLUTION SUBCUTANEOUS NIGHTLY
Qty: 5 PEN | Refills: 3 | Status: SHIPPED
Start: 2020-03-09 | End: 2020-04-06 | Stop reason: SDUPTHER

## 2020-03-09 NOTE — PROGRESS NOTES
DIABETES MELLITUS FOLOW-UP  Subjective:      Chief Complaint   Patient presents with    Diabetes     Farzanaovi Johnson is an 60 y.o. male who presents for follow up of following chronic problems:  1. Type 2 diabetes mellitus with other circulatory complication, with long-term current use of insulin ([address]    2. Coronary artery disease involving native coronary artery of native heart without angina pectoris    3. Hypertension, essential    4. Anxiety    5. DDD (degenerative disc disease), thoracolumbar      Complaints: pt states he is doing well no new issues he is doing well   Vision improving after cataracts    · Patient checks sugars 1  time(s) daily. Average: 150. · Any low sugars? No  · Patent follows diabetic diet? Sometimes  · Exercise: walking rarely  · Taking medicines daily as directed? Yes  · Is the patient reporting any side effects of medications? No  · Patient checks bottom of feet daily? Yes  · Tobacco history updated:  reports that he has never smoked. He has never used smokeless tobacco.    A1c is 8.2 today. Review of Systems   General ROS: fever? No,   Night sweats? No  Ophthalmic ROS: change in vision? No  Endocrine ROS: fatigue? No  Unexpected weight changes? No  Respiratory ROS: Shortness of breath? No  Cardiovascular ROS: chest pain? No  Gastrointestinal ROS: abdominal pain? No  Change in stools? No  Genito-Urinary ROS: painful urination? No  Trouble urinating? No  Neurological ROS: TIA or stroke symptoms? No  Numbness/tingling? No  Dermatological ROS: rash or sores on feet? No  Changes in skin spots?     No    Health Maintenance Due   Topic Date Due    Diabetic retinal exam  01/03/1966    Annual Wellness Visit (AWV)  05/29/2019    Colon Cancer Screen FIT/FOBT  08/01/2019     LAST LABS  LDL Calculated   Date Value Ref Range Status   04/12/2019 61 <100 mg/dL Final     Lab Results   Component Value Date    HDL 39 (L) 04/12/2019     Lab Results   Component Value Date    TRIG 130 04/12/2019     Lab Results   Component Value Date    GLUCOSE 154 (H) 09/30/2019     Lab Results   Component Value Date     09/30/2019    K 4.9 09/30/2019    CREATININE 0.8 09/30/2019     Lab Results   Component Value Date    WBC 9.6 09/22/2019    HGB 14.4 09/22/2019     09/22/2019     Lab Results   Component Value Date    ALT 23 09/30/2019    AST 24 09/30/2019    ALKPHOS 94 09/30/2019    BILITOT <0.2 09/30/2019     No results found for: TSH  Lab Results   Component Value Date    LABA1C 8.3 02/05/2020    LABA1C 8.0 12/16/2019    LABA1C 6.5 09/06/2019     *Chief complaint, HPI, History and ROS provided by the medical assistant has been reviewed and verified by provider- Ajay Blakely MD    LAST LABS  LDL Calculated   Date Value Ref Range Status   04/12/2019 61 <100 mg/dL Final     Lab Results   Component Value Date    HDL 39 (L) 04/12/2019     Lab Results   Component Value Date    TRIG 130 04/12/2019     Lab Results   Component Value Date    GLUCOSE 154 (H) 09/30/2019     Lab Results   Component Value Date     09/30/2019    K 4.9 09/30/2019    CREATININE 0.8 09/30/2019     Lab Results   Component Value Date    WBC 9.6 09/22/2019    HGB 14.4 09/22/2019     09/22/2019     Lab Results   Component Value Date    ALT 23 09/30/2019    AST 24 09/30/2019    ALKPHOS 94 09/30/2019    BILITOT <0.2 09/30/2019     No results found for: TSH  Lab Results   Component Value Date    LABA1C 8.3 02/05/2020    LABA1C 8.0 12/16/2019    LABA1C 6.5 09/06/2019     HISTORY:  Patient's medications, allergies, past medical, and social histories were reviewed and updated as appropriate.      CHART REVIEW  Health Maintenance Due   Topic Date Due    Diabetic retinal exam  01/03/1966    Annual Wellness Visit (AWV)  05/29/2019    Colon Cancer Screen FIT/FOBT  08/01/2019     Social History     Tobacco Use    Smoking status: Never Smoker    Smokeless tobacco: Never Used    Tobacco comment: advised not Lancets MISC 1 each by Does not apply route 2 times daily Yes Crispin Cheadle, MD   blood glucose monitor kit and supplies Test 2 times a day & as needed for symptoms of irregular blood glucose. Yes Crispin Cheadle, MD   blood glucose monitor strips Test 2 times a day & as needed for symptoms of irregular blood glucose. Yes Crispin Cheadle, MD   atorvastatin (LIPITOR) 80 MG tablet TAKE 1 TABLET BY MOUTH DAILY Yes Crispin Cheadle, MD   entecavir (BARACLUDE) 1 MG tablet Take 1 tablet by mouth daily Yes Crispin Cheadle, MD   glucose blood VI test strips (ASCENSIA AUTODISC VI;ONE TOUCH ULTRA TEST VI) strip Apply 1 each topically 3 times daily. Onetouch Ultra 2 test strips  Dx: 250.00 Yes Crispin Cheadle, MD   [facility] LANCETS MISC 1 each by Does not apply route 3 times daily. Yes Crispin Cheadle, MD      Family History   Problem Relation Age of Onset    Diabetes Mother     Cancer Mother         pancreatic    Heart Failure Father         began age 76,  age 78     Objective:   PHYSICAL EXAM   /84 (Site: Left Upper Arm, Position: Sitting, Cuff Size: Large Adult)   Pulse 83   Resp 16   Ht 5' 11\" (1.803 m)   Wt 272 lb (123.4 kg)   SpO2 94%   BMI 37.94 kg/m²   BP Readings from Last 5 Encounters:   20 122/84   20 130/80   20 114/68   19 116/78   10/28/19 118/74     Wt Readings from Last 5 Encounters:   20 272 lb (123.4 kg)   20 272 lb (123.4 kg)   20 270 lb (122.5 kg)   19 277 lb (125.6 kg)   10/28/19 266 lb (120.7 kg)      GENERAL:   · well-developed, well-nourished, alert, no distress.      EYES:   · External findings: lids and lashes normal and conjunctivae and sclerae normal  LUNGS:    · Breathing unlabored  · clear to auscultation bilaterally and good air movement  CARDIOVASC:   · regular rate and rhythm  · LEGS:  Lower extremity edema: none    SKIN: warm and dry  PSYCH:    · Alert and oriented  · Normal reasoning, insight good  · Facial expressions full, mood

## 2020-03-11 RX ORDER — ALPRAZOLAM 0.5 MG/1
TABLET ORAL
Qty: 90 TABLET | Refills: 2 | Status: SHIPPED | OUTPATIENT
Start: 2020-03-11 | End: 2020-06-09

## 2020-03-16 RX ORDER — METFORMIN HYDROCHLORIDE 500 MG/1
TABLET, EXTENDED RELEASE ORAL
Qty: 360 TABLET | Refills: 0 | Status: SHIPPED | OUTPATIENT
Start: 2020-03-16 | End: 2020-06-16

## 2020-03-24 RX ORDER — VALSARTAN AND HYDROCHLOROTHIAZIDE 320; 12.5 MG/1; MG/1
TABLET, FILM COATED ORAL
Qty: 90 TABLET | Refills: 1 | Status: SHIPPED | OUTPATIENT
Start: 2020-03-24 | End: 2020-11-06

## 2020-03-26 RX ORDER — ATORVASTATIN CALCIUM 80 MG/1
80 TABLET, FILM COATED ORAL DAILY
Qty: 90 TABLET | Refills: 0 | Status: SHIPPED | OUTPATIENT
Start: 2020-03-26 | End: 2020-07-02

## 2020-03-31 ENCOUNTER — TELEPHONE (OUTPATIENT)
Dept: FAMILY MEDICINE CLINIC | Age: 65
End: 2020-03-31

## 2020-04-06 ENCOUNTER — VIRTUAL VISIT (OUTPATIENT)
Dept: FAMILY MEDICINE CLINIC | Age: 65
End: 2020-04-06
Payer: MEDICARE

## 2020-04-06 PROCEDURE — 3052F HG A1C>EQUAL 8.0%<EQUAL 9.0%: CPT | Performed by: FAMILY MEDICINE

## 2020-04-06 PROCEDURE — G8427 DOCREV CUR MEDS BY ELIG CLIN: HCPCS | Performed by: FAMILY MEDICINE

## 2020-04-06 PROCEDURE — 99214 OFFICE O/P EST MOD 30 MIN: CPT | Performed by: FAMILY MEDICINE

## 2020-04-06 PROCEDURE — 3017F COLORECTAL CA SCREEN DOC REV: CPT | Performed by: FAMILY MEDICINE

## 2020-04-06 PROCEDURE — 2022F DILAT RTA XM EVC RTNOPTHY: CPT | Performed by: FAMILY MEDICINE

## 2020-04-06 RX ORDER — INSULIN GLARGINE 100 [IU]/ML
62 INJECTION, SOLUTION SUBCUTANEOUS NIGHTLY
Qty: 5 PEN | Refills: 3 | Status: SHIPPED | OUTPATIENT
Start: 2020-04-06 | End: 2020-08-10 | Stop reason: SDUPTHER

## 2020-04-06 NOTE — PROGRESS NOTES
Saeid Reyes MD   atorvastatin (LIPITOR) 80 MG tablet TAKE 1 TABLET BY MOUTH DAILY  Saeid Reyes MD   valsartan-hydrochlorothiazide (DIOVAN-HCT) 320-12.5 MG per tablet TAKE 1 TABLET BY MOUTH EVERY DAY  Saeid eRyes MD   metFORMIN (GLUCOPHAGE-XR) 500 MG extended release tablet TAKE 4 TABLETS BY MOUTH EVERY DAY WITH BREAKFAST  Saeid Reyes MD   ALPRAZolam (XANAX) 0.5 MG tablet TAKE 1 TABLET BY MOUTH THREE TIMES DAILY  Saeid Reyes MD   clopidogrel (PLAVIX) 75 MG tablet TAKE 1 TABLET BY MOUTH DAILY  Saeid Reyes MD   INSULIN SYRINGE .5CC/29G ([place] INS SYR .5CC/29G) 29G X 1/2\" 0.5 ML MISC 1 each by Does not apply route daily  Saeid Reyes MD   gabapentin (NEURONTIN) 400 MG capsule TAKE 1 CAPSULE BY MOUTH THREE TIMES DAILY  Saeid Reyes MD   carisoprodol (SOMA) 350 MG tablet TAKE 1 TABLET BY MOUTH FOUR TIMES DAILY AS NEEDED  Saeid Reyes MD   triamcinolone (KENALOG) 0.1 % cream Apply topically 2 times daily. Saeid Reyes MD   carvedilol (COREG) 6.25 MG tablet TAKE 1 TABLET BY MOUTH TWICE DAILY WITH MEALS  Saeid Reyes MD   BELBUCA 150 MCG FILM TK 1 FILM BID  Historical Provider, MD   escitalopram (LEXAPRO) 5 MG tablet TAKE 1 TABLET BY MOUTH DAILY  Saeid Reyes MD   fluocinonide (LIDEX) 0.05 % cream Apply topically 2 times daily. Saeid Reyes MD   canagliflozin (INVOKANA) 300 MG TABS tablet TAKE 1 TABLET BY MOUTH EVERY MORNING BEFORE BREAKFAST  Saeid Reyes MD   aspirin 81 MG EC tablet Take 4 tablets by mouth daily  Claudia Luong MD   Emollient (AQUAPHOR ADVANCED THERAPY) OINT Apply three times per day as needed  Saeid Reyes MD   Lancets MISC 1 each by Does not apply route 2 times daily  Saeid Reyes MD   blood glucose monitor kit and supplies Test 2 times a day & as needed for symptoms of irregular blood glucose. Saeid Reyes MD   blood glucose monitor strips Test 2 times a day & as needed for symptoms of irregular blood glucose.   Saeid Reyes MD   entecavir (BARACLUDE) 1 MG tablet Take 1 tablet by

## 2020-04-13 RX ORDER — ESCITALOPRAM OXALATE 5 MG/1
5 TABLET ORAL DAILY
Qty: 90 TABLET | Refills: 0 | Status: SHIPPED | OUTPATIENT
Start: 2020-04-13 | End: 2020-08-26

## 2020-05-04 ENCOUNTER — TELEPHONE (OUTPATIENT)
Dept: FAMILY MEDICINE CLINIC | Age: 65
End: 2020-05-04

## 2020-05-04 DIAGNOSIS — E11.9 TYPE 2 DIABETES MELLITUS WITHOUT COMPLICATION, WITHOUT LONG-TERM CURRENT USE OF INSULIN (HCC): ICD-10-CM

## 2020-05-04 RX ORDER — CANAGLIFLOZIN 300 MG/1
TABLET, FILM COATED ORAL
Qty: 20 TABLET | Refills: 0 | COMMUNITY
Start: 2020-05-04 | End: 2020-06-11 | Stop reason: SDUPTHER

## 2020-06-03 ENCOUNTER — TELEPHONE (OUTPATIENT)
Dept: FAMILY MEDICINE CLINIC | Age: 65
End: 2020-06-03

## 2020-06-03 RX ORDER — CLOPIDOGREL BISULFATE 75 MG/1
75 TABLET ORAL DAILY
Qty: 90 TABLET | Refills: 0 | Status: SHIPPED | OUTPATIENT
Start: 2020-06-03 | End: 2020-09-08

## 2020-06-11 RX ORDER — CANAGLIFLOZIN 300 MG/1
TABLET, FILM COATED ORAL
Qty: 30 TABLET | Refills: 2 | Status: SHIPPED | OUTPATIENT
Start: 2020-06-11 | End: 2021-03-26 | Stop reason: SDUPTHER

## 2020-06-12 RX ORDER — ALPRAZOLAM 0.5 MG/1
TABLET ORAL
Qty: 90 TABLET | Refills: 0 | Status: SHIPPED | OUTPATIENT
Start: 2020-06-12 | End: 2020-07-09

## 2020-06-15 RX ORDER — ALPRAZOLAM 0.5 MG/1
TABLET ORAL
Qty: 90 TABLET | OUTPATIENT
Start: 2020-06-15

## 2020-06-26 ENCOUNTER — HOSPITAL ENCOUNTER (EMERGENCY)
Age: 65
Discharge: HOME | End: 2020-06-26
Payer: MEDICARE

## 2020-06-26 ENCOUNTER — APPOINTMENT (OUTPATIENT)
Dept: CT IMAGING | Age: 65
End: 2020-06-26
Payer: MEDICARE

## 2020-06-26 VITALS
BODY MASS INDEX: 39.26 KG/M2 | RESPIRATION RATE: 13 BRPM | SYSTOLIC BLOOD PRESSURE: 148 MMHG | TEMPERATURE: 98.4 F | WEIGHT: 280.43 LBS | HEART RATE: 95 BPM | HEIGHT: 71 IN | DIASTOLIC BLOOD PRESSURE: 69 MMHG | OXYGEN SATURATION: 100 %

## 2020-06-26 LAB
ANION GAP SERPL CALCULATED.3IONS-SCNC: 15 MMOL/L (ref 3–16)
BASOPHILS ABSOLUTE: 0 K/UL (ref 0–0.2)
BASOPHILS RELATIVE PERCENT: 0.4 %
BUN BLDV-MCNC: 17 MG/DL (ref 7–20)
CALCIUM SERPL-MCNC: 8.9 MG/DL (ref 8.3–10.6)
CHLORIDE BLD-SCNC: 99 MMOL/L (ref 99–110)
CO2: 22 MMOL/L (ref 21–32)
CREAT SERPL-MCNC: 0.8 MG/DL (ref 0.8–1.3)
EOSINOPHILS ABSOLUTE: 0 K/UL (ref 0–0.6)
EOSINOPHILS RELATIVE PERCENT: 0.5 %
GFR AFRICAN AMERICAN: >60
GFR NON-AFRICAN AMERICAN: >60
GLUCOSE BLD-MCNC: 233 MG/DL (ref 70–99)
HCT VFR BLD CALC: 45.1 % (ref 40.5–52.5)
HEMOGLOBIN: 15.4 G/DL (ref 13.5–17.5)
LYMPHOCYTES ABSOLUTE: 1.3 K/UL (ref 1–5.1)
LYMPHOCYTES RELATIVE PERCENT: 16.7 %
MCH RBC QN AUTO: 32.7 PG (ref 26–34)
MCHC RBC AUTO-ENTMCNC: 34.2 G/DL (ref 31–36)
MCV RBC AUTO: 95.6 FL (ref 80–100)
MONOCYTES ABSOLUTE: 0.8 K/UL (ref 0–1.3)
MONOCYTES RELATIVE PERCENT: 10.6 %
NEUTROPHILS ABSOLUTE: 5.5 K/UL (ref 1.7–7.7)
NEUTROPHILS RELATIVE PERCENT: 71.8 %
PDW BLD-RTO: 14.9 % (ref 12.4–15.4)
PLATELET # BLD: 164 K/UL (ref 135–450)
PMV BLD AUTO: 7.8 FL (ref 5–10.5)
POTASSIUM SERPL-SCNC: 4.5 MMOL/L (ref 3.5–5.1)
RBC # BLD: 4.71 M/UL (ref 4.2–5.9)
SODIUM BLD-SCNC: 136 MMOL/L (ref 136–145)
WBC # BLD: 7.6 K/UL (ref 4–11)

## 2020-06-26 PROCEDURE — 80048 BASIC METABOLIC PNL TOTAL CA: CPT

## 2020-06-26 PROCEDURE — 3209999900 CT THORACIC SPINE TRAUMA RECONSTRUCTION

## 2020-06-26 PROCEDURE — 70450 CT HEAD/BRAIN W/O DYE: CPT

## 2020-06-26 PROCEDURE — 99284 EMERGENCY DEPT VISIT MOD MDM: CPT

## 2020-06-26 PROCEDURE — 3209999900 CT LUMBAR SPINE TRAUMA RECONSTRUCTION

## 2020-06-26 PROCEDURE — 6370000000 HC RX 637 (ALT 250 FOR IP): Performed by: PHYSICIAN ASSISTANT

## 2020-06-26 PROCEDURE — 6360000004 HC RX CONTRAST MEDICATION: Performed by: PHYSICIAN ASSISTANT

## 2020-06-26 PROCEDURE — 71260 CT THORAX DX C+: CPT

## 2020-06-26 PROCEDURE — 85025 COMPLETE CBC W/AUTO DIFF WBC: CPT

## 2020-06-26 PROCEDURE — 72125 CT NECK SPINE W/O DYE: CPT

## 2020-06-26 RX ORDER — CYCLOBENZAPRINE HCL 10 MG
10 TABLET ORAL 3 TIMES DAILY PRN
Qty: 20 TABLET | Refills: 0 | Status: SHIPPED | OUTPATIENT
Start: 2020-06-26 | End: 2020-07-06

## 2020-06-26 RX ORDER — OXYCODONE HYDROCHLORIDE AND ACETAMINOPHEN 5; 325 MG/1; MG/1
1 TABLET ORAL ONCE
Status: COMPLETED | OUTPATIENT
Start: 2020-06-26 | End: 2020-06-26

## 2020-06-26 RX ORDER — OXYCODONE HYDROCHLORIDE AND ACETAMINOPHEN 5; 325 MG/1; MG/1
1 TABLET ORAL EVERY 6 HOURS PRN
Qty: 12 TABLET | Refills: 0 | Status: SHIPPED | OUTPATIENT
Start: 2020-06-26 | End: 2020-06-29

## 2020-06-26 RX ORDER — CYCLOBENZAPRINE HCL 10 MG
10 TABLET ORAL ONCE
Status: COMPLETED | OUTPATIENT
Start: 2020-06-26 | End: 2020-06-26

## 2020-06-26 RX ADMIN — CYCLOBENZAPRINE HYDROCHLORIDE 10 MG: 10 TABLET, FILM COATED ORAL at 14:26

## 2020-06-26 RX ADMIN — OXYCODONE HYDROCHLORIDE AND ACETAMINOPHEN 1 TABLET: 5; 325 TABLET ORAL at 14:26

## 2020-06-26 RX ADMIN — IOPAMIDOL 75 ML: 755 INJECTION, SOLUTION INTRAVENOUS at 13:57

## 2020-06-26 ASSESSMENT — PAIN DESCRIPTION - ONSET: ONSET: ON-GOING

## 2020-06-26 ASSESSMENT — PAIN SCALES - GENERAL
PAINLEVEL_OUTOF10: 10
PAINLEVEL_OUTOF10: 9

## 2020-06-26 ASSESSMENT — ENCOUNTER SYMPTOMS
NAUSEA: 0
VOMITING: 0
ABDOMINAL PAIN: 0
BACK PAIN: 1
SHORTNESS OF BREATH: 0
SORE THROAT: 0

## 2020-06-26 ASSESSMENT — PAIN DESCRIPTION - ORIENTATION: ORIENTATION: MID;LOWER

## 2020-06-26 ASSESSMENT — PAIN DESCRIPTION - DESCRIPTORS: DESCRIPTORS: SHARP

## 2020-06-26 ASSESSMENT — PAIN DESCRIPTION - PROGRESSION
CLINICAL_PROGRESSION: GRADUALLY WORSENING
CLINICAL_PROGRESSION: GRADUALLY WORSENING

## 2020-06-26 ASSESSMENT — PAIN DESCRIPTION - FREQUENCY: FREQUENCY: CONTINUOUS

## 2020-06-26 ASSESSMENT — PAIN DESCRIPTION - LOCATION: LOCATION: BACK

## 2020-06-26 ASSESSMENT — PAIN DESCRIPTION - PAIN TYPE: TYPE: ACUTE PAIN

## 2020-06-26 NOTE — ED PROVIDER NOTES
region right mid abdomen, nontender   Musculoskeletal: Normal range of motion. Comments: Patient has mild diffuse tenderness of the left elbow and knee but full range of motion of both without deformity, edema or point bony tenderness. There is midline tenderness of the lumbar spine. Skin:     General: Skin is warm and dry. Comments: Scabbed abrasions left knee, left elbow   Neurological:      Mental Status: He is alert and oriented to person, place, and time. Psychiatric:         Behavior: Behavior normal.            DIAGNOSTIC RESULTS     RADIOLOGY:   Non-plain film images such as CT, Ultrasound and MRI are read by the radiologist.Plain radiographic images are visualized and preliminarily interpreted by Trent Deluca PA-C with the below findings:        Interpretation per the Radiologist below, if available at the time of this note:    CT Head WO Contrast   Final Result   No acute intracranial abnormality. CT Cervical Spine WO Contrast   Final Result   No acute abnormality of the cervical spine. CT CHEST ABDOMEN PELVIS W CONTRAST   Final Result   No acute traumatic injury is identified in the chest, abdomen or pelvis. Prostatomegaly with bladder distention suggesting bladder outlet obstruction. Atherosclerotic disease with coronary artery involvement and postsurgical   changes from prior CABG. Other incidental findings as above. CT THORACIC SPINE TRAUMA RECONSTRUCTION   Final Result   Unremarkable CT of the thoracic spine. CT LUMBAR SPINE TRAUMA RECONSTRUCTION   Final Result   No acute fracture or subluxation of the lumbar spine. L5-S1 moderate to severe right and moderate left foraminal narrowing due to   disc bulge and osseous hypertrophy.              LABS:  Labs Reviewed   BASIC METABOLIC PANEL - Abnormal; Notable for the following components:       Result Value    Glucose 233 (*)     All other components within normal limits    Narrative:

## 2020-06-26 NOTE — ED NOTES
Bed: S-41  Expected date:   Expected time:   Means of arrival:   Comments:  Bed [address] HANSEN, RN  06/26/20 [id number]

## 2020-07-02 RX ORDER — ATORVASTATIN CALCIUM 80 MG/1
80 TABLET, FILM COATED ORAL DAILY
Qty: 90 TABLET | Refills: 0 | Status: SHIPPED | OUTPATIENT
Start: 2020-07-02 | End: 2020-07-30

## 2020-07-09 RX ORDER — ALPRAZOLAM 0.5 MG/1
TABLET ORAL
Qty: 90 TABLET | Refills: 0 | Status: SHIPPED | OUTPATIENT
Start: 2020-07-09 | End: 2020-08-07 | Stop reason: SDUPTHER
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
## 2020-07-10 ENCOUNTER — OFFICE VISIT (OUTPATIENT)
Dept: RHEUMATOLOGY | Age: 65
End: 2020-07-10
Payer: MEDICARE

## 2020-07-10 ENCOUNTER — TELEPHONE (OUTPATIENT)
Dept: FAMILY MEDICINE CLINIC | Age: 65
End: 2020-07-10

## 2020-07-10 ENCOUNTER — TELEPHONE (OUTPATIENT)
Dept: RHEUMATOLOGY | Age: 65
End: 2020-07-10

## 2020-07-10 VITALS
HEART RATE: 97 BPM | BODY MASS INDEX: 39.96 KG/M2 | TEMPERATURE: 97.6 F | HEIGHT: 69 IN | DIASTOLIC BLOOD PRESSURE: 78 MMHG | WEIGHT: 269.8 LBS | SYSTOLIC BLOOD PRESSURE: 136 MMHG

## 2020-07-10 PROCEDURE — 3017F COLORECTAL CA SCREEN DOC REV: CPT | Performed by: INTERNAL MEDICINE

## 2020-07-10 PROCEDURE — 99205 OFFICE O/P NEW HI 60 MIN: CPT | Performed by: INTERNAL MEDICINE

## 2020-07-10 PROCEDURE — G8417 CALC BMI ABV UP PARAM F/U: HCPCS | Performed by: INTERNAL MEDICINE

## 2020-07-10 PROCEDURE — 1036F TOBACCO NON-USER: CPT | Performed by: INTERNAL MEDICINE

## 2020-07-10 PROCEDURE — G8428 CUR MEDS NOT DOCUMENT: HCPCS | Performed by: INTERNAL MEDICINE

## 2020-07-10 RX ORDER — ADALIMUMAB 40MG/0.4ML
40 KIT SUBCUTANEOUS
Qty: 2 EACH | Refills: 5 | Status: SHIPPED | OUTPATIENT
Start: 2020-07-10 | End: 2021-09-30 | Stop reason: ALTCHOICE

## 2020-07-10 RX ORDER — NAPROXEN 500 MG/1
500 TABLET ORAL 2 TIMES DAILY WITH MEALS
Qty: 60 TABLET | Refills: 0 | Status: SHIPPED | OUTPATIENT
Start: 2020-07-10 | End: 2020-08-12

## 2020-07-10 NOTE — LETTER
[facility]  [address] [id number]  Phone: [phone]  Fax: [phone]    Lili Anderson MD        July 10, 2020     Hilario Hinson MD  [address] [place] [address]  Seda Valentine MD  [address] [place] [address]    Patient: Katie Saldaña  MR Number: <[id number]>  YOB: 1955  Date of Visit: 7/10/2020    Dear Dr. Seda Valentine:    Thank you for your referral. Progress note attached in visit summary. If you have questions, please do not hesitate to call me. I look forward to following Eden Ulrich along with you.     Sincerely,        Lili Anderson MD

## 2020-07-10 NOTE — PROGRESS NOTES
When discharging the pt, he mentioned he was being treated for Hepatitis B.  advised that he needs to contact his Gastroenterologist to be cleared to take Humira. Pt called .  he was advised that he was ok to go forward with Tx.

## 2020-07-10 NOTE — TELEPHONE ENCOUNTER
Pt saw a rheumatologist today it was suggested to see a spine specialist can we refer?  This is for his back

## 2020-08-03 ENCOUNTER — TELEPHONE (OUTPATIENT)
Dept: RHEUMATOLOGY | Age: 65
End: 2020-08-03

## 2020-08-03 NOTE — TELEPHONE ENCOUNTER
Patient unable to start Humira due to cost. He would still have to pay $1000 out of pocket monthly and he is not able to afford this.

## 2020-08-04 NOTE — TELEPHONE ENCOUNTER
Elder Yusuf ARE we  able to apply for financial assistance.   Please find out and let the patient know

## 2020-08-05 LAB
C-REACTIVE PROTEIN: 1.6 MG/L (ref 0–5.1)
RHEUMATOID FACTOR: <10 IU/ML
SEDIMENTATION RATE, ERYTHROCYTE: 16 MM/HR (ref 0–20)

## 2020-08-06 LAB
ANTI-NUCLEAR ANTIBODY (ANA): NEGATIVE
CYCLIC CITRULLINATED PEPTIDE ANTIBODY IGG: <0.5 U/ML (ref 0–2.9)
HEPATITIS B CORE IGM ANTIBODY: NORMAL
HEPATITIS B SURFACE ANTIGEN INTERPRETATION: REACTIVE
HEPATITIS C ANTIBODY INTERPRETATION: NORMAL

## 2020-08-07 ENCOUNTER — TELEMEDICINE (OUTPATIENT)
Dept: FAMILY MEDICINE CLINIC | Age: 65
End: 2020-08-07
Payer: MEDICARE

## 2020-08-07 VITALS — BODY MASS INDEX: 36.4 KG/M2 | WEIGHT: 260 LBS | HEIGHT: 71 IN

## 2020-08-07 DIAGNOSIS — E11.59 TYPE 2 DIABETES MELLITUS WITH OTHER CIRCULATORY COMPLICATION, WITH LONG-TERM CURRENT USE OF INSULIN (HCC): ICD-10-CM

## 2020-08-07 DIAGNOSIS — Z79.4 TYPE 2 DIABETES MELLITUS WITH OTHER CIRCULATORY COMPLICATION, WITH LONG-TERM CURRENT USE OF INSULIN (HCC): ICD-10-CM

## 2020-08-07 PROBLEM — L97.512 NON-PRESSURE CHRONIC ULCER OF OTHER PART OF RIGHT FOOT WITH FAT LAYER EXPOSED (HCC): Status: ACTIVE | Noted: 2020-08-07

## 2020-08-07 PROBLEM — S91.201A OPEN WOUND OF RIGHT GREAT TOE WITH DAMAGE TO NAIL: Status: RESOLVED | Noted: 2019-08-12 | Resolved: 2020-08-05

## 2020-08-07 PROBLEM — R97.20 ELEVATED PSA: Status: RESOLVED | Noted: 2019-04-16 | Resolved: 2020-08-07

## 2020-08-07 PROBLEM — L97.512 NON-PRESSURE CHRONIC ULCER OF OTHER PART OF RIGHT FOOT WITH FAT LAYER EXPOSED (HCC): Status: RESOLVED | Noted: 2020-08-07 | Resolved: 2020-08-07

## 2020-08-07 LAB
HEPATITIS B SURFACE ANTIGEN CONFIRMATION: POSITIVE
HLA B27: NEGATIVE

## 2020-08-07 PROCEDURE — G0439 PPPS, SUBSEQ VISIT: HCPCS | Performed by: FAMILY MEDICINE

## 2020-08-07 PROCEDURE — 3017F COLORECTAL CA SCREEN DOC REV: CPT | Performed by: FAMILY MEDICINE

## 2020-08-07 PROCEDURE — 3052F HG A1C>EQUAL 8.0%<EQUAL 9.0%: CPT | Performed by: FAMILY MEDICINE

## 2020-08-07 RX ORDER — ALPRAZOLAM 0.5 MG/1
TABLET ORAL
Qty: 90 TABLET | Refills: 2 | Status: SHIPPED | OUTPATIENT
Start: 2020-08-07 | End: 2020-11-05 | Stop reason: SDUPTHER

## 2020-08-07 ASSESSMENT — LIFESTYLE VARIABLES: HOW OFTEN DO YOU HAVE A DRINK CONTAINING ALCOHOL: 0

## 2020-08-07 ASSESSMENT — PATIENT HEALTH QUESTIONNAIRE - PHQ9
SUM OF ALL RESPONSES TO PHQ QUESTIONS 1-9: 0
SUM OF ALL RESPONSES TO PHQ QUESTIONS 1-9: 0

## 2020-08-07 NOTE — PATIENT INSTRUCTIONS
Personalized Preventive Plan for Haider Oar - 8/7/2020  Medicare offers a range of preventive health benefits. Some of the tests and screenings are paid in full while other may be subject to a deductible, co-insurance, and/or copay. Some of these benefits include a comprehensive review of your medical history including lifestyle, illnesses that may run in your family, and various assessments and screenings as appropriate. After reviewing your medical record and screening and assessments performed today your provider may have ordered immunizations, labs, imaging, and/or referrals for you. A list of these orders (if applicable) as well as your Preventive Care list are included within your After Visit Summary for your review. Other Preventive Recommendations:    · A preventive eye exam performed by an eye specialist is recommended every 1-2 years to screen for glaucoma; cataracts, macular degeneration, and other eye disorders. · A preventive dental visit is recommended every 6 months. · Try to get at least 150 minutes of exercise per week or 10,000 steps per day on a pedometer . · Order or download the FREE \"Exercise & Physical Activity: Your Everyday Guide\" from [employer]. Call 6-194.460.9193 or search [employer] online. · You need 1758-5713 mg of calcium and 5179-8232 IU of vitamin D per day. It is possible to meet your calcium requirement with diet alone, but a vitamin D supplement is usually necessary to meet this goal.  · When exposed to the sun, use a sunscreen that protects against both UVA and UVB radiation with an SPF of 30 or greater. Reapply every 2 to 3 hours or after sweating, drying off with a towel, or swimming. · Always wear a seat belt when traveling in a car. Always wear a helmet when riding a bicycle or motorcycle.

## 2020-08-07 NOTE — PROGRESS NOTES
urinating? No  Musculoskeletal ROS: trouble walking? No   Joint pain? No  Neurological ROS: TIA or stroke symptoms? No   Numbness/tingling? No  Dermatological ROS: rash? No   Changes in skin spots? No  HISTORY:  Patient's medications, allergies, past medical, and social histories were reviewed and updated as appropriate. CHART REVIEW  Health Maintenance   Topic Date Due    Diabetic retinal exam  01/03/1966    Shingles Vaccine (1 of 2) 01/03/2006    Annual Wellness Visit (AWV)  05/29/2019    Colon Cancer Screen FIT/FOBT  08/01/2019    Diabetic microalbuminuria test  04/12/2020    Lipid screen  04/12/2020    Diabetic foot exam  06/13/2020    Flu vaccine (1) 09/01/2020    A1C test (Diabetic or Prediabetic)  03/09/2021    Potassium monitoring  06/26/2021    Creatinine monitoring  06/26/2021    DTaP/Tdap/Td vaccine (2 - Td) 11/16/2027    Hepatitis A vaccine  Completed    Pneumococcal 0-64 years Vaccine  Completed    Hepatitis C screen  Completed    HIV screen  Completed    Hib vaccine  Aged Out    Meningococcal (ACWY) vaccine  Aged Out     The ASCVD Risk score (Kandace Sullivan, et al., 2013) failed to calculate for the following reasons: The patient has a prior MI or stroke diagnosis  Prior to Visit Medications    Medication Sig Taking?  Authorizing Provider   ALPRAZolam (XANAX) 0.5 MG tablet TAKE 1 TABLET BY MOUTH THREE TIMES DAILY Yes Mckenzie Garcia MD   atorvastatin (LIPITOR) 80 MG tablet TAKE 1 TABLET BY MOUTH DAILY Yes Shara Sprague, APRN - CNP   gabapentin (NEURONTIN) 400 MG capsule TAKE 1 CAPSULE BY MOUTH THREE TIMES DAILY Yes Shara Sprague, APRN - CNP   Adalimumab (HUMIRA PEN) 40 MG/0.4ML PNKT Inject 40 mg into the skin every 14 days Yes Graciela Ricardo MD   naproxen (NAPROSYN) 500 MG tablet Take 1 tablet by mouth 2 times daily (with meals) Yes Graciela Ricardo MD   metFORMIN (GLUCOPHAGE-XR) 500 MG extended release tablet TAKE 4 TABLETS BY MOUTH EVERY DAY WITH BREAKFAST Yes Yifan Vidales Taina Duke MD   canagliflozin (INVOKANA) 300 MG TABS tablet TAKE 1 TABLET BY MOUTH EVERY MORNING BEFORE BREAKFAST Yes Indio Ayala MD   clopidogrel (PLAVIX) 75 MG tablet TAKE 1 TABLET BY MOUTH DAILY Yes Indio Ayala MD   escitalopram (LEXAPRO) 5 MG tablet TAKE 1 TABLET BY MOUTH DAILY Yes Indio Ayala MD   insulin glargine (BASAGLAR KWIKPEN) 100 UNIT/ML injection pen Inject 62 Units into the skin nightly Yes Indio Ayala MD   valsartan-hydrochlorothiazide (DIOVAN-HCT) 320-12.5 MG per tablet TAKE 1 TABLET BY MOUTH EVERY DAY Yes Indio Ayala MD   INSULIN SYRINGE .5CC/29G (Kathlyne Erp INS SYR .5CC/29G) 29G X 1/2\" 0.5 ML MISC 1 each by Does not apply route daily Yes Indio Ayala MD   triamcinolone (KENALOG) 0.1 % cream Apply topically 2 times daily. Yes Indio Ayala MD   carvedilol (COREG) 6.25 MG tablet TAKE 1 TABLET BY MOUTH TWICE DAILY WITH MEALS Yes Indio Ayala MD   fluocinonide (LIDEX) 0.05 % cream Apply topically 2 times daily. Yes Indio Ayala MD   aspirin 81 MG EC tablet Take 4 tablets by mouth daily Yes Sandee Parker MD   Emollient (AQUAPHOR ADVANCED THERAPY) OINT Apply three times per day as needed Yes Indio Ayala MD   Lancets MISC 1 each by Does not apply route 2 times daily Yes Indio Ayala MD   blood glucose monitor kit and supplies Test 2 times a day & as needed for symptoms of irregular blood glucose. Yes Indio Ayala MD   blood glucose monitor strips Test 2 times a day & as needed for symptoms of irregular blood glucose. Yes Indio Ayala MD   entecavir (BARACLUDE) 1 MG tablet Take 1 tablet by mouth daily Yes Indio Ayala MD   glucose blood VI test strips (ASCENSIA AUTODISC VI;ONE TOUCH ULTRA TEST VI) strip Apply 1 each topically 3 times daily. Onetouch Ultra 2 test strips  Dx: 250.00 Yes Indio Ayala MD   [facility] LANCETS MISC 1 each by Does not apply route 3 times daily.  Yes Indio Ayala MD      Family History   Problem Relation Age of Onset    Diabetes Mother     Cancer Mother pancreatic    Heart Failure Father         began age 76,  age 78     Social History     Tobacco Use    Smoking status: Never Smoker    Smokeless tobacco: Never Used    Tobacco comment: advised not to start   Substance Use Topics    Alcohol use: Not Currently     Comment: rare    Drug use: No      Immunization History   Administered Date(s) Administered    Hepatitis A Adult ([place]) 08/10/2017, 2019    Influenza Vaccine, unspecified formulation 10/19/2011, 10/15/2012    Influenza, Intradermal, Preservative free 2013    Influenza, Intradermal, Quadrivalent, Preservative Free 2016, 2017    Influenza, Quadv, IM, PF (6 mo and older Fluzone, Flulaval, Fluarix, and 3 yrs and older Afluria) 2019    Influenza, Quadv, Recombinant, IM PF (Flublok 18 yrs and older) 10/03/2018    Pneumococcal Polysaccharide ([id number]) 2008, 10/19/2011    Td, unspecified formulation 2006    Tdap (Boostrix, Adacel) 2017     LAST LABS  Cholesterol, Total   Date Value Ref Range Status   2019 126 0 - 199 mg/dL Final     LDL Calculated   Date Value Ref Range Status   2019 61 <100 mg/dL Final     HDL   Date Value Ref Range Status   2019 39 (L) 40 - 60 mg/dL Final     Triglycerides   Date Value Ref Range Status   2019 130 0 - 150 mg/dL Final     Lab Results   Component Value Date    GLUCOSE 233 (H) 2020     Lab Results   Component Value Date     2020    K 4.5 2020    CREATININE 0.8 2020     Lab Results   Component Value Date    WBC 7.6 2020    HGB 15.4 2020    HCT 45.1 2020    MCV 95.6 2020     2020     Lab Results   Component Value Date    ALT 23 2019    AST 24 2019    ALKPHOS 94 2019    BILITOT <0.2 2019     No results found for: TSH  Lab Results   Component Value Date    LABA1C 8.2 2020      CareTeam (Including outside providers/suppliers regularly involved in providing care):   Patient Care Team:  Kahlil Shelby MD as PCP - General (Family Medicine)  Kahlil Shelby MD as PCP - [facility] Provider  KARYN Abdul CNP as Nurse Practitioner (Certified Nurse Practitioner)  The following problems were reviewed today and where indicated follow up appointments were made and/or referrals ordered. Positive Risk Factor Screenings with Interventions:     General Health:  General  In general, how would you say your health is?: Very Good  In the past 7 days, have you experienced any of the following? New or Increased Pain, New or Increased Fatigue, Loneliness, Social Isolation, Stress or Anger?: None of These  Do you get the social and emotional support that you need?: Yes  Do you have a Living Will?: (!) No  General Health Risk Interventions:  · No Living Will: planning to get on line      Health Habits/Nutrition:  Health Habits/Nutrition  Do you exercise for at least 20 minutes 2-3 times per week?: Yes  Have you lost any weight without trying in the past 3 months?: No  Do you eat fewer than 2 meals per day?: No  Have you seen a dentist within the past year?: (!) No  Body mass index is 36.26 kg/m². Health Habits/Nutrition Interventions:  · Nutritional issues:  limited exercise due to back      Hearing/Vision:  No exam data present  Hearing/Vision  Do you or your family notice any trouble with your hearing?: (!) Yes  Do you have difficulty driving, watching TV, or doing any of your daily activities because of your eyesight?: No  Have you had an eye exam within the past year?: Yes  Hearing/Vision Interventions:  · Hearing concerns:  patient declines any further evaluation/treatment for hearing issues    Current Health Maintenance Status  Recommendations for Preventive Services Due: see orders.   Recommended screening schedule for the next 5-10 years is provided to the patient in written form: see Patient Instructions/AVS.    PHYSICAL EXAM:   Objective:   PHYSICAL EXAM:  Vitals (if available)        Assessment and Plan:      Diagnosis Orders   1. Routine general medical examination at a health care facility     2. Anxiety  ALPRAZolam (XANAX) 0.5 MG tablet   3. Chronic pain syndrome     4. Non-pressure chronic ulcer of other part of right foot with fat layer exposed ([facility] [address]     5. Recurrent major depressive disorder, in partial remission (HCC)     6. Obesity, Class III, BMI 40-49.9 (morbid obesity) ([facility] [address]     7. [facility] active viral hepatitis B [facility]     8. Type 2 diabetes mellitus with other circulatory complication, with long-term current use of insulin (HCC)  Hemoglobin A1C   9. Hypertension, essential     10. Coronary artery disease involving native coronary artery of native heart without angina pectoris     11. Psoriasis     12. Chronic bilateral low back pain with bilateral sciatica     13. Fatty liver     14. S/P CABG x 5     Stable. Plan as above and below. INSTRUCTIONS  NEXT APPOINTMENT: Please schedule check-up in 3 months. · Please get flu vaccine when available in fall. Can get either at this office or at stores such as [employer] and [address]. · Do stool test sent from insurance. I affirm this is a Patient Initiated Episode with an Established Patient who has not had a related appointment within my department in the past 7 days or scheduled within the next 24 hours.     Total Time: minutes: 21-30 minutes    Note: not billable if this call serves to triage the patient into an appointment for the relevant concern    --Ridge Mchugh MD on 8/7/2020 at 1:49 PM    An electronic signature was used to authenticate this note

## 2020-08-08 LAB
ESTIMATED AVERAGE GLUCOSE: 208.7 MG/DL
HBA1C MFR BLD: 8.9 %

## 2020-08-09 LAB
QUANTI TB GOLD PLUS: NEGATIVE
QUANTI TB1 MINUS NIL: 0 IU/ML (ref 0–0.34)
QUANTI TB2 MINUS NIL: 0.02 IU/ML (ref 0–0.34)
QUANTIFERON MITOGEN: 8.47 IU/ML
QUANTIFERON NIL: 0.02 IU/ML

## 2020-08-10 RX ORDER — INSULIN GLARGINE 100 [IU]/ML
80 INJECTION, SOLUTION SUBCUTANEOUS NIGHTLY
Qty: 18 PEN | Refills: 3 | Status: SHIPPED | OUTPATIENT
Start: 2020-08-10 | End: 2021-05-07

## 2020-08-12 ENCOUNTER — VIRTUAL VISIT (OUTPATIENT)
Dept: RHEUMATOLOGY | Age: 65
End: 2020-08-12
Payer: MEDICARE

## 2020-08-12 PROCEDURE — 99214 OFFICE O/P EST MOD 30 MIN: CPT | Performed by: INTERNAL MEDICINE

## 2020-08-12 PROCEDURE — 3017F COLORECTAL CA SCREEN DOC REV: CPT | Performed by: INTERNAL MEDICINE

## 2020-08-12 PROCEDURE — G8428 CUR MEDS NOT DOCUMENT: HCPCS | Performed by: INTERNAL MEDICINE
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
## 2020-08-14 ENCOUNTER — HOSPITAL ENCOUNTER (OUTPATIENT)
Age: 65
Discharge: HOME | End: 2020-08-14
Payer: MEDICARE

## 2020-08-14 ENCOUNTER — HOSPITAL ENCOUNTER (OUTPATIENT)
Dept: GENERAL RADIOLOGY | Age: 65
Discharge: HOME | End: 2020-08-14
Payer: MEDICARE

## 2020-08-14 PROCEDURE — 72200 X-RAY EXAM SI JOINTS: CPT

## 2020-08-14 PROCEDURE — 73630 X-RAY EXAM OF FOOT: CPT

## 2020-08-14 PROCEDURE — 73562 X-RAY EXAM OF KNEE 3: CPT

## 2020-08-14 PROCEDURE — 73130 X-RAY EXAM OF HAND: CPT

## 2020-08-17 NOTE — RESULT ENCOUNTER NOTE
Please call the patient and let him know that his x-ray show changes consistent with degenerative arthritis. There is also involvement of arthritis in the sacroiliac joints consistent with psoriatic arthritis. Continue Humira.

## 2020-08-26 ENCOUNTER — TELEPHONE (OUTPATIENT)
Dept: FAMILY MEDICINE CLINIC | Age: 65
End: 2020-08-26

## 2020-08-26 RX ORDER — IBUPROFEN 200 MG
1 TABLET ORAL DAILY
Qty: 100 EACH | Refills: 3 | Status: SHIPPED | OUTPATIENT
Start: 2020-08-26 | End: 2021-10-06 | Stop reason: SDUPTHER

## 2020-08-26 RX ORDER — ESCITALOPRAM OXALATE 5 MG/1
5 TABLET ORAL DAILY
Qty: 90 TABLET | Refills: 0 | Status: SHIPPED | OUTPATIENT
Start: 2020-08-26 | End: 2020-11-30

## 2020-09-03 RX ORDER — GABAPENTIN 400 MG/1
CAPSULE ORAL
Qty: 90 CAPSULE | Refills: 5 | Status: SHIPPED | OUTPATIENT
Start: 2020-09-03 | End: 2021-03-24

## 2020-09-03 NOTE — TELEPHONE ENCOUNTER
Patient calling would like this refilled today if possible he is completely out   linda   Please advise

## 2020-09-08 ENCOUNTER — TELEPHONE (OUTPATIENT)
Dept: FAMILY MEDICINE CLINIC | Age: 65
End: 2020-09-08

## 2020-09-08 NOTE — TELEPHONE ENCOUNTER
Pt calling because he is out of invokana and states the medication has increased in price and he is unable to afford  It   Calling to see if there are any samples   Please advise

## 2020-09-23 ENCOUNTER — TELEPHONE (OUTPATIENT)
Dept: INTERNAL MEDICINE CLINIC | Age: 65
End: 2020-09-23

## 2020-10-02 ENCOUNTER — TELEPHONE (OUTPATIENT)
Dept: FAMILY MEDICINE CLINIC | Age: 65
End: 2020-10-02

## 2020-10-06 ENCOUNTER — TELEPHONE (OUTPATIENT)
Dept: INTERNAL MEDICINE CLINIC | Age: 65
End: 2020-10-06

## 2020-10-06 ENCOUNTER — TELEPHONE (OUTPATIENT)
Dept: FAMILY MEDICINE CLINIC | Age: 65
End: 2020-10-06

## 2020-10-06 NOTE — TELEPHONE ENCOUNTER
Pt calling to see if he is able to come and  Humira samples   Would like a call if he is able to    Please advise

## 2020-10-15 ENCOUNTER — TELEPHONE (OUTPATIENT)
Dept: RHEUMATOLOGY | Age: 65
End: 2020-10-15

## 2020-10-15 NOTE — TELEPHONE ENCOUNTER
Please call pt and ask him if he received paperwork from [employer]? He should have completed and mailed income information to the address specified that would have been sent to him. Per Betty Liang patient requested assistance with the high copay on their prescription. There are no grants available at  this time so we have recommended the patient apply for assistance through the [facility] Patient  Assistance Program. We have mailed an application to the patient\" from 8/28.     If he did this, we can follow up with [employer]

## 2020-10-27 RX ORDER — METFORMIN HYDROCHLORIDE 500 MG/1
TABLET, EXTENDED RELEASE ORAL
Qty: 360 TABLET | Refills: 1 | Status: SHIPPED | OUTPATIENT
Start: 2020-10-27 | End: 2021-05-19

## 2020-10-27 NOTE — TELEPHONE ENCOUNTER
----- Message from Angela Christine sent at 10/27/2020 12:41 PM EDT -----  Subject: Refill Request    QUESTIONS  Name of Medication? metFORMIN (GLUCOPHAGE-XR) 500 MG extended release   tablet  Patient-reported dosage and instructions? 4 tablets by mouth a day in the   AM  How many days do you have left? 0  Preferred Pharmacy? [facility]  Pharmacy phone number (if available)? [phone]  Additional Information for Provider?   ---------------------------------------------------------------------------  --------------  CALL BACK INFO  What is the best way for the office to contact you? OK to leave message on   voicemail  Preferred Call Back Phone Number?  [phone]

## 2020-10-27 NOTE — PROGRESS NOTES
[address]  time__10/30/2020 0800__________        Surgery time____0900________    Take the following medications with a sip of water:    Do not eat or drink anything after 12:00 midnight prior to your surgery. This includes water chewing gum, mints and ice chips. You may brush your teeth and gargle the morning of your surgery, but do not swallow the water     Please see your family doctor/pediatrician for a history and physical and/or concerning medications. Bring any test results/reports from your physicians office. If you are under the care of a heart doctor or specialist doctor, please be aware that you may be asked to them for clearance    You may be asked to stop blood thinners such as Coumadin, Plavix, Fragmin, Lovenox, etc., or any anti-inflammatories such as:  Aspirin, Ibuprofen, Advil, Naproxen prior to your surgery. We also ask that you stop any OTC medications such as fish oil, vitamin E, glucosamine, garlic, Multivitamins, COQ 10, etc.    We ask that you do not smoke 24 hours prior to surgery  We ask that you do not  drink any alcoholic beverages 24 hours prior to surgery     You must make arrangements for a responsible adult to take you home after your surgery. For your safety you will not be allowed to leave alone or drive yourself home. Your surgery will be cancelled if you do not have a ride home. Also for your safety, it is strongly suggested that someone stay with you the first 24 hours after your surgery. A parent or legal guardian must accompany a child scheduled for surgery and plan to stay at the hospital until the child is discharged. Please do not bring other children with you. For your comfort, please wear simple loose fitting clothing to the hospital.  Please do not bring valuables.     Do not wear any make-up or nail polish on your fingers or toes      For your safety, please do not wear any jewelry or body piercing's on the day of surgery. All jewelry must be removed. If you have dentures, they will be removed before going to operating room. For your convenience, we will provide you with a container. If you wear contact lenses or glasses, they will be removed, please bring a case for them. If you have a living will and a durable power of  for healthcare, please bring in a copy. As part of our patient safety program to minimize surgical site infections, we ask you to do the following:    · Please notify your surgeon if you develop any illness between         now and the  day of your surgery. · This includes a cough, cold, fever, sore throat, nausea,         or vomiting, and diarrhea, etc.  ·  Please notify your surgeon if you experience dizziness, shortness         of breath or blurred vision between now and the time of your surgery. Do not shave your operative site 96 hours prior to surgery. For face and neck surgery, men may use an electric razor 48 hours   prior to surgery. You may shower the night before surgery or the morning of   your surgery with an antibacterial soap. You will need to bring a photo ID and insurance card    [facility] has an onsite pharmacy, would you like to utilize our pharmacy     If you will be staying overnight and use a C-pap machine, please bring   your C-pap to hospital     Our goal is to provide you with excellent care, therefore, visitors will be limited to two(2) in the room at a time so that we may focus on providing this care for you. Please contact pre-admission testing if you have any further questions. [facility] phone number:  [phone]  Please note these are generalized instructions for all surgical cases, you may be provided with more specific instructions according to your surgery.

## 2020-10-30 ENCOUNTER — APPOINTMENT (OUTPATIENT)
Dept: INTERVENTIONAL RADIOLOGY/VASCULAR | Age: 65
End: 2020-10-30
Attending: ANESTHESIOLOGY
Payer: MEDICARE

## 2020-10-30 ENCOUNTER — HOSPITAL ENCOUNTER (OUTPATIENT)
Age: 65
Setting detail: OUTPATIENT SURGERY
Discharge: HOME | End: 2020-10-30
Attending: ANESTHESIOLOGY | Admitting: ANESTHESIOLOGY
Payer: MEDICARE

## 2020-10-30 VITALS
SYSTOLIC BLOOD PRESSURE: 113 MMHG | HEART RATE: 76 BPM | WEIGHT: 283 LBS | RESPIRATION RATE: 16 BRPM | TEMPERATURE: 96.9 F | DIASTOLIC BLOOD PRESSURE: 68 MMHG | BODY MASS INDEX: 39.62 KG/M2 | HEIGHT: 71 IN | OXYGEN SATURATION: 99 %

## 2020-10-30 PROCEDURE — 2709999900 HC NON-CHARGEABLE SUPPLY: Performed by: ANESTHESIOLOGY

## 2020-10-30 PROCEDURE — 3610000054 HC PAIN LEVEL 3 BASE (NON-OR): Performed by: ANESTHESIOLOGY

## 2020-10-30 PROCEDURE — 2500000003 HC RX 250 WO HCPCS: Performed by: ANESTHESIOLOGY

## 2020-10-30 RX ORDER — BUPIVACAINE HYDROCHLORIDE 5 MG/ML
INJECTION, SOLUTION EPIDURAL; INTRACAUDAL
Status: COMPLETED | OUTPATIENT
Start: 2020-10-30 | End: 2020-10-30

## 2020-10-30 RX ORDER — LIDOCAINE HYDROCHLORIDE 10 MG/ML
INJECTION, SOLUTION EPIDURAL; INFILTRATION; INTRACAUDAL; PERINEURAL
Status: COMPLETED | OUTPATIENT
Start: 2020-10-30 | End: 2020-10-30

## 2020-10-30 ASSESSMENT — PAIN DESCRIPTION - ORIENTATION
ORIENTATION: RIGHT;LEFT
ORIENTATION: RIGHT;LEFT

## 2020-10-30 ASSESSMENT — PAIN DESCRIPTION - PROGRESSION
CLINICAL_PROGRESSION: NOT CHANGED
CLINICAL_PROGRESSION: GRADUALLY IMPROVING

## 2020-10-30 ASSESSMENT — PAIN - FUNCTIONAL ASSESSMENT
PAIN_FUNCTIONAL_ASSESSMENT: 0-10
PAIN_FUNCTIONAL_ASSESSMENT: PREVENTS OR INTERFERES WITH MANY ACTIVE NOT PASSIVE ACTIVITIES
PAIN_FUNCTIONAL_ASSESSMENT: PREVENTS OR INTERFERES SOME ACTIVE ACTIVITIES AND ADLS
PAIN_FUNCTIONAL_ASSESSMENT: PREVENTS OR INTERFERES SOME ACTIVE ACTIVITIES AND ADLS

## 2020-10-30 ASSESSMENT — PAIN DESCRIPTION - DESCRIPTORS
DESCRIPTORS: CONSTANT;DISCOMFORT
DESCRIPTORS: CONSTANT;STABBING
DESCRIPTORS: DISCOMFORT;CONSTANT

## 2020-10-30 ASSESSMENT — PAIN DESCRIPTION - LOCATION
LOCATION: BACK;SHOULDER
LOCATION: BACK

## 2020-10-30 ASSESSMENT — PAIN SCALES - GENERAL
PAINLEVEL_OUTOF10: 3
PAINLEVEL_OUTOF10: 3

## 2020-10-30 ASSESSMENT — PAIN DESCRIPTION - FREQUENCY
FREQUENCY: CONTINUOUS
FREQUENCY: CONTINUOUS

## 2020-10-30 ASSESSMENT — PAIN DESCRIPTION - PAIN TYPE: TYPE: CHRONIC PAIN
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
## 2020-10-30 NOTE — OP NOTE
Patient:  Melissa Zuniga  YOB: 1955  Medical Record #:  [id number]   Place: [address] [facility]  Date:  10/30/2020   Physician:  Heladio Fajardo MD    PRE-PROCEDURE DIAGNOSIS: M47.816, M47.817        POST-PROCEDURE DIAGNOSIS:  M47.816, M47.817    PROCEDURE:  Bilateral 2 level medial branch block of the L4-5 and L5-S1 facets with fluoroscopy. BRIEF HISTORY: The patient presents today to the [facility] for a scheduled lumbar medial branch facet block procedure. The patient was re-evaluated today, and is clinically unchanged as compared to my previous evaluation. The patient is clinically stable to proceed with todays procedure. The previously distributed literature was reviewed with the patient today. The options, rationale and benefits of the procedure including that of being purely a diagnostic block, as well as the risks of the procedure including but not limited to infection, bleeding, paresthesia, pain, failure to relieve pain, neurologic sequelae, allergic reaction were also discussed with the patient. Informed written consent was obtained from the patient. PROCEDURE NOTE:  The patient was brought to the fluoroscopy suite and placed in the prone position. The lumbosacral area was prepped with Chloraprep and draped in the usual sterile fashion. AP fluoroscopy was obtained. Initially the target branch for the right L5 posterior primary ramus was identified at the junction between the right sacral ala medially superiorly, with the junction of the inferolateral aspect of the superior articulating facet of S1 on the sacrum. Then a 22 gauge 5 inch spinal needle was slowly inserted parallel to the x-ray beam towards this target end point of the right L5 posterior primary ramus until periosteum was contacted. Orthogonal fluoroscopic views confirmed proper anatomic needle tip placement at the medial branch of the posterior primary ramus of L5.   Negative took >75% more than a typical procedure secondary to BMI 40 (E66.0), making visualization and needle placement more difficult.       Office: [phone]

## 2020-11-05 RX ORDER — ALPRAZOLAM 0.5 MG/1
TABLET ORAL
Qty: 90 TABLET | Refills: 2 | Status: SHIPPED | OUTPATIENT
Start: 2020-11-05 | End: 2021-02-01 | Stop reason: SDUPTHER

## 2020-11-06 ENCOUNTER — TELEPHONE (OUTPATIENT)
Dept: FAMILY MEDICINE CLINIC | Age: 65
End: 2020-11-06

## 2020-11-06 RX ORDER — VALSARTAN AND HYDROCHLOROTHIAZIDE 320; 12.5 MG/1; MG/1
TABLET, FILM COATED ORAL
Qty: 90 TABLET | Refills: 1 | Status: SHIPPED | OUTPATIENT
Start: 2020-11-06 | End: 2021-04-27

## 2020-11-06 RX ORDER — VALSARTAN AND HYDROCHLOROTHIAZIDE 320; 12.5 MG/1; MG/1
TABLET, FILM COATED ORAL
Qty: 90 TABLET | Refills: 1 | OUTPATIENT
Start: 2020-11-06

## 2020-11-06 NOTE — TELEPHONE ENCOUNTER
----- Message from Jaxsonpurvi Vannmicheal sent at 11/6/2020  9:27 AM EST -----  Subject: Refill Request    QUESTIONS  Name of Medication? valsartan-hydrochlorothiazide (DIOVAN-HCT) 320-12.5 MG   per tablet  Patient-reported dosage and instructions? 320-12.5 MG - once daily  How many days do you have left? 2  Preferred Pharmacy? [facility]  Pharmacy phone number (if available)? [phone]  Additional Information for Provider? N/A  ---------------------------------------------------------------------------  --------------  CALL BACK INFO  What is the best way for the office to contact you? OK to leave message on   voicemail  Preferred Call Back Phone Number?  [phone]

## 2020-11-30 RX ORDER — ESCITALOPRAM OXALATE 5 MG/1
5 TABLET ORAL DAILY
Qty: 90 TABLET | Refills: 0 | Status: SHIPPED | OUTPATIENT
Start: 2020-11-30 | End: 2021-03-08

## 2020-12-03 ENCOUNTER — TELEPHONE (OUTPATIENT)
Dept: FAMILY MEDICINE CLINIC | Age: 65
End: 2020-12-03

## 2020-12-14 ENCOUNTER — TELEPHONE (OUTPATIENT)
Dept: FAMILY MEDICINE CLINIC | Age: 65
End: 2020-12-14

## 2020-12-14 NOTE — TELEPHONE ENCOUNTER
PT CALLED IN HIS  BACK DR TESTED POSITIVE FOR COVID   PT WAS WEARING A MASK   PT IS NOT HAVING ANY SYMPTOMS   PT HAS HAD SLIGHT DIARRHEA       PLEASE ADVISE

## 2020-12-14 NOTE — TELEPHONE ENCOUNTER
Where did he get positive test?  Why did he get tested? Diarrhea would be considered a symptom  He should quarantine for 10 days from first day he had stomach upset. .   If he develops worsening sx let us know  Thank you

## 2020-12-15 NOTE — TELEPHONE ENCOUNTER
So sorry, I misunderstood the original message. He does not need to get tested unless he starts having symptoms. Thank you so much for clarifying!

## 2020-12-15 NOTE — TELEPHONE ENCOUNTER
WHEN THE PT CALLED HE STATED HE WAS EXPOSED ON 12/4/20 HIS BACK DR IS POSITIVE FOR COVID AND HE WAS WEARING A MASK THE PT WANTED TO KNOW SHOULD HE GET TESTED FOR COVID?

## 2020-12-15 NOTE — TELEPHONE ENCOUNTER
Per previous phone note, yes, he is aware    [place]         12/14/20 3:30 PM  Note     PT CALLED IN HIS  BACK DR TESTED POSITIVE FOR COVID   PT WAS WEARING A MASK   PT IS NOT HAVING ANY SYMPTOMS   PT HAS HAD SLIGHT DIARRHEA         PLEASE ADVISE

## 2020-12-18 ENCOUNTER — VIRTUAL VISIT (OUTPATIENT)
Dept: FAMILY MEDICINE CLINIC | Age: 65
End: 2020-12-18
Payer: MEDICARE

## 2020-12-18 PROCEDURE — 2022F DILAT RTA XM EVC RTNOPTHY: CPT | Performed by: FAMILY MEDICINE

## 2020-12-18 PROCEDURE — 99213 OFFICE O/P EST LOW 20 MIN: CPT | Performed by: FAMILY MEDICINE

## 2020-12-18 PROCEDURE — G8427 DOCREV CUR MEDS BY ELIG CLIN: HCPCS | Performed by: FAMILY MEDICINE

## 2020-12-18 PROCEDURE — 3017F COLORECTAL CA SCREEN DOC REV: CPT | Performed by: FAMILY MEDICINE

## 2020-12-18 PROCEDURE — 3052F HG A1C>EQUAL 8.0%<EQUAL 9.0%: CPT | Performed by: FAMILY MEDICINE

## 2020-12-18 RX ORDER — CLOPIDOGREL BISULFATE 75 MG/1
75 TABLET ORAL DAILY
Qty: 90 TABLET | Refills: 3 | Status: SHIPPED | OUTPATIENT
Start: 2020-12-18 | End: 2021-10-01 | Stop reason: ALTCHOICE

## 2020-12-18 NOTE — PROGRESS NOTES
TELEHEALTH EVALUATION -- Audio/Visual (During ECZNL-30 public health emergency)  VIDEO VISIT- patient and provider not at same location  Also present:. DIABETES MELLITUS FOLOW-UP  Subjective:      Chief Complaint   Patient presents with    Diabetes     Shiraz Choe is an 60 y.o. male who presents for follow up of following chronic problems:  1. Type 2 diabetes mellitus with diabetic peripheral angiopathy without gangrene, with long-term current use of insulin (HCC)    2. Elevated liver enzymes    3. Colon cancer screening    4. Hypertension, essential    5. Coronary artery disease involving native coronary artery of native heart without angina pectoris    6. S/P CABG x 5      Complaints: pt is doing well no new issues     Has lumbar nerve ablation 2 weeks ago. No change yet. Has watched diet laots 6-8 lbs over last couple months    · Patient checks sugars 1  time(s) daily. Average: . · Any low sugars? No  · Patent follows diabetic diet? Yes  · Exercise: walking intermittently  · Taking medicines daily as directed? Yes  · Is the patient reporting any side effects of medications? No  · Patient checks bottom of feet daily? No  · Tobacco history updated:  reports that he has never smoked. He has never used smokeless tobacco.      Review of Systems   General ROS: fever? No,   Night sweats? No  Ophthalmic ROS: change in vision? No  Endocrine ROS: fatigue? No  Unexpected weight changes? No  Respiratory ROS: Shortness of breath? No  Cardiovascular ROS: chest pain? No  Gastrointestinal ROS: abdominal pain? No  Change in stools? No  Genito-Urinary ROS: painful urination? No  Trouble urinating? No  Neurological ROS: TIA or stroke symptoms? No  Numbness/tingling? No  Dermatological ROS: rash or sores on feet? No  Changes in skin spots?     No    Health Maintenance Due   Topic Date Due    Diabetic retinal exam  01/03/1966    Shingles Vaccine (1 of 2) 01/03/2006 INSULIN SYRINGE .5CC/29G ([employer] INS SYR .5CC/29G) 29G X 1/2\" 0.5 ML MISC 1 each by Does not apply route daily Yes Asuncion Cobb MD   insulin glargine (BASAGLAR KWIKPEN) 100 UNIT/ML injection pen Inject 80 Units into the skin nightly NEW DOSAGE Yes Asuncion Cobb MD   atorvastatin (LIPITOR) 80 MG tablet TAKE 1 TABLET BY MOUTH DAILY Yes KARYN Griffith CNP   Adalimumab (HUMIRA PEN) 40 MG/0.4ML PNKT Inject 40 mg into the skin every 14 days Yes Boubacar Izquierdo MD   canagliflozin (INVOKANA) 300 MG TABS tablet TAKE 1 TABLET BY MOUTH EVERY MORNING BEFORE BREAKFAST Yes Asuncion Cobb MD   triamcinolone (KENALOG) 0.1 % cream Apply topically 2 times daily. Yes Asuncion Cobb MD   fluocinonide (LIDEX) 0.05 % cream Apply topically 2 times daily. Yes Asuncion Cobb MD   aspirin 81 MG EC tablet Take 4 tablets by mouth daily Yes Mathew Gary MD   Emollient (AQUAPHOR ADVANCED THERAPY) OINT Apply three times per day as needed Yes Asuncion Cobb MD   Lancets MISC 1 each by Does not apply route 2 times daily Yes Asuncion Cobb MD   blood glucose monitor kit and supplies Test 2 times a day & as needed for symptoms of irregular blood glucose. Yes Asuncion Cobb MD   blood glucose monitor strips Test 2 times a day & as needed for symptoms of irregular blood glucose. Yes Asuncion Cobb MD   entecavir (BARACLUDE) 1 MG tablet Take 1 tablet by mouth daily Yes Asuncion Cobb MD   glucose blood VI test strips (ASCENSIA AUTODISC VI;ONE TOUCH ULTRA TEST VI) strip Apply 1 each topically 3 times daily. Onetouch Ultra 2 test strips  Dx: 250.00 Yes Asuncion Cobb MD   [facility] LANCETS MISC 1 each by Does not apply route 3 times daily.  Yes Asuncion Cobb MD      Family History   Problem Relation Age of Onset    Diabetes Mother     Cancer Mother         pancreatic    Heart Failure Father         began age 76,  age 78     Social History     Tobacco Use    Smoking status: Never Smoker    Smokeless tobacco: Never Used  Tobacco comment: advised not to start   Substance Use Topics    Alcohol use: Not Currently     Comment: rare    Drug use: No      LAST LABS  Cholesterol, Total   Date Value Ref Range Status   04/12/2019 126 0 - 199 mg/dL Final     LDL Calculated   Date Value Ref Range Status   04/12/2019 61 <100 mg/dL Final     HDL   Date Value Ref Range Status   04/12/2019 39 (L) 40 - 60 mg/dL Final     Triglycerides   Date Value Ref Range Status   04/12/2019 130 0 - 150 mg/dL Final     Lab Results   Component Value Date    GLUCOSE 233 (H) 06/26/2020     Lab Results   Component Value Date     06/26/2020    K 4.5 06/26/2020    CREATININE 0.8 06/26/2020     Lab Results   Component Value Date    WBC 7.6 06/26/2020    HGB 15.4 06/26/2020    HCT 45.1 06/26/2020    MCV 95.6 06/26/2020     06/26/2020     Lab Results   Component Value Date    ALT 23 09/30/2019    AST 24 09/30/2019    ALKPHOS 94 09/30/2019    BILITOT <0.2 09/30/2019     No results found for: TSH  Lab Results   Component Value Date    LABA1C 8.9 08/05/2020      Objective:   PHYSICAL EXAM:  There were no vitals taken for this visit. BP Readings from Last 5 Encounters:   10/30/20 113/68   07/10/20 136/78   06/26/20 (!) 148/69   03/09/20 122/84   03/04/20 130/80     Wt Readings from Last 5 Encounters:   10/30/20 283 lb (128.4 kg)   08/07/20 260 lb (117.9 kg)   07/10/20 269 lb 12.8 oz (122.4 kg)   06/26/20 280 lb 6.8 oz (127.2 kg)   03/09/20 272 lb (123.4 kg)      GENERAL:   · well-developed, well-nourished, alert, no distress. EYES:   · External findings: lids and lashes normal and conjunctivae and sclerae normal  · Eyes: no periorbital cellulitis.   HENT:   · Normocephalic, atraumatic  · External nose and ears appear normal  · Mucous membranes are moist  · Hearing grossly normal.     NECK: No visible masses  LUNGS:    · Respiratory effort normal.  · No visualized signs of difficulty breathing or respiratory distress  SKIN: warm and dry · No significant exanthematous lesions or discoloration noted on facial skin  PSYCH:    · Alert and oriented, able to follow commands  · Normal reasoning, insight good  · Normal affect  · No memory disturbance noted  NEURO:  ? No Facial Asymmetry (Cranial nerve 7 motor function) (limited exam to video visit)     ? No gaze palsy      Assessment and Plan:      Diagnosis Orders   1. Type 2 diabetes mellitus with diabetic peripheral angiopathy without gangrene, with long-term current use of insulin (HCC)  HEMOGLOBIN A1C   2. Elevated liver enzymes  LIPID PANEL   3. Colon cancer screening     4. Hypertension, essential     5. Coronary artery disease involving native coronary artery of native heart without angina pectoris  clopidogrel (PLAVIX) 75 MG tablet   6. S/P CABG x 5  clopidogrel (PLAVIX) 75 MG tablet   Plan below. INSTRUCTIONS  · NEXT APPOINTMENT: Please schedule check-up in 3 months. Please get annual dilated eye exam to screen for diabetic retinopathy which can lead to vision loss. Ask for report to be faxed to [phone]. PLEASE GET FASTING BLOODWORK DRAWN SOON. Lab is on first floor in [address].  Hours Monday to Friday 7 AM to 5 PM. Virtual Visit (video visit) encounter employed to address concerns as mentioned above. A caregiver was present when appropriate. Due to this being a TeleHealth encounter (During DRFMJ-13 public health emergency), evaluation of the following organ systems was limited. Pursuant to the emergency declaration under the [facility], [address] [place] [address] and the [email or web address] and [employer] Act, this Virtual Visit was conducted with patient's (and/or legal guardian's) consent, to reduce the patient's risk of exposure to COVID-19 and provide necessary medical care. The patient (and/or legal guardian) has also been advised to contact this office for worsening conditions or problems, and seek emergency medical treatment and/or call 911 if deemed necessary. Services were provided through a video synchronous discussion virtually to substitute for in-person clinic visit. Patient and provider were located at their individual homes. minutes: 11-20 minutes were spent on the digital evaluation and management of this patient. --Jj Ambrocio MD on 12/18/2020 at 11:24 AM    An electronic signature was used to authenticate this note.

## 2021-01-12 ENCOUNTER — TELEPHONE (OUTPATIENT)
Dept: FAMILY MEDICINE CLINIC | Age: 66
End: 2021-01-12

## 2021-01-18 RX ORDER — TRIAMCINOLONE ACETONIDE 1 MG/G
CREAM TOPICAL
Qty: 453.6 G | Refills: 3 | Status: SHIPPED | OUTPATIENT
Start: 2021-01-18 | End: 2022-08-15 | Stop reason: SDUPTHER

## 2021-01-18 NOTE — TELEPHONE ENCOUNTER
Pt. called in to refill his triamcinolone medication however he asked if he could get that in a larger size.     Please Advise

## 2021-02-12 ENCOUNTER — NURSE TRIAGE (OUTPATIENT)
Dept: OTHER | Facility: CLINIC | Age: 66
End: 2021-02-12

## 2021-02-12 NOTE — TELEPHONE ENCOUNTER
Patient called [facility])  with red flag complaint. Brief description of triage: Pt reports having sweats, productive cough, headache, and sob when laying flat x4-5 days. Triage indicates for patient to have virtual appt with PCP today. Care advice provided, patient verbalizes understanding; denies any other questions or concerns; instructed to call back for any new or worsening symptoms. Writer provided warm transfer to [facility] MARYANA LEVI at [facility] for appointment scheduling. Attention Provider: Thank you for allowing me to participate in the care of your patient. The patient was connected to triage in response to information provided to the [facility]. Please do not respond through this encounter as the response is not directed to a shared pool. Reason for Disposition   Patient wants to be seen    Answer Assessment - Initial Assessment Questions  1. ONSET: \"When did the cough begin? \"       4-5 days. 2. SEVERITY: \"How bad is the cough today? \"       Worse when laying down. 3. RESPIRATORY DISTRESS: \"Describe your breathing. \"       Sob d/t coughing    4. FEVER: \"Do you have a fever? \" If so, ask: \"What is your temperature, how was it measured, and when did it start? \"      Sweats, no fever    5. HEMOPTYSIS: \"Are you coughing up any blood? \" If so ask: \"How much? \" (flecks, streaks, tablespoons, etc.)      Clear    6. TREATMENT: \"What have you done so far to treat the cough? \" (e.g., meds, fluids, humidifier)      Mucinex    7. CARDIAC HISTORY: \"Do you have any history of heart disease? \" (e.g., heart attack, congestive heart failure)       HTN    8. LUNG HISTORY: \"Do you have any history of lung disease? \"  (e.g., pulmonary embolus, asthma, emphysema)      Denies    9. PE RISK FACTORS: \"Do you have a history of blood clots? \" (or: recent major surgery, recent prolonged travel, bedridden)      Denies    10.  OTHER SYMPTOMS: \"Do you have any other symptoms? (e.g., runny nose, wheezing, chest pain)        Runny nose     11. PREGNANCY: \"Is there any chance you are pregnant? \" \"When was your last menstrual period? \"        N/A    12. TRAVEL: \"Have you traveled out of the country in the last month? \" (e.g., travel history, exposures)        No travel    Protocols used: COUGH-ADULT-[place]

## 2021-02-12 NOTE — TELEPHONE ENCOUNTER
Patient transferred from call center. Sweating happened yesterday. Off an on  Sleeping last night he was having trouble breathing when he layed down. When patient sits up no sob only laying down. No fever, a slight cough. Symptoms started yesterday. Patient wife is in hospital for pneumonia so not sure if related to pneumonia or not.      Please advise

## 2021-02-15 ENCOUNTER — HOSPITAL ENCOUNTER (OUTPATIENT)
Dept: WOUND CARE | Age: 66
Discharge: HOME | End: 2021-02-15
Payer: MEDICARE

## 2021-02-15 VITALS
HEART RATE: 80 BPM | BODY MASS INDEX: 39.04 KG/M2 | SYSTOLIC BLOOD PRESSURE: 147 MMHG | DIASTOLIC BLOOD PRESSURE: 90 MMHG | HEIGHT: 71 IN | TEMPERATURE: 96.8 F | RESPIRATION RATE: 18 BRPM | WEIGHT: 278.88 LBS

## 2021-02-15 DIAGNOSIS — Z79.4 TYPE 2 DIABETES MELLITUS WITH DIABETIC PERIPHERAL ANGIOPATHY WITHOUT GANGRENE, WITH LONG-TERM CURRENT USE OF INSULIN (HCC): Primary | ICD-10-CM

## 2021-02-15 DIAGNOSIS — L97.511 DIABETIC ULCER OF TOE OF RIGHT FOOT ASSOCIATED WITH TYPE 2 DIABETES MELLITUS, LIMITED TO BREAKDOWN OF SKIN (HCC): ICD-10-CM

## 2021-02-15 DIAGNOSIS — E11.621 DIABETIC ULCER OF TOE OF RIGHT FOOT ASSOCIATED WITH TYPE 2 DIABETES MELLITUS, LIMITED TO BREAKDOWN OF SKIN (HCC): ICD-10-CM

## 2021-02-15 DIAGNOSIS — E11.51 TYPE 2 DIABETES MELLITUS WITH DIABETIC PERIPHERAL ANGIOPATHY WITHOUT GANGRENE, WITH LONG-TERM CURRENT USE OF INSULIN (HCC): Primary | ICD-10-CM

## 2021-02-15 PROCEDURE — 11042 DBRDMT SUBQ TIS 1ST 20SQCM/<: CPT

## 2021-02-15 PROCEDURE — 99203 OFFICE O/P NEW LOW 30 MIN: CPT | Performed by: STUDENT IN AN ORGANIZED HEALTH CARE EDUCATION/TRAINING PROGRAM

## 2021-02-15 PROCEDURE — 99213 OFFICE O/P EST LOW 20 MIN: CPT

## 2021-02-15 PROCEDURE — 11042 DBRDMT SUBQ TIS 1ST 20SQCM/<: CPT | Performed by: STUDENT IN AN ORGANIZED HEALTH CARE EDUCATION/TRAINING PROGRAM

## 2021-02-15 RX ORDER — LIDOCAINE HYDROCHLORIDE 20 MG/ML
JELLY TOPICAL ONCE
Status: CANCELLED | OUTPATIENT
Start: 2021-02-15 | End: 2021-02-15

## 2021-02-15 RX ORDER — BACITRACIN ZINC AND POLYMYXIN B SULFATE 500; 1000 [USP'U]/G; [USP'U]/G
OINTMENT TOPICAL ONCE
Status: CANCELLED | OUTPATIENT
Start: 2021-02-15 | End: 2021-02-15

## 2021-02-15 RX ORDER — BACITRACIN, NEOMYCIN, POLYMYXIN B 400; 3.5; 5 [USP'U]/G; MG/G; [USP'U]/G
OINTMENT TOPICAL ONCE
Status: CANCELLED | OUTPATIENT
Start: 2021-02-15 | End: 2021-02-15

## 2021-02-15 RX ORDER — BETAMETHASONE DIPROPIONATE 0.05 %
OINTMENT (GRAM) TOPICAL ONCE
Status: CANCELLED | OUTPATIENT
Start: 2021-02-15 | End: 2021-02-15

## 2021-02-15 RX ORDER — GINSENG 100 MG
CAPSULE ORAL ONCE
Status: CANCELLED | OUTPATIENT
Start: 2021-02-15 | End: 2021-02-15

## 2021-02-15 RX ORDER — CLOBETASOL PROPIONATE 0.5 MG/G
OINTMENT TOPICAL ONCE
Status: CANCELLED | OUTPATIENT
Start: 2021-02-15 | End: 2021-02-15

## 2021-02-15 RX ORDER — LIDOCAINE HYDROCHLORIDE 40 MG/ML
SOLUTION TOPICAL ONCE
Status: CANCELLED | OUTPATIENT
Start: 2021-02-15 | End: 2021-02-15

## 2021-02-15 RX ORDER — GENTAMICIN SULFATE 1 MG/G
OINTMENT TOPICAL ONCE
Status: CANCELLED | OUTPATIENT
Start: 2021-02-15 | End: 2021-02-15

## 2021-02-15 RX ORDER — LIDOCAINE 50 MG/G
OINTMENT TOPICAL ONCE
Status: COMPLETED | OUTPATIENT
Start: 2021-02-15 | End: 2021-02-15

## 2021-02-15 RX ORDER — LIDOCAINE 40 MG/G
CREAM TOPICAL ONCE
Status: CANCELLED | OUTPATIENT
Start: 2021-02-15 | End: 2021-02-15

## 2021-02-15 RX ORDER — LIDOCAINE 50 MG/G
OINTMENT TOPICAL ONCE
Status: CANCELLED | OUTPATIENT
Start: 2021-02-15 | End: 2021-02-15

## 2021-02-15 RX ADMIN — LIDOCAINE 0.5 CM: 50 OINTMENT TOPICAL at 10:47

## 2021-02-15 ASSESSMENT — PAIN SCALES - GENERAL: PAINLEVEL_OUTOF10: 0

## 2021-02-15 ASSESSMENT — ENCOUNTER SYMPTOMS
ABDOMINAL PAIN: 0
SHORTNESS OF BREATH: 0
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
## 2021-02-16 ENCOUNTER — TELEPHONE (OUTPATIENT)
Dept: FAMILY MEDICINE CLINIC | Age: 66
End: 2021-02-16

## 2021-02-25 ENCOUNTER — HOSPITAL ENCOUNTER (OUTPATIENT)
Dept: WOUND CARE | Age: 66
Discharge: HOME | End: 2021-02-25
Payer: MEDICARE

## 2021-02-25 VITALS
RESPIRATION RATE: 18 BRPM | DIASTOLIC BLOOD PRESSURE: 98 MMHG | SYSTOLIC BLOOD PRESSURE: 170 MMHG | TEMPERATURE: 97.1 F | HEART RATE: 72 BPM

## 2021-02-25 PROCEDURE — 99211 OFF/OP EST MAY X REQ PHY/QHP: CPT

## 2021-02-25 ASSESSMENT — ENCOUNTER SYMPTOMS
SHORTNESS OF BREATH: 0
ABDOMINAL PAIN: 0

## 2021-02-25 ASSESSMENT — PAIN SCALES - GENERAL: PAINLEVEL_OUTOF10: 0
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
## 2021-03-02 ENCOUNTER — IMMUNIZATION (OUTPATIENT)
Dept: PRIMARY CARE CLINIC | Age: 66
End: 2021-03-02
Payer: MEDICARE

## 2021-03-02 PROCEDURE — 0011A PR IMM ADMN SARSCOV2 100 MCG/0.5 ML 1ST DOSE: CPT | Performed by: FAMILY MEDICINE

## 2021-03-02 PROCEDURE — 91301 COVID-19, MODERNA VACCINE 100MCG/0.5ML DOSE: CPT | Performed by: FAMILY MEDICINE

## 2021-03-08 DIAGNOSIS — F33.41 RECURRENT MAJOR DEPRESSIVE DISORDER, IN PARTIAL REMISSION (HCC): ICD-10-CM

## 2021-03-08 RX ORDER — ESCITALOPRAM OXALATE 5 MG/1
5 TABLET ORAL DAILY
Qty: 90 TABLET | Refills: 0 | Status: SHIPPED | OUTPATIENT
Start: 2021-03-08 | End: 2021-06-07

## 2021-03-24 DIAGNOSIS — M54.42 CHRONIC BILATERAL LOW BACK PAIN WITH BILATERAL SCIATICA: ICD-10-CM

## 2021-03-24 DIAGNOSIS — G89.4 CHRONIC PAIN SYNDROME: ICD-10-CM

## 2021-03-24 DIAGNOSIS — G89.29 CHRONIC BILATERAL LOW BACK PAIN WITH BILATERAL SCIATICA: ICD-10-CM

## 2021-03-24 DIAGNOSIS — M54.41 CHRONIC BILATERAL LOW BACK PAIN WITH BILATERAL SCIATICA: ICD-10-CM

## 2021-03-24 RX ORDER — GABAPENTIN 400 MG/1
CAPSULE ORAL
Qty: 90 CAPSULE | Refills: 5 | Status: SHIPPED | OUTPATIENT
Start: 2021-03-24 | End: 2021-09-07

## 2021-03-30 ENCOUNTER — IMMUNIZATION (OUTPATIENT)
Dept: PRIMARY CARE CLINIC | Age: 66
End: 2021-03-30
Payer: MEDICARE

## 2021-03-30 PROCEDURE — 91301 COVID-19, MODERNA VACCINE 100MCG/0.5ML DOSE: CPT | Performed by: FAMILY MEDICINE

## 2021-03-30 PROCEDURE — 0012A COVID-19, MODERNA VACCINE 100MCG/0.5ML DOSE: CPT | Performed by: FAMILY MEDICINE
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
## 2021-04-27 DIAGNOSIS — F41.9 ANXIETY: ICD-10-CM

## 2021-04-27 RX ORDER — VALSARTAN AND HYDROCHLOROTHIAZIDE 320; 12.5 MG/1; MG/1
TABLET, FILM COATED ORAL
Qty: 90 TABLET | Refills: 0 | Status: SHIPPED | OUTPATIENT
Start: 2021-04-27 | End: 2021-09-20

## 2021-04-27 NOTE — TELEPHONE ENCOUNTER
I spoke to pharmacy and asked them to do override due to lost medication . Pharmacist states they did this but insurance says patient has to call them to request the override. Patient is eligible for a refill on 5/11 and if he wants to pay out of pocket for a 2 week supply it would be $68.99.  I LM for patient with this information

## 2021-04-27 NOTE — TELEPHONE ENCOUNTER
THE PHARM WONT REFILL THIS MED BECAUSE IT WAS JUST REFILLED AND ISNT DUE YET FOR A REFILL  PT JUST MOVED AND WAS GOING THROUGH HIS MEDS AND COULD NOT FIND HIS THIS MEDICATION WHICH IS WHY HE IS NEEDING IT FILLED.       IS THERE A WAY WE CAN HAVE THE PHARM REFILL THIS MEDICATION     PHARM CONFIRMED   Jeannette Garcia

## 2021-04-27 NOTE — TELEPHONE ENCOUNTER
LM for patient that he needs to complete labs that were previously ordered and he is due for a follow up appt.     Please try again

## 2021-04-28 RX ORDER — ALPRAZOLAM 0.5 MG/1
TABLET ORAL
Qty: 90 TABLET | Refills: 2 | Status: SHIPPED | OUTPATIENT
Start: 2021-04-28 | End: 2021-05-27 | Stop reason: SDUPTHER

## 2021-05-07 ENCOUNTER — OFFICE VISIT (OUTPATIENT)
Dept: FAMILY MEDICINE CLINIC | Age: 66
End: 2021-05-07
Payer: MEDICARE

## 2021-05-07 VITALS
SYSTOLIC BLOOD PRESSURE: 122 MMHG | RESPIRATION RATE: 16 BRPM | WEIGHT: 276 LBS | HEIGHT: 71 IN | HEART RATE: 95 BPM | BODY MASS INDEX: 38.64 KG/M2 | DIASTOLIC BLOOD PRESSURE: 82 MMHG | OXYGEN SATURATION: 98 %

## 2021-05-07 DIAGNOSIS — I10 HYPERTENSION, ESSENTIAL: ICD-10-CM

## 2021-05-07 DIAGNOSIS — Z23 NEED FOR PNEUMOCOCCAL VACCINATION: ICD-10-CM

## 2021-05-07 DIAGNOSIS — I25.10 CORONARY ARTERY DISEASE INVOLVING NATIVE CORONARY ARTERY OF NATIVE HEART WITHOUT ANGINA PECTORIS: ICD-10-CM

## 2021-05-07 DIAGNOSIS — L40.50 PSORIATIC ARTHRITIS (HCC): ICD-10-CM

## 2021-05-07 DIAGNOSIS — Z79.4 TYPE 2 DIABETES MELLITUS WITH DIABETIC PERIPHERAL ANGIOPATHY WITHOUT GANGRENE, WITH LONG-TERM CURRENT USE OF INSULIN (HCC): Primary | ICD-10-CM

## 2021-05-07 DIAGNOSIS — R74.8 ELEVATED LIVER ENZYMES: ICD-10-CM

## 2021-05-07 DIAGNOSIS — B18.1 CHRONIC ACTIVE VIRAL HEPATITIS B (HCC): ICD-10-CM

## 2021-05-07 DIAGNOSIS — E11.51 TYPE 2 DIABETES MELLITUS WITH DIABETIC PERIPHERAL ANGIOPATHY WITHOUT GANGRENE, WITH LONG-TERM CURRENT USE OF INSULIN (HCC): ICD-10-CM

## 2021-05-07 DIAGNOSIS — E66.01 OBESITY, CLASS III, BMI 40-49.9 (MORBID OBESITY) (HCC): ICD-10-CM

## 2021-05-07 DIAGNOSIS — F33.41 RECURRENT MAJOR DEPRESSIVE DISORDER, IN PARTIAL REMISSION (HCC): ICD-10-CM

## 2021-05-07 DIAGNOSIS — Z79.4 TYPE 2 DIABETES MELLITUS WITH DIABETIC PERIPHERAL ANGIOPATHY WITHOUT GANGRENE, WITH LONG-TERM CURRENT USE OF INSULIN (HCC): ICD-10-CM

## 2021-05-07 DIAGNOSIS — E11.51 TYPE 2 DIABETES MELLITUS WITH DIABETIC PERIPHERAL ANGIOPATHY WITHOUT GANGRENE, WITH LONG-TERM CURRENT USE OF INSULIN (HCC): Primary | ICD-10-CM

## 2021-05-07 LAB
A/G RATIO: 1.6 (ref 1.1–2.2)
ALBUMIN SERPL-MCNC: 4.7 G/DL (ref 3.4–5)
ALP BLD-CCNC: 90 U/L (ref 40–129)
ALT SERPL-CCNC: 28 U/L (ref 10–40)
ANION GAP SERPL CALCULATED.3IONS-SCNC: 15 MMOL/L (ref 3–16)
AST SERPL-CCNC: 24 U/L (ref 15–37)
BILIRUB SERPL-MCNC: 0.6 MG/DL (ref 0–1)
BUN BLDV-MCNC: 19 MG/DL (ref 7–20)
CALCIUM SERPL-MCNC: 9.7 MG/DL (ref 8.3–10.6)
CHLORIDE BLD-SCNC: 98 MMOL/L (ref 99–110)
CHOLESTEROL, TOTAL: 192 MG/DL (ref 0–199)
CO2: 24 MMOL/L (ref 21–32)
CREAT SERPL-MCNC: 0.9 MG/DL (ref 0.8–1.3)
CREATININE URINE: 51.3 MG/DL (ref 39–259)
GFR AFRICAN AMERICAN: >60
GFR NON-AFRICAN AMERICAN: >60
GLOBULIN: 2.9 G/DL
GLUCOSE BLD-MCNC: 275 MG/DL (ref 70–99)
HBA1C MFR BLD: 10.6 %
HDLC SERPL-MCNC: 47 MG/DL (ref 40–60)
LDL CHOLESTEROL CALCULATED: 103 MG/DL
MICROALBUMIN UR-MCNC: 1.3 MG/DL
MICROALBUMIN/CREAT UR-RTO: 25.3 MG/G (ref 0–30)
POTASSIUM SERPL-SCNC: 4.7 MMOL/L (ref 3.5–5.1)
SODIUM BLD-SCNC: 137 MMOL/L (ref 136–145)
TOTAL PROTEIN: 7.6 G/DL (ref 6.4–8.2)
TRIGL SERPL-MCNC: 211 MG/DL (ref 0–150)
VLDLC SERPL CALC-MCNC: 42 MG/DL

## 2021-05-07 PROCEDURE — G0009 ADMIN PNEUMOCOCCAL VACCINE: HCPCS | Performed by: FAMILY MEDICINE

## 2021-05-07 PROCEDURE — G8427 DOCREV CUR MEDS BY ELIG CLIN: HCPCS | Performed by: FAMILY MEDICINE

## 2021-05-07 PROCEDURE — 4040F PNEUMOC VAC/ADMIN/RCVD: CPT | Performed by: FAMILY MEDICINE

## 2021-05-07 PROCEDURE — 3046F HEMOGLOBIN A1C LEVEL >9.0%: CPT | Performed by: FAMILY MEDICINE

## 2021-05-07 PROCEDURE — 99214 OFFICE O/P EST MOD 30 MIN: CPT | Performed by: FAMILY MEDICINE

## 2021-05-07 PROCEDURE — 83036 HEMOGLOBIN GLYCOSYLATED A1C: CPT | Performed by: FAMILY MEDICINE

## 2021-05-07 PROCEDURE — 1123F ACP DISCUSS/DSCN MKR DOCD: CPT | Performed by: FAMILY MEDICINE

## 2021-05-07 PROCEDURE — G8417 CALC BMI ABV UP PARAM F/U: HCPCS | Performed by: FAMILY MEDICINE

## 2021-05-07 PROCEDURE — 90732 PPSV23 VACC 2 YRS+ SUBQ/IM: CPT | Performed by: FAMILY MEDICINE

## 2021-05-07 PROCEDURE — 1036F TOBACCO NON-USER: CPT | Performed by: FAMILY MEDICINE

## 2021-05-07 PROCEDURE — 3017F COLORECTAL CA SCREEN DOC REV: CPT | Performed by: FAMILY MEDICINE

## 2021-05-07 PROCEDURE — 2022F DILAT RTA XM EVC RTNOPTHY: CPT | Performed by: FAMILY MEDICINE

## 2021-05-07 RX ORDER — INSULIN GLARGINE 100 [IU]/ML
50 INJECTION, SOLUTION SUBCUTANEOUS NIGHTLY
Qty: 10 ML | Refills: 3 | Status: SHIPPED | OUTPATIENT
Start: 2021-05-07 | End: 2021-09-03

## 2021-05-07 NOTE — PROGRESS NOTES
microalbuminuria test  04/12/2020    Lipid screen  04/12/2020    Pneumococcal 65+ years Vaccine (1 of 1 - PPSV23) 01/03/2021     *Chief complaint, HPI, History and ROS provided by the medical assistant has been reviewed and verified by provider- Mari Darby MD    LAST LABS  LDL Calculated   Date Value Ref Range Status   04/12/2019 61 <100 mg/dL Final     Lab Results   Component Value Date    HDL 39 (L) 04/12/2019     Lab Results   Component Value Date    TRIG 130 04/12/2019     Lab Results   Component Value Date    GLUCOSE 233 (H) 06/26/2020     Lab Results   Component Value Date     06/26/2020    K 4.5 06/26/2020    CREATININE 0.8 06/26/2020     Lab Results   Component Value Date    WBC 7.6 06/26/2020    HGB 15.4 06/26/2020     06/26/2020     Lab Results   Component Value Date    ALT 23 09/30/2019    AST 24 09/30/2019    ALKPHOS 94 09/30/2019    BILITOT <0.2 09/30/2019     No results found for: TSH  Lab Results   Component Value Date    LABA1C 8.9 08/05/2020    LABA1C 8.2 03/09/2020    LABA1C 8.3 02/05/2020     HISTORY:  Patient's medications, allergies, past medical, and social histories were reviewed and updated as appropriate. CHART REVIEW  Health Maintenance Due   Topic Date Due    Shingles Vaccine (1 of 2) Never done    Colon Cancer Screen FIT/FOBT  08/01/2019    Diabetic microalbuminuria test  04/12/2020    Lipid screen  04/12/2020    Pneumococcal 65+ years Vaccine (1 of 1 - PPSV23) 01/03/2021     Social History     Tobacco Use    Smoking status: Never Smoker    Smokeless tobacco: Never Used    Tobacco comment: advised not to start   Substance Use Topics    Alcohol use: Not Currently     Comment: rare    Drug use: No      The ASCVD Risk score (Rob Murillo et al., 2013) failed to calculate for the following reasons: The valid total cholesterol range is 130 to 320 mg/dL  Prior to Visit Medications    Medication Sig Taking?  Authorizing Provider   insulin glargine (LANTUS) 100 UNIT/ML injection vial Inject 50 Units into the skin nightly Yes Jennifer Muñoz MD   ALPRAZolam (XANAX) 0.5 MG tablet TAKE 1 TABLET BY MOUTH THREE TIMES DAILY Yes Jennifer Muñoz MD   valsartan-hydroCHLOROthiazide (DIOVAN-HCT) 320-12.5 MG per tablet TAKE 1 TABLET BY MOUTH EVERY DAY Yes Jennifer Muñoz MD   canagliflozin (INVOKANA) 300 MG TABS tablet Take 1 tablet by mouth every morning (before breakfast) Yes Jose Daniel Patrick MD   gabapentin (NEURONTIN) 400 MG capsule TAKE 1 CAPSULE BY MOUTH THREE TIMES DAILY Yes Jennifer Muñoz MD   escitalopram (LEXAPRO) 5 MG tablet TAKE 1 TABLET BY MOUTH DAILY Yes Jennifer Muñoz MD   atorvastatin (LIPITOR) 80 MG tablet TAKE 1 TABLET BY MOUTH DAILY Yes KARYN Bowman - CNP   triamcinolone (KENALOG) 0.1 % cream Apply topically 2 times daily. Yes Jennifer Muñoz MD   clopidogrel (PLAVIX) 75 MG tablet Take 1 tablet by mouth daily Yes Jennifer Muñoz MD   carvedilol (COREG) 6.25 MG tablet Take 1 tablet by mouth 2 times daily (with meals) *APPOINTMENT NEEDED* Yes Jennifer Muñoz MD   metFORMIN (GLUCOPHAGE-XR) 500 MG extended release tablet TAKE 4 TABLETS BY MOUTH [address] Yes Jennifer Muñoz MD   INSULIN SYRINGE .5CC/29G (Betha Favre INS SYR .5CC/29G) 29G X 1/2\" 0.5 ML MISC 1 each by Does not apply route daily Yes Jennifer Muñoz MD   Adalimumab (HUMIRA PEN) 40 MG/0.4ML PNKT Inject 40 mg into the skin every 14 days Yes Marlene De Los Santos MD   fluocinonide (LIDEX) 0.05 % cream Apply topically 2 times daily. Yes Jennifer Muñoz MD   aspirin 81 MG EC tablet Take 4 tablets by mouth daily Yes Lara Guzman MD   Emollient (AQUAPHOR ADVANCED THERAPY) OINT Apply three times per day as needed Yes Jennifer Muñoz MD   Lancets MISC 1 each by Does not apply route 2 times daily Yes Jennifer Muñoz MD   blood glucose monitor kit and supplies Test 2 times a day & as needed for symptoms of irregular blood glucose.  Yes Jennifer Muñoz MD   blood glucose monitor strips Test 2 times a day & as needed for symptoms of irregular blood glucose. Yes Maru Fountain MD   entecavir (BARACLUDE) 1 MG tablet Take 1 tablet by mouth daily Yes Maru Fountain MD   glucose blood VI test strips (ASCENSIA AUTODISC VI;ONE TOUCH ULTRA TEST VI) strip Apply 1 each topically 3 times daily. Onetouch Ultra 2 test strips  Dx: 250.00 Yes Maru Fountain MD   [facility] LANCETS MISC 1 each by Does not apply route 3 times daily. Yes Maru Fountain MD      Family History   Problem Relation Age of Onset    Diabetes Mother     Cancer Mother         pancreatic    Heart Failure Father         began age 76,  age 78     Objective:   PHYSICAL EXAM   /82 (Site: Left Upper Arm, Position: Sitting, Cuff Size: Large Adult)   Pulse 95   Resp 16   Ht 5' 11\" (1.803 m)   Wt 276 lb (125.2 kg)   SpO2 98%   BMI 38.49 kg/m²   BP Readings from Last 5 Encounters:   21 122/82   21 (!) 170/98   02/15/21 (!) 147/90   10/30/20 113/68   07/10/20 136/78     Wt Readings from Last 5 Encounters:   21 276 lb (125.2 kg)   02/15/21 278 lb 14.1 oz (126.5 kg)   10/30/20 283 lb (128.4 kg)   20 260 lb (117.9 kg)   07/10/20 269 lb 12.8 oz (122.4 kg)      GENERAL:   · well-developed, well-nourished, alert, no distress. EYES:   · External findings: lids and lashes normal and conjunctivae and sclerae normal  LUNGS:    · Breathing unlabored  · clear to auscultation bilaterally and good air movement  CARDIOVASC:   · regular rate and rhythm  · LEGS:  Lower extremity edema: none    SKIN: warm and dry  PSYCH:    · Alert and oriented  · Normal reasoning, insight good  · Facial expressions full, mood appropriate  · No memory disturbance noted  MUSCULOSKEL:    · No significant finger or nail findings  NEURO:   · CN 2-12 intact  Diabetic Foot Exam  · Foot exam reveals normal cap refill  · Feet normal skin color, no callouses, no ulcers, no venous stasis  · Feet- no deformities  · sensation grossly normal to light touch in feet.   FEET:Monofilament Sensation: normal      Assessment and Plan:      Diagnosis Orders   1. Type 2 diabetes mellitus with diabetic peripheral angiopathy without gangrene, with long-term current use of insulin (HCC)  HM DIABETES FOOT EXAM    POCT glycosylated hemoglobin (Hb A1C)    Microalbumin / Creatinine Urine Ratio    insulin glargine (LANTUS) 100 UNIT/ML injection vial   2. Recurrent major depressive disorder, in partial remission (HCC)     3. Elevated liver enzymes  Comprehensive Metabolic Panel   4. Hypertension, essential  Comprehensive Metabolic Panel   5. Coronary artery disease involving native coronary artery of native heart without angina pectoris  Lipid Panel   6. Need for pneumococcal vaccination  Pneumococcal polysaccharide vaccine 23-valent greater than or equal to 1yo subcutaneous/IM   7. Psoriatic arthritis ([facility]     8. Obesity, Class III, BMI 40-49.9 (morbid obesity) ([facility]     9. Chronic active viral hepatitis B (HCC)     Stable. Continue current Tx plan. Any changes marked below. INSTRUCTIONS  NEXT APPOINTMENT: Please schedule annual complete physical (30 minutes) in 3 months. · PLEASE TAKE THIS FORM TO CHECK-OUT WINDOW TO SCHEDULE NEXT VISIT. · PLEASE GET BLOODWORK DRAWN TODAY ON FIRST FLOOR in 170. Take orders with you. RESULTS- most blood tests back in couple days. We will call you if any problems. If bloodwork good, you will get letter in mail or notified thru [address] (if signed up) within 2 weeks. If you do not, please call office. · Get Shingrix #2 in 2-4 weeks  · Start Lantus at 25 units nightly.   · Call with AM and pre-dinner sugars in 1-2 weeks so we can increase

## 2021-05-07 NOTE — PATIENT INSTRUCTIONS
INSTRUCTIONS  NEXT APPOINTMENT: Please schedule annual complete physical (30 minutes) in 3 months AFTER 8/8/21. · PLEASE TAKE THIS FORM TO CHECK-OUT WINDOW TO SCHEDULE NEXT VISIT. · PLEASE GET BLOODWORK DRAWN TODAY ON FIRST FLOOR in 170. Take orders with you. RESULTS- most blood tests back in couple days. We will call you if any problems. If bloodwork good, you will get letter in mail or notified thru [address] (if signed up) within 2 weeks. If you do not, please call office. · Get Shingrix #2 in 2-4 weeks  · Start Lantus at 25 units nightly.   · Call with AM and pre-dinner sugars in 1-2 weeks so we can increase

## 2021-05-14 DIAGNOSIS — E11.51 TYPE 2 DIABETES MELLITUS WITH DIABETIC PERIPHERAL ANGIOPATHY WITHOUT GANGRENE, WITH LONG-TERM CURRENT USE OF INSULIN (HCC): ICD-10-CM

## 2021-05-14 DIAGNOSIS — Z79.4 TYPE 2 DIABETES MELLITUS WITH DIABETIC PERIPHERAL ANGIOPATHY WITHOUT GANGRENE, WITH LONG-TERM CURRENT USE OF INSULIN (HCC): ICD-10-CM

## 2021-05-18 RX ORDER — ROSUVASTATIN CALCIUM 40 MG/1
40 TABLET, COATED ORAL DAILY
Qty: 90 TABLET | Refills: 1 | Status: SHIPPED | OUTPATIENT
Start: 2021-05-18 | End: 2021-12-27

## 2021-05-19 DIAGNOSIS — E11.9 TYPE 2 DIABETES MELLITUS WITHOUT COMPLICATION, WITHOUT LONG-TERM CURRENT USE OF INSULIN (HCC): ICD-10-CM

## 2021-05-19 RX ORDER — METFORMIN HYDROCHLORIDE 500 MG/1
TABLET, EXTENDED RELEASE ORAL
Qty: 360 TABLET | Refills: 1 | Status: SHIPPED | OUTPATIENT
Start: 2021-05-19 | End: 2022-01-24 | Stop reason: SDUPTHER

## 2021-05-23 DIAGNOSIS — I10 HYPERTENSION, ESSENTIAL: ICD-10-CM

## 2021-05-23 DIAGNOSIS — I25.10 CORONARY ARTERY DISEASE INVOLVING NATIVE CORONARY ARTERY OF NATIVE HEART WITHOUT ANGINA PECTORIS: ICD-10-CM

## 2021-05-24 RX ORDER — CARVEDILOL 6.25 MG/1
TABLET ORAL
Qty: 180 TABLET | Refills: 0 | Status: SHIPPED | OUTPATIENT
Start: 2021-05-24 | End: 2021-08-23

## 2021-05-26 ENCOUNTER — TELEPHONE (OUTPATIENT)
Dept: FAMILY MEDICINE CLINIC | Age: 66
End: 2021-05-26

## 2021-05-26 DIAGNOSIS — F41.9 ANXIETY: ICD-10-CM

## 2021-05-27 RX ORDER — ALPRAZOLAM 0.5 MG/1
TABLET ORAL
Qty: 90 TABLET | Refills: 0 | Status: SHIPPED | OUTPATIENT
Start: 2021-05-27 | End: 2021-06-28 | Stop reason: SDUPTHER

## 2021-06-05 DIAGNOSIS — F33.41 RECURRENT MAJOR DEPRESSIVE DISORDER, IN PARTIAL REMISSION (HCC): ICD-10-CM

## 2021-06-07 RX ORDER — ESCITALOPRAM OXALATE 5 MG/1
5 TABLET ORAL DAILY
Qty: 90 TABLET | Refills: 0 | Status: SHIPPED | OUTPATIENT
Start: 2021-06-07 | End: 2021-09-07

## 2021-06-09 ENCOUNTER — TELEPHONE (OUTPATIENT)
Dept: FAMILY MEDICINE CLINIC | Age: 66
End: 2021-06-09

## 2021-06-09 DIAGNOSIS — F41.9 ANXIETY: ICD-10-CM

## 2021-06-09 RX ORDER — ALPRAZOLAM 0.5 MG/1
TABLET ORAL
Qty: 30 TABLET | Refills: 0 | Status: CANCELLED | OUTPATIENT
Start: 2021-06-09 | End: 2021-07-06

## 2021-06-25 NOTE — CARE COORDINATION
Ern[facility] Transitions Initial Follow Up Call    Call within 2 business days of discharge: Yes    Patient: Russell Boyer Patient : 1955   MRN: [id number]  Reason for Admission:   Discharge Date: 4/3/19 RARS: Readmission Risk Score: 21       Spoke with: no one    Facility: [facility] [address] [place] [address] Transitions 24 Hour Call    Care Transitions Interventions         Follow Up: Final attempt at CTC d/c phone call. Unable to reach patient. Left message. Informed Jack Ureña this would be the final CTC call. Encouraged him to call his PCP with any future issues or concerns.   Future Appointments   Date Time Provider Nicanor Ruiz   4/10/2019 11:40 AM Angela Hu MD [facility]   2019  9:45 AM Yesica Powell MD [facility]   2019 10:15 AM Delfina Aschoff, MD [facility]       Kolton Orozco RN Patient in labor/Change in fetal status

## 2021-06-28 ENCOUNTER — TELEPHONE (OUTPATIENT)
Dept: FAMILY MEDICINE CLINIC | Age: 66
End: 2021-06-28

## 2021-06-28 DIAGNOSIS — F41.9 ANXIETY: ICD-10-CM

## 2021-06-28 RX ORDER — ALPRAZOLAM 0.5 MG/1
0.5 TABLET ORAL NIGHTLY PRN
Qty: 30 TABLET | Refills: 2 | Status: SHIPPED | OUTPATIENT
Start: 2021-06-28 | End: 2021-07-28

## 2021-06-28 NOTE — TELEPHONE ENCOUNTER
Jane calling from [facility] stating that they received the order for a Sharlot Bouche but are missing some information. Stated she would like a call back at [phone] to get verbal orders and clarification.

## 2021-07-07 ENCOUNTER — TELEPHONE (OUTPATIENT)
Dept: FAMILY MEDICINE CLINIC | Age: 66
End: 2021-07-07

## 2021-07-27 ENCOUNTER — TELEPHONE (OUTPATIENT)
Dept: FAMILY MEDICINE CLINIC | Age: 66
End: 2021-07-27

## 2021-07-27 NOTE — TELEPHONE ENCOUNTER
[facility] called in has questions on a form that was filled out by Dr Kellen Colon they want to know if pt is on insulin and how often does  the pt checks his insulin ?       Please call [facility] [phone]

## 2021-08-21 DIAGNOSIS — I25.10 CORONARY ARTERY DISEASE INVOLVING NATIVE CORONARY ARTERY OF NATIVE HEART WITHOUT ANGINA PECTORIS: ICD-10-CM

## 2021-08-21 DIAGNOSIS — I10 HYPERTENSION, ESSENTIAL: ICD-10-CM

## 2021-08-23 RX ORDER — CARVEDILOL 6.25 MG/1
TABLET ORAL
Qty: 180 TABLET | Refills: 0 | Status: SHIPPED | OUTPATIENT
Start: 2021-08-23 | End: 2021-10-01 | Stop reason: ALTCHOICE

## 2021-09-02 ENCOUNTER — TELEPHONE (OUTPATIENT)
Dept: FAMILY MEDICINE CLINIC | Age: 66
End: 2021-09-02

## 2021-09-02 DIAGNOSIS — I10 HYPERTENSION, ESSENTIAL: ICD-10-CM

## 2021-09-02 DIAGNOSIS — I25.10 CORONARY ARTERY DISEASE INVOLVING NATIVE CORONARY ARTERY OF NATIVE HEART WITHOUT ANGINA PECTORIS: ICD-10-CM

## 2021-09-02 RX ORDER — CARVEDILOL 6.25 MG/1
TABLET ORAL
Qty: 180 TABLET | Refills: 0 | OUTPATIENT
Start: 2021-09-02

## 2021-09-03 DIAGNOSIS — Z79.4 TYPE 2 DIABETES MELLITUS WITH DIABETIC PERIPHERAL ANGIOPATHY WITHOUT GANGRENE, WITH LONG-TERM CURRENT USE OF INSULIN (HCC): ICD-10-CM

## 2021-09-03 DIAGNOSIS — E11.51 TYPE 2 DIABETES MELLITUS WITH DIABETIC PERIPHERAL ANGIOPATHY WITHOUT GANGRENE, WITH LONG-TERM CURRENT USE OF INSULIN (HCC): ICD-10-CM

## 2021-09-03 RX ORDER — INSULIN GLARGINE 100 [IU]/ML
INJECTION, SOLUTION SUBCUTANEOUS
Qty: 10 ML | Refills: 2 | Status: SHIPPED | OUTPATIENT
Start: 2021-09-03 | End: 2022-01-18

## 2021-09-05 DIAGNOSIS — F33.41 RECURRENT MAJOR DEPRESSIVE DISORDER, IN PARTIAL REMISSION (HCC): ICD-10-CM

## 2021-09-07 RX ORDER — ESCITALOPRAM OXALATE 5 MG/1
5 TABLET ORAL DAILY
Qty: 90 TABLET | Refills: 0 | Status: SHIPPED | OUTPATIENT
Start: 2021-09-07 | End: 2021-12-07

## 2021-09-10 ENCOUNTER — HOSPITAL ENCOUNTER (OUTPATIENT)
Dept: GENERAL RADIOLOGY | Age: 66
Discharge: HOME | End: 2021-09-10
Payer: MEDICARE

## 2021-09-10 ENCOUNTER — HOSPITAL ENCOUNTER (OUTPATIENT)
Age: 66
Discharge: HOME | End: 2021-09-10
Payer: MEDICARE

## 2021-09-10 DIAGNOSIS — M25.521 ELBOW PAIN, RIGHT: ICD-10-CM

## 2021-09-10 PROCEDURE — 73080 X-RAY EXAM OF ELBOW: CPT

## 2021-09-20 RX ORDER — VALSARTAN AND HYDROCHLOROTHIAZIDE 320; 12.5 MG/1; MG/1
TABLET, FILM COATED ORAL
Qty: 90 TABLET | Refills: 0 | Status: SHIPPED | OUTPATIENT
Start: 2021-09-20 | End: 2022-01-03

## 2021-09-21 DIAGNOSIS — E11.9 TYPE 2 DIABETES MELLITUS WITHOUT COMPLICATION, WITHOUT LONG-TERM CURRENT USE OF INSULIN (HCC): ICD-10-CM

## 2021-09-21 RX ORDER — METFORMIN HYDROCHLORIDE 500 MG/1
TABLET, EXTENDED RELEASE ORAL
Qty: 360 TABLET | Refills: 1 | OUTPATIENT
Start: 2021-09-21

## 2021-09-30 ENCOUNTER — OFFICE VISIT (OUTPATIENT)
Dept: FAMILY MEDICINE CLINIC | Age: 66
End: 2021-09-30
Payer: MEDICARE

## 2021-09-30 VITALS
OXYGEN SATURATION: 96 % | DIASTOLIC BLOOD PRESSURE: 82 MMHG | WEIGHT: 272 LBS | BODY MASS INDEX: 40.29 KG/M2 | HEART RATE: 73 BPM | TEMPERATURE: 97.8 F | HEIGHT: 69 IN | SYSTOLIC BLOOD PRESSURE: 136 MMHG

## 2021-09-30 DIAGNOSIS — E78.5 HYPERLIPIDEMIA LDL GOAL <70: ICD-10-CM

## 2021-09-30 DIAGNOSIS — Z12.11 COLON CANCER SCREENING: ICD-10-CM

## 2021-09-30 DIAGNOSIS — E11.51 TYPE 2 DIABETES MELLITUS WITH DIABETIC PERIPHERAL ANGIOPATHY WITHOUT GANGRENE, WITH LONG-TERM CURRENT USE OF INSULIN (HCC): ICD-10-CM

## 2021-09-30 DIAGNOSIS — Z23 NEED FOR INFLUENZA VACCINATION: ICD-10-CM

## 2021-09-30 DIAGNOSIS — Z00.00 ROUTINE GENERAL MEDICAL EXAMINATION AT A HEALTH CARE FACILITY: Primary | ICD-10-CM

## 2021-09-30 DIAGNOSIS — Z79.4 TYPE 2 DIABETES MELLITUS WITH DIABETIC PERIPHERAL ANGIOPATHY WITHOUT GANGRENE, WITH LONG-TERM CURRENT USE OF INSULIN (HCC): ICD-10-CM

## 2021-09-30 LAB — HBA1C MFR BLD: 9.2 %

## 2021-09-30 PROCEDURE — 1123F ACP DISCUSS/DSCN MKR DOCD: CPT | Performed by: NURSE PRACTITIONER

## 2021-09-30 PROCEDURE — 83036 HEMOGLOBIN GLYCOSYLATED A1C: CPT | Performed by: NURSE PRACTITIONER

## 2021-09-30 PROCEDURE — G0439 PPPS, SUBSEQ VISIT: HCPCS | Performed by: NURSE PRACTITIONER

## 2021-09-30 PROCEDURE — 90694 VACC AIIV4 NO PRSRV 0.5ML IM: CPT | Performed by: NURSE PRACTITIONER

## 2021-09-30 PROCEDURE — G0008 ADMIN INFLUENZA VIRUS VAC: HCPCS | Performed by: NURSE PRACTITIONER

## 2021-09-30 PROCEDURE — 3046F HEMOGLOBIN A1C LEVEL >9.0%: CPT | Performed by: NURSE PRACTITIONER

## 2021-09-30 PROCEDURE — 3017F COLORECTAL CA SCREEN DOC REV: CPT | Performed by: NURSE PRACTITIONER

## 2021-09-30 PROCEDURE — 4040F PNEUMOC VAC/ADMIN/RCVD: CPT | Performed by: NURSE PRACTITIONER

## 2021-09-30 RX ORDER — DULAGLUTIDE 0.75 MG/.5ML
0.75 INJECTION, SOLUTION SUBCUTANEOUS WEEKLY
Qty: 4 PEN | Refills: 1 | Status: SHIPPED | OUTPATIENT
Start: 2021-09-30 | End: 2022-02-24

## 2021-09-30 ASSESSMENT — PATIENT HEALTH QUESTIONNAIRE - PHQ9
2. FEELING DOWN, DEPRESSED OR HOPELESS: 0
SUM OF ALL RESPONSES TO PHQ9 QUESTIONS 1 & 2: 0
SUM OF ALL RESPONSES TO PHQ QUESTIONS 1-9: 0
SUM OF ALL RESPONSES TO PHQ QUESTIONS 1-9: 0
1. LITTLE INTEREST OR PLEASURE IN DOING THINGS: 0
SUM OF ALL RESPONSES TO PHQ QUESTIONS 1-9: 0

## 2021-09-30 ASSESSMENT — LIFESTYLE VARIABLES: HOW OFTEN DO YOU HAVE A DRINK CONTAINING ALCOHOL: 0
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
## 2021-09-30 NOTE — PATIENT INSTRUCTIONS
Please check fasting blood sugar and two hours after lunch or dinner. Increase lantus to 50 units in the evening. Start trulicity injection weekly. If tolerating can increase in a month. Please send blood sugar readings in a couple of weeks. Personalized Preventive Plan for Carolina Tejeda - 9/30/2021  Medicare offers a range of preventive health benefits. Some of the tests and screenings are paid in full while other may be subject to a deductible, co-insurance, and/or copay. Some of these benefits include a comprehensive review of your medical history including lifestyle, illnesses that may run in your family, and various assessments and screenings as appropriate. After reviewing your medical record and screening and assessments performed today your provider may have ordered immunizations, labs, imaging, and/or referrals for you. A list of these orders (if applicable) as well as your Preventive Care list are included within your After Visit Summary for your review. Other Preventive Recommendations:    · A preventive eye exam performed by an eye specialist is recommended every 1-2 years to screen for glaucoma; cataracts, macular degeneration, and other eye disorders. · A preventive dental visit is recommended every 6 months. · Try to get at least 150 minutes of exercise per week or 10,000 steps per day on a pedometer . · Order or download the FREE \"Exercise & Physical Activity: Your Everyday Guide\" from [employer]. Call 5-590.863.9074 or search [employer] online. · You need 2077-3558 mg of calcium and 8243-3521 IU of vitamin D per day. It is possible to meet your calcium requirement with diet alone, but a vitamin D supplement is usually necessary to meet this goal.  · When exposed to the sun, use a sunscreen that protects against both UVA and UVB radiation with an SPF of 30 or greater.  Reapply every 2 to 3 hours or after sweating, drying off with a towel, or swimming. · Always wear a seat belt when traveling in a car. Always wear a helmet when riding a bicycle or motorcycle.

## 2021-10-01 ENCOUNTER — OFFICE VISIT (OUTPATIENT)
Dept: CARDIOLOGY CLINIC | Age: 66
End: 2021-10-01
Payer: MEDICARE

## 2021-10-01 VITALS
HEART RATE: 89 BPM | SYSTOLIC BLOOD PRESSURE: 118 MMHG | HEIGHT: 69 IN | BODY MASS INDEX: 40.58 KG/M2 | OXYGEN SATURATION: 96 % | WEIGHT: 274 LBS | DIASTOLIC BLOOD PRESSURE: 66 MMHG

## 2021-10-01 DIAGNOSIS — I25.10 CAD IN NATIVE ARTERY: Primary | ICD-10-CM

## 2021-10-01 DIAGNOSIS — Z86.39 H/O DIABETES MELLITUS: ICD-10-CM

## 2021-10-01 DIAGNOSIS — I10 ESSENTIAL HYPERTENSION: ICD-10-CM

## 2021-10-01 PROCEDURE — 93000 ELECTROCARDIOGRAM COMPLETE: CPT | Performed by: INTERNAL MEDICINE

## 2021-10-01 PROCEDURE — 99214 OFFICE O/P EST MOD 30 MIN: CPT | Performed by: INTERNAL MEDICINE

## 2021-10-01 PROCEDURE — G8484 FLU IMMUNIZE NO ADMIN: HCPCS | Performed by: INTERNAL MEDICINE

## 2021-10-01 PROCEDURE — 1036F TOBACCO NON-USER: CPT | Performed by: INTERNAL MEDICINE

## 2021-10-01 PROCEDURE — G8417 CALC BMI ABV UP PARAM F/U: HCPCS | Performed by: INTERNAL MEDICINE

## 2021-10-01 PROCEDURE — G8427 DOCREV CUR MEDS BY ELIG CLIN: HCPCS | Performed by: INTERNAL MEDICINE

## 2021-10-01 PROCEDURE — 1123F ACP DISCUSS/DSCN MKR DOCD: CPT | Performed by: INTERNAL MEDICINE

## 2021-10-01 PROCEDURE — 3017F COLORECTAL CA SCREEN DOC REV: CPT | Performed by: INTERNAL MEDICINE

## 2021-10-01 PROCEDURE — 4040F PNEUMOC VAC/ADMIN/RCVD: CPT | Performed by: INTERNAL MEDICINE

## 2021-10-01 RX ORDER — OXYCODONE HYDROCHLORIDE AND ACETAMINOPHEN 5; 325 MG/1; MG/1
TABLET ORAL
COMMUNITY
Start: 2021-09-10 | End: 2022-02-24

## 2021-10-01 RX ORDER — METOPROLOL SUCCINATE 25 MG/1
25 TABLET, EXTENDED RELEASE ORAL DAILY
Qty: 90 TABLET | Refills: 3 | Status: SHIPPED | OUTPATIENT
Start: 2021-10-01 | End: 2022-02-24

## 2021-10-01 RX ORDER — EZETIMIBE 10 MG/1
10 TABLET ORAL DAILY
Qty: 90 TABLET | Refills: 1 | Status: SHIPPED | OUTPATIENT
Start: 2021-10-01 | End: 2022-08-17 | Stop reason: SDUPTHER

## 2021-10-01 NOTE — PATIENT INSTRUCTIONS
Patient Education        Learning About High Cholesterol  What is high cholesterol? High cholesterol means that you have too much cholesterol in your blood. Cholesterol is a type of fat. It's needed for many body functions, such as making new cells. Cholesterol is made by your body. It also comes from food you eat. Having high cholesterol can lead to the buildup of plaque in artery walls. This can increase your risk of heart attack and stroke. When your doctor talks about high cholesterol levels, your doctor is talking about your total cholesterol and LDL cholesterol (the \"bad\" cholesterol) levels. Your doctor may also speak about HDL (the \"good\" cholesterol) levels. High HDL is linked with a lower risk for coronary artery disease, heart attack, and stroke. Your cholesterol levels help your doctor find out your risk for having a heart attack or stroke. How can you help prevent high cholesterol? A heart-healthy lifestyle can help you prevent high cholesterol and lower your risk for a heart attack and stroke. · Eat heart-healthy foods. ? Eat fruits, vegetables, whole grains, beans, and other high-fiber foods. ? Eat lean proteins, such as seafood, lean meats, beans, nuts, and soy products. ? Eat healthy fats, such as canola and olive oil. ? Choose foods that are low in saturated fat. ? Limit sodium and alcohol. ? Limit drinks and foods with added sugar. · Be active. Try to do moderate activity at least 2½ hours a week. Or try vigorous activity at least 1¼ hours a week. You may want to walk or try other activities, such as running, swimming, cycling, or playing tennis or team sports. · Stay at a healthy weight. Lose weight if you need to. · Don't smoke. If you need help quitting, talk to your doctor about stop-smoking programs and medicines. These can increase your chances of quitting for good. How is high cholesterol treated?   The goal of treatment is to reduce your chances of having a heart attack or stroke. The goal is not to lower your cholesterol numbers only. · Have a heart-healthy lifestyle. This includes eating healthy foods, not smoking, losing weight, and being more active. · You may choose to take medicine. Follow-up care is a key part of your treatment and safety. Be sure to make and go to all appointments, and call your doctor if you are having problems. It's also a good idea to know your test results and keep a list of the medicines you take. Where can you learn more? Go to https://[employer] org and sign in to your [employer] account. Enter O576 in the [employer] box to learn more about \"Learning About High Cholesterol. \"     If you do not have an account, please click on the \"Sign Up Now\" link. Current as of: April 29, 2021               Content Version: 13.0  © 7436-7271 Healthwise, [employer]. Care instructions adapted under license by [facility] ([facility]). If you have questions about a medical condition or this instruction, always ask your healthcare professional. [address] any warranty or liability for your use of this information. 1. Finish taking Carvedilol then switch to Metoprolol XL 50 mg daily. 2. Begin taking Zetia to help lower LDL (bad cholesterol). Goal LDL is below 70 and yours is currently 103. 3. Repeat fasting labs.
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
## 2021-10-05 ENCOUNTER — TELEPHONE (OUTPATIENT)
Dept: FAMILY MEDICINE CLINIC | Age: 66
End: 2021-10-05

## 2021-10-05 DIAGNOSIS — Z79.4 CONTROLLED TYPE 2 DIABETES MELLITUS WITH OTHER CIRCULATORY COMPLICATION, WITH LONG-TERM CURRENT USE OF INSULIN (HCC): ICD-10-CM

## 2021-10-05 DIAGNOSIS — E11.59 CONTROLLED TYPE 2 DIABETES MELLITUS WITH OTHER CIRCULATORY COMPLICATION, WITH LONG-TERM CURRENT USE OF INSULIN (HCC): ICD-10-CM

## 2021-10-05 NOTE — TELEPHONE ENCOUNTER
Pt re located needs diabetic supply's he misplaced   Pt needs syringes sent that goes to 48      Verified pharmacy

## 2021-10-06 RX ORDER — IBUPROFEN 200 MG
1 TABLET ORAL DAILY
Qty: 100 EACH | Refills: 3 | Status: SHIPPED | OUTPATIENT
Start: 2021-10-06 | End: 2022-01-10

## 2021-11-01 RX ORDER — CANAGLIFLOZIN 300 MG/1
TABLET, FILM COATED ORAL
Qty: 30 TABLET | Refills: 2 | Status: SHIPPED | OUTPATIENT
Start: 2021-11-01 | End: 2022-02-28

## 2021-11-04 DIAGNOSIS — F41.9 ANXIETY: ICD-10-CM

## 2021-11-04 RX ORDER — ALPRAZOLAM 0.5 MG/1
TABLET ORAL
Qty: 30 TABLET | Status: CANCELLED | OUTPATIENT
Start: 2021-11-04

## 2021-11-05 RX ORDER — ALPRAZOLAM 0.5 MG/1
TABLET ORAL
Qty: 90 TABLET | Refills: 0 | Status: SHIPPED | OUTPATIENT
Start: 2021-11-05 | End: 2021-12-05

## 2021-12-07 ENCOUNTER — VIRTUAL VISIT (OUTPATIENT)
Dept: FAMILY MEDICINE CLINIC | Age: 66
End: 2021-12-07
Payer: MEDICARE

## 2021-12-07 ENCOUNTER — TELEPHONE (OUTPATIENT)
Dept: FAMILY MEDICINE CLINIC | Age: 66
End: 2021-12-07

## 2021-12-07 DIAGNOSIS — F33.41 RECURRENT MAJOR DEPRESSIVE DISORDER, IN PARTIAL REMISSION (HCC): ICD-10-CM

## 2021-12-07 DIAGNOSIS — J20.9 ACUTE BRONCHITIS, UNSPECIFIED ORGANISM: Primary | ICD-10-CM

## 2021-12-07 PROCEDURE — G8427 DOCREV CUR MEDS BY ELIG CLIN: HCPCS | Performed by: FAMILY MEDICINE

## 2021-12-07 PROCEDURE — 3017F COLORECTAL CA SCREEN DOC REV: CPT | Performed by: FAMILY MEDICINE

## 2021-12-07 PROCEDURE — 99213 OFFICE O/P EST LOW 20 MIN: CPT | Performed by: FAMILY MEDICINE

## 2021-12-07 PROCEDURE — 4040F PNEUMOC VAC/ADMIN/RCVD: CPT | Performed by: FAMILY MEDICINE

## 2021-12-07 PROCEDURE — 1123F ACP DISCUSS/DSCN MKR DOCD: CPT | Performed by: FAMILY MEDICINE

## 2021-12-07 RX ORDER — ALBUTEROL SULFATE 90 UG/1
2 AEROSOL, METERED RESPIRATORY (INHALATION) EVERY 4 HOURS PRN
Qty: 18 G | Refills: 3 | Status: SHIPPED | OUTPATIENT
Start: 2021-12-07 | End: 2022-07-21 | Stop reason: ALTCHOICE

## 2021-12-07 RX ORDER — ESCITALOPRAM OXALATE 5 MG/1
5 TABLET ORAL DAILY
Qty: 90 TABLET | Refills: 0 | Status: SHIPPED | OUTPATIENT
Start: 2021-12-07 | End: 2022-03-04 | Stop reason: SDUPTHER

## 2021-12-07 RX ORDER — AZITHROMYCIN 250 MG/1
TABLET, FILM COATED ORAL
Qty: 1 PACKET | Refills: 0 | Status: SHIPPED | OUTPATIENT
Start: 2021-12-07 | End: 2021-12-12

## 2021-12-07 RX ORDER — BENZONATATE 100 MG/1
100-200 CAPSULE ORAL 3 TIMES DAILY PRN
Qty: 30 CAPSULE | Refills: 0 | Status: SHIPPED | OUTPATIENT
Start: 2021-12-07 | End: 2021-12-27

## 2021-12-07 RX ORDER — METHYLPREDNISOLONE 4 MG/1
TABLET ORAL
Qty: 1 KIT | Refills: 0 | Status: SHIPPED | OUTPATIENT
Start: 2021-12-07 | End: 2021-12-17

## 2021-12-07 NOTE — TELEPHONE ENCOUNTER
Pt called and spoke with call center. Noted that pt Pt said that he has been having a lot of coughing and headaches and wanted to schedule an appt   to be seen. Pt said this has been going on for 2 weeks. He is okay with a VV he just wants something for a cough. Feels like he is in a tunnel, but no fever , no chills, he said he can feel it in his sinus cavity.     Please Advise vv, ov, or [facility]  Pt can be reached at [phone]

## 2021-12-07 NOTE — PROGRESS NOTES
TELEHEALTH EVALUATION -- Audio/Visual (During UWIRC-60 public health emergency)  VIDEO VISIT- patient and provider not at same location  Also present:none. UPPER RESPIRATORY VISIT  Subjective:      Chief Complaint   Patient presents with    URI      Dean Montes De Oca is a 73 y.o. male who presents for evaluation of symptoms of URI. Onset of symptoms was 2 weeks ago. Symptoms include achiness, congestion, facial pain, headache described as pounding, lightheadedness, nasal congestion, no  fever, non productive cough, post nasal drip, sinus pressure and sore throat and are unchanged since that time. Other symptoms: no fever  Little wheeze in AM  Denies: purulent nasal discharge, shortness of breath, sneezing and wheezing. Tried OTC: antihistamine-decongestant of choice with little relief of symptoms   BS doing OK    Other issues: pt is just fatigued     Review of Systems   General ROS: fever? No,    Night sweats? No  Ophthalmic ROS: change in vision? No  Endocrine ROS: fatigue? Yes   Unexpected weight changes? No  Hematological and Lymphatic ROS: easy bruising? No   Swollen lymph nodes? No  ENT ROS: headaches? Yes    Sore throat? Yes  Respiratory ROS: cough? Yes   Wheezing? No  Cardiovascular ROS: chest pain? No   Shortness of breath? No  Gastrointestinal ROS: abdominal pain? No   Change in stools?  No    *Chief complaint, HPI, History and ROS provided by the medical assistant has been reviewed and verified by provider- Jaylyn Howard MD    CHART REVIEW  Health Maintenance   Topic Date Due    Shingles Vaccine (1 of 2) Never done    Colon Cancer Screen FIT/FOBT  08/01/2019    A1C test (Diabetic or Prediabetic)  12/30/2021    Diabetic retinal exam  03/12/2022    Diabetic foot exam  05/07/2022    Diabetic microalbuminuria test  05/07/2022    Lipid screen  05/07/2022    Potassium monitoring  05/07/2022    Creatinine monitoring  05/07/2022    Annual Wellness Visit (AWV)  10/01/2022    DTaP/Tdap/Td vaccine (2 - Td or Tdap) 11/16/2027    Hepatitis A vaccine  Completed    Flu vaccine  Completed    Pneumococcal 65+ years Vaccine  Completed    COVID-19 Vaccine  Completed    Hepatitis C screen  Completed    HIV screen  Completed    Hib vaccine  Aged Out    Meningococcal (ACWY) vaccine  Aged Out     The 10-year ASCVD risk score (Arina Felix., et al., 2013) is: 23.6%    Values used to calculate the score:      Age: 73 years      Sex: Male      Is Non- : No      Diabetic: Yes      Tobacco smoker: No      Systolic Blood Pressure: 232 mmHg      Is BP treated: Yes      HDL Cholesterol: 47 mg/dL      Total Cholesterol: 192 mg/dL  Prior to Visit Medications    Medication Sig Taking? Authorizing Provider   benzonatate (TESSALON PERLES) 100 MG capsule Take 1-2 capsules by mouth 3 times daily as needed for Cough Yes Jaylyn Howard MD   albuterol sulfate  (90 Base) MCG/ACT inhaler Inhale 2 puffs into the lungs every 4 hours as needed for Wheezing May substitute for insurance preferred (Ventolin, Proventil, ProAir) Yes Jaylyn Howard MD   azithromycin (ZITHROMAX Z-SIMON) 250 MG tablet See admin instructions Yes Jaylyn Howard MD   methylPREDNISolone (MEDROL, SIMON,) 4 MG tablet Take by mouth. Yes Jaylyn Howard MD   INVOKANA 300 MG TABS tablet TAKE 1 TABLET BY MOUTH EVERY MORNING BEFORE BREAKFAST Yes Jaylyn Howard MD   INSULIN SYRINGE .5CC/29G ([employer] INS SYR .5CC/29G) 29G X 1/2\" 0.5 ML MISC 1 each by Does not apply route daily Yes Jaylyn Howard MD   oxyCODONE-acetaminophen (PERCOCET) 5-325 MG per tablet TAKE 1 TABLET BY MOUTH UP TO EVERY 8 HOURS AS NEEDED. FOR USE 9/12/21 TO 10/11/21.  Yes Historical Provider, MD   ezetimibe (ZETIA) 10 MG tablet Take 1 tablet by mouth daily Yes Jordan Michel MD   metoprolol succinate (TOPROL XL) 25 MG extended release tablet Take 1 tablet by mouth daily Yes Jordan Michel MD   Dulaglutide (TRULICITY) 4.29 DB/7.1TC SOPN Inject 0.75 mg into the skin once a week Yes [facility] Noah COBOS   valsartan-hydroCHLOROthiazide (DIOVAN-HCT) 320-12.5 MG per tablet TAKE 1 TABLET BY MOUTH EVERY DAY Yes Lin Bentley MD   escitalopram (LEXAPRO) 5 MG tablet TAKE 1 TABLET BY MOUTH DAILY Yes Lin Bentley MD   LANTUS 100 UNIT/ML injection vial ADMINISTER 50 UNITS UNDER THE SKIN EVERY NIGHT Yes Lin Bentley MD   metFORMIN (GLUCOPHAGE-XR) 500 MG extended release tablet TAKE 4 TABLETS BY MOUTH EVERY DAY WITH BREAKFAST Yes iLn Bentley MD   rosuvastatin (CRESTOR) 40 MG tablet Take 1 tablet by mouth daily Yes Lin Bentley MD   triamcinolone (KENALOG) 0.1 % cream Apply topically 2 times daily. Yes Lin Bentley MD   fluocinonide (LIDEX) 0.05 % cream Apply topically 2 times daily. Yes Lin Bentley MD   aspirin 81 MG EC tablet Take 4 tablets by mouth daily Yes Milton Cantu MD   Emollient (AQUAPHOR ADVANCED THERAPY) OINT Apply three times per day as needed Yes Lin Bentley MD   Lancets MISC 1 each by Does not apply route 2 times daily Yes Lin Bentley MD   blood glucose monitor kit and supplies Test 2 times a day & as needed for symptoms of irregular blood glucose. Yes Lin Betnley MD   blood glucose monitor strips Test 2 times a day & as needed for symptoms of irregular blood glucose. Yes Lin Bentley MD   entecavir (BARACLUDE) 1 MG tablet Take 1 mg by mouth daily  Yes Lin Bentley MD   glucose blood VI test strips (ASCENSIA AUTODISC VI;ONE TOUCH ULTRA TEST VI) strip Apply 1 each topically 3 times daily. Onetouch Ultra 2 test strips  Dx: 250.00 Yes Lin Bentley MD   [facility] LANCETS MISC 1 each by Does not apply route 3 times daily.  Yes Lin Bentley MD   gabapentin (NEURONTIN) 400 MG capsule TAKE 1 CAPSULE BY MOUTH THREE TIMES DAILY  Lin Bentley MD      Family History   Problem Relation Age of Onset    Diabetes Mother     Cancer Mother         pancreatic    Heart Failure Father         began age 76,  age 78     Social History     Tobacco Use    Smoking status: Never Smoker    Smokeless tobacco: Never Used    Tobacco comment: advised not to start   Vaping Use    Vaping Use: Never used   Substance Use Topics    Alcohol use: Not Currently     Comment: rare    Drug use: No      LAST LABS  Cholesterol, Total   Date Value Ref Range Status   05/07/2021 192 0 - 199 mg/dL Final     LDL Calculated   Date Value Ref Range Status   05/07/2021 103 (H) <100 mg/dL Final     HDL   Date Value Ref Range Status   05/07/2021 47 40 - 60 mg/dL Final     Triglycerides   Date Value Ref Range Status   05/07/2021 211 (H) 0 - 150 mg/dL Final     Lab Results   Component Value Date    GLUCOSE 275 (H) 05/07/2021     Lab Results   Component Value Date     05/07/2021    K 4.7 05/07/2021    CREATININE 0.9 05/07/2021     Lab Results   Component Value Date    WBC 7.6 06/26/2020    HGB 15.4 06/26/2020    HCT 45.1 06/26/2020    MCV 95.6 06/26/2020     06/26/2020     Lab Results   Component Value Date    ALT 28 05/07/2021    AST 24 05/07/2021    ALKPHOS 90 05/07/2021    BILITOT 0.6 05/07/2021     No results found for: TSH  Lab Results   Component Value Date    LABA1C 9.2 09/30/2021      Objective:   PHYSICAL EXAM:  There were no vitals taken for this visit. BP Readings from Last 5 Encounters:   10/01/21 118/66   09/30/21 136/82   05/07/21 122/82   02/25/21 (!) 170/98   02/15/21 (!) 147/90     Wt Readings from Last 5 Encounters:   10/01/21 274 lb (124.3 kg)   09/30/21 272 lb (123.4 kg)   05/07/21 276 lb (125.2 kg)   02/15/21 278 lb 14.1 oz (126.5 kg)   10/30/20 283 lb (128.4 kg)      GENERAL:   · well-developed, well-nourished, alert, no distress. EYES:   · External findings: lids and lashes normal and conjunctivae and sclerae normal  · Eyes: no periorbital cellulitis.   HENT:   · Normocephalic, atraumatic  · External nose and ears appear normal  · Mucous membranes are moist  · Hearing grossly normal.     NECK: No visible masses  LUNGS:    · Respiratory effort normal.  · No visualized signs of difficulty breathing or respiratory distress  SKIN: warm and dry  · No significant exanthematous lesions or discoloration noted on facial skin  PSYCH:    · Alert and oriented, able to follow commands  · Normal reasoning, insight good  · Normal affect  · No memory disturbance noted  NEURO:   No Facial Asymmetry (Cranial nerve 7 motor function) (limited exam to video visit)      No gaze palsy      Assessment and Plan:      Diagnosis Orders   1. Acute bronchitis, unspecified organism  benzonatate (TESSALON PERLES) 100 MG capsule    albuterol sulfate  (90 Base) MCG/ACT inhaler    azithromycin (ZITHROMAX Z-SIMON) 250 MG tablet    methylPREDNISolone (MEDROL, SIMON,) 4 MG tablet     Call if not getting better or getting worse    Virtual Visit (video visit) encounter employed to address concerns as mentioned above. A caregiver was present when appropriate. Due to this being a TeleHealth encounter (During JJA-08 public health emergency), evaluation of the following organ systems was limited. Pursuant to the emergency declaration under the [address] [place] [address] and the [employer] Act, this Virtual Visit was conducted with patient's (and/or legal guardian's) consent, to reduce the patient's risk of exposure to COVID-19 and provide necessary medical care. The patient (and/or legal guardian) has also been advised to contact this office for worsening conditions or problems, and seek emergency medical treatment and/or call 911 if deemed necessary. Services were provided through a video synchronous discussion virtually to substitute for in-person clinic visit. Patient and provider were located at their individual homes. minutes: 5-10 minutes were spent on the digital evaluation and management of this patient. --Marily Parker MD on 12/7/2021 at 1:09 PM    An electronic signature was used to authenticate this note.

## 2021-12-07 NOTE — TELEPHONE ENCOUNTER
----- Message from Jed Chen sent at 12/6/2021  5:18 PM EST -----  Subject: Appointment Request    Reason for Call: Semi-Routine Cough, Cold Symptoms    QUESTIONS  Type of Appointment? Established Patient  Reason for appointment request? No appointments available during search  Additional Information for Provider? Pt called in and said that he has   been having a lot of coughing and headache and wanted to schedule an appt   to be seen. Pt said this has been going on for 2 weeks. He is okay with a   VV he just wants something for a cough. Feels like he is in a tunnel, no   fever , no chills, he said he can feel it in his sinus cavity.  ---------------------------------------------------------------------------  --------------  CALL BACK INFO  What is the best way for the office to contact you? OK to leave message on   voicemail  Preferred Call Back Phone Number? [phone]  ---------------------------------------------------------------------------  --------------  SCRIPT ANSWERS  Relationship to Patient? Self  Are you currently unable to finish sentences due to any difficulty   breathing? No  Are you unable to swallow liquids? No  Are you having fevers (100.4 or greater), chills, or sweats? No  Do you have COPD, asthma or a chronic lung condition? No  Have your symptoms been present for more than 5 days? No  Have you recently (14 days) been seen by a provider for this issue? No  Have you been diagnosed with, awaiting test results for, or told that you   are suspected of having COVID-19 (Coronavirus)? (If patient has tested   negative or was tested as a requirement for work, school, or travel and   not based on symptoms, answer no)? No  Within the past two weeks have you developed any of the following symptoms   (answer no if symptoms have been present longer than 2 weeks or began   more than 2 weeks ago)?  Fever or Chills, Cough, Shortness of breath or   difficulty breathing, Loss of taste or smell, Sore throat, Nasal   congestion, Sneezing or runny nose, Fatigue or generalized body aches   (answer no if pain is specific to a body part e.g. back pain), Diarrhea,   Headache?  Yes

## 2021-12-24 DIAGNOSIS — J20.9 ACUTE BRONCHITIS, UNSPECIFIED ORGANISM: ICD-10-CM

## 2021-12-27 RX ORDER — ROSUVASTATIN CALCIUM 40 MG/1
40 TABLET, COATED ORAL DAILY
Qty: 90 TABLET | Refills: 1 | Status: SHIPPED | OUTPATIENT
Start: 2021-12-27 | End: 2022-06-23

## 2021-12-27 RX ORDER — BENZONATATE 100 MG/1
CAPSULE ORAL
Qty: 30 CAPSULE | Refills: 0 | Status: SHIPPED | OUTPATIENT
Start: 2021-12-27 | End: 2022-02-24

## 2021-12-28 DIAGNOSIS — F41.9 ANXIETY: ICD-10-CM

## 2021-12-29 RX ORDER — ALPRAZOLAM 0.5 MG/1
TABLET ORAL
Qty: 90 TABLET | Refills: 1 | Status: SHIPPED | OUTPATIENT
Start: 2021-12-29 | End: 2022-04-01

## 2022-01-03 RX ORDER — VALSARTAN AND HYDROCHLOROTHIAZIDE 320; 12.5 MG/1; MG/1
TABLET, FILM COATED ORAL
Qty: 90 TABLET | Refills: 0 | Status: SHIPPED | OUTPATIENT
Start: 2022-01-03 | End: 2022-04-06

## 2022-01-07 DIAGNOSIS — Z79.4 CONTROLLED TYPE 2 DIABETES MELLITUS WITH OTHER CIRCULATORY COMPLICATION, WITH LONG-TERM CURRENT USE OF INSULIN (HCC): ICD-10-CM

## 2022-01-07 DIAGNOSIS — E11.59 CONTROLLED TYPE 2 DIABETES MELLITUS WITH OTHER CIRCULATORY COMPLICATION, WITH LONG-TERM CURRENT USE OF INSULIN (HCC): ICD-10-CM

## 2022-01-10 ENCOUNTER — TELEPHONE (OUTPATIENT)
Dept: FAMILY MEDICINE CLINIC | Age: 67
End: 2022-01-10

## 2022-01-10 DIAGNOSIS — R05.9 COUGH: Primary | ICD-10-CM

## 2022-01-10 RX ORDER — IBUPROFEN 200 MG
TABLET ORAL
Qty: 100 EACH | Refills: 3 | Status: SHIPPED | OUTPATIENT
Start: 2022-01-10 | End: 2022-02-24

## 2022-01-17 DIAGNOSIS — R05.9 COUGH: ICD-10-CM

## 2022-01-18 ENCOUNTER — VIRTUAL VISIT (OUTPATIENT)
Dept: FAMILY MEDICINE CLINIC | Age: 67
End: 2022-01-18
Payer: MEDICARE

## 2022-01-18 ENCOUNTER — HOSPITAL ENCOUNTER (OUTPATIENT)
Age: 67
Discharge: HOME | End: 2022-01-18
Payer: MEDICARE

## 2022-01-18 ENCOUNTER — HOSPITAL ENCOUNTER (OUTPATIENT)
Dept: GENERAL RADIOLOGY | Age: 67
Discharge: HOME | End: 2022-01-18
Payer: MEDICARE

## 2022-01-18 DIAGNOSIS — J18.9 PNEUMONIA DUE TO INFECTIOUS ORGANISM, UNSPECIFIED LATERALITY, UNSPECIFIED PART OF LUNG: Primary | ICD-10-CM

## 2022-01-18 DIAGNOSIS — Z79.4 TYPE 2 DIABETES MELLITUS WITH DIABETIC PERIPHERAL ANGIOPATHY WITHOUT GANGRENE, WITH LONG-TERM CURRENT USE OF INSULIN (HCC): ICD-10-CM

## 2022-01-18 DIAGNOSIS — E11.51 TYPE 2 DIABETES MELLITUS WITH DIABETIC PERIPHERAL ANGIOPATHY WITHOUT GANGRENE, WITH LONG-TERM CURRENT USE OF INSULIN (HCC): ICD-10-CM

## 2022-01-18 DIAGNOSIS — J18.9 PNEUMONIA DUE TO INFECTIOUS ORGANISM, UNSPECIFIED LATERALITY, UNSPECIFIED PART OF LUNG: ICD-10-CM

## 2022-01-18 DIAGNOSIS — E66.01 OBESITY, CLASS III, BMI 40-49.9 (MORBID OBESITY) (HCC): ICD-10-CM

## 2022-01-18 LAB — SARS-COV-2: DETECTED

## 2022-01-18 PROCEDURE — 99214 OFFICE O/P EST MOD 30 MIN: CPT | Performed by: FAMILY MEDICINE

## 2022-01-18 PROCEDURE — G8427 DOCREV CUR MEDS BY ELIG CLIN: HCPCS | Performed by: FAMILY MEDICINE

## 2022-01-18 PROCEDURE — 4040F PNEUMOC VAC/ADMIN/RCVD: CPT | Performed by: FAMILY MEDICINE

## 2022-01-18 PROCEDURE — 3017F COLORECTAL CA SCREEN DOC REV: CPT | Performed by: FAMILY MEDICINE

## 2022-01-18 PROCEDURE — 1123F ACP DISCUSS/DSCN MKR DOCD: CPT | Performed by: FAMILY MEDICINE

## 2022-01-18 PROCEDURE — 71046 X-RAY EXAM CHEST 2 VIEWS: CPT

## 2022-01-18 RX ORDER — DEXTROMETHORPHAN HYDROBROMIDE AND PROMETHAZINE HYDROCHLORIDE 15; 6.25 MG/5ML; MG/5ML
5 SYRUP ORAL 4 TIMES DAILY PRN
Qty: 240 ML | Refills: 0 | Status: SHIPPED | OUTPATIENT
Start: 2022-01-18 | End: 2022-01-26 | Stop reason: SDUPTHER

## 2022-01-18 RX ORDER — LEVOFLOXACIN 500 MG/1
500 TABLET, FILM COATED ORAL DAILY
Qty: 7 TABLET | Refills: 0 | Status: SHIPPED | OUTPATIENT
Start: 2022-01-18 | End: 2022-01-25

## 2022-01-18 RX ORDER — INSULIN GLARGINE 100 [IU]/ML
INJECTION, SOLUTION SUBCUTANEOUS
Qty: 10 ML | Refills: 2 | Status: SHIPPED | OUTPATIENT
Start: 2022-01-18 | End: 2022-02-24 | Stop reason: SDUPTHER

## 2022-01-18 NOTE — PROGRESS NOTES
TELEHEALTH EVALUATION -- Audio/Visual (During OKUFJ-01 public health emergency)  VIDEO VISIT- patient and provider not at same location  Also present:none. UPPER RESPIRATORY VISIT  Subjective:      Chief Complaint   Patient presents with    URI      Obie Brito is a 78 y.o. male who presents for evaluation of symptoms of URI. Onset of symptoms was 3 weeks ago. Symptoms include achiness, congestion, facial pain, headache described as pounding, lightheadedness, low grade fever, post nasal drip, productive cough with  yellow colored sputum, sinus pressure and wheezing and are unchanged since that time. Loss smell and tastte    1-17-22 COVID pending  12/7/21 VV for bronchitis- Z-sallie, medrol dose pack got better for a better    Denies: achiness, sneezing and wheezing. Tried OTC: cough suppressant of choice with little relief of symptoms     pt has been using inhalers if wheeze and it helps, and tesslon pears and did a couple rounds of antibiotics, says he has lost taste and smell, very fatigue. Would feel better then get bad again. He isn't sleeping, sleep deprivation is the worse he can only sleep two hours a night because of cough     Review of Systems   General ROS: fever? Yes, but not checking   Night sweats? Yes, after chills x 2 weeks  Ophthalmic ROS: change in vision? No  Endocrine ROS: fatigue? Yes   Unexpected weight changes? No  Hematological and Lymphatic ROS: easy bruising? No   Swollen lymph nodes? No  ENT ROS: headaches? Yes   Sore throat? No  Respiratory ROS: cough? Yes   Wheezing? Yes  Cardiovascular ROS: chest pain? No   Shortness of breath? No  Gastrointestinal ROS: abdominal pain? No   Change in stools?  No    *Chief complaint, HPI, History and ROS provided by the medical assistant has been reviewed and verified by provider- La Brown MD    CHART REVIEW  Health Maintenance   Topic Date Due    Shingles Vaccine (1 of 2) Never done    Colon Cancer Screen FIT/FOBT  08/01/2019    A1C test (Diabetic or Prediabetic)  12/30/2021    Diabetic retinal exam  03/12/2022    Diabetic foot exam  05/07/2022    Diabetic microalbuminuria test  05/07/2022    Lipid screen  05/07/2022    Potassium monitoring  05/07/2022    Creatinine monitoring  05/07/2022    Depression Monitoring  09/30/2022    Annual Wellness Visit (AWV)  10/01/2022    DTaP/Tdap/Td vaccine (2 - Td or Tdap) 11/16/2027    Hepatitis A vaccine  Completed    Flu vaccine  Completed    Pneumococcal 65+ years Vaccine  Completed    COVID-19 Vaccine  Completed    Hepatitis C screen  Completed    Hib vaccine  Aged Out    Meningococcal (ACWY) vaccine  Aged Out     The 10-year ASCVD risk score (Joycelyn Stearns, et al., 2013) is: 25.2%    Values used to calculate the score:      Age: 78 years      Sex: Male      Is Non- : No      Diabetic: Yes      Tobacco smoker: No      Systolic Blood Pressure: 574 mmHg      Is BP treated: Yes      HDL Cholesterol: 47 mg/dL      Total Cholesterol: 192 mg/dL  Prior to Visit Medications    Medication Sig Taking? Authorizing Provider   levoFLOXacin (LEVAQUIN) 500 MG tablet Take 1 tablet by mouth daily for 7 days Yes Ese Aguilar MD   promethazine-dextromethorphan (PROMETHAZINE-DM) 6.25-15 MG/5ML syrup Take 5 mLs by mouth 4 times daily as needed for Cough Watch for sedation.  Yes Ese Aguilar MD   INSULIN SYRINGE .5CC/29G 29G X 1/2\" 0.5 ML MISC USE DAILY Yes sEe Aguilar MD   valsartan-hydroCHLOROthiazide (DIOVAN-HCT) 320-12.5 MG per tablet TAKE 1 TABLET BY MOUTH EVERY DAY Yes KARYN Chicas - CNP   ALPRAZolam (XANAX) 0.5 MG tablet TAKE 1 TABLET BY MOUTH THREE TIMES DAILY Yes Ese Aguilar MD   benzonatate (TESSALON) 100 MG capsule TAKE 1 TO 2 CAPSULES BY MOUTH THREE TIMES DAILY AS NEEDED FOR COUGH Yes Ese Aguilar MD   rosuvastatin (CRESTOR) 40 MG tablet TAKE 1 TABLET BY MOUTH DAILY Yes Ese Aguilar MD   escitalopram (LEXAPRO) 5 MG tablet TAKE 1 TABLET BY MOUTH DAILY Yes Irina Goins Cinyd Fontana MD   albuterol sulfate  (90 Base) MCG/ACT inhaler Inhale 2 puffs into the lungs every 4 hours as needed for Wheezing May substitute for insurance preferred (Ventolin, Proventil, ProAir) Yes Ese Aguilar MD   INVOKANA 300 MG TABS tablet TAKE 1 TABLET BY Bhavesh Vasquez Yes Ese Aguilar MD   oxyCODONE-acetaminophen (PERCOCET) 5-325 MG per tablet TAKE 1 TABLET BY MOUTH UP TO EVERY 8 HOURS AS NEEDED. FOR USE 9/12/21 TO 10/11/21. Yes August Goetz, MD   ezetimibe (ZETIA) 10 MG tablet Take 1 tablet by mouth daily Yes Evans Ramos MD   metoprolol succinate (TOPROL XL) 25 MG extended release tablet Take 1 tablet by mouth daily Yes Evans Ramos MD   Dulaglutide (TRULICITY) 5.57 TU/2.8SD SOPN Inject 0.75 mg into the skin once a week Yes KARYN Jackson - CNP   gabapentin (NEURONTIN) 400 MG capsule TAKE 1 CAPSULE BY MOUTH THREE TIMES DAILY Yes Ese Aguilar MD   LANTUS 100 UNIT/ML injection vial ADMINISTER 50 UNITS UNDER THE SKIN EVERY NIGHT Yes Ese Aguilar MD   metFORMIN (GLUCOPHAGE-XR) 500 MG extended release tablet TAKE 4 TABLETS BY MOUTH EVERY DAY WITH BREAKFAST Yes Ese Aguilar MD   fluocinonide (LIDEX) 0.05 % cream Apply topically 2 times daily. Yes Ese Aguilar MD   aspirin 81 MG EC tablet Take 4 tablets by mouth daily Yes Evans Ramos MD   Emollient (AQUAPHOR ADVANCED THERAPY) OINT Apply three times per day as needed Yes Ese Aguilar MD   Lancets MISC 1 each by Does not apply route 2 times daily Yes Ese Aguilar MD   blood glucose monitor kit and supplies Test 2 times a day & as needed for symptoms of irregular blood glucose. Yes Ese Aguilar MD   blood glucose monitor strips Test 2 times a day & as needed for symptoms of irregular blood glucose.  Yes Ese Aguilar MD   entecavir (BARACLUDE) 1 MG tablet Take 1 mg by mouth daily  Yes Ese Aguilar MD   glucose blood VI test strips (ASCENSIA AUTODISC VI;ONE TOUCH ULTRA TEST VI) strip Apply 1 each topically 3 times daily. Onetouch Ultra 2 test strips  Dx: 250.00 Yes Carroll Esteban MD   [facility] MISC 1 each by Does not apply route 3 times daily. Yes Carroll Esteban MD      Family History   Problem Relation Age of Onset    Diabetes Mother     Cancer Mother         pancreatic    Heart Failure Father         began age 76,  age 78     Social History     Tobacco Use    Smoking status: Never Smoker    Smokeless tobacco: Never Used    Tobacco comment: advised not to start   Vaping Use    Vaping Use: Never used   Substance Use Topics    Alcohol use: Not Currently     Comment: rare    Drug use: No      LAST LABS  Cholesterol, Total   Date Value Ref Range Status   2021 192 0 - 199 mg/dL Final     LDL Calculated   Date Value Ref Range Status   2021 103 (H) <100 mg/dL Final     HDL   Date Value Ref Range Status   2021 47 40 - 60 mg/dL Final     Triglycerides   Date Value Ref Range Status   2021 211 (H) 0 - 150 mg/dL Final     Lab Results   Component Value Date    GLUCOSE 275 (H) 2021     Lab Results   Component Value Date     2021    K 4.7 2021    CREATININE 0.9 2021     Lab Results   Component Value Date    WBC 7.6 2020    HGB 15.4 2020    HCT 45.1 2020    MCV 95.6 2020     2020     Lab Results   Component Value Date    ALT 28 2021    AST 24 2021    ALKPHOS 90 2021    BILITOT 0.6 2021     No results found for: TSH  Lab Results   Component Value Date    LABA1C 9.2 2021      Objective:   PHYSICAL EXAM:  There were no vitals taken for this visit.   BP Readings from Last 5 Encounters:   10/01/21 118/66   21 136/82   21 122/82   21 (!) 170/98   02/15/21 (!) 147/90     Wt Readings from Last 5 Encounters:   10/01/21 274 lb (124.3 kg)   21 272 lb (123.4 kg)   21 276 lb (125.2 kg)   02/15/21 278 lb 14.1 oz (126.5 kg)   10/30/20 283 lb (128.4 kg)      GENERAL: · well-developed, well-nourished, alert, no distress. EYES:   · External findings: lids and lashes normal and conjunctivae and sclerae normal  · Eyes: no periorbital cellulitis. HENT:   · Normocephalic, atraumatic  · External nose and ears appear normal  · Mucous membranes are moist  · Hearing grossly normal.     NECK: No visible masses  LUNGS:    · Respiratory effort normal.  · No visualized signs of difficulty breathing or respiratory distress  SKIN: warm and dry  · No significant exanthematous lesions or discoloration noted on facial skin  PSYCH:    · Alert and oriented, able to follow commands  · Normal reasoning, insight good  · Normal affect  · No memory disturbance noted  NEURO:   No Facial Asymmetry (Cranial nerve 7 motor function) (limited exam to video visit)      No gaze palsy      Assessment and Plan:      Diagnosis Orders   1. Pneumonia due to infectious organism, unspecified laterality, unspecified part of lung  levoFLOXacin (LEVAQUIN) 500 MG tablet    promethazine-dextromethorphan (PROMETHAZINE-DM) 6.25-15 MG/5ML syrup    XR CHEST (2 VW)   2. Obesity, Class III, BMI 40-49.9 (morbid obesity) ([facility] [place] [address]     Plan aoove and below. Presumed secondary pneumonia at this time. Virtual Visit (video visit) encounter employed to address concerns as mentioned above. A caregiver was present when appropriate. Due to this being a TeleHealth encounter (During LQGAL-79 public health emergency), evaluation of the following organ systems was limited. Pursuant to the emergency declaration under the [facility], [address] [place] [address] authority and the [employer] Act, this Virtual Visit was conducted with patient's (and/or legal guardian's) consent, to reduce the patient's risk of exposure to COVID-19 and provide necessary medical care.   The patient (and/or legal guardian) has also been advised to contact this office for worsening conditions or problems, and seek emergency medical treatment and/or call 911 if deemed necessary. Services were provided through a video synchronous discussion virtually to substitute for in-person clinic visit. Patient and provider were located at their individual homes. minutes: 11-20 minutes were spent on the digital evaluation and management of this patient. --Chantel Rosa MD on 1/18/2022 at 10:15 AM    An electronic signature was used to authenticate this note.

## 2022-01-23 DIAGNOSIS — E11.9 TYPE 2 DIABETES MELLITUS WITHOUT COMPLICATION, WITHOUT LONG-TERM CURRENT USE OF INSULIN (HCC): ICD-10-CM

## 2022-01-24 RX ORDER — METFORMIN HYDROCHLORIDE 500 MG/1
TABLET, EXTENDED RELEASE ORAL
Qty: 360 TABLET | Refills: 1 | OUTPATIENT
Start: 2022-01-24

## 2022-01-24 RX ORDER — METFORMIN HYDROCHLORIDE 500 MG/1
TABLET, EXTENDED RELEASE ORAL
Qty: 360 TABLET | Refills: 1 | Status: SHIPPED | OUTPATIENT
Start: 2022-01-24 | End: 2022-04-04

## 2022-01-26 ENCOUNTER — TELEPHONE (OUTPATIENT)
Dept: FAMILY MEDICINE CLINIC | Age: 67
End: 2022-01-26

## 2022-01-26 DIAGNOSIS — J18.9 PNEUMONIA DUE TO INFECTIOUS ORGANISM, UNSPECIFIED LATERALITY, UNSPECIFIED PART OF LUNG: ICD-10-CM

## 2022-01-26 RX ORDER — DEXTROMETHORPHAN HYDROBROMIDE AND PROMETHAZINE HYDROCHLORIDE 15; 6.25 MG/5ML; MG/5ML
5 SYRUP ORAL 4 TIMES DAILY PRN
Qty: 240 ML | Refills: 0 | Status: SHIPPED | OUTPATIENT
Start: 2022-01-26 | End: 2022-04-01

## 2022-01-26 NOTE — TELEPHONE ENCOUNTER
He doesn't need to test again unless he would like to but he could be positive for up to a month after

## 2022-01-26 NOTE — TELEPHONE ENCOUNTER
Pt calling stating that he still has a lingering cough from having covid. Stated that his cough is productive sometimes but the mucus is more clear. Stated that the cough is waking him up throughout the night. Stated that he would like to get more cough syrup promethazine-DM because it was helping but he is out. Pharm Confirmed - [facility] on [address]. Stated that he tested positive on 1.17.2022 and was wondering when he should test again. Stated that he would not need it for an occupation but was wondering when he could back out with people.     Please Advise  Pt can be reached at [phone]

## 2022-01-26 NOTE — TELEPHONE ENCOUNTER
Cough syrup sent in. Can be around others with usual precautions (mask on) 10 days after start of symptoms.

## 2022-02-16 ENCOUNTER — TELEMEDICINE (OUTPATIENT)
Dept: FAMILY MEDICINE CLINIC | Age: 67
End: 2022-02-16
Payer: MEDICARE

## 2022-02-16 DIAGNOSIS — J06.9 VIRAL URI: Primary | ICD-10-CM

## 2022-02-16 PROCEDURE — 1036F TOBACCO NON-USER: CPT | Performed by: FAMILY MEDICINE

## 2022-02-16 PROCEDURE — 3017F COLORECTAL CA SCREEN DOC REV: CPT | Performed by: FAMILY MEDICINE

## 2022-02-16 PROCEDURE — G8417 CALC BMI ABV UP PARAM F/U: HCPCS | Performed by: FAMILY MEDICINE

## 2022-02-16 PROCEDURE — 1123F ACP DISCUSS/DSCN MKR DOCD: CPT | Performed by: FAMILY MEDICINE

## 2022-02-16 PROCEDURE — G8427 DOCREV CUR MEDS BY ELIG CLIN: HCPCS | Performed by: FAMILY MEDICINE

## 2022-02-16 PROCEDURE — 99213 OFFICE O/P EST LOW 20 MIN: CPT | Performed by: FAMILY MEDICINE

## 2022-02-16 PROCEDURE — G8484 FLU IMMUNIZE NO ADMIN: HCPCS | Performed by: FAMILY MEDICINE

## 2022-02-16 PROCEDURE — 4040F PNEUMOC VAC/ADMIN/RCVD: CPT | Performed by: FAMILY MEDICINE

## 2022-02-16 NOTE — PROGRESS NOTES
2/16/2022    TELEHEALTH EVALUATION -- Audio/Visual (During [facility])    Chief Complaint   Patient presents with    Cough     Pt has laittle bit of a cough and some congestion. Pt said that he is starting better, but worried about it coming back. 1. Viral URI        Started 3-4 d ago with fatigue, congestion and some wheeze and used inhaler. No fever. Feels little better today. Dx COVID 3 wks ago. -200    HISTORY:  Patient's medications, allergies, past medical, and social histories were reviewed and updated as appropriate. CHART REVIEW  Health Maintenance   Topic Date Due    Shingles Vaccine (1 of 2) Never done    Colorectal Cancer Screen  08/01/2019    A1C test (Diabetic or Prediabetic)  12/30/2021    Diabetic retinal exam  03/12/2022    Diabetic foot exam  05/07/2022    Diabetic microalbuminuria test  05/07/2022    Lipid screen  05/07/2022    Potassium monitoring  05/07/2022    Creatinine monitoring  05/07/2022    Depression Monitoring  09/30/2022    Annual Wellness Visit (AWV)  10/01/2022    DTaP/Tdap/Td vaccine (2 - Td or Tdap) 11/16/2027    Hepatitis A vaccine  Completed    Flu vaccine  Completed    Pneumococcal 65+ years Vaccine  Completed    COVID-19 Vaccine  Completed    Hepatitis C screen  Completed    Hib vaccine  Aged Out    Meningococcal (ACWY) vaccine  Aged Out     The 10-year ASCVD risk score (Emili Pham, et al., 2013) is: 25.2%    Values used to calculate the score:      Age: 78 years      Sex: Male      Is Non- : No      Diabetic: Yes      Tobacco smoker: No      Systolic Blood Pressure: 889 mmHg      Is BP treated: Yes      HDL Cholesterol: 47 mg/dL      Total Cholesterol: 192 mg/dL  Prior to Visit Medications    Medication Sig Taking?  Authorizing Provider   metFORMIN (GLUCOPHAGE-XR) 500 MG extended release tablet TAKE 4 TABLETS BY MOUTH EVERY DAY WITH BREAKFAST Yes Carroll Esteban MD   LANTUS 100 UNIT/ML injection vial ADMINISTER 50 UNITS UNDER THE SKIN EVERY NIGHT Yes Minna Ayers MD   INSULIN SYRINGE .5CC/29G 29G X 1/2\" 0.5 ML MISC USE DAILY Yes Minna Ayers MD   valsartan-hydroCHLOROthiazide (DIOVAN-HCT) 320-12.5 MG per tablet TAKE 1 TABLET BY MOUTH EVERY DAY Yes KARYN Catalan - CNP   ALPRAZolam (XANAX) 0.5 MG tablet TAKE 1 TABLET BY MOUTH THREE TIMES DAILY Yes Minna Ayers MD   rosuvastatin (CRESTOR) 40 MG tablet TAKE 1 TABLET BY MOUTH DAILY Yes Minna Ayers MD   escitalopram (LEXAPRO) 5 MG tablet TAKE 1 TABLET BY MOUTH DAILY Yes Minna Ayers MD   albuterol sulfate  (90 Base) MCG/ACT inhaler Inhale 2 puffs into the lungs every 4 hours as needed for Wheezing May substitute for insurance preferred (Ventolin, Proventil, ProAir) Yes Minna Ayers MD   INVOKANA 300 MG TABS tablet TAKE 1 [address] Yes Minna Ayers MD   oxyCODONE-acetaminophen (PERCOCET) 5-325 MG per tablet TAKE 1 TABLET BY MOUTH UP TO EVERY 8 HOURS AS NEEDED. FOR USE 9/12/21 TO 10/11/21. Yes Historical Provider, MD   ezetimibe (ZETIA) 10 MG tablet Take 1 tablet by mouth daily Yes Rochelle Sifuentes MD   fluocinonide (LIDEX) 0.05 % cream Apply topically 2 times daily. Yes Minna Ayers MD   aspirin 81 MG EC tablet Take 4 tablets by mouth daily Yes Rochelle Sifuentes MD   Emollient (AQUAPHOR ADVANCED THERAPY) OINT Apply three times per day as needed Yes Minna Ayers MD   Lancets MISC 1 each by Does not apply route 2 times daily Yes Minna Ayers MD   blood glucose monitor kit and supplies Test 2 times a day & as needed for symptoms of irregular blood glucose. Yes Minna Ayers MD   blood glucose monitor strips Test 2 times a day & as needed for symptoms of irregular blood glucose. Yes Minna Ayers MD   entecavir (BARACLUDE) 1 MG tablet Take 1 mg by mouth daily  Yes Minna Ayers MD   glucose blood VI test strips (ASCENSIA AUTODISC VI;ONE TOUCH ULTRA TEST VI) strip Apply 1 each topically 3 times daily. Onetouch Ultra 2 test strips  Dx: 250.00 Yes Chelsea Jarvis MD   [facility] MISC 1 each by Does not apply route 3 times daily.  Yes Chelsea Jarvis MD   benzonatate (TESSALON) 100 MG capsule TAKE 1 TO 2 CAPSULES BY MOUTH THREE TIMES DAILY AS NEEDED FOR COUGH  Patient not taking: Reported on 2022  Chelsea Jarvis MD   metoprolol succinate (TOPROL XL) 25 MG extended release tablet Take 1 tablet by mouth daily  Patient not taking: Reported on 2022  Akshat Sanabria MD   Dulaglutide (TRULICITY) 3.00 EM/6.4UU SOPN Inject 0.75 mg into the skin once a week  Patient not taking: Reported on 2022  KARYN Villegas - CNP   gabapentin (NEURONTIN) 400 MG capsule TAKE 1 CAPSULE BY MOUTH THREE TIMES DAILY  Chelsea Jarvis MD      Family History   Problem Relation Age of Onset    Diabetes Mother     Cancer Mother         pancreatic    Heart Failure Father         began age 76,  age 78     Social History     Tobacco Use    Smoking status: Never Smoker    Smokeless tobacco: Never Used    Tobacco comment: advised not to start   Vaping Use    Vaping Use: Never used   Substance Use Topics    Alcohol use: Not Currently     Comment: rare    Drug use: No      LAST LABS  Cholesterol, Total   Date Value Ref Range Status   2021 192 0 - 199 mg/dL Final     LDL Calculated   Date Value Ref Range Status   2021 103 (H) <100 mg/dL Final     HDL   Date Value Ref Range Status   2021 47 40 - 60 mg/dL Final     Triglycerides   Date Value Ref Range Status   2021 211 (H) 0 - 150 mg/dL Final     Lab Results   Component Value Date    GLUCOSE 275 (H) 2021     Lab Results   Component Value Date     2021    K 4.7 2021    CREATININE 0.9 2021     Lab Results   Component Value Date    WBC 7.6 2020    HGB 15.4 2020    HCT 45.1 2020    MCV 95.6 2020     2020     Lab Results   Component Value Date    ALT 28 2021    AST 24 2021 ALKPHOS 90 05/07/2021    BILITOT 0.6 05/07/2021     No results found for: TSH  Lab Results   Component Value Date    LABA1C 9.2 09/30/2021      Objective:   PHYSICAL EXAM:  Vitals (if available)    BP Readings from Last 3 Encounters:   10/01/21 118/66   09/30/21 136/82   05/07/21 122/82     Wt Readings from Last 3 Encounters:   10/01/21 274 lb (124.3 kg)   09/30/21 272 lb (123.4 kg)   05/07/21 276 lb (125.2 kg)     GENERAL:   · well-developed, well-nourished, alert, no distress. EYES:   · External findings: lids and lashes normal and conjunctivae and sclerae normal  · Eyes: no periorbital cellulitis. HENT:   · Normocephalic, atraumatic  · External nose and ears appear normal  · Mucous membranes are moist  · Hearing grossly normal.     NECK: No visible masses  LUNGS:    · Respiratory effort normal.  · No visualized signs of difficulty breathing or respiratory distress  SKIN: warm and dry  · No significant exanthematous lesions or discoloration noted on facial skin  PSYCH:    · Alert and oriented, able to follow commands  · Normal reasoning, insight good  · Normal affect  · No memory disturbance noted  NEURO:   No Facial Asymmetry (Cranial nerve 7 motor function) (limited exam to video visit)      No gaze palsy      Assessment and Plan:      Diagnosis Orders   1. Viral URI     Plan below. INSTRUCTIONS  NEXT APPOINTMENT: May take Mucinex (guaifenisen) and Robitussin DM (guafenisen, dextromethorphan) for cough and congestion. May also try Vicks Vaporub. · AVOID decongestants like phenylephrine and pseudoephedrine. AVOID Afrin. [facility], was evaluated through a synchronous (real-time) audio-video encounter. The patient (or guardian if applicable) is aware that this is a billable service, which includes applicable co-pays. This Virtual Visit was conducted with patient's (and/or legal guardian's) consent.  The visit was conducted pursuant to the emergency declaration under the [address] and the

## 2022-02-20 DIAGNOSIS — M54.42 CHRONIC BILATERAL LOW BACK PAIN WITH BILATERAL SCIATICA: ICD-10-CM

## 2022-02-20 DIAGNOSIS — G89.29 CHRONIC BILATERAL LOW BACK PAIN WITH BILATERAL SCIATICA: ICD-10-CM

## 2022-02-20 DIAGNOSIS — G89.4 CHRONIC PAIN SYNDROME: ICD-10-CM

## 2022-02-20 DIAGNOSIS — M54.41 CHRONIC BILATERAL LOW BACK PAIN WITH BILATERAL SCIATICA: ICD-10-CM

## 2022-02-22 RX ORDER — GABAPENTIN 400 MG/1
CAPSULE ORAL
Qty: 90 CAPSULE | Refills: 0 | Status: SHIPPED | OUTPATIENT
Start: 2022-02-22 | End: 2022-03-22

## 2022-02-24 ENCOUNTER — OFFICE VISIT (OUTPATIENT)
Dept: FAMILY MEDICINE CLINIC | Age: 67
End: 2022-02-24
Payer: MEDICARE

## 2022-02-24 VITALS
BODY MASS INDEX: 40.29 KG/M2 | RESPIRATION RATE: 18 BRPM | SYSTOLIC BLOOD PRESSURE: 130 MMHG | WEIGHT: 272 LBS | HEIGHT: 69 IN | OXYGEN SATURATION: 96 % | DIASTOLIC BLOOD PRESSURE: 72 MMHG | HEART RATE: 79 BPM

## 2022-02-24 DIAGNOSIS — I25.10 CORONARY ARTERY DISEASE INVOLVING NATIVE CORONARY ARTERY OF NATIVE HEART WITHOUT ANGINA PECTORIS: ICD-10-CM

## 2022-02-24 DIAGNOSIS — R01.1 SYSTOLIC MURMUR: ICD-10-CM

## 2022-02-24 DIAGNOSIS — E11.51 TYPE 2 DIABETES MELLITUS WITH DIABETIC PERIPHERAL ANGIOPATHY WITHOUT GANGRENE, WITH LONG-TERM CURRENT USE OF INSULIN (HCC): ICD-10-CM

## 2022-02-24 DIAGNOSIS — E78.5 HYPERLIPIDEMIA LDL GOAL <70: ICD-10-CM

## 2022-02-24 DIAGNOSIS — E11.65 UNCONTROLLED TYPE 2 DIABETES MELLITUS WITH HYPERGLYCEMIA (HCC): Primary | ICD-10-CM

## 2022-02-24 DIAGNOSIS — Z79.4 TYPE 2 DIABETES MELLITUS WITH DIABETIC PERIPHERAL ANGIOPATHY WITHOUT GANGRENE, WITH LONG-TERM CURRENT USE OF INSULIN (HCC): ICD-10-CM

## 2022-02-24 DIAGNOSIS — I10 HYPERTENSION, ESSENTIAL: ICD-10-CM

## 2022-02-24 LAB — HBA1C MFR BLD: 8.2 %

## 2022-02-24 PROCEDURE — 3052F HG A1C>EQUAL 8.0%<EQUAL 9.0%: CPT | Performed by: FAMILY MEDICINE

## 2022-02-24 PROCEDURE — 1123F ACP DISCUSS/DSCN MKR DOCD: CPT | Performed by: FAMILY MEDICINE

## 2022-02-24 PROCEDURE — 83036 HEMOGLOBIN GLYCOSYLATED A1C: CPT | Performed by: FAMILY MEDICINE

## 2022-02-24 PROCEDURE — 99214 OFFICE O/P EST MOD 30 MIN: CPT | Performed by: FAMILY MEDICINE

## 2022-02-24 PROCEDURE — 2022F DILAT RTA XM EVC RTNOPTHY: CPT | Performed by: FAMILY MEDICINE

## 2022-02-24 PROCEDURE — G8417 CALC BMI ABV UP PARAM F/U: HCPCS | Performed by: FAMILY MEDICINE

## 2022-02-24 PROCEDURE — G8484 FLU IMMUNIZE NO ADMIN: HCPCS | Performed by: FAMILY MEDICINE

## 2022-02-24 PROCEDURE — G8427 DOCREV CUR MEDS BY ELIG CLIN: HCPCS | Performed by: FAMILY MEDICINE

## 2022-02-24 PROCEDURE — 4040F PNEUMOC VAC/ADMIN/RCVD: CPT | Performed by: FAMILY MEDICINE

## 2022-02-24 PROCEDURE — 3017F COLORECTAL CA SCREEN DOC REV: CPT | Performed by: FAMILY MEDICINE

## 2022-02-24 PROCEDURE — 1036F TOBACCO NON-USER: CPT | Performed by: FAMILY MEDICINE

## 2022-02-24 RX ORDER — INSULIN GLARGINE 100 [IU]/ML
60 INJECTION, SOLUTION SUBCUTANEOUS NIGHTLY
Qty: 10 ML | Refills: 2 | Status: SHIPPED | OUTPATIENT
Start: 2022-02-24 | End: 2022-05-02

## 2022-02-24 RX ORDER — IBUPROFEN 200 MG
1 TABLET ORAL DAILY
Qty: 100 EACH | Refills: 3 | Status: SHIPPED | OUTPATIENT
Start: 2022-02-24 | End: 2022-03-28 | Stop reason: SDUPTHER

## 2022-02-24 NOTE — PROGRESS NOTES
CHRONIC CONDITION FOLOW-UP    Subjective:      Chief Complaint   Patient presents with    Diabetes     Pt is here for a diabetic follow up. David Tang is an 78 y.o. male who presents for follow up of following chronic problems:  1. Type 2 diabetes mellitus with diabetic peripheral angiopathy without gangrene, with long-term current use of insulin ([facility] [address]    2. Hypertension, essential    3. Coronary artery disease involving native coronary artery of native heart without angina pectoris    4. Hyperlipidemia LDL goal <70    5. Systolic murmur      Complaints: none  · Mood good, stopped lexapro and energy better  · Rare xanax  · Stopped pain management doc, going to try CBD oil, sahara is helping    CHART REVIEW   reports that he has never smoked. He has never used smokeless tobacco.  Health Maintenance Due   Topic Date Due    Shingles Vaccine (1 of 2) Never done    Colorectal Cancer Screen  08/01/2019    A1C test (Diabetic or Prediabetic)  12/30/2021     Current Outpatient Medications   Medication Instructions    albuterol sulfate  (90 Base) MCG/ACT inhaler 2 puffs, Inhalation, EVERY 4 HOURS PRN, May substitute for insurance preferred (Ventolin, Proventil, ProAir)    ALPRAZolam (XANAX) 0.5 MG tablet TAKE 1 TABLET BY MOUTH THREE TIMES DAILY    aspirin 325 mg, Oral, DAILY    blood glucose monitor kit and supplies Test 2 times a day & as needed for symptoms of irregular blood glucose.  blood glucose monitor strips Test 2 times a day & as needed for symptoms of irregular blood glucose.  Emollient (AQUAPHOR ADVANCED THERAPY) OINT Apply three times per day as needed    entecavir (BARACLUDE) 1 mg, Oral, DAILY    ezetimibe (ZETIA) 10 mg, Oral, DAILY    fluocinonide (LIDEX) 0.05 % cream Apply topically 2 times daily.     gabapentin (NEURONTIN) 400 MG capsule TAKE 1 CAPSULE BY MOUTH THREE TIMES DAILY    glucose blood VI test strips (ASCENSIA AUTODISC VI;ONE TOUCH ULTRA TEST VI) strip 1 each, Topical, 3 TIMES DAILY, Onetouch Ultra 2 test strips  Dx: 250.00    INSULIN SYRINGE .5CC/29G 29G X 1/2\" 0.5 ML MISC USE DAILY    INVOKANA 300 MG TABS tablet TAKE 1 TABLET BY MOUTH EVERY MORNING BEFORE BREAKFAST    Lancets MISC 1 each, Does not apply, 2 TIMES DAILY    LANTUS 100 UNIT/ML injection vial ADMINISTER 50 UNITS UNDER THE SKIN EVERY NIGHT    metFORMIN (GLUCOPHAGE-XR) 500 MG extended release tablet TAKE 4 TABLETS BY MOUTH EVERY DAY WITH BREAKFAST    ONETOUCH DELICA LANCETS MISC 1 each, Does not apply, 3 TIMES DAILY    rosuvastatin (CRESTOR) 40 mg, Oral, DAILY    valsartan-hydroCHLOROthiazide (DIOVAN-HCT) 320-12.5 MG per tablet TAKE 1 TABLET BY MOUTH EVERY DAY       LAST LABS  Lab Results   Component Value Date    LDLCALC 103 (H) 05/07/2021    LDLDIRECT 114 (H) 03/25/2019     Lab Results   Component Value Date    HDL 47 05/07/2021     Lab Results   Component Value Date    TRIG 211 (H) 05/07/2021     Lab Results   Component Value Date     05/07/2021    K 4.7 05/07/2021    CREATININE 0.9 05/07/2021     Lab Results   Component Value Date    WBC 7.6 06/26/2020    HGB 15.4 06/26/2020     06/26/2020     Lab Results   Component Value Date    ALT 28 05/07/2021    AST 24 05/07/2021    ALKPHOS 90 05/07/2021    BILITOT 0.6 05/07/2021     No results found for: TSH  Lab Results   Component Value Date    GLUCOSE 275 (H) 05/07/2021     Lab Results   Component Value Date    LABA1C 9.2 09/30/2021    LABA1C 10.6 05/07/2021    LABA1C 8.9 08/05/2020     Objective:   PHYSICAL EXAM   /72 (Site: Right Upper Arm, Position: Sitting, Cuff Size: Large Adult)   Pulse 79   Resp 18   Ht 5' 9\" (1.753 m)   Wt 272 lb (123.4 kg)   SpO2 96%   BMI 40.17 kg/m²   BP Readings from Last 5 Encounters:   02/24/22 130/72   10/01/21 118/66   09/30/21 136/82   05/07/21 122/82   02/25/21 (!) 170/98     Wt Readings from Last 5 Encounters:   02/24/22 272 lb (123.4 kg)   10/01/21 274 lb (124.3 kg)   09/30/21 272 lb (123.4 kg) 05/07/21 276 lb (125.2 kg)   02/15/21 278 lb 14.1 oz (126.5 kg)      GENERAL:   · well-developed, well-nourished, alert, no distress. LUNGS:    · Breathing unlabored  · clear to auscultation bilaterally and good air movement  CARDIOVASC:   · regular rate and rhythm, 4/6 descrescendo systolic M, loudest L sternal border with radiation to carotids  · LEGS:  Lower extremity edema: none    SKIN: warm and dry     Assessment and Plan:      Diagnosis Orders   1. Type 2 diabetes mellitus with diabetic peripheral angiopathy without gangrene, with long-term current use of insulin (HCC)  POCT glycosylated hemoglobin (Hb A1C)   2. Hypertension, essential     3. Coronary artery disease involving native coronary artery of native heart without angina pectoris     4. Hyperlipidemia LDL goal <70     5. Systolic murmur  ECHO Complete 2D W Doppler W Color   Stable     Continue current Tx plan. Any changes marked below. INSTRUCTIONS  NEXT APPOINTMENT: Please schedule check-up in 3 months. · PLEASE TAKE THIS FORM TO CHECK-OUT WINDOW TO SCHEDULE NEXT VISIT. · Since you are taking a CONTROLLED MEDICATION, will need to be seen at least every 6 months AND will need to have a future appointment scheduled for any refills. Be sure to schedule on way out of office today to avoid denial of future refills. · Please do stool dip screen and return/mail back to office for colon cancer screening. Place on back of toilet. · INCREASE insulin to 60 units. Call with some sugar reading in 1-2 weeks for make further adjustment.

## 2022-02-24 NOTE — PATIENT INSTRUCTIONS
INSTRUCTIONS  NEXT APPOINTMENT: Please schedule check-up in 3 months. · PLEASE TAKE THIS FORM TO CHECK-OUT WINDOW TO SCHEDULE NEXT VISIT. · Since you are taking a CONTROLLED MEDICATION, will need to be seen at least every 6 months AND will need to have a future appointment scheduled for any refills. Be sure to schedule on way out of office today to avoid denial of future refills. · Please do stool dip screen and return/mail back to office for colon cancer screening. Place on back of toilet. · INCREASE insulin to 60 units. Call with some sugar reading in 1-2 weeks for make further adjustment.

## 2022-02-25 ENCOUNTER — HOSPITAL ENCOUNTER (OUTPATIENT)
Dept: NON INVASIVE DIAGNOSTICS | Age: 67
Discharge: HOME | End: 2022-02-25
Payer: MEDICARE

## 2022-02-25 DIAGNOSIS — R01.1 SYSTOLIC MURMUR: ICD-10-CM

## 2022-02-25 LAB
LV EF: 55 %
LVEF MODALITY: NORMAL

## 2022-02-25 PROCEDURE — 93306 TTE W/DOPPLER COMPLETE: CPT

## 2022-02-28 ENCOUNTER — TELEPHONE (OUTPATIENT)
Dept: FAMILY MEDICINE CLINIC | Age: 67
End: 2022-02-28

## 2022-02-28 PROBLEM — I35.0 AORTIC STENOSIS, MODERATE: Status: ACTIVE | Noted: 2022-02-28

## 2022-02-28 RX ORDER — CANAGLIFLOZIN 300 MG/1
TABLET, FILM COATED ORAL
Qty: 30 TABLET | Refills: 2 | Status: SHIPPED | OUTPATIENT
Start: 2022-02-28 | End: 2022-06-06

## 2022-02-28 NOTE — TELEPHONE ENCOUNTER
FYI- saw him for check up. New murmur. Mod-severe AS by ECHO. No symptoms. Should be seeing you soon.

## 2022-02-28 NOTE — TELEPHONE ENCOUNTER
Have noted the moderate aortic stenosis findings    Looking forward to seeing the patient at his next appointment

## 2022-03-02 ENCOUNTER — TELEPHONE (OUTPATIENT)
Dept: CARDIOLOGY CLINIC | Age: 67
End: 2022-03-02

## 2022-03-02 NOTE — TELEPHONE ENCOUNTER
I called and spoke with patient he reports symptoms of intermittent sharp pain in the right side close to center of his chest that occurs regardless of his activity. Denies that the pain radiates. He reports that he has also started noticing shortness of breath with activity and is concerned about his most recent echo results which revealed moderate to severe aortic stenosis. He had an upcoming with Dr. Mitzy Parker on 3/23 but he requested earlier follow up so he was scheduled by  to see Lionel Pearson CNP on 3/9/2022.

## 2022-03-02 NOTE — TELEPHONE ENCOUNTER
Edward Sandra called in this afternoon and originally had an appt with Dr. Cristal Goldberg on 3/24. He wanted to be seen sooner because he is not feeling right. Dr. Cristal Goldberg had no availability until then at [facility], nor at [place]. I set him up with Suze Werner for next week 3/9. He states he is having a weird pain on the right side of his chest. Not constant, but here and there and states he feels overall funny. Please contact him to further go over symptoms.     He can be reached back at [phone]

## 2022-03-04 DIAGNOSIS — F33.41 RECURRENT MAJOR DEPRESSIVE DISORDER, IN PARTIAL REMISSION (HCC): ICD-10-CM

## 2022-03-04 RX ORDER — ESCITALOPRAM OXALATE 5 MG/1
5 TABLET ORAL DAILY
Qty: 90 TABLET | Refills: 0 | Status: SHIPPED | OUTPATIENT
Start: 2022-03-04 | End: 2022-03-21
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
## 2022-03-21 ENCOUNTER — OFFICE VISIT (OUTPATIENT)
Dept: CARDIOLOGY CLINIC | Age: 67
End: 2022-03-21
Payer: MEDICARE

## 2022-03-21 VITALS
BODY MASS INDEX: 40.88 KG/M2 | HEIGHT: 69 IN | HEART RATE: 91 BPM | SYSTOLIC BLOOD PRESSURE: 118 MMHG | DIASTOLIC BLOOD PRESSURE: 74 MMHG | OXYGEN SATURATION: 97 % | WEIGHT: 276 LBS

## 2022-03-21 DIAGNOSIS — E78.5 HYPERLIPIDEMIA LDL GOAL <70: ICD-10-CM

## 2022-03-21 DIAGNOSIS — G89.4 CHRONIC PAIN SYNDROME: ICD-10-CM

## 2022-03-21 DIAGNOSIS — M54.42 CHRONIC BILATERAL LOW BACK PAIN WITH BILATERAL SCIATICA: ICD-10-CM

## 2022-03-21 DIAGNOSIS — G89.29 CHRONIC BILATERAL LOW BACK PAIN WITH BILATERAL SCIATICA: ICD-10-CM

## 2022-03-21 DIAGNOSIS — I25.10 CAD IN NATIVE ARTERY: ICD-10-CM

## 2022-03-21 DIAGNOSIS — M54.41 CHRONIC BILATERAL LOW BACK PAIN WITH BILATERAL SCIATICA: ICD-10-CM

## 2022-03-21 DIAGNOSIS — I10 ESSENTIAL HYPERTENSION: ICD-10-CM

## 2022-03-21 DIAGNOSIS — I35.0 NONRHEUMATIC AORTIC VALVE STENOSIS: Primary | ICD-10-CM

## 2022-03-21 PROCEDURE — 99214 OFFICE O/P EST MOD 30 MIN: CPT | Performed by: NURSE PRACTITIONER

## 2022-03-21 PROCEDURE — 4040F PNEUMOC VAC/ADMIN/RCVD: CPT | Performed by: NURSE PRACTITIONER

## 2022-03-21 PROCEDURE — 1036F TOBACCO NON-USER: CPT | Performed by: NURSE PRACTITIONER

## 2022-03-21 PROCEDURE — G8417 CALC BMI ABV UP PARAM F/U: HCPCS | Performed by: NURSE PRACTITIONER

## 2022-03-21 PROCEDURE — 1123F ACP DISCUSS/DSCN MKR DOCD: CPT | Performed by: NURSE PRACTITIONER

## 2022-03-21 PROCEDURE — G8427 DOCREV CUR MEDS BY ELIG CLIN: HCPCS | Performed by: NURSE PRACTITIONER

## 2022-03-21 PROCEDURE — 3017F COLORECTAL CA SCREEN DOC REV: CPT | Performed by: NURSE PRACTITIONER

## 2022-03-21 PROCEDURE — G8484 FLU IMMUNIZE NO ADMIN: HCPCS | Performed by: NURSE PRACTITIONER

## 2022-03-21 RX ORDER — ASPIRIN 325 MG
325 TABLET ORAL DAILY
COMMUNITY

## 2022-03-22 RX ORDER — GABAPENTIN 400 MG/1
CAPSULE ORAL
Qty: 90 CAPSULE | Refills: 2 | Status: SHIPPED | OUTPATIENT
Start: 2022-03-22 | End: 2022-06-13

## 2022-03-28 DIAGNOSIS — Z79.4 TYPE 2 DIABETES MELLITUS WITH DIABETIC PERIPHERAL ANGIOPATHY WITHOUT GANGRENE, WITH LONG-TERM CURRENT USE OF INSULIN (HCC): ICD-10-CM

## 2022-03-28 DIAGNOSIS — E11.51 TYPE 2 DIABETES MELLITUS WITH DIABETIC PERIPHERAL ANGIOPATHY WITHOUT GANGRENE, WITH LONG-TERM CURRENT USE OF INSULIN (HCC): ICD-10-CM

## 2022-03-28 PROCEDURE — 3052F HG A1C>EQUAL 8.0%<EQUAL 9.0%: CPT | Performed by: FAMILY MEDICINE

## 2022-03-28 RX ORDER — IBUPROFEN 200 MG
1 TABLET ORAL DAILY
Qty: 100 EACH | Refills: 3 | Status: SHIPPED | OUTPATIENT
Start: 2022-03-28 | End: 2022-04-01

## 2022-03-28 NOTE — TELEPHONE ENCOUNTER
----- Message from Katherin Mohs sent at 3/28/2022  1:48 PM EDT -----  Subject: Refill Request    QUESTIONS  Name of Medication? INSULIN SYRINGE 1CC/29G ([employer] INS SYRINGE 1CC/29G)   29G X 1/2\" 1 ML MISC  Patient-reported dosage and instructions? one each   How many days do you have left? 0  Preferred [facility]? [id number]  Pharmacy phone number (if available)? [phone]  Additional Information for Provider? patient says the needles are too long   and wants to get something shorter. he says they bend and wants something   shorter. please call him back   ---------------------------------------------------------------------------  --------------  CALL BACK INFO  What is the best way for the office to contact you? OK to leave message on   voicemail  Preferred Call Back Phone Number?  [phone]

## 2022-03-31 DIAGNOSIS — J18.9 PNEUMONIA DUE TO INFECTIOUS ORGANISM, UNSPECIFIED LATERALITY, UNSPECIFIED PART OF LUNG: ICD-10-CM

## 2022-04-01 ENCOUNTER — TELEPHONE (OUTPATIENT)
Dept: FAMILY MEDICINE CLINIC | Age: 67
End: 2022-04-01

## 2022-04-01 ENCOUNTER — HOSPITAL ENCOUNTER (OUTPATIENT)
Age: 67
Discharge: HOME | End: 2022-04-01
Payer: MEDICARE

## 2022-04-01 DIAGNOSIS — E78.5 HYPERLIPIDEMIA LDL GOAL <70: ICD-10-CM

## 2022-04-01 DIAGNOSIS — F41.9 ANXIETY: ICD-10-CM

## 2022-04-01 DIAGNOSIS — I25.10 CAD IN NATIVE ARTERY: ICD-10-CM

## 2022-04-01 LAB
A/G RATIO: 1.7 (ref 1.1–2.2)
ALBUMIN SERPL-MCNC: 4.7 G/DL (ref 3.4–5)
ALP BLD-CCNC: 80 U/L (ref 40–129)
ALT SERPL-CCNC: 21 U/L (ref 10–40)
ANION GAP SERPL CALCULATED.3IONS-SCNC: 18 MMOL/L (ref 3–16)
AST SERPL-CCNC: 22 U/L (ref 15–37)
BILIRUB SERPL-MCNC: 0.4 MG/DL (ref 0–1)
BUN BLDV-MCNC: 25 MG/DL (ref 7–20)
CALCIUM SERPL-MCNC: 10.3 MG/DL (ref 8.3–10.6)
CHLORIDE BLD-SCNC: 104 MMOL/L (ref 99–110)
CHOLESTEROL, TOTAL: 156 MG/DL (ref 0–199)
CO2: 18 MMOL/L (ref 21–32)
CREAT SERPL-MCNC: 0.9 MG/DL (ref 0.8–1.3)
GFR AFRICAN AMERICAN: >60
GFR NON-AFRICAN AMERICAN: >60
GLUCOSE BLD-MCNC: 145 MG/DL (ref 70–99)
HDLC SERPL-MCNC: 42 MG/DL (ref 40–60)
LDL CHOLESTEROL CALCULATED: 83 MG/DL
POTASSIUM SERPL-SCNC: 4.5 MMOL/L (ref 3.5–5.1)
SODIUM BLD-SCNC: 140 MMOL/L (ref 136–145)
TOTAL PROTEIN: 7.4 G/DL (ref 6.4–8.2)
TRIGL SERPL-MCNC: 157 MG/DL (ref 0–150)
VLDLC SERPL CALC-MCNC: 31 MG/DL

## 2022-04-01 PROCEDURE — 80061 LIPID PANEL: CPT

## 2022-04-01 PROCEDURE — 80053 COMPREHEN METABOLIC PANEL: CPT

## 2022-04-01 PROCEDURE — 36415 COLL VENOUS BLD VENIPUNCTURE: CPT

## 2022-04-01 RX ORDER — DEXTROMETHORPHAN HYDROBROMIDE AND PROMETHAZINE HYDROCHLORIDE 15; 6.25 MG/5ML; MG/5ML
SYRUP ORAL
Qty: 240 ML | Refills: 0 | Status: SHIPPED | OUTPATIENT
Start: 2022-04-01 | End: 2022-04-14

## 2022-04-01 RX ORDER — ALPRAZOLAM 0.5 MG/1
TABLET ORAL
Qty: 90 TABLET | Refills: 0 | Status: SHIPPED | OUTPATIENT
Start: 2022-04-04 | End: 2022-04-28

## 2022-04-01 NOTE — TELEPHONE ENCOUNTER
----- Message from Yvette Sánchez sent at 4/1/2022 11:19 AM EDT -----  Subject: Message to Provider    QUESTIONS  Information for Provider? Pt is requesting that new syringes be called to   the pharmacy that have a 1/4 needle on them instead of the 1/2 needle. Pt   states that the 1/2 needles bend on him. Would like a call back. ---------------------------------------------------------------------------  --------------  Susy Meamanda INFO  What is the best way for the office to contact you? OK to leave message on   voicemail  Preferred Call Back Phone Number? [phone]  ---------------------------------------------------------------------------  --------------  SCRIPT ANSWERS  Relationship to Patient?  Self

## 2022-04-02 DIAGNOSIS — E11.9 TYPE 2 DIABETES MELLITUS WITHOUT COMPLICATION, WITHOUT LONG-TERM CURRENT USE OF INSULIN (HCC): ICD-10-CM

## 2022-04-04 RX ORDER — METFORMIN HYDROCHLORIDE 500 MG/1
TABLET, EXTENDED RELEASE ORAL
Qty: 360 TABLET | Refills: 1 | Status: SHIPPED | OUTPATIENT
Start: 2022-04-04 | End: 2022-10-27

## 2022-04-06 RX ORDER — VALSARTAN AND HYDROCHLOROTHIAZIDE 320; 12.5 MG/1; MG/1
TABLET, FILM COATED ORAL
Qty: 90 TABLET | Refills: 1 | Status: SHIPPED | OUTPATIENT
Start: 2022-04-06 | End: 2022-08-17 | Stop reason: SDUPTHER

## 2022-04-11 ENCOUNTER — TELEPHONE (OUTPATIENT)
Dept: FAMILY MEDICINE CLINIC | Age: 67
End: 2022-04-11

## 2022-04-11 DIAGNOSIS — J20.9 ACUTE BRONCHITIS, UNSPECIFIED ORGANISM: ICD-10-CM

## 2022-04-11 RX ORDER — BENZONATATE 100 MG/1
CAPSULE ORAL
Qty: 30 CAPSULE | Refills: 0 | Status: SHIPPED | OUTPATIENT
Start: 2022-04-11 | End: 2022-07-21 | Stop reason: ALTCHOICE

## 2022-04-11 NOTE — TELEPHONE ENCOUNTER
Patient calling has had cough since December off, and on. Patient was prescribed cough medicine thinks his body is used to this not working Patient had been taking a cough medication not sure of name was prescribed a few months ago for cough. Sometimes will cough up clear liquid. Headache tried tylenol asprin not helping,. Headache for a week. Patient would like refill on cough medication called in   Patient stating gets irritated by everything. Very agitated with everything patient not sure if heart murmor has to do with his mood going in may for scan. alprazolam is not helping.    Please give patient a call back to let him know about medication     Please advise

## 2022-04-12 ENCOUNTER — OFFICE VISIT (OUTPATIENT)
Dept: FAMILY MEDICINE CLINIC | Age: 67
End: 2022-04-12
Payer: MEDICARE

## 2022-04-12 VITALS
TEMPERATURE: 97.9 F | WEIGHT: 284.6 LBS | OXYGEN SATURATION: 96 % | HEART RATE: 89 BPM | HEIGHT: 69 IN | DIASTOLIC BLOOD PRESSURE: 74 MMHG | SYSTOLIC BLOOD PRESSURE: 122 MMHG | RESPIRATION RATE: 16 BRPM | BODY MASS INDEX: 42.15 KG/M2

## 2022-04-12 DIAGNOSIS — R05.9 COUGH: Primary | ICD-10-CM

## 2022-04-12 DIAGNOSIS — F41.9 ANXIETY: ICD-10-CM

## 2022-04-12 PROCEDURE — 99213 OFFICE O/P EST LOW 20 MIN: CPT

## 2022-04-12 ASSESSMENT — ENCOUNTER SYMPTOMS
COUGH: 1
SINUS PAIN: 0
CHEST TIGHTNESS: 0
SHORTNESS OF BREATH: 0
WHEEZING: 0
RHINORRHEA: 0
SORE THROAT: 0
SINUS PRESSURE: 0

## 2022-04-12 NOTE — PROGRESS NOTES
Willean Prader (:  1955) is a 78 y.o. male,Established patient, here for evaluation of the following chief complaint(s):  Cough (has been chronic for 1 1/2 months, dry cough. no fever no congestion. cough medicines have not been working. constant especially colder weather)         ASSESSMENT/PLAN:  1. Cough  Start Tessalon- ordered to pharmacy yesterday  Hydration with water    2. Anxiety  Take alprazolam as prescribed  Consider counseling- declined  Offered to try SSRI- declined    Will call if needing referral for new pain management specialist.    Return in about 6 weeks (around 2022) for with Dr. Clayton La. Subjective   SUBJECTIVE/OBJECTIVE:  PREETI Toney presents today with complaints of a cough that has been going on for over 1 month. Was prescribed Promethazine-DM with minimal relief. Tessalon was sent to pharmacy yesterday. Has not been picked up yet. Reports that he has tried Tessalon in the past and it was effective. He also reports that he gets agitated very easily. He states that he does not take alprazolam regularly. Was seeing Mylene Smith for pain management- does not want to pay for the expensive treatments, interested in looking for a new pain specialist.    Review of Systems   Constitutional: Negative for activity change, fatigue and fever. HENT: Negative for congestion, ear discharge, ear pain, postnasal drip, rhinorrhea, sinus pressure, sinus pain and sore throat. Respiratory: Positive for cough. Negative for chest tightness, shortness of breath and wheezing. Cardiovascular: Negative for chest pain and palpitations. Neurological: Negative for dizziness and light-headedness. Psychiatric/Behavioral: Positive for agitation. Objective      Vitals:    22 1151   BP: 122/74   Pulse: 89   Resp: 16   Temp: 97.9 °F (36.6 °C)   SpO2: 96%       Physical Exam  Vitals reviewed. Constitutional:       General: He is not in acute distress.      Appearance: Normal appearance. He is obese. HENT:      Head: Normocephalic and atraumatic. Right Ear: Tympanic membrane, ear canal and external ear normal. There is no impacted cerumen. Left Ear: Tympanic membrane, ear canal and external ear normal. There is no impacted cerumen. Nose: No congestion or rhinorrhea. Mouth/Throat:      Mouth: Mucous membranes are moist.      Pharynx: Oropharynx is clear. No oropharyngeal exudate or posterior oropharyngeal erythema. Eyes:      Conjunctiva/sclera: Conjunctivae normal.      Pupils: Pupils are equal, round, and reactive to light. Cardiovascular:      Rate and Rhythm: Normal rate and regular rhythm. Heart sounds: Murmur heard. No friction rub. No gallop. Pulmonary:      Effort: Pulmonary effort is normal. No respiratory distress. Breath sounds: Normal breath sounds. No wheezing. Musculoskeletal:         General: Normal range of motion. Right lower leg: No edema. Left lower leg: No edema. Skin:     General: Skin is warm and dry. Neurological:      Mental Status: He is oriented to person, place, and time. Psychiatric:         Behavior: Behavior normal.         Thought Content: Thought content normal.         Judgment: Judgment normal.                  An electronic signature was used to authenticate this note.     --Sravanthi Zaman, KARYN - CNP

## 2022-04-13 ENCOUNTER — TELEPHONE (OUTPATIENT)
Dept: FAMILY MEDICINE CLINIC | Age: 67
End: 2022-04-13

## 2022-04-13 DIAGNOSIS — J18.9 PNEUMONIA DUE TO INFECTIOUS ORGANISM, UNSPECIFIED LATERALITY, UNSPECIFIED PART OF LUNG: ICD-10-CM

## 2022-04-13 DIAGNOSIS — R05.9 COUGH: Primary | ICD-10-CM

## 2022-04-13 NOTE — TELEPHONE ENCOUNTER
Patient calling medication prescribed yesterday by isabell obregon is not helping. Patient has had cough since he had covid off and on since November   Patient does not cough any phylm up only a dry cough. Patient stating would like a different medication called in.      Pharmacy verified [facility]   Please advise

## 2022-04-14 ENCOUNTER — TELEPHONE (OUTPATIENT)
Dept: CARDIOLOGY CLINIC | Age: 67
End: 2022-04-14

## 2022-04-14 RX ORDER — DEXTROMETHORPHAN HYDROBROMIDE AND PROMETHAZINE HYDROCHLORIDE 15; 6.25 MG/5ML; MG/5ML
SYRUP ORAL
Qty: 240 ML | Refills: 0 | Status: SHIPPED | OUTPATIENT
Start: 2022-04-14 | End: 2022-06-29

## 2022-04-14 NOTE — TELEPHONE ENCOUNTER
Called pt and relayed message   Pt said he spoke with the pharmacy and dahl not think the inhaler will help   Pt said he has a dry cough   Pt wants to know if something else could be called in instead   Please advise

## 2022-04-14 NOTE — TELEPHONE ENCOUNTER
Agree with Dr. Brenda Muller.     Can try the OTC claritin/flonase  Can also try Omeprazole OTC for possible GERD    Put in referral for ENT

## 2022-04-14 NOTE — TELEPHONE ENCOUNTER
Nico Lennon called in this afternoon stating that he has had a dry cough since before Nov. He's called his PCP and they keep just prescribing him medications, which isn't working. He is wanting to know if his cough could be caused from his \"aortic valve stenosis? \"    You can reach Nico Lennon at #[phone]

## 2022-04-14 NOTE — TELEPHONE ENCOUNTER
Please call patient and move appointment with Dr. Renae Raphael up to Thursday, 4/21/22 at 3 PM, thanks.

## 2022-04-14 NOTE — TELEPHONE ENCOUNTER
CXR 1/18/22 OK. Saw pulmonary for chronic cough in 2019. Consider claritin/flonase for allergy control. Consider PPI for possible GERD. Consider ENT if fail above. To Maria Luz Dick to select next best step.

## 2022-04-14 NOTE — TELEPHONE ENCOUNTER
Patient called in stating that the cough medicine is not working that was called in yesterday for him    Patient would like Jaxson Muller to call him back    Please Advise

## 2022-04-19 DIAGNOSIS — F41.9 ANXIETY: ICD-10-CM

## 2022-04-19 RX ORDER — ALPRAZOLAM 0.5 MG/1
TABLET ORAL
Qty: 90 TABLET | OUTPATIENT
Start: 2022-04-19

## 2022-04-20 NOTE — PROGRESS NOTES
[facility]  Cardiology Consult    Brayan Mosquera  1955    April 21, 2022      Reason for Referral: Aortic stenosis    Referring physician: Dr Cata Schulte    CC: \"I was told that have valve problem. \"      Subjective:     History of Present Illness:    Brayan Mosquera is a 78 y.o. patient with a PMH significant for coronary artery disease/prior PCI/CABG, hypertension, hyperlipidemia, diabetes and sleep apnea. He was seen in 2019 with severe indigestion and found to have RCA occlusion that underwent stenting. He undrwent urgent CABG x 5 on 3/26/2019 by Dr. Hi Bhatti ((LEHMAN-LAD, [id number], SVG-PDA) LAD endarterectomy, sternal stabilization ([employer] Sterna[employer]). Today, he is here to discuss his aortic stenosis. He says that he has noticed that he has increased shortness of breath of breath with exertion. He has noticed it more recently. He has had some chest pain that comes and goes but is not actively having any chest discomfort. He has had a cough for months. He has been taking tessalon pearles that does relief the cough. Patient denies exertional chest pain/pressure, dyspnea at rest, PND, orthopnea, palpitations, lightheadedness, weight changes, changes in LE edema, and syncope. Past Medical History:   has a past medical history of Anxiety, Aortic valve sclerosis, Arthritis, CAD (coronary artery disease), Cerebral infarct ([address] Chronic active viral hepatitis B ([address] Chronic back pain, Diabetes mellitus, type 2 ([address] Fatty liver, Heart attack ([address] Hepatitis B immune- pos HBSAg, History of blood transfusion, HTN (hypertension), Hyperlipidemia LDL goal < 100, Hyperlipidemia with target LDL less than 100, Hypertension, Obesity, Psoriasis, Sleep apnea, and STEMI (ST elevation myocardial infarction) ([address] Surgical History:   has a past surgical history that includes Coronary angioplasty with stent (11/16/2011); Appendectomy (age 17); Tonsillectomy and adenoidectomy (1980's);  Total knee arthroplasty (Left, 2005); Biceps tendon repair; Skull fracture elevation ([age]); Colonoscopy; Coronary angioplasty with stent (06/01/2009); Coronary angioplasty with stent (11/25/2008); Coronary angioplasty with stent (11/24/2008); craniotomy (Right); Umbilical hernia repair; Coronary artery bypass graft (03/26/2019); Coronary artery bypass graft (N/A, 3/26/2019); Cataract removal (Bilateral, 2020); and Nerve Block (Bilateral, 10/30/2020). Social History:   reports that he has never smoked. He has never used smokeless tobacco. He reports previous alcohol use. He reports that he does not use drugs. Family History:  family history includes Cancer in his mother; Diabetes in his mother; Heart Failure in his father. Home Medications:  Were reviewed and are listed in nursing record and/or below  Prior to Admission medications    Medication Sig Start Date End Date Taking? Authorizing Provider   promethazine-dextromethorphan (PROMETHAZINE-DM) 6.25-15 MG/5ML syrup TAKE 5 ML BY MOUTH FOUR TIMES DAILY AS NEEDED FOR COUGH.  MAY CAUSE SEDATION 4/14/22  Yes Aditi Plasencia MD   ipratropium (ATROVENT HFA) 17 MCG/ACT inhaler Inhale 2 puffs into the lungs every 6 hours 4/13/22  Yes KARYN Mijares CNP   benzonatate (TESSALON) 100 MG capsule TAKE 1 TO 2 CAPSULES BY MOUTH THREE TIMES DAILY AS NEEDED FOR COUGH 4/11/22  Yes Aditi Plasencia MD   valsartan-hydroCHLOROthiazide (DIOVAN-HCT) 320-12.5 MG per tablet TAKE 1 TABLET BY MOUTH EVERY DAY 4/6/22  Yes Aditi Plasencia MD   metFORMIN (GLUCOPHAGE-XR) 500 MG extended release tablet TAKE 4 TABLETS BY MOUTH EVERY DAY WITH BREAKFAST 4/4/22  Yes Aditi Plasencia MD   ALPRAZolam Lauren Jaye) 0.5 MG tablet TAKE 1 TABLET BY MOUTH THREE TIMES DAILY 4/4/22 5/1/22 Yes KARYN Connolly CNP   Insulin Syringe-Needle U-100 ([employer] INSULIN SYR 1CC/30G) 30G X 5/16\" 1 ML MISC 1 each by Does not apply route daily 4/1/22  Yes Amandeep Sharp MD   gabapentin (NEURONTIN) 400 MG capsule TAKE 1 CAPSULE BY MOUTH THREE TIMES DAILY 3/22/22 6/20/22 Yes Payton Garcia MD   aspirin 325 MG tablet Take 325 mg by mouth daily   Yes August Goetz MD   INVOKANA 300 MG TABS tablet TAKE 1 TABLET BY MOUTH EVERY MORNING BEFORE BREAKFAST 2/28/22  Yes Payton Garcia MD   insulin glargine (LANTUS) 100 UNIT/ML injection vial Inject 60 Units into the skin nightly 2/24/22  Yes Payton Garcia MD   rosuvastatin (CRESTOR) 40 MG tablet TAKE 1 TABLET BY MOUTH DAILY 12/27/21  Yes Payton Garcia MD   albuterol sulfate  (90 Base) MCG/ACT inhaler Inhale 2 puffs into the lungs every 4 hours as needed for Wheezing May substitute for insurance preferred (Ventolin, Proventil, ProAir) 12/7/21 12/7/22 Yes Payton Garcia MD   ezetimibe (ZETIA) 10 MG tablet Take 1 tablet by mouth daily 10/1/21  Yes Herber Weeks MD   fluocinonide (LIDEX) 0.05 % cream Apply topically 2 times daily. 10/24/19  Yes Payton Garcia MD   Emollient (AQUAPHOR ADVANCED THERAPY) OINT Apply three times per day as needed 7/9/19  Yes Payton Garcia MD   Lancets MISC 1 each by Does not apply route 2 times daily 6/13/19  Yes Payton Garcia MD   blood glucose monitor kit and supplies Test 2 times a day & as needed for symptoms of irregular blood glucose. 6/13/19  Yes Payton Garcia MD   blood glucose monitor strips Test 2 times a day & as needed for symptoms of irregular blood glucose. 6/13/19  Yes Payton Garcia MD   entecavir (BARACLUDE) 1 MG tablet Take 1 mg by mouth daily  1/21/19  Yes Payton Garcia MD   glucose blood VI test strips (ASCENSIA AUTODISC VI;ONE TOUCH ULTRA TEST VI) strip Apply 1 each topically 3 times daily. Onetouch Ultra 2 test strips  Dx: 250.00 3/20/13  Yes Payton Garcia MD   [facility] LANCETS MISC 1 each by Does not apply route 3 times daily. 3/7/13  Yes Payton Garcia MD        CURRENT Medications:  No current facility-administered medications for this visit. Allergies:  Patient has no known allergies.            Review of Systems: SEE HPI · Constitutional: no unanticipated weight loss. There's been no change in energy level, sleep pattern, or activity level. No fevers, chills. · Eyes: No visual changes or diplopia. No scleral icterus. · ENT: No Headaches, hearing loss or vertigo. No mouth sores or sore throat. · Cardiovascular: No Chest pain, tightness or discomfort.  No Shortness of breath. No Dyspnea on exertion, Orthopnea, Paroxysmal nocturnal dyspnea or breathlessness at rest.   No Palpitations.  No Syncope ('blackouts', 'faints', 'collapse') or dizziness. · Respiratory: No cough or wheezing, no sputum production. No hematemesis. · Gastrointestinal: No abdominal pain, appetite loss, blood in stools. No change in bowel or bladder habits. · Genitourinary: No dysuria, trouble voiding, or hematuria. · Musculoskeletal:  No gait disturbance, no joint complaints. · Integumentary: No rash or pruritis. · Neurological: No headache, diplopia, change in muscle strength, numbness or tingling. · Psychiatric: No anxiety or depression. · Endocrine: No temperature intolerance. No excessive thirst, fluid intake, or urination. No tremor. · Hematologic/Lymphatic: No abnormal bruising or bleeding, blood clots or swollen lymph nodes. · Allergic/Immunologic: No nasal congestion or hives. Objective:     PHYSICAL EXAM:      Vitals:    04/21/22 1457   BP: 124/76   Pulse: 80   SpO2: 97%    Weight: 283 lb (128.4 kg)       General Appearance:  Alert, cooperative, no distress, appears stated age. Head:  Normocephalic, without obvious abnormality, atraumatic. Eyes:  Pupils equal and round. No scleral icterus. Mouth: Moist mucosa, no pharyngeal erythema. Nose: Nares normal. No drainage or sinus tenderness. Neck: Supple, symmetrical, trachea midline. No adenopathy. No tenderness/mass/nodules. No carotid bruit or elevated JVD. Lungs:   Respiratory Effort: Normal   Auscultation: Clear to auscultation bilaterally, respirations unlabored. No wheeze, rales   Chest Wall:  No tenderness or deformity. Cardiovascular:    Pulses  Palpation: normal   Ascultation: Regular rate, S1/ S2 normal. No murmur, rub, or gallop. 2+ radial and pedal pulses, symmetric  Carotid  Femoral   Abdomen and Gastrointestinal:   Soft, non-tender, bowel sounds active. Liver and Spleen  Masses   Musculoskeletal: No muscle wasting  Back  Gait   Extremities: Extremities normal, atraumatic. No cyanosis or edema. No cyanosis clubbing       Skin: Inspection and palpation performed, no rashes or lesions. Pysch: Normal mood and affect. Alert and oriented to time place person   Neurologic: Normal gross motor and sensory exam.       Labs     All labs have been reviewed    Lab Results   Component Value Date    WBC 7.6 06/26/2020    RBC 4.71 06/26/2020    HGB 15.4 06/26/2020    HCT 45.1 06/26/2020    MCV 95.6 06/26/2020    RDW 14.9 06/26/2020     06/26/2020     Lab Results   Component Value Date     04/01/2022    K 4.5 04/01/2022    K 4.3 09/01/2019     04/01/2022    CO2 18 04/01/2022    BUN 25 04/01/2022    CREATININE 0.9 04/01/2022    GFRAA >60 04/01/2022    GFRAA >60 02/22/2013    AGRATIO 1.7 04/01/2022    LABGLOM >60 04/01/2022    GLUCOSE 145 04/01/2022    PROT 7.4 04/01/2022    CALCIUM 10.3 04/01/2022    BILITOT 0.4 04/01/2022    ALKPHOS 80 04/01/2022    AST 22 04/01/2022    ALT 21 04/01/2022     No results found for: PTINR  Lab Results   Component Value Date    LABA1C 8.2 02/24/2022     Lab Results   Component Value Date    TROPONINI <0.01 09/22/2019       Cardiac, Vascular and Imaging Data all Personally Reviewed in Detail by Myself      EKG:     Echocardiogram:   ECHO 2/25/2022  Normal left ventricle size, wall thickness, and systolic function with an  estimated ejection fraction of 55%. No regional wall motion abnormalities  are seen. Normal diastolic function. No evidence of aortic valve regurgitation.   Moderate to severe aortic stenosis with a peak velocity of 3.57m/s and a  mean pressure gradient of 32mmHg, KRISTIN by continuity 0.77 cm2, DI 0.19 ,  recommend MELISSA  The right ventricle is normal in size and function. ECHO (Limited) 3/25/19  Left ventricular cavity size is normal.  There is mild concentric left ventricular hypertrophy and low normal  systolic function. Ejection fraction is visually estimated to be 50%. There is mild hypokinesis of the inferior wall. The right ventricle is not well visualized but appears to have normal size  and function. There are no significant valvular abnormalities appreciated in this study. Stress Test:     Cath:  Cardiac cath 3/25/19  CONCLUSIONS:  1. Successful recanalization of a STIVEN 1 to 2 flow, distal right  coronary artery, with stenting of a 99% distal right coronary artery  lesion with a 3.0 x 23-mm length Xience Darcy drug-eluting stent. In  the early mid-right coronary artery, there was a focal 80% stenosis that  was stented with a 3.5 mm x 12-mm Xience Darcy drug-eluting stent and  this stent was deployed at 14 atmospheres with nearly 0% residual  stenosis. That same 3.5-mm balloon was then used to deliver therapy and  reduce an in-stent stenotic lesion of about 80% to less than 10%  residual stenosis after inflation of that balloon to 12 atmospheres and  then 14 atmospheres x30 seconds each. STIVEN 3 flow resulted. 2.  There appears to be a 60% ostial left main stenosis. There is a 99%  ostial circumflex stenosis. There is a mid to distal 95% LAD stenosis. 3.  LV ejection fraction of 50% with inferior basal akinesia in the AMOR  projection. 4. LVEDP 10 mmHg with a 10-mm gradient peak-to-peak upon pullback  across the aortic valve. 5.  Successful Angio-Seal, right femoral arteriotomy.     Other imaging:     Assessment and Plan     Nonrheumatic aortic valve stenosis  Moderate to severe aortic stenosis with a peak velocity of 3.57m/s and a  mean pressure gradient of 32mmHg, KRISTIN by continuity 0.77 cm2, DI 0.19   Increased dyspnea with exertion. Intermittent chest discomfort. I believe that he will need a valve replacement in the near future. Repeat echocardiogram in 3 months. Coronary artery disease  Managed by Dr Madelyn Garcia. Hypertension  Controlled. Continue current medical management. Hyperlipidemia  Continue statin therapy. Follow up in     Thank you for allowing us to participate in the care of [place]. Please do not hesitate to contact me if you have any questions. Rodri Quinteros MD, MPH    [facility], [address] [place], [place] [address]  Ph: [phone]  Fax: [phone]    This note was scribed in the presence of Dr Kenyon Calderón, by Maria Luz Portillo RN  Physician Attestation:  The scribes documentation has been prepared under my direction and personally reviewed by me in its entirety. I confirm that the note above accurately reflects all work, treatment, procedures, and medical decision making performed by me.

## 2022-04-21 ENCOUNTER — OFFICE VISIT (OUTPATIENT)
Dept: CARDIOLOGY CLINIC | Age: 67
End: 2022-04-21
Payer: MEDICARE

## 2022-04-21 VITALS
WEIGHT: 283 LBS | BODY MASS INDEX: 41.92 KG/M2 | OXYGEN SATURATION: 97 % | HEIGHT: 69 IN | HEART RATE: 80 BPM | SYSTOLIC BLOOD PRESSURE: 124 MMHG | DIASTOLIC BLOOD PRESSURE: 76 MMHG

## 2022-04-21 DIAGNOSIS — I10 ESSENTIAL HYPERTENSION: ICD-10-CM

## 2022-04-21 DIAGNOSIS — E78.5 HYPERLIPIDEMIA LDL GOAL <70: ICD-10-CM

## 2022-04-21 DIAGNOSIS — I35.0 NONRHEUMATIC AORTIC VALVE STENOSIS: Primary | ICD-10-CM

## 2022-04-21 PROCEDURE — 99214 OFFICE O/P EST MOD 30 MIN: CPT | Performed by: INTERNAL MEDICINE

## 2022-04-21 PROCEDURE — 4040F PNEUMOC VAC/ADMIN/RCVD: CPT | Performed by: INTERNAL MEDICINE

## 2022-04-21 PROCEDURE — 1036F TOBACCO NON-USER: CPT | Performed by: INTERNAL MEDICINE

## 2022-04-21 PROCEDURE — G8417 CALC BMI ABV UP PARAM F/U: HCPCS | Performed by: INTERNAL MEDICINE

## 2022-04-21 PROCEDURE — 3017F COLORECTAL CA SCREEN DOC REV: CPT | Performed by: INTERNAL MEDICINE

## 2022-04-21 PROCEDURE — 1123F ACP DISCUSS/DSCN MKR DOCD: CPT | Performed by: INTERNAL MEDICINE

## 2022-04-21 PROCEDURE — G8427 DOCREV CUR MEDS BY ELIG CLIN: HCPCS | Performed by: INTERNAL MEDICINE

## 2022-04-21 NOTE — PATIENT INSTRUCTIONS
Patient Education        Aortic Valve Stenosis: Care Instructions  Overview     Having aortic valve stenosis means that the valve between your heart and the large blood vessel that carries blood to the body (aorta) has narrowed. That forces the heart to pump harder to get enough blood through the valve. As stenosis gets worse, the valve gets narrower. This can cause symptoms. Symptomsinclude chest pain, dizziness, fainting, or shortness of breath. Your doctor will check your heart regularly. You may take medicine to lower blood pressure or cholesterol. If the stenosis gets worse, you may choose tohave the valve replaced. Follow-up care is a key part of your treatment and safety. Be sure to make and go to all appointments, and call your doctor if you are having problems. It's also a good idea to know your test results and keep alist of the medicines you take. How can you care for yourself at home?  Be safe with medicines. Take your medicines exactly as prescribed. Call your doctor if you think you are having a problem with your medicine.  Call your doctor if you have new symptoms or your symptoms get worse.  Eat heart-healthy foods. These include vegetables, fruits, nuts, beans, lean meat, fish, and whole grains. Limit sodium, alcohol, and sugar.  Be active. Ask your doctor what type and level of exercise is safe for you.  Do not smoke.  Stay at a healthy weight. Lose weight if you need to.  Manage other health problems. If you think you may have a problem with alcohol or drug use, talk to your doctor.  Avoid colds and flu. Get a pneumococcal vaccine shot if you need to. Get a flu vaccine every year.  Take care of your teeth and gums. Get regular dental checkups. Good dental health is important because bacteria can spread from infected teeth and gums to the heart valves. When should you call for help? Call 911 anytime you think you may need emergency care.  For example, call if:     You passed out (lost consciousness).      You have symptoms of a heart attack. These may include:  ? Chest pain or pressure, or a strange feeling in the chest.  ? Sweating. ? Shortness of breath. ? Nausea or vomiting. ? Pain, pressure, or a strange feeling in the back, neck, jaw, or upper belly or in one or both shoulders or arms. ? Lightheadedness or sudden weakness. ? A fast or irregular heartbeat. After you call 911, the  may tell you to chew 1 adult-strength or 2 to 4 low-doseaspirin. Wait for an ambulance. Do not try to drive yourself. Call your doctor now or seek immediate medical care if:     You have new symptoms or your symptoms get worse.      You have new or increased shortness of breath.      You are dizzy or lightheaded, or you feel like you may faint.      You have sudden weight gain, such as more than 2 to 3 pounds in a day or 5 pounds in a week. (Your doctor may suggest a different range of weight gain.)      You have new or increased swelling in your legs, ankles, or feet.      You are suddenly so tired or weak that you cannot do your usual activities. Watch closely for changes in your health, and be sure to contact your doctor ifyou have any problems. Where can you learn more? Go to https://OATSystemspeYoolink.RedKLEVER. org and sign in to your [employer] account. Enter Y054 in the [facility] box to learn more about \"Aortic Valve Stenosis: Care Instructions. \"     If you do not have an account, please click on the \"Sign Up Now\" link. Current as of: January 10, 2022               Content Version: 13.2  © 2028-6924 [employer]wise, Incorporated. Care instructions adapted under license by [facility] [employer] [facility] ([facility]). If you have questions about a medical condition or this instruction, always ask your healthcare professional. [place] [address] any warranty or liability for your use of this information.

## 2022-04-25 ENCOUNTER — TELEPHONE (OUTPATIENT)
Dept: CARDIOLOGY CLINIC | Age: 67
End: 2022-04-25

## 2022-04-27 ENCOUNTER — TELEPHONE (OUTPATIENT)
Dept: FAMILY MEDICINE CLINIC | Age: 67
End: 2022-04-27

## 2022-04-27 NOTE — TELEPHONE ENCOUNTER
Called pt   I explained to pt that we do not have a test results in our system   I looked and we do not have a test for him either   Pt said that okay he will get another at his next apt on may 23 2022   Pt is not sure what happened   AIDEN

## 2022-04-27 NOTE — TELEPHONE ENCOUNTER
Patient calling he received a fit test in mail from [place]. Patient brought in a fit test about a month ago to our office did not receive results.    Patient not sure if needing to complete another fit test  Please give patient a call back with fit test results, and further direction with fit test.     Please advise

## 2022-04-28 DIAGNOSIS — F41.9 ANXIETY: ICD-10-CM

## 2022-04-28 RX ORDER — ALPRAZOLAM 0.5 MG/1
TABLET ORAL
Qty: 90 TABLET | Refills: 0 | Status: SHIPPED | OUTPATIENT
Start: 2022-05-04 | End: 2022-05-18

## 2022-05-02 DIAGNOSIS — Z79.4 TYPE 2 DIABETES MELLITUS WITH DIABETIC PERIPHERAL ANGIOPATHY WITHOUT GANGRENE, WITH LONG-TERM CURRENT USE OF INSULIN (HCC): ICD-10-CM

## 2022-05-02 DIAGNOSIS — E11.51 TYPE 2 DIABETES MELLITUS WITH DIABETIC PERIPHERAL ANGIOPATHY WITHOUT GANGRENE, WITH LONG-TERM CURRENT USE OF INSULIN (HCC): ICD-10-CM

## 2022-05-02 RX ORDER — INSULIN GLARGINE 100 [IU]/ML
INJECTION, SOLUTION SUBCUTANEOUS
Qty: 10 ML | Refills: 5 | Status: SHIPPED | OUTPATIENT
Start: 2022-05-02 | End: 2022-10-05 | Stop reason: SDUPTHER

## 2022-05-12 ENCOUNTER — OFFICE VISIT (OUTPATIENT)
Dept: PULMONOLOGY | Age: 67
End: 2022-05-12
Payer: MEDICARE

## 2022-05-12 VITALS
BODY MASS INDEX: 40.26 KG/M2 | HEART RATE: 81 BPM | OXYGEN SATURATION: 97 % | DIASTOLIC BLOOD PRESSURE: 90 MMHG | HEIGHT: 70 IN | RESPIRATION RATE: 21 BRPM | SYSTOLIC BLOOD PRESSURE: 130 MMHG | WEIGHT: 281.2 LBS | TEMPERATURE: 97.1 F

## 2022-05-12 DIAGNOSIS — G47.33 OSA (OBSTRUCTIVE SLEEP APNEA): Primary | ICD-10-CM

## 2022-05-12 DIAGNOSIS — J98.4 RESTRICTIVE LUNG DISEASE: ICD-10-CM

## 2022-05-12 DIAGNOSIS — I35.0 AORTIC STENOSIS, MODERATE: ICD-10-CM

## 2022-05-12 PROCEDURE — 3017F COLORECTAL CA SCREEN DOC REV: CPT | Performed by: INTERNAL MEDICINE

## 2022-05-12 PROCEDURE — 1036F TOBACCO NON-USER: CPT | Performed by: INTERNAL MEDICINE

## 2022-05-12 PROCEDURE — G8427 DOCREV CUR MEDS BY ELIG CLIN: HCPCS | Performed by: INTERNAL MEDICINE

## 2022-05-12 PROCEDURE — 99204 OFFICE O/P NEW MOD 45 MIN: CPT | Performed by: INTERNAL MEDICINE

## 2022-05-12 PROCEDURE — 1123F ACP DISCUSS/DSCN MKR DOCD: CPT | Performed by: INTERNAL MEDICINE

## 2022-05-12 PROCEDURE — G8417 CALC BMI ABV UP PARAM F/U: HCPCS | Performed by: INTERNAL MEDICINE

## 2022-05-12 PROCEDURE — 4040F PNEUMOC VAC/ADMIN/RCVD: CPT | Performed by: INTERNAL MEDICINE
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
## 2022-05-13 ENCOUNTER — TELEPHONE (OUTPATIENT)
Dept: CARDIOLOGY CLINIC | Age: 67
End: 2022-05-13

## 2022-05-13 DIAGNOSIS — I25.10 CORONARY ARTERY DISEASE INVOLVING NATIVE CORONARY ARTERY OF NATIVE HEART WITHOUT ANGINA PECTORIS: Primary | ICD-10-CM

## 2022-05-13 DIAGNOSIS — R07.89 OTHER CHEST PAIN: ICD-10-CM

## 2022-05-13 NOTE — TELEPHONE ENCOUNTER
Pt called stating that he has had off and on chest pain over the past few days. Concerned that his CAD or his valve is worsening. Let pt  Know that we will get him scheduled for an angiogram (for chest pain and AVR workup).  Irving RADFORD

## 2022-05-13 NOTE — LETTER
[address] [facility] [place]  Phone: [phone]  Fax: [phone]        May 16, 2022    Procedure is scheduled for 6/24/22 at 8:30 am with a 7 am arrival time. The morning of your procedure you will park in the hospital parking lot and report directly to the cath lab to check in.     Pre-Procedure Instructions   1. You will need to fast for at least 8 hours prior to procedure. No caffeine the morning of.    2. Hold all diabetic medications including, Metfomin. If you take Lantus/Levemir only take ½ your normal dose the evening before. All other medications can be taken in the morning with sips of water. 3. You will need to take 325 mg aspirin the morning of. If you are currently taking 81 mg please take 4 tablets that morning. 4. Do not use any lotions, creams or perfume the morning of procedure. 5. Pre-procedure lab work will need to be completed 5-7 days prior to procedure. 6. Please have a responsible adult to drive you home after procedure. We advise you have someone to stay with you for 24 hours following procedure for precautionary measures. Depending on procedure you may require an overnight stay. 7. Cath lab will provide you with all post procedure instructions. If you have any questions regarding the procedure itself or medications, please call [phone] and ask to speak with a nurse.        Sincerely,    Tanna Bobo

## 2022-05-16 ENCOUNTER — TELEPHONE (OUTPATIENT)
Dept: SLEEP CENTER | Age: 67
End: 2022-05-16

## 2022-05-16 NOTE — TELEPHONE ENCOUNTER
While on the phone with pt going over procedure information he states that he forgot to mention his right arm has been bothering him in the morning as well. Pt states that it is \"pounding. \"     June Hermosillo can be reached at [phone]

## 2022-05-16 NOTE — TELEPHONE ENCOUNTER
Procedure is scheduled for 6/24/22 at 8:30 am with a 7 am arrival time. The morning of your procedure you will park in the hospital parking lot and report directly to the cath lab to check in.     Pre-Procedure Instructions   1. You will need to fast for at least 8 hours prior to procedure. No caffeine the morning of.    2. Hold all diabetic medications including, Metfomin. If you take Lantus/Levemir only take ½ your normal dose the evening before. All other medications can be taken in the morning with sips of water. 3. You will need to take 325 mg aspirin the morning of. If you are currently taking 81 mg please take 4 tablets that morning. 4. Do not use any lotions, creams or perfume the morning of procedure. 5. Pre-procedure lab work will need to be completed 5-7 days prior to procedure. 6. Please have a responsible adult to drive you home after procedure. We advise you have someone to stay with you for 24 hours following procedure for precautionary measures. Depending on procedure you may require an overnight stay. 7. Cath lab will provide you with all post procedure instructions. If you have any questions regarding the procedure itself or medications, please call [phone] and ask to speak with a nurse. Pt is agreeable to date/time. Went over pre-procedure instructions. Pt v/u. Mailed instructions and labs to pt's home address. Published on [employer] and e-mail to Yamileth Bailon.

## 2022-05-16 NOTE — TELEPHONE ENCOUNTER
----- Message from Ranjit Prajapati MA sent at 5/16/2022  8:11 AM EDT -----  Regarding: Incomplete office note  Please complete the office note 05/12/2022 to ensure submission of the split night sleep study in a timely manner.     Thank you

## 2022-05-17 DIAGNOSIS — F41.9 ANXIETY: ICD-10-CM

## 2022-05-18 RX ORDER — ALPRAZOLAM 0.5 MG/1
TABLET ORAL
Qty: 90 TABLET | Refills: 0 | Status: SHIPPED | OUTPATIENT
Start: 2022-06-01 | End: 2022-06-21

## 2022-05-18 NOTE — TELEPHONE ENCOUNTER
Discussed with Dr Jesus Butterfield and cholo to move [facility] to 6/8/2022 at 3:00 if patient is agreeable.

## 2022-05-19 ENCOUNTER — TELEPHONE (OUTPATIENT)
Dept: FAMILY MEDICINE CLINIC | Age: 67
End: 2022-05-19

## 2022-05-19 NOTE — TELEPHONE ENCOUNTER
Per the PDMP, it was last filled on 5/1/2022- this was a 30 day supply.   Too soon to refill, but I sent in a refill to be filled 6/1/2022

## 2022-05-19 NOTE — TELEPHONE ENCOUNTER
----- Message from Myrna Latham sent at 5/19/2022  2:40 PM EDT -----  Subject: Message to Provider    QUESTIONS  Information for Provider? Patient called in and is looking for an update   on his refill for the medication of ALPRAZolam (XANAX) 0.5 MG tablet. Patient would like an update.   ---------------------------------------------------------------------------  --------------  CALL BACK INFO  What is the best way for the office to contact you? Do not leave any   message, patient will call back for answer  Preferred Call Back Phone Number? [phone]  ---------------------------------------------------------------------------  --------------  SCRIPT ANSWERS  Relationship to Patient?  Self

## 2022-05-23 ENCOUNTER — OFFICE VISIT (OUTPATIENT)
Dept: FAMILY MEDICINE CLINIC | Age: 67
End: 2022-05-23
Payer: MEDICARE

## 2022-05-23 VITALS
OXYGEN SATURATION: 97 % | HEART RATE: 71 BPM | HEIGHT: 70 IN | BODY MASS INDEX: 40.8 KG/M2 | WEIGHT: 285 LBS | SYSTOLIC BLOOD PRESSURE: 138 MMHG | RESPIRATION RATE: 18 BRPM | DIASTOLIC BLOOD PRESSURE: 80 MMHG

## 2022-05-23 DIAGNOSIS — E11.51 TYPE 2 DIABETES MELLITUS WITH DIABETIC PERIPHERAL ANGIOPATHY WITHOUT GANGRENE, WITH LONG-TERM CURRENT USE OF INSULIN (HCC): Primary | ICD-10-CM

## 2022-05-23 DIAGNOSIS — I35.0 AORTIC STENOSIS, MODERATE: ICD-10-CM

## 2022-05-23 DIAGNOSIS — F33.41 RECURRENT MAJOR DEPRESSIVE DISORDER, IN PARTIAL REMISSION (HCC): ICD-10-CM

## 2022-05-23 DIAGNOSIS — I25.10 CORONARY ARTERY DISEASE INVOLVING NATIVE CORONARY ARTERY OF NATIVE HEART WITHOUT ANGINA PECTORIS: ICD-10-CM

## 2022-05-23 DIAGNOSIS — Z79.4 TYPE 2 DIABETES MELLITUS WITH DIABETIC PERIPHERAL ANGIOPATHY WITHOUT GANGRENE, WITH LONG-TERM CURRENT USE OF INSULIN (HCC): Primary | ICD-10-CM

## 2022-05-23 DIAGNOSIS — I10 HYPERTENSION, ESSENTIAL: ICD-10-CM

## 2022-05-23 DIAGNOSIS — B18.1 CHRONIC ACTIVE VIRAL HEPATITIS B (HCC): ICD-10-CM

## 2022-05-23 DIAGNOSIS — E78.5 HYPERLIPIDEMIA LDL GOAL <70: ICD-10-CM

## 2022-05-23 DIAGNOSIS — L40.50 PSORIATIC ARTHRITIS (HCC): ICD-10-CM

## 2022-05-23 PROBLEM — E11.621 DIABETIC ULCER OF TOE OF RIGHT FOOT ASSOCIATED WITH TYPE 2 DIABETES MELLITUS, LIMITED TO BREAKDOWN OF SKIN (HCC): Status: RESOLVED | Noted: 2021-02-15 | Resolved: 2022-05-23

## 2022-05-23 PROBLEM — L97.511 DIABETIC ULCER OF TOE OF RIGHT FOOT ASSOCIATED WITH TYPE 2 DIABETES MELLITUS, LIMITED TO BREAKDOWN OF SKIN (HCC): Status: RESOLVED | Noted: 2021-02-15 | Resolved: 2022-05-23

## 2022-05-23 PROCEDURE — 99214 OFFICE O/P EST MOD 30 MIN: CPT | Performed by: FAMILY MEDICINE

## 2022-05-23 PROCEDURE — 1123F ACP DISCUSS/DSCN MKR DOCD: CPT | Performed by: FAMILY MEDICINE

## 2022-05-23 PROCEDURE — 2022F DILAT RTA XM EVC RTNOPTHY: CPT | Performed by: FAMILY MEDICINE

## 2022-05-23 PROCEDURE — G8427 DOCREV CUR MEDS BY ELIG CLIN: HCPCS | Performed by: FAMILY MEDICINE

## 2022-05-23 PROCEDURE — 3017F COLORECTAL CA SCREEN DOC REV: CPT | Performed by: FAMILY MEDICINE

## 2022-05-23 PROCEDURE — 3052F HG A1C>EQUAL 8.0%<EQUAL 9.0%: CPT | Performed by: FAMILY MEDICINE

## 2022-05-23 PROCEDURE — 1036F TOBACCO NON-USER: CPT | Performed by: FAMILY MEDICINE

## 2022-05-23 PROCEDURE — G8417 CALC BMI ABV UP PARAM F/U: HCPCS | Performed by: FAMILY MEDICINE

## 2022-05-23 NOTE — PROGRESS NOTES
CHRONIC CONDITION FOLOW-UP       Assessment and Plan:      Diagnosis Orders   1. Type 2 diabetes mellitus with diabetic peripheral angiopathy without gangrene, with long-term current use of insulin ([facility] [address]     2. Hypertension, essential     3. Psoriatic arthritis ([facility] [address]     4. Recurrent major depressive disorder, in partial remission ([facility] [address]     5. Chronic active viral hepatitis B ([facility] [address]     6. Hyperlipidemia LDL goal <70     7. Aortic stenosis, moderate to severe by ECHO 2/2022     8. Coronary artery disease involving native coronary artery of native heart without angina pectoris       Continue current Tx plan. Any changes marked below. INSTRUCTIONS  NEXT APPOINTMENT: Please schedule check-up in 4 months with Anthony Awan (Nurse Practitioner) or Dr. Sylvia Darden.   · Dr. Sylvia Darden and Anthony Awan (Nurse Practitioner) are sharing their practices as a team.  This will allow increased access to care. Office visits may alternate between these providers while we continue to maintain high quality of care. · PLEASE TAKE THIS FORM TO CHECK-OUT WINDOW TO SCHEDULE NEXT VISIT. · PLEASE GET BLOODWORK DRAWN June 1 ON FIRST FLOOR in 170. Take orders with you for A1c. Orders for BMP and CBC in system. Subjective:      Chief Complaint   Patient presents with    3 Month Follow-Up     Pt is here for a 3 month follow up on diabetes. Asuncion Bustamante is an 78 y.o. male who presents for follow up    Complaints:    Having some chest pain. Cardio doing angiogram 6/8.  Sleep study planned 6/1    CHART REVIEW   reports that he has never smoked.  He has never used smokeless tobacco.  Health Maintenance Due   Topic Date Due    Shingles vaccine (1 of 2) Never done    Colorectal Cancer Screen  08/01/2019    Diabetic retinal exam  03/12/2022    Pneumococcal 65+ years Vaccine (2 - PCV) 05/07/2022    Diabetic foot exam  05/07/2022    Diabetic microalbuminuria test  05/07/2022     Current Outpatient Medications   Medication Instructions  albuterol sulfate  (90 Base) MCG/ACT inhaler 2 puffs, Inhalation, EVERY 4 HOURS PRN, May substitute for insurance preferred (Ventolin, Proventil, ProAir)    [START ON 6/1/2022] ALPRAZolam (XANAX) 0.5 MG tablet TAKE 1 TABLET BY MOUTH THREE TIMES DAILY    aspirin 325 mg, Oral, DAILY    benzonatate (TESSALON) 100 MG capsule TAKE 1 TO 2 CAPSULES BY MOUTH THREE TIMES DAILY AS NEEDED FOR COUGH    blood glucose monitor kit and supplies Test 2 times a day & as needed for symptoms of irregular blood glucose.  blood glucose monitor strips Test 2 times a day & as needed for symptoms of irregular blood glucose.  Emollient (AQUAPHOR ADVANCED THERAPY) OINT Apply three times per day as needed    entecavir (BARACLUDE) 1 mg, Oral, DAILY    ezetimibe (ZETIA) 10 mg, Oral, DAILY    fluocinonide (LIDEX) 0.05 % cream Apply topically 2 times daily.  gabapentin (NEURONTIN) 400 MG capsule TAKE 1 CAPSULE BY MOUTH THREE TIMES DAILY    glucose blood VI test strips (ASCENSIA AUTODISC VI;ONE TOUCH ULTRA TEST VI) strip 1 each, Topical, 3 TIMES DAILY, Onetouch Ultra 2 test strips  Dx: 250.00    Insulin Syringe-Needle U-100 ([employer] INSULIN SYR 1CC/30G) 30G X 5/16\" 1 ML MISC 1 each, Does not apply, DAILY    INVOKANA 300 MG TABS tablet TAKE 1 TABLET BY MOUTH EVERY MORNING BEFORE BREAKFAST    ipratropium (ATROVENT HFA) 17 MCG/ACT inhaler 2 puffs, Inhalation, EVERY 6 HOURS    Lancets MISC 1 each, Does not apply, 2 TIMES DAILY    LANTUS 100 UNIT/ML injection vial ADMINISTER 50 UNITS UNDER THE SKIN EVERY NIGHT    metFORMIN (GLUCOPHAGE-XR) 500 MG extended release tablet TAKE 4 TABLETS BY MOUTH EVERY DAY WITH BREAKFAST    ONETOUCH DELICA LANCETS MISC 1 each, Does not apply, 3 TIMES DAILY    promethazine-dextromethorphan (PROMETHAZINE-DM) 6.25-15 MG/5ML syrup TAKE 5 ML BY MOUTH FOUR TIMES DAILY AS NEEDED FOR COUGH.  MAY CAUSE SEDATION    rosuvastatin (CRESTOR) 40 mg, Oral, DAILY    valsartan-hydroCHLOROthiazide (DIOVAN-HCT) 320-12.5 MG per tablet TAKE 1 TABLET BY MOUTH EVERY DAY       LAST LABS  Lab Results   Component Value Date    LDLCALC 83 04/01/2022    LDLDIRECT 114 (H) 03/25/2019     Lab Results   Component Value Date    HDL 42 04/01/2022     Lab Results   Component Value Date    TRIG 157 (H) 04/01/2022     Lab Results   Component Value Date     04/01/2022    K 4.5 04/01/2022    CREATININE 0.9 04/01/2022     Lab Results   Component Value Date    WBC 7.6 06/26/2020    HGB 15.4 06/26/2020     06/26/2020     Lab Results   Component Value Date    ALT 21 04/01/2022    AST 22 04/01/2022    ALKPHOS 80 04/01/2022    BILITOT 0.4 04/01/2022     No results found for: TSH  Lab Results   Component Value Date    GLUCOSE 145 (H) 04/01/2022     Lab Results   Component Value Date    LABA1C 8.2 02/24/2022    LABA1C 9.2 09/30/2021    LABA1C 10.6 05/07/2021     Objective:   PHYSICAL EXAM   /80 (Site: Right Upper Arm, Position: Sitting, Cuff Size: Large Adult)   Pulse 71   Resp 18   Ht 5' 10\" (1.778 m)   Wt 285 lb (129.3 kg)   SpO2 97%   BMI 40.89 kg/m²   BP Readings from Last 5 Encounters:   05/23/22 138/80   05/12/22 (!) 130/90   04/21/22 124/76   04/12/22 122/74   03/21/22 118/74     Wt Readings from Last 5 Encounters:   05/23/22 285 lb (129.3 kg)   05/12/22 281 lb 3.2 oz (127.6 kg)   04/21/22 283 lb (128.4 kg)   04/12/22 284 lb 9.6 oz (129.1 kg)   03/21/22 276 lb (125.2 kg)      GENERAL:   · well-developed, well-nourished, alert, no distress.      LUNGS:    · Breathing unlabored  · clear to auscultation bilaterally and good air movement  CARDIOVASC:   · regular rate and rhythm  SKIN: warm and dry

## 2022-05-23 NOTE — PATIENT INSTRUCTIONS
INSTRUCTIONS  NEXT APPOINTMENT: Please schedule check-up in 4 months with Kurt Kiran (Nurse Practitioner) or Dr. Cristian Keene.   · Dr. Cristian Keene and Kurt Kiran (Nurse Practitioner) are sharing their practices as a team.  This will allow increased access to care. Office visits may alternate between these providers while we continue to maintain high quality of care. · PLEASE TAKE THIS FORM TO CHECK-OUT WINDOW TO SCHEDULE NEXT VISIT. · PLEASE GET BLOODWORK DRAWN June 1 ON FIRST FLOOR in 170. Take orders with you for A1c. Orders for BMP and CBC in system.   Patient Education

## 2022-06-01 ENCOUNTER — HOSPITAL ENCOUNTER (OUTPATIENT)
Dept: SLEEP CENTER | Age: 67
Discharge: HOME | End: 2022-06-01
Payer: MEDICARE

## 2022-06-01 DIAGNOSIS — G47.33 OSA (OBSTRUCTIVE SLEEP APNEA): ICD-10-CM

## 2022-06-01 PROCEDURE — 95811 POLYSOM 6/>YRS CPAP 4/> PARM: CPT

## 2022-06-02 NOTE — PROGRESS NOTES
Jatinder El         : 1955  [] [address] [facility]     [] A[facility]      [] [facility]     [][facility]    []   [x] [facility]   [] Other:  Diagnosis: [x] KAYLA (G47.33) [] CSA (G47.31) [] Apnea (G47.30)   Length of Need: [] 12 Months [x] 99 Months [] Other:    Machine (FATUMA!): [x] Charlette [x] ResMed AirSense     Auto [] Other:     [x]  CPAP () [] Bilevel ()   Mode: [x] Auto [] Spontaneous    Mode: [] Auto [] Spontaneous           14 cm                 Comfort Settings:   - Ramp Pressure: 5 cmH2O                                        - Ramp time: 15 min                                     -  Flex/EPR - 3 full time                                    - For ResMed Bilevel (TiMax-4 sec   TiMin- 0.2 sec)     Humidifier: [x] Heated ()        [x] Water chamber replacement ()/ 1 per 6 months        Mask:  Please always start with the mask the patient used during the titraion   [x] Nasal () /1 per 3 months [x] Full Face () /1 per 3 months   [x] Patient choice -Size and fit mask [x] Patient Choice - Size and fit mask   [] Dispense:    [] Dispense:   medium Simplus full face    [x] Headgear () / 1 per 3 months [x] Headgear () / 1 per 3 months   [x] Replacement Nasal Cushion ()/2 per month [x] Interface Replacement ()/1 per month   [x] Replacement Nasal Pillows ()/2 per month         Tubing: [x] Heated ()/1 per 3 months    [] Standard ()/1 per 3 months [] Other:           Filters: [x] Non-disposable ()/1 per 6 months     [x] Ultra-Fine, Disposable ()/2 per month        Miscellaneous: [x] Chin Strap ()/ 1 per 6 months [] O2 bleed-in:       LPM   [] Oximetry on CPAP/Bilevel []  Other:          Start Order Date: 22    MEDICAL JUSTIFICATION:  I, the undersigned, certify that the above prescribed supplies are medically necessary for this patients wellbeing.   In my opinion, the supplies are both reasonable and necessary in reference to accepted standards of medicalpractice in treatment of this patients condition.     Jefry Garcia MD      NPI: [id number]       Order Signed Date: 06/02/22    Electronically signed by Jefry Garcia MD on 6/2/2022 at 12:09 PM

## 2022-06-03 PROCEDURE — 95811 POLYSOM 6/>YRS CPAP 4/> PARM: CPT | Performed by: PSYCHIATRY & NEUROLOGY

## 2022-06-06 ENCOUNTER — TELEPHONE (OUTPATIENT)
Dept: PULMONOLOGY | Age: 67
End: 2022-06-06

## 2022-06-06 RX ORDER — CANAGLIFLOZIN 300 MG/1
TABLET, FILM COATED ORAL
Qty: 30 TABLET | Refills: 5 | Status: SHIPPED | OUTPATIENT
Start: 2022-06-06

## 2022-06-08 ENCOUNTER — HOSPITAL ENCOUNTER (OUTPATIENT)
Dept: CARDIAC CATH/INVASIVE PROCEDURES | Age: 67
Discharge: HOME | End: 2022-06-08
Payer: MEDICARE

## 2022-06-08 VITALS
HEART RATE: 80 BPM | TEMPERATURE: 97.2 F | RESPIRATION RATE: 16 BRPM | SYSTOLIC BLOOD PRESSURE: 152 MMHG | HEIGHT: 71 IN | BODY MASS INDEX: 38.64 KG/M2 | DIASTOLIC BLOOD PRESSURE: 94 MMHG | WEIGHT: 276 LBS | OXYGEN SATURATION: 97 %

## 2022-06-08 LAB
HCT VFR BLD CALC: 47.5 % (ref 40.5–52.5)
HEMOGLOBIN: 16 G/DL (ref 13.5–17.5)
MCH RBC QN AUTO: 31.4 PG (ref 26–34)
MCHC RBC AUTO-ENTMCNC: 33.8 G/DL (ref 31–36)
MCV RBC AUTO: 93 FL (ref 80–100)
PDW BLD-RTO: 13.9 % (ref 12.4–15.4)
PLATELET # BLD: 180 K/UL (ref 135–450)
PMV BLD AUTO: 7.2 FL (ref 5–10.5)
RBC # BLD: 5.11 M/UL (ref 4.2–5.9)
WBC # BLD: 6.6 K/UL (ref 4–11)

## 2022-06-08 PROCEDURE — 93455 CORONARY ART/GRFT ANGIO S&I: CPT

## 2022-06-08 PROCEDURE — 99152 MOD SED SAME PHYS/QHP 5/>YRS: CPT

## 2022-06-08 PROCEDURE — 93005 ELECTROCARDIOGRAM TRACING: CPT | Performed by: INTERNAL MEDICINE

## 2022-06-08 PROCEDURE — C1760 CLOSURE DEV, VASC: HCPCS

## 2022-06-08 PROCEDURE — 93455 CORONARY ART/GRFT ANGIO S&I: CPT | Performed by: INTERNAL MEDICINE

## 2022-06-08 PROCEDURE — 6360000002 HC RX W HCPCS

## 2022-06-08 PROCEDURE — 99153 MOD SED SAME PHYS/QHP EA: CPT

## 2022-06-08 PROCEDURE — 6370000000 HC RX 637 (ALT 250 FOR IP)

## 2022-06-08 PROCEDURE — 85027 COMPLETE CBC AUTOMATED: CPT

## 2022-06-08 PROCEDURE — 99152 MOD SED SAME PHYS/QHP 5/>YRS: CPT | Performed by: INTERNAL MEDICINE

## 2022-06-08 PROCEDURE — 6360000004 HC RX CONTRAST MEDICATION: Performed by: INTERNAL MEDICINE

## 2022-06-08 PROCEDURE — 2500000003 HC RX 250 WO HCPCS

## 2022-06-08 PROCEDURE — 2709999900 HC NON-CHARGEABLE SUPPLY

## 2022-06-08 PROCEDURE — C1894 INTRO/SHEATH, NON-LASER: HCPCS

## 2022-06-08 PROCEDURE — C1769 GUIDE WIRE: HCPCS

## 2022-06-08 PROCEDURE — 80048 BASIC METABOLIC PNL TOTAL CA: CPT

## 2022-06-08 RX ORDER — METHYLPREDNISOLONE SODIUM SUCCINATE 125 MG/2ML
125 INJECTION, POWDER, LYOPHILIZED, FOR SOLUTION INTRAMUSCULAR; INTRAVENOUS ONCE
Status: DISCONTINUED | OUTPATIENT
Start: 2022-06-08 | End: 2022-06-09 | Stop reason: HOSPADM

## 2022-06-08 RX ORDER — ACETAMINOPHEN 325 MG/1
650 TABLET ORAL EVERY 4 HOURS PRN
Status: DISCONTINUED | OUTPATIENT
Start: 2022-06-08 | End: 2022-06-09 | Stop reason: HOSPADM

## 2022-06-08 RX ORDER — SODIUM CHLORIDE 0.9 % (FLUSH) 0.9 %
5-40 SYRINGE (ML) INJECTION EVERY 12 HOURS SCHEDULED
Status: DISCONTINUED | OUTPATIENT
Start: 2022-06-08 | End: 2022-06-09 | Stop reason: HOSPADM

## 2022-06-08 RX ORDER — SODIUM CHLORIDE 0.9 % (FLUSH) 0.9 %
5-40 SYRINGE (ML) INJECTION PRN
Status: DISCONTINUED | OUTPATIENT
Start: 2022-06-08 | End: 2022-06-09 | Stop reason: HOSPADM

## 2022-06-08 RX ORDER — SODIUM CHLORIDE 9 MG/ML
INJECTION, SOLUTION INTRAVENOUS PRN
Status: DISCONTINUED | OUTPATIENT
Start: 2022-06-08 | End: 2022-06-09 | Stop reason: HOSPADM

## 2022-06-08 RX ORDER — ASPIRIN 325 MG
325 TABLET ORAL ONCE
Status: DISCONTINUED | OUTPATIENT
Start: 2022-06-08 | End: 2022-06-09 | Stop reason: HOSPADM

## 2022-06-08 RX ORDER — DIPHENHYDRAMINE HYDROCHLORIDE 50 MG/ML
25 INJECTION INTRAMUSCULAR; INTRAVENOUS ONCE
Status: DISCONTINUED | OUTPATIENT
Start: 2022-06-08 | End: 2022-06-09 | Stop reason: HOSPADM

## 2022-06-08 RX ORDER — ONDANSETRON 2 MG/ML
4 INJECTION INTRAMUSCULAR; INTRAVENOUS EVERY 6 HOURS PRN
Status: DISCONTINUED | OUTPATIENT
Start: 2022-06-08 | End: 2022-06-09 | Stop reason: HOSPADM

## 2022-06-08 RX ADMIN — IOPAMIDOL 120 ML: 755 INJECTION, SOLUTION INTRAVENOUS at 15:25

## 2022-06-08 NOTE — PROCEDURES
[address] [place] [address]                            CARDIAC CATHETERIZATION    PATIENT NAME: Jon Moody                      :        1955  MED REC NO:   [id number]                          ROOM:  ACCOUNT NO:   [de-identified]                           ADMIT DATE: 2022  PROVIDER:     Primo Hays MD    DATE OF PROCEDURE:  2022    PROCEDURE PERFORMED:  Left heart catheterization. INDICATION FOR PROCEDURE:  Severe aortic valve stenosis. Informed consent obtained. ASA is II. Mallampati score is II. No complication. Estimated blood loss less than 20 mL. PROCEDURE IN DETAIL:  Right common femoral artery access was obtained. JL4 engaged the left main. We performed angiography of the left system. JR4 engaged the right coronary artery. We performed angiography of the  right system. JR4 engaged the saphenous vein graft. We performed angiography of the  saphenous vein graft to obtuse marginal, diagonal, and ramus. LIMA catheter engaged into the left internal mammary artery. Left  internal mammary artery to LAD angiogram was performed. Multipurpose catheter was used to engage the saphenous vein graft to the  right PDA. Selective angiography was performed. All catheters, wires, sheaths were removed. Perclose was applied to  achieve hemostasis. ANGIOGRAPHY FINDINGS:  1. Left main has 30% stenosis. 2.  Left anterior descending artery has diffuse disease and distally  occludes. 3.  Left circumflex has 85% stenosis in the proximal portion and  occludes in the mid portion. 4.  Right coronary artery mid portion 90% stenosis with distal occlusion  of the PDA. 5.  Saphenous vein graft to obtuse marginal 1, ramus and diagonal is  patent. 6.  Left internal mammary artery to LAD is patent but LAD is occluded.   7.  Saphenous vein graft to the right PDA has 80% stenosis in the  proximal

## 2022-06-08 NOTE — H&P
CC: \"I was told that have valve problem. \"        Subjective:      History of Present Illness:     Stacy Chacon is a 78 y.o. patient with a PMH significant for coronary artery disease/prior PCI/CABG, hypertension, hyperlipidemia, diabetes and sleep apnea. He was seen in 2019 with severe indigestion and found to have RCA occlusion that underwent stenting. He undrwent urgent CABG x 5 on 3/26/2019 by Dr. Renata Luis ((LIMA-LAD, [id number], SVG-PDA) LAD endarterectomy, sternal stabilization ([employer] SternaLock). Today, he is here to discuss his aortic stenosis. He says that he has noticed that he has increased shortness of breath of breath with exertion. He has noticed it more recently. He has had some chest pain that comes and goes but is not actively having any chest discomfort. He has had a cough for months. He has been taking tessalon pearles that does relief the cough. Patient denies exertional chest pain/pressure, dyspnea at rest, PND, orthopnea, palpitations, lightheadedness, weight changes, changes in LE edema, and syncope.      Past Medical History:   has a past medical history of Anxiety, Aortic valve sclerosis, Arthritis, CAD (coronary artery disease), Cerebral infarct ([address] Chronic active viral hepatitis B ([address] Chronic back pain, Diabetes mellitus, type 2 ([address] Fatty liver, Heart attack ([address] Hepatitis B immune- pos HBSAg, History of blood transfusion, HTN (hypertension), Hyperlipidemia LDL goal < 100, Hyperlipidemia with target LDL less than 100, Hypertension, Obesity, Psoriasis, Sleep apnea, and STEMI (ST elevation myocardial infarction) ([address]    Surgical History:   has a past surgical history that includes Coronary angioplasty with stent (11/16/2011); Appendectomy (age 17); Tonsillectomy and adenoidectomy (1980's); Total knee arthroplasty (Left, 2005); Biceps tendon repair; Skull fracture elevation (teen); Colonoscopy; Coronary angioplasty with stent (06/01/2009);  Coronary angioplasty with stent (11/25/2008); Coronary angioplasty with stent (11/24/2008); craniotomy (Right); Umbilical hernia repair; Coronary artery bypass graft (03/26/2019); Coronary artery bypass graft (N/A, 3/26/2019); Cataract removal (Bilateral, 2020); and Nerve Block (Bilateral, 10/30/2020).    Social History:   reports that he has never smoked. He has never used smokeless tobacco. He reports previous alcohol use. He reports that he does not use drugs.      Family History:  family history includes Cancer in his mother; Diabetes in his mother; Heart Failure in his father.     Home Medications:  Were reviewed and are listed in nursing record and/or below  Home Medications           Prior to Admission medications    Medication Sig Start Date End Date Taking? Authorizing Provider   promethazine-dextromethorphan (PROMETHAZINE-DM) 6.25-15 MG/5ML syrup TAKE 5 ML BY MOUTH FOUR TIMES DAILY AS NEEDED FOR COUGH.  MAY CAUSE SEDATION 4/14/22   Yes Benedicto Meyers MD   ipratropium (ATROVENT HFA) 17 MCG/ACT inhaler Inhale 2 puffs into the lungs every 6 hours 4/13/22   Yes KARYN Goldstein CNP   benzonatate (TESSALON) 100 MG capsule TAKE 1 TO 2 CAPSULES BY MOUTH THREE TIMES DAILY AS NEEDED FOR COUGH 4/11/22   Yes Benedicto Meyers MD   valsartan-hydroCHLOROthiazide (DIOVAN-HCT) 320-12.5 MG per tablet TAKE 1 TABLET BY MOUTH EVERY DAY 4/6/22   Yes Benedicto Meyers MD   metFORMIN (GLUCOPHAGE-XR) 500 MG extended release tablet TAKE 4 TABLETS BY MOUTH EVERY DAY WITH BREAKFAST 4/4/22   Yes Benedicto Meyers MD   ALPRAZolam Aldon Nones) 0.5 MG tablet TAKE 1 TABLET BY MOUTH THREE TIMES DAILY 4/4/22 5/1/22 Yes KARYN Winter CNP   Insulin Syringe-Needle U-100 ([employer] INSULIN SYR 1CC/30G) 30G X 5/16\" 1 ML MISC 1 each by Does not apply route daily 4/1/22   Yes Ewa Camejo MD   gabapentin (NEURONTIN) 400 MG capsule TAKE 1 CAPSULE BY MOUTH THREE TIMES DAILY 3/22/22 6/20/22 Yes Benedicto Meyers MD   aspirin 325 MG tablet Take 325 mg by mouth daily     Yes Historical Provider, MD ROSARIOOKANA 300 MG TABS tablet TAKE 1 TABLET BY MOUTH EVERY MORNING BEFORE BREAKFAST 2/28/22   Yes Aditi Plasencia MD   insulin glargine (LANTUS) 100 UNIT/ML injection vial Inject 60 Units into the skin nightly 2/24/22   Yes Aditi Plasencia MD   rosuvastatin (CRESTOR) 40 MG tablet TAKE 1 TABLET BY MOUTH DAILY 12/27/21   Yes Aditi Plasencia MD   albuterol sulfate  (90 Base) MCG/ACT inhaler Inhale 2 puffs into the lungs every 4 hours as needed for Wheezing May substitute for insurance preferred (Ventolin, Proventil, ProAir) 12/7/21 12/7/22 Yes Aditi Plasencia MD   ezetimibe (ZETIA) 10 MG tablet Take 1 tablet by mouth daily 10/1/21   Yes Hung Burks MD   fluocinonide (LIDEX) 0.05 % cream Apply topically 2 times daily. 10/24/19   Yes Aditi Plasencia MD   Emollient (AQUAPHOR ADVANCED THERAPY) OINT Apply three times per day as needed 7/9/19   Yes Aditi Plasencia MD   Lancets MISC 1 each by Does not apply route 2 times daily 6/13/19   Yes Aditi Plasencia MD   blood glucose monitor kit and supplies Test 2 times a day & as needed for symptoms of irregular blood glucose. 6/13/19   Yes Aditi Plasencia MD   blood glucose monitor strips Test 2 times a day & as needed for symptoms of irregular blood glucose. 6/13/19   Yes Aditi Plasencia MD   entecavir (BARACLUDE) 1 MG tablet Take 1 mg by mouth daily  1/21/19   Yes Aditi Plasencia MD   glucose blood VI test strips (ASCENSIA AUTODISC VI;ONE TOUCH ULTRA TEST VI) strip Apply 1 each topically 3 times daily. Onetouch Ultra 2 test strips  Dx: 250.00 3/20/13   Yes Aditi Plasencia MD   [facility] LANCETS MISC 1 each by Does not apply route 3 times daily. 3/7/13   Yes Aditi Plasencia MD            CURRENT Medications:    Current Meds Link used for Sign Out Report   No current facility-administered medications for this visit.         Allergies:  Patient has no known allergies.             Review of Systems: SEE HPI   · Constitutional: no unanticipated weight loss.  There's been no change in energy level, sleep pattern, or activity level. No fevers, chills. · Eyes: No visual changes or diplopia. No scleral icterus. · ENT: No Headaches, hearing loss or vertigo. No mouth sores or sore throat. · Cardiovascular: No Chest pain, tightness or discomfort. · No Shortness of breath. No Dyspnea on exertion, Orthopnea, Paroxysmal nocturnal dyspnea or breathlessness at rest.  · No Palpitations. · No Syncope ('blackouts', 'faints', 'collapse') or dizziness. · Respiratory: No cough or wheezing, no sputum production. No hematemesis. · Gastrointestinal: No abdominal pain, appetite loss, blood in stools. No change in bowel or bladder habits. · Genitourinary: No dysuria, trouble voiding, or hematuria. · Musculoskeletal:  No gait disturbance, no joint complaints. · Integumentary: No rash or pruritis. · Neurological: No headache, diplopia, change in muscle strength, numbness or tingling. · Psychiatric: No anxiety or depression. · Endocrine: No temperature intolerance. No excessive thirst, fluid intake, or urination. No tremor. · Hematologic/Lymphatic: No abnormal bruising or bleeding, blood clots or swollen lymph nodes. · Allergic/Immunologic: No nasal congestion or hives.        Objective:      PHYSICAL EXAM:           Vitals:     04/21/22 1457   BP: 124/76   Pulse: 80   SpO2: 97%    Weight: 283 lb (128.4 kg)       General Appearance:  Alert, cooperative, no distress, appears stated age. Head:  Normocephalic, without obvious abnormality, atraumatic. Eyes:  Pupils equal and round. No scleral icterus. Mouth: Moist mucosa, no pharyngeal erythema. Nose: Nares normal. No drainage or sinus tenderness. Neck: Supple, symmetrical, trachea midline. No adenopathy. No tenderness/mass/nodules. No carotid bruit or elevated JVD. Lungs:   Respiratory Effort: Normal   Auscultation: Clear to auscultation bilaterally, respirations unlabored.  No wheeze, rales   Chest Wall:  No tenderness or deformity. Cardiovascular:     Pulses  Palpation: normal   Ascultation: Regular rate, S1/ S2 normal. No murmur, rub, or gallop. 2+ radial and pedal pulses, symmetric  Carotid  Femoral   Abdomen and Gastrointestinal:   Soft, non-tender, bowel sounds active. Liver and Spleen  Masses   Musculoskeletal: No muscle wasting  Back  Gait   Extremities: Extremities normal, atraumatic. No cyanosis or edema. No cyanosis clubbing         Skin: Inspection and palpation performed, no rashes or lesions. Pysch: Normal mood and affect. Alert and oriented to time place person   Neurologic: Normal gross motor and sensory exam.       Labs      All labs have been reviewed           Lab Results   Component Value Date     WBC 7.6 06/26/2020     RBC 4.71 06/26/2020     HGB 15.4 06/26/2020     HCT 45.1 06/26/2020     MCV 95.6 06/26/2020     RDW 14.9 06/26/2020      06/26/2020            Lab Results   Component Value Date      04/01/2022     K 4.5 04/01/2022     K 4.3 09/01/2019      04/01/2022     CO2 18 04/01/2022     BUN 25 04/01/2022     CREATININE 0.9 04/01/2022     GFRAA >60 04/01/2022     GFRAA >60 02/22/2013     AGRATIO 1.7 04/01/2022     LABGLOM >60 04/01/2022     GLUCOSE 145 04/01/2022     PROT 7.4 04/01/2022     CALCIUM 10.3 04/01/2022     BILITOT 0.4 04/01/2022     ALKPHOS 80 04/01/2022     AST 22 04/01/2022     ALT 21 04/01/2022      No results found for: PTINR        Lab Results   Component Value Date     LABA1C 8.2 02/24/2022            Lab Results   Component Value Date     TROPONINI <0.01 09/22/2019         Cardiac, Vascular and Imaging Data all Personally Reviewed in Detail by Myself       EKG:      Echocardiogram:   ECHO 2/25/2022  Normal left ventricle size, wall thickness, and systolic function with an  estimated ejection fraction of 55%. No regional wall motion abnormalities  are seen. Normal diastolic function. No evidence of aortic valve regurgitation.   Moderate to severe aortic stenosis with a peak velocity of 3.57m/s and a  mean pressure gradient of 32mmHg, KRISTIN by continuity 0.77 cm2, DI 0.19 ,  recommend MELISSA  The right ventricle is normal in size and function.     ECHO (Limited) 3/25/19  Left ventricular cavity size is normal.  There is mild concentric left ventricular hypertrophy and low normal  systolic function. Ejection fraction is visually estimated to be 50%. There is mild hypokinesis of the inferior wall. The right ventricle is not well visualized but appears to have normal size  and function. There are no significant valvular abnormalities appreciated in this study.     Stress Test:      Cath:  Cardiac cath 3/25/19  CONCLUSIONS:  1.  Successful recanalization of a STIVEN 1 to 2 flow, distal right  coronary artery, with stenting of a 99% distal right coronary artery  lesion with a 3.0 x 23-mm length Xience Darcy drug-eluting stent.  In  the early mid-right coronary artery, there was a focal 80% stenosis that  was stented with a 3.5 mm x 12-mm Xience Darcy drug-eluting stent and  this stent was deployed at 14 atmospheres with nearly 0% residual  stenosis.  That same 3.5-mm balloon was then used to deliver therapy and  reduce an in-stent stenotic lesion of about 80% to less than 10%  residual stenosis after inflation of that balloon to 12 atmospheres and  then 14 atmospheres x30 seconds each.  STIVEN 3 flow resulted. 2. William Constanza appears to be a 60% ostial left main stenosis. William Constanza is a 99%  ostial circumflex stenosis. William Constanza is a mid to distal 95% LAD stenosis. 3.  LV ejection fraction of 50% with inferior basal akinesia in the AMOR  projection. 4.  LVEDP 10 mmHg with a 10-mm gradient peak-to-peak upon pullback  across the aortic valve.   5.  Successful Angio-Seal, right femoral arteriotomy.     Other imaging:      Assessment and Plan      Nonrheumatic aortic valve stenosis  Moderate to severe aortic stenosis with a peak velocity of 3.57m/s and a  mean pressure gradient of 32mmHg, KRISTIN by continuity 0.77 cm2, DI 0.19   Increased dyspnea with exertion. Intermittent chest discomfort. I believe that he will need a valve replacement in the near future.      Repeat echocardiogram in 3 months.      Coronary artery disease  Managed by Dr Cata Schulte.      Hypertension  Controlled. Continue current medical management.      Hyperlipidemia  Continue statin therapy.     Follow up in      Thank you for allowing us to participate in the care of Paulie Dawson. Please do not hesitate to contact me if you have any questions.     Mary Matt MD, MPH     [facility], [address] [place] [address]  Ph: [phone]  Fax: [phone]        CC: \"I was told that have valve problem. \"        Subjective:      History of Present Illness:     Paulie Dawosn is a 78 y.o. patient with a PMH significant for coronary artery disease/prior PCI/CABG, hypertension, hyperlipidemia, diabetes and sleep apnea. He was seen in 2019 with severe indigestion and found to have RCA occlusion that underwent stenting. He undrwent urgent CABG x 5 on 3/26/2019 by Dr. Hi Bhatti ((LEHMAN-LAD, [id number], SVG-PDA) LAD endarterectomy, sternal stabilization ([employer] SternaLock). Today, he is here to discuss his aortic stenosis. He says that he has noticed that he has increased shortness of breath of breath with exertion. He has noticed it more recently. He has had some chest pain that comes and goes but is not actively having any chest discomfort. He has had a cough for months. He has been taking tessalon pearles that does relief the cough.  Patient denies exertional chest pain/pressure, dyspnea at rest, PND, orthopnea, palpitations, lightheadedness, weight changes, changes in LE edema, and syncope.      Past Medical History:   has a past medical history of Anxiety, Aortic valve sclerosis, Arthritis, CAD (coronary artery disease), Cerebral infarct ([address] Chronic active viral hepatitis B ([facility] Chronic back pain, Diabetes mellitus, type 2 ([facility] Fatty liver, Heart attack ([facility] [address] Hepatitis B immune- pos HBSAg, History of blood transfusion, HTN (hypertension), Hyperlipidemia LDL goal < 100, Hyperlipidemia with target LDL less than 100, Hypertension, Obesity, Psoriasis, Sleep apnea, and STEMI (ST elevation myocardial infarction) ([facility] [address]    Surgical History:   has a past surgical history that includes Coronary angioplasty with stent (11/16/2011); Appendectomy (age 17); Tonsillectomy and adenoidectomy (1980's); Total knee arthroplasty (Left, 2005); Biceps tendon repair; Skull fracture elevation (teen); Colonoscopy; Coronary angioplasty with stent (06/01/2009); Coronary angioplasty with stent (11/25/2008); Coronary angioplasty with stent (11/24/2008); craniotomy (Right); Umbilical hernia repair; Coronary artery bypass graft (03/26/2019); Coronary artery bypass graft (N/A, 3/26/2019); Cataract removal (Bilateral, 2020); and Nerve Block (Bilateral, 10/30/2020).    Social History:   reports that he has never smoked. He has never used smokeless tobacco. He reports previous alcohol use. He reports that he does not use drugs.      Family History:  family history includes Cancer in his mother; Diabetes in his mother; Heart Failure in his father.     Home Medications:  Were reviewed and are listed in nursing record and/or below  Home Medications           Prior to Admission medications    Medication Sig Start Date End Date Taking? Authorizing Provider   promethazine-dextromethorphan (PROMETHAZINE-DM) 6.25-15 MG/5ML syrup TAKE 5 ML BY MOUTH FOUR TIMES DAILY AS NEEDED FOR COUGH.  MAY CAUSE SEDATION 4/14/22   Yes Carmelita Ying MD   ipratropium (ATROVENT HFA) 17 MCG/ACT inhaler Inhale 2 puffs into the lungs every 6 hours 4/13/22   Yes KARYN Suggs - CNP   benzonatate (TESSALON) 100 MG capsule TAKE 1 TO 2 CAPSULES BY MOUTH THREE TIMES DAILY AS NEEDED FOR COUGH 4/11/22   Yes Carmelita Ying MD   valsartan-hydroCHLOROthiazide (DIOVAN-HCT) 320-12.5 MG per tablet TAKE 1 TABLET BY MOUTH EVERY DAY 4/6/22   Yes Jimmie Fraser MD   metFORMIN (GLUCOPHAGE-XR) 500 MG extended release tablet TAKE 4 TABLETS BY MOUTH EVERY DAY WITH BREAKFAST 4/4/22   Yes Jimmie Fraser MD   ALPRAZolam Alvia Rachel) 0.5 MG tablet TAKE 1 TABLET BY MOUTH THREE TIMES DAILY 4/4/22 5/1/22 Yes Noe Moy APRN - CNP   Insulin Syringe-Needle U-100 ([employer] INSULIN SYR 1CC/30G) 30G X 5/16\" 1 ML MISC 1 each by Does not apply route daily 4/1/22   Yes Randalyn Gilford, MD   gabapentin (NEURONTIN) 400 MG capsule TAKE 1 CAPSULE BY MOUTH THREE TIMES DAILY 3/22/22 6/20/22 Yes Jimmie Fraser MD   aspirin 325 MG tablet Take 325 mg by mouth daily     Yes Historical Provider, MD   INVOKANA 300 MG TABS tablet TAKE 1 TABLET BY MOUTH EVERY MORNING BEFORE BREAKFAST 2/28/22   Yes Jimmie Fraser MD   insulin glargine (LANTUS) 100 UNIT/ML injection vial Inject 60 Units into the skin nightly 2/24/22   Yes Jimmie Fraser MD   rosuvastatin (CRESTOR) 40 MG tablet TAKE 1 TABLET BY MOUTH DAILY 12/27/21   Yes Jimmie Fraser MD   albuterol sulfate  (90 Base) MCG/ACT inhaler Inhale 2 puffs into the lungs every 4 hours as needed for Wheezing May substitute for insurance preferred (Ventolin, Proventil, ProAir) 12/7/21 12/7/22 Yes Jimmie Fraser MD   ezetimibe (ZETIA) 10 MG tablet Take 1 tablet by mouth daily 10/1/21   Yes Nilo Calabrese MD   fluocinonide (LIDEX) 0.05 % cream Apply topically 2 times daily. 10/24/19   Yes Jimmie Fraser MD   Emollient (AQUAPHOR ADVANCED THERAPY) OINT Apply three times per day as needed 7/9/19   Yes Jimmie Fraser MD   Lancets MISC 1 each by Does not apply route 2 times daily 6/13/19   Yes Jimmie Fraser MD   blood glucose monitor kit and supplies Test 2 times a day & as needed for symptoms of irregular blood glucose. 6/13/19   Yes Jimmie Fraser MD   blood glucose monitor strips Test 2 times a day & as needed for symptoms of irregular blood glucose.  6/13/19   Yes Jimmie Fraser MD entecavir (BARACLUDE) 1 MG tablet Take 1 mg by mouth daily  1/21/19   Yes Ellen Quiroz MD   glucose blood VI test strips (ASCENSIA AUTODISC VI;ONE TOUCH ULTRA TEST VI) strip Apply 1 each topically 3 times daily. Onetouch Ultra 2 test strips  Dx: 250.00 3/20/13   Yes Ellen Quiroz MD   [facility] LANCETS MISC 1 each by Does not apply route 3 times daily. 3/7/13   Yes Ellen Quiroz MD            CURRENT Medications:    Current Meds Link used for Sign Out Report   No current facility-administered medications for this visit.         Allergies:  Patient has no known allergies.             Review of Systems: SEE HPI   · Constitutional: no unanticipated weight loss. There's been no change in energy level, sleep pattern, or activity level. No fevers, chills. · Eyes: No visual changes or diplopia. No scleral icterus. · ENT: No Headaches, hearing loss or vertigo. No mouth sores or sore throat. · Cardiovascular: No Chest pain, tightness or discomfort. · No Shortness of breath. No Dyspnea on exertion, Orthopnea, Paroxysmal nocturnal dyspnea or breathlessness at rest.  · No Palpitations. · No Syncope ('blackouts', 'faints', 'collapse') or dizziness. · Respiratory: No cough or wheezing, no sputum production. No hematemesis. · Gastrointestinal: No abdominal pain, appetite loss, blood in stools. No change in bowel or bladder habits. · Genitourinary: No dysuria, trouble voiding, or hematuria. · Musculoskeletal:  No gait disturbance, no joint complaints. · Integumentary: No rash or pruritis. · Neurological: No headache, diplopia, change in muscle strength, numbness or tingling. · Psychiatric: No anxiety or depression. · Endocrine: No temperature intolerance. No excessive thirst, fluid intake, or urination. No tremor. · Hematologic/Lymphatic: No abnormal bruising or bleeding, blood clots or swollen lymph nodes.   · Allergic/Immunologic: No nasal congestion or hives.        Objective:      PHYSICAL EXAM:        Vitals:     04/21/22 1457   BP: 124/76   Pulse: 80   SpO2: 97%    Weight: 283 lb (128.4 kg)       General Appearance:  Alert, cooperative, no distress, appears stated age. Head:  Normocephalic, without obvious abnormality, atraumatic. Eyes:  Pupils equal and round. No scleral icterus. Mouth: Moist mucosa, no pharyngeal erythema. Nose: Nares normal. No drainage or sinus tenderness. Neck: Supple, symmetrical, trachea midline. No adenopathy. No tenderness/mass/nodules. No carotid bruit or elevated JVD. Lungs:   Respiratory Effort: Normal   Auscultation: Clear to auscultation bilaterally, respirations unlabored. No wheeze, rales   Chest Wall:  No tenderness or deformity. Cardiovascular:     Pulses  Palpation: normal   Ascultation: Regular rate, S1/ S2 normal. No murmur, rub, or gallop. 2+ radial and pedal pulses, symmetric  Carotid  Femoral   Abdomen and Gastrointestinal:   Soft, non-tender, bowel sounds active. Liver and Spleen  Masses   Musculoskeletal: No muscle wasting  Back  Gait   Extremities: Extremities normal, atraumatic. No cyanosis or edema. No cyanosis clubbing         Skin: Inspection and palpation performed, no rashes or lesions. Pysch: Normal mood and affect.  Alert and oriented to time place person   Neurologic: Normal gross motor and sensory exam.       Labs      All labs have been reviewed           Lab Results   Component Value Date     WBC 7.6 06/26/2020     RBC 4.71 06/26/2020     HGB 15.4 06/26/2020     HCT 45.1 06/26/2020     MCV 95.6 06/26/2020     RDW 14.9 06/26/2020      06/26/2020            Lab Results   Component Value Date      04/01/2022     K 4.5 04/01/2022     K 4.3 09/01/2019      04/01/2022     CO2 18 04/01/2022     BUN 25 04/01/2022     CREATININE 0.9 04/01/2022     GFRAA >60 04/01/2022     GFRAA >60 02/22/2013     AGRATIO 1.7 04/01/2022     LABGLOM >60 04/01/2022     GLUCOSE 145 04/01/2022     PROT 7.4 04/01/2022     CALCIUM 10.3 04/01/2022     BILITOT 0.4 04/01/2022     ALKPHOS 80 04/01/2022     AST 22 04/01/2022     ALT 21 04/01/2022      No results found for: PTINR        Lab Results   Component Value Date     LABA1C 8.2 02/24/2022      Lab Results   Component Value Date     TROPONINI <0.01 09/22/2019         Cardiac, Vascular and Imaging Data all Personally Reviewed in Detail by Myself       EKG:      Echocardiogram:   ECHO 2/25/2022  Normal left ventricle size, wall thickness, and systolic function with an  estimated ejection fraction of 55%. No regional wall motion abnormalities  are seen. Normal diastolic function. No evidence of aortic valve regurgitation. Moderate to severe aortic stenosis with a peak velocity of 3.57m/s and a  mean pressure gradient of 32mmHg, KRISTIN by continuity 0.77 cm2, DI 0.19 ,  recommend MELISSA  The right ventricle is normal in size and function.     ECHO (Limited) 3/25/19  Left ventricular cavity size is normal.  There is mild concentric left ventricular hypertrophy and low normal  systolic function. Ejection fraction is visually estimated to be 50%. There is mild hypokinesis of the inferior wall. The right ventricle is not well visualized but appears to have normal size  and function.   There are no significant valvular abnormalities appreciated in this study.     Stress Test:      Cath:  Cardiac cath 3/25/19  CONCLUSIONS:  1.  Successful recanalization of a STIVEN 1 to 2 flow, distal right  coronary artery, with stenting of a 99% distal right coronary artery  lesion with a 3.0 x 23-mm length Xience Darcy drug-eluting stent.  In  the early mid-right coronary artery, there was a focal 80% stenosis that  was stented with a 3.5 mm x 12-mm Xience Darcy drug-eluting stent and  this stent was deployed at 14 atmospheres with nearly 0% residual  stenosis.  That same 3.5-mm balloon was then used to deliver therapy and  reduce an in-stent stenotic lesion of about 80% to less than 10%  residual stenosis after inflation of that balloon to 12 atmospheres and  then 14 atmospheres x30 seconds each.  STIVEN 3 flow resulted. 2. Ulysses Lewis appears to be a 60% ostial left main stenosis. Lyons Lewis is a 99%  ostial circumflex stenosis. Lyons Lewis is a mid to distal 95% LAD stenosis. 3.  LV ejection fraction of 50% with inferior basal akinesia in the AMOR  projection. 4.  LVEDP 10 mmHg with a 10-mm gradient peak-to-peak upon pullback  across the aortic valve. 5.  Successful Angio-Seal, right femoral arteriotomy.     Other imaging:      Assessment and Plan      Nonrheumatic aortic valve stenosis  Moderate to severe aortic stenosis with a peak velocity of 3.57m/s and a  mean pressure gradient of 32mmHg, KRISTIN by continuity 0.77 cm2, DI 0.19   Increased dyspnea with exertion. Intermittent chest discomfort. I believe that he will need a valve replacement in the near future.      Repeat echocardiogram in 3 months.      Coronary artery disease  Managed by Dr Charo Smith.      Hypertension  Controlled. Continue current medical management.      Hyperlipidemia  Continue statin therapy.     Follow up in      Thank you for allowing us to participate in the care of [facility].   Please do not hesitate to contact me if you have any questions.     Radha Herring MD, MPH     [address], [address] [place] [address] [address]  Ph: [phone]  Fax: [phone]

## 2022-06-08 NOTE — PROCEDURES
Moderate Sedation:  Start time: 14:40  Stop time: 15:11  4 mg versed   200 mcg fentanyl   An independent trained observer pushed medications at my direction. We monitored the patient's level of consciousness and vital signs/physiologic status throughout the procedure duration (see start and start times above).

## 2022-06-09 LAB
EKG ATRIAL RATE: 79 BPM
EKG DIAGNOSIS: NORMAL
EKG P AXIS: 22 DEGREES
EKG P-R INTERVAL: 150 MS
EKG Q-T INTERVAL: 400 MS
EKG QRS DURATION: 104 MS
EKG QTC CALCULATION (BAZETT): 458 MS
EKG R AXIS: -5 DEGREES
EKG T AXIS: 43 DEGREES
EKG VENTRICULAR RATE: 79 BPM

## 2022-06-09 PROCEDURE — 93010 ELECTROCARDIOGRAM REPORT: CPT | Performed by: INTERNAL MEDICINE

## 2022-06-09 NOTE — TELEPHONE ENCOUNTER
John Lundy calls back and will go with [employer]. Please send new order to [facility]. Expedite as AHI is 70.0  Was told by [facility] since AHI elevated will rush.

## 2022-06-10 LAB
ANION GAP SERPL CALCULATED.3IONS-SCNC: 16 MMOL/L (ref 3–16)
BUN BLDV-MCNC: 32 MG/DL (ref 7–20)
CALCIUM SERPL-MCNC: 9.8 MG/DL (ref 8.3–10.6)
CHLORIDE BLD-SCNC: 101 MMOL/L (ref 99–110)
CO2: 22 MMOL/L (ref 21–32)
CREAT SERPL-MCNC: 1.2 MG/DL (ref 0.8–1.3)
GFR AFRICAN AMERICAN: >60
GFR NON-AFRICAN AMERICAN: >60
GLUCOSE BLD-MCNC: 196 MG/DL (ref 70–99)
POTASSIUM SERPL-SCNC: 4.3 MMOL/L (ref 3.5–5.1)
SODIUM BLD-SCNC: 139 MMOL/L (ref 136–145)

## 2022-06-12 DIAGNOSIS — M54.42 CHRONIC BILATERAL LOW BACK PAIN WITH BILATERAL SCIATICA: ICD-10-CM

## 2022-06-12 DIAGNOSIS — M54.41 CHRONIC BILATERAL LOW BACK PAIN WITH BILATERAL SCIATICA: ICD-10-CM

## 2022-06-12 DIAGNOSIS — G89.4 CHRONIC PAIN SYNDROME: ICD-10-CM

## 2022-06-12 DIAGNOSIS — G89.29 CHRONIC BILATERAL LOW BACK PAIN WITH BILATERAL SCIATICA: ICD-10-CM

## 2022-06-13 RX ORDER — GABAPENTIN 400 MG/1
CAPSULE ORAL
Qty: 90 CAPSULE | Refills: 5 | Status: SHIPPED | OUTPATIENT
Start: 2022-06-13 | End: 2022-12-10

## 2022-06-16 NOTE — TELEPHONE ENCOUNTER
Pt said we told him to call us and let us know when he got his CPAP.  He got it today and will be using it for the first time tonight

## 2022-06-20 DIAGNOSIS — F41.9 ANXIETY: ICD-10-CM

## 2022-06-21 RX ORDER — ALPRAZOLAM 0.5 MG/1
TABLET ORAL
Qty: 90 TABLET | Refills: 2 | Status: SHIPPED | OUTPATIENT
Start: 2022-06-27 | End: 2022-06-29

## 2022-06-23 RX ORDER — ROSUVASTATIN CALCIUM 40 MG/1
40 TABLET, COATED ORAL DAILY
Qty: 90 TABLET | Refills: 1 | Status: SHIPPED | OUTPATIENT
Start: 2022-06-23

## 2022-06-29 ENCOUNTER — TELEPHONE (OUTPATIENT)
Dept: FAMILY MEDICINE CLINIC | Age: 67
End: 2022-06-29

## 2022-06-29 DIAGNOSIS — F13.90 BENZODIAZEPINE MISUSE: ICD-10-CM

## 2022-06-29 DIAGNOSIS — F41.9 ANXIETY: Primary | ICD-10-CM

## 2022-06-29 NOTE — TELEPHONE ENCOUNTER
Noted that he always gets his xanax refills early. He is 13 days ahead since 1-9-22. I can no longer prescribe this. He picked up Rx on 6/26. He can take it twice a day for 2 weeks and then take once a day until gone. I will continue to treat his diabetes but not more controlled meds. He may want to see psychiatry to help manage his anxiety. Order in EMR. MA- please notify pt and cancel any refills at pharmacy.

## 2022-06-29 NOTE — TELEPHONE ENCOUNTER
----- Message from Gregoria Schaefer sent at 6/29/2022 12:53 PM EDT -----  Subject: Message to Provider    QUESTIONS  Information for Provider? spouse called to state she is concerned with him   taking to much Alprazolam, he is falling down, and combative, wife needs   called at [phone] he is taking up to 11 to 12 a day, and is only to   be taking three a day.   ---------------------------------------------------------------------------  --------------  CALL BACK INFO  What is the best way for the office to contact you? OK to leave message on   voicemail  Preferred Call Back Phone Number? [phone]  ---------------------------------------------------------------------------  --------------  SCRIPT ANSWERS  Relationship to Patient? Other  Representative Name? Edilberto Hand spouse  Is the Representative on the appropriate HIPAA document in Epic?  Yes

## 2022-07-01 NOTE — TELEPHONE ENCOUNTER
PATRICIA fo him to call back on Tuesday. About his Alprazolam.  See note at bottom of page. That she put in on Wednesday.

## 2022-07-05 NOTE — TELEPHONE ENCOUNTER
Patient returning shanice call regarding alprazolam  Patient is not out of medication patient requests a few days early due to pharmacy will fill the day it is due

## 2022-07-05 NOTE — TELEPHONE ENCOUNTER
AIDEN  I called him today and informed of the Message from Dr. Derek Perez. He said ne never asked for his meds early. He said the pharmacy always called him and told him to call us to get his meds filled early. He has plenty of his med left. But if you are going to play it this way, he will just fined another doctor. He doesn't like the way you are pushing your Nurse Practicioner on him anyway. I told him that was his choice.

## 2022-07-05 NOTE — TELEPHONE ENCOUNTER
Patient calling back to check status of message   Patient stating okay to leave voicemail if no answer.

## 2022-07-06 ENCOUNTER — TELEPHONE (OUTPATIENT)
Dept: FAMILY MEDICINE CLINIC | Age: 67
End: 2022-07-06

## 2022-07-06 NOTE — TELEPHONE ENCOUNTER
Informed him that we do not have any sample. He want the number of some place he can call to get med cheaper.

## 2022-07-06 NOTE — TELEPHONE ENCOUNTER
Patient calling would like to know if he can have some samples of invokana   Patient is in a coverage gap , and on medicare. Insurance will not accept a pa due to being on medicare, and [employer] secondary.    Please give patient a call back if we have samples, or if something can be done to approve this medication  Cost was  $47 now  $152   Please advise

## 2022-07-15 ENCOUNTER — HOSPITAL ENCOUNTER (OUTPATIENT)
Dept: NON INVASIVE DIAGNOSTICS | Age: 67
Discharge: HOME | End: 2022-07-15
Payer: MEDICARE

## 2022-07-15 DIAGNOSIS — I35.0 NONRHEUMATIC AORTIC VALVE STENOSIS: ICD-10-CM

## 2022-07-15 LAB
LV EF: 60 %
LVEF MODALITY: NORMAL

## 2022-07-15 PROCEDURE — 93306 TTE W/DOPPLER COMPLETE: CPT

## 2022-07-21 ENCOUNTER — OFFICE VISIT (OUTPATIENT)
Dept: CARDIOLOGY CLINIC | Age: 67
End: 2022-07-21
Payer: MEDICARE

## 2022-07-21 VITALS
WEIGHT: 275.8 LBS | DIASTOLIC BLOOD PRESSURE: 54 MMHG | BODY MASS INDEX: 38.61 KG/M2 | HEART RATE: 90 BPM | SYSTOLIC BLOOD PRESSURE: 90 MMHG | OXYGEN SATURATION: 95 % | HEIGHT: 71 IN

## 2022-07-21 DIAGNOSIS — I35.0 NONRHEUMATIC AORTIC VALVE STENOSIS: Primary | ICD-10-CM

## 2022-07-21 DIAGNOSIS — I25.10 CORONARY ARTERY DISEASE INVOLVING NATIVE CORONARY ARTERY OF NATIVE HEART WITHOUT ANGINA PECTORIS: ICD-10-CM

## 2022-07-21 DIAGNOSIS — I10 ESSENTIAL HYPERTENSION: ICD-10-CM

## 2022-07-21 DIAGNOSIS — E78.5 HYPERLIPIDEMIA LDL GOAL <70: ICD-10-CM

## 2022-07-21 PROCEDURE — G8427 DOCREV CUR MEDS BY ELIG CLIN: HCPCS | Performed by: INTERNAL MEDICINE

## 2022-07-21 PROCEDURE — 3017F COLORECTAL CA SCREEN DOC REV: CPT | Performed by: INTERNAL MEDICINE

## 2022-07-21 PROCEDURE — 99214 OFFICE O/P EST MOD 30 MIN: CPT | Performed by: INTERNAL MEDICINE

## 2022-07-21 PROCEDURE — 1036F TOBACCO NON-USER: CPT | Performed by: INTERNAL MEDICINE

## 2022-07-21 PROCEDURE — G8417 CALC BMI ABV UP PARAM F/U: HCPCS | Performed by: INTERNAL MEDICINE

## 2022-07-21 PROCEDURE — 1123F ACP DISCUSS/DSCN MKR DOCD: CPT | Performed by: INTERNAL MEDICINE

## 2022-07-21 NOTE — PROGRESS NOTES
[address]  Cardiology Consult    Dipti Chen  1955    July 21, 2022      Reason for Referral: Aortic stenosis    Referring physician: Dr Marcelo Short    CC: \"I am here to discuss my recent echocardiogram.\"      Subjective:     History of Present Illness:    Dipti Chen is a 78 y.o. patient with a PMH significant for coronary artery disease/prior PCI/CABG, hypertension, hyperlipidemia, diabetes and sleep apnea. He was seen in 2019 with severe indigestion and found to have RCA occlusion that underwent stenting. He undrwent urgent CABG x 5 on 3/26/2019 by Dr. Sathya Pitt ((LEHMAN-LAD, [id number], SVG-PDA) LAD endarterectomy, sternal stabilization ([employer] Stern[employer]Lock). Today, he is here to discuss recent echocardiogram. He continues to have increased shortness of breath which is limiting his activity. Patient denies exertional chest pain/pressure,palpitations, lightheadedness, weight changes, changes in LE edema, and syncope. Past Medical History:   has a past medical history of Anxiety, Aortic valve sclerosis, Arthritis, CAD (coronary artery disease), Cerebral infarct ([address] Chronic active viral hepatitis B ([address] Chronic back pain, Diabetes mellitus, type 2 ([address] Fatty liver, Heart attack ([address] Hepatitis B immune- pos HBSAg, History of blood transfusion, HTN (hypertension), Hyperlipidemia LDL goal < 100, Hyperlipidemia with target LDL less than 100, Hypertension, Obesity, Psoriasis, Sleep apnea, and STEMI (ST elevation myocardial infarction) ([address] Surgical History:   has a past surgical history that includes Coronary angioplasty with stent (11/16/2011); Appendectomy (age 17); Tonsillectomy and adenoidectomy (1980's); Total knee arthroplasty (Left, 2005); Biceps tendon repair; Skull fracture elevation ([age]); Colonoscopy; Coronary angioplasty with stent (06/01/2009); Coronary angioplasty with stent (11/25/2008);  Coronary angioplasty with stent (11/24/2008); craniotomy (Right); Umbilical hernia repair; Coronary artery bypass graft (03/26/2019); Coronary artery bypass graft (N/A, 3/26/2019); Cataract removal (Bilateral, 2020); and Nerve Block (Bilateral, 10/30/2020). Social History:   reports that he has never smoked. He has never used smokeless tobacco. He reports that he does not currently use alcohol. He reports that he does not use drugs. Family History:  family history includes Cancer in his mother; Diabetes in his mother; Heart Failure in his father. Home Medications:  Were reviewed and are listed in nursing record and/or below  Prior to Admission medications    Medication Sig Start Date End Date Taking? Authorizing Provider   rosuvastatin (CRESTOR) 40 MG tablet TAKE 1 TABLET BY MOUTH DAILY 6/23/22  Yes Kortney Adam MD   gabapentin (NEURONTIN) 400 MG capsule TAKE 1 CAPSULE BY MOUTH THREE TIMES DAILY 6/13/22 12/10/22 Yes Kortney Adam MD   INVOKANA 300 MG TABS tablet TAKE 1 TABLET BY MOUTH EVERY MORNING BEFORE BREAKFAST 6/6/22  Yes Kortney Adam MD   LANTUS 100 UNIT/ML injection vial ADMINISTER 50 UNITS UNDER THE SKIN EVERY NIGHT 5/2/22  Yes Kortney Adam MD   valsartan-hydroCHLOROthiazide (DIOVAN-HCT) 320-12.5 MG per tablet TAKE 1 TABLET BY MOUTH EVERY DAY 4/6/22  Yes Kortney Adam MD   metFORMIN (GLUCOPHAGE-XR) 500 MG extended release tablet TAKE 4 TABLETS BY MOUTH EVERY DAY WITH BREAKFAST 4/4/22  Yes Kortney Adam MD   Insulin Syringe-Needle U-100 ([employer] INSULIN SYR 1CC/30G) 30G X 5/16\" 1 ML MISC 1 each by Does not apply route daily 4/1/22  Yes Mary Laird MD   aspirin 325 MG tablet Take 325 mg by mouth daily   Yes Historical Provider, MD   ezetimibe (ZETIA) 10 MG tablet Take 1 tablet by mouth daily 10/1/21  Yes Cyndi Browne MD   fluocinonide (LIDEX) 0.05 % cream Apply topically 2 times daily.  10/24/19  Yes Kortney Adam MD   Emollient (AQUAPHOR ADVANCED THERAPY) OINT Apply three times per day as needed 7/9/19  Yes Kortney Adam MD   Lancets MISC 1 each by Does not apply route 2 times daily 6/13/19  Yes Donna Walsh MD   blood glucose monitor kit and supplies Test 2 times a day & as needed for symptoms of irregular blood glucose. 6/13/19  Yes Donna Walsh MD   blood glucose monitor strips Test 2 times a day & as needed for symptoms of irregular blood glucose. 6/13/19  Yes Donna Walsh MD   entecavir (BARACLUDE) 1 MG tablet Take 1 mg by mouth daily  1/21/19  Yes Donna Walsh MD   glucose blood VI test strips (ASCENSIA AUTODISC VI;ONE TOUCH ULTRA TEST VI) strip Apply 1 each topically 3 times daily. Onetouch Ultra 2 test strips  Dx: 250.00 3/20/13  Yes Donna Walsh MD   [facility] 1 each by Does not apply route 3 times daily. 3/7/13  Yes Donna Walsh MD        CURRENT Medications:  No current facility-administered medications for this visit. Allergies:  Patient has no known allergies. Review of Systems: SEE HPI   Constitutional: no unanticipated weight loss. There's been no change in energy level, sleep pattern, or activity level. No fevers, chills. Eyes: No visual changes or diplopia. No scleral icterus. ENT: No Headaches, hearing loss or vertigo. No mouth sores or sore throat. Cardiovascular: No Chest pain, tightness or discomfort. No Shortness of breath. No Dyspnea on exertion, Orthopnea, Paroxysmal nocturnal dyspnea or breathlessness at rest.  No Palpitations. No Syncope ('blackouts', 'faints', 'collapse') or dizziness. Respiratory: No cough or wheezing, no sputum production. No hematemesis. Gastrointestinal: No abdominal pain, appetite loss, blood in stools. No change in bowel or bladder habits. Genitourinary: No dysuria, trouble voiding, or hematuria. Musculoskeletal:  No gait disturbance, no joint complaints. Integumentary: No rash or pruritis. Neurological: No headache, diplopia, change in muscle strength, numbness or tingling. Psychiatric: No anxiety or depression. Endocrine: No temperature intolerance.  No excessive thirst, fluid intake, or urination. No tremor. Hematologic/Lymphatic: No abnormal bruising or bleeding, blood clots or swollen lymph nodes. Allergic/Immunologic: No nasal congestion or hives. Objective:     PHYSICAL EXAM:      Vitals:    07/21/22 1410   BP: (!) 90/54   Pulse: 90   SpO2: 95%      Weight: 275 lb 12.8 oz (125.1 kg)       General Appearance:  Alert, cooperative, no distress, appears stated age. Head:  Normocephalic, without obvious abnormality, atraumatic. Eyes:  Pupils equal and round. No scleral icterus. Mouth: Moist mucosa, no pharyngeal erythema. Nose: Nares normal. No drainage or sinus tenderness. Neck: Supple, symmetrical, trachea midline. No adenopathy. No tenderness/mass/nodules. No carotid bruit or elevated JVD. Lungs:   Respiratory Effort: Normal   Auscultation: Clear to auscultation bilaterally, respirations unlabored. No wheeze, rales   Chest Wall:  No tenderness or deformity. Cardiovascular:    Pulses  Palpation: normal   Ascultation: Regular rate, S1/ S2 normal. No murmur, rub, or gallop. 2+ radial and pedal pulses, symmetric  Carotid  Femoral   Abdomen and Gastrointestinal:   Soft, non-tender, bowel sounds active. Liver and Spleen  Masses   Musculoskeletal: No muscle wasting  Back  Gait   Extremities: Extremities normal, atraumatic. No cyanosis or edema. No cyanosis clubbing       Skin: Inspection and palpation performed, no rashes or lesions. Pysch: Normal mood and affect.  Alert and oriented to time place person   Neurologic: Normal gross motor and sensory exam.       Labs     All labs have been reviewed    Lab Results   Component Value Date/Time    WBC 6.6 06/08/2022 01:45 PM    RBC 5.11 06/08/2022 01:45 PM    HGB 16.0 06/08/2022 01:45 PM    HCT 47.5 06/08/2022 01:45 PM    MCV 93.0 06/08/2022 01:45 PM    RDW 13.9 06/08/2022 01:45 PM     06/08/2022 01:45 PM     Lab Results   Component Value Date/Time     06/08/2022 01:45 PM    K 4.3 06/08/2022 01:45 PM    K 4.3 09/01/2019 09:14 PM     06/08/2022 01:45 PM    CO2 22 06/08/2022 01:45 PM    BUN 32 06/08/2022 01:45 PM    CREATININE 1.2 06/08/2022 01:45 PM    GFRAA >60 06/08/2022 01:45 PM    GFRAA >60 02/22/2013 09:59 AM    AGRATIO 1.7 04/01/2022 10:53 AM    LABGLOM >60 06/08/2022 01:45 PM    GLUCOSE 196 06/08/2022 01:45 PM    PROT 7.4 04/01/2022 10:53 AM    CALCIUM 9.8 06/08/2022 01:45 PM    BILITOT 0.4 04/01/2022 10:53 AM    ALKPHOS 80 04/01/2022 10:53 AM    AST 22 04/01/2022 10:53 AM    ALT 21 04/01/2022 10:53 AM     No results found for: PTINR  Lab Results   Component Value Date    LABA1C 8.2 02/24/2022     Lab Results   Component Value Date    TROPONINI <0.01 09/22/2019       Cardiac, Vascular and Imaging Data all Personally Reviewed in Detail by Myself      EKG:     Echocardiogram:   ECHO 2/25/2022  Normal left ventricle size, wall thickness, and systolic function with an  estimated ejection fraction of 55%. No regional wall motion abnormalities  are seen. Normal diastolic function. No evidence of aortic valve regurgitation. Moderate to severe aortic stenosis with a peak velocity of 3.57m/s and a  mean pressure gradient of 32mmHg, KRISTIN by continuity 0.77 cm2, DI 0.19 ,  recommend MELISSA  The right ventricle is normal in size and function. ECHO (Limited) 3/25/19  Left ventricular cavity size is normal.  There is mild concentric left ventricular hypertrophy and low normal  systolic function. Ejection fraction is visually estimated to be 50%. There is mild hypokinesis of the inferior wall. The right ventricle is not well visualized but appears to have normal size  and function. There are no significant valvular abnormalities appreciated in this study. ECHO 7/15/2022  Normal LV size and wall motion. EF is  60%. Normal diastolic function. The left atrium is normal in size. The right ventricle is normal in size and function. Mild Aortic stenosis. Mean gradient 26 mmHg.     Stress Test:     Cath:  Cardiac cath 3/25/19  CONCLUSIONS:  1. Successful recanalization of a STIVEN 1 to 2 flow, distal right  coronary artery, with stenting of a 99% distal right coronary artery  lesion with a 3.0 x 23-mm length Xience Darcy drug-eluting stent. In  the early mid-right coronary artery, there was a focal 80% stenosis that  was stented with a 3.5 mm x 12-mm Xience Darcy drug-eluting stent and  this stent was deployed at 14 atmospheres with nearly 0% residual  stenosis. That same 3.5-mm balloon was then used to deliver therapy and  reduce an in-stent stenotic lesion of about 80% to less than 10%  residual stenosis after inflation of that balloon to 12 atmospheres and  then 14 atmospheres x30 seconds each. STIVEN 3 flow resulted. 2.  There appears to be a 60% ostial left main stenosis. There is a 99%  ostial circumflex stenosis. There is a mid to distal 95% LAD stenosis. 3.  LV ejection fraction of 50% with inferior basal akinesia in the AMOR  projection. 4. LVEDP 10 mmHg with a 10-mm gradient peak-to-peak upon pullback  across the aortic valve. 5.  Successful Angio-Seal, right femoral arteriotomy. Other imaging:     Assessment and Plan     Nonrheumatic aortic valve stenosis  Moderate to severe aortic stenosis with a peak velocity of 3.57m/s and a  mean pressure gradient of 32mmHg, KRISTIN by continuity 0.77 cm2, DI 0.19   Increased dyspnea with exertion. I believe that he will need a valve replacement in the near future. .  Dedicated cardiac cath valve study. Transesophageal echo to do planimetry of the aortic valve. Coronary artery disease  Managed by Dr Rosendo Neff. Hypertension  Controlled. Continue current medical management. Hyperlipidemia  Continue statin therapy. Follow up as directed by TAVR. Thank you for allowing us to participate in the care of [facility]. Please do not hesitate to contact me if you have any questions.     Lilly Barton MD, MPH    [facility] [address]  Ph: [phone]  Fax: [phone]    This note was scribed in the presence of Dr Hunter Steel, by Chantal Rajput RN  Physician Attestation:  The scribes documentation has been prepared under my direction and personally reviewed by me in its entirety. I confirm that the note above accurately reflects all work, treatment, procedures, and medical decision making performed by me.

## 2022-08-02 ENCOUNTER — TELEPHONE (OUTPATIENT)
Dept: FAMILY MEDICINE CLINIC | Age: 67
End: 2022-08-02

## 2022-08-02 RX ORDER — FLUORIDE TOOTHPASTE
15 TOOTHPASTE DENTAL 3 TIMES DAILY PRN
Qty: 237 ML | Refills: 2 | Status: SHIPPED | OUTPATIENT
Start: 2022-08-02 | End: 2022-10-05

## 2022-08-02 NOTE — TELEPHONE ENCOUNTER
Patient is calling in asking for a prescription for a mouth rinse as he just started using his CPAP machine and is mouth is getting dry.     Pharmacy verified    Please Advise

## 2022-08-15 DIAGNOSIS — L40.50 PSORIATIC ARTHRITIS (HCC): Primary | ICD-10-CM

## 2022-08-15 RX ORDER — TRIAMCINOLONE ACETONIDE 1 MG/G
CREAM TOPICAL
Qty: 453.6 G | Refills: 3 | Status: SHIPPED | OUTPATIENT
Start: 2022-08-15 | End: 2023-08-14

## 2022-08-16 ENCOUNTER — TELEPHONE (OUTPATIENT)
Dept: FAMILY MEDICINE CLINIC | Age: 67
End: 2022-08-16

## 2022-08-17 RX ORDER — EZETIMIBE 10 MG/1
10 TABLET ORAL DAILY
Qty: 90 TABLET | Refills: 1 | Status: SHIPPED | OUTPATIENT
Start: 2022-08-17 | End: 2022-10-11

## 2022-08-17 RX ORDER — VALSARTAN AND HYDROCHLOROTHIAZIDE 320; 12.5 MG/1; MG/1
TABLET, FILM COATED ORAL
Qty: 90 TABLET | Refills: 1 | Status: SHIPPED | OUTPATIENT
Start: 2022-08-17 | End: 2022-10-10

## 2022-08-29 ENCOUNTER — TELEPHONE (OUTPATIENT)
Dept: PULMONOLOGY | Age: 67
End: 2022-08-29

## 2022-08-29 NOTE — TELEPHONE ENCOUNTER
Patient calls today requesting pressure be turned down on CPAP. He has no trouble falling to sleep but feels once he is asleep and his lips relax the pressure is to high. He is very dry in morning. Can we pull download and see if pressures are being tolerated and make a change.

## 2022-09-15 ENCOUNTER — OFFICE VISIT (OUTPATIENT)
Dept: PULMONOLOGY | Age: 67
End: 2022-09-15
Payer: MEDICARE

## 2022-09-15 VITALS
SYSTOLIC BLOOD PRESSURE: 108 MMHG | TEMPERATURE: 97.3 F | BODY MASS INDEX: 38.78 KG/M2 | HEART RATE: 92 BPM | WEIGHT: 277 LBS | OXYGEN SATURATION: 96 % | HEIGHT: 71 IN | RESPIRATION RATE: 18 BRPM | DIASTOLIC BLOOD PRESSURE: 63 MMHG

## 2022-09-15 DIAGNOSIS — I35.0 AORTIC STENOSIS, MODERATE: ICD-10-CM

## 2022-09-15 DIAGNOSIS — J98.4 RESTRICTIVE LUNG DISEASE: ICD-10-CM

## 2022-09-15 DIAGNOSIS — G47.33 OSA (OBSTRUCTIVE SLEEP APNEA): Primary | ICD-10-CM

## 2022-09-15 PROCEDURE — G8417 CALC BMI ABV UP PARAM F/U: HCPCS | Performed by: INTERNAL MEDICINE

## 2022-09-15 PROCEDURE — G8427 DOCREV CUR MEDS BY ELIG CLIN: HCPCS | Performed by: INTERNAL MEDICINE

## 2022-09-15 PROCEDURE — 99214 OFFICE O/P EST MOD 30 MIN: CPT | Performed by: INTERNAL MEDICINE

## 2022-09-15 PROCEDURE — 1123F ACP DISCUSS/DSCN MKR DOCD: CPT | Performed by: INTERNAL MEDICINE

## 2022-09-15 PROCEDURE — 3017F COLORECTAL CA SCREEN DOC REV: CPT | Performed by: INTERNAL MEDICINE

## 2022-09-15 PROCEDURE — 1036F TOBACCO NON-USER: CPT | Performed by: INTERNAL MEDICINE

## 2022-09-15 RX ORDER — TRAZODONE HYDROCHLORIDE 50 MG/1
50 TABLET ORAL NIGHTLY
Qty: 30 TABLET | Refills: 1 | Status: SHIPPED | OUTPATIENT
Start: 2022-09-15 | End: 2022-10-10

## 2022-09-15 ASSESSMENT — SLEEP AND FATIGUE QUESTIONNAIRES
HOW LIKELY ARE YOU TO NOD OFF OR FALL ASLEEP WHILE SITTING AND TALKING TO SOMEONE: 0
HOW LIKELY ARE YOU TO NOD OFF OR FALL ASLEEP WHILE LYING DOWN TO REST IN THE AFTERNOON WHEN CIRCUMSTANCES PERMIT: 0
HOW LIKELY ARE YOU TO NOD OFF OR FALL ASLEEP WHEN YOU ARE A PASSENGER IN A CAR FOR AN HOUR WITHOUT A BREAK: 0
HOW LIKELY ARE YOU TO NOD OFF OR FALL ASLEEP WHILE SITTING QUIETLY AFTER LUNCH WITHOUT ALCOHOL: 0
HOW LIKELY ARE YOU TO NOD OFF OR FALL ASLEEP IN A CAR, WHILE STOPPED FOR A FEW MINUTES IN TRAFFIC: 0
NECK CIRCUMFERENCE (INCHES): 0
ESS TOTAL SCORE: 0
HOW LIKELY ARE YOU TO NOD OFF OR FALL ASLEEP WHILE SITTING AND READING: 0
HOW LIKELY ARE YOU TO NOD OFF OR FALL ASLEEP WHILE SITTING INACTIVE IN A PUBLIC PLACE: 0
HOW LIKELY ARE YOU TO NOD OFF OR FALL ASLEEP WHILE WATCHING TV: 0
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
## 2022-09-16 ENCOUNTER — TELEPHONE (OUTPATIENT)
Dept: CARDIOLOGY CLINIC | Age: 67
End: 2022-09-16

## 2022-09-16 NOTE — TELEPHONE ENCOUNTER
Patient calling asking about valve wanting to know if he is proceeding? Do you have any additional information or is there something I need to do?

## 2022-09-19 ENCOUNTER — TELEPHONE (OUTPATIENT)
Dept: FAMILY MEDICINE CLINIC | Age: 67
End: 2022-09-19

## 2022-09-19 NOTE — TELEPHONE ENCOUNTER
Talked to pt at length re current moderate severity of his AS and that we are unable to do TAVR until it is in the severe range. Offered MELISSA to get another look at the valve. He states that since adjusting his CPAP, he is feeling much better. He states that at this time, we will think about the MELISSA and get back to me. Let him know that we can also schedule his 6 mos echo and OV for 1/2023. He states that he is dealing with extreme constipation and is working with his PCP.  Irving RADFORD

## 2022-09-20 ENCOUNTER — TELEPHONE (OUTPATIENT)
Dept: PULMONOLOGY | Age: 67
End: 2022-09-20

## 2022-09-20 NOTE — TELEPHONE ENCOUNTER
Patient calls in to let Dr. Markell Rich know that the changes that were made have helped a lot and he has no com\plaints.    And he said Thank you very much!!

## 2022-09-22 ENCOUNTER — TELEPHONE (OUTPATIENT)
Dept: PULMONOLOGY | Age: 67
End: 2022-09-22

## 2022-09-22 DIAGNOSIS — F51.01 PRIMARY INSOMNIA: Primary | ICD-10-CM

## 2022-09-22 DIAGNOSIS — F51.01 PRIMARY INSOMNIA: ICD-10-CM

## 2022-09-22 RX ORDER — ESZOPICLONE 2 MG/1
2 TABLET, FILM COATED ORAL NIGHTLY PRN
Qty: 30 TABLET | Refills: 0 | Status: SHIPPED | OUTPATIENT
Start: 2022-09-22 | End: 2022-09-23 | Stop reason: SDUPTHER

## 2022-09-22 NOTE — TELEPHONE ENCOUNTER
Patient is calling to let Dr. Lilly Hill know that the trazadone is not working he has been tossing and turning all night

## 2022-09-23 ENCOUNTER — TELEPHONE (OUTPATIENT)
Dept: PULMONOLOGY | Age: 67
End: 2022-09-23

## 2022-09-23 DIAGNOSIS — F51.01 PRIMARY INSOMNIA: ICD-10-CM

## 2022-09-23 RX ORDER — ESZOPICLONE 2 MG/1
2 TABLET, FILM COATED ORAL NIGHTLY PRN
Qty: 30 TABLET | Refills: 0 | Status: SHIPPED | OUTPATIENT
Start: 2022-09-23 | End: 2022-09-26 | Stop reason: SDUPTHER

## 2022-09-23 NOTE — TELEPHONE ENCOUNTER
Called [facility] and [employer]. [facility] pharmacy is calling [facility] to have the script forwarded to them. I was not able to resend the script. SW patient and gave him this information. He was instructed to stop taking the Trazodone per Dr. Vladimir Subramanian instructions, which he stated he already has.

## 2022-09-23 NOTE — TELEPHONE ENCOUNTER
Dr. Kamryn Rosenbaum can you please resend this patients script to the [facility] listed. I was unable to do so.

## 2022-09-23 NOTE — TELEPHONE ENCOUNTER
Patient calls back in [facility] ,   Carmen and Yadira were unable to transfer script , a new one will have to be sent

## 2022-09-26 DIAGNOSIS — F51.01 PRIMARY INSOMNIA: ICD-10-CM

## 2022-09-26 RX ORDER — ESZOPICLONE 1 MG/1
1 TABLET, FILM COATED ORAL NIGHTLY PRN
Qty: 30 TABLET | Refills: 0 | Status: SHIPPED | OUTPATIENT
Start: 2022-09-26 | End: 2022-10-10

## 2022-09-30 ENCOUNTER — TELEPHONE (OUTPATIENT)
Dept: CARDIOLOGY CLINIC | Age: 67
End: 2022-09-30

## 2022-09-30 NOTE — LETTER
Sapphire Spivey  1955    [facility]   The morning of your Procedure-MELISSA you will park in the hospital parking lot and report directly to the cath lab to check in. DATE: 10/27/2022 TIME: 12:00      Pre-Procedure Instructions:  1. Do not eat or drink anything 8 hours before your procedure. 2. Hold all diabetic medications the morning of the procedure including, Metfomin. 3. If you take Lantus/Levemir only take ½ your normal dose the evening before. 4. All other medications can be taken in the morning with sips of water. 5. Do not hold anticoagulation therapy: warfarin, eliquis, xarelto therapy. 6. Do not use any lotions, creams or perfume the morning of procedure. 7. Please arrive 1 hour prior to procedure time. 8. Please have a responsible adult to drive you home after procedure. It is recommended you do not drive for 24 hours after procedure. 9. Cath lab will provide you with all post procedure instructions. If you have any questions regarding the procedure itself or medications please call [phone] and ask to speak with a nurse.

## 2022-09-30 NOTE — TELEPHONE ENCOUNTER
Dr. Trina Ruiz suggested a MELISSA for Mr. Boyer to better look at his aortic stenosis. Can you set this up for him? Pt is aware you will be calling him.  Irving RADFORD

## 2022-10-04 ENCOUNTER — TELEPHONE (OUTPATIENT)
Dept: FAMILY MEDICINE CLINIC | Age: 67
End: 2022-10-04

## 2022-10-04 DIAGNOSIS — E11.51 TYPE 2 DIABETES MELLITUS WITH DIABETIC PERIPHERAL ANGIOPATHY WITHOUT GANGRENE, WITH LONG-TERM CURRENT USE OF INSULIN (HCC): ICD-10-CM

## 2022-10-04 DIAGNOSIS — Z79.4 TYPE 2 DIABETES MELLITUS WITH DIABETIC PERIPHERAL ANGIOPATHY WITHOUT GANGRENE, WITH LONG-TERM CURRENT USE OF INSULIN (HCC): ICD-10-CM

## 2022-10-04 RX ORDER — INSULIN GLARGINE 100 [IU]/ML
INJECTION, SOLUTION SUBCUTANEOUS
Qty: 10 ML | Refills: 5 | Status: CANCELLED | OUTPATIENT
Start: 2022-10-04

## 2022-10-04 NOTE — TELEPHONE ENCOUNTER
I called him he said he needs refill on insulin, not syringes. Old rx was for Lantus 50 units nightly. He said you raised it to 60 units, but he is only taking 55 units nightly and his BS have been running pretty good. Want this sent in for 55 units. He has appt next week 10/10/22.   Pended med

## 2022-10-04 NOTE — TELEPHONE ENCOUNTER
----- Message from Hazel Lindsay sent at 10/4/2022 12:44 PM EDT -----  Subject: Medication Problem    Medication: Insulin Syringe-Needle U-100 (KROGER INSULIN SYR 1CC/30G) 30G   X 5/16\" 1 ML MISC  Dosage: 1cc/30g  Ordering Provider: Liz Jimenez    Question/Problem: Patient would like to up the dosage on his insulin  Additional Information for Provider:     Pharmacy: [address], [address] [place] [address] [phone] Yuliana Marii [phone]    ---------------------------------------------------------------------------  --------------  Zora Bosworth INFO  [phone]; OK to leave message on voicemail  ---------------------------------------------------------------------------  --------------    SCRIPT ANSWERS  Relationship to Patient: Self

## 2022-10-05 ENCOUNTER — TELEMEDICINE (OUTPATIENT)
Dept: FAMILY MEDICINE CLINIC | Age: 67
End: 2022-10-05
Payer: MEDICARE

## 2022-10-05 DIAGNOSIS — E11.51 TYPE 2 DIABETES MELLITUS WITH DIABETIC PERIPHERAL ANGIOPATHY WITHOUT GANGRENE, WITH LONG-TERM CURRENT USE OF INSULIN (HCC): ICD-10-CM

## 2022-10-05 DIAGNOSIS — Z79.4 TYPE 2 DIABETES MELLITUS WITH DIABETIC PERIPHERAL ANGIOPATHY WITHOUT GANGRENE, WITH LONG-TERM CURRENT USE OF INSULIN (HCC): ICD-10-CM

## 2022-10-05 DIAGNOSIS — J06.9 VIRAL URI WITH COUGH: Primary | ICD-10-CM

## 2022-10-05 PROCEDURE — 99213 OFFICE O/P EST LOW 20 MIN: CPT

## 2022-10-05 PROCEDURE — 1123F ACP DISCUSS/DSCN MKR DOCD: CPT

## 2022-10-05 PROCEDURE — 3052F HG A1C>EQUAL 8.0%<EQUAL 9.0%: CPT

## 2022-10-05 RX ORDER — INSULIN GLARGINE 100 [IU]/ML
INJECTION, SOLUTION SUBCUTANEOUS
Qty: 10 ML | Refills: 5 | Status: SHIPPED | OUTPATIENT
Start: 2022-10-05

## 2022-10-05 RX ORDER — DEXTROMETHORPHAN HYDROBROMIDE AND PROMETHAZINE HYDROCHLORIDE 15; 6.25 MG/5ML; MG/5ML
SYRUP ORAL
Qty: 240 ML | Refills: 0 | Status: SHIPPED | OUTPATIENT
Start: 2022-10-05 | End: 2022-10-10

## 2022-10-05 ASSESSMENT — PATIENT HEALTH QUESTIONNAIRE - PHQ9
7. TROUBLE CONCENTRATING ON THINGS, SUCH AS READING THE NEWSPAPER OR WATCHING TELEVISION: 0
6. FEELING BAD ABOUT YOURSELF - OR THAT YOU ARE A FAILURE OR HAVE LET YOURSELF OR YOUR FAMILY DOWN: 0
SUM OF ALL RESPONSES TO PHQ QUESTIONS 1-9: 1
SUM OF ALL RESPONSES TO PHQ9 QUESTIONS 1 & 2: 0
2. FEELING DOWN, DEPRESSED OR HOPELESS: 0
8. MOVING OR SPEAKING SO SLOWLY THAT OTHER PEOPLE COULD HAVE NOTICED. OR THE OPPOSITE, BEING SO FIGETY OR RESTLESS THAT YOU HAVE BEEN MOVING AROUND A LOT MORE THAN USUAL: 0
SUM OF ALL RESPONSES TO PHQ QUESTIONS 1-9: 1
4. FEELING TIRED OR HAVING LITTLE ENERGY: 0
10. IF YOU CHECKED OFF ANY PROBLEMS, HOW DIFFICULT HAVE THESE PROBLEMS MADE IT FOR YOU TO DO YOUR WORK, TAKE CARE OF THINGS AT HOME, OR GET ALONG WITH OTHER PEOPLE: 0
3. TROUBLE FALLING OR STAYING ASLEEP: 1
SUM OF ALL RESPONSES TO PHQ QUESTIONS 1-9: 1
1. LITTLE INTEREST OR PLEASURE IN DOING THINGS: 0
5. POOR APPETITE OR OVEREATING: 0
SUM OF ALL RESPONSES TO PHQ QUESTIONS 1-9: 1
9. THOUGHTS THAT YOU WOULD BE BETTER OFF DEAD, OR OF HURTING YOURSELF: 0

## 2022-10-05 ASSESSMENT — ENCOUNTER SYMPTOMS
COUGH: 1
DIARRHEA: 0
WHEEZING: 0
SWOLLEN GLANDS: 0
SINUS PAIN: 1
NAUSEA: 0
ABDOMINAL PAIN: 0
RHINORRHEA: 0
VOMITING: 0
SORE THROAT: 1

## 2022-10-05 NOTE — PROGRESS NOTES
Meryle Gambles (:  1955) is a Established patient, here for evaluation of the following:    Cough (Cough, sore throat, yellow phlegm, chest congestion - started this morningmm ) and Medication Refill (Lantus refill)      Assessment & Plan   Below is the assessment and plan developed based on review of pertinent history, physical exam, labs, studies, and medications. 1. Viral URI with cough  -     promethazine-dextromethorphan (PROMETHAZINE-DM) 6.25-15 MG/5ML syrup; TAKE 5 ML BY MOUTH FOUR TIMES DAILY AS NEEDED FOR COUGH. MAY CAUSE SEDATION, Disp-240 mL, R-0Normal  Advised supportive care including rest, hydration, ibuprofen/tylenol and mucinex (advised to avoid additional DM)  He is to let me know if his COVID 19 test is positive and will order paxlovid  Please call if cold symptoms getting worse after 7 days from onset, last longer than 10 days, having high fevers, having trouble breathing in chest or extremely ill. Return in 5 days (on 10/10/2022), or if symptoms worsen or fail to improve. Subjective   URI   This is a new problem. The current episode started today. The problem has been gradually worsening. There has been no fever. Associated symptoms include congestion, coughing, headaches, sinus pain and a sore throat. Pertinent negatives include no abdominal pain, chest pain, diarrhea, dysuria, ear pain, joint pain, joint swelling, nausea, neck pain, plugged ear sensation, rash, rhinorrhea, sneezing, swollen glands, vomiting or wheezing. He has tried nothing for the symptoms. Review of Systems   HENT:  Positive for congestion, sinus pain and sore throat. Negative for ear pain, rhinorrhea and sneezing. Respiratory:  Positive for cough. Negative for wheezing. Cardiovascular:  Negative for chest pain. Gastrointestinal:  Negative for abdominal pain, diarrhea, nausea and vomiting. Genitourinary:  Negative for dysuria. Musculoskeletal:  Negative for joint pain and neck pain.    Skin: Negative for rash. Neurological:  Positive for headaches. Objective   Patient-Reported Vitals  Patient-Reported Weight: 260lb  Patient-Reported Height: 5' 11\"       Physical Exam  [INSTRUCTIONS:  \"[x]\" Indicates a positive item  \"[]\" Indicates a negative item  -- DELETE ALL ITEMS NOT EXAMINED]    Constitutional: [x] Appears well-developed and well-nourished [x] No apparent distress      [] Abnormal -     Mental status: [x] Alert and awake  [x] Oriented to person/place/time [x] Able to follow commands    [] Abnormal -     Eyes:   EOM    [x]  Normal    [] Abnormal -   Sclera  [x]  Normal    [] Abnormal -          Discharge [x]  None visible   [] Abnormal -     HENT: [x] Normocephalic, atraumatic  [] Abnormal -   [x] Mouth/Throat: Mucous membranes are moist    External Ears [x] Normal  [] Abnormal -    Neck: [x] No visualized mass [] Abnormal -     Pulmonary/Chest: [x] Respiratory effort normal   [x] No visualized signs of difficulty breathing or respiratory distress        [] Abnormal -      Musculoskeletal:   [x] Normal gait with no signs of ataxia         [x] Normal range of motion of neck        [] Abnormal -     Neurological:        [x] No Facial Asymmetry (Cranial nerve 7 motor function) (limited exam due to video visit)          [x] No gaze palsy        [] Abnormal -          Skin:        [x] No significant exanthematous lesions or discoloration noted on facial skin         [] Abnormal -            Psychiatric:       [x] Normal Affect [] Abnormal -        [x] No Hallucinations    Other pertinent observable physical exam findings:-             Jerica Coffey, was evaluated through a synchronous (real-time) audio-video encounter. The patient (or guardian if applicable) is aware that this is a billable service, which includes applicable co-pays. This Virtual Visit was conducted with patient's (and/or legal guardian's) consent.  The visit was conducted pursuant to the emergency declaration under the  [address] Act and the [place], [facility] waiver authority and the [employer] General Act. Patient identification was verified, and a caregiver was present when appropriate. The patient was located at Home: [address] [facility]  [address] [address].    Provider was located at [facility] (Appt Dept): [address],  Walter Cordero 950, APRN - CNP

## 2022-10-05 NOTE — TELEPHONE ENCOUNTER
[address] [place]   The morning of your Procedure-MELISSA you will park in the hospital parking lot and report directly to the cath lab to check in. DATE: 10/27/2022 TIME: 12:00      Pre-Procedure Instructions:  Do not eat or drink anything 8 hours before your procedure. Hold all diabetic medications the morning of the procedure including, Metfomin. If you take Lantus/Levemir only take ½ your normal dose the evening before. All other medications can be taken in the morning with sips of water. Do not hold anticoagulation therapy: warfarin, eliquis, xarelto therapy. Do not use any lotions, creams or perfume the morning of procedure. Please arrive 1 hour prior to procedure time. Please have a responsible adult to drive you home after procedure. It is recommended you do not drive for 24 hours after procedure. Cath lab will provide you with all post procedure instructions. If you have any questions regarding the procedure itself or medications please call [phone] and ask to speak with a nurse. Published on [employer] and e-mail to [place]. Called patient and he is agreeable to date and time. Patient requested that I email instruction. Email with instructions sent. Encouraged him to call with any questions or concerns.

## 2022-10-10 ENCOUNTER — OFFICE VISIT (OUTPATIENT)
Dept: FAMILY MEDICINE CLINIC | Age: 67
End: 2022-10-10
Payer: MEDICARE

## 2022-10-10 VITALS
TEMPERATURE: 97.7 F | SYSTOLIC BLOOD PRESSURE: 114 MMHG | DIASTOLIC BLOOD PRESSURE: 70 MMHG | RESPIRATION RATE: 16 BRPM | BODY MASS INDEX: 38.86 KG/M2 | HEIGHT: 71 IN | WEIGHT: 277.6 LBS | HEART RATE: 90 BPM | OXYGEN SATURATION: 95 %

## 2022-10-10 DIAGNOSIS — I25.10 CORONARY ARTERY DISEASE INVOLVING NATIVE CORONARY ARTERY OF NATIVE HEART WITHOUT ANGINA PECTORIS: ICD-10-CM

## 2022-10-10 DIAGNOSIS — I10 HYPERTENSION, ESSENTIAL: ICD-10-CM

## 2022-10-10 DIAGNOSIS — Z79.4 TYPE 2 DIABETES MELLITUS WITH DIABETIC PERIPHERAL ANGIOPATHY WITHOUT GANGRENE, WITH LONG-TERM CURRENT USE OF INSULIN (HCC): ICD-10-CM

## 2022-10-10 DIAGNOSIS — Z23 NEEDS FLU SHOT: ICD-10-CM

## 2022-10-10 DIAGNOSIS — Z12.11 COLON CANCER SCREENING: ICD-10-CM

## 2022-10-10 DIAGNOSIS — I35.0 AORTIC STENOSIS, MODERATE: ICD-10-CM

## 2022-10-10 DIAGNOSIS — K59.00 CONSTIPATION, UNSPECIFIED CONSTIPATION TYPE: ICD-10-CM

## 2022-10-10 DIAGNOSIS — M25.521 CHRONIC ELBOW PAIN, RIGHT: ICD-10-CM

## 2022-10-10 DIAGNOSIS — G89.29 CHRONIC ELBOW PAIN, RIGHT: ICD-10-CM

## 2022-10-10 DIAGNOSIS — E11.51 TYPE 2 DIABETES MELLITUS WITH DIABETIC PERIPHERAL ANGIOPATHY WITHOUT GANGRENE, WITH LONG-TERM CURRENT USE OF INSULIN (HCC): ICD-10-CM

## 2022-10-10 DIAGNOSIS — E11.65 UNCONTROLLED TYPE 2 DIABETES MELLITUS WITH HYPERGLYCEMIA (HCC): Primary | ICD-10-CM

## 2022-10-10 LAB — HBA1C MFR BLD: 9.6 %

## 2022-10-10 PROCEDURE — G8417 CALC BMI ABV UP PARAM F/U: HCPCS | Performed by: FAMILY MEDICINE

## 2022-10-10 PROCEDURE — G8427 DOCREV CUR MEDS BY ELIG CLIN: HCPCS | Performed by: FAMILY MEDICINE

## 2022-10-10 PROCEDURE — 3046F HEMOGLOBIN A1C LEVEL >9.0%: CPT | Performed by: FAMILY MEDICINE

## 2022-10-10 PROCEDURE — G8484 FLU IMMUNIZE NO ADMIN: HCPCS | Performed by: FAMILY MEDICINE

## 2022-10-10 PROCEDURE — 99214 OFFICE O/P EST MOD 30 MIN: CPT | Performed by: FAMILY MEDICINE

## 2022-10-10 PROCEDURE — 1123F ACP DISCUSS/DSCN MKR DOCD: CPT | Performed by: FAMILY MEDICINE

## 2022-10-10 PROCEDURE — 83036 HEMOGLOBIN GLYCOSYLATED A1C: CPT | Performed by: FAMILY MEDICINE

## 2022-10-10 PROCEDURE — 90694 VACC AIIV4 NO PRSRV 0.5ML IM: CPT | Performed by: FAMILY MEDICINE

## 2022-10-10 PROCEDURE — G0008 ADMIN INFLUENZA VIRUS VAC: HCPCS | Performed by: FAMILY MEDICINE

## 2022-10-10 PROCEDURE — 2022F DILAT RTA XM EVC RTNOPTHY: CPT | Performed by: FAMILY MEDICINE

## 2022-10-10 PROCEDURE — 1036F TOBACCO NON-USER: CPT | Performed by: FAMILY MEDICINE

## 2022-10-10 PROCEDURE — 3017F COLORECTAL CA SCREEN DOC REV: CPT | Performed by: FAMILY MEDICINE

## 2022-10-10 RX ORDER — VALSARTAN AND HYDROCHLOROTHIAZIDE 320; 12.5 MG/1; MG/1
TABLET, FILM COATED ORAL
Qty: 90 TABLET | Refills: 1 | Status: SHIPPED | OUTPATIENT
Start: 2022-10-10

## 2022-10-10 SDOH — ECONOMIC STABILITY: FOOD INSECURITY: WITHIN THE PAST 12 MONTHS, YOU WORRIED THAT YOUR FOOD WOULD RUN OUT BEFORE YOU GOT MONEY TO BUY MORE.: NEVER TRUE

## 2022-10-10 SDOH — ECONOMIC STABILITY: FOOD INSECURITY: WITHIN THE PAST 12 MONTHS, THE FOOD YOU BOUGHT JUST DIDN'T LAST AND YOU DIDN'T HAVE MONEY TO GET MORE.: NEVER TRUE

## 2022-10-10 ASSESSMENT — SOCIAL DETERMINANTS OF HEALTH (SDOH): HOW HARD IS IT FOR YOU TO PAY FOR THE VERY BASICS LIKE FOOD, HOUSING, MEDICAL CARE, AND HEATING?: NOT HARD AT ALL

## 2022-10-10 NOTE — PATIENT INSTRUCTIONS
INSTRUCTIONS  NEXT APPOINTMENT: Please schedule annual complete physical (30 minutes) in 3 months with Dr. [place] Tavarez or her NP, Dariel Dubin. PLEASE TAKE THIS FORM TO CHECK-OUT WINDOW TO SCHEDULE NEXT VISIT. A1c 9.6 today. For constipation, take OTC Miralax OTC once a day. Adjust to orthostatic dizziness or twice a day as needed. Call GI to schedule colonoscopy soon for colon cancer screening and prevention. Will need to do bowel prep the day before and someone to drive home afterwards. Please get annual dilated eye exam to screen for diabetic retinopathy which can lead to vision loss. Ask for report to be faxed to [phone]. Would get new COVID booster soon. Has better coverage for Omicron variant. Separate from other vaccines by at least 2 weeks. INCREASE insulin to 65 units. Can increase by another 5 units every 2-3 weeks to get fasting under 130 and before bed < 150. See diabetic ed soon. Patient Education      Constipation    Constipation is defined as having a bowel movement fewer than three times per week. With constipation stools are usually hard, dry, small in size, and difficult to eliminate. Some people who are constipated find it painful to have a bowel movement and often experience straining, bloating, and the sensation of a full bowel. Some people think they are constipated if they do not have a bowel movement every day. However, normal stool elimination may be three times a day or three times a week, depending on the person. Constipation is a symptom, not a disease. Almost everyone experiences constipation at some point in their life, and a poor diet typically is the cause. Most constipation is temporary and not serious. Understanding its causes, prevention, and treatment will help most people find relief. Lower digestive system. Who gets constipated? Constipation is one of the most common gastrointestinal complaints in the [place].  More than 4 million Americans have frequent constipation, accounting for 2.5 million physician visits a year. Those reporting constipation most often are women and adults ages 72 and older. Pregnant women may have constipation, and it is a common problem following childbirth or surgery. Self-treatment of constipation with over-the-counter (OTC) laxatives is by far the most common aid. Around $725 million is spent on laxative products each year in [facility]. What causes constipation? To understand constipation, it helps to know how the colon, or large intestine, works. As food moves through the colon, the colon absorbs water from the food while it forms waste products, or stool. Muscle contractions in the colon then push the stool toward the rectum. By the time stool reaches the rectum it is solid, because most of the water has been absorbed. Constipation occurs when the colon absorbs too much water or if the colon's muscle contractions are slow or sluggish, causing the stool to move through the colon too slowly. As a result, stools can become hard and dry. Common causes of constipation are  not enough fiber in the diet   lack of physical activity (especially in the elderly)   medications   milk   irritable bowel syndrome   changes in life or routine such as pregnancy, aging, and travel   abuse of laxatives   ignoring the urge to have a bowel movement   dehydration   specific diseases or conditions, such as stroke (most common)   problems with the colon and rectum   problems with intestinal function (chronic idiopathic constipation)     Not Enough Fiber in the Diet  People who eat a high-fiber diet are less likely to become constipated. The most common causes of constipation are a diet low in fiber or a diet high in fats, such as cheese, eggs, and meats.   Fiber--both soluble and insoluble--is the part of fruits, vegetables, and grains that the body cannot digest. Soluble fiber dissolves easily in water and takes on a soft, gel-like texture in the intestines. Insoluble fiber passes through the intestines almost unchanged. The bulk and soft texture of fiber help prevent hard, dry stools that are difficult to pass. Americans eat an average of 5 to 14 grams of fiber daily,* which is short of the 20 to 35 grams recommended by the American Dietetic Association. Both children and adults often eat too many refined and processed foods from which the natural fiber has been removed. A low-fiber diet also plays a key role in constipation among older adults, who may lose interest in eating and choose foods that are quick to make or buy, such as fast foods, or prepared foods, both of which are usually low in fiber. Also, difficulties with chewing or swallowing may cause older people to eat soft foods that are processed and low in fiber. [facility] for GERARDO Almanzar. Dietary Intake of Macronutrients, Micronutrients, and Other Dietary Constituents: Pia Naidu, 2189-98. Vital and Health Statistics, Series 11, Number 245. July 2002. Not Enough Liquids  Research shows that although increased fluid intake does not necessarily help relieve constipation, many people report some relief from their constipation if they drink fluids such as water and juice and avoid dehydration. Liquids add fluid to the colon and bulk to stools, making bowel movements softer and easier to pass. People who have problems with constipation should try to drink liquids every day. However, liquids that contain caffeine, such as coffee and cola drinks will worsen one's symptoms by causing dehydration. Alcohol is another beverage that causes dehydration. It is important to drink fluids that hydrate the body, especially when consuming caffeine containing drinks or alcoholic beverages. Lack of Physical Activity  A lack of physical activity can lead to constipation, although doctors do not know precisely why.  For example, constipation often occurs after an accident or during an illness when one must stay in bed and cannot exercise. Lack of physical activity is thought to be one of the reasons constipation is common in older people. Medications  Some medications can cause constipation, including  pain medications (especially narcotics)   antacids that contain aluminum and calcium   blood pressure medications (calcium channel blockers)   antiparkinson drugs   antispasmodics   antidepressants   iron supplements   diuretics   anticonvulsants     Changes in Life or Routine  During pregnancy, women may be constipated because of hormonal changes or because the uterus compresses the intestine. Aging may also affect bowel regularity, because a slower metabolism results in less intestinal activity and muscle tone. In addition, people often become constipated when traveling, because their normal diet and daily routine are disrupted. Abuse of Laxatives  The common belief that people must have a daily bowel movement has led to self-medicating with OTC laxative products. Although people may feel relief when they use laxatives, typically they must increase the dose over time because the body grows reliant on laxatives in order to have a bowel movement. As a result, laxatives may become habit-forming. Ignoring the Urge to Have a Bowel Movement  People who ignore the urge to have a bowel movement may eventually stop feeling the need to have one, which can lead to constipation. Some people delay having a bowel movement because they do not want to use toilets outside the home. Others ignore the urge because of emotional stress or because they are too busy. Children may postpone having a bowel movement because of stressful toilet training or because they do not want to interrupt their play. Specific Diseases  Diseases that cause constipation include neurological disorders, metabolic and endocrine disorders, and systemic conditions that affect organ systems.  These disorders can slow the movement of stool through the colon, rectum, or anus. Conditions that can cause constipation are found below. Neurological disorders   multiple sclerosis  Parkinson's disease  chronic idiopathic intestinal pseudo-obstruction  stroke  spinal cord injuries  Metabolic and endocrine conditions   diabetes   uremia   hypercalcemia   poor glycemic control   hypothyroidism   Systemic disorders   amyloidosis   lupus   scleroderma     Problems with the Colon and Rectum  Intestinal obstruction, scar tissue--also called adhesions--diverticulosis, tumors, colorectal stricture, Hirschsprung disease, or cancer can compress, squeeze, or narrow the intestine and rectum and cause constipation. Problems with Intestinal Function  The two types of constipation are idiopathic constipation and functional constipation. Irritable bowel syndrome (IBS) with predominant symptoms of constipation is categorized separately. Idiopathic--of unknown origin--constipation does not respond to standard treatment. Functional constipation means that the bowel is healthy but not working properly. Functional constipation is often the result of poor dietary habits and lifestyle. It occurs in both children and adults and is most common in women. Colonic inertia, delayed transit, and pelvic floor dysfunction are three types of functional constipation. Colonic inertia and delayed transit are caused by a decrease in muscle activity in the colon. These syndromes may affect the entire colon or may be confined to the lower, or sigmoid, colon. Pelvic floor dysfunction is caused by a weakness of the muscles in the pelvis surrounding the anus and rectum. However, because this group of muscles is voluntarily controlled to some extent, biofeedback training is somewhat successful in retraining the muscles to function normally and improving the ability to have a bowel movement.   Functional constipation that stems from problems in the structure of the anus and rectum is known as anorectal dysfunction, or anismus. These abnormalities result in an inability to relax the rectal and anal muscles that allow stool to exit. People with IBS having predominantly constipation also have pain and bloating as part of their symptoms. How is the cause of constipation identified? The tests the doctor performs depend on the duration and severity of the constipation, the person's age, and whether blood in stools, recent changes in bowel habits, or weight loss have occurred. Most people with constipation do not need extensive testing and can be treated with changes in diet and exercise. For example, in young people with mild symptoms, a medical history and physical exam may be all that is needed for diagnosis and treatment. Medical History  The doctor may ask a patient to describe his or her constipation, including duration of symptoms, frequency of bowel movements, consistency of stools, presence of blood in the stool, and toilet habits--how often and where one has bowel movements. A record of eating habits, medication, and level of physical activity will also help the doctor determine the cause of constipation. The clinical definition of constipation is having any two of the following symptoms for at least 12 weeks--not always consecutive--in the previous 12 months:  straining during bowel movements   lumpy or hard stool   sensation of incomplete evacuation   sensation of anorectal blockage/obstruction   fewer than three bowel movements per week     Physical Examination  A physical exam may include a rectal exam with a gloved, lubricated finger to evaluate the tone of the muscle that closes off the anus--also called anal sphincter--and to detect tenderness, obstruction, or blood. In some cases, blood and thyroid tests may be necessary to look for thyroid disease and serum calcium or to rule out inflammatory, metabolic, and other disorders.   Extensive testing usually is reserved for people with severe symptoms, for those with sudden changes in the number and consistency of bowel movements or blood in the stool, and older adults. Additional tests that may be used to evaluate constipation include  a colorectal transit study   anorectal function tests   a defecography   Because of an increased risk of colorectal cancer in older adults, the doctor may use tests to rule out a diagnosis of cancer, including a  barium enema x ray   sigmoidoscopy or colonoscopy     Colorectal transit study. This test shows how well food moves through the colon. The patient swallows capsules containing small markers that are visible on an x ray. The movement of the markers through the colon is monitored by abdominal x rays taken several times 3 to 7 days after the capsule is swallowed. The patient eats a high-fiber diet during the course of this test.    Anorectal function tests. These tests diagnose constipation caused by abnormal functioning of the anus or rectum--also called anorectal function. Anorectal manometry evaluates anal sphincter muscle function. For this test, a catheter or air-filled balloon is inserted into the anus and slowly pulled back through the sphincter muscle to measure muscle tone and contractions. Balloon expulsion tests consist of filling a balloon with varying amounts of water after it has been rectally inserted. Then the patient is asked to expel the balloon. The inability to expel a balloon filled with less than 150 mL of water may indicate a decrease in bowel function. Defecography is an x ray of the anorectal area that evaluates completeness of stool elimination, identifies anorectal abnormalities, and evaluates rectal muscle contractions and relaxation. During the exam, the doctor fills the rectum with a soft paste that is the same consistency as stool. The patient sits on a toilet positioned inside an x-ray machine, then relaxes and squeezes the anus to expel the paste.  The doctor studies the x rays for anorectal problems that occurred as the paste was expelled. Barium enema x ray. This exam involves viewing the rectum, colon, and lower part of the small intestine to locate problems. This part of the digestive tract is known as the bowel. This test may show intestinal obstruction and Hirschsprung disease, which is a lack of nerves within the colon. The night before the test, bowel cleansing, also called bowel prep, is necessary to clear the lower digestive tract. The patient drinks a special liquid to flush out the bowel. A clean bowel is important, because even a small amount of stool in the colon can hide details and result in an incomplete exam.  Because the colon does not show up well on x rays, the doctor fills it with barium, a chalky liquid that makes the area visible. Once the mixture coats the inside of the colon and rectum, x rays are taken that show their shape and condition. The patient may feel some abdominal cramping when the barium fills the colon but usually feels little discomfort after the procedure. Stools may be white in color for a few days after the exam.    Sigmoidoscopy or colonoscopy. An examination of the rectum and lower, or sigmoid, colon is called a sigmoidoscopy. An examination of the rectum and entire colon is called a colonoscopy. The person usually has a liquid dinner the night before a colonoscopy or sigmoidoscopy and takes an enema early the next morning. An enema an hour before the test may also be necessary. To perform a sigmoidoscopy, the doctor uses a long, flexible tube with a light on the end, called a sigmoidoscope, to view the rectum and lower colon. The patient is lightly sedated before the exam. First, the doctor examines the rectum with a gloved, lubricated finger. Then, the sigmoidoscope is inserted through the anus into the rectum and lower colon. The procedure may cause abdominal pressure and a mild sensation of wanting to move the bowels.  The doctor may fill the colon with air to get a better view. The air can cause mild cramping. To perform a colonoscopy, the doctor uses a flexible tube with a light on the end, called a colonoscope, to view the entire colon. This tube is longer than a sigmoidoscope. During the exam, the patient lies on his or her side, and the doctor inserts the tube through the anus and rectum into the colon. If an abnormality is seen, the doctor can use the colonoscope to remove a small piece of tissue for examination (biopsy). The patient may feel gassy and bloated after the procedure. How is constipation treated? Although treatment depends on the cause, severity, and duration of the constipation, in most cases dietary and lifestyle changes will help relieve symptoms and help prevent them from recurring. Diet  A diet with enough fiber (20 to 35 grams each day) helps the body form soft, bulky stool. A doctor or dietitian can help plan an appropriate diet. High-fiber foods include beans, whole grains and bran cereals, fresh fruits, and vegetables such as asparagus, brussels sprouts, cabbage, and carrots. For people prone to constipation, limiting foods that have little or no fiber, such as ice cream, cheese, meat, and processed foods, is also important. Lifestyle Changes  Other changes that may help treat and prevent constipation include drinking enough water and other liquids, such as fruit and vegetable juices and clear soups, so as not to become dehydrated, engaging in daily exercise, and reserving enough time to have a bowel movement. In addition, the urge to have a bowel movement should not be ignored. Laxatives  Most people who are mildly constipated do not need laxatives. However, for those who have made diet and lifestyle changes and are still constipated, a doctor may recommend laxatives or enemas for a limited time. These treatments can help retrain a chronically sluggish bowel.  For children, short-term treatment with laxatives, along with retraining to establish regular bowel habits, helps prevent constipation. A doctor should determine when a patient needs a laxative and which form is best. Laxatives taken by mouth are available in liquid, tablet, gum powder, and granule forms. They work in various ways:  Bulk-forming laxatives generally are considered the safest, but they can interfere with absorption of some medicines. These laxatives, also known as fiber supplements, are taken with water. They absorb water in the intestine and make the stool softer. Brand names include Metamucil, Fiberall, [address] [facility], [place] [address], and [facility]. These agents must be taken with water or they can cause obstruction. Many people also report no relief after taking bulking agents and suffer from a worsening in bloating and abdominal pain. Stimulants cause rhythmic muscle contractions in the intestines. Brand names include Correctol, Dulcolax, Purge, and Senokot. Studies suggest that phenolphthalein, an ingredient in some stimulant laxatives, might increase a person's risk for cancer. The Food and Drug Administration has proposed a ban on all over-the-counter products containing phenolphthalein. Most laxative makers have replaced, or plan to replace, phenolphthalein with a safer ingredient. Osmotics cause fluids to flow in a special way through the colon, resulting in bowel distention. This class of drugs is useful for people with idiopathic constipation. Brand names include Cephulac, Sorbitol, and Miralax. People with diabetes should be monitored for electrolyte imbalances. Stool softeners moisten the stool and prevent dehydration. These laxatives are often recommended after childbirth or surgery. Brand names include Colace and Surfak. These products are suggested for people who should avoid straining in order to pass a bowel movement. The prolonged use of this class of drugs may result in an electrolyte imbalance.   Lubricants grease the stool, enabling it to move through the intestine more easily. Mineral oil is the most common example. Brand names include Fleet and Zymenol. Lubricants typically stimulate a bowel movement within 8 hours. Saline laxatives act like a sponge to draw water into the colon for easier passage of stool. Brand names include Milk of Magnesia and Isaura's M-O. Saline laxatives are used to treat acute constipation if there is no indication of bowel obstruction. Electrolyte imbalances have been reported with extended use, especially in small children and people with renal deficiency. Chloride channel activators increase intestinal fluid and motility to help stool pass, thereby reducing the symptoms of constipation. One such agent is Amitiza, which has been shown to be safely used for up to 6 to 12 months. Thereafter a doctor should assess the need for continued use. People who are dependent on laxatives need to slowly stop using them. A doctor can assist in this process. For most people, stopping laxatives restores the colon's natural ability to contract. Other Treatments  Treatment for constipation may be directed at a specific cause. For example, the doctor may recommend discontinuing medication or performing surgery to correct an anorectal problem such as rectal prolapse, a condition in which the lower portion of the colon turns inside out. People with chronic constipation caused by anorectal dysfunction can use biofeedback to retrain the muscles that control bowel movements. Biofeedback involves using a sensor to monitor muscle activity, which is displayed on a computer screen, allowing for an accurate assessment of body functions. A health care professional uses this information to help the patient learn how to retrain these muscles. Surgical removal of the colon may be an option for people with severe symptoms caused by colonic inertia.  However, the benefits of this surgery must be weighed against possible complications, which include abdominal pain and diarrhea. Can constipation be serious? Sometimes constipation can lead to complications. These complications include hemorrhoids, caused by straining to have a bowel movement, or anal fissures--tears in the skin around the anus--caused when hard stool stretches the sphincter muscle. As a result, rectal bleeding may occur, appearing as bright red streaks on the surface of the stool. Treatment for hemorrhoids may include warm tub baths, ice packs, and application of a special cream to the affected area. Treatment for anal fissures may include stretching the sphincter muscle or surgically removing the tissue or skin in the affected area. Sometimes straining causes a small amount of intestinal lining to push out from the anal opening. This condition, known as rectal prolapse, may lead to secretion of mucus from the anus. Usually eliminating the cause of the prolapse, such as straining or coughing, is the only treatment necessary. Severe or chronic prolapse requires surgery to strengthen and tighten the anal sphincter muscle or to repair the prolapsed lining. Constipation may also cause hard stool to pack the intestine and rectum so tightly that the normal pushing action of the colon is not enough to expel the stool. This condition, called fecal impaction, occurs most often in children and older adults. An impaction can be softened with mineral oil taken by mouth and by an enema. After softening the impaction, the doctor may break up and remove part of the hardened stool by inserting one or two fingers into the anus. [facility] through [facility] and Nutrition at the [facility] Diabetes and Digestive and Kidney Diseases supports basic and clinical research into gastrointestinal conditions, including constipation.  Researchers are studying the anatomical and physiological characteristics of rectoanal motility and the use of new medications and behavioral techniques, such as biofeedback, to treat constipation. Points to Remember  Constipation affects almost everyone at one time or another. Many people think they are constipated when, in fact, their bowel movements are regular. The most common causes of constipation are poor diet and lack of exercise. Other causes of constipation include medications, irritable bowel syndrome, abuse of laxatives, and specific diseases. A medical history and physical exam may be the only diagnostic tests needed before the doctor suggests treatment. In most cases, following these simple tips will help relieve symptoms and prevent recurrence of constipation:  Eat a well-balanced, high-fiber diet that includes beans, bran, whole grains, fresh fruits, and vegetables. Drink plenty of liquids. Exercise regularly. Set aside time after breakfast or dinner for undisturbed visits to the toilet. Do not ignore the urge to have a bowel movement. Understand that normal bowel habits vary. Whenever a significant or prolonged change in bowel habits occurs, check with a doctor.

## 2022-10-10 NOTE — PROGRESS NOTES
CHRONIC CONDITION FOLLOW-UP       Assessment and Plan:      Diagnosis Orders   1. Uncontrolled type 2 diabetes mellitus with hyperglycemia (HCC)  Increase insulin      2. Type 2 diabetes mellitus with diabetic peripheral angiopathy without gangrene, with long-term current use of insulin (HCC)  POCT glycosylated hemoglobin (Hb A1C)      3. Coronary artery disease involving native coronary artery of native heart without angina pectoris        4. Hypertension, essential        5. Aortic stenosis, moderate to severe by ECHO 2/2022        6. Constipation, unspecified constipation type        7. Colon cancer screening        8. Needs flu shot        9. Chronic elbow pain, right        Changes marked below. INSTRUCTIONS  NEXT APPOINTMENT: Please schedule annual complete physical (30 minutes) in 3 months with Dr. Nura Cormier or her NP, Tomy Saleh. PLEASE TAKE THIS FORM TO CHECK-OUT WINDOW TO SCHEDULE NEXT VISIT. A1c 9.6 today. For constipation, take OTC Miralax OTC once a day. Adjust to orthostatic dizziness or twice a day as needed. Call GI to schedule colonoscopy soon for colon cancer screening and prevention. Will need to do bowel prep the day before and someone to drive home afterwards. Please get annual dilated eye exam to screen for diabetic retinopathy which can lead to vision loss. Ask for report to be faxed to [phone]. Would get new COVID booster soon. Has better coverage for Omicron variant. Separate from other vaccines by at least 2 weeks. INCREASE insulin to 65 units. Can increase by another 5 units every 2-3 weeks to get fasting under 130 and before bed < 150. See diabetic ed soon. Subjective:      Chief Complaint   Patient presents with    Diabetes     Sugar has been up. Constipation     X6 weeks. Cramps, bloating. Constant. Lots of gas. Other     Rt elbow pain. X1 year. Not getting any better. Pain with movement.       Kelsey Lee is an 78 y.o. male who presents for follow up    Complaints:   Constipation up to 4 days, hard initial. Feels bloated and crampy. Tried suppositories. Enema works. Never had this before. A1c 9.6. Rare low sugars. Get MELISSA soon to check valve. Still R elbow pain x 1 yr. Sharp to ache, no swell, worse with activity. Heat some help. CHART REVIEW   reports that he has never smoked. He has been exposed to tobacco smoke. He has never used smokeless tobacco.  Health Maintenance Due   Topic Date Due    Shingles vaccine (1 of 2) Never done    Colorectal Cancer Screen  08/01/2019    COVID-19 Vaccine (4 - Booster for Jack Dunks series) 03/10/2022    Diabetic retinal exam  03/12/2022    Diabetic foot exam  05/07/2022    Diabetic microalbuminuria test  05/07/2022    Pneumococcal 65+ years Vaccine (2 - PCV) 05/07/2022    Flu vaccine (1) 08/01/2022    Annual Wellness Visit (AWV)  10/01/2022     Current Outpatient Medications   Medication Instructions    aspirin 325 mg, Oral, DAILY    blood glucose monitor kit and supplies Test 2 times a day & as needed for symptoms of irregular blood glucose. blood glucose monitor strips Test 2 times a day & as needed for symptoms of irregular blood glucose. Emollient (AQUAPHOR ADVANCED THERAPY) OINT Apply three times per day as needed    entecavir (BARACLUDE) 1 mg, Oral, DAILY    ezetimibe (ZETIA) 10 mg, Oral, DAILY    fluocinonide (LIDEX) 0.05 % cream Apply topically 2 times daily.     gabapentin (NEURONTIN) 400 MG capsule TAKE 1 CAPSULE BY MOUTH THREE TIMES DAILY    glucose blood VI test strips (ASCENSIA AUTODISC VI;ONE TOUCH ULTRA TEST VI) strip 1 each, Topical, 3 TIMES DAILY, Onetouch Ultra 2 test strips  Dx: 250.00    insulin glargine (LANTUS) 100 UNIT/ML injection vial ADMINISTER 55 UNITS UNDER THE SKIN EVERY NIGHT    Insulin Syringe-Needle U-100 ([employer] INSULIN SYR 1CC/30G) 30G X 5/16\" 1 ML MISC 1 each, Does not apply, DAILY    INVOKANA 300 MG TABS tablet TAKE 1 TABLET BY MOUTH EVERY MORNING BEFORE BREAKFAST    Lancets MISC 1 each, Does not apply, 2 TIMES DAILY    metFORMIN (GLUCOPHAGE-XR) 500 MG extended release tablet TAKE 4 TABLETS BY MOUTH EVERY DAY WITH BREAKFAST    ONETOUCH DELICA LANCETS MISC 1 each, Does not apply, 3 TIMES DAILY    rosuvastatin (CRESTOR) 40 mg, Oral, DAILY    triamcinolone (KENALOG) 0.1 % cream Apply topically 2 times daily. valsartan-hydroCHLOROthiazide (DIOVAN-HCT) 320-12.5 MG per tablet TAKE 1 TABLET BY MOUTH EVERY DAY     LAST LABS  Lab Results   Component Value Date    LDLCALC 83 04/01/2022    LDLDIRECT 114 (H) 03/25/2019     Lab Results   Component Value Date    HDL 42 04/01/2022     Lab Results   Component Value Date    TRIG 157 (H) 04/01/2022     Lab Results   Component Value Date     06/08/2022    K 4.3 06/08/2022    CREATININE 1.2 06/08/2022     Lab Results   Component Value Date    WBC 6.6 06/08/2022    HGB 16.0 06/08/2022     06/08/2022     Lab Results   Component Value Date    ALT 21 04/01/2022    AST 22 04/01/2022    ALKPHOS 80 04/01/2022    BILITOT 0.4 04/01/2022     No results found for: TSH  Lab Results   Component Value Date    GLUCOSE 196 (H) 06/08/2022     Lab Results   Component Value Date    LABA1C 9.6 10/10/2022    LABA1C 8.2 02/24/2022    LABA1C 9.2 09/30/2021     Objective:   PHYSICAL EXAM   /70 (Site: Right Upper Arm, Position: Sitting, Cuff Size: Large Adult)   Pulse 90   Temp 97.7 °F (36.5 °C) (Oral)   Resp 16   Ht 5' 11\" (1.803 m)   Wt 277 lb 9.6 oz (125.9 kg)   SpO2 95%   BMI 38.72 kg/m²   BP Readings from Last 5 Encounters:   10/10/22 114/70   09/15/22 108/63   07/21/22 (!) 90/54   06/08/22 (!) 152/94   05/23/22 138/80     Wt Readings from Last 5 Encounters:   10/10/22 277 lb 9.6 oz (125.9 kg)   09/15/22 277 lb (125.6 kg)   07/21/22 275 lb 12.8 oz (125.1 kg)   06/08/22 276 lb (125.2 kg)   05/23/22 285 lb (129.3 kg)      GENERAL:   well-developed, well-nourished, alert, no distress.      LUNGS:    Breathing unlabored  clear to auscultation bilaterally and good air movement  CARDIOVASC:   regular rate and rhythm  SKIN: warm and dry  MS- R elbow nl ROM, no tenderness.

## 2022-10-11 RX ORDER — EZETIMIBE 10 MG/1
10 TABLET ORAL DAILY
Qty: 90 TABLET | Refills: 1 | Status: SHIPPED | OUTPATIENT
Start: 2022-10-11

## 2022-10-14 ENCOUNTER — OFFICE VISIT (OUTPATIENT)
Dept: ORTHOPEDIC SURGERY | Age: 67
End: 2022-10-14
Payer: MEDICARE

## 2022-10-14 VITALS — BODY MASS INDEX: 38.78 KG/M2 | HEIGHT: 71 IN | WEIGHT: 277 LBS | RESPIRATION RATE: 16 BRPM

## 2022-10-14 DIAGNOSIS — R20.0 HAND NUMBNESS: Primary | ICD-10-CM

## 2022-10-14 PROCEDURE — 99204 OFFICE O/P NEW MOD 45 MIN: CPT | Performed by: ORTHOPAEDIC SURGERY

## 2022-10-14 PROCEDURE — G8427 DOCREV CUR MEDS BY ELIG CLIN: HCPCS | Performed by: ORTHOPAEDIC SURGERY

## 2022-10-14 PROCEDURE — G8417 CALC BMI ABV UP PARAM F/U: HCPCS | Performed by: ORTHOPAEDIC SURGERY

## 2022-10-14 PROCEDURE — G8484 FLU IMMUNIZE NO ADMIN: HCPCS | Performed by: ORTHOPAEDIC SURGERY

## 2022-10-14 NOTE — Clinical Note
Dear  Destiney Alcazar MD,  Thank you very much for your referral or . Corrie Chong to me for evaluation and treatment of his Hand & Wrist condition. I appreciate your confidence in me and thank you for allowing me the opportunity to care for your patients. If I can be of any further assistance to you on this or any other patient, please do not hesitate to contact me. Sincerely,  Hugo Aragon MD

## 2022-10-14 NOTE — PATIENT INSTRUCTIONS
Thank you for choosing [facility]) Physicians for your Hand and Upper Extremity needs. If we can be of any further assistance to you, please do not hesitate to contact us.     Office Phone Number:  [phone]  or  [phone]

## 2022-10-14 NOTE — PROGRESS NOTES
Mr. Marlyn Pascual is a 78 y.o. right handed man  who is seen today in Hand Surgical Consultation at the request of Devonte Eid MD.    He presents today regarding Right side symptoms which have been present for approximately 1 years. A history of antecedent trauma or injury is Absent. He reports symptoms to include severe pain about the right elbow globally with proximal pain along the triceps muscle without significant report of numbness & tingling in the Median Innervated Digits and Ulnar Innervated Digits. Neurologic symptoms do not Frequently awaken him from sleep. He reports marked pain located in the Both Medial and Lateral right elbow, with retrograde radiation . Symptoms are worsening over time. He does have  along history of neck and back pain and trouble. Previous treatment has included conservative measures. He does not claim relation of his symptoms to his required work activities. He has not undergone electrodiagnostic testing. I have today reviewed with Marlyn Pascual the clinically relevant, past medical history, medications, allergies,  family history, social history, and Review Of Systems & I have documented any details relevant to today's presenting complaints in my history above. Mr. Homar Stover's self-reported past medical history, medications, allergies,  family history, social history, and Review Of Systems have been scanned into the chart under the \"Media\" tab. Physical Exam:  Mr. Delia Salmeron most recent vitals:  Vitals  Resp: 16  Height: 5' 11\" (180.3 cm)  Weight: 277 lb (125.6 kg)    He is well nourished, oriented to person, place & time. He demonstrates appropriate mood and affect as well as normal gait and station.     Skin: Normal Color and Free of Lesions Bilaterally   Digital range of motion is without significant limitation bilaterally  Wrist range of motion is equal bilaterally   Elbow range of motion is limited by pain and limited by Osteoarthritis on the Right, normal on the Left  Sensation is subjectively normal in the Median Innervated Digits and Ulnar Innervated Digits and objectively present in the same distribution bilaterally. All other digits show normal sensation  Vascular examination reveals normal and good capillary refill bilaterally  Swelling is minimal about the elbow on the Right, normal on the Left  There is no evidence of gross joint instability bilaterally. Muscular strength is clinically appropriate bilaterally. Examination for Cubital Tunnel Syndrome on the right shows generalized tenderness to palpation at the Both Medial and Lateral epicondyle. The Ulnar Nerve rests behind the medial epicondyle without subluxation upon elbow flexion. Elbow flexion-compression test is Equivocal, and there is an active Tinnel's Sign over the Cubital Tunnel . The Ulnar Nerve innervated intrinsic musculature is not atrophied & weakened. The elbow joint itself is minimally tender to palpation along the joint linges. There is no effusion or significant reproduction of pain with elbow joint range of motion or forearm rotation. Cervical Spine: Active Range of Motion  is markedly Decreased with pain at extremes. Lateral bending does not reproduce symptoms in the symptomatic extremity, Maximal rotation does not reproduce symptoms in the symptomatic extremity - likely due to his significantly limited range of motion     Radiographic Evaluation:  Radiographs, taken From another Physician's Office outside of my practice were Personally Reviewed & Interpreted by myself today (3 views of the right elbow). They demonstrate no evidence of acute fracture, subluxation, or dislocation. There is moderate degenerative change at the radiocapitellar and/or ulnotrochelar joints.        Impression:  Mr. Kelsey Lee has developed what I believe are symptoms primarily related to a Cervical Radiculopathy  with some mild underlying  elbow osteoarthritis and possible Ulnar Nerve entrapment at the Cubital Tunnel, which is currently exacerbated, and presents requesting further treatment. Plan:    I have had a thorough discussion with Mr. Kelsey Lee regarding the treatment options available for his initially presenting right  Elbow  joint osteoarthritis, which is causing him significant symptoms and difficulty. I have outlined for Mr. Kelsey Lee the risk, benefits and consequences of the various treatment modalities, including a reasonable expectation for the long term success of each. Based upon our current discussion and a reasonable understating of the options available to him, Mr. Kelsey Lee has selected to proceed with a conservative plan of treatment consisting of: the use of protective splints, activity modification, and the judicious use of over-the-counter anti-inflammatory medications if allowed by his primary care physician. I have clearly explained to him that the above outlined treatment plan should not be expected to 'cure' his joint osteoarthritis, but we are rather treating the symptoms with which he presents. He has understood that in order to achieve more durable relief of his symptoms and to prevent future worsening or further damage, that definitive surgical treatment would be an option, although I STRONGLY DOUBT that this osteoarthritis accounts for a large percentage of his symptoms. . Mr. Kelsey Lee  voiced an appropriate understanding of our discussion, the options available to him, and of the expectations of his selected  treatment. With regard to his   possible cubital tunnel syndrome :  I will ask Mr. Kelsey Lee. to undergo electrodiagnostic studies of the symptomatic right side. I will ask that he schedule a follow-up appointment with me to review the results of this study after it has been completed. As I do not find an etiology for his symptoms in my evaluation of his hand & wrist, I will refer Mr. Kelsey Lee for evaluation of his cervical spine to see if there is an ongoing problem at that level which may explain his presenting complaints. Mr. Tyler Mcrae has requested a prescription for pain medication. I have explained to him that I do not treat chronic or long-standing conditions, or fully healed surgery with narcotic medication. I have explained to him that  I will not be providing him with pain medication for this condition. I have explained to Mr. Tyler Mcrae that despite successful treatment (surgical or otherwise) of his current presenting condition, that due to his coexistent conditions (both diagnosed and undiagnosed), that he is likely to have some permanent residual symptoms related to these conditions that do not improve long term. I have also explained that maximal recovery of function & symptom improvement may take a full year or longer to realize. He voiced a clear understanding of this. Mr. Tyler Mcrae has been given a full verbal list of instructions and precautions related to his present condition. I have asked him to followup with me in the office at the prescribed time. He is also specifically requested to call or return to the office sooner if his symptoms change or worsen prior to the next scheduled appointment.

## 2022-10-17 ENCOUNTER — TELEPHONE (OUTPATIENT)
Dept: ORTHOPEDIC SURGERY | Age: 67
End: 2022-10-17

## 2022-10-17 NOTE — TELEPHONE ENCOUNTER
General Question     Subject: patient call and would like to have his order fax over to Dr. Sole Vick at [facility] Scheduling Dept. In [place] he gave me the phone number : [phone] Please Advise.    Patient Maggy Sheth  Contact Number: [phone]

## 2022-10-18 NOTE — TELEPHONE ENCOUNTER
Faxing Patient [facility] Name: Yasemin Abreu   Pt Name: Janel Aburto Number: [phone]  Facility Fax Number: [phone]     Pt WENT TO [facility] AND THEY DIDN'T HAVE THE ORDER THAT WAS FAXED AND [facility]. CAN YOU PLEASE REFAX THE EMG ORDER TO [facility] NEUROLOGY? FAX TO:[phone]    PLEASE CALL THE Pt WHEN FAXED SO HE CAN CALL TO MAKE HIS APPT.

## 2022-10-19 ENCOUNTER — TELEPHONE (OUTPATIENT)
Dept: ORTHOPEDIC SURGERY | Age: 67
End: 2022-10-19

## 2022-10-19 NOTE — TELEPHONE ENCOUNTER
General Question     Subject: Patient states Teresa needs prior authorization from insurance and a referral faxed to there office.   FAX: [phone]  Patient: Ny Du  Contact Number: [phone]

## 2022-10-19 NOTE — TELEPHONE ENCOUNTER
Tried calling [facility] to get more information but the office closed at 3 today. Will try again tomorrow.

## 2022-10-20 ENCOUNTER — TELEPHONE (OUTPATIENT)
Dept: ORTHOPEDIC SURGERY | Age: 67
End: 2022-10-20

## 2022-10-20 NOTE — TELEPHONE ENCOUNTER
General Question     Subject: Prior Authorization/Emg order  Patient Request: Ramila January Number: [phone]    Patient is req Emg order and Prior Authorization be faxed to [address] [place] [address] [address]. Patient is needing to schedule and per Teresa, they have not received this yet. Fax# [phone]. Please return call to the above number.

## 2022-10-21 ENCOUNTER — TELEPHONE (OUTPATIENT)
Dept: ORTHOPEDIC SURGERY | Age: 67
End: 2022-10-21

## 2022-10-21 NOTE — TELEPHONE ENCOUNTER
General Question     Subject: PATIENT IS CHECKING ON THE STATUS OF PRIOR AUTH AND EMG ORDER FAXED TO [facility].  PLEASE ADVISE   Patient: Mook Mullen  Contact Number: [phone]

## 2022-10-25 ENCOUNTER — TELEPHONE (OUTPATIENT)
Dept: CARDIOLOGY CLINIC | Age: 67
End: 2022-10-25

## 2022-10-25 NOTE — TELEPHONE ENCOUNTER
Noe Barron called in this afternoon, he has a MELISSA scheduled for 10/27 he would like to cancel it, he states they are going to do the same thing to him in January. He states he's not having any symptoms that would require it to be done now. He can be reached at [phone].

## 2022-10-26 ENCOUNTER — TELEPHONE (OUTPATIENT)
Dept: FAMILY MEDICINE CLINIC | Age: 67
End: 2022-10-26

## 2022-10-26 DIAGNOSIS — K59.00 CONSTIPATION, UNSPECIFIED CONSTIPATION TYPE: Primary | ICD-10-CM

## 2022-10-26 RX ORDER — SENNA PLUS 8.6 MG/1
1 TABLET ORAL 2 TIMES DAILY
Qty: 60 TABLET | Refills: 11 | Status: SHIPPED | OUTPATIENT
Start: 2022-10-26 | End: 2023-10-26

## 2022-10-26 NOTE — TELEPHONE ENCOUNTER
Called and spoke to patient and let him know the MELISSA and echocardiogram are 2 different test. He states that he does not want to have the MELISSA tomorrow. Updated Keyon Quarry and e-mail to Vasiliy Kolb.

## 2022-10-26 NOTE — TELEPHONE ENCOUNTER
Can add senokot twice daily- sent to pharmacy [facility])

## 2022-10-26 NOTE — TELEPHONE ENCOUNTER
Pt called in has a colonoscopy scheduled on 11/21/2022  Pt stated he has tried Miramax for the constipation and it is not helping his stomach feels hard as a rock when pt saw Dr Elliott Das he said that she said she can prescribe something to help clean him out     Please advise

## 2022-10-27 DIAGNOSIS — E11.9 TYPE 2 DIABETES MELLITUS WITHOUT COMPLICATION, WITHOUT LONG-TERM CURRENT USE OF INSULIN (HCC): ICD-10-CM

## 2022-10-27 RX ORDER — METFORMIN HYDROCHLORIDE 500 MG/1
TABLET, EXTENDED RELEASE ORAL
Qty: 360 TABLET | Refills: 1 | Status: SHIPPED | OUTPATIENT
Start: 2022-10-27

## 2022-11-08 DIAGNOSIS — G89.29 CHRONIC BILATERAL LOW BACK PAIN WITH BILATERAL SCIATICA: ICD-10-CM

## 2022-11-08 DIAGNOSIS — M54.42 CHRONIC BILATERAL LOW BACK PAIN WITH BILATERAL SCIATICA: ICD-10-CM

## 2022-11-08 DIAGNOSIS — G89.4 CHRONIC PAIN SYNDROME: ICD-10-CM

## 2022-11-08 DIAGNOSIS — M54.41 CHRONIC BILATERAL LOW BACK PAIN WITH BILATERAL SCIATICA: ICD-10-CM

## 2022-11-08 RX ORDER — GABAPENTIN 400 MG/1
CAPSULE ORAL
Qty: 90 CAPSULE | Refills: 5 | Status: SHIPPED | OUTPATIENT
Start: 2022-11-08 | End: 2023-05-07

## 2022-11-11 RX ORDER — ROSUVASTATIN CALCIUM 40 MG/1
TABLET, COATED ORAL
Qty: 90 TABLET | Refills: 1 | Status: SHIPPED | OUTPATIENT
Start: 2022-11-11

## 2022-11-14 ENCOUNTER — TELEPHONE (OUTPATIENT)
Dept: CARDIOLOGY CLINIC | Age: 67
End: 2022-11-14

## 2022-11-14 NOTE — TELEPHONE ENCOUNTER
Cortez Hicks called into the office wanting to leave a message with Madison Fonseca RN. Cortez Hicks would like to proceed with the MELISSA he canceled back in October. Cortez Hicks is asking that [place] go through his esophagus instead of the normal way. \"     I informed Cortez Hicks that I would relay the message to Madison Fonseca RN and she will be able to call him with the next steps.      Cortez Hicks can be reached at: [phone]

## 2022-11-16 NOTE — TELEPHONE ENCOUNTER
Emailed instructions to patient per his request on prior call to schedule procedure. Confirmed with the RN about his diabetic medications and advised him not to take any oral diabetic meds per her instructions.

## 2022-11-16 NOTE — TELEPHONE ENCOUNTER
Spoke with the patient and got him scheduled for his MELISSA. He did have a question about his oral diabetic meds. He understands the Metformin/insulin and all other instructions. Verbalized understanding of pre-procedure instructions.      Procedure- MELISSA   Date: 1/13/2023  Arrival Time: 11:00 am   Procedure Time: 12:00 pm     Reta updated & emailed cath lab

## 2022-11-16 NOTE — TELEPHONE ENCOUNTER
[facility] ORTHOPEDIC AND SPINE [facility]   The morning of your Procedure-MELISSA you will park in the hospital parking lot and report directly to the cath lab to check in. DATE: ____________ TIME: _____________      Pre-Procedure Instructions:  Do not eat or drink anything 8 hours before your procedure. Hold all diabetic medications the morning of the procedure including, Metfomin. If you take Lantus/Levemir only take ½ your normal dose the evening before. All other medications can be taken in the morning with sips of water. Do not hold anticoagulation therapy: warfarin, eliquis, xarelto therapy. Do not use any lotions, creams or perfume the morning of procedure. Please arrive 1 hour prior to procedure time. Please have a responsible adult to drive you home after procedure. It is recommended you do not drive for 24 hours after procedure. Cath lab will provide you with all post procedure instructions. If you have any questions regarding the procedure itself or medications please call [phone] and ask to speak with a nurse.

## 2022-11-22 RX ORDER — CANAGLIFLOZIN 300 MG/1
TABLET, FILM COATED ORAL
Qty: 30 TABLET | Refills: 5 | Status: SHIPPED | OUTPATIENT
Start: 2022-11-22

## 2022-12-09 ENCOUNTER — TELEPHONE (OUTPATIENT)
Dept: CARDIOLOGY CLINIC | Age: 67
End: 2022-12-09

## 2022-12-09 DIAGNOSIS — I25.10 CORONARY ARTERY DISEASE INVOLVING NATIVE CORONARY ARTERY OF NATIVE HEART WITHOUT ANGINA PECTORIS: Primary | ICD-10-CM

## 2022-12-13 ENCOUNTER — TELEPHONE (OUTPATIENT)
Dept: ORTHOPEDIC SURGERY | Age: 67
End: 2022-12-13

## 2022-12-13 NOTE — TELEPHONE ENCOUNTER
Test Results     PATIENT IS CALLING REQUESTING EMG RESULTS.  [facility] 30 [phone]

## 2022-12-14 NOTE — TELEPHONE ENCOUNTER
Left a vm for the patient to call office back. Can be scheduled in next available follow up spot with Abbi or Dr. Florida Matamoros. Patient needs to bring a copy of EMG results.

## 2022-12-15 NOTE — TELEPHONE ENCOUNTER
Left a vm for the patient to call back and schedule a follow up visit to go over EMG results and talk next steps.

## 2022-12-16 NOTE — TELEPHONE ENCOUNTER
Per Dr. Helio Wade, can we move pts MELISSA up sooner due to SOB? Pt aware you will be calling.  Dani Mendoza

## 2022-12-20 ENCOUNTER — TELEPHONE (OUTPATIENT)
Dept: CARDIOLOGY CLINIC | Age: 67
End: 2022-12-20

## 2022-12-20 NOTE — TELEPHONE ENCOUNTER
----- Message from Shantell Gallardo RN sent at 12/20/2022 10:02 AM EST -----  Dr. Esperanza Renteria wants to do his MELISSA. I am not sure of his schedule. Maybe check with Rodríguez Saab. Mara Jenkins  ----- Message -----  From: Costa Guerrero  Sent: 12/20/2022   7:49 AM EST  To: Shantell Gallardo RN    He is full until that date. Can someone else do this procedure for him? Or did he give you a date he wanted to do it?   ----- Message -----  From: Shantell Gallardo RN  Sent: 12/9/2022  12:25 PM EST  To: Luis Casillas RN    Per Dr. Esperanza Renteria, can we move pts MELISSA up sooner due to SOB? Pt aware you will be calling.  Mara Jenkins

## 2022-12-20 NOTE — TELEPHONE ENCOUNTER
Patient would like MELISSA scheduled sooner than scheduled. [facility] ORTHOPEDIC AND SPINE \[facility] [place] [facility]"" AT [place]   The morning of your Procedure-MELISSA you will park in the hospital parking lot and report directly to the cath lab to check in. DATE: ____________ TIME: _____________      Pre-Procedure Instructions:  Do not eat or drink anything 8 hours before your procedure. Hold all diabetic medications the morning of the procedure including, Metfomin. If you take Lantus/Levemir only take ½ your normal dose the evening before. All other medications can be taken in the morning with sips of water. Do not hold anticoagulation therapy: warfarin, eliquis, xarelto therapy. Do not use any lotions, creams or perfume the morning of procedure. Please arrive 1 hour prior to procedure time. Please have a responsible adult to drive you home after procedure. It is recommended you do not drive for 24 hours after procedure. Cath lab will provide you with all post procedure instructions. If you have any questions regarding the procedure itself or medications please call [phone] and ask to speak with a nurse.

## 2022-12-21 NOTE — TELEPHONE ENCOUNTER
Spoke w/the pt and we got him rescheduled earlier for his MELISSA. We went over the pre-procedure instructions again. He verbalized understanding. As long as weather isn't bad he will be there but will call if he isn't going to make it. [facility] ORTHOPEDIC AND SPINE [facility] AT [place]   The morning of your Procedure-MELISSA you will park in the hospital parking lot and report directly to the cath lab to check in. DATE: 12/23/2022 TIME: 12:00 pm, arrive by 11:00 am         Pre-Procedure Instructions:  Do not eat or drink anything 8 hours before your procedure. Hold all diabetic medications the morning of the procedure including, Metfomin. If you take Lantus/Levemir only take ½ your normal dose the evening before. All other medications can be taken in the morning with sips of water. Do not hold anticoagulation therapy: warfarin, eliquis, xarelto therapy. Do not use any lotions, creams or perfume the morning of procedure. Please arrive 1 hour prior to procedure time. Please have a responsible adult to drive you home after procedure. It is recommended you do not drive for 24 hours after procedure. Cath lab will provide you with all post procedure instructions. If you have any questions regarding the procedure itself or medications please call [phone] and ask to speak with a nurse.        Teams updated / emailed cath lab

## 2022-12-22 NOTE — TELEPHONE ENCOUNTER
Spoke with the pt yesterday and got him scheduled for 12/23 weather permitting.   We went over the pre-procedure instructions again and he verbalized understanding them     See other encounter notes

## 2022-12-23 ENCOUNTER — HOSPITAL ENCOUNTER (OUTPATIENT)
Dept: CARDIAC CATH/INVASIVE PROCEDURES | Age: 67
Discharge: HOME | End: 2022-12-23

## 2022-12-29 NOTE — TELEPHONE ENCOUNTER
Spoke w/the pt and got him rescheduled for his procedure. His wife fell last week and hurt her knee and he had to cancel. We went over instructions below and he verbalized understanding. [facility] in [place] AT [place]   The morning of your Procedure-MELISSA you will park in the hospital parking lot and report directly to the cath lab to check in. DATE: 1/11/2023   TIME: 2:30  pm, arrive by 1:30  pm          Pre-Procedure Instructions:  Do not eat or drink anything 8 hours before your procedure. Hold all diabetic medications the morning of the procedure including, Metfomin. If you take Lantus/Levemir only take ½ your normal dose the evening before. All other medications can be taken in the morning with sips of water. Do not hold anticoagulation therapy: warfarin, eliquis, xarelto therapy. Do not use any lotions, creams or perfume the morning of procedure. Please arrive 1 hour prior to procedure time. Please have a responsible adult to drive you home after procedure. It is recommended you do not drive for 24 hours after procedure. Cath lab will provide you with all post procedure instructions. If you have any questions regarding the procedure itself or medications please call [phone] and ask to speak with a nurse.      Teams updated / emailed cath lab

## 2023-01-05 ENCOUNTER — OFFICE VISIT (OUTPATIENT)
Dept: PULMONOLOGY | Age: 68
End: 2023-01-05
Payer: MEDICARE

## 2023-01-05 ENCOUNTER — HOSPITAL ENCOUNTER (OUTPATIENT)
Dept: NON INVASIVE DIAGNOSTICS | Age: 68
Discharge: HOME | End: 2023-01-05
Payer: MEDICARE

## 2023-01-05 VITALS
TEMPERATURE: 97.3 F | WEIGHT: 287 LBS | RESPIRATION RATE: 18 BRPM | HEART RATE: 99 BPM | HEIGHT: 71 IN | OXYGEN SATURATION: 96 % | BODY MASS INDEX: 40.18 KG/M2 | SYSTOLIC BLOOD PRESSURE: 130 MMHG | DIASTOLIC BLOOD PRESSURE: 80 MMHG

## 2023-01-05 DIAGNOSIS — J98.4 RESTRICTIVE LUNG DISEASE: ICD-10-CM

## 2023-01-05 DIAGNOSIS — G47.33 OSA (OBSTRUCTIVE SLEEP APNEA): ICD-10-CM

## 2023-01-05 DIAGNOSIS — I35.0 AORTIC STENOSIS, MODERATE: ICD-10-CM

## 2023-01-05 DIAGNOSIS — I25.10 CORONARY ARTERY DISEASE INVOLVING NATIVE CORONARY ARTERY OF NATIVE HEART WITHOUT ANGINA PECTORIS: ICD-10-CM

## 2023-01-05 DIAGNOSIS — F51.01 PRIMARY INSOMNIA: Primary | ICD-10-CM

## 2023-01-05 DIAGNOSIS — Z79.4 TYPE 2 DIABETES MELLITUS WITH DIABETIC PERIPHERAL ANGIOPATHY WITHOUT GANGRENE, WITH LONG-TERM CURRENT USE OF INSULIN (HCC): ICD-10-CM

## 2023-01-05 DIAGNOSIS — E11.51 TYPE 2 DIABETES MELLITUS WITH DIABETIC PERIPHERAL ANGIOPATHY WITHOUT GANGRENE, WITH LONG-TERM CURRENT USE OF INSULIN (HCC): ICD-10-CM

## 2023-01-05 PROCEDURE — G8427 DOCREV CUR MEDS BY ELIG CLIN: HCPCS | Performed by: INTERNAL MEDICINE

## 2023-01-05 PROCEDURE — 78452 HT MUSCLE IMAGE SPECT MULT: CPT

## 2023-01-05 PROCEDURE — 3074F SYST BP LT 130 MM HG: CPT | Performed by: INTERNAL MEDICINE

## 2023-01-05 PROCEDURE — 3078F DIAST BP <80 MM HG: CPT | Performed by: INTERNAL MEDICINE

## 2023-01-05 PROCEDURE — 1123F ACP DISCUSS/DSCN MKR DOCD: CPT | Performed by: INTERNAL MEDICINE

## 2023-01-05 PROCEDURE — A9502 TC99M TETROFOSMIN: HCPCS

## 2023-01-05 PROCEDURE — 99214 OFFICE O/P EST MOD 30 MIN: CPT | Performed by: INTERNAL MEDICINE

## 2023-01-05 PROCEDURE — 3430000000 HC RX DIAGNOSTIC RADIOPHARMACEUTICAL: Performed by: INTERNAL MEDICINE

## 2023-01-05 PROCEDURE — A9502 TC99M TETROFOSMIN: HCPCS | Performed by: INTERNAL MEDICINE

## 2023-01-05 PROCEDURE — 3430000000 HC RX DIAGNOSTIC RADIOPHARMACEUTICAL

## 2023-01-05 PROCEDURE — 1036F TOBACCO NON-USER: CPT | Performed by: INTERNAL MEDICINE

## 2023-01-05 PROCEDURE — G8417 CALC BMI ABV UP PARAM F/U: HCPCS | Performed by: INTERNAL MEDICINE

## 2023-01-05 PROCEDURE — 93017 CV STRESS TEST TRACING ONLY: CPT

## 2023-01-05 PROCEDURE — 6360000002 HC RX W HCPCS: Performed by: FAMILY MEDICINE

## 2023-01-05 PROCEDURE — G8484 FLU IMMUNIZE NO ADMIN: HCPCS | Performed by: INTERNAL MEDICINE

## 2023-01-05 PROCEDURE — 3017F COLORECTAL CA SCREEN DOC REV: CPT | Performed by: INTERNAL MEDICINE

## 2023-01-05 RX ORDER — QUETIAPINE FUMARATE 25 MG/1
50 TABLET, FILM COATED ORAL NIGHTLY
Qty: 30 TABLET | Refills: 0 | Status: SHIPPED | OUTPATIENT
Start: 2023-01-05

## 2023-01-05 RX ADMIN — TETROFOSMIN 10 MILLICURIE: 1.38 INJECTION, POWDER, LYOPHILIZED, FOR SOLUTION INTRAVENOUS at 11:26

## 2023-01-05 RX ADMIN — TETROFOSMIN 30 MILLICURIE: 1.38 INJECTION, POWDER, LYOPHILIZED, FOR SOLUTION INTRAVENOUS at 12:30

## 2023-01-05 RX ADMIN — REGADENOSON 0.4 MG: 0.08 INJECTION, SOLUTION INTRAVENOUS at 12:46
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
## 2023-01-06 RX ORDER — INSULIN GLARGINE 100 [IU]/ML
INJECTION, SOLUTION SUBCUTANEOUS
Qty: 10 ML | Refills: 1 | Status: SHIPPED | OUTPATIENT
Start: 2023-01-06

## 2023-01-11 ENCOUNTER — HOSPITAL ENCOUNTER (OUTPATIENT)
Dept: CARDIAC CATH/INVASIVE PROCEDURES | Age: 68
Discharge: HOME | End: 2023-01-11
Payer: MEDICARE

## 2023-01-11 PROCEDURE — 93325 DOPPLER ECHO COLOR FLOW MAPG: CPT

## 2023-01-11 PROCEDURE — 6360000002 HC RX W HCPCS

## 2023-01-11 PROCEDURE — 93320 DOPPLER ECHO COMPLETE: CPT

## 2023-01-11 PROCEDURE — 99152 MOD SED SAME PHYS/QHP 5/>YRS: CPT

## 2023-01-11 PROCEDURE — 93312 ECHO TRANSESOPHAGEAL: CPT

## 2023-01-11 RX ORDER — SODIUM CHLORIDE 9 MG/ML
INJECTION, SOLUTION INTRAVENOUS PRN
Status: DISCONTINUED | OUTPATIENT
Start: 2023-01-11 | End: 2023-01-12 | Stop reason: HOSPADM

## 2023-01-11 RX ORDER — SODIUM CHLORIDE 0.9 % (FLUSH) 0.9 %
5-40 SYRINGE (ML) INJECTION PRN
Status: DISCONTINUED | OUTPATIENT
Start: 2023-01-11 | End: 2023-01-12 | Stop reason: HOSPADM

## 2023-01-11 RX ORDER — SODIUM CHLORIDE 0.9 % (FLUSH) 0.9 %
5-40 SYRINGE (ML) INJECTION EVERY 12 HOURS SCHEDULED
Status: DISCONTINUED | OUTPATIENT
Start: 2023-01-11 | End: 2023-01-12 | Stop reason: HOSPADM

## 2023-01-11 NOTE — H&P
Reason for Referral: Aortic stenosis     Referring physician: Dr Mary Kay Still     CC: \"I am here to discuss my recent echocardiogram.\"        Subjective:      History of Present Illness:     Power Ventura is a 78 y.o. patient with a PMH significant for coronary artery disease/prior PCI/CABG, hypertension, hyperlipidemia, diabetes and sleep apnea. He was seen in 2019 with severe indigestion and found to have RCA occlusion that underwent stenting. He undrwent urgent CABG x 5 on 3/26/2019 by Dr. Abiola Castorena ((LIMA-LAD, [id number], SVG-PDA) LAD endarterectomy, sternal stabilization ([employer]). Today, he is here to discuss recent echocardiogram. He continues to have increased shortness of breath which is limiting his activity. Patient denies exertional chest pain/pressure,palpitations, lightheadedness, weight changes, changes in LE edema, and syncope. Past Medical History:   has a past medical history of Anxiety, Aortic valve sclerosis, Arthritis, CAD (coronary artery disease), Cerebral infarct ([address] Chronic active viral hepatitis B ([address] Chronic back pain, Diabetes mellitus, type 2 ([address] Fatty liver, Heart attack ([address] Hepatitis B immune- pos HBSAg, History of blood transfusion, HTN (hypertension), Hyperlipidemia LDL goal < 100, Hyperlipidemia with target LDL less than 100, Hypertension, Obesity, Psoriasis, Sleep apnea, and STEMI (ST elevation myocardial infarction) ([address] Surgical History:   has a past surgical history that includes Coronary angioplasty with stent (11/16/2011); Appendectomy (age 17); Tonsillectomy and adenoidectomy (1980's); Total knee arthroplasty (Left, 2005); Biceps tendon repair; Skull fracture elevation (teen); Colonoscopy; Coronary angioplasty with stent (06/01/2009); Coronary angioplasty with stent (11/25/2008); Coronary angioplasty with stent (11/24/2008); craniotomy (Right); Umbilical hernia repair; Coronary artery bypass graft (03/26/2019);  Coronary artery bypass graft (N/A, 3/26/2019); Cataract removal (Bilateral, 2020); and Nerve Block (Bilateral, 10/30/2020). Social History:   reports that he has never smoked. He has never used smokeless tobacco. He reports that he does not currently use alcohol. He reports that he does not use drugs. Family History:  family history includes Cancer in his mother; Diabetes in his mother; Heart Failure in his father. Home Medications:  Were reviewed and are listed in nursing record and/or below  Home Medications           Prior to Admission medications    Medication Sig Start Date End Date Taking? Authorizing Provider   rosuvastatin (CRESTOR) 40 MG tablet TAKE 1 TABLET BY MOUTH DAILY 6/23/22   Yes Beto Garcia MD   gabapentin (NEURONTIN) 400 MG capsule TAKE 1 CAPSULE BY MOUTH THREE TIMES DAILY 6/13/22 12/10/22 Yes Beto Garcia MD   INVOKANA 300 MG TABS tablet TAKE 1 TABLET BY MOUTH EVERY MORNING BEFORE BREAKFAST 6/6/22   Yes Beto Garcia MD   LANTUS 100 UNIT/ML injection vial ADMINISTER 50 UNITS UNDER THE SKIN EVERY NIGHT 5/2/22   Yes Beto Garcia MD   valsartan-hydroCHLOROthiazide (DIOVAN-HCT) 320-12.5 MG per tablet TAKE 1 TABLET BY MOUTH EVERY DAY 4/6/22   Yes Beto Garcia MD   metFORMIN (GLUCOPHAGE-XR) 500 MG extended release tablet TAKE 4 TABLETS BY MOUTH EVERY DAY WITH BREAKFAST 4/4/22   Yes Beto Garcia MD   Insulin Syringe-Needle U-100 ([employer] INSULIN SYR 1CC/30G) 30G X 5/16\" 1 ML MISC 1 each by Does not apply route daily 4/1/22   Yes Vertis Olszewski, MD   aspirin 325 MG tablet Take 325 mg by mouth daily     Yes Historical Provider, MD   ezetimibe (ZETIA) 10 MG tablet Take 1 tablet by mouth daily 10/1/21   Yes Delbert Guerra MD   fluocinonide (LIDEX) 0.05 % cream Apply topically 2 times daily.  10/24/19   Yes Beto Garcia MD   Emollient (AQUAPHOR ADVANCED THERAPY) OINT Apply three times per day as needed 7/9/19   Yes Beto Garcia MD   Lancets MISC 1 each by Does not apply route 2 times daily 6/13/19   Yes Beto Garcia MD blood glucose monitor kit and supplies Test 2 times a day & as needed for symptoms of irregular blood glucose. 6/13/19   Yes Sheba Cardenas MD   blood glucose monitor strips Test 2 times a day & as needed for symptoms of irregular blood glucose. 6/13/19   Yes Sheba Cardenas MD   entecavir (BARACLUDE) 1 MG tablet Take 1 mg by mouth daily  1/21/19   Yes Sheba Cardenas MD   glucose blood VI test strips (ASCENSIA AUTODISC VI;ONE TOUCH ULTRA TEST VI) strip Apply 1 each topically 3 times daily. Onetouch Ultra 2 test strips  Dx: 250.00 3/20/13   Yes Sheba Cardenas MD   [facility] LANCETS MISC 1 each by Does not apply route 3 times daily. 3/7/13   Yes Sheba Cardenas MD            CURRENT Medications:  Current Meds Link used for Sign Out Report   No current facility-administered medications for this visit. Allergies:  Patient has no known allergies. Review of Systems: SEE HPI   Constitutional: no unanticipated weight loss. There's been no change in energy level, sleep pattern, or activity level. No fevers, chills. Eyes: No visual changes or diplopia. No scleral icterus. ENT: No Headaches, hearing loss or vertigo. No mouth sores or sore throat. Cardiovascular: No Chest pain, tightness or discomfort. No Shortness of breath. No Dyspnea on exertion, Orthopnea, Paroxysmal nocturnal dyspnea or breathlessness at rest.  No Palpitations. No Syncope ('blackouts', 'faints', 'collapse') or dizziness. Respiratory: No cough or wheezing, no sputum production. No hematemesis. Gastrointestinal: No abdominal pain, appetite loss, blood in stools. No change in bowel or bladder habits. Genitourinary: No dysuria, trouble voiding, or hematuria. Musculoskeletal:  No gait disturbance, no joint complaints. Integumentary: No rash or pruritis. Neurological: No headache, diplopia, change in muscle strength, numbness or tingling. Psychiatric: No anxiety or depression. Endocrine: No temperature intolerance. No excessive thirst, fluid intake, or urination. No tremor. Hematologic/Lymphatic: No abnormal bruising or bleeding, blood clots or swollen lymph nodes. Allergic/Immunologic: No nasal congestion or hives. Objective:      PHYSICAL EXAM:           Vitals:     07/21/22 1410   BP: (!) 90/54   Pulse: 90   SpO2: 95%       Weight: 275 lb 12.8 oz (125.1 kg)       General Appearance:  Alert, cooperative, no distress, appears stated age. Head:  Normocephalic, without obvious abnormality, atraumatic. Eyes:  Pupils equal and round. No scleral icterus. Mouth: Moist mucosa, no pharyngeal erythema. Nose: Nares normal. No drainage or sinus tenderness. Neck: Supple, symmetrical, trachea midline. No adenopathy. No tenderness/mass/nodules. No carotid bruit or elevated JVD. Lungs:   Respiratory Effort: Normal   Auscultation: Clear to auscultation bilaterally, respirations unlabored. No wheeze, rales   Chest Wall:  No tenderness or deformity. Cardiovascular:     Pulses  Palpation: normal   Ascultation: Regular rate, S1/ S2 normal. No murmur, rub, or gallop. 2+ radial and pedal pulses, symmetric  Carotid  Femoral   Abdomen and Gastrointestinal:   Soft, non-tender, bowel sounds active. Liver and Spleen  Masses   Musculoskeletal: No muscle wasting  Back  Gait   Extremities: Extremities normal, atraumatic. No cyanosis or edema. No cyanosis clubbing         Skin: Inspection and palpation performed, no rashes or lesions. Pysch: Normal mood and affect.  Alert and oriented to time place person   Neurologic: Normal gross motor and sensory exam.       Labs      All labs have been reviewed           Lab Results   Component Value Date/Time     WBC 6.6 06/08/2022 01:45 PM     RBC 5.11 06/08/2022 01:45 PM     HGB 16.0 06/08/2022 01:45 PM     HCT 47.5 06/08/2022 01:45 PM     MCV 93.0 06/08/2022 01:45 PM     RDW 13.9 06/08/2022 01:45 PM      06/08/2022 01:45 PM            Lab Results   Component Value Date/Time     NA 139 06/08/2022 01:45 PM     K 4.3 06/08/2022 01:45 PM     K 4.3 09/01/2019 09:14 PM      06/08/2022 01:45 PM     CO2 22 06/08/2022 01:45 PM     BUN 32 06/08/2022 01:45 PM     CREATININE 1.2 06/08/2022 01:45 PM     GFRAA >60 06/08/2022 01:45 PM     GFRAA >60 02/22/2013 09:59 AM     AGRATIO 1.7 04/01/2022 10:53 AM     LABGLOM >60 06/08/2022 01:45 PM     GLUCOSE 196 06/08/2022 01:45 PM     PROT 7.4 04/01/2022 10:53 AM     CALCIUM 9.8 06/08/2022 01:45 PM     BILITOT 0.4 04/01/2022 10:53 AM     ALKPHOS 80 04/01/2022 10:53 AM     AST 22 04/01/2022 10:53 AM     ALT 21 04/01/2022 10:53 AM      No results found for: PTINR        Lab Results   Component Value Date     LABA1C 8.2 02/24/2022            Lab Results   Component Value Date     TROPONINI <0.01 09/22/2019         Cardiac, Vascular and Imaging Data all Personally Reviewed in Detail by Myself       EKG:      Echocardiogram:   ECHO 2/25/2022  Normal left ventricle size, wall thickness, and systolic function with an  estimated ejection fraction of 55%. No regional wall motion abnormalities  are seen. Normal diastolic function. No evidence of aortic valve regurgitation. Moderate to severe aortic stenosis with a peak velocity of 3.57m/s and a  mean pressure gradient of 32mmHg, KRISTIN by continuity 0.77 cm2, DI 0.19 ,  recommend MELISSA  The right ventricle is normal in size and function. ECHO (Limited) 3/25/19  Left ventricular cavity size is normal.  There is mild concentric left ventricular hypertrophy and low normal  systolic function. Ejection fraction is visually estimated to be 50%. There is mild hypokinesis of the inferior wall. The right ventricle is not well visualized but appears to have normal size  and function. There are no significant valvular abnormalities appreciated in this study. ECHO 7/15/2022  Normal LV size and wall motion. EF is  60%. Normal diastolic function. The left atrium is normal in size.   The right ventricle is normal in size and function. Mild Aortic stenosis. Mean gradient 26 mmHg. Stress Test:      Cath:  Cardiac cath 3/25/19  CONCLUSIONS:  1. Successful recanalization of a STIVEN 1 to 2 flow, distal right  coronary artery, with stenting of a 99% distal right coronary artery  lesion with a 3.0 x 23-mm length Xience Darcy drug-eluting stent. In  the early mid-right coronary artery, there was a focal 80% stenosis that  was stented with a 3.5 mm x 12-mm Xience Darcy drug-eluting stent and  this stent was deployed at 14 atmospheres with nearly 0% residual  stenosis. That same 3.5-mm balloon was then used to deliver therapy and  reduce an in-stent stenotic lesion of about 80% to less than 10%  residual stenosis after inflation of that balloon to 12 atmospheres and  then 14 atmospheres x30 seconds each. STIVEN 3 flow resulted. 2.  There appears to be a 60% ostial left main stenosis. There is a 99%  ostial circumflex stenosis. There is a mid to distal 95% LAD stenosis. 3.  LV ejection fraction of 50% with inferior basal akinesia in the AMOR  projection. 4. LVEDP 10 mmHg with a 10-mm gradient peak-to-peak upon pullback  across the aortic valve. 5.  Successful Angio-Seal, right femoral arteriotomy. Other imaging:      Assessment and Plan      Nonrheumatic aortic valve stenosis  Moderate to severe aortic stenosis with a peak velocity of 3.57m/s and a  mean pressure gradient of 32mmHg, KRISTIN by continuity 0.77 cm2, DI 0.19   Increased dyspnea with exertion. I believe that he will need a valve replacement in the near future. .  Dedicated cardiac cath valve study. Transesophageal echo to do planimetry of the aortic valve. Coronary artery disease  Managed by Dr Maricarmen Chacon. Hypertension  Controlled. Continue current medical management. Hyperlipidemia  Continue statin therapy. Follow up as directed by TAVR. Thank you for allowing us to participate in the care of [facility].   Please do not hesitate to contact me if you have any questions.      Patricia Almanzar MD, MPH

## 2023-01-11 NOTE — DISCHARGE INSTRUCTIONS
TRANSESOPHAGEAL ECHOCARDIOGRAM (MELISSA)      No driving for 24 hours after procedure  Do not make important / legal decisions within 24 hours after procedure  Do not drink any alcoholic beverages or sleeping pills for 24 hours   Advance your diet as tolerated  Continue all of your medication as directed  Call your doctor for the following symptoms:     -Fever   -Difficulty swallowing   -Chest pain   -Difficulty breathing   -Coughing up blood or bloody sputum    Follow up with your doctor as instructed    Dr. Morillo [facility] office will call you with follow-up  [phone]

## 2023-01-12 NOTE — FLOWSHEET NOTE
Preoperative Screening for Elective Surgery/Invasive Procedures While COVID-19 present in the community     Have you tested positive or have been told to self-isolate for COVID-19 like symptoms within the past 28 days? Do you currently have any of the following symptoms? Fever >100.0 F or 99.9 F in immunocompromised patients? New onset cough, shortness of breath or difficulty breathing? New onset sore throat, myalgia (muscle aches and pains), headache, loss of taste/smell or diarrhea? Have you had a potential exposure to COVID-19 within the past 14 days by:  Close contact with a confirmed case? Close contact with a healthcare worker,  or essential infrastructure worker (grocery store, [employer], gas station, public utilities or transportation)? Do you reside in a congregate setting such as; skilled nursing facility, adult home, correctional facility, homeless shelter or other institutional setting? Have you had recent travel to a known COVID-19 hotspot? No to all above       * Admitted with diarrhea? [] YES    [x]  NO     *Prior history of C-Diff. In last 3 months? [] YES    [x]  NO     *Antibiotic use in the past 6-8 weeks? [x]  NO    []  YES      If yes, which: REASON_________________     *Prior hospitalization or nursing home in the last month? []  YES    [x]  NO     SAFETY FIRST. .call before you fall    [facility] time_____1/23/23 0900_______        Surgery time____1045________    Do not eat anything after 12:00 midnight prior to your surgery. Nothing to drink within 5 hours of procedure. This includes water chewing gum, mints and ice chips- the Day of Surgery. You may brush your teeth and gargle the morning of your surgery, but do not swallow the water     Please see your family doctor/pediatrician for a history and physical and/or questions concerning medications.    Bring any test results/reports from your physicians office. If you are under the care of a heart doctor or specialist doctor, please be aware that you may be asked to them for clearance    You may be asked to stop blood thinners such as Coumadin, Plavix, Fragmin, Lovenox, etc., or any anti-inflammatories such as:  Aspirin, Ibuprofen, Advil, Naproxen prior to your surgery. We also ask that you stop any OTC medications such as fish oil, vitamin E, glucosamine, garlic, Multivitamins, COQ 10, etc.    We ask that you do not smoke 24 hours prior to surgery  We ask that you do not  drink any alcoholic beverages 24 hours prior to surgery     You must make arrangements for a responsible adult to take you home after your surgery. For your safety you will not be allowed to leave alone or drive yourself home. Your surgery will be cancelled if you do not have a ride home. Also for your safety, it is strongly suggested that someone stay with you the first 24 hours after your surgery. A parent or legal guardian must accompany a child scheduled for surgery and plan to stay at the hospital until the child is discharged. Please do not bring other children with you. For your comfort, please wear simple loose fitting clothing to the hospital.  Please do not bring valuables. Do not wear any make-up or nail polish on your fingers or toes. For your safety, please do not wear any jewelry or body piercing's on the day of surgery. All jewelry must be removed. If you have dentures, they will be removed before going to operating room. For your convenience, we will provide you with a container. If you wear contact lenses or glasses, they will be removed, please bring a case for them. If you have a living will and a durable power of  for healthcare, please bring in a copy.      As part of our patient safety program to minimize surgical site infections, we ask you to do the following:    Please notify your surgeon if you develop any illness between         now and the day of your surgery. This includes a cough, cold, fever, sore throat, nausea,         or vomiting, and diarrhea, etc.   Please notify your surgeon if you experience dizziness, shortness         of breath or blurred vision between now and the time of your surgery. Do not shave your operative site 96 hours prior to surgery. For face and neck surgery, men may use an electric razor 48 hours   prior to surgery. You may shower the night before surgery or the morning of   your surgery with an antibacterial soap. You will need to bring a photo ID and insurance card     If you use a C-pap or Bi-pap machine, please bring your machine with you to the hospital     Our goal is to provide you with excellent care, therefore, visitors will be limited to so that we may focus on providing this care for you. Please contact your surgeon office, if you have any further questions. [facility] phone number:  [phone] PAT fax number:  [phone]    Please note these are generalized instructions for all surgical cases, you may be provided with more specific instructions according to your surgery.

## 2023-01-16 ENCOUNTER — TELEPHONE (OUTPATIENT)
Dept: CARDIOLOGY CLINIC | Age: 68
End: 2023-01-16

## 2023-01-16 DIAGNOSIS — I35.0 NONRHEUMATIC AORTIC VALVE STENOSIS: Primary | ICD-10-CM

## 2023-01-16 DIAGNOSIS — R42 DIZZINESS: ICD-10-CM

## 2023-01-16 NOTE — TELEPHONE ENCOUNTER
Spoke to pt to let him know that his MELISSA showed severe AS based on KRISTIN and SVI of 30ml/m2. Discussed his stress test results and per Dr. Hortencia Bran, consistent with CAD, no need to repeat angiogram. Lillian, Can you schedule him for CTA chest/abd/pelvis (TAVR protocol) and carotid US at [facility] location? He has no dye allergy, no port and lives at home. Labs are ordered. He cannot do these tests on 1/23/23 but open any other day.  Irving RADFORD

## 2023-01-18 DIAGNOSIS — F51.01 PRIMARY INSOMNIA: ICD-10-CM

## 2023-01-19 RX ORDER — QUETIAPINE FUMARATE 25 MG/1
TABLET, FILM COATED ORAL
Qty: 30 TABLET | Refills: 0 | Status: SHIPPED | OUTPATIENT
Start: 2023-01-19

## 2023-01-19 NOTE — TELEPHONE ENCOUNTER
Called pt to schedule the CTA/Carotid Study. LVM to return call, please transfer call to [address] [place] [address]. Thank you.

## 2023-01-20 ENCOUNTER — ANESTHESIA EVENT (OUTPATIENT)
Dept: OPERATING ROOM | Age: 68
End: 2023-01-20
Payer: MEDICARE

## 2023-01-23 ENCOUNTER — HOSPITAL ENCOUNTER (OUTPATIENT)
Age: 68
Setting detail: OUTPATIENT SURGERY
Discharge: HOME | End: 2023-01-23
Attending: INTERNAL MEDICINE | Admitting: INTERNAL MEDICINE
Payer: MEDICARE

## 2023-01-23 ENCOUNTER — HOSPITAL ENCOUNTER (OUTPATIENT)
Dept: ENDOSCOPY | Age: 68
Setting detail: OUTPATIENT SURGERY
Discharge: HOME | End: 2023-01-23

## 2023-01-23 ENCOUNTER — ANESTHESIA (OUTPATIENT)
Dept: OPERATING ROOM | Age: 68
End: 2023-01-23
Payer: MEDICARE

## 2023-01-23 VITALS
TEMPERATURE: 97.8 F | OXYGEN SATURATION: 82 % | BODY MASS INDEX: 40.18 KG/M2 | DIASTOLIC BLOOD PRESSURE: 66 MMHG | RESPIRATION RATE: 14 BRPM | SYSTOLIC BLOOD PRESSURE: 101 MMHG | HEART RATE: 69 BPM | HEIGHT: 71 IN | WEIGHT: 287 LBS

## 2023-01-23 LAB
GLUCOSE BLD-MCNC: 172 MG/DL (ref 70–99)
PERFORMED ON: ABNORMAL

## 2023-01-23 PROCEDURE — 3609027000 HC COLONOSCOPY: Performed by: INTERNAL MEDICINE

## 2023-01-23 PROCEDURE — 6360000002 HC RX W HCPCS: Performed by: INTERNAL MEDICINE

## 2023-01-23 PROCEDURE — 2709999900 HC NON-CHARGEABLE SUPPLY: Performed by: INTERNAL MEDICINE

## 2023-01-23 PROCEDURE — 7100000010 HC PHASE II RECOVERY - FIRST 15 MIN: Performed by: INTERNAL MEDICINE

## 2023-01-23 PROCEDURE — 7100000011 HC PHASE II RECOVERY - ADDTL 15 MIN: Performed by: INTERNAL MEDICINE

## 2023-01-23 PROCEDURE — 99152 MOD SED SAME PHYS/QHP 5/>YRS: CPT | Performed by: INTERNAL MEDICINE

## 2023-01-23 RX ORDER — SODIUM CHLORIDE 0.9 % (FLUSH) 0.9 %
5-40 SYRINGE (ML) INJECTION EVERY 12 HOURS SCHEDULED
Status: DISCONTINUED | OUTPATIENT
Start: 2023-01-23 | End: 2023-01-23 | Stop reason: HOSPADM

## 2023-01-23 RX ORDER — SODIUM CHLORIDE 0.9 % (FLUSH) 0.9 %
5-40 SYRINGE (ML) INJECTION PRN
Status: DISCONTINUED | OUTPATIENT
Start: 2023-01-23 | End: 2023-01-23 | Stop reason: HOSPADM

## 2023-01-23 RX ORDER — MIDAZOLAM HYDROCHLORIDE 1 MG/ML
INJECTION INTRAMUSCULAR; INTRAVENOUS PRN
Status: DISCONTINUED | OUTPATIENT
Start: 2023-01-23 | End: 2023-01-23 | Stop reason: ALTCHOICE

## 2023-01-23 RX ORDER — SODIUM CHLORIDE 9 MG/ML
INJECTION, SOLUTION INTRAVENOUS PRN
Status: DISCONTINUED | OUTPATIENT
Start: 2023-01-23 | End: 2023-01-23 | Stop reason: HOSPADM

## 2023-01-23 RX ORDER — FENTANYL CITRATE 50 UG/ML
INJECTION, SOLUTION INTRAMUSCULAR; INTRAVENOUS PRN
Status: DISCONTINUED | OUTPATIENT
Start: 2023-01-23 | End: 2023-01-23 | Stop reason: ALTCHOICE

## 2023-01-23 ASSESSMENT — PAIN - FUNCTIONAL ASSESSMENT: PAIN_FUNCTIONAL_ASSESSMENT: 0-10
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
## 2023-01-23 NOTE — PROGRESS NOTES
Patient and dr Brayan Hogan agreeable to do colonscopy at this time. Timeout performed. Patient turned to left side. Vitals monitored throughout procedure.

## 2023-01-23 NOTE — OP NOTE
Flexible Sigmoidoscopy Note      Patient: Araseli Navarrete  : 1955  Acct#:     Procedure: Flexible Sigmoidoscopy to 20 cm    Date:  2023    Surgeon:  Goyo Mcdermott MD    Referring Physician:  Joel Perez MD     Preoperative Diagnosis:  1. Surveillance     Postoperative Diagnosis:  1. Poor Preparation     Anesthesia:  Versed 4 mg IV, fentanyl  25 mcg IV (Sedation time:10:19-10:33)    Consent:  The patient or their legal guardian has signed a consent, and is aware of the potential risks, benefits, alternatives, and potential complications of this procedure. These include, but are not limited to hemorrhage, bleeding, post procedural pain, perforation, phlebitis, aspiration, hypotension, hypoxia, cardiovascular events such as arryhthmia, and possibly death. Additionally, the possibility of missed colonic polyps and interval colon cancer was discussed in the consent. Procedure: An informed consent was obtained from the patient after explanation of indications, benefits, possible risks and complications of the procedure. The patient was then taken to the endoscopy suite, placed in the left lateral decubitus position, and the above IV anesthesia was administered. A digital rectal examination was performed and revealed negative without mass, lesions or tenderness. The Olympus video colonoscope was placed in the patient's rectum under digital direction and advanced 20 cm from the anal verge. The prep was poor. The scope was then withdrawn back through the descending and sigmoid colons. Carefull circumferential examination of the mucosa in these areas demonstrated normal colonic mucosa throughout. The scope was then withdrawn into the rectum and retroflexed. The retroflexed view of the anal verge and rectum demonstrates: The colonoscope was advanced to 20 cm and visualization was aborted due to a large amount of stool present.      The scope was straightened, the colon was decompressed and the scope was withdrawn from the patient. The patient tolerated the procedure well and was taken to the PACU in good condition. Estimated Blood Loss (mL): None     Complications: None    Specimens: None     Recommendations:  Recommend rescheduling for a EGD/Colonoscopy after his aortic valve repair. Patient will require a two day preparation.        Alfredo Mcnamara MD  [address]  1/23/2023

## 2023-01-23 NOTE — DISCHARGE INSTRUCTIONS
Recommendations:  Recommend rescheduling for a EGD/Colonoscopy after his aortic valve repair. Patient will require a two day preparation. Discharge Instructions for Colonoscopy     Colonoscopy is a visual exam of the lining of the large intestine, also called the bowel or colon, with a colonoscope. A colonoscope is a flexible tube with a light and a viewing device. It allows the doctor to view the inside of the colon through a tiny video camera. Colonoscopy is performed for many reasons: unexplained anemia , pain, diarrhea , bloody stools, cancer screening, among many other reasons. Complications from a colonoscopy are rare. Some possible serious complications include perforated bowel (which might require surgery) and bleeding (which could require blood transfusion ). Minor complications include bloating, gas, and cramping that can last for 1-2 days after the procedure. Because air is put into your colon during the procedure, it is normal to pass large amounts of air from your rectum. You may not have a bowel movement for 1-3 days after the procedure. What You Will Need:  Someone to drive you home after the procedure     Steps to Take:  [address] when you get home. Because the sedative will make you drowsy, don't drive, operate machinery, or make important decisions the day of the procedure. Feelings of bloating, gas, or cramping may persist for 24 hours. Diet -  Try sips of water first. If tolerated, resume bland food (scrambled eggs, toast, soup) first.  If tolerated, resume regular diet or the diet recommended by your physician. Do not drink alcohol for 24 hours. Physical Activity -  Ask your doctor when you will be able to return to work. Do not drive, operate heavy machinery, or do activities that require coordination or balance for 24 hours.    Otherwise, return to your normal routine as soon as you are comfortable to do so, which is usually the next day after the procedure. Medications - When taking medications, it's important to: Take your medication as directed, not more, not less, not at a different time. Do not stop taking them without consulting your healthcare provider. Don't share them with anyone else. Know what effects and side effects to expect, and report them to your healthcare provider. If you are taking more than one drug, even if it is an over-the-counter medication, herb, or dietary supplement, be sure to check with a physician or pharmacist about drug interactions. Plan ahead for refills so you don't run out. Lifestyle Changes - The results of your colonoscopy will determine if any lifestyle changes are necessary. Follow-up:  The doctor will usually give you a preliminary report after the medication wears off and you are more alert. The results from a biopsy can take as long as 1-2 weeks to be completed. Schedule a follow-up appointment as directed by your doctor. You should schedule a follow-up colonoscopy as recommended by your doctor. Call Your Doctor If Any of the Following Occurs:  Bleeding from your rectum; notify your doctor if you pass a teaspoonful or more of blood   Black, tarry stools   Severe abdominal pain   Hard, swollen abdomen   Signs of infection, including fever or chills   Inability to pass gas or stool   Coughing, shortness of breath, chest pain, severe nausea or vomiting     In case of an emergency, call 911 immediately.

## 2023-01-29 DIAGNOSIS — F51.01 PRIMARY INSOMNIA: ICD-10-CM

## 2023-01-30 RX ORDER — QUETIAPINE FUMARATE 25 MG/1
TABLET, FILM COATED ORAL
Qty: 30 TABLET | Refills: 0 | Status: SHIPPED | OUTPATIENT
Start: 2023-01-30

## 2023-01-31 NOTE — TELEPHONE ENCOUNTER
TAVR testing is scheduled for 2-13-23 per below:      12:30 pm  - CAROTID STUDY  * Please wear easy to remove clothing  * Please take all medication, unless otherwise instructed  * You will be here for approximately 1 hour       2:00 pm - F CTA CHEST ABD PELVIC W/CONTRAST  PREPS:  * NPO 4 hours prior to procedure  * Following Carotid Study  * No necklaces, underwire bras, body piercing, no pants with zippers, belts,or suspenders  * Bring a complete list of medications or the test cannot be completed.

## 2023-02-01 ENCOUNTER — TELEPHONE (OUTPATIENT)
Dept: CARDIOTHORACIC SURGERY | Age: 68
End: 2023-02-01

## 2023-02-06 ENCOUNTER — TELEPHONE (OUTPATIENT)
Dept: CARDIOTHORACIC SURGERY | Age: 68
End: 2023-02-06

## 2023-02-07 DIAGNOSIS — F51.01 PRIMARY INSOMNIA: ICD-10-CM

## 2023-02-07 RX ORDER — QUETIAPINE FUMARATE 25 MG/1
TABLET, FILM COATED ORAL
Qty: 30 TABLET | Refills: 0 | OUTPATIENT
Start: 2023-02-07

## 2023-02-07 NOTE — TELEPHONE ENCOUNTER
Last appt: 1-5-2023  Next appt: 4-5-2023   Medication matches medication on Epic list   Medication was sent in 1- with no refills.

## 2023-02-13 ENCOUNTER — HOSPITAL ENCOUNTER (OUTPATIENT)
Dept: VASCULAR LAB | Age: 68
Discharge: HOME | End: 2023-02-13
Payer: MEDICARE

## 2023-02-13 ENCOUNTER — HOSPITAL ENCOUNTER (OUTPATIENT)
Age: 68
Discharge: HOME | End: 2023-02-13
Payer: MEDICARE

## 2023-02-13 ENCOUNTER — HOSPITAL ENCOUNTER (OUTPATIENT)
Dept: CT IMAGING | Age: 68
Discharge: HOME | End: 2023-02-13
Payer: MEDICARE

## 2023-02-13 DIAGNOSIS — I35.0 NONRHEUMATIC AORTIC VALVE STENOSIS: ICD-10-CM

## 2023-02-13 DIAGNOSIS — R42 DIZZINESS: ICD-10-CM

## 2023-02-13 LAB
ANION GAP SERPL CALCULATED.3IONS-SCNC: 12 MMOL/L (ref 3–16)
BUN BLDV-MCNC: 25 MG/DL (ref 7–20)
CALCIUM SERPL-MCNC: 9.4 MG/DL (ref 8.3–10.6)
CHLORIDE BLD-SCNC: 102 MMOL/L (ref 99–110)
CO2: 22 MMOL/L (ref 21–32)
CREAT SERPL-MCNC: 1.1 MG/DL (ref 0.8–1.3)
GFR SERPL CREATININE-BSD FRML MDRD: >60 ML/MIN/{1.73_M2}
GLUCOSE BLD-MCNC: 211 MG/DL (ref 70–99)
HCT VFR BLD CALC: 45.5 % (ref 40.5–52.5)
HEMOGLOBIN: 14.8 G/DL (ref 13.5–17.5)
MCH RBC QN AUTO: 30.2 PG (ref 26–34)
MCHC RBC AUTO-ENTMCNC: 32.6 G/DL (ref 31–36)
MCV RBC AUTO: 92.7 FL (ref 80–100)
PDW BLD-RTO: 14.2 % (ref 12.4–15.4)
PLATELET # BLD: 197 K/UL (ref 135–450)
PMV BLD AUTO: 7.3 FL (ref 5–10.5)
POTASSIUM SERPL-SCNC: 4.5 MMOL/L (ref 3.5–5.1)
RBC # BLD: 4.9 M/UL (ref 4.2–5.9)
SODIUM BLD-SCNC: 136 MMOL/L (ref 136–145)
WBC # BLD: 7.3 K/UL (ref 4–11)

## 2023-02-13 PROCEDURE — 85027 COMPLETE CBC AUTOMATED: CPT

## 2023-02-13 PROCEDURE — 36415 COLL VENOUS BLD VENIPUNCTURE: CPT

## 2023-02-13 PROCEDURE — 6360000004 HC RX CONTRAST MEDICATION: Performed by: PSYCHIATRY & NEUROLOGY

## 2023-02-13 PROCEDURE — 80048 BASIC METABOLIC PNL TOTAL CA: CPT

## 2023-02-13 PROCEDURE — 93880 EXTRACRANIAL BILAT STUDY: CPT

## 2023-02-13 PROCEDURE — 71275 CT ANGIOGRAPHY CHEST: CPT

## 2023-02-13 RX ADMIN — IOPAMIDOL 100 ML: 755 INJECTION, SOLUTION INTRAVENOUS at 14:00

## 2023-02-15 ENCOUNTER — OFFICE VISIT (OUTPATIENT)
Dept: CARDIOTHORACIC SURGERY | Age: 68
End: 2023-02-15
Payer: MEDICARE

## 2023-02-15 VITALS
HEIGHT: 71 IN | SYSTOLIC BLOOD PRESSURE: 114 MMHG | OXYGEN SATURATION: 96 % | TEMPERATURE: 98.2 F | HEART RATE: 96 BPM | BODY MASS INDEX: 39.48 KG/M2 | WEIGHT: 282 LBS | DIASTOLIC BLOOD PRESSURE: 62 MMHG

## 2023-02-15 DIAGNOSIS — E66.01 SEVERE OBESITY (BMI 35.0-39.9) WITH COMORBIDITY (HCC): ICD-10-CM

## 2023-02-15 DIAGNOSIS — I35.0 AORTIC STENOSIS, SEVERE: Primary | ICD-10-CM

## 2023-02-15 PROCEDURE — 3017F COLORECTAL CA SCREEN DOC REV: CPT | Performed by: THORACIC SURGERY (CARDIOTHORACIC VASCULAR SURGERY)

## 2023-02-15 PROCEDURE — 1036F TOBACCO NON-USER: CPT | Performed by: THORACIC SURGERY (CARDIOTHORACIC VASCULAR SURGERY)

## 2023-02-15 PROCEDURE — G8427 DOCREV CUR MEDS BY ELIG CLIN: HCPCS | Performed by: THORACIC SURGERY (CARDIOTHORACIC VASCULAR SURGERY)

## 2023-02-15 PROCEDURE — G8484 FLU IMMUNIZE NO ADMIN: HCPCS | Performed by: THORACIC SURGERY (CARDIOTHORACIC VASCULAR SURGERY)

## 2023-02-15 PROCEDURE — 3074F SYST BP LT 130 MM HG: CPT | Performed by: THORACIC SURGERY (CARDIOTHORACIC VASCULAR SURGERY)

## 2023-02-15 PROCEDURE — 1123F ACP DISCUSS/DSCN MKR DOCD: CPT | Performed by: THORACIC SURGERY (CARDIOTHORACIC VASCULAR SURGERY)

## 2023-02-15 PROCEDURE — G8417 CALC BMI ABV UP PARAM F/U: HCPCS | Performed by: THORACIC SURGERY (CARDIOTHORACIC VASCULAR SURGERY)

## 2023-02-15 PROCEDURE — 3078F DIAST BP <80 MM HG: CPT | Performed by: THORACIC SURGERY (CARDIOTHORACIC VASCULAR SURGERY)

## 2023-02-15 PROCEDURE — 99204 OFFICE O/P NEW MOD 45 MIN: CPT | Performed by: THORACIC SURGERY (CARDIOTHORACIC VASCULAR SURGERY)

## 2023-02-15 RX ORDER — HYDROCODONE BITARTRATE AND ACETAMINOPHEN 10; 300 MG/1; MG/1
10 TABLET ORAL EVERY 8 HOURS PRN
COMMUNITY

## 2023-02-15 ASSESSMENT — ENCOUNTER SYMPTOMS
SHORTNESS OF BREATH: 1
ALLERGIC/IMMUNOLOGIC NEGATIVE: 1
APNEA: 1
GASTROINTESTINAL NEGATIVE: 1
CHEST TIGHTNESS: 0
TROUBLE SWALLOWING: 0
BACK PAIN: 1
EYES NEGATIVE: 1
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
## 2023-02-16 ENCOUNTER — TELEPHONE (OUTPATIENT)
Dept: CARDIOLOGY CLINIC | Age: 68
End: 2023-02-16

## 2023-02-16 NOTE — TELEPHONE ENCOUNTER
Pt aware that pending insurance approval and Dr. Davis Jaramillo opinion on CTA/access, we will tentatively schedule his TAVR for 2/28/23. Aware that I will call him early next week for finalization of TAVR date and PAT date/time.  Irving RADFORD

## 2023-02-21 ENCOUNTER — TELEPHONE (OUTPATIENT)
Dept: CARDIOLOGY | Age: 68
End: 2023-02-21

## 2023-02-21 NOTE — TELEPHONE ENCOUNTER
Pt aware of TAVR date 2/28/23 with PAT on 2/23/23 at 9:45am at [facility].  Irving RADFORD

## 2023-02-22 NOTE — PROGRESS NOTES
[facility]  H+P  Consult  OP Visit  FU Visit   CC/HPI   CC New patient visit for aortic stenosis. Intervention PCI, CABG   General Doing fair. Concerned with sob, dahl   Cardiac Sx -CP, -dizziness, -syncope, -edema, +SOB, +orthopnea, +pnd   HISTORY/ALLERGY/ROS   MEDHx  has a past medical history of Anxiety, Aortic valve sclerosis, Arthritis, CAD (coronary artery disease), Cerebral infarct ([address] Chronic active viral hepatitis B ([address] Chronic back pain, Constipation, Diabetes mellitus, type 2 ([address] Esophageal varices ([address] Fatty liver, Heart attack ([address] Hepatitis B immune- pos HBSAg, History of blood transfusion, HTN (hypertension), Hyperlipidemia LDL goal < 100, Hyperlipidemia with target LDL less than 100, Obesity, Psoriasis, Sleep apnea, and STEMI (ST elevation myocardial infarction) ([address] SURGHx  has a past surgical history that includes Coronary angioplasty with stent (11/16/2011); Appendectomy (age 17); Tonsillectomy and adenoidectomy (1980's); Total knee arthroplasty (Left, 2005); Biceps tendon repair; Skull fracture elevation (teen); Colonoscopy; Coronary angioplasty with stent (06/01/2009); Coronary angioplasty with stent (11/25/2008); Coronary angioplasty with stent (11/24/2008); craniotomy (Right); Umbilical hernia repair; Coronary artery bypass graft (03/26/2019); Coronary artery bypass graft (N/A, 03/26/2019); Cataract removal (Bilateral, 2020); Nerve Block (Bilateral, 10/30/2020); transesophageal echocardiogram (01/2023); and Colonoscopy (N/A, 1/23/2023). SOCHx  reports that he has never smoked. He has been exposed to tobacco smoke. He has never used smokeless tobacco. He reports that he does not currently use alcohol. He reports that he does not use drugs. FAMHx family history includes Cancer in his mother; Diabetes in his mother; Heart Failure in his father. ALLERG Patient has no known allergies.    ROS Full ROS obtained and negative except as mentioned in HPI   MEDICATIONS Current Outpatient Medications   Medication Sig Dispense Refill    Insulin Syringe-Needle U-100 ([employer] INSULIN SYR 1CC/30G) 30G X 5/16\" 1 ML MISC 1 each by Does not apply route daily 100 each 3    HYDROcodone-acetaminophen  MG TABS Take 10 mg of opioid by mouth every 8 hours as needed for Pain. insulin glargine (LANTUS) 100 UNIT/ML injection vial ADMINISTER 55 UNITS UNDER THE SKIN EVERY NIGHT 30 mL 1    QUEtiapine (SEROQUEL) 25 MG tablet TAKE 2 TABLETS BY MOUTH EVERY NIGHT 30 tablet 0    canagliflozin (INVOKANA) 300 MG TABS tablet TAKE 1 TABLET BY MOUTH EVERY MORNING BEFORE BREAKFAST 30 tablet 5    rosuvastatin (CRESTOR) 40 MG tablet TAKE 1 TABLET EVERY DAY 90 tablet 1    gabapentin (NEURONTIN) 400 MG capsule TAKE 1 CAPSULE BY MOUTH THREE TIMES DAILY 90 capsule 5    metFORMIN (GLUCOPHAGE-XR) 500 MG extended release tablet TAKE 4 TABLETS EVERY DAY WITH BREAKFAST 360 tablet 1    ezetimibe (ZETIA) 10 MG tablet TAKE 1 TABLET BY MOUTH DAILY 90 tablet 1    valsartan-hydroCHLOROthiazide (DIOVAN-HCT) 320-12.5 MG per tablet TAKE 1 TABLET BY MOUTH EVERY DAY 90 tablet 1    triamcinolone (KENALOG) 0.1 % cream Apply topically 2 times daily. 453.6 g 3    aspirin 325 MG tablet Take 325 mg by mouth daily      Emollient (AQUAPHOR ADVANCED THERAPY) OINT Apply three times per day as needed 396 g 2    Lancets MISC 1 each by Does not apply route 2 times daily 200 each 3    blood glucose monitor kit and supplies Test 2 times a day & as needed for symptoms of irregular blood glucose. 1 kit 0    blood glucose monitor strips Test 2 times a day & as needed for symptoms of irregular blood glucose. 200 strip 3    entecavir (BARACLUDE) 1 MG tablet Take 1 mg by mouth daily  30 tablet 3    glucose blood VI test strips (ASCENSIA AUTODISC VI;ONE TOUCH ULTRA TEST VI) strip Apply 1 each topically 3 times daily.  Onetouch Ultra 2 test strips  Dx: 250.00 300 strip 3    ONETOUCH DELICA LANCETS MISC 1 each by Does not apply route 3 times daily. 300 each 3     No current facility-administered medications for this visit. PHYSICAL EXAM   Vitals /60 (Site: Left Upper Arm, Position: Sitting, Cuff Size: Large Adult)   Pulse 73   Ht 5' 11\" (1.803 m)   Wt 280 lb 9.6 oz (127.3 kg)   SpO2 95%   BMI 39.14 kg/m²    Gen Alert, coop, no distress Heart  RRR, 3/6   Head NC, AT, no abnorm Abd  Soft, NT, +BS, no mass, no OM   Eyes PER, conj/corn clear Ext  Ext nl, AT, no C/C, +edema   Nose Nares nl, no drain, NT Pulse 2+ and symmetric   Throat Lips, mucosa, tongue nl Skin Col/text/turg nl, no vis rash/les   Neck S/S, TM, NT, no bruit/JVD Psych Nl mood and affect   Lung CTA-B, unlabored, no DTP Lymph   No cervical or axillary LA   Ch wall NT, no deform Neuro  Nl gross M/S exam      COMPLIANCE   Discussed and counseled on diet, exercise, weight loss, smoking, alcohol, drugs. All questions answered. CODING   SCI (78181) - 30-39 mins spent reviewing hx/tests/consults, performing exam, counseling/educating, ordering meds/tests/procedures, referring/communicating w/PCP/consultants, documenting in EMR, interpreting results, communicating medical information and plan with family. SCRIBE ATTESTATION   Nurse Scribe Attestation  Martha Quan am scribing for and in the presence of Kadeem Lovett MD.   Signed, Lisette Chadwick RN. 2/22/2023 3:08 PM    Doctor Lisette Chadwick is working as a scribe for and in the presence of mare Lovett MD). Working as a scribe, Lisette Chadwick may have prepopulated components of this note with my historical  intellectual property under my direct supervision. Any additions to this intellectual property were performed in my presence and at my direction. Furthermore, the content and accuracy of this note have been reviewed by mare Lovett MD).   @TD@ @NOW@   ASSESSMENT AND PLAN   *AS    Date EF Detail   Sx     Onset: Onset: recent  Duration: weeks  Temporal: Worsening   CHF Type   Chronic diastolic   NYHA   III   FRAME  CHF Dx (2 Major or 1 Major+2 Minor)  Major: PND/Orthop  Minor: ROSALBA BYRD   CHF Meds   ACE/ARB/ARNI, ASA, STATIN   TTE 7/22 60% AS MG 26,    MELISSA 1/23  AS MG 32, paradoxical low flow low gradient severe AS   Plan     Comprehensive discussion regarding options including Med v TAVR v SAVR  TAVR next week  Appreciate referral from Dr. Simpson    *CAD   Date EF Results   Sx   As above   Hx 3/19  LIMA-LAD, [id number]-OM1, SVG-PDA ([place])   C 3/19  6/22  PCI RCA, refer to CTS, ([place])  Grafts patent ([place])   MPI 1/23 46% Small size mostly fixed defect with mild reversibility involving the apical inferior wall   Plan   Continue aggressive medical treatment at doses above   *HTN  Status Controlled   Plan Continue current antihypertensives at doses above   *CHOL  LDL 83, 4/22   Plan Counseled on diet/exercise/weight, continue HI/MT statin   *FOLLOWUP  After procedure

## 2023-02-23 ENCOUNTER — ANESTHESIA EVENT (OUTPATIENT)
Dept: OPERATING ROOM | Age: 68
DRG: 267 | End: 2023-02-23
Payer: MEDICARE

## 2023-02-23 ENCOUNTER — OFFICE VISIT (OUTPATIENT)
Dept: CARDIOLOGY CLINIC | Age: 68
End: 2023-02-23
Payer: MEDICARE

## 2023-02-23 ENCOUNTER — HOSPITAL ENCOUNTER (OUTPATIENT)
Dept: PREADMISSION TESTING | Age: 68
Discharge: HOME | End: 2023-02-23
Payer: MEDICARE

## 2023-02-23 VITALS
HEART RATE: 73 BPM | OXYGEN SATURATION: 95 % | DIASTOLIC BLOOD PRESSURE: 60 MMHG | BODY MASS INDEX: 39.28 KG/M2 | WEIGHT: 280.6 LBS | HEIGHT: 71 IN | SYSTOLIC BLOOD PRESSURE: 110 MMHG

## 2023-02-23 VITALS
OXYGEN SATURATION: 98 % | TEMPERATURE: 97.9 F | HEIGHT: 71 IN | RESPIRATION RATE: 16 BRPM | BODY MASS INDEX: 39.2 KG/M2 | WEIGHT: 280 LBS | SYSTOLIC BLOOD PRESSURE: 107 MMHG | DIASTOLIC BLOOD PRESSURE: 67 MMHG | HEART RATE: 74 BPM

## 2023-02-23 DIAGNOSIS — I25.83 CORONARY ARTERY DISEASE DUE TO LIPID RICH PLAQUE: ICD-10-CM

## 2023-02-23 DIAGNOSIS — I10 ESSENTIAL HYPERTENSION: ICD-10-CM

## 2023-02-23 DIAGNOSIS — I35.0 AORTIC VALVE STENOSIS, NONRHEUMATIC: Primary | ICD-10-CM

## 2023-02-23 DIAGNOSIS — E78.00 HYPERCHOLESTEROLEMIA: ICD-10-CM

## 2023-02-23 DIAGNOSIS — I25.10 CORONARY ARTERY DISEASE DUE TO LIPID RICH PLAQUE: ICD-10-CM

## 2023-02-23 LAB
ABO/RH: NORMAL
ALBUMIN SERPL-MCNC: 3.9 G/DL (ref 3.4–5)
ALP BLD-CCNC: 84 U/L (ref 40–129)
ALT SERPL-CCNC: 22 U/L (ref 10–40)
ANION GAP SERPL CALCULATED.3IONS-SCNC: 12 MMOL/L (ref 3–16)
ANTIBODY SCREEN: NORMAL
APTT: 37.9 SEC (ref 23–34.3)
AST SERPL-CCNC: 21 U/L (ref 15–37)
BASOPHILS ABSOLUTE: 0 K/UL (ref 0–0.2)
BASOPHILS RELATIVE PERCENT: 0.7 %
BILIRUB SERPL-MCNC: 0.4 MG/DL (ref 0–1)
BILIRUBIN DIRECT: <0.2 MG/DL (ref 0–0.3)
BILIRUBIN URINE: NEGATIVE
BILIRUBIN, INDIRECT: NORMAL MG/DL (ref 0–1)
BLOOD, URINE: NEGATIVE
BUN BLDV-MCNC: 30 MG/DL (ref 7–20)
CALCIUM SERPL-MCNC: 9.4 MG/DL (ref 8.3–10.6)
CHLORIDE BLD-SCNC: 99 MMOL/L (ref 99–110)
CLARITY: CLEAR
CO2: 26 MMOL/L (ref 21–32)
COLOR: YELLOW
CREAT SERPL-MCNC: 1.2 MG/DL (ref 0.8–1.3)
EOSINOPHILS ABSOLUTE: 0.1 K/UL (ref 0–0.6)
EOSINOPHILS RELATIVE PERCENT: 2.2 %
GFR SERPL CREATININE-BSD FRML MDRD: >60 ML/MIN/{1.73_M2}
GLUCOSE BLD-MCNC: 208 MG/DL (ref 70–99)
GLUCOSE URINE: >=1000 MG/DL
HCT VFR BLD CALC: 45.5 % (ref 40.5–52.5)
HEMOGLOBIN: 15.3 G/DL (ref 13.5–17.5)
INR BLD: 0.95 (ref 0.87–1.14)
KETONES, URINE: NEGATIVE MG/DL
LEUKOCYTE ESTERASE, URINE: NEGATIVE
LYMPHOCYTES ABSOLUTE: 1.9 K/UL (ref 1–5.1)
LYMPHOCYTES RELATIVE PERCENT: 37.1 %
MAGNESIUM: 2.6 MG/DL (ref 1.8–2.4)
MCH RBC QN AUTO: 30.8 PG (ref 26–34)
MCHC RBC AUTO-ENTMCNC: 33.7 G/DL (ref 31–36)
MCV RBC AUTO: 91.4 FL (ref 80–100)
MICROSCOPIC EXAMINATION: ABNORMAL
MONOCYTES ABSOLUTE: 0.6 K/UL (ref 0–1.3)
MONOCYTES RELATIVE PERCENT: 10.6 %
NEUTROPHILS ABSOLUTE: 2.6 K/UL (ref 1.7–7.7)
NEUTROPHILS RELATIVE PERCENT: 49.4 %
NITRITE, URINE: NEGATIVE
PDW BLD-RTO: 14 % (ref 12.4–15.4)
PH UA: 5 (ref 5–8)
PHOSPHORUS: 4.2 MG/DL (ref 2.5–4.9)
PLATELET # BLD: 201 K/UL (ref 135–450)
PMV BLD AUTO: 8 FL (ref 5–10.5)
POTASSIUM SERPL-SCNC: 4.5 MMOL/L (ref 3.5–5.1)
PROTEIN UA: NEGATIVE MG/DL
PROTHROMBIN TIME: 12.6 SEC (ref 11.7–14.5)
RBC # BLD: 4.97 M/UL (ref 4.2–5.9)
SODIUM BLD-SCNC: 137 MMOL/L (ref 136–145)
SPECIFIC GRAVITY UA: >=1.03 (ref 1–1.03)
TOTAL PROTEIN: 7.3 G/DL (ref 6.4–8.2)
URINE TYPE: ABNORMAL
UROBILINOGEN, URINE: 0.2 E.U./DL
WBC # BLD: 5.2 K/UL (ref 4–11)

## 2023-02-23 PROCEDURE — 86900 BLOOD TYPING SEROLOGIC ABO: CPT

## 2023-02-23 PROCEDURE — 3078F DIAST BP <80 MM HG: CPT | Performed by: INTERNAL MEDICINE

## 2023-02-23 PROCEDURE — 1036F TOBACCO NON-USER: CPT | Performed by: INTERNAL MEDICINE

## 2023-02-23 PROCEDURE — 81003 URINALYSIS AUTO W/O SCOPE: CPT

## 2023-02-23 PROCEDURE — 1124F ACP DISCUSS-NO DSCNMKR DOCD: CPT | Performed by: INTERNAL MEDICINE

## 2023-02-23 PROCEDURE — 85730 THROMBOPLASTIN TIME PARTIAL: CPT

## 2023-02-23 PROCEDURE — 83735 ASSAY OF MAGNESIUM: CPT

## 2023-02-23 PROCEDURE — 99214 OFFICE O/P EST MOD 30 MIN: CPT | Performed by: INTERNAL MEDICINE

## 2023-02-23 PROCEDURE — 86901 BLOOD TYPING SEROLOGIC RH(D): CPT

## 2023-02-23 PROCEDURE — 36415 COLL VENOUS BLD VENIPUNCTURE: CPT

## 2023-02-23 PROCEDURE — 85025 COMPLETE CBC W/AUTO DIFF WBC: CPT

## 2023-02-23 PROCEDURE — G8484 FLU IMMUNIZE NO ADMIN: HCPCS | Performed by: INTERNAL MEDICINE

## 2023-02-23 PROCEDURE — 3017F COLORECTAL CA SCREEN DOC REV: CPT | Performed by: INTERNAL MEDICINE

## 2023-02-23 PROCEDURE — 93005 ELECTROCARDIOGRAM TRACING: CPT | Performed by: FAMILY MEDICINE

## 2023-02-23 PROCEDURE — 80076 HEPATIC FUNCTION PANEL: CPT

## 2023-02-23 PROCEDURE — G8427 DOCREV CUR MEDS BY ELIG CLIN: HCPCS | Performed by: INTERNAL MEDICINE

## 2023-02-23 PROCEDURE — 87086 URINE CULTURE/COLONY COUNT: CPT

## 2023-02-23 PROCEDURE — 80069 RENAL FUNCTION PANEL: CPT

## 2023-02-23 PROCEDURE — G8417 CALC BMI ABV UP PARAM F/U: HCPCS | Performed by: INTERNAL MEDICINE

## 2023-02-23 PROCEDURE — 85610 PROTHROMBIN TIME: CPT

## 2023-02-23 PROCEDURE — 3074F SYST BP LT 130 MM HG: CPT | Performed by: INTERNAL MEDICINE

## 2023-02-23 PROCEDURE — 86850 RBC ANTIBODY SCREEN: CPT

## 2023-02-23 ASSESSMENT — ENCOUNTER SYMPTOMS: SHORTNESS OF BREATH: 0

## 2023-02-23 ASSESSMENT — PAIN SCALES - GENERAL: PAINLEVEL_OUTOF10: 0

## 2023-02-23 ASSESSMENT — LIFESTYLE VARIABLES: SMOKING_STATUS: 0

## 2023-02-23 NOTE — PROGRESS NOTES
Pt here for PAT visit. Consents for procedure and blood signed by pt and in chart. Labs drawn and urine collected and sent to lab for testing as ordered. Pt seen by Dr. Lyubov Tyson from Anesthesia and questions answered. Vitals stable and documented in flowsheet. Pt instructed to be NPO after midnight prior to procedure and states understanding. Pt provided with hibiclens and instructed to shower with entire bottle the night prior to procedure. Pt instructed to take the following medications the morning of procedure with a small sip of water: N/A. Pt instructed to stop taking N/A prior to procedure. EKG completed and results in chart. Pt instructed to arrive to the hospital the morning of procedure at 0530 on 2/28/23. Pt in good condition and okay for discharge. Pt denies further questions at time of discharge. Pt instructed to call Dr. Leanne Parker office with any medical concerns or the heart office at [phone] with any further questions between now and day of procedure.

## 2023-02-23 NOTE — PROGRESS NOTES
1.  NM Introduced self to pt and family. 2.  NM Informed about what to expect the day of surgery. A)  Preop nurse will bring them to pre-op holding NM. B)  PCA will bathe & shave them. NM       C)  Anesthesiologist will put IV lines in while in preop. sedation for comfort,             during lining, doze off. NM       D)  Nursing staff will be in to visit. NM       E)  Operating room will be very cold. Warming blanket under and on them NM        F)   In the operating room there will be several people connecting them to               monitors. NM        G)  Urinary catheter will be placed after they are asleep. NM  3. NM Will wake up in the CVU with breathing tube in place. (Will remove as soon               as it is safe for them.)  4. NM Invite them to ask questions. 5.   NM Inform them they will attend a 1 hour class with our NP Chuy Wade on Tuesday or             Friday. (will go over what to expect in the weeks to follow.) NM
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
## 2023-02-24 LAB
EKG ATRIAL RATE: 68 BPM
EKG DIAGNOSIS: NORMAL
EKG P AXIS: 44 DEGREES
EKG P-R INTERVAL: 166 MS
EKG Q-T INTERVAL: 384 MS
EKG QRS DURATION: 100 MS
EKG QTC CALCULATION (BAZETT): 408 MS
EKG R AXIS: -4 DEGREES
EKG T AXIS: 2 DEGREES
EKG VENTRICULAR RATE: 68 BPM
URINE CULTURE, ROUTINE: NORMAL

## 2023-02-24 PROCEDURE — 93010 ELECTROCARDIOGRAM REPORT: CPT | Performed by: INTERNAL MEDICINE

## 2023-02-28 ENCOUNTER — ANESTHESIA (OUTPATIENT)
Dept: OPERATING ROOM | Age: 68
DRG: 267 | End: 2023-02-28
Payer: MEDICARE

## 2023-02-28 ENCOUNTER — HOSPITAL ENCOUNTER (INPATIENT)
Age: 68
LOS: 1 days | Discharge: HOME | DRG: 267 | End: 2023-03-01
Attending: INTERNAL MEDICINE | Admitting: INTERNAL MEDICINE
Payer: MEDICARE

## 2023-02-28 PROBLEM — I35.0 AORTIC STENOSIS, SEVERE: Status: ACTIVE | Noted: 2023-02-28

## 2023-02-28 LAB
ABO/RH: NORMAL
ACTIVATED CLOTTING TIME: 109 SEC (ref 99–130)
ACTIVATED CLOTTING TIME: 112 SEC (ref 99–130)
ANTIBODY SCREEN: NORMAL
BASE EXCESS ARTERIAL: 3 (ref -3–3)
BLOOD BANK DISPENSE STATUS: NORMAL
BLOOD BANK PRODUCT CODE: NORMAL
BPU ID: NORMAL
CALCIUM IONIZED: 1.24 MMOL/L (ref 1.12–1.32)
DESCRIPTION BLOOD BANK: NORMAL
EKG ATRIAL RATE: 75 BPM
EKG DIAGNOSIS: NORMAL
EKG P AXIS: 61 DEGREES
EKG P-R INTERVAL: 160 MS
EKG Q-T INTERVAL: 394 MS
EKG QRS DURATION: 98 MS
EKG QTC CALCULATION (BAZETT): 439 MS
EKG R AXIS: -4 DEGREES
EKG T AXIS: -4 DEGREES
EKG VENTRICULAR RATE: 75 BPM
GLUCOSE BLD-MCNC: 245 MG/DL (ref 70–99)
GLUCOSE BLD-MCNC: 314 MG/DL (ref 70–99)
GLUCOSE BLD-MCNC: 337 MG/DL (ref 70–99)
HCO3 ARTERIAL: 29.2 MMOL/L (ref 21–29)
HEMOGLOBIN: 14.5 GM/DL (ref 13.5–17.5)
LACTATE: 1.18 MMOL/L (ref 0.4–2)
O2 SAT, ARTERIAL: 100 % (ref 93–100)
PCO2 ARTERIAL: 59.4 MM HG (ref 35–45)
PERFORMED ON: ABNORMAL
PH ARTERIAL: 7.3 (ref 7.35–7.45)
PO2 ARTERIAL: 204.9 MM HG (ref 75–108)
POC HEMATOCRIT: 43 % (ref 40.5–52.5)
POC POTASSIUM: 5.1 MMOL/L (ref 3.5–5.1)
POC SAMPLE TYPE: ABNORMAL
POC SODIUM: 140 MMOL/L (ref 136–145)
PRO-BNP: 210 PG/ML (ref 0–124)
TCO2 ARTERIAL: 31 MMOL/L
TROPONIN: <0.01 NG/ML

## 2023-02-28 PROCEDURE — 3700000001 HC ADD 15 MINUTES (ANESTHESIA): Performed by: INTERNAL MEDICINE

## 2023-02-28 PROCEDURE — 86900 BLOOD TYPING SEROLOGIC ABO: CPT

## 2023-02-28 PROCEDURE — 2709999900 HC NON-CHARGEABLE SUPPLY

## 2023-02-28 PROCEDURE — 93005 ELECTROCARDIOGRAM TRACING: CPT | Performed by: INTERNAL MEDICINE

## 2023-02-28 PROCEDURE — C1760 CLOSURE DEV, VASC: HCPCS

## 2023-02-28 PROCEDURE — 2580000003 HC RX 258: Performed by: INTERNAL MEDICINE

## 2023-02-28 PROCEDURE — 86923 COMPATIBILITY TEST ELECTRIC: CPT

## 2023-02-28 PROCEDURE — 83880 ASSAY OF NATRIURETIC PEPTIDE: CPT

## 2023-02-28 PROCEDURE — 02RF38Z REPLACEMENT OF AORTIC VALVE WITH ZOOPLASTIC TISSUE, PERCUTANEOUS APPROACH: ICD-10-PCS | Performed by: THORACIC SURGERY (CARDIOTHORACIC VASCULAR SURGERY)

## 2023-02-28 PROCEDURE — 6360000004 HC RX CONTRAST MEDICATION: Performed by: INTERNAL MEDICINE

## 2023-02-28 PROCEDURE — 84295 ASSAY OF SERUM SODIUM: CPT

## 2023-02-28 PROCEDURE — 84132 ASSAY OF SERUM POTASSIUM: CPT

## 2023-02-28 PROCEDURE — 86850 RBC ANTIBODY SCREEN: CPT

## 2023-02-28 PROCEDURE — 82947 ASSAY GLUCOSE BLOOD QUANT: CPT

## 2023-02-28 PROCEDURE — 6360000002 HC RX W HCPCS: Performed by: INTERNAL MEDICINE

## 2023-02-28 PROCEDURE — 3600000017 HC SURGERY HYBRID ADDL 15MIN

## 2023-02-28 PROCEDURE — 3600000007 HC SURGERY HYBRID BASE

## 2023-02-28 PROCEDURE — 93010 ELECTROCARDIOGRAM REPORT: CPT | Performed by: INTERNAL MEDICINE

## 2023-02-28 PROCEDURE — 82803 BLOOD GASES ANY COMBINATION: CPT

## 2023-02-28 PROCEDURE — 2500000003 HC RX 250 WO HCPCS: Performed by: INTERNAL MEDICINE

## 2023-02-28 PROCEDURE — 3700000000 HC ANESTHESIA ATTENDED CARE: Performed by: INTERNAL MEDICINE

## 2023-02-28 PROCEDURE — 33361 REPLACE AORTIC VALVE PERQ: CPT | Performed by: INTERNAL MEDICINE

## 2023-02-28 PROCEDURE — C1889 IMPLANT/INSERT DEVICE, NOC: HCPCS

## 2023-02-28 PROCEDURE — 85014 HEMATOCRIT: CPT

## 2023-02-28 PROCEDURE — 83605 ASSAY OF LACTIC ACID: CPT

## 2023-02-28 PROCEDURE — 2709999900 HC NON-CHARGEABLE SUPPLY: Performed by: INTERNAL MEDICINE

## 2023-02-28 PROCEDURE — 36415 COLL VENOUS BLD VENIPUNCTURE: CPT

## 2023-02-28 PROCEDURE — 33361 REPLACE AORTIC VALVE PERQ: CPT | Performed by: THORACIC SURGERY (CARDIOTHORACIC VASCULAR SURGERY)

## 2023-02-28 PROCEDURE — 84484 ASSAY OF TROPONIN QUANT: CPT

## 2023-02-28 PROCEDURE — 4A023N7 MEASUREMENT OF CARDIAC SAMPLING AND PRESSURE, LEFT HEART, PERCUTANEOUS APPROACH: ICD-10-PCS | Performed by: THORACIC SURGERY (CARDIOTHORACIC VASCULAR SURGERY)

## 2023-02-28 PROCEDURE — 6370000000 HC RX 637 (ALT 250 FOR IP): Performed by: INTERNAL MEDICINE

## 2023-02-28 PROCEDURE — 86901 BLOOD TYPING SEROLOGIC RH(D): CPT

## 2023-02-28 PROCEDURE — 85347 COAGULATION TIME ACTIVATED: CPT

## 2023-02-28 PROCEDURE — 6360000002 HC RX W HCPCS: Performed by: ANESTHESIOLOGY

## 2023-02-28 PROCEDURE — C1769 GUIDE WIRE: HCPCS

## 2023-02-28 PROCEDURE — 2100000000 HC CCU R&B

## 2023-02-28 PROCEDURE — 82330 ASSAY OF CALCIUM: CPT

## 2023-02-28 PROCEDURE — 7100000010 HC PHASE II RECOVERY - FIRST 15 MIN

## 2023-02-28 PROCEDURE — C1894 INTRO/SHEATH, NON-LASER: HCPCS

## 2023-02-28 PROCEDURE — 7100000011 HC PHASE II RECOVERY - ADDTL 15 MIN

## 2023-02-28 RX ORDER — 0.9 % SODIUM CHLORIDE 0.9 %
500 INTRAVENOUS SOLUTION INTRAVENOUS PRN
Status: DISCONTINUED | OUTPATIENT
Start: 2023-02-28 | End: 2023-03-01 | Stop reason: HOSPADM

## 2023-02-28 RX ORDER — ASPIRIN 81 MG/1
81 TABLET ORAL DAILY
Status: DISCONTINUED | OUTPATIENT
Start: 2023-02-28 | End: 2023-03-01 | Stop reason: HOSPADM

## 2023-02-28 RX ORDER — PROPOFOL 10 MG/ML
INJECTION, EMULSION INTRAVENOUS CONTINUOUS PRN
Status: DISCONTINUED | OUTPATIENT
Start: 2023-02-28 | End: 2023-02-28 | Stop reason: SDUPTHER

## 2023-02-28 RX ORDER — CLOPIDOGREL BISULFATE 75 MG/1
75 TABLET ORAL DAILY
Status: DISCONTINUED | OUTPATIENT
Start: 2023-02-28 | End: 2023-03-01 | Stop reason: HOSPADM

## 2023-02-28 RX ORDER — VALSARTAN AND HYDROCHLOROTHIAZIDE 320; 12.5 MG/1; MG/1
1 TABLET, FILM COATED ORAL DAILY
Status: DISCONTINUED | OUTPATIENT
Start: 2023-02-28 | End: 2023-02-28 | Stop reason: SDUPTHER

## 2023-02-28 RX ORDER — ONDANSETRON 2 MG/ML
4 INJECTION INTRAMUSCULAR; INTRAVENOUS EVERY 6 HOURS PRN
Status: DISCONTINUED | OUTPATIENT
Start: 2023-02-28 | End: 2023-03-01 | Stop reason: HOSPADM

## 2023-02-28 RX ORDER — GABAPENTIN 400 MG/1
400 CAPSULE ORAL 3 TIMES DAILY
Status: DISCONTINUED | OUTPATIENT
Start: 2023-02-28 | End: 2023-03-01 | Stop reason: HOSPADM

## 2023-02-28 RX ORDER — HYDROCHLOROTHIAZIDE 25 MG/1
12.5 TABLET ORAL DAILY
Status: DISCONTINUED | OUTPATIENT
Start: 2023-02-28 | End: 2023-03-01 | Stop reason: HOSPADM

## 2023-02-28 RX ORDER — QUETIAPINE FUMARATE 25 MG/1
50 TABLET, FILM COATED ORAL NIGHTLY
Status: DISCONTINUED | OUTPATIENT
Start: 2023-02-28 | End: 2023-03-01 | Stop reason: HOSPADM

## 2023-02-28 RX ORDER — PROTAMINE SULFATE 10 MG/ML
INJECTION, SOLUTION INTRAVENOUS PRN
Status: DISCONTINUED | OUTPATIENT
Start: 2023-02-28 | End: 2023-02-28 | Stop reason: SDUPTHER

## 2023-02-28 RX ORDER — TAMSULOSIN HYDROCHLORIDE 0.4 MG/1
0.4 CAPSULE ORAL DAILY
Status: DISCONTINUED | OUTPATIENT
Start: 2023-02-28 | End: 2023-03-01 | Stop reason: HOSPADM

## 2023-02-28 RX ORDER — SODIUM CHLORIDE 9 MG/ML
INJECTION, SOLUTION INTRAVENOUS PRN
Status: DISCONTINUED | OUTPATIENT
Start: 2023-02-28 | End: 2023-03-01 | Stop reason: HOSPADM

## 2023-02-28 RX ORDER — DOCUSATE SODIUM 100 MG/1
100 CAPSULE, LIQUID FILLED ORAL DAILY
Status: DISCONTINUED | OUTPATIENT
Start: 2023-02-28 | End: 2023-03-01 | Stop reason: HOSPADM

## 2023-02-28 RX ORDER — LIDOCAINE 4 G/G
1 PATCH TOPICAL ONCE
Status: COMPLETED | OUTPATIENT
Start: 2023-02-28 | End: 2023-02-28

## 2023-02-28 RX ORDER — HYDROCODONE BITARTRATE AND ACETAMINOPHEN 10; 325 MG/1; MG/1
1 TABLET ORAL EVERY 8 HOURS PRN
Status: DISCONTINUED | OUTPATIENT
Start: 2023-02-28 | End: 2023-03-01 | Stop reason: HOSPADM

## 2023-02-28 RX ORDER — VALSARTAN 160 MG/1
320 TABLET ORAL DAILY
Status: DISCONTINUED | OUTPATIENT
Start: 2023-02-28 | End: 2023-03-01 | Stop reason: HOSPADM

## 2023-02-28 RX ORDER — SODIUM CHLORIDE 0.9 % (FLUSH) 0.9 %
5-40 SYRINGE (ML) INJECTION EVERY 12 HOURS SCHEDULED
Status: DISCONTINUED | OUTPATIENT
Start: 2023-02-28 | End: 2023-03-01 | Stop reason: HOSPADM

## 2023-02-28 RX ORDER — ATROPINE SULFATE 0.4 MG/ML
0.5 AMPUL (ML) INJECTION
Status: DISCONTINUED | OUTPATIENT
Start: 2023-02-28 | End: 2023-03-01 | Stop reason: HOSPADM

## 2023-02-28 RX ORDER — ACETAMINOPHEN 325 MG/1
650 TABLET ORAL EVERY 4 HOURS PRN
Status: DISCONTINUED | OUTPATIENT
Start: 2023-02-28 | End: 2023-03-01 | Stop reason: HOSPADM

## 2023-02-28 RX ORDER — SODIUM CHLORIDE, SODIUM LACTATE, POTASSIUM CHLORIDE, CALCIUM CHLORIDE 600; 310; 30; 20 MG/100ML; MG/100ML; MG/100ML; MG/100ML
INJECTION, SOLUTION INTRAVENOUS ONCE
Status: COMPLETED | OUTPATIENT
Start: 2023-02-28 | End: 2023-02-28

## 2023-02-28 RX ORDER — INSULIN LISPRO 100 [IU]/ML
0-4 INJECTION, SOLUTION INTRAVENOUS; SUBCUTANEOUS NIGHTLY
Status: DISCONTINUED | OUTPATIENT
Start: 2023-02-28 | End: 2023-03-01 | Stop reason: HOSPADM

## 2023-02-28 RX ORDER — SODIUM CHLORIDE 0.9 % (FLUSH) 0.9 %
5-40 SYRINGE (ML) INJECTION PRN
Status: DISCONTINUED | OUTPATIENT
Start: 2023-02-28 | End: 2023-03-01 | Stop reason: HOSPADM

## 2023-02-28 RX ORDER — INSULIN LISPRO 100 [IU]/ML
0-4 INJECTION, SOLUTION INTRAVENOUS; SUBCUTANEOUS
Status: DISCONTINUED | OUTPATIENT
Start: 2023-02-28 | End: 2023-03-01 | Stop reason: HOSPADM

## 2023-02-28 RX ORDER — INSULIN GLARGINE 100 [IU]/ML
55 INJECTION, SOLUTION SUBCUTANEOUS NIGHTLY
Status: DISCONTINUED | OUTPATIENT
Start: 2023-02-28 | End: 2023-03-01 | Stop reason: HOSPADM

## 2023-02-28 RX ORDER — FUROSEMIDE 10 MG/ML
20 INJECTION INTRAMUSCULAR; INTRAVENOUS ONCE
Status: COMPLETED | OUTPATIENT
Start: 2023-02-28 | End: 2023-02-28

## 2023-02-28 RX ORDER — NITROGLYCERIN 20 MG/100ML
5 INJECTION INTRAVENOUS CONTINUOUS
Status: DISCONTINUED | OUTPATIENT
Start: 2023-02-28 | End: 2023-03-01 | Stop reason: HOSPADM

## 2023-02-28 RX ORDER — ENTECAVIR 0.5 MG/1
1 TABLET, FILM COATED ORAL DAILY
Status: DISCONTINUED | OUTPATIENT
Start: 2023-02-28 | End: 2023-03-01 | Stop reason: HOSPADM

## 2023-02-28 RX ORDER — HEPARIN SODIUM 1000 [USP'U]/ML
INJECTION, SOLUTION INTRAVENOUS; SUBCUTANEOUS PRN
Status: DISCONTINUED | OUTPATIENT
Start: 2023-02-28 | End: 2023-02-28 | Stop reason: SDUPTHER

## 2023-02-28 RX ORDER — HYDRALAZINE HYDROCHLORIDE 20 MG/ML
10 INJECTION INTRAMUSCULAR; INTRAVENOUS EVERY 6 HOURS PRN
Status: DISCONTINUED | OUTPATIENT
Start: 2023-02-28 | End: 2023-03-01 | Stop reason: HOSPADM

## 2023-02-28 RX ADMIN — INSULIN LISPRO 4 UNITS: 100 INJECTION, SOLUTION INTRAVENOUS; SUBCUTANEOUS at 20:53

## 2023-02-28 RX ADMIN — DOCUSATE SODIUM 100 MG: 100 CAPSULE, LIQUID FILLED ORAL at 14:22

## 2023-02-28 RX ADMIN — INSULIN LISPRO 3 UNITS: 100 INJECTION, SOLUTION INTRAVENOUS; SUBCUTANEOUS at 18:15

## 2023-02-28 RX ADMIN — INSULIN GLARGINE 55 UNITS: 100 INJECTION, SOLUTION SUBCUTANEOUS at 20:53

## 2023-02-28 RX ADMIN — HYDROCODONE BITARTRATE AND ACETAMINOPHEN 1 TABLET: 10; 325 TABLET ORAL at 21:01

## 2023-02-28 RX ADMIN — GABAPENTIN 400 MG: 400 CAPSULE ORAL at 14:22

## 2023-02-28 RX ADMIN — VALSARTAN 320 MG: 160 TABLET, FILM COATED ORAL at 14:22

## 2023-02-28 RX ADMIN — IOPAMIDOL 82 ML: 755 INJECTION, SOLUTION INTRAVENOUS at 09:53

## 2023-02-28 RX ADMIN — HYDROCHLOROTHIAZIDE 12.5 MG: 25 TABLET ORAL at 14:22

## 2023-02-28 RX ADMIN — TAMSULOSIN HYDROCHLORIDE 0.4 MG: 0.4 CAPSULE ORAL at 14:22

## 2023-02-28 RX ADMIN — SODIUM CHLORIDE, PRESERVATIVE FREE 10 ML: 5 INJECTION INTRAVENOUS at 20:53

## 2023-02-28 RX ADMIN — GABAPENTIN 400 MG: 400 CAPSULE ORAL at 20:47

## 2023-02-28 RX ADMIN — SODIUM CHLORIDE, POTASSIUM CHLORIDE, SODIUM LACTATE AND CALCIUM CHLORIDE: 600; 310; 30; 20 INJECTION, SOLUTION INTRAVENOUS at 07:00

## 2023-02-28 RX ADMIN — FUROSEMIDE 20 MG: 10 INJECTION, SOLUTION INTRAMUSCULAR; INTRAVENOUS at 18:07

## 2023-02-28 RX ADMIN — ASPIRIN 81 MG: 81 TABLET, COATED ORAL at 14:22

## 2023-02-28 RX ADMIN — Medication 3000 MG: at 08:35

## 2023-02-28 RX ADMIN — ENTECAVIR 1 MG: 0.5 TABLET ORAL at 15:58

## 2023-02-28 RX ADMIN — QUETIAPINE FUMARATE 50 MG: 25 TABLET ORAL at 20:47

## 2023-02-28 RX ADMIN — PROTAMINE SULFATE 100 MG: 10 INJECTION, SOLUTION INTRAVENOUS at 09:45

## 2023-02-28 RX ADMIN — HEPARIN SODIUM 10000 UNITS: 1000 INJECTION INTRAVENOUS; SUBCUTANEOUS at 09:26

## 2023-02-28 RX ADMIN — PROPOFOL 100 MCG/KG/MIN: 10 INJECTION, EMULSION INTRAVENOUS at 08:32

## 2023-02-28 RX ADMIN — CLOPIDOGREL BISULFATE 75 MG: 75 TABLET ORAL at 18:07

## 2023-02-28 ASSESSMENT — PAIN DESCRIPTION - PAIN TYPE: TYPE: CHRONIC PAIN

## 2023-02-28 ASSESSMENT — PAIN SCALES - GENERAL
PAINLEVEL_OUTOF10: 7
PAINLEVEL_OUTOF10: 7
PAINLEVEL_OUTOF10: 0

## 2023-02-28 ASSESSMENT — PAIN DESCRIPTION - LOCATION
LOCATION: BACK
LOCATION: BACK

## 2023-02-28 NOTE — PROGRESS NOTES
Incentive Spirometry education and demonstration completed by Respiratory Therapy Yes      Response to education: Excellent     Teaching Time: 20 minutes    Minimum Predicted Vital Capacity - 730 mL. Patient's Actual Vital Capacity - 2000 mL. Turning over to Nursing for routine follow-up Yes.     Comments: IS goal met    Electronically signed by Bharati Hernandez RCP on 2/28/2023 at 3:04 PM

## 2023-02-28 NOTE — PROCEDURES
[address]  TAVR Operative Note     PROCEDURE SUMMARY   Procedure Replacement of aortic valve with zooplastic tissue, percutaneous ([id number])   Valve Type Dent Mihir 3 Ultra 29mm with 0additional cc of volume. Operators Gayathri Ponce MD (IC), Binu Appiah MD (IC)   Indication Nonrheumatic aortic valve stenosis (I35.0)   Consent Obtained, witnessed, documented in chart. EBL <25VE   Complications None   Specimens None   Bleed Risk Low   Sedation Sedation provided entirely by anesthesia. See record for details. TTE Performed preprocedure, intraprocedure and postprocedure. Access RCFA, LCFA, RCFV   Preclose Delivery side artery preclosed with 2 perclose devices   TVP Inserted via CFV into RV and threshold obtained and acceptable. Removed after valve implantation. Angiography Pigtail inserted through CFA into ascending aorta over wire   Delivery Sheath J wire advanced into descending Ao.  JR4 advanced over wire into descending Ao. Lunderquist wire advanced into descending Ao through JR4. Delivery sheath advanced over wire into descending aorta. AV Crossing AL1 and J/Straight wire combo used to cross AV. Amplatz extra stiff advanced through AL1 and curled in LV apex. BAV BAV not performed. Valve Deploy TAVR valve prepped by certified Flora Joy and advanced through the delivery sheath to the level of the aortic annulus. Valve localized under echo and fluoro guidance. Rapid pacing employed and valve deployed to profile for 4s. Balloon deflated and withdrawn into delivery sheath. Rapid pacing aborted. TTE reviewed for complications. Postdilation Post dilation was not performed   PostProcedure Wires removed and delivery catheter withdrawn. Delivery sheath removed and perclose sutures tied with hemostasis. Transitioned to postop/CVU.       CHF STATUS   Symptoms Onset: Onset: recent  Duration: months  Temporal: Worsening   CHF Type Chronic diastolic   NYHA II [place] Major: Paroxysmal nocturnal dyspnea  Minor: Dyspnea on exertion   CHF Meds    BNP Lab Results   Component Value Date    PROBNP 210 (H) 02/28/2023    PROBNP 110 09/22/2019    PROBNP 314 (H) 09/01/2019       IP Tx Plan Oral diuresis     MAJOR COMORBIDITIES AND COMPLICATIONS (care home)   Cardiac A/C Diastolic CHF (J83.65)   Liver None   Renal None   Respiratory None   Neurologic None     COMORBIDITIES AND COMPLICATIONS (CC)   Cardiac Chronic Diastolic CHF (I41.17)   Respiratory None   Neurologic None   Weight BMI 40-44.9 (Z68.41)     Alton Delgado MD, MPH  [facility] [place]

## 2023-02-28 NOTE — FLOWSHEET NOTE
02/28/23 1255   Vitals   Temp 97.6 °F (36.4 °C)   Temp Source Temporal   Heart Rate 73   Heart Rate Source Monitor   Resp 13   BP (!) 152/78   MAP (Calculated) 103   MAP (mmHg) 92   BP Location Right lower arm   BP Upper/Lower Lower   BP Method Automatic   Patient Position Semi fowlers   Level of Consciousness 0   MEWS Score 0   Cardiac Rhythm Sinus rhythm   Pain Assessment   Pain Assessment None - Denies Pain   Oxygen Therapy   SpO2 98 %   Pulse Oximetry Type Intermittent   Pulse Oximeter Device Mode Intermittent   Pulse Oximeter Device Location Finger   O2 Device None (Room air)     Pt. To room 2913 from cath lab via bed. S/P TAVR. Bilateral groin sites CDI. Handoff report received. Bed rest up at 1547. Pt. VSS, patient denies pain, patient oriented to room. Pt. Updated on POC, denies questions. Pt. Given toiletries, denies further needs. Bed in lowest position, bed alarm activated, call light and bedside table within reach. Will continue to monitor.     Raul Shea RN

## 2023-02-28 NOTE — H&P
CC: \"I am here to discuss my recent echocardiogram.\"        Subjective:      ASA 2  Mallampati 2      History of Present Illness:     Abhinav Garcia is a 78 y.o. patient with a PMH significant for coronary artery disease/prior PCI/CABG, hypertension, hyperlipidemia, diabetes and sleep apnea. He was seen in 2019 with severe indigestion and found to have RCA occlusion that underwent stenting. He undrwent urgent CABG x 5 on 3/26/2019 by Dr. Kamila Duenas ((LEHMAN-LAD, [id number]-OM1, SVG-PDA) LAD endarterectomy, sternal stabilization ([employer] SternaLock). Today, he is here to discuss recent echocardiogram. He continues to have increased shortness of breath which is limiting his activity. Patient denies exertional chest pain/pressure,palpitations, lightheadedness, weight changes, changes in LE edema, and syncope. Past Medical History:   has a past medical history of Anxiety, Aortic valve sclerosis, Arthritis, CAD (coronary artery disease), Cerebral infarct ([address] Chronic active viral hepatitis B ([address] Chronic back pain, Diabetes mellitus, type 2 ([address] Fatty liver, Heart attack ([address] Hepatitis B immune- pos HBSAg, History of blood transfusion, HTN (hypertension), Hyperlipidemia LDL goal < 100, Hyperlipidemia with target LDL less than 100, Hypertension, Obesity, Psoriasis, Sleep apnea, and STEMI (ST elevation myocardial infarction) ([address] Surgical History:   has a past surgical history that includes Coronary angioplasty with stent (11/16/2011); Appendectomy (age 17); Tonsillectomy and adenoidectomy (1980's); Total knee arthroplasty (Left, 2005); Biceps tendon repair; Skull fracture elevation ([age]); Colonoscopy; Coronary angioplasty with stent (06/01/2009); Coronary angioplasty with stent (11/25/2008); Coronary angioplasty with stent (11/24/2008); craniotomy (Right); Umbilical hernia repair; Coronary artery bypass graft (03/26/2019); Coronary artery bypass graft (N/A, 3/26/2019);  Cataract removal (Bilateral, 2020); and Nerve Block (Bilateral, 10/30/2020). Social History:   reports that he has never smoked. He has never used smokeless tobacco. He reports that he does not currently use alcohol. He reports that he does not use drugs. Family History:  family history includes Cancer in his mother; Diabetes in his mother; Heart Failure in his father. Home Medications:  Were reviewed and are listed in nursing record and/or below  Home Medications                 Prior to Admission medications    Medication Sig Start Date End Date Taking? Authorizing Provider   rosuvastatin (CRESTOR) 40 MG tablet TAKE 1 TABLET BY MOUTH DAILY 6/23/22   Yes Zachery Ramon MD   gabapentin (NEURONTIN) 400 MG capsule TAKE 1 CAPSULE BY MOUTH THREE TIMES DAILY 6/13/22 12/10/22 Yes Zachery Ramon MD   INVOKANA 300 MG TABS tablet TAKE 1 TABLET BY MOUTH EVERY MORNING BEFORE BREAKFAST 6/6/22   Yes Zachery Ramon MD   LANTUS 100 UNIT/ML injection vial ADMINISTER 50 UNITS UNDER THE SKIN EVERY NIGHT 5/2/22   Yes Zachery Ramon MD   valsartan-hydroCHLOROthiazide (DIOVAN-HCT) 320-12.5 MG per tablet TAKE 1 TABLET BY MOUTH EVERY DAY 4/6/22   Yes Zachery Ramon MD   metFORMIN (GLUCOPHAGE-XR) 500 MG extended release tablet TAKE 4 TABLETS BY MOUTH EVERY DAY WITH BREAKFAST 4/4/22   Yes Zachery Ramon MD   Insulin Syringe-Needle U-100 ([employer] INSULIN SYR 1CC/30G) 30G X 5/16\" 1 ML MISC 1 each by Does not apply route daily 4/1/22   Yes Michelle Izquierdo MD   aspirin 325 MG tablet Take 325 mg by mouth daily     Yes Historical Provider, MD   ezetimibe (ZETIA) 10 MG tablet Take 1 tablet by mouth daily 10/1/21   Yes Severiano Pintos, MD   fluocinonide (LIDEX) 0.05 % cream Apply topically 2 times daily.  10/24/19   Yes Zachery Ramon MD   Emollient (AQUAPHOR ADVANCED THERAPY) OINT Apply three times per day as needed 7/9/19   Yes Zachery Ramon MD   Lancets MISC 1 each by Does not apply route 2 times daily 6/13/19   Yes Zachery Ramon MD   blood glucose monitor kit and supplies Test 2 times a day & as needed for symptoms of irregular blood glucose. 6/13/19   Yes Oscar Lara MD   blood glucose monitor strips Test 2 times a day & as needed for symptoms of irregular blood glucose. 6/13/19   Yes Oscar Lara MD   entecavir (BARACLUDE) 1 MG tablet Take 1 mg by mouth daily  1/21/19   Yes Oscar Lara MD   glucose blood VI test strips (ASCENSIA AUTODISC VI;ONE TOUCH ULTRA TEST VI) strip Apply 1 each topically 3 times daily. Onetouch Ultra 2 test strips  Dx: 250.00 3/20/13   Yes Oscar Lara MD   [facility] LANCETS MISC 1 each by Does not apply route 3 times daily. 3/7/13   Yes Oscar Lara MD            CURRENT Medications:  Current Meds Link used for Sign Out Report   No current facility-administered medications for this visit. Allergies:  Patient has no known allergies. Review of Systems: SEE HPI   Constitutional: no unanticipated weight loss. There's been no change in energy level, sleep pattern, or activity level. No fevers, chills. Eyes: No visual changes or diplopia. No scleral icterus. ENT: No Headaches, hearing loss or vertigo. No mouth sores or sore throat. Cardiovascular: No Chest pain, tightness or discomfort. No Shortness of breath. No Dyspnea on exertion, Orthopnea, Paroxysmal nocturnal dyspnea or breathlessness at rest.  No Palpitations. No Syncope ('blackouts', 'faints', 'collapse') or dizziness. Respiratory: No cough or wheezing, no sputum production. No hematemesis. Gastrointestinal: No abdominal pain, appetite loss, blood in stools. No change in bowel or bladder habits. Genitourinary: No dysuria, trouble voiding, or hematuria. Musculoskeletal:  No gait disturbance, no joint complaints. Integumentary: No rash or pruritis. Neurological: No headache, diplopia, change in muscle strength, numbness or tingling. Psychiatric: No anxiety or depression. Endocrine: No temperature intolerance.  No excessive thirst, fluid intake, or urination. No tremor. Hematologic/Lymphatic: No abnormal bruising or bleeding, blood clots or swollen lymph nodes. Allergic/Immunologic: No nasal congestion or hives. Objective:      PHYSICAL EXAM:             Vitals:     07/21/22 1410   BP: (!) 90/54   Pulse: 90   SpO2: 95%       Weight: 275 lb 12.8 oz (125.1 kg)       General Appearance:  Alert, cooperative, no distress, appears stated age. Head:  Normocephalic, without obvious abnormality, atraumatic. Eyes:  Pupils equal and round. No scleral icterus. Mouth: Moist mucosa, no pharyngeal erythema. Nose: Nares normal. No drainage or sinus tenderness. Neck: Supple, symmetrical, trachea midline. No adenopathy. No tenderness/mass/nodules. No carotid bruit or elevated JVD. Lungs:   Respiratory Effort: Normal   Auscultation: Clear to auscultation bilaterally, respirations unlabored. No wheeze, rales   Chest Wall:  No tenderness or deformity. Cardiovascular:     Pulses  Palpation: normal   Ascultation: Regular rate, S1/ S2 normal. No murmur, rub, or gallop. 2+ radial and pedal pulses, symmetric  Carotid  Femoral   Abdomen and Gastrointestinal:   Soft, non-tender, bowel sounds active. Liver and Spleen  Masses   Musculoskeletal: No muscle wasting  Back  Gait   Extremities: Extremities normal, atraumatic. No cyanosis or edema. No cyanosis clubbing         Skin: Inspection and palpation performed, no rashes or lesions. Pysch: Normal mood and affect.  Alert and oriented to time place person   Neurologic: Normal gross motor and sensory exam.       Labs      All labs have been reviewed               Lab Results   Component Value Date/Time     WBC 6.6 06/08/2022 01:45 PM     RBC 5.11 06/08/2022 01:45 PM     HGB 16.0 06/08/2022 01:45 PM     HCT 47.5 06/08/2022 01:45 PM     MCV 93.0 06/08/2022 01:45 PM     RDW 13.9 06/08/2022 01:45 PM      06/08/2022 01:45 PM                Lab Results   Component Value Date/Time      06/08/2022 01:45 PM     K 4.3 06/08/2022 01:45 PM     K 4.3 09/01/2019 09:14 PM      06/08/2022 01:45 PM     CO2 22 06/08/2022 01:45 PM     BUN 32 06/08/2022 01:45 PM     CREATININE 1.2 06/08/2022 01:45 PM     GFRAA >60 06/08/2022 01:45 PM     GFRAA >60 02/22/2013 09:59 AM     AGRATIO 1.7 04/01/2022 10:53 AM     LABGLOM >60 06/08/2022 01:45 PM     GLUCOSE 196 06/08/2022 01:45 PM     PROT 7.4 04/01/2022 10:53 AM     CALCIUM 9.8 06/08/2022 01:45 PM     BILITOT 0.4 04/01/2022 10:53 AM     ALKPHOS 80 04/01/2022 10:53 AM     AST 22 04/01/2022 10:53 AM     ALT 21 04/01/2022 10:53 AM      No results found for: PTINR            Lab Results   Component Value Date     LABA1C 8.2 02/24/2022                Lab Results   Component Value Date     TROPONINI <0.01 09/22/2019         Cardiac, Vascular and Imaging Data all Personally Reviewed in Detail by Myself       EKG:      Echocardiogram:   ECHO 2/25/2022  Normal left ventricle size, wall thickness, and systolic function with an  estimated ejection fraction of 55%. No regional wall motion abnormalities  are seen. Normal diastolic function. No evidence of aortic valve regurgitation. Moderate to severe aortic stenosis with a peak velocity of 3.57m/s and a  mean pressure gradient of 32mmHg, KRISTIN by continuity 0.77 cm2, DI 0.19 ,  recommend MELISSA  The right ventricle is normal in size and function. ECHO (Limited) 3/25/19  Left ventricular cavity size is normal.  There is mild concentric left ventricular hypertrophy and low normal  systolic function. Ejection fraction is visually estimated to be 50%. There is mild hypokinesis of the inferior wall. The right ventricle is not well visualized but appears to have normal size  and function. There are no significant valvular abnormalities appreciated in this study. ECHO 7/15/2022  Normal LV size and wall motion. EF is  60%. Normal diastolic function. The left atrium is normal in size. The right ventricle is normal in size and function.   Mild Aortic stenosis. Mean gradient 26 mmHg. Stress Test:      Cath:  Cardiac cath 3/25/19  CONCLUSIONS:  1. Successful recanalization of a STIVEN 1 to 2 flow, distal right  coronary artery, with stenting of a 99% distal right coronary artery  lesion with a 3.0 x 23-mm length Xience Darcy drug-eluting stent. In  the early mid-right coronary artery, there was a focal 80% stenosis that  was stented with a 3.5 mm x 12-mm Xience Darcy drug-eluting stent and  this stent was deployed at 14 atmospheres with nearly 0% residual  stenosis. That same 3.5-mm balloon was then used to deliver therapy and  reduce an in-stent stenotic lesion of about 80% to less than 10%  residual stenosis after inflation of that balloon to 12 atmospheres and  then 14 atmospheres x30 seconds each. STIVEN 3 flow resulted. 2.  There appears to be a 60% ostial left main stenosis. There is a 99%  ostial circumflex stenosis. There is a mid to distal 95% LAD stenosis. 3.  LV ejection fraction of 50% with inferior basal akinesia in the AMOR  projection. 4. LVEDP 10 mmHg with a 10-mm gradient peak-to-peak upon pullback  across the aortic valve. 5.  Successful Angio-Seal, right femoral arteriotomy. Other imaging:      Assessment and Plan      Nonrheumatic aortic valve stenosis  evere aortic stenosis with a peak velocity of 3.57m/s and a  mean pressure gradient of 32mmHg, KRISTIN by continuity 0.77 cm2, DI 0.19   Increased dyspnea with exertion. I believe that he will need a valve replacement         Coronary artery disease  Managed by Dr Mortimer Downing. Hypertension  Controlled. Continue current medical management. Hyperlipidemia  Continue statin therapy. Thank you for allowing us to participate in the care of [facility]. Please do not hesitate to contact me if you have any questions.      Harjeet Louis MD, MPH

## 2023-02-28 NOTE — ANESTHESIA POSTPROCEDURE EVALUATION
Department of Anesthesiology  Postprocedure Note    Patient: Evelynn Harada  MRN: [id number]  [place]: 1955  Date of evaluation: 2/28/2023      Procedure Summary     Date: 02/28/23 Room / Location: [address] [place] [address]    Anesthesia Start: 7162 Anesthesia Stop:     Procedures:       TRANSCATHETER AORTIC VALVE REPLACEMENT FEMORAL APPROACH      TRANSCATHETER AORTIC VALVE REPLACEMENT FEMORAL APPROACH Diagnosis:       Aortic valve stenosis, etiology of cardiac valve disease unspecified      (Aortic valve stenosis, etiology of cardiac valve disease unspecified [I35.0])    Surgeons: León Mcmahon MD; Jeniffer Garsia MD Responsible Provider:     Anesthesia Type: MAC ASA Status: 4          Anesthesia Type: No value filed.     Venu Phase I: Venu Score: 10    Venu Phase II:        Anesthesia Post Evaluation    Patient location during evaluation: PACU  Patient participation: complete - patient participated  Level of consciousness: awake  Airway patency: patent  Nausea & Vomiting: no vomiting and no nausea  Complications: no  Cardiovascular status: hemodynamically stable  Respiratory status: acceptable  Hydration status: stable  Multimodal analgesia pain management approach

## 2023-02-28 NOTE — PROGRESS NOTES
Pt verified information regarding surgery, name, birth date, surgeon, procedure and allergies: NKA. Patient admitted to [address] [place] [address] for surgery. Appropriate antibiotics ordered: Ancef . Beta blocker: NA . DVT prophyaxis: YULIA's and SCD's in place. Lab work within normal limits, 4 units of RBC's on call to OR, vital signs stable, Mepilex sacral border applied and 2% chlorhexidine gluconate skin prep given. Patient and family educated about surgery and pain management. Arterial line placed in left radial by Katherine Woodson. To cath lab per bed at 0818.

## 2023-03-01 ENCOUNTER — TELEPHONE (OUTPATIENT)
Dept: CARDIOLOGY CLINIC | Age: 68
End: 2023-03-01

## 2023-03-01 VITALS
SYSTOLIC BLOOD PRESSURE: 119 MMHG | WEIGHT: 281.09 LBS | HEIGHT: 71 IN | DIASTOLIC BLOOD PRESSURE: 69 MMHG | BODY MASS INDEX: 39.35 KG/M2 | HEART RATE: 89 BPM | TEMPERATURE: 98.9 F | OXYGEN SATURATION: 91 % | RESPIRATION RATE: 20 BRPM

## 2023-03-01 DIAGNOSIS — Z95.2 S/P TAVR (TRANSCATHETER AORTIC VALVE REPLACEMENT): Primary | ICD-10-CM

## 2023-03-01 LAB
ANION GAP SERPL CALCULATED.3IONS-SCNC: 8 MMOL/L (ref 3–16)
BUN BLDV-MCNC: 26 MG/DL (ref 7–20)
CALCIUM SERPL-MCNC: 9.6 MG/DL (ref 8.3–10.6)
CHLORIDE BLD-SCNC: 101 MMOL/L (ref 99–110)
CO2: 29 MMOL/L (ref 21–32)
CREAT SERPL-MCNC: 1.1 MG/DL (ref 0.8–1.3)
GFR SERPL CREATININE-BSD FRML MDRD: >60 ML/MIN/{1.73_M2}
GLUCOSE BLD-MCNC: 157 MG/DL (ref 70–99)
GLUCOSE BLD-MCNC: 163 MG/DL (ref 70–99)
GLUCOSE BLD-MCNC: 235 MG/DL (ref 70–99)
HCT VFR BLD CALC: 43.5 % (ref 40.5–52.5)
HEMOGLOBIN: 14.2 G/DL (ref 13.5–17.5)
LV EF: 50 %
LVEF MODALITY: NORMAL
MCH RBC QN AUTO: 30 PG (ref 26–34)
MCHC RBC AUTO-ENTMCNC: 32.6 G/DL (ref 31–36)
MCV RBC AUTO: 92 FL (ref 80–100)
PDW BLD-RTO: 14.1 % (ref 12.4–15.4)
PERFORMED ON: ABNORMAL
PERFORMED ON: ABNORMAL
PLATELET # BLD: 114 K/UL (ref 135–450)
PMV BLD AUTO: 7.4 FL (ref 5–10.5)
POTASSIUM REFLEX MAGNESIUM: 4.9 MMOL/L (ref 3.5–5.1)
RBC # BLD: 4.73 M/UL (ref 4.2–5.9)
SODIUM BLD-SCNC: 138 MMOL/L (ref 136–145)
WBC # BLD: 9.1 K/UL (ref 4–11)

## 2023-03-01 PROCEDURE — 80048 BASIC METABOLIC PNL TOTAL CA: CPT

## 2023-03-01 PROCEDURE — 6370000000 HC RX 637 (ALT 250 FOR IP): Performed by: INTERNAL MEDICINE

## 2023-03-01 PROCEDURE — 85027 COMPLETE CBC AUTOMATED: CPT

## 2023-03-01 PROCEDURE — 94760 N-INVAS EAR/PLS OXIMETRY 1: CPT

## 2023-03-01 PROCEDURE — C8929 TTE W OR WO FOL WCON,DOPPLER: HCPCS

## 2023-03-01 RX ORDER — ASPIRIN 81 MG/1
81 TABLET ORAL DAILY
Qty: 30 TABLET | Refills: 3 | Status: SHIPPED | OUTPATIENT
Start: 2023-03-02

## 2023-03-01 RX ORDER — CLOPIDOGREL BISULFATE 75 MG/1
75 TABLET ORAL DAILY
Qty: 30 TABLET | Refills: 3 | Status: SHIPPED | OUTPATIENT
Start: 2023-03-02

## 2023-03-01 RX ADMIN — DOCUSATE SODIUM 100 MG: 100 CAPSULE, LIQUID FILLED ORAL at 09:18

## 2023-03-01 RX ADMIN — ENTECAVIR 1 MG: 0.5 TABLET ORAL at 09:33

## 2023-03-01 RX ADMIN — HYDROCHLOROTHIAZIDE 12.5 MG: 25 TABLET ORAL at 09:17

## 2023-03-01 RX ADMIN — INSULIN LISPRO 1 UNITS: 100 INJECTION, SOLUTION INTRAVENOUS; SUBCUTANEOUS at 11:58

## 2023-03-01 RX ADMIN — HYDROCODONE BITARTRATE AND ACETAMINOPHEN 1 TABLET: 10; 325 TABLET ORAL at 05:34

## 2023-03-01 RX ADMIN — CLOPIDOGREL BISULFATE 75 MG: 75 TABLET ORAL at 09:17

## 2023-03-01 RX ADMIN — TAMSULOSIN HYDROCHLORIDE 0.4 MG: 0.4 CAPSULE ORAL at 09:16

## 2023-03-01 RX ADMIN — GABAPENTIN 400 MG: 400 CAPSULE ORAL at 09:17

## 2023-03-01 RX ADMIN — VALSARTAN 320 MG: 160 TABLET, FILM COATED ORAL at 09:17

## 2023-03-01 RX ADMIN — ASPIRIN 81 MG: 81 TABLET, COATED ORAL at 09:17

## 2023-03-01 ASSESSMENT — PAIN DESCRIPTION - FREQUENCY
FREQUENCY: CONTINUOUS

## 2023-03-01 ASSESSMENT — PAIN DESCRIPTION - LOCATION
LOCATION: BACK

## 2023-03-01 ASSESSMENT — PAIN DESCRIPTION - PAIN TYPE
TYPE: CHRONIC PAIN

## 2023-03-01 ASSESSMENT — PAIN - FUNCTIONAL ASSESSMENT
PAIN_FUNCTIONAL_ASSESSMENT: ACTIVITIES ARE NOT PREVENTED

## 2023-03-01 ASSESSMENT — PAIN SCALES - GENERAL
PAINLEVEL_OUTOF10: 7
PAINLEVEL_OUTOF10: 5
PAINLEVEL_OUTOF10: 7
PAINLEVEL_OUTOF10: 0

## 2023-03-01 ASSESSMENT — PAIN DESCRIPTION - DESCRIPTORS
DESCRIPTORS: SHARP

## 2023-03-01 ASSESSMENT — PAIN DESCRIPTION - ORIENTATION
ORIENTATION: LOWER

## 2023-03-01 ASSESSMENT — PAIN DESCRIPTION - ONSET
ONSET: ON-GOING

## 2023-03-01 NOTE — DISCHARGE SUMMARY
[address]   TAVR DISCHARGE SUMMARY    Admit Date 2/28/2023   Discharge Date 3/1/2023   Admit MD Kolton Wilcox MD   Admit Diagnosis Aortic valve stenosis, etiology of cardiac valve disease unspecified [I35.0]  Aortic stenosis, severe [I35.0]   Discharge Diagnosis Patient Active Problem List   Diagnosis    Coronary artery disease involving native coronary artery of native heart without angina pectoris    Type 2 diabetes mellitus with circulatory disorder, with long-term current use of insulin (HCC)    Hyperlipidemia LDL goal <70    Anxiety    Psoriatic arthritis (HCC)    KAYLA (obstructive sleep apnea)    Hypertension, essential    Family history of prostate cancer- father    Erectile dysfunction    Obesity, Class III, BMI 40-49.9 (morbid obesity) (HCC)    DISH (diffuse idiopathic skeletal hyperostosis)    Facet arthropathy, lumbar    DDD (degenerative disc disease), thoracolumbar    DDD (degenerative disc disease), cervical    Chronic pain syndrome    Fatty liver    Chronic active viral hepatitis B ([facility] [address]- on antiviral per Dr. 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 Bledsoe Matters    Constipation    Aortic stenosis, severe      Discharge Condition good   Hospital Course Admitted for TAVR for severe aortic stenosis. Patient tolerated procedure and postoperative period well without evidence for complication. Access site(s) assessed and stable prior to discharge. Patient denies chest pain, shortness of breath or other acute symptomatology at discharge.      Consult IP CONSULT TO CARDIAC REHAB   Subjective Patient seen and examined. Notes, labs, tele and recent testing reviewed. No acute issue overnight and patient without concern. Exam Gen Alert, coop, no distress Heart  RRR, no MRG   Head NC, AT, no abnorm Abd  Soft, NT, +BS, no mass, no OM   Eyes PER, conj/corn clear Ext  Ext nl, AT, no C/C/E   Nose Nares nl, no drain, NT Pulse 2+ and symmetric   Throat Lips, mucosa, tongue nl Skin Col/text/turg nl, no vis rash/les   Neck S/S, TM, NT, no JVD Psych Nl mood and affect   Lung CTA-B, unlab, no DTP Lymph   No cervical or axillary LA   Ch wall NT, no deform Neuro  Nl gross M/S exam      Studies/Labs Reviewed, please see Epic for specific details   Disposition home   Discharge Medications    Medication List        START taking these medications      aspirin 81 MG EC tablet  Take 1 tablet by mouth daily  Start taking on: March 2, 2023  Replaces: aspirin 325 MG tablet  Notes to patient: Use: prevents heart attacks and strokes. Side effects: bleeding or bruising more easily, stomach upset. clopidogrel 75 MG tablet  Commonly known as: PLAVIX  Take 1 tablet by mouth daily  Start taking on: March 2, 2023  Notes to patient: Use: prevents closing of stent  Side effects: Bleeding or bruising more easily            CONTINUE taking these medications      Aquaphor Advanced Therapy Oint  Apply three times per day as needed  Notes to patient: Continue home routine     blood glucose monitor kit and supplies  Test 2 times a day & as needed for symptoms of irregular blood glucose. * blood glucose test strips strip  Commonly known as: ASCENSIA AUTODISC VI;ONE TOUCH ULTRA TEST VI  Apply 1 each topically 3 times daily. Onetouch Ultra 2 test strips  Dx: 250.00     * blood glucose test strips  Test 2 times a day & as needed for symptoms of irregular blood glucose.      entecavir 1 MG tablet  Commonly known as: Lj Allred  Notes to patient: Use- treatment of hepatitis B  Side effects- Upset stomach, hives itching or rash, muscle pain or cramping   ezetimibe 10 MG tablet  Commonly known as: ZETIA  TAKE 1 TABLET BY MOUTH DAILY  Notes to patient: Use:  Cholesterol reduction  Side effects:  Muscle pain or soreness, stomach and intestinal upset     gabapentin 400 MG capsule  Commonly known as: NEURONTIN  TAKE 1 CAPSULE BY MOUTH THREE TIMES DAILY  Notes to patient: Use: To treat nerve pain, migraine prevention and epilepsy  Side Effects: feeling sleepy, tired or dizzy; nausea/vomiting/diarrhea; mood changes, blurry vision, memory issues     HYDROcodone-acetaminophen  MG Tabs  Notes to patient: Use:  Lessens pain  Side effects: Drowsiness, lightheadedness or fainting, constipation     insulin glargine 100 UNIT/ML injection vial  Commonly known as: Lantus  ADMINISTER 55 UNITS UNDER THE SKIN EVERY NIGHT  Notes to patient: Use: treats diabetes or high blood sugar. Long acting insulin  Side effects: Low blood sugar, irritation at the injection site     Insulin Syringe-Needle U-100 30G X 5/16\" 1 ML Misc  Commonly known as: KROGER INSULIN SYR 1CC/30G  1 each by Does not apply route daily     Invokana 300 MG Tabs tablet  Generic drug: canagliflozin  TAKE 1 TABLET BY MOUTH EVERY MORNING BEFORE BREAKFAST  Notes to patient: Use: treats diabetes or high blood sugar  Side effects: upset stomach, low blood sugar     metFORMIN 500 MG extended release tablet  Commonly known as: GLUCOPHAGE-XR  TAKE 4 TABLETS EVERY DAY WITH BREAKFAST  Notes to patient: Helps to restore your body's proper response to the insulin your body naturally makes. It also decreases the amount of sugar that your liver makes and that your stomach/intestines absorb.  Side effects: nausea, vomiting, diarrhea, metallic taste     * OneTouch Delica Lancets Misc  1 each by Does not apply route 3 times daily.     * Lancets Misc  1 each by Does not apply route 2 times daily     QUEtiapine 25 MG tablet  Commonly known as: SEROQUEL  TAKE 2 TABLETS BY MOUTH EVERY NIGHT  Notes to patient: Use: treatment of  depression  Side effects: weight gain, upset stomach, fatigue, agitation     rosuvastatin 40 MG tablet  Commonly known as: CRESTOR  TAKE 1 TABLET EVERY DAY  Notes to patient: Use:  Cholesterol reduction  Side effects:  Muscle pain or soreness, stomach and intestinal upset     triamcinolone 0.1 % cream  Commonly known as: KENALOG  Apply topically 2 times daily. Notes to patient: Uses: Eye drops are used for various conditions. Please read your prescription information and talk to your pharmacist if this is a new medication. Side Effects: Blurred vision, eye redness or discomfort, increased sensitivity of eyes to light, matting or stickiness of eyelashes, swelling of eyelids, watering of eyes     valsartan-hydroCHLOROthiazide 320-12.5 MG per tablet  Commonly known as: DIOVAN-HCT  TAKE 1 TABLET BY MOUTH EVERY DAY  Notes to patient: Use-treatment of high blood pressure  Side effects-headache, dizziness, lightheadedness           * This list has 4 medication(s) that are the same as other medications prescribed for you. Read the directions carefully, and ask your doctor or other care provider to review them with you. STOP taking these medications      aspirin 325 MG tablet  Replaced by: aspirin 81 MG EC tablet               Where to Get Your Medications        These medications were sent to [facility], [address] [place] [address], [address]      Phone: [phone]   aspirin 81 MG EC tablet  clopidogrel 75 MG tablet        Summary Patients is stable from cardiac standpoint  --Plavix new for TAVR. OK to DC 9/1/23. ASA 81mg indefinitely  Tobacco, diet, salt, activity restrictions/recommendation discussed in detail  HHC not indicated for AS or for TAVR procedure. Resume only with other indications. Followup I TAVR clinic 4/6/23 w echo. Has PCP OV 3/13/23. Instructed to keep OV.      Signed:  Gayathri Ponce MD, 3/1/2023, 12:54 PM  Time spent on discharge of patient: >31 minutes

## 2023-03-01 NOTE — PLAN OF CARE
Problem: Pain  Goal: Verbalizes/displays adequate comfort level or baseline comfort level  3/1/2023 0950 by Araceli Palmer RN  Outcome: Progressing  3/1/2023 0204 by Susana Galeano RN  Outcome: Progressing  Flowsheets (Taken 2/28/2023 2104)  Verbalizes/displays adequate comfort level or baseline comfort level:   Encourage patient to monitor pain and request assistance   Assess pain using appropriate pain scale   Administer analgesics based on type and severity of pain and evaluate response   Implement non-pharmacological measures as appropriate and evaluate response   Consider cultural and social influences on pain and pain management     Problem: Discharge Planning  Goal: Discharge to home or other facility with appropriate resources  3/1/2023 0950 by Araceli Palmer RN  Outcome: Progressing  3/1/2023 0204 by Susana Galeano RN  Outcome: Progressing  Flowsheets (Taken 2/28/2023 2104)  Discharge to home or other facility with appropriate resources:   Identify barriers to discharge with patient and caregiver   Identify discharge learning needs (meds, wound care, etc)

## 2023-03-01 NOTE — OP NOTE
[address]                     [facility], [address]                                OPERATIVE REPORT    PATIENT NAME: Kellee Will                    :        1955  MED REC NO:   [id number]                          ROOM:       [id number]  ACCOUNT NO:   [de-identified]                           ADMIT DATE: 2023  PROVIDER:     Lonni Prader, MD    DATE OF PROCEDURE:  2023    PREOPERATIVE DIAGNOSES:  Severe aortic valve stenosis, status post prior  coronary artery bypass grafting. POSTOPERATIVE DIAGNOSES:  Severe aortic valve stenosis, status post  prior coronary artery bypass grafting. OPERATION PERFORMED:  Transcatheter aortic valve replacement using 29-mm  Dent ZACK III Ultra bovine pericardial percutaneous bioprosthesis. CARDIOTHORACIC SURGEON:  Kyra Grijalva MD    INTERVENTIONAL CARDIOLOGY:  Alton Delgado MD and Ramy Ibarra MD    ADDITIONAL ASSISTANT:  Keysha Shaikh MD (Resident)    EBL: < 20 cc's      INDICATIONS:  The patient is a 23-year-old man who had been known to my  office from his prior coronary bypass procedure, to have aortic valve  sclerosis that has progressed over time and is now involved into severe  aortic valve stenosis. Having been through the TAVR protocol, he is now  brought to the operative room today on an elective basis for elective  transcatheter aortic valve replacement. OPERATIVE PROCEDURE:  After obtaining adequate intravenous sedation and  administration of appropriate preoperative prophylactic antibiotics, the  patient's anterior chest, abdomen, and groin regions were all  meticulously prepped and draped in a sterile manner. A standard  time-out procedure was completed. Both groins were then infiltrated with 1% plain Xylocaine and bilateral  femoral arterial and right femoral venous access was achieved. A pacer  wire was placed in the apex of the right ventricle.   Perclose devices  were deployed in right arteries and an aortic root shot was completed to  make sure we had the appropriate deployment angle. Thereafter, the left  ventricle was crossed and a stiff guidewire was placed across the aortic  valve. The sheath introducer was then placed in the right common  femoral artery. Via the sheath, the transcatheter valve was then  introduced and the patient was systematically heparinized. Once all present were in agreement that the transcatheter valve was  appropriately located across the aortic annulus, rapid ventricular  pacing was utilized and the valve was deployed. The valve seated  ideally from the outset. Immediate postprocedure echocardiography  showed a gradient of approximately 6 mmHg and zero paravalvular leakage. The guidewire in the left ventricle was then captured and removed. The introducer sheath was then removed under angiographic guidance. The  Perclose devices were then snugged down and secured. Good hemostasis  was achieved in both groins and after angiographic confirmation of  patency of the right common femoral artery. The patient's heparin was  reversed with protamine sulfate. Once all protamine had been given and  good hemostasis was achieved bilaterally, the patient carefully  transferred to the ICU for ongoing care.         Elliot Perkins MD    D: 02/28/2023 10:34:02       T: 02/28/2023 17:20:17     [id number]  Job#: [id number]     Doc#: [id number]    CC:

## 2023-03-01 NOTE — CARE COORDINATION
Patient discharged 3/1/2023 to home  All discharge needs met per case management     Jean Claude Mar RN, BSN  [phone]

## 2023-03-01 NOTE — PROGRESS NOTES
CLINICAL PHARMACY NOTE: MEDS TO BEDS    Total # of Prescriptions Filled: 2   The following medications were delivered to the patient:  Aspirin  plavix    Additional Documentation:  Wife picked up

## 2023-03-01 NOTE — CARE COORDINATION
03/01/23 1239   IMM Letter   IMM Letter given to Patient/Family/Significant other/Guardian/POA/by: Joel Jason RN CM   IMM Letter date given: 03/01/23   IMM Letter time given: 6601  (copy made for hard chart)

## 2023-03-01 NOTE — PLAN OF CARE
Problem: Chronic Conditions and Co-morbidities  Goal: Patient's chronic conditions and co-morbidity symptoms are monitored and maintained or improved  Outcome: Progressing  Flowsheets (Taken 2/28/2023 2104)  Care Plan - Patient's Chronic Conditions and Co-Morbidity Symptoms are Monitored and Maintained or Improved:   Monitor and assess patient's chronic conditions and comorbid symptoms for stability, deterioration, or improvement   Collaborate with multidisciplinary team to address chronic and comorbid conditions and prevent exacerbation or deterioration   Update acute care plan with appropriate goals if chronic or comorbid symptoms are exacerbated and prevent overall improvement and discharge     Problem: Discharge Planning  Goal: Discharge to home or other facility with appropriate resources  Outcome: Progressing  Flowsheets (Taken 2/28/2023 2104)  Discharge to home or other facility with appropriate resources:   Identify barriers to discharge with patient and caregiver   Identify discharge learning needs (meds, wound care, etc)     Problem: Pain  Goal: Verbalizes/displays adequate comfort level or baseline comfort level  Outcome: Progressing  Flowsheets (Taken 2/28/2023 2104)  Verbalizes/displays adequate comfort level or baseline comfort level:   Encourage patient to monitor pain and request assistance   Assess pain using appropriate pain scale   Administer analgesics based on type and severity of pain and evaluate response   Implement non-pharmacological measures as appropriate and evaluate response   Consider cultural and social influences on pain and pain management     Problem: Safety - Adult  Goal: Free from fall injury  Outcome: Progressing  Flowsheets (Taken 3/1/2023 0203)  Free From Fall Injury:   Instruct family/caregiver on patient safety   Based on caregiver fall risk screen, instruct family/caregiver to ask for assistance with transferring infant if caregiver noted to have fall risk factors    Problem: ABCDS Injury Assessment  Goal: Absence of physical injury  Outcome: Progressing  Flowsheets (Taken 3/1/2023 0203)  Absence of Physical Injury: Implement safety measures based on patient assessment

## 2023-03-01 NOTE — PROGRESS NOTES
Patient provided with discharge instructions, discussed in detail, new medications reviewed including use and side effects. Patient verbalized understanding. Prescriptions filled per outpatient pharmacy. All questions answered, family at the bedside to transport home.

## 2023-03-01 NOTE — DISCHARGE INSTRUCTIONS
Post Transcatheter Aortic Valve Replacement (TAVR) Discharge Instructions     Your follow-up appointment and echocardiogram will be scheduled by Dr. Naz Mcclain or Dr. Jojo Gutierrez office and their office staff and will call you with the date and time. We are here for you! If you have any questions please call: Mason Mcallister RN Valve Coordinator at  [phone]      Do you have the help you need at home? ACTIVITY:  Weigh yourself every day in the morning after using the bathroom. Your discharge weight was 281lb  NO DRIVING UNTIL YOU RETURN TO THE DOCTOR'S OFFICE. You may ride in a car. Do not lift more than five to ten pounds for the first week you are home.  (A gallon of milk is 7 lbs)  Get up and get dressed every day. Do not stay in bed. Set a daily routine. This is an important step in re-gaining your strength. You may walk up and down stairs. Walk as much as you can. This will help facilitate the healing process. Plan rest periods during the day. Cough and deep breathe, and use your incentive spirometer 10 times every hour while you are awake. Avoid work that increases muscle tension. (straining with bowel movements, moving furniture)    WOUND CARE:  Shower every day but no bathtubs, pools, or hot tubs for the first week you are home. Cleanse wounds with mild soap and water and pat dry. Keep site dry. A small amount of bloody or clear drainage is normal.   Watch for signs of infection: incision red or hot to the touch, fever > 101F, swelling at the groin or incision site, discolored drainage from your incision.      NUTRITION:   Low fat, low salt diet (guidelines for Heart Healthy eating)  Limit caffeine to 1-2 cups per day (coffee, tea, chocolate, soda)  Eat high fiber to avoid constipation and straining during bowel movements (fresh veggies and fruit, whole grains)  Limit alcohol to two servings a day ( 8 oz beer, 1 oz liquor, 4 oz wine)    HEALTHY HABITS:  No Smoking!!!! If you need help to stop smoking, please call your doctor. Attempt to achieve and maintain your ideal body weight. We suggest that you walk as much as you can, but do not exhaust yourself. Set a goal and work your way up to it at a paced rate. MEDICATIONS:  DO NOT STOP TAKING PLAVIX OR ANY BLOOD THINNER WITHOUT SPEAKING WITH YOUR CARDIOLOGIST! Take your pills as ordered at time of discharge. Do not stop taking any medication without first discussing it with your doctor. Avoid all over the counter medications, herbal, and natural supplements unless you have discussed them with your doctor. It is important to follow your doctor's orders, especially if blood thinning drugs are prescribed. Your doctor will monitor your medicine and advise you when or if you can stop taking it. WHO DO YOU NEED TO TELL ABOUT YOUR IMPLANTED VALVE? Regular check-ups by your cardiologist are very important. It is easier for patients with a heart valve replacement to get infections, which could lead to further heart damage. Before any dental work or surgery is done, tell your dentist or surgeon about your heart valve replacement. You may need to take antibiotics before and after some medical procedures to reduce risk of infection. Always tell the doctor (or medical technician) that you have an implanted heart valve. Failure to do so may result in damage to the heart valve that could result in death. Before an MRI (magnetic resonance imaging) procedure, always notify the doctor (or medical technician) that you have an implanted heart valve. What symptoms or health problems do you need to look out for after you leave the hospital? Call your physician if you have any of these symptoms: (Phone Number [phone])  Weight pound gain of 3 pounds in one day or 5 pounds in one week.  Weight gain is NOT normal.    Increased swelling or bruising of the groin, legs, ankles, or feet  Increasing shortness of breath  Change in the color or temperature of your lower legs and feet  Develop abdominal pain or unrelieved nausea and/or vomiting  Develop any redness, incision, or limited movement of your arms or legs  Signs of infection: redness, incision hot to the touch, fevers greater than 101 degrees, or colored drainage from your incision  Seroma is an accumulation of fluid in the tissues at the groin surgical site. Symptoms may include: a lump near the groin surgical site, clear fluid draining from the site, redness, warmth, or swelling.     Bleeding that is not stopped after holding pressure for 10 minutes  Unusual pain, numbness or tingling at the groin or down the leg

## 2023-03-02 ENCOUNTER — CARE COORDINATION (OUTPATIENT)
Dept: CASE MANAGEMENT | Age: 68
End: 2023-03-02

## 2023-03-02 DIAGNOSIS — F51.01 PRIMARY INSOMNIA: ICD-10-CM

## 2023-03-02 PROBLEM — Z95.2 S/P TAVR (TRANSCATHETER AORTIC VALVE REPLACEMENT): Status: ACTIVE | Noted: 2023-03-02

## 2023-03-02 NOTE — CARE COORDINATION
[facility] Care Transitions Initial Follow Up Call    Call within 2 business days of discharge: Yes    First attempt at 24 hour discharge call, no answer, CTN left VM with contact information and request for return call. CTN will continue with outreach call attempts.       Follow Up  Future Appointments   Date Time Provider Nicanor Ruiz   3/13/2023  1:00 PM Zachery Ramon MD [facility] AT [facility]   4/5/2023  2:40 PM [facility] Jasso [facility]   4/6/2023  2:00 PM SCHEDULE, [facility] ECHO ROOM 2 [facility]   4/6/2023  3:00 PM Randall Lucas MD [facility]           Tiffani Randall, RN

## 2023-03-03 ENCOUNTER — CARE COORDINATION (OUTPATIENT)
Dept: CASE MANAGEMENT | Age: 68
End: 2023-03-03

## 2023-03-03 DIAGNOSIS — Z95.2 S/P TAVR (TRANSCATHETER AORTIC VALVE REPLACEMENT): Primary | ICD-10-CM

## 2023-03-03 PROCEDURE — 1111F DSCHRG MED/CURRENT MED MERGE: CPT | Performed by: FAMILY MEDICINE

## 2023-03-03 NOTE — TELEPHONE ENCOUNTER
Last appt: 01/05/2023  Next appt: 04/02/2023  Medication matches medication on Epic list    Called pt to see how medication was helping with his sleep since he is new to it. Pt stated that he feels it is helping but may need a higher dose. Pt wanted you to know that he just had a valve replaced.     Call routed to [facility]

## 2023-03-03 NOTE — CARE COORDINATION
[facility] Care Transitions Initial Follow Up Call    Call within 2 business days of discharge: Yes    Patient Current Location:  Home: [address] [place] [address]     Care Transition Nurse contacted the patient by telephone to perform post hospital discharge assessment. Verified name and  with patient as identifiers. Provided introduction to self, and explanation of the Care Transition Nurse role. Patient: Valentina Garcia Patient : 1955   MRN: [id number]  Reason for Admission: TAVR  Discharge Date: 3/1/23 RARS: Readmission Risk Score: 7.6      Last Discharge  Street       Date Complaint Diagnosis Description Type Department Provider    23   Admission (Discharged) Lizeth Gomes MD            Was this an external facility discharge? No Discharge Facility:     Challenges to be reviewed by the provider   Additional needs identified to be addressed with provider: No  none               Method of communication with provider: none. Patient reports that he is doing very well, he is feeling much better and \"back to my normal self\". He walked yesterday and did very well. Discussed discharge instructions and reviewed medications, 1111F completed. He has all of his medications and is taking them as prescribed. He has a follow up scheduled with PCP 3/13/23. CTN will continue with outreach follow up calls. Care Transition Nurse reviewed discharge instructions and red flags with patient who verbalized understanding. The patient was given an opportunity to ask questions and does not have any further questions or concerns at this time. Were discharge instructions available to patient? Yes. Reviewed appropriate site of care based on symptoms and resources available to patient including: PCP  When to call 911. The patient agrees to contact the PCP office for questions related to their healthcare.      Advance Care Planning:   Does patient have an Advance Directive: not on file; education provided. Medication reconciliation was performed with patient, who verbalizes understanding of administration of home medications. Medications reviewed, 1111F entered: yes    Was patient discharged with a pulse oximeter? no    Non-face-to-face services provided:  Obtained and reviewed discharge summary and/or continuity of care documents    Offered patient enrollment in the Remote Patient Monitoring (RPM) program for in-home monitoring: Will offer on future call . Care Transitions 24 Hour Call    Do you have a copy of your discharge instructions?: Yes  Have you been contacted by a [address]?: No  Have you scheduled your follow up appointment?: Yes  How are you going to get to your appointment?: Car - family or friend to transport  Do you feel like you have everything you need to keep you well at home?: Yes  Are you an active caregiver in your home?: No  Care Transitions Interventions         Discussed follow-up appointments. If no appointment was previously scheduled, appointment scheduling offered: Yes. Is follow up appointment scheduled within 7 days of discharge? No.    Follow Up  Future Appointments   Date Time Provider Nicanor Ruiz   3/13/2023  1:00 PM Freida Mortimer, MD [facility] [place] [facility]   4/5/2023  2:40 PM Iftikhar Limon MD [facility]   4/6/2023  2:00 PM SCHEDULE, [facility] ECHO ROOM 2 [facility]   4/6/2023  3:00 PM Bernadette Ambrose MD [facility]       Care Transition Nurse provided contact information. Plan for follow-up call in 5-7 days based on severity of symptoms and risk factors.   Plan for next call: symptom management-.  self management-.  follow-up appointment-.    Brigitte Canseco RN

## 2023-03-06 RX ORDER — QUETIAPINE FUMARATE 25 MG/1
TABLET, FILM COATED ORAL
Qty: 30 TABLET | Refills: 0 | Status: SHIPPED | OUTPATIENT
Start: 2023-03-06

## 2023-03-06 NOTE — TELEPHONE ENCOUNTER
Rich called in to check on his refill this morning. He states he was advised he would have it Friday. I show 30 minutes ago it was sent to the pharmacy-[facility]. He's upset that it was not taken care of Friday as promised and noone called him. Advised I would not his complaint.

## 2023-03-10 ENCOUNTER — CARE COORDINATION (OUTPATIENT)
Dept: CASE MANAGEMENT | Age: 68
End: 2023-03-10

## 2023-03-10 NOTE — CARE COORDINATION
[facility] Care Transitions Follow Up Call    Patient: Lois Nguyen  Patient : 1955   MRN: [id number]  Reason for Admission:  TAVR  Discharge Date: 3/1/23 RARS: Readmission Risk Score: 7.6      Attempted to contact patient for follow up transition call. Left voicemail message to return call with an update on condition since discharge. Contact information provided. Will continue to follow up.         Follow Up  Future Appointments   Date Time Provider Nicanor Ruiz   3/13/2023  1:00 PM Manuel Bocanegra MD [facility] [place] [facility]   2023  2:40 PM Pancho Martínez MD [facility]   2023  2:00 PM SCHEDULE, [facility] ECHO ROOM 2 [facility] ECHO [facility]   2023  3:00 PM Leonardo Meyers MD [facility]          Care Transitions Subsequent and Final Call    Subsequent and Final Calls  Care Transitions Interventions  Other Interventions:           Yudelka Kebede LPN

## 2023-03-13 ENCOUNTER — OFFICE VISIT (OUTPATIENT)
Dept: FAMILY MEDICINE CLINIC | Age: 68
End: 2023-03-13

## 2023-03-13 VITALS
HEART RATE: 92 BPM | OXYGEN SATURATION: 96 % | HEIGHT: 71 IN | WEIGHT: 275 LBS | DIASTOLIC BLOOD PRESSURE: 66 MMHG | SYSTOLIC BLOOD PRESSURE: 110 MMHG | BODY MASS INDEX: 38.5 KG/M2

## 2023-03-13 DIAGNOSIS — Z95.2 S/P TAVR (TRANSCATHETER AORTIC VALVE REPLACEMENT): ICD-10-CM

## 2023-03-13 DIAGNOSIS — I25.10 CORONARY ARTERY DISEASE INVOLVING NATIVE CORONARY ARTERY OF NATIVE HEART WITHOUT ANGINA PECTORIS: ICD-10-CM

## 2023-03-13 DIAGNOSIS — F33.41 RECURRENT MAJOR DEPRESSIVE DISORDER, IN PARTIAL REMISSION (HCC): ICD-10-CM

## 2023-03-13 DIAGNOSIS — Z00.00 MEDICARE ANNUAL WELLNESS VISIT, SUBSEQUENT: Primary | ICD-10-CM

## 2023-03-13 DIAGNOSIS — B18.1 CHRONIC ACTIVE VIRAL HEPATITIS B (HCC): ICD-10-CM

## 2023-03-13 DIAGNOSIS — H93.13 TINNITUS OF BOTH EARS: ICD-10-CM

## 2023-03-13 DIAGNOSIS — Z79.4 TYPE 2 DIABETES MELLITUS WITH DIABETIC PERIPHERAL ANGIOPATHY WITHOUT GANGRENE, WITH LONG-TERM CURRENT USE OF INSULIN (HCC): ICD-10-CM

## 2023-03-13 DIAGNOSIS — M79.2 NEURALGIA INVOLVING SCALP: ICD-10-CM

## 2023-03-13 DIAGNOSIS — Z23 NEED FOR PNEUMOCOCCAL VACCINATION: ICD-10-CM

## 2023-03-13 DIAGNOSIS — L40.50 PSORIATIC ARTHRITIS (HCC): ICD-10-CM

## 2023-03-13 DIAGNOSIS — F51.01 PRIMARY INSOMNIA: ICD-10-CM

## 2023-03-13 DIAGNOSIS — E11.65 UNCONTROLLED TYPE 2 DIABETES MELLITUS WITH HYPERGLYCEMIA (HCC): ICD-10-CM

## 2023-03-13 DIAGNOSIS — E11.51 TYPE 2 DIABETES MELLITUS WITH DIABETIC PERIPHERAL ANGIOPATHY WITHOUT GANGRENE, WITH LONG-TERM CURRENT USE OF INSULIN (HCC): ICD-10-CM

## 2023-03-13 PROBLEM — I35.0 AORTIC STENOSIS, MODERATE: Status: RESOLVED | Noted: 2022-02-28 | Resolved: 2023-03-13

## 2023-03-13 PROBLEM — I35.0 AORTIC STENOSIS, SEVERE: Status: RESOLVED | Noted: 2023-02-28 | Resolved: 2023-03-13

## 2023-03-13 LAB
CREATININE URINE: 51.4 MG/DL (ref 39–259)
HBA1C MFR BLD: 11.1 %
MICROALBUMIN UR-MCNC: <1.2 MG/DL
MICROALBUMIN/CREAT UR-RTO: NORMAL MG/G (ref 0–30)

## 2023-03-13 RX ORDER — QUETIAPINE FUMARATE 25 MG/1
TABLET, FILM COATED ORAL
Qty: 30 TABLET | Refills: 0 | OUTPATIENT
Start: 2023-03-13

## 2023-03-13 RX ORDER — INSULIN GLARGINE 100 [IU]/ML
INJECTION, SOLUTION SUBCUTANEOUS
Qty: 30 ML | Refills: 1 | Status: SHIPPED | OUTPATIENT
Start: 2023-03-13

## 2023-03-13 SDOH — ECONOMIC STABILITY: FOOD INSECURITY: WITHIN THE PAST 12 MONTHS, YOU WORRIED THAT YOUR FOOD WOULD RUN OUT BEFORE YOU GOT MONEY TO BUY MORE.: NEVER TRUE

## 2023-03-13 SDOH — ECONOMIC STABILITY: HOUSING INSECURITY
IN THE LAST 12 MONTHS, WAS THERE A TIME WHEN YOU DID NOT HAVE A STEADY PLACE TO SLEEP OR SLEPT IN A SHELTER (INCLUDING NOW)?: NO

## 2023-03-13 SDOH — ECONOMIC STABILITY: INCOME INSECURITY: HOW HARD IS IT FOR YOU TO PAY FOR THE VERY BASICS LIKE FOOD, HOUSING, MEDICAL CARE, AND HEATING?: NOT HARD AT ALL

## 2023-03-13 SDOH — ECONOMIC STABILITY: FOOD INSECURITY: WITHIN THE PAST 12 MONTHS, THE FOOD YOU BOUGHT JUST DIDN'T LAST AND YOU DIDN'T HAVE MONEY TO GET MORE.: NEVER TRUE

## 2023-03-13 ASSESSMENT — PATIENT HEALTH QUESTIONNAIRE - PHQ9
8. MOVING OR SPEAKING SO SLOWLY THAT OTHER PEOPLE COULD HAVE NOTICED. OR THE OPPOSITE, BEING SO FIGETY OR RESTLESS THAT YOU HAVE BEEN MOVING AROUND A LOT MORE THAN USUAL: 0
SUM OF ALL RESPONSES TO PHQ QUESTIONS 1-9: 1
SUM OF ALL RESPONSES TO PHQ QUESTIONS 1-9: 1
1. LITTLE INTEREST OR PLEASURE IN DOING THINGS: 0
3. TROUBLE FALLING OR STAYING ASLEEP: 1
SUM OF ALL RESPONSES TO PHQ QUESTIONS 1-9: 1
SUM OF ALL RESPONSES TO PHQ QUESTIONS 1-9: 1
4. FEELING TIRED OR HAVING LITTLE ENERGY: 0
10. IF YOU CHECKED OFF ANY PROBLEMS, HOW DIFFICULT HAVE THESE PROBLEMS MADE IT FOR YOU TO DO YOUR WORK, TAKE CARE OF THINGS AT HOME, OR GET ALONG WITH OTHER PEOPLE: 0
6. FEELING BAD ABOUT YOURSELF - OR THAT YOU ARE A FAILURE OR HAVE LET YOURSELF OR YOUR FAMILY DOWN: 0
9. THOUGHTS THAT YOU WOULD BE BETTER OFF DEAD, OR OF HURTING YOURSELF: 0
7. TROUBLE CONCENTRATING ON THINGS, SUCH AS READING THE NEWSPAPER OR WATCHING TELEVISION: 0
SUM OF ALL RESPONSES TO PHQ9 QUESTIONS 1 & 2: 0
2. FEELING DOWN, DEPRESSED OR HOPELESS: 0
5. POOR APPETITE OR OVEREATING: 0

## 2023-03-13 ASSESSMENT — LIFESTYLE VARIABLES
HOW MANY STANDARD DRINKS CONTAINING ALCOHOL DO YOU HAVE ON A TYPICAL DAY: PATIENT DOES NOT DRINK
HOW OFTEN DO YOU HAVE A DRINK CONTAINING ALCOHOL: NEVER

## 2023-03-13 NOTE — PROGRESS NOTES
Medicare Annual Wellness Visit  Name: Letty Walters  YOB: 1955  Age: 80 y.o. Sex: male  MRN: [id number]     Date of Service:  3/13/2023    Chief Complaint:   Letty Walters is a 80 y.o. male who presents for Medicare Annual Wellness Visit and check-up for:  1. Medicare annual wellness visit, subsequent    2. Type 2 diabetes mellitus with diabetic peripheral angiopathy without gangrene, with long-term current use of insulin (HCC)    3. Psoriatic arthritis ([facility]    4. Recurrent major depressive disorder, in partial remission ([facility]    5. Chronic active viral hepatitis B ([facility]    6. S/P TAVR (transcatheter aortic valve replacement) 2023    7. Coronary artery disease involving native coronary artery of native heart without angina pectoris    8. Uncontrolled type 2 diabetes mellitus with hyperglycemia ([facility]    9. Tinnitus of both ears    10. Neuralgia involving scalp    11. Need for pneumococcal vaccination      HPI  Chief Complaint   Patient presents with    Medicare AWV     AWV and DM check   Complaints: 2 wks S/P transcatheter AVR. Planning 6 mo Plavix. Stamina improving. Cervical disc problem with bilat radicular pain to arms. May eventually need surgery. Tinnitus  Burning pain R scalp, intermittent, last 5-10 min, about 2x/mo  Dry cough x 1 week, no fever, Post nasal drip noted. Sneezing and some L eye watering. Review of Systems - unremarable except as noted above    HISTORY:  Patient's medications, allergies, past medical, and social histories were reviewed and updated as appropriate.      CHART REVIEW  Health Maintenance   Topic Date Due    Shingles vaccine (1 of 2) Never done    COVID-19 Vaccine (4 - Booster for Moderna series) 01/05/2022    Diabetic retinal exam  03/12/2022    Diabetic Alb to Cr ratio (uACR) test  05/07/2022    Lipids  04/01/2023    A1C test (Diabetic or Prediabetic)  06/13/2023    Depression Monitoring  10/05/2023    Diabetic foot exam  10/10/2023    GFR test (Diabetes, CKD 3-4, OR last GFR 15-59)  03/01/2024    Annual Wellness Visit (AWV)  03/13/2024    DTaP/Tdap/Td vaccine (3 - Td or Tdap) 11/16/2027    Colorectal Cancer Screen  01/23/2033    Hepatitis A vaccine  Completed    Flu vaccine  Completed    Pneumococcal 65+ years Vaccine  Completed    Hepatitis C screen  Completed    Hib vaccine  Aged Out    Meningococcal (ACWY) vaccine  Aged Out    Hepatitis B vaccine  Discontinued     The 10-year ASCVD risk score (Osmel ZHANG, et al., 2019) is: 22.7%    Values used to calculate the score:      Age: 80 years      Sex: Male      Is Non- : No      Diabetic: Yes      Tobacco smoker: No      Systolic Blood Pressure: 064 mmHg      Is BP treated: Yes      HDL Cholesterol: 42 mg/dL      Total Cholesterol: 156 mg/dL  Current Outpatient Medications   Medication Instructions    aspirin 81 mg, Oral, DAILY    blood glucose monitor kit and supplies Test 2 times a day & as needed for symptoms of irregular blood glucose. blood glucose monitor strips Test 2 times a day & as needed for symptoms of irregular blood glucose. canagliflozin (INVOKANA) 300 MG TABS tablet TAKE 1 TABLET BY MOUTH EVERY MORNING BEFORE BREAKFAST    clopidogrel (PLAVIX) 75 mg, Oral, DAILY    Dulaglutide 0.75 mg, SubCUTAneous, WEEKLY, Plan increase to 1.5 mg after first month.     entecavir (BARACLUDE) 1 mg, Oral, DAILY    ezetimibe (ZETIA) 10 mg, Oral, DAILY    gabapentin (NEURONTIN) 400 MG capsule TAKE 1 CAPSULE BY MOUTH THREE TIMES DAILY    glucose blood VI test strips (ASCENSIA AUTODISC VI;ONE TOUCH ULTRA TEST VI) strip 1 each, Topical, 3 TIMES DAILY, Onetouch Ultra 2 test strips  Dx: 250.00    HYDROcodone-acetaminophen  MG TABS 10 mg of opioid, Oral, EVERY 8 HOURS PRN    insulin glargine (LANTUS) 100 UNIT/ML injection vial ADMINISTER 65 UNITS UNDER THE SKIN EVERY NIGHT    Insulin Syringe-Needle U-100 ([employer] INSULIN SYR 1CC/30G) 30G X 5/16\" 1 ML MISC 1 each, Does not apply, DAILY    Lancets MISC 1 each, Does not apply, 2 TIMES DAILY    metFORMIN (GLUCOPHAGE-XR) 500 MG extended release tablet TAKE 4 TABLETS EVERY DAY WITH BREAKFAST    ONETOUCH DELICA LANCETS MISC 1 each, Does not apply, 3 TIMES DAILY    rosuvastatin (CRESTOR) 40 MG tablet TAKE 1 TABLET EVERY DAY    triamcinolone (KENALOG) 0.1 % cream Apply topically 2 times daily.     valsartan-hydroCHLOROthiazide (DIOVAN-HCT) 320-12.5 MG per tablet TAKE 1 TABLET BY MOUTH EVERY DAY      Family History   Problem Relation Age of Onset    Diabetes Mother     Cancer Mother         pancreatic    Heart Failure Father         began age 76,  age 78     Social History     Tobacco Use    Smoking status: Never     Passive exposure: Past    Smokeless tobacco: Never    Tobacco comments:     advised not to start   Vaping Use    Vaping Use: Never used   Substance Use Topics    Alcohol use: Not Currently     Comment: rare    Drug use: No      Immunization History   Administered Date(s) Administered    COVID-19, MODERNA BLUE border, Primary or Immunocompromised, (age 12y+), IM, 100 mcg/0.5mL 2021, 2021, 11/10/2021    Hepatitis A Adult ([place]) 08/10/2017, 2019    INFLUENZA, INTRADERMAL, QUADRIVALENT, PRESERVATIVE FREE 2016, 2017    Influenza Vaccine, unspecified formulation 10/19/2011, 10/15/2012    Influenza Virus Vaccine 2010, 2020    Influenza, FLUAD, (age 72 y+), Adjuvanted, 0.5mL 2021, 10/10/2022    Influenza, FLUARIX, FLULAVAL, FLUZONE (age 10 mo+) AND AFLURIA, (age 1 y+), PF, 0.5mL 2019    Influenza, FLUBLOK, (age 25 y+), PF, 0.5mL 10/03/2018    Influenza, Intradermal, Preservative free 2013    Pneumococcal Polysaccharide (Kdahnecjp58) 2008, 10/19/2011, 2021    Pneumococcal conjugate PCV20, PF (Prevnar 20) 2023    Td, unspecified formulation 2006    Tdap (Boostrix, Adacel) 2006, 2017     LAST LABS  Cholesterol, Total   Date Value Ref Range Status   2022 156 0 - 199 mg/dL Final     LDL Calculated   Date Value Ref Range Status   04/01/2022 83 <100 mg/dL Final     HDL   Date Value Ref Range Status   04/01/2022 42 40 - 60 mg/dL Final     Triglycerides   Date Value Ref Range Status   04/01/2022 157 (H) 0 - 150 mg/dL Final     Lab Results   Component Value Date     03/01/2023    K 4.9 03/01/2023    CREATININE 1.1 03/01/2023     Lab Results   Component Value Date    LABGLOM >60 03/01/2023    LABGLOM >60 02/23/2023    LABGLOM >60 02/13/2023    LABGLOM >60 10/12/2022    LABGLOM >60 06/08/2022     Lab Results   Component Value Date    WBC 9.1 03/01/2023    HGB 14.2 03/01/2023    HCT 43.5 03/01/2023    MCV 92.0 03/01/2023     (L) 03/01/2023     Lab Results   Component Value Date    ALT 22 02/23/2023    AST 21 02/23/2023    ALKPHOS 84 02/23/2023    BILITOT 0.4 02/23/2023     No results found for: TSH  Lab Results   Component Value Date    GLUCOSE 157 (H) 03/01/2023     Lab Results   Component Value Date    LABA1C 11.1 03/13/2023      CareTeam (Including outside providers/suppliers regularly involved in providing care):   Patient Care Team:  Clara Mendiola MD as PCP - General (Family Medicine)  Clara Mendiola MD as PCP - Empaneled Provider  KARYN Cortes CNP as Nurse Practitioner (Certified Nurse Practitioner)  Neo Alfaro RN as Care Transitions Nurse    The following problems were reviewed today and where indicated follow up appointments were made and/or referrals ordered.     Positive Risk Factor Screenings with Interventions:          General Health and ACP:  General  In general, how would you say your health is?: Good  In the past 7 days, have you experienced any of the following: New or Increased Pain, New or Increased Fatigue, Loneliness, Social Isolation, Stress or Anger?: (!) Yes  Select all that apply: (!) New or Increased Pain  Do you get the social and emotional support that you need?: Yes  Do you have a Living Will?: (!) No    Advance Directives Power of [employer] Living Will ACP-Advance Directive ACP-Power of     Not on File Not on File Not on File Not on File        General Health Risk Interventions:  Pain issues: see A/P RE scalp  No Living Will: Advance Care Planning addressed with patient today    Weight and Activity:  Physical Activity: Insufficiently Active    Days of Exercise per Week: 2 days    Minutes of Exercise per Session: 10 min     On average, how many days per week do you engage in moderate to strenuous exercise (like a brisk walk)?: 2 days  Have you lost any weight without trying in the past 3 months?: No  Body mass index: (!) 38.35  Health Habits/Nutrition Interventions:  Nutritional issues:  Already increasing activity. Handout for diet. Vision Screen:  Do you have difficulty driving, watching TV, or doing any of your daily activities because of your eyesight?: No  Have you had an eye exam within the past year?: (!) No  No results found. Hearing/Vision Interventions:  Vision concerns:  Patient encouraged to make appointment with their eye specialist      Current Health Maintenance Status  Recommendations for Preventive Services Due: see orders. Recommended screening schedule for the next 5-10 years is provided to the patient in written form: see Patient Instructions/AVS.    PHYSICAL EXAM:  VITALS:  /66 (Site: Right Upper Arm, Position: Sitting, Cuff Size: Large Adult)   Pulse 92   Ht 5' 11\" (1.803 m)   Wt 275 lb (124.7 kg)   SpO2 96%   BMI 38.35 kg/m²   BP Readings from Last 5 Encounters:   03/13/23 110/66   03/01/23 119/69   02/23/23 107/67   02/23/23 110/60   02/15/23 114/62     Wt Readings from Last 5 Encounters:   03/13/23 275 lb (124.7 kg)   03/01/23 281 lb 1.4 oz (127.5 kg)   02/23/23 280 lb (127 kg)   02/23/23 280 lb 9.6 oz (127.3 kg)   02/15/23 282 lb (127.9 kg)   Body mass index is 38.35 kg/m².   GENERAL: well-developed, obese, alert, no distress, calm   EYES: negative findings: lids and lashes normal and conjunctivae and sclerae normal  ENT: normal TM's and external ear canals both ears  External nose and ears appear normal  Pharynx: normal. Exudates: None  Lips, mucosa, and tongue normal  Hearing grossly normal.    NECK: No adenopathy, supple, symmetrical, trachea midline  Thyroid not enlarged, symmetric, no tenderness/mass/nodules  no cervical nodes, no supraclavicular nodes  LUNGS:  Breathing unlabored  clear to auscultation bilaterally and good air movement  CARDIOVASC: regular rate and rhythm, S1, S2 normal  LEGS:  Lower extremity edema: none    No carotid bruits  ABDOMEN: Soft, non-tender, no masses  No hepatosplenomegaly  No hernias noted. Exam limited by body habitus  SKIN: warm and dry  No rashes or suspicious lesions  PSYCH:  Alert and oriented  Normal reasoning, insight good  Facial expressions full, mood appropriate  No memory disturbance noted  MUSCULOSKEL:  No significant finger or nail findings  Spine symmetric, no deformities, no kyphosis   GAIT: UP and Go test: <30 seconds with gait: normal.  Speed Normal.  No significant balance checks. No extra steps on turn around. Assistive device: none        Assessment and Plan:      Diagnosis Orders   1. Medicare annual wellness visit, subsequent        2. Type 2 diabetes mellitus with diabetic peripheral angiopathy without gangrene, with long-term current use of insulin (HCC)  POCT glycosylated hemoglobin (Hb A1C)    Microalbumin / Creatinine Urine Ratio    insulin glargine (LANTUS) 100 UNIT/ML injection vial    Dulaglutide 0.75 MG/0.5ML SOPN      3. Psoriatic arthritis (HCC)        4. Recurrent major depressive disorder, in partial remission ([address]  Stable with current medications. No adjustments needed. Will continue to monitor. 5. Chronic active viral hepatitis B ([address]  Per GI      6. S/P TAVR (transcatheter aortic valve replacement) 2023  Doing well 2 wks out      7.  Coronary artery disease involving native coronary artery of native heart without angina pectoris  Lipid Panel      8. Uncontrolled type 2 diabetes mellitus with hyperglycemia (HCC)  Increase Lantus and metformin. Add Trulicity. 9. Tinnitus of both ears  Declines ENT/audiology at this time      10. Neuralgia involving scalp  NEW- encouraged to take gabapentin more regularly      11. Need for pneumococcal vaccination  Pneumococcal, PCV20, PREVNAR 21, (age 25 yrs+), IM, PF      Plan as above and below. FYI: While Medicare provides you with a FREE ANNUAL PREVENTIVE PHYSICAL, this visit does NOT include management of chronic medical problems or physical examination. Dr. Roman Browne usually does a combination visit if you have other medical problems so you don't have to come back for another visit. However, this means that there will be a co-pay. INSTRUCTIONS  NEXT APPOINTMENT: with Dr. Roman Browne or her NP, Chrissy Castaneda. PLEASE TAKE THIS FORM TO CHECK-OUT WINDOW TO SCHEDULE NEXT VISIT. PLEASE GET FASTING BLOODWORK DRAWN SOON. Lab is on first floor in [address]. Hours Monday to Friday 6:30 AM to 4 PM.    Please get annual dilated eye exam to screen for diabetic retinopathy which can lead to vision loss. Ask for report to be faxed to [phone]. Would get new bivalent COVID booster soon. Has better coverage for Omicron variant. Separate from other vaccines by at least 2 weeks. Eat less, move more! You can do it! 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 For allergies, try Claritin/Allovert/loratidine 10 mg once or twice a day. If needed, can add steroid nasal spray in a month. A1c 11.1. Remember to take 4 metformin a day. INCREASE Lantus to 65 units. Check sugar 2 h after meals or if feels like it is low. Call with readings in 2 weeks. ADD Trulicity weekly shot. Plan increase   Look into making living will and medical POA. Info included in back of this packet.

## 2023-03-13 NOTE — PATIENT INSTRUCTIONS
FYI: While Medicare provides you with a FREE ANNUAL PREVENTIVE PHYSICAL, this visit does NOT include management of chronic medical problems or physical examination. Dr. Destinee Walker usually does a combination visit if you have other medical problems so you don't have to come back for another visit. However, this means that there will be a co-pay. INSTRUCTIONS  NEXT APPOINTMENT: with Dr. Destinee Walker or her NP, Valora Bumpers. PLEASE TAKE THIS FORM TO CHECK-OUT WINDOW TO SCHEDULE NEXT VISIT. PLEASE GET FASTING BLOODWORK DRAWN SOON. Lab is on first floor in [address]. Hours Monday to Friday 6:30 AM to 4 PM.    Please get annual dilated eye exam to screen for diabetic retinopathy which can lead to vision loss. Ask for report to be faxed to [phone]. Would get new bivalent COVID booster soon. Has better coverage for Omicron variant. Separate from other vaccines by at least 2 weeks. Eat less, move more! You can do it! 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 For allergies, try Claritin/Allovert/loratidine 10 mg once or twice a day. If needed, can add steroid nasal spray in a month. A1c 11.1. Remember to take 4 metformin a day. INCREASE Lantus to 65 units. Check sugar 2 h after meals or if feels like it is low. Call with readings in 2 weeks. ADD Trulicity weekly shot. Plan increase   Look into making living will and medical POA. Info included in back of this packet. Patient Education      TINNITUS    Overview   What is tinnitus? Tinnitus is a problem that causes you to hear a noise in one ear or both ears. People commonly think of it as ringing in the ear, but it also can be roaring, clicking, buzzing or other sounds. Some people who have tinnitus hear a more complex noise that changes over time. You may hear the noise constantly, or it may come and go. In most cases, people who have tinnitus hear noise in their head when no outside sound is there.  This type of tinnitus is called subjective tinnitus.  It can happen because certain nerves are not functioning normally or because there is a problem with part of your ear. In rare cases, tinnitus is caused by an actual sound that occurs inside or near the ear, such as from nearby blood vessels. This type of tinnitus is called objective tinnitus.     Causes & Risk Factors   What causes tinnitus? The following are among the most common causes of tinnitus:  Exposure to loud noises, which can lead to noise-induced hearing loss over time   Hearing loss related to aging   Certain drugs that can damage the inner workings of the ear. For example, taking high doses of aspirin every day may lead to tinnitus. Eustachian tube dysfunction  Infections, such as otitis media or labyrinthitis   Menieres disease, a condition that also causes a spinning feeling (also called vertigo)   Allergies, high blood pressure, low blood pressure, diabetes, tumors and head injuries can also cause tinnitus. In many cases, the cause of tinnitus cannot be identified. Diagnosis & Tests   How is tinnitus diagnosed? Your doctor will probably take a detailed medical history. He or she will want to know about any medical conditions you may have and any history of infections. Your doctor also needs to know what medicines you are taking, including herbal products. He or she will check your ears and may give you a hearing test or do other tests to find out what is causing your tinnitus. Treatment   How is tinnitus treated? Treatment will depend on what is causing your tinnitus. For example, if a drug you are taking causes your tinnitus, your doctor may recommend you stop taking that drug. Remember you should never stop taking a prescription drug without talking to your doctor first.  If an underlying condition, such as high blood pressure, causes your tinnitus, your doctor can create a treatment plan for you to follow.  Usually, tinnitus goes away once the condition that is causing it is treated. When no specific cause can be identified, your doctor will probably focus on making your tinnitus easier to tolerate. Some possible methods include the following:  Hearing aids: For people who have tinnitus and hearing loss, using a hearing aid may be helpful. When you wear a hearing aid, things you need to hear will be louder than the ringing, buzzing or clicking sound. Maskers: Maskers are placed behind your ear and create white noise (constant background noise). This makes tinnitus less noticeable. Some people also use bedside maskers to help them sleep. Combined hearing aids and maskers   Counseling: Many people who have tinnitus become depressed. If you have tinnitus and are struggling, seeking help through a counselor and/or a support group may help you cope. Tinnitus restraining therapy: This method uses a mix of counseling with maskers or other approaches. An otolaryngologist (an ear, nose and throat doctor) and an audiologist (a hearing professional) will work with you to help you deal with your tinnitus. This isnt a quick fix, but many people find it useful with time and practice. Relaxing: Stress can make tinnitus worse. Your doctor can suggest relaxation techniques that might help you deal with your stress. Medicines: Currently, there are no medicines specifically designed for treating tinnitus. Some medicines, such as certain drugs used to treat anxiety, have been shown to relieve tinnitus for some people. Talk to your doctor about whether medicine might relieve your symptoms. Prevention   How can I prevent tinnitus, or at least keep it from getting worse? To prevent tinnitus or keep it from getting worse, avoid long-term exposure to loud noises and activities that put you at risk for hearing loss. If you know youre going to be around loud noises, take precautions by wearing earplugs or earmuffs.  If you listen to music through headphones, keep the volume low.  If you have tinnitus, avoid things that seem to make it worse. These may include nicotine, alcohol or caffeine.         Learning About Vision Tests  What are vision tests?     The four most common vision tests are visual acuity tests, refraction, visual field tests, and color vision tests.  Visual acuity (sharpness) tests  These tests are used:  To see if you need glasses or contact lenses.  To monitor an eye problem.  To check an eye injury.  Visual acuity tests are done as part of routine exams. You may also have this test when you get your 's license or apply for some types of jobs.  Visual field tests  These tests are used:  To check for vision loss in any area of your range of vision.  To screen for certain eye diseases.  To look for nerve damage after a stroke, head injury, or other problem that could reduce blood flow to the brain.  Refraction and color tests  A refraction test is done to find the right prescription for glasses and contact lenses.  A color vision test is done to check for color blindness.  Color vision is often tested as part of a routine exam. You may also have this test when you apply for a job where recognizing different colors is important, such as , electronics, or the .  How are vision tests done?  Visual acuity test   You cover one eye at a time.  You read aloud from a wall chart across the room.  You read aloud from a small card that you hold in your hand.  Refraction   You look into a special device.  The device puts lenses of different strengths in front of each eye to see how strong your glasses or contact lenses need to be.  Visual field tests   Your doctor may have you look through special machines.  Or your doctor may simply have you stare straight ahead while they move a finger into and out of your field of vision.  Color vision test   You look at pieces of printed test patterns in various colors. You say what number or symbol  you see. Your doctor may have you trace the number or symbol using a pointer. How do these tests feel? There is very little chance of having a problem from this test. If dilating drops are used for a vision test, they may make the eyes sting and cause a medicine taste in the mouth. Follow-up care is a key part of your treatment and safety. Be sure to make and go to all appointments, and call your doctor if you are having problems. It's also a good idea to know your test results and keep a list of the medicines you take. Where can you learn more? Go to http://www.coleman.com/ and enter G551 to learn more about \"Learning About Vision Tests. \"  Current as of: October 12, 2022               Content Version: 13.5  © 4012-7459 Healthwise, [employer]. Care instructions adapted under license by [facility] ([facility]). If you have questions about a medical condition or this instruction, always ask your healthcare professional. [place] [address] any warranty or liability for your use of this information. Advance Directives: Care Instructions  Overview  An advance directive is a legal way to state your wishes at the end of your life. It tells your family and your doctor what to do if you can't say what you want. There are two main types of advance directives. You can change them any time your wishes change. Living will. This form tells your family and your doctor your wishes about life support and other treatment. The form is also called a declaration. Medical power of . This form lets you name a person to make treatment decisions for you when you can't speak for yourself. This person is called a health care agent (health care proxy, health care surrogate). The form is also called a durable power of  for health care.   If you do not have an advance directive, decisions about your medical care may be made by a family member, or by a doctor or a  who doesn't know you.  It may help to think of an advance directive as a gift to the people who care for you. If you have one, they won't have to make tough decisions by themselves. For more information, including forms for your state, see the [address] website (www.caringinfo.org/planning/advance-directives/). Follow-up care is a key part of your treatment and safety. Be sure to make and go to all appointments, and call your doctor if you are having problems. It's also a good idea to know your test results and keep a list of the medicines you take. What should you include in an advance directive? Many states have a unique advance directive form. (It may ask you to address specific issues.) Or you might use a universal form that's approved by many states. If your form doesn't tell you what to address, it may be hard to know what to include in your advance directive. Use the questions below to help you get started. Who do you want to make decisions about your medical care if you are not able to? What life-support measures do you want if you have a serious illness that gets worse over time or can't be cured? What are you most afraid of that might happen? (Maybe you're afraid of having pain, losing your independence, or being kept alive by machines.)  Where would you prefer to die? (Your home? A hospital? A nursing home?)  Do you want to donate your organs when you die? Do you want certain [employer] practices performed before you die? When should you call for help? Be sure to contact your doctor if you have any questions. Where can you learn more? Go to http://www.coleman.com/ and enter R264 to learn more about \"Advance Directives: Care Instructions. \"  Current as of: June 16, 2022               Content Version: 13.5  © 5752-9345 Healthwise, Incorporated. Care instructions adapted under license by [employer] [facility] ([facility]).  If you have questions about a medical condition or this instruction, always ask your healthcare professional. [address] any warranty or liability for your use of this information. Starting a Weight Loss Plan: Care Instructions  Overview     If you're thinking about losing weight, it can be hard to know where to start. Your doctor can help you set up a weight loss plan that best meets your needs. You may want to take a class on nutrition or exercise, or you could join a weight loss support group. If you have questions about how to make changes to your eating or exercise habits, ask your doctor about seeing a registered dietitian or an exercise specialist.  It can be a big challenge to lose weight. But you don't have to make huge changes at once. Make small changes, and stick with them. When those changes become habit, add a few more changes. If you don't think you're ready to make changes right now, try to pick a date in the future. Make an appointment to see your doctor to discuss whether the time is right for you to start a plan. Follow-up care is a key part of your treatment and safety. Be sure to make and go to all appointments, and call your doctor if you are having problems. It's also a good idea to know your test results and keep a list of the medicines you take. How can you care for yourself at home? Set realistic goals. Many people expect to lose much more weight than is likely. A weight loss of 5% to 10% of your body weight may be enough to improve your health. Get family and friends involved to provide support. Talk to them about why you are trying to lose weight, and ask them to help. They can help by participating in exercise and having meals with you, even if they may be eating something different. Find what works best for you. If you do not have time or do not like to cook, a program that offers meal replacement bars or shakes may be better for you.  Or if you like to prepare meals, finding a plan that includes daily menus and recipes may be best.  Ask your doctor about other health professionals who can help you achieve your weight loss goals. A dietitian can help you make healthy changes in your diet. An exercise specialist or  can help you develop a safe and effective exercise program.  A counselor or psychiatrist can help you cope with issues such as depression, anxiety, or family problems that can make it hard to focus on weight loss. Consider joining a support group for people who are trying to lose weight. Your doctor can suggest groups in your area. Where can you learn more? Go to http://www.woods.com/ and enter U357 to learn more about \"Starting a Weight Loss Plan: Care Instructions. \"  Current as of: August 25, 2022               Content Version: 13.5  © 2006-2022 [employer]. Care instructions adapted under license by [facility] ([facility]). If you have questions about a medical condition or this instruction, always ask your healthcare professional. [place] [address] any warranty or liability for your use of this information. A Healthy Heart: Care Instructions  Your Care Instructions     Coronary artery disease, also called heart disease, occurs when a substance called plaque builds up in the vessels that supply oxygen-rich blood to your heart muscle. This can narrow the blood vessels and reduce blood flow. A heart attack happens when blood flow is completely blocked. A high-fat diet, smoking, and other factors increase the risk of heart disease. Your doctor has found that you have a chance of having heart disease. You can do lots of things to keep your heart healthy. It may not be easy, but you can change your diet, exercise more, and quit smoking. These steps really work to lower your chance of heart disease. Follow-up care is a key part of your treatment and safety. Be sure to make and go to all appointments, and call your doctor if you are having problems.  It's also a good idea to know your test results and keep a list of the medicines you take. How can you care for yourself at home? Diet    Use less salt when you cook and eat. This helps lower your blood pressure. Taste food before salting. Add only a little salt when you think you need it. With time, your taste buds will adjust to less salt. Eat fewer snack items, fast foods, canned soups, and other high-salt, high-fat, processed foods. Read food labels and try to avoid saturated and trans fats. They increase your risk of heart disease by raising cholesterol levels. Limit the amount of solid fat-butter, margarine, and shortening-you eat. Use olive, peanut, or canola oil when you cook. Bake, broil, and steam foods instead of frying them. Eat a variety of fruit and vegetables every day. Dark green, deep orange, red, or yellow fruits and vegetables are especially good for you. Examples include spinach, carrots, peaches, and berries. Foods high in fiber can reduce your cholesterol and provide important vitamins and minerals. High-fiber foods include whole-grain cereals and breads, oatmeal, beans, brown rice, citrus fruits, and apples. Eat lean proteins. Heart-healthy proteins include seafood, lean meats and poultry, eggs, beans, peas, nuts, seeds, and soy products. Limit drinks and foods with added sugar. These include candy, desserts, and soda pop. Lifestyle changes    If your doctor recommends it, get more exercise. Walking is a good choice. Bit by bit, increase the amount you walk every day. Try for at least 30 minutes on most days of the week. You also may want to swim, bike, or do other activities. Do not smoke. If you need help quitting, talk to your doctor about stop-smoking programs and medicines. These can increase your chances of quitting for good. Quitting smoking may be the most important step you can take to protect your heart. It is never too late to quit.      Limit alcohol to 2 drinks a day for men and 1 drink a day for women. Too much alcohol can cause health problems. Manage other health problems such as diabetes, high blood pressure, and high cholesterol. If you think you may have a problem with alcohol or drug use, talk to your doctor. Medicines    Take your medicines exactly as prescribed. Call your doctor if you think you are having a problem with your medicine. If your doctor recommends aspirin, take the amount directed each day. Make sure you take aspirin and not another kind of pain reliever, such as acetaminophen (Tylenol). When should you call for help? Call 911 if you have symptoms of a heart attack. These may include:    Chest pain or pressure, or a strange feeling in the chest.     Sweating. Shortness of breath. Pain, pressure, or a strange feeling in the back, neck, jaw, or upper belly or in one or both shoulders or arms. Lightheadedness or sudden weakness. A fast or irregular heartbeat. After you call 911, the  may tell you to chew 1 adult-strength or 2 to 4 low-dose aspirin. Wait for an ambulance. Do not try to drive yourself. Watch closely for changes in your health, and be sure to contact your doctor if you have any problems. Where can you learn more? Go to http://www.coleman.com/ and enter F075 to learn more about \"A Healthy Heart: Care Instructions. \"  Current as of: September 7, 2022               Content Version: 13.5  © 2006-2022 Healthwise, Incorporated. Care instructions adapted under license by [facility] ([facility]). If you have questions about a medical condition or this instruction, always ask your healthcare professional. [place] [address] any warranty or liability for your use of this information. Personalized Preventive Plan for Tootie Ceja - 3/13/2023  Medicare offers a range of preventive health benefits.  Some of the tests and screenings are paid in full while other may be subject to a deductible, co-insurance, and/or copay. Some of these benefits include a comprehensive review of your medical history including lifestyle, illnesses that may run in your family, and various assessments and screenings as appropriate. After reviewing your medical record and screening and assessments performed today your provider may have ordered immunizations, labs, imaging, and/or referrals for you. A list of these orders (if applicable) as well as your Preventive Care list are included within your After Visit Summary for your review. Other Preventive Recommendations:    A preventive eye exam performed by an eye specialist is recommended every 1-2 years to screen for glaucoma; cataracts, macular degeneration, and other eye disorders. A preventive dental visit is recommended every 6 months. Try to get at least 150 minutes of exercise per week or 10,000 steps per day on a pedometer . Order or download the FREE \"Exercise & Physical Activity: Your Everyday Guide\" from [employer]. Call 8-194.411.7074 or search [employer] online. You need 5494-2914 mg of calcium and 2642-1790 IU of vitamin D per day. It is possible to meet your calcium requirement with diet alone, but a vitamin D supplement is usually necessary to meet this goal.  When exposed to the sun, use a sunscreen that protects against both UVA and UVB radiation with an SPF of 30 or greater. Reapply every 2 to 3 hours or after sweating, drying off with a towel, or swimming. Always wear a seat belt when traveling in a car. Always wear a helmet when riding a bicycle or motorcycle.

## 2023-03-13 NOTE — TELEPHONE ENCOUNTER
Last appt: 01/05/2023  Next appt: 04/05/2023  Medication matches medication on Epic list    Denied.  Duplicate request.

## 2023-03-15 ENCOUNTER — CARE COORDINATION (OUTPATIENT)
Dept: CASE MANAGEMENT | Age: 68
End: 2023-03-15

## 2023-03-24 DIAGNOSIS — F51.01 PRIMARY INSOMNIA: ICD-10-CM

## 2023-03-24 RX ORDER — QUETIAPINE FUMARATE 25 MG/1
TABLET, FILM COATED ORAL
Qty: 30 TABLET | Refills: 0 | Status: SHIPPED | OUTPATIENT
Start: 2023-03-24

## 2023-03-30 ENCOUNTER — TELEPHONE (OUTPATIENT)
Dept: FAMILY MEDICINE CLINIC | Age: 68
End: 2023-03-30

## 2023-03-30 DIAGNOSIS — E11.51 TYPE 2 DIABETES MELLITUS WITH DIABETIC PERIPHERAL ANGIOPATHY WITHOUT GANGRENE, WITH LONG-TERM CURRENT USE OF INSULIN (HCC): Primary | ICD-10-CM

## 2023-03-30 DIAGNOSIS — Z79.4 TYPE 2 DIABETES MELLITUS WITH DIABETIC PERIPHERAL ANGIOPATHY WITHOUT GANGRENE, WITH LONG-TERM CURRENT USE OF INSULIN (HCC): Primary | ICD-10-CM

## 2023-03-30 DIAGNOSIS — E11.9 TYPE 2 DIABETES MELLITUS WITHOUT COMPLICATION, WITHOUT LONG-TERM CURRENT USE OF INSULIN (HCC): ICD-10-CM

## 2023-03-30 RX ORDER — GLUCOSAMINE HCL/CHONDROITIN SU 500-400 MG
CAPSULE ORAL
Qty: 200 STRIP | Refills: 3 | Status: SHIPPED | OUTPATIENT
Start: 2023-03-30

## 2023-04-01 DIAGNOSIS — F51.01 PRIMARY INSOMNIA: ICD-10-CM

## 2023-04-03 RX ORDER — QUETIAPINE FUMARATE 25 MG/1
TABLET, FILM COATED ORAL
Qty: 30 TABLET | Refills: 0 | OUTPATIENT
Start: 2023-04-03

## 2023-04-04 DIAGNOSIS — M54.42 CHRONIC BILATERAL LOW BACK PAIN WITH BILATERAL SCIATICA: ICD-10-CM

## 2023-04-04 DIAGNOSIS — M54.41 CHRONIC BILATERAL LOW BACK PAIN WITH BILATERAL SCIATICA: ICD-10-CM

## 2023-04-04 DIAGNOSIS — G89.29 CHRONIC BILATERAL LOW BACK PAIN WITH BILATERAL SCIATICA: ICD-10-CM

## 2023-04-04 DIAGNOSIS — G89.4 CHRONIC PAIN SYNDROME: ICD-10-CM

## 2023-04-05 ENCOUNTER — OFFICE VISIT (OUTPATIENT)
Dept: PULMONOLOGY | Age: 68
End: 2023-04-05
Payer: MEDICARE

## 2023-04-05 VITALS
SYSTOLIC BLOOD PRESSURE: 126 MMHG | OXYGEN SATURATION: 94 % | BODY MASS INDEX: 39.06 KG/M2 | HEIGHT: 71 IN | DIASTOLIC BLOOD PRESSURE: 78 MMHG | HEART RATE: 97 BPM | RESPIRATION RATE: 18 BRPM | WEIGHT: 279 LBS | TEMPERATURE: 97.9 F

## 2023-04-05 DIAGNOSIS — F51.01 PRIMARY INSOMNIA: ICD-10-CM

## 2023-04-05 DIAGNOSIS — G47.33 OSA (OBSTRUCTIVE SLEEP APNEA): Primary | ICD-10-CM

## 2023-04-05 DIAGNOSIS — J98.4 RESTRICTIVE LUNG DISEASE: ICD-10-CM

## 2023-04-05 PROCEDURE — G8427 DOCREV CUR MEDS BY ELIG CLIN: HCPCS | Performed by: INTERNAL MEDICINE

## 2023-04-05 PROCEDURE — 1123F ACP DISCUSS/DSCN MKR DOCD: CPT | Performed by: INTERNAL MEDICINE

## 2023-04-05 PROCEDURE — 3017F COLORECTAL CA SCREEN DOC REV: CPT | Performed by: INTERNAL MEDICINE

## 2023-04-05 PROCEDURE — 1036F TOBACCO NON-USER: CPT | Performed by: INTERNAL MEDICINE

## 2023-04-05 PROCEDURE — 3074F SYST BP LT 130 MM HG: CPT | Performed by: INTERNAL MEDICINE

## 2023-04-05 PROCEDURE — G8417 CALC BMI ABV UP PARAM F/U: HCPCS | Performed by: INTERNAL MEDICINE

## 2023-04-05 PROCEDURE — 3078F DIAST BP <80 MM HG: CPT | Performed by: INTERNAL MEDICINE

## 2023-04-05 PROCEDURE — 99213 OFFICE O/P EST LOW 20 MIN: CPT | Performed by: INTERNAL MEDICINE

## 2023-04-05 RX ORDER — ASPIRIN 81 MG/1
81 TABLET ORAL DAILY
Qty: 30 TABLET | Refills: 3 | Status: SHIPPED | OUTPATIENT
Start: 2023-04-05

## 2023-04-05 RX ORDER — CLOPIDOGREL BISULFATE 75 MG/1
75 TABLET ORAL DAILY
Qty: 30 TABLET | Refills: 3 | Status: SHIPPED | OUTPATIENT
Start: 2023-04-05

## 2023-04-05 RX ORDER — GABAPENTIN 400 MG/1
CAPSULE ORAL
Qty: 90 CAPSULE | Refills: 4 | Status: SHIPPED | OUTPATIENT
Start: 2023-04-05 | End: 2023-09-05

## 2023-04-05 NOTE — PROGRESS NOTES
Dragon Dictation software. Please disregard any translational errors.     Ivory Mcgregor MD  [facility] Pulmonary, Sleep and Critical Care

## 2023-04-05 NOTE — PROGRESS NOTES
angioplasty with stent (11/25/2008); Coronary angioplasty with stent (11/24/2008); craniotomy (Right); Umbilical hernia repair; Coronary artery bypass graft (03/26/2019); Coronary artery bypass graft (N/A, 03/26/2019); Cataract removal (Bilateral, 2020); Nerve Block (Bilateral, 10/30/2020); transesophageal echocardiogram (01/2023); Colonoscopy (N/A, 1/23/2023); Aortic valve replacement (N/A, 2/28/2023); and Aortic valve replacement (N/A, 2/28/2023). Social History:   reports that he has never smoked. He has been exposed to tobacco smoke. He has never used smokeless tobacco. He reports that he does not currently use alcohol. He reports that he does not use drugs. Family History:  family history includes Cancer in his mother; Diabetes in his mother; Heart Failure in his father. Home Medications:  Were reviewed and are listed in nursing record and/or below  Prior to Admission medications    Medication Sig Start Date End Date Taking? Authorizing Provider   clopidogrel (PLAVIX) 75 MG tablet Take 1 tablet by mouth daily 4/5/23  Yes Michelle Mcnamara MD   aspirin 81 MG EC tablet Take 1 tablet by mouth daily 4/5/23  Yes Michelle Mcnamara MD   gabapentin (NEURONTIN) 400 MG capsule TAKE 1 CAPSULE BY MOUTH THREE TIMES A DAY 4/5/23 9/5/23 Yes Mich Blinks, APRN - CNP   blood glucose monitor strips Test 2 times a day & as needed for symptoms of irregular blood glucose. 3/30/23  Yes Michelle Mcnamara MD   blood glucose monitor kit and supplies Test 2 times a day & as needed for symptoms of irregular blood glucose. 3/30/23  Yes Michelle Mcnamara MD   QUEtiapine (SEROQUEL) 25 MG tablet TAKE 2 TABLETS BY MOUTH EVERY NIGHT 3/24/23  Yes Timmy Cole MD   insulin glargine (LANTUS) 100 UNIT/ML injection vial ADMINISTER 65 UNITS UNDER THE SKIN EVERY NIGHT 3/13/23  Yes Michelle Mcnamara MD   Dulaglutide 0.75 MG/0.5ML SOPN Inject 0.75 mg into the skin once a week Plan increase to 1.5 mg after first month.  3/13/23  Yes Michelle Mcnamara MD

## 2023-04-06 ENCOUNTER — HOSPITAL ENCOUNTER (OUTPATIENT)
Dept: NON INVASIVE DIAGNOSTICS | Age: 68
Discharge: HOME | End: 2023-04-06
Payer: MEDICARE

## 2023-04-06 ENCOUNTER — TELEPHONE (OUTPATIENT)
Dept: FAMILY MEDICINE CLINIC | Age: 68
End: 2023-04-06

## 2023-04-06 ENCOUNTER — OFFICE VISIT (OUTPATIENT)
Dept: CARDIOLOGY CLINIC | Age: 68
End: 2023-04-06

## 2023-04-06 VITALS
OXYGEN SATURATION: 97 % | HEIGHT: 71 IN | SYSTOLIC BLOOD PRESSURE: 120 MMHG | DIASTOLIC BLOOD PRESSURE: 70 MMHG | BODY MASS INDEX: 38.64 KG/M2 | WEIGHT: 276 LBS | HEART RATE: 106 BPM

## 2023-04-06 DIAGNOSIS — Z95.2 S/P TAVR (TRANSCATHETER AORTIC VALVE REPLACEMENT): ICD-10-CM

## 2023-04-06 DIAGNOSIS — E11.51 TYPE 2 DIABETES MELLITUS WITH DIABETIC PERIPHERAL ANGIOPATHY WITHOUT GANGRENE, WITH LONG-TERM CURRENT USE OF INSULIN (HCC): Primary | ICD-10-CM

## 2023-04-06 DIAGNOSIS — I35.0 AORTIC VALVE STENOSIS, NONRHEUMATIC: Primary | ICD-10-CM

## 2023-04-06 DIAGNOSIS — I25.10 CORONARY ARTERY DISEASE DUE TO LIPID RICH PLAQUE: ICD-10-CM

## 2023-04-06 DIAGNOSIS — I10 ESSENTIAL HYPERTENSION: ICD-10-CM

## 2023-04-06 DIAGNOSIS — E78.5 HYPERLIPIDEMIA LDL GOAL <70: ICD-10-CM

## 2023-04-06 DIAGNOSIS — I25.83 CORONARY ARTERY DISEASE DUE TO LIPID RICH PLAQUE: ICD-10-CM

## 2023-04-06 DIAGNOSIS — Z79.4 TYPE 2 DIABETES MELLITUS WITH DIABETIC PERIPHERAL ANGIOPATHY WITHOUT GANGRENE, WITH LONG-TERM CURRENT USE OF INSULIN (HCC): Primary | ICD-10-CM

## 2023-04-06 LAB
LV EF: 53 %
LVEF MODALITY: NORMAL

## 2023-04-06 PROCEDURE — 6360000004 HC RX CONTRAST MEDICATION: Performed by: INTERNAL MEDICINE

## 2023-04-06 PROCEDURE — C8929 TTE W OR WO FOL WCON,DOPPLER: HCPCS

## 2023-04-06 RX ORDER — SEMAGLUTIDE 1.34 MG/ML
1 INJECTION, SOLUTION SUBCUTANEOUS WEEKLY
Qty: 3 ML | Refills: 2 | Status: SHIPPED | OUTPATIENT
Start: 2023-04-06

## 2023-04-06 RX ADMIN — PERFLUTREN 1.5 ML: 6.52 INJECTION, SUSPENSION INTRAVENOUS at 14:49

## 2023-04-11 DIAGNOSIS — F51.01 PRIMARY INSOMNIA: ICD-10-CM

## 2023-04-12 RX ORDER — QUETIAPINE FUMARATE 25 MG/1
TABLET, FILM COATED ORAL
Qty: 30 TABLET | Refills: 0 | Status: SHIPPED | OUTPATIENT
Start: 2023-04-12 | End: 2023-04-20

## 2023-04-20 ENCOUNTER — OFFICE VISIT (OUTPATIENT)
Dept: FAMILY MEDICINE CLINIC | Age: 68
End: 2023-04-20

## 2023-04-20 VITALS
HEART RATE: 94 BPM | HEIGHT: 71 IN | WEIGHT: 275 LBS | RESPIRATION RATE: 16 BRPM | DIASTOLIC BLOOD PRESSURE: 70 MMHG | SYSTOLIC BLOOD PRESSURE: 130 MMHG | BODY MASS INDEX: 38.5 KG/M2 | TEMPERATURE: 98.7 F | OXYGEN SATURATION: 95 %

## 2023-04-20 DIAGNOSIS — E11.65 UNCONTROLLED TYPE 2 DIABETES MELLITUS WITH HYPERGLYCEMIA (HCC): Primary | ICD-10-CM

## 2023-04-20 DIAGNOSIS — F51.01 PRIMARY INSOMNIA: ICD-10-CM

## 2023-04-20 DIAGNOSIS — J06.9 VIRAL URI: ICD-10-CM

## 2023-04-20 DIAGNOSIS — Z79.4 TYPE 2 DIABETES MELLITUS WITH DIABETIC PERIPHERAL ANGIOPATHY WITHOUT GANGRENE, WITH LONG-TERM CURRENT USE OF INSULIN (HCC): ICD-10-CM

## 2023-04-20 DIAGNOSIS — E11.51 TYPE 2 DIABETES MELLITUS WITH DIABETIC PERIPHERAL ANGIOPATHY WITHOUT GANGRENE, WITH LONG-TERM CURRENT USE OF INSULIN (HCC): ICD-10-CM

## 2023-04-20 LAB
Lab: NORMAL
PERFORMING INSTRUMENT: NORMAL
QC PASS/FAIL: NORMAL
SARS-COV-2, POC: NORMAL

## 2023-04-20 RX ORDER — QUETIAPINE FUMARATE 25 MG/1
TABLET, FILM COATED ORAL
Qty: 30 TABLET | Refills: 0 | Status: SHIPPED | OUTPATIENT
Start: 2023-04-20

## 2023-04-20 RX ORDER — INSULIN GLARGINE 100 [IU]/ML
INJECTION, SOLUTION SUBCUTANEOUS
Qty: 30 ML | Refills: 1 | Status: SHIPPED | OUTPATIENT
Start: 2023-04-20 | End: 2023-05-25 | Stop reason: SDUPTHER

## 2023-04-20 NOTE — PATIENT INSTRUCTIONS
INSTRUCTIONS  May take Mucinex (guaifenisen) and Robitussin DM (guafenisen, dextromethorphan) for cough and congestion. May also try Vicks Vaporub. AVOID decongestants like phenylephrine and pseudoephedrine. AVOID Afrin. Sarita Redder Please call if symptoms getting worse. INCREASE Lantus to 75 units. Let me know sugar readings in 2 weeks. Please call if cold symptoms getting worse after 7 days from onset, last longer than 10 days, having high fevers, having trouble breathing in chest or extremely ill.

## 2023-04-20 NOTE — PROGRESS NOTES
well-nourished, alert, no distress  EYES: negative findings: lids and lashes normal and conjunctivae and sclerae normal  ENT: normal TM's and external ear canals both ears  External nose and ears appear normal  Pharynx: red, cobblestoned. Exudates: None  Lips, mucosa, and tongue normal and teeth normal  Hearing grossly normal  NECK: Supple, symmetrical, trachea midline  Thyroid not enlarged, symmetric, no tenderness/mass/nodules  no cervical nodes, no supraclavicular nodes  LUNGS:  Breathing unlabored  clear to auscultation bilaterally,  good air movement  CARDIOVASC: regular rate and rhythm    Assessment/Plan:      Diagnosis Orders   1. Uncontrolled type 2 diabetes mellitus with hyperglycemia ([facility] [address]        2. Viral URI        Explained lack of efficacy of antibiotics in viral disease. INSTRUCTIONS  May take Mucinex (guaifenisen) and Robitussin DM (guafenisen, dextromethorphan) for cough and congestion. May also try Vicks Vaporub. AVOID decongestants like phenylephrine and pseudoephedrine. AVOID Afrin. Sayda Fish Please call if symptoms getting worse. INCREASE Lantus to 75 units. Let me know sugar readings in 2 weeks. Please call if cold symptoms getting worse after 7 days from onset, last longer than 10 days, having high fevers, having trouble breathing in chest or extremely ill.

## 2023-04-24 ENCOUNTER — TELEPHONE (OUTPATIENT)
Dept: FAMILY MEDICINE CLINIC | Age: 68
End: 2023-04-24

## 2023-04-24 RX ORDER — BENZONATATE 100 MG/1
100 CAPSULE ORAL 3 TIMES DAILY PRN
Qty: 30 CAPSULE | Refills: 0 | Status: SHIPPED | OUTPATIENT
Start: 2023-04-24 | End: 2023-05-04

## 2023-04-24 NOTE — TELEPHONE ENCOUNTER
Rx sent  Cough can linger for weeks sometimes unfortunately  If worsening call us to be seen in person

## 2023-04-24 NOTE — TELEPHONE ENCOUNTER
Pt calling in, He saw Dr. Estela Gonzalez on 4/20 and prescribed seroquel that the pharmacy told him was a sleep aide. He thought is was something for his wheezing. He felt worse over the weekend and went to the [facility] Saturday with trouble breathing. They prescribed him an Albuterol inhaler and cough medicine. He still have some wheezing from when he saw Dr. Estela Gonzalez and still has the cough. He would like to know what else to do to get rid of this.      Please Advise   Pt can be reached at [phone]

## 2023-04-24 NOTE — TELEPHONE ENCOUNTER
Behavioral Health Psychotherapy Progress Note    Psychotherapy Provided: Individual Psychotherapy     1  Moderately severe recurrent major depression ([address] )        2  Post traumatic stress disorder (PTSD)            Goals addressed in session: Goal 1 and Goal 2     DATA: Client became angry and began yelling at therapist that she feels as though no one at [facility] cares or supports her because we can't/won't force her son, Will, to talk to and attend therapy with Raf Deluna  Therapist reminded client that she cannot force Will to speak to client  Client stated that Will attends therapy at [facility], so this therapist needs to talk to [facility] therapist in order to find out why Will won't speak with client  Therapist stated that she cannot confirm or deny if Will or anyone else attends therapy at [facility]  Therapist reminded client that in the state of [place], that once an individual is 15years old, that they have the decision making abilities over their mental health treatment  Therapist challenged client's thinking that therapist did not support her, or care, because she cannot not force someone to speak to client  Therapist reminded client that what she, as client's therapist, can do, is help client to process her feelings around this issue  During this session, this clinician used the following therapeutic modalities: Cognitive Behavioral Therapy    Substance Abuse was not addressed during this session  If the client is diagnosed with a co-occurring substance use disorder, please indicate any changes in the frequency or amount of use: No change  Stage of change for addressing substance use diagnoses: No substance use/Not applicable    ASSESSMENT:  Shila Duckworth presents with a Angry mood  her affect is intense, which is congruent, with her mood and the content of the session  The client has made progress on their goals       Shila Duckworth presents with a none risk of suicide, none Done "risk of self-harm, and none risk of harm to others  For any risk assessment that surpasses a \"low\" rating, a safety plan must be developed  A safety plan was indicated: no  If yes, describe in detail NA    PLAN: Between sessions, Archana Montoya will use her coping skills  At the next session, the therapist will use Mindfulness-based Strategies and Supportive Psychotherapy to address coping skill building  Behavioral Health Treatment Plan and Discharge Planning: Archana Montoya is aware of and agrees to continue to work on their treatment plan  They have identified and are working toward their discharge goals  yes    Virtual Regular Visit    Verification of patient location:    Patient is located at Home in the following state in which I hold an active license [place]      Assessment/Plan:    Problem List Items Addressed This Visit        Other    Moderately severe recurrent major depression ([facility] [address] ) - Primary    Post traumatic stress disorder (PTSD)       Goals addressed in session: Goal 1 and Goal 2          Reason for visit is No chief complaint on file  Encounter provider Eddie Coelho, [facility]    Provider located at [address] [place] [address]  [phone]      Recent Visits  No visits were found meeting these conditions  Showing recent visits within past 7 days and meeting all other requirements  Future Appointments  No visits were found meeting these conditions  Showing future appointments within next 150 days and meeting all other requirements       The patient was identified by name and date of birth  Archana Montoya was informed that this is a telemedicine visit and that the visit is being conducted throughthe [employer] platform  She agrees to proceed     My office door was closed  No one else was in the room  She acknowledged consent and understanding of privacy and security of the video platform   " The patient has agreed to participate and understands they can discontinue the visit at any time  Patient is aware this is a billable service  Subjective  Ten Mcnulty is a 41 y o  female    HPI     No past medical history on file  No past surgical history on file  No current outpatient medications on file  No current facility-administered medications for this visit  No Known Allergies    Review of Systems    Video Exam    There were no vitals filed for this visit      Physical Exam     04/03/23  Start Time: 1104  Stop Time: 0859  Total Visit Time: 41 minutes

## 2023-04-24 NOTE — TELEPHONE ENCOUNTER
I called him, he said the inhaler is helping, but still has a cough. The cough med is not help at all. Can we send in the cough pearls for him? Pended med for tid for 10 days. If this is what you want.

## 2023-05-04 DIAGNOSIS — F51.01 PRIMARY INSOMNIA: ICD-10-CM

## 2023-05-05 RX ORDER — BENZONATATE 100 MG/1
CAPSULE ORAL
Qty: 30 CAPSULE | Refills: 0 | OUTPATIENT
Start: 2023-05-05

## 2023-05-08 RX ORDER — BENZONATATE 100 MG/1
CAPSULE ORAL
Qty: 30 CAPSULE | Refills: 0 | Status: SHIPPED | OUTPATIENT
Start: 2023-05-08

## 2023-05-08 RX ORDER — QUETIAPINE FUMARATE 25 MG/1
TABLET, FILM COATED ORAL
Qty: 30 TABLET | Refills: 5 | Status: SHIPPED | OUTPATIENT
Start: 2023-05-08 | End: 2023-05-31 | Stop reason: SDUPTHER

## 2023-05-24 DIAGNOSIS — E11.51 TYPE 2 DIABETES MELLITUS WITH DIABETIC PERIPHERAL ANGIOPATHY WITHOUT GANGRENE, WITH LONG-TERM CURRENT USE OF INSULIN (HCC): ICD-10-CM

## 2023-05-24 DIAGNOSIS — Z79.4 TYPE 2 DIABETES MELLITUS WITH DIABETIC PERIPHERAL ANGIOPATHY WITHOUT GANGRENE, WITH LONG-TERM CURRENT USE OF INSULIN (HCC): ICD-10-CM

## 2023-05-24 DIAGNOSIS — E11.9 TYPE 2 DIABETES MELLITUS WITHOUT COMPLICATION, WITHOUT LONG-TERM CURRENT USE OF INSULIN (HCC): ICD-10-CM

## 2023-05-24 RX ORDER — EZETIMIBE 10 MG/1
TABLET ORAL
Qty: 90 TABLET | Refills: 1 | Status: SHIPPED | OUTPATIENT
Start: 2023-05-24

## 2023-05-24 RX ORDER — METFORMIN HYDROCHLORIDE 500 MG/1
TABLET, EXTENDED RELEASE ORAL
Qty: 360 TABLET | Refills: 1 | Status: SHIPPED | OUTPATIENT
Start: 2023-05-24

## 2023-05-24 RX ORDER — VALSARTAN AND HYDROCHLOROTHIAZIDE 320; 12.5 MG/1; MG/1
TABLET, FILM COATED ORAL
Qty: 90 TABLET | Refills: 1 | Status: SHIPPED | OUTPATIENT
Start: 2023-05-24

## 2023-05-25 RX ORDER — VALSARTAN AND HYDROCHLOROTHIAZIDE 320; 12.5 MG/1; MG/1
1 TABLET, FILM COATED ORAL DAILY
Qty: 90 TABLET | Refills: 1 | OUTPATIENT
Start: 2023-05-25

## 2023-05-25 RX ORDER — INSULIN GLARGINE 100 [IU]/ML
INJECTION, SOLUTION SUBCUTANEOUS
Qty: 30 ML | Refills: 3 | Status: SHIPPED | OUTPATIENT
Start: 2023-05-25

## 2023-05-26 ENCOUNTER — TELEPHONE (OUTPATIENT)
Dept: FAMILY MEDICINE CLINIC | Age: 68
End: 2023-05-26

## 2023-05-31 DIAGNOSIS — F51.01 PRIMARY INSOMNIA: ICD-10-CM

## 2023-05-31 RX ORDER — ROSUVASTATIN CALCIUM 40 MG/1
40 TABLET, COATED ORAL DAILY
Qty: 90 TABLET | Refills: 0 | Status: SHIPPED | OUTPATIENT
Start: 2023-05-31 | End: 2023-06-21

## 2023-05-31 RX ORDER — QUETIAPINE FUMARATE 25 MG/1
TABLET, FILM COATED ORAL
Qty: 30 TABLET | Refills: 5 | Status: SHIPPED | OUTPATIENT
Start: 2023-05-31 | End: 2023-08-04

## 2023-05-31 RX ORDER — CLOPIDOGREL BISULFATE 75 MG/1
75 TABLET ORAL DAILY
Qty: 30 TABLET | Refills: 5 | Status: SHIPPED | OUTPATIENT
Start: 2023-05-31

## 2023-06-07 ENCOUNTER — OFFICE VISIT (OUTPATIENT)
Dept: FAMILY MEDICINE CLINIC | Age: 68
End: 2023-06-07

## 2023-06-07 VITALS
WEIGHT: 262 LBS | HEART RATE: 95 BPM | DIASTOLIC BLOOD PRESSURE: 70 MMHG | OXYGEN SATURATION: 98 % | SYSTOLIC BLOOD PRESSURE: 100 MMHG | HEIGHT: 71 IN | BODY MASS INDEX: 36.68 KG/M2

## 2023-06-07 DIAGNOSIS — R42 VERTIGO: Primary | ICD-10-CM

## 2023-06-07 DIAGNOSIS — J06.9 VIRAL URI: ICD-10-CM

## 2023-06-07 RX ORDER — MECLIZINE HCL 12.5 MG/1
12.5 TABLET ORAL 3 TIMES DAILY PRN
Qty: 15 TABLET | Refills: 0 | Status: SHIPPED | OUTPATIENT
Start: 2023-06-07 | End: 2023-06-17

## 2023-06-07 NOTE — PATIENT INSTRUCTIONS
be even more effective than the in-office procedure, perhaps because it is repeated every night for a week. The method (for the left side) is performed as shown on the figure to the right. One stays in each of the supine (lying down) positions for 30 seconds, and in the sitting upright position (top) for 1 minute. Thus, once cycle takes 2 1/2 minutes. Typically 3 cycles are performed just prior to going to sleep. It is best to do them at night rather than in the morning or midday, as if one becomes dizzy following the exercises, then it can resolve while one is sleeping. The mirror image of this procedure is used for the right ear.

## 2023-06-07 NOTE — PROGRESS NOTES
UPPER RESPIRATORY VISIT  Subjective:      Chief Complaint   Patient presents with    Cough     Cough started 1 month ago. Has gotten worse since Monday. Sinus Problem     Started Monday. Sinus problems, head congestion, ear pressure on right side  Dizzy if bends head down. NO Fever, has taken OTC meds for this. Tootie Ceja is a 80 y.o. male who presents for evaluation of symptoms of URI. Onset of symptoms was 2 days ago got dizzy, spinning when lean over. 2 episodes of vomiting about 15 minutes of eating in past 48 h.  R ear feels sore and tender. ASA seems to help. R cheek pain. Nasal green. No fever. Cough x 1 mo. CHART REVIEW  Health Maintenance   Topic Date Due    Shingles vaccine (1 of 2) Never done    COVID-19 Vaccine (4 - Booster for Moderna series) 01/05/2022    Diabetic retinal exam  03/12/2022    Lipids  04/01/2023    A1C test (Diabetic or Prediabetic)  06/13/2023    Diabetic foot exam  10/10/2023    GFR test (Diabetes, CKD 3-4, OR last GFR 15-59)  03/01/2024    Diabetic Alb to Cr ratio (uACR) test  03/13/2024    Depression Monitoring  03/13/2024    Annual Wellness Visit (AWV)  03/13/2024    DTaP/Tdap/Td vaccine (4 - Td or Tdap) 11/16/2027    Colorectal Cancer Screen  01/23/2033    Hepatitis A vaccine  Completed    Flu vaccine  Completed    Pneumococcal 65+ years Vaccine  Completed    Hepatitis C screen  Completed    Hib vaccine  Aged Out    Meningococcal (ACWY) vaccine  Aged Out    Hepatitis B vaccine  Discontinued    Pneumococcal 0-64 years Vaccine  Discontinued    HIV screen  Discontinued    Prostate Specific Antigen (PSA) Screening or Monitoring  Discontinued     Prior to Visit Medications    Medication Sig Taking?  Authorizing Provider   meclizine (ANTIVERT) 12.5 MG tablet Take 1 tablet by mouth 3 times daily as needed for Dizziness Yes Danielle Gross MD   QUEtiapine (SEROQUEL) 25 MG tablet TAKE 2 TABLETS BY MOUTH EVERY NIGHT Yes Danielle Gross MD   rosuvastatin (CRESTOR) 40 MG

## 2023-06-08 ENCOUNTER — TELEPHONE (OUTPATIENT)
Dept: FAMILY MEDICINE CLINIC | Age: 68
End: 2023-06-08

## 2023-06-08 RX ORDER — AMOXICILLIN AND CLAVULANATE POTASSIUM 875; 125 MG/1; MG/1
1 TABLET, FILM COATED ORAL 2 TIMES DAILY
Qty: 20 TABLET | Refills: 0 | Status: SHIPPED | OUTPATIENT
Start: 2023-06-08 | End: 2023-06-18

## 2023-06-08 NOTE — TELEPHONE ENCOUNTER
Please notify patient that prescription was e-scribed to pharmacy for antibiotic. Let me know if any neurologic symptoms. Watch the eyes. Will do eye drop in 5 days if not better.

## 2023-06-08 NOTE — TELEPHONE ENCOUNTER
Patient called in stating that he was here yesterday to see Dr. Ana Laura Araya    He said the pressure he was having on the right side of his head has now turned into it being very painful as he could not sleep last night. He said this is the same side he had brain surgery on years ago.     He also said he woke up with his eyes being crusty    Please Advise

## 2023-06-09 ENCOUNTER — TELEPHONE (OUTPATIENT)
Dept: FAMILY MEDICINE CLINIC | Age: 68
End: 2023-06-09

## 2023-06-09 DIAGNOSIS — H92.01 RIGHT EAR PAIN: ICD-10-CM

## 2023-06-09 DIAGNOSIS — R42 VERTIGO: Primary | ICD-10-CM

## 2023-06-09 RX ORDER — SULFACETAMIDE SODIUM 100 MG/ML
1 SOLUTION/ DROPS OPHTHALMIC 4 TIMES DAILY
Qty: 1 EACH | Refills: 0 | Status: SHIPPED | OUTPATIENT
Start: 2023-06-09 | End: 2023-06-19

## 2023-06-09 NOTE — TELEPHONE ENCOUNTER
Spouse calling in, she would like to know if Dr. Elyse Singleton would send in eye drops for pt. Pt's eyes are blood shot and crusty.     Pharm Confirmed- [facility]    Please Advise  Spouse can be reached at [phone]

## 2023-06-09 NOTE — TELEPHONE ENCOUNTER
Pt called in advised something was sent to pharmacy   Pt would like a referral to a ENT pt wants his ear checked out farther       Please advise

## 2023-06-09 NOTE — TELEPHONE ENCOUNTER
Please notify patient that prescription was e-scribed to pharmacy   I hope you are feeling better soon!

## 2023-06-09 NOTE — TELEPHONE ENCOUNTER
Please call and give pt the ref info     [address], and [address] N. Milo Lockhart MD   [address] [place] [address], [address] [place] [address]   [facility]   Ph: [phone]

## 2023-06-12 ENCOUNTER — TELEPHONE (OUTPATIENT)
Dept: FAMILY MEDICINE CLINIC | Age: 68
End: 2023-06-12

## 2023-06-12 NOTE — TELEPHONE ENCOUNTER
Patient was here on 6/7/2023 for a sinus infection but he feels like his right ear is still painful & feels clogged. He still has a hard time hearing out of his ear. He wanted to know if he should go see a specialist or come back into our office?     Please Advise

## 2023-06-21 RX ORDER — ROSUVASTATIN CALCIUM 40 MG/1
TABLET, COATED ORAL
Qty: 90 TABLET | Refills: 0 | Status: SHIPPED | OUTPATIENT
Start: 2023-06-21

## 2023-06-26 RX ORDER — CLOPIDOGREL BISULFATE 75 MG/1
TABLET ORAL
Qty: 90 TABLET | Refills: 1 | OUTPATIENT
Start: 2023-06-26

## 2023-06-26 RX ORDER — ASPIRIN 81 MG/1
TABLET, COATED ORAL
Qty: 90 TABLET | Refills: 1 | OUTPATIENT
Start: 2023-06-26

## 2023-06-28 ENCOUNTER — OFFICE VISIT (OUTPATIENT)
Dept: FAMILY MEDICINE CLINIC | Age: 68
End: 2023-06-28
Payer: MEDICARE

## 2023-06-28 VITALS
WEIGHT: 266 LBS | RESPIRATION RATE: 16 BRPM | HEART RATE: 95 BPM | HEIGHT: 71 IN | BODY MASS INDEX: 37.24 KG/M2 | TEMPERATURE: 97.8 F | OXYGEN SATURATION: 97 %

## 2023-06-28 DIAGNOSIS — L40.0 PSORIASIS VULGARIS: Primary | ICD-10-CM

## 2023-06-28 PROCEDURE — G8417 CALC BMI ABV UP PARAM F/U: HCPCS | Performed by: FAMILY MEDICINE

## 2023-06-28 PROCEDURE — 1036F TOBACCO NON-USER: CPT | Performed by: FAMILY MEDICINE

## 2023-06-28 PROCEDURE — 99213 OFFICE O/P EST LOW 20 MIN: CPT | Performed by: FAMILY MEDICINE

## 2023-06-28 PROCEDURE — 1123F ACP DISCUSS/DSCN MKR DOCD: CPT | Performed by: FAMILY MEDICINE

## 2023-06-28 PROCEDURE — G8427 DOCREV CUR MEDS BY ELIG CLIN: HCPCS | Performed by: FAMILY MEDICINE

## 2023-06-28 PROCEDURE — 3017F COLORECTAL CA SCREEN DOC REV: CPT | Performed by: FAMILY MEDICINE

## 2023-06-28 RX ORDER — CALCIPOTRIENE 50 UG/G
CREAM TOPICAL
Qty: 120 G | Refills: 4 | Status: SHIPPED | OUTPATIENT
Start: 2023-06-28

## 2023-06-30 RX ORDER — CANAGLIFLOZIN 300 MG/1
TABLET, FILM COATED ORAL
Qty: 30 TABLET | Refills: 5 | Status: SHIPPED | OUTPATIENT
Start: 2023-06-30

## 2023-07-05 ENCOUNTER — TELEPHONE (OUTPATIENT)
Dept: FAMILY MEDICINE CLINIC | Age: 68
End: 2023-07-05

## 2023-07-05 DIAGNOSIS — M54.42 CHRONIC BILATERAL LOW BACK PAIN WITH BILATERAL SCIATICA: ICD-10-CM

## 2023-07-05 DIAGNOSIS — M54.41 CHRONIC BILATERAL LOW BACK PAIN WITH BILATERAL SCIATICA: ICD-10-CM

## 2023-07-05 DIAGNOSIS — G89.4 CHRONIC PAIN SYNDROME: ICD-10-CM

## 2023-07-05 DIAGNOSIS — G89.29 CHRONIC BILATERAL LOW BACK PAIN WITH BILATERAL SCIATICA: ICD-10-CM

## 2023-07-05 DIAGNOSIS — L40.0 PSORIASIS VULGARIS: ICD-10-CM

## 2023-07-05 RX ORDER — GABAPENTIN 400 MG/1
CAPSULE ORAL
Qty: 90 CAPSULE | Refills: 5 | Status: SHIPPED | OUTPATIENT
Start: 2023-07-05 | End: 2024-01-01

## 2023-07-05 NOTE — TELEPHONE ENCOUNTER
Pt calling in, the redness is gone on his spot, doing better, but peeling. Stated that the dovonex cream was going to be $400 out of pocket. He did not picket it up but was able to  the kenalog ointment.     AIDEN

## 2023-07-05 NOTE — TELEPHONE ENCOUNTER
Only used the Kenolog ointment. The other was tooo expensive. Spot is better, but it is peeling.  Would this be normal?

## 2023-07-14 ENCOUNTER — TELEPHONE (OUTPATIENT)
Dept: FAMILY MEDICINE CLINIC | Age: 68
End: 2023-07-14

## 2023-07-14 ENCOUNTER — TELEMEDICINE (OUTPATIENT)
Dept: FAMILY MEDICINE CLINIC | Age: 68
End: 2023-07-14
Payer: MEDICARE

## 2023-07-14 DIAGNOSIS — L40.0 PSORIASIS VULGARIS: ICD-10-CM

## 2023-07-14 PROCEDURE — 3017F COLORECTAL CA SCREEN DOC REV: CPT | Performed by: FAMILY MEDICINE

## 2023-07-14 PROCEDURE — G8427 DOCREV CUR MEDS BY ELIG CLIN: HCPCS | Performed by: FAMILY MEDICINE

## 2023-07-14 PROCEDURE — 99213 OFFICE O/P EST LOW 20 MIN: CPT | Performed by: FAMILY MEDICINE

## 2023-07-14 PROCEDURE — 1123F ACP DISCUSS/DSCN MKR DOCD: CPT | Performed by: FAMILY MEDICINE

## 2023-07-14 RX ORDER — HYDROXYZINE HYDROCHLORIDE 25 MG/1
25 TABLET, FILM COATED ORAL EVERY 8 HOURS PRN
Qty: 30 TABLET | Refills: 0 | Status: SHIPPED | OUTPATIENT
Start: 2023-07-14 | End: 2023-07-24

## 2023-07-14 NOTE — TELEPHONE ENCOUNTER
He called on 7/5/23 about this saying:                  Pt calling in, the redness is gone on his spot, doing better, but peeling. Stated that the dovonex cream was going to be $400 out of pocket. He did not picket it up but was able to  the kenalog ointment. So the other med just cost too much. Did you want to see him again.

## 2023-07-14 NOTE — TELEPHONE ENCOUNTER
Spoke to the patient he stated he has an appt with Derm on the 19th. The patient is wanting to see if he can get a cream or a medication he has a bad rash that is hot to touch, red and itchy       The patient is wanting to know if the diabetic medication is causing the rash.

## 2023-07-14 NOTE — TELEPHONE ENCOUNTER
Pt called back in stated he has no appointment with dermatology yet   Rash is bothering the pt now it is just red and very hot and sore to touch it is around waist and around hips and underneath each arm   Pt stated this is unbearable and painful   Pt wants to know what can Dr Briana Son do prescription carrera?

## 2023-07-14 NOTE — TELEPHONE ENCOUNTER
----- Message from Christ Fulton sent at 7/13/2023  4:32 PM EDT -----  Subject: Medication Problem    Medication: triamcinolone (KENALOG) 0.1 % ointment  Dosage: unknown  Ordering Provider: Abiodun Turner    Question/Problem: This medication only gives a little bit of help but   still hurts. Medication: calcipotriene (DOVONEX) 0.005 % cream   Dosage: unknown   Ordering Provider: Abiodun Turner     Question/Problem:  This was too expensive     Pharmacy: [address] [place] [address],[address] [place] [address], [address] [place] [address] [phone]    ---------------------------------------------------------------------------  --------------  Adam CLEMENT  [phone]; OK to leave message on voicemail  ---------------------------------------------------------------------------  --------------    SCRIPT ANSWERS  Relationship to Patient: Self

## 2023-07-14 NOTE — PROGRESS NOTES
7/14/2023    TELEHEALTH EVALUATION -- Audio/Visual (During ATZTW-95 public health emergency)    Chief Complaint   Patient presents with    Rash     rash     1. Psoriasis vulgaris      Having psoriasis flare after oral steroids. Psoriasis now flaring with the topical steroid. Dovonex was too expensive. Now itchy, some pain, hot to touch over arms, legs, abd. Using topical sterids x 2 weeks. No weeping. No fever. HISTORY:  Patient's medications, allergies, past medical, and social histories were reviewed and updated as appropriate. CHART REVIEW  Health Maintenance   Topic Date Due    Shingles vaccine (1 of 2) Never done    COVID-19 Vaccine (4 - Booster for Moderna series) 01/05/2022    Diabetic retinal exam  03/12/2022    Lipids  04/01/2023    A1C test (Diabetic or Prediabetic)  06/13/2023    Flu vaccine (1) 08/01/2023    Diabetic foot exam  10/10/2023    GFR test (Diabetes, CKD 3-4, OR last GFR 15-59)  03/01/2024    Diabetic Alb to Cr ratio (uACR) test  03/13/2024    Depression Monitoring  03/13/2024    Annual Wellness Visit (AWV)  03/13/2024    DTaP/Tdap/Td vaccine (4 - Td or Tdap) 11/16/2027    Colorectal Cancer Screen  01/23/2033    Hepatitis A vaccine  Completed    Pneumococcal 65+ years Vaccine  Completed    Hepatitis C screen  Completed    Hib vaccine  Aged Out    Meningococcal (ACWY) vaccine  Aged Out    Hepatitis B vaccine  Discontinued    Pneumococcal 0-64 years Vaccine  Discontinued    HIV screen  Discontinued    Prostate Specific Antigen (PSA) Screening or Monitoring  Discontinued     The 10-year ASCVD risk score (Osmel ZHANG, et al., 2019) is: 22.7%    Values used to calculate the score:      Age: 80 years      Sex: Male      Is Non- : No      Diabetic: Yes      Tobacco smoker: No      Systolic Blood Pressure: 603 mmHg      Is BP treated: Yes      HDL Cholesterol: 42 mg/dL      Total Cholesterol: 156 mg/dL  Prior to Visit Medications    Medication Sig Taking?  Authorizing

## 2023-07-14 NOTE — TELEPHONE ENCOUNTER
Has he been able to get in with dermatology? He probably need a systemic therapy rather than topical alone. All other prescription topicals will be expensive. He can get OTC tar products in various forms to apply twice a day . Watch for staining. Look for OTC psoriasis creams containing coal tar. Don't forget to moisturize as skin is getting irritated from the psoriasis. Eucerin lotion or ointment is great.

## 2023-07-17 NOTE — TELEPHONE ENCOUNTER
I submitted to [facility] for [facility] and they state that eligibility couldn't be found for this patient. Please verify pharmacy and demographics. Please advise. If this requires a response please respond to the pool. [facility]). Please advise patient thank you.

## 2023-07-17 NOTE — TELEPHONE ENCOUNTER
I called his pharmacist. They said hi pharmacy coverage is [employer] Part D.  ID # [id number]  [facility] # [id number]  N # [id number]  No group number. Can you try this for his Calcipotriene (Dovonex) 0.005% to use twice daily. ICD10:  L40.0 - Psoriasis vulgaris  Thanks.

## 2023-07-19 DIAGNOSIS — Z79.4 TYPE 2 DIABETES MELLITUS WITH DIABETIC PERIPHERAL ANGIOPATHY WITHOUT GANGRENE, WITH LONG-TERM CURRENT USE OF INSULIN (HCC): ICD-10-CM

## 2023-07-19 DIAGNOSIS — E11.51 TYPE 2 DIABETES MELLITUS WITH DIABETIC PERIPHERAL ANGIOPATHY WITHOUT GANGRENE, WITH LONG-TERM CURRENT USE OF INSULIN (HCC): ICD-10-CM

## 2023-07-19 RX ORDER — INSULIN GLARGINE 100 [IU]/ML
INJECTION, SOLUTION SUBCUTANEOUS
Qty: 70 ML | OUTPATIENT
Start: 2023-07-19

## 2023-07-20 DIAGNOSIS — Z79.4 TYPE 2 DIABETES MELLITUS WITH DIABETIC PERIPHERAL ANGIOPATHY WITHOUT GANGRENE, WITH LONG-TERM CURRENT USE OF INSULIN (HCC): ICD-10-CM

## 2023-07-20 DIAGNOSIS — E11.51 TYPE 2 DIABETES MELLITUS WITH DIABETIC PERIPHERAL ANGIOPATHY WITHOUT GANGRENE, WITH LONG-TERM CURRENT USE OF INSULIN (HCC): ICD-10-CM

## 2023-07-20 RX ORDER — SEMAGLUTIDE 1.34 MG/ML
INJECTION, SOLUTION SUBCUTANEOUS
Qty: 3 ML | Refills: 2 | Status: SHIPPED | OUTPATIENT
Start: 2023-07-20

## 2023-08-04 ENCOUNTER — OFFICE VISIT (OUTPATIENT)
Dept: FAMILY MEDICINE CLINIC | Age: 68
End: 2023-08-04
Payer: MEDICARE

## 2023-08-04 VITALS
BODY MASS INDEX: 37.66 KG/M2 | OXYGEN SATURATION: 96 % | HEIGHT: 71 IN | TEMPERATURE: 98.7 F | HEART RATE: 100 BPM | WEIGHT: 269 LBS | DIASTOLIC BLOOD PRESSURE: 60 MMHG | SYSTOLIC BLOOD PRESSURE: 106 MMHG

## 2023-08-04 DIAGNOSIS — R42 VERTIGO: ICD-10-CM

## 2023-08-04 DIAGNOSIS — L30.9 DERMATITIS: Primary | ICD-10-CM

## 2023-08-04 DIAGNOSIS — Z79.4 TYPE 2 DIABETES MELLITUS WITH DIABETIC PERIPHERAL ANGIOPATHY WITHOUT GANGRENE, WITH LONG-TERM CURRENT USE OF INSULIN (HCC): ICD-10-CM

## 2023-08-04 DIAGNOSIS — E11.51 TYPE 2 DIABETES MELLITUS WITH DIABETIC PERIPHERAL ANGIOPATHY WITHOUT GANGRENE, WITH LONG-TERM CURRENT USE OF INSULIN (HCC): ICD-10-CM

## 2023-08-04 DIAGNOSIS — E11.9 TYPE 2 DIABETES MELLITUS WITHOUT COMPLICATION, WITHOUT LONG-TERM CURRENT USE OF INSULIN (HCC): ICD-10-CM

## 2023-08-04 DIAGNOSIS — L40.0 PSORIASIS VULGARIS: ICD-10-CM

## 2023-08-04 LAB — HBA1C MFR BLD: 7 %

## 2023-08-04 PROCEDURE — G8417 CALC BMI ABV UP PARAM F/U: HCPCS | Performed by: FAMILY MEDICINE

## 2023-08-04 PROCEDURE — 3074F SYST BP LT 130 MM HG: CPT | Performed by: FAMILY MEDICINE

## 2023-08-04 PROCEDURE — 3017F COLORECTAL CA SCREEN DOC REV: CPT | Performed by: FAMILY MEDICINE

## 2023-08-04 PROCEDURE — 99214 OFFICE O/P EST MOD 30 MIN: CPT | Performed by: FAMILY MEDICINE

## 2023-08-04 PROCEDURE — 1036F TOBACCO NON-USER: CPT | Performed by: FAMILY MEDICINE

## 2023-08-04 PROCEDURE — 1123F ACP DISCUSS/DSCN MKR DOCD: CPT | Performed by: FAMILY MEDICINE

## 2023-08-04 PROCEDURE — 3051F HG A1C>EQUAL 7.0%<8.0%: CPT | Performed by: FAMILY MEDICINE

## 2023-08-04 PROCEDURE — 83037 HB GLYCOSYLATED A1C HOME DEV: CPT | Performed by: FAMILY MEDICINE

## 2023-08-04 PROCEDURE — 2022F DILAT RTA XM EVC RTNOPTHY: CPT | Performed by: FAMILY MEDICINE

## 2023-08-04 PROCEDURE — 3078F DIAST BP <80 MM HG: CPT | Performed by: FAMILY MEDICINE

## 2023-08-04 PROCEDURE — G8427 DOCREV CUR MEDS BY ELIG CLIN: HCPCS | Performed by: FAMILY MEDICINE

## 2023-08-04 RX ORDER — METFORMIN HYDROCHLORIDE 500 MG/1
TABLET, EXTENDED RELEASE ORAL
Qty: 360 TABLET | Refills: 1 | Status: SHIPPED | COMMUNITY
Start: 2023-08-04

## 2023-08-04 RX ORDER — MECLIZINE HCL 12.5 MG/1
12.5 TABLET ORAL 3 TIMES DAILY PRN
Qty: 30 TABLET | Refills: 0 | Status: SHIPPED | OUTPATIENT
Start: 2023-08-04 | End: 2023-08-14

## 2023-08-04 RX ORDER — LIDOCAINE 50 MG/G
OINTMENT TOPICAL
Qty: 50 G | Refills: 0 | Status: SHIPPED | OUTPATIENT
Start: 2023-08-04

## 2023-08-04 RX ORDER — SEMAGLUTIDE 1.34 MG/ML
INJECTION, SOLUTION SUBCUTANEOUS
Qty: 3 ML | Refills: 2 | Status: SHIPPED | OUTPATIENT
Start: 2023-08-04

## 2023-08-04 NOTE — PROGRESS NOTES
CHRONIC CONDITION FOLLOW-UP     Assessment and Plan:      Diagnosis Orders   1. Dermatitis        2. Type 2 diabetes mellitus with diabetic peripheral angiopathy without gangrene, with long-term current use of insulin (HCC)  POCT glycosylated hemoglobin (Hb A1C)    metFORMIN (GLUCOPHAGE-XR) 500 MG extended release tablet      3. Vertigo  meclizine (ANTIVERT) 12.5 MG tablet      4. Psoriasis vulgaris  lidocaine (XYLOCAINE) 5 % ointment      worse     Continue current Tx plan. Any changes marked below. INSTRUCTIONS  NEXT APPOINTMENT: Please schedule check-up in 2 weeks with Dr. Mae Miller or her NP, Bennett Sotomayor. Can try lidocaine on the worst spots. OTC loratidine 10 mg twice a day to help with symptoms. HOLD ozempic for 2 weeks (3 weeks in between shots)  HOLD Crestor, Zetia, valsartan HCT,aspirin for 10 days. Call if BP running > 150/100. IF better rash, will add back 1 med every 10 days. IF not better, can resume pills (not ozempic) and will try off the Invokana, gabapentin  and plavix    Subjective:      Chief Complaint   Patient presents with    Dizziness     Discuss vertigo and look at arms, has hives on legs arms and buttocks. Dayo Barbosa is an 80 y.o. male who presents for follow up    Complaints:   2 d ago saw derm. Only took losartan HCT before and rash seemed better then flared after other meds. Newest med is ozempic  Getting dizzy, fell 4 d ago after bend over, spinning and fell in bathroom. CHART REVIEW   reports that he has never smoked. He has been exposed to tobacco smoke.  He has never used smokeless tobacco.  Health Maintenance Due   Topic Date Due    Shingles vaccine (1 of 2) Never done    COVID-19 Vaccine (4 - Booster for Moderna series) 01/05/2022    Diabetic retinal exam  03/12/2022    Lipids  04/01/2023    Flu vaccine (1) 08/01/2023     Current Outpatient Medications   Medication Instructions    aspirin 81 mg, Oral, DAILY    blood glucose monitor kit and supplies Test 2 times a

## 2023-08-04 NOTE — PATIENT INSTRUCTIONS
INSTRUCTIONS  NEXT APPOINTMENT: Please schedule check-up in 2 weeks with Dr. Bennett Welch or her NP, Shanell Beasley. Can try lidocaine on the worst spots. OTC loratidine 10 mg twice a day to help with symptoms. HOLD ozempic for 2 weeks (3 weeks in between shots)  HOLD Crestor, Zetia, valsartan HCT,aspirin for 10 days. Call if BP running > 150/100. IF better rash, will add back 1 med every 10 days.   IF not better, can resume pills (not ozempic) and will try off the Invokana, gabapentin and plavix for 10 days

## 2023-08-06 DIAGNOSIS — F51.01 PRIMARY INSOMNIA: ICD-10-CM

## 2023-08-07 RX ORDER — QUETIAPINE FUMARATE 25 MG/1
TABLET, FILM COATED ORAL
Qty: 180 TABLET | Refills: 2 | Status: SHIPPED | OUTPATIENT
Start: 2023-08-07 | End: 2023-09-06

## 2023-08-18 ENCOUNTER — OFFICE VISIT (OUTPATIENT)
Dept: FAMILY MEDICINE CLINIC | Age: 68
End: 2023-08-18
Payer: MEDICARE

## 2023-08-18 VITALS
WEIGHT: 262 LBS | HEART RATE: 60 BPM | DIASTOLIC BLOOD PRESSURE: 60 MMHG | HEIGHT: 71 IN | BODY MASS INDEX: 36.68 KG/M2 | SYSTOLIC BLOOD PRESSURE: 120 MMHG | OXYGEN SATURATION: 98 %

## 2023-08-18 DIAGNOSIS — F41.9 ANXIETY: ICD-10-CM

## 2023-08-18 DIAGNOSIS — L30.9 DERMATITIS: Primary | ICD-10-CM

## 2023-08-18 DIAGNOSIS — R42 VERTIGO: ICD-10-CM

## 2023-08-18 PROCEDURE — 3078F DIAST BP <80 MM HG: CPT | Performed by: FAMILY MEDICINE

## 2023-08-18 PROCEDURE — G8417 CALC BMI ABV UP PARAM F/U: HCPCS | Performed by: FAMILY MEDICINE

## 2023-08-18 PROCEDURE — 1036F TOBACCO NON-USER: CPT | Performed by: FAMILY MEDICINE

## 2023-08-18 PROCEDURE — 3017F COLORECTAL CA SCREEN DOC REV: CPT | Performed by: FAMILY MEDICINE

## 2023-08-18 PROCEDURE — 99213 OFFICE O/P EST LOW 20 MIN: CPT | Performed by: FAMILY MEDICINE

## 2023-08-18 PROCEDURE — 1123F ACP DISCUSS/DSCN MKR DOCD: CPT | Performed by: FAMILY MEDICINE

## 2023-08-18 PROCEDURE — G8427 DOCREV CUR MEDS BY ELIG CLIN: HCPCS | Performed by: FAMILY MEDICINE

## 2023-08-18 PROCEDURE — 3074F SYST BP LT 130 MM HG: CPT | Performed by: FAMILY MEDICINE

## 2023-08-18 RX ORDER — MECLIZINE HCL 12.5 MG/1
12.5 TABLET ORAL 3 TIMES DAILY PRN
Qty: 30 TABLET | Refills: 0 | Status: SHIPPED | OUTPATIENT
Start: 2023-08-18 | End: 2023-08-28
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
## 2023-08-18 NOTE — PATIENT INSTRUCTIONS
INSTRUCTIONS  NEXT APPOINTMENT: Please schedule check-up in 1 month with Dr. Cameron Ulloa or her NP, Chichi Duckworth. PLEASE TAKE THIS FORM TO CHECK-OUT WINDOW TO SCHEDULE NEXT VISIT. Add back one a month. Valsartan hct started on 8/17 (if returns will take out hydrochlorothioazide  Aspirin start back now since not really been off and doing better  Crestor start back 9-14  Zetia start back 10-12  Consider psychiatry referral to see what medication regimen would be best for anxiety. Please ask MA for Obie Slater Psych NP to get pt in for anxiety disorder. Thanks!

## 2023-08-30 ENCOUNTER — TELEPHONE (OUTPATIENT)
Dept: FAMILY MEDICINE CLINIC | Age: 68
End: 2023-08-30

## 2023-08-30 ENCOUNTER — TELEPHONE (OUTPATIENT)
Dept: CARDIOLOGY CLINIC | Age: 68
End: 2023-08-30

## 2023-08-30 RX ORDER — ASPIRIN 81 MG/1
TABLET, COATED ORAL
Qty: 90 TABLET | Refills: 1 | Status: SHIPPED | OUTPATIENT
Start: 2023-08-30

## 2023-08-30 NOTE — TELEPHONE ENCOUNTER
Pt states that his BP has been running high (157/89) despite restarting Diovan HCT a few days ago. States that he has been getting dizzy (near syncopal) at times. BP above with sx. Describes chest discomfort as dull, right sided discomfort with no exacerbating or relieving factors, occurs randomly. Denies exertional component. Instructed him to go to the ER if pain worsens or is consistent. Will discuss with Dr. Angelina Martins in AM. Pt verbalized understanding.  Irving RADFORD

## 2023-08-30 NOTE — TELEPHONE ENCOUNTER
José Ponce called stating that he has been experiencing some discomfort at the incision site post TAVR, done in April 2023. He mentioned that at times he would get lightheaded and thought initially that it was his BP. He described the pain on the right side of his chest, as sometimes tight and achy. José Ponce is looking to make an appointment with Dr. Kaylah Laughlin as he feels it is related to the TAVR. He is scheduled to follow with Dr. Carlos Manuel Bourgeois in October for his 6 month f/u. Please advise.     Rich's callback: [phone]

## 2023-08-30 NOTE — TELEPHONE ENCOUNTER
Pt stated he is feeling weird light headed  has slight pain in his chest above the incision where he had a bypass done if pt gets up to quick bend over come back up he gets dizzy pt bp seems to be high he started taking the bp meds again he is not sure what Dr wants him to go back too  Pt called the cardiologist this morning all they said they would get back to the patient   Sending a message per ma     Please advise

## 2023-08-31 ENCOUNTER — TELEPHONE (OUTPATIENT)
Dept: FAMILY MEDICINE CLINIC | Age: 68
End: 2023-08-31

## 2023-08-31 RX ORDER — CARVEDILOL 6.25 MG/1
6.25 TABLET ORAL 2 TIMES DAILY
Qty: 180 TABLET | Refills: 3 | Status: SHIPPED | OUTPATIENT
Start: 2023-08-31

## 2023-08-31 NOTE — TELEPHONE ENCOUNTER
Pt calling in, he spoke with Dr. Davina Keyes office about his bp and was advised to keep track of bp's. Pt's A1C went down and he would like to know if he can start back up on ozempic since the rash on his skin is going away. On 8/14 he was told to stop to see if it was the cause of his rash.     [facility]    Please advise  Pt can be reached at [phone]

## 2023-08-31 NOTE — TELEPHONE ENCOUNTER
Discussed symptoms with Dr. Ruthie Gutierrez. Called pt to inform him to begin coreg 6.25mg BID and continue to monitor BP/HR at home. He states that he has had no further chest discomfort.  Irving RADFORD

## 2023-08-31 NOTE — TELEPHONE ENCOUNTER
Informed of this.  He can restart the ozempic and med was sent with refills on 8/4/23 so check with pharmacy

## 2023-09-06 ENCOUNTER — OFFICE VISIT (OUTPATIENT)
Dept: FAMILY MEDICINE CLINIC | Age: 68
End: 2023-09-06
Payer: MEDICARE

## 2023-09-06 VITALS
BODY MASS INDEX: 37.38 KG/M2 | HEART RATE: 73 BPM | HEIGHT: 71 IN | SYSTOLIC BLOOD PRESSURE: 134 MMHG | DIASTOLIC BLOOD PRESSURE: 72 MMHG | WEIGHT: 267 LBS | OXYGEN SATURATION: 98 %

## 2023-09-06 DIAGNOSIS — E11.51 TYPE 2 DIABETES MELLITUS WITH DIABETIC PERIPHERAL ANGIOPATHY WITHOUT GANGRENE, WITH LONG-TERM CURRENT USE OF INSULIN (HCC): ICD-10-CM

## 2023-09-06 DIAGNOSIS — E11.65 UNCONTROLLED TYPE 2 DIABETES MELLITUS WITH HYPERGLYCEMIA (HCC): ICD-10-CM

## 2023-09-06 DIAGNOSIS — L30.9 DERMATITIS: ICD-10-CM

## 2023-09-06 DIAGNOSIS — R42 VERTIGO: ICD-10-CM

## 2023-09-06 DIAGNOSIS — L40.0 PSORIASIS VULGARIS: Primary | ICD-10-CM

## 2023-09-06 DIAGNOSIS — Z79.4 TYPE 2 DIABETES MELLITUS WITH DIABETIC PERIPHERAL ANGIOPATHY WITHOUT GANGRENE, WITH LONG-TERM CURRENT USE OF INSULIN (HCC): ICD-10-CM

## 2023-09-06 PROCEDURE — 1123F ACP DISCUSS/DSCN MKR DOCD: CPT | Performed by: FAMILY MEDICINE

## 2023-09-06 PROCEDURE — 99213 OFFICE O/P EST LOW 20 MIN: CPT | Performed by: FAMILY MEDICINE

## 2023-09-06 PROCEDURE — 2022F DILAT RTA XM EVC RTNOPTHY: CPT | Performed by: FAMILY MEDICINE

## 2023-09-06 PROCEDURE — 3017F COLORECTAL CA SCREEN DOC REV: CPT | Performed by: FAMILY MEDICINE

## 2023-09-06 PROCEDURE — G8427 DOCREV CUR MEDS BY ELIG CLIN: HCPCS | Performed by: FAMILY MEDICINE

## 2023-09-06 PROCEDURE — 3075F SYST BP GE 130 - 139MM HG: CPT | Performed by: FAMILY MEDICINE

## 2023-09-06 PROCEDURE — G8417 CALC BMI ABV UP PARAM F/U: HCPCS | Performed by: FAMILY MEDICINE

## 2023-09-06 PROCEDURE — 3051F HG A1C>EQUAL 7.0%<8.0%: CPT | Performed by: FAMILY MEDICINE

## 2023-09-06 PROCEDURE — 3078F DIAST BP <80 MM HG: CPT | Performed by: FAMILY MEDICINE

## 2023-09-06 PROCEDURE — 1036F TOBACCO NON-USER: CPT | Performed by: FAMILY MEDICINE

## 2023-09-06 RX ORDER — MECLIZINE HCL 12.5 MG/1
TABLET ORAL
COMMUNITY
Start: 2023-08-30

## 2023-09-06 RX ORDER — ASPIRIN 81 MG/1
81 TABLET ORAL DAILY
Qty: 90 TABLET | Refills: 1 | COMMUNITY
Start: 2023-09-06

## 2023-09-06 NOTE — PATIENT INSTRUCTIONS
INSTRUCTIONS  NEXT APPOINTMENT: Please schedule check-up in 3 months with Dr. Mae Miller or her NP, Bennett Sotomayor. Try decreasing meclizine to twice a day for a few days and the stop unless dizziness flares up. Then take only if flares up again. Apolonio Fothergill Please ask MA for Nidhi Lucas Psych NP to get pt in for anxiety disorder. Thanks! Never contacted after last visit. Resume aspirin and crestor.

## 2023-09-15 ENCOUNTER — TELEPHONE (OUTPATIENT)
Dept: FAMILY MEDICINE CLINIC | Age: 68
End: 2023-09-15

## 2023-09-15 DIAGNOSIS — E11.51 TYPE 2 DIABETES MELLITUS WITH DIABETIC PERIPHERAL ANGIOPATHY WITHOUT GANGRENE, WITH LONG-TERM CURRENT USE OF INSULIN (HCC): Primary | ICD-10-CM

## 2023-09-15 DIAGNOSIS — Z79.4 TYPE 2 DIABETES MELLITUS WITH DIABETIC PERIPHERAL ANGIOPATHY WITHOUT GANGRENE, WITH LONG-TERM CURRENT USE OF INSULIN (HCC): Primary | ICD-10-CM

## 2023-09-15 NOTE — TELEPHONE ENCOUNTER
Pt needs a temporary supply sent of syringes until his come in  the mail late next week pt [facility]

## 2023-09-18 ENCOUNTER — TELEPHONE (OUTPATIENT)
Dept: FAMILY MEDICINE CLINIC | Age: 68
End: 2023-09-18

## 2023-09-18 NOTE — TELEPHONE ENCOUNTER
Pt calling in, he would like to know is he should get the rsv vaccine. He is not sure what the vaccine is or for but saw it.     Please advise  Pt can be reached at [phone]

## 2023-09-19 NOTE — TELEPHONE ENCOUNTER
RSV (Respiratory syncytial virus) causes colds but can also cause viral pneumonia. Those at risk of more severe infection are the very you and those over 60 AND have chronic medical conditions such as heart, lung, kidney, liver, or neuromuscular disease; diabetes, immunocompromise, frail, or live in nursing home. It is a single dose that provides coverage for at least 2 years. Side effects are pain at injection site, fatigue, aches and headache. May be severe enough to interrupt daily activities in 1-3% for a day or 2. Uncertain about Medicare coverage at this time. I would say he is a candidate.  Would check cost.

## 2023-10-01 DIAGNOSIS — Z79.4 TYPE 2 DIABETES MELLITUS WITH DIABETIC PERIPHERAL ANGIOPATHY WITHOUT GANGRENE, WITH LONG-TERM CURRENT USE OF INSULIN (HCC): ICD-10-CM

## 2023-10-01 DIAGNOSIS — E11.51 TYPE 2 DIABETES MELLITUS WITH DIABETIC PERIPHERAL ANGIOPATHY WITHOUT GANGRENE, WITH LONG-TERM CURRENT USE OF INSULIN (HCC): ICD-10-CM

## 2023-10-02 RX ORDER — INSULIN GLARGINE 100 [IU]/ML
INJECTION, SOLUTION SUBCUTANEOUS
Qty: 60 ML | Refills: 1 | Status: SHIPPED | OUTPATIENT
Start: 2023-10-02

## 2023-10-04 ENCOUNTER — TELEPHONE (OUTPATIENT)
Dept: FAMILY MEDICINE CLINIC | Age: 68
End: 2023-10-04

## 2023-10-04 RX ORDER — CANAGLIFLOZIN 300 MG/1
TABLET, FILM COATED ORAL
Qty: 30 TABLET | Refills: 5 | Status: SHIPPED | OUTPATIENT
Start: 2023-10-04

## 2023-10-05 NOTE — PROGRESS NOTES
(COREG) 6.25 mg, Oral, 2 TIMES DAILY    clopidogrel (PLAVIX) 75 mg, Oral, DAILY    entecavir (BARACLUDE) 1 mg, Oral, DAILY    gabapentin (NEURONTIN) 400 MG capsule TAKE 1 CAPSULE BY MOUTH THREE TIMES A DAY    glucose blood VI test strips (ASCENSIA AUTODISC VI;ONE TOUCH ULTRA TEST VI) strip 1 each, Topical, 3 TIMES DAILY, Onetouch Ultra 2 test strips  Dx: 250.00    HYDROcodone-acetaminophen  MG TABS 10 mg of opioid, Oral, EVERY 8 HOURS PRN    insulin glargine (LANTUS) 100 UNIT/ML injection vial ADMINISTER 75 UNITS UNDER THE SKIN EVERY NIGHT    Insulin Syringe-Needle U-100 ([employer] INSULIN SYR 1CC/30G) 30G X 5/16\" 1 ML MISC 1 each, Does not apply, DAILY    Lancets MISC 1 each, Does not apply, 2 TIMES DAILY    lidocaine (XYLOCAINE) 5 % ointment Apply topically as needed. meclizine (ANTIVERT) 12.5 MG tablet TAKE 1 TABLET BY MOUTH 3 TIMES A DAY AS NEEDED FOR DIZZINESS    metFORMIN (GLUCOPHAGE-XR) 500 MG extended release tablet 2 a day    ONETOUCH DELICA LANCETS MISC 1 each, Does not apply, 3 TIMES DAILY    pantoprazole (PROTONIX) 40 mg, Oral, DAILY    rosuvastatin (CRESTOR) 40 MG tablet TAKE 1 TABLET EVERY DAY    Semaglutide, 1 MG/DOSE, (OZEMPIC, 1 MG/DOSE,) 4 MG/3ML SOPN INJECT 1MG INTO THE SKIN ONCE A WEEK     LAST LABS  Lab Results   Component Value Date    LDLCALC 83 04/01/2022    LDLDIRECT 114 (H) 03/25/2019     Lab Results   Component Value Date    HDL 42 04/01/2022     Lab Results   Component Value Date    TRIG 157 (H) 04/01/2022     Lab Results   Component Value Date    ALT 22 02/23/2023    AST 21 02/23/2023    ALKPHOS 84 02/23/2023    BILITOT 0.4 02/23/2023     Lab Results   Component Value Date     03/01/2023    K 4.9 03/01/2023    CREATININE 1.1 03/01/2023     Lab Results   Component Value Date    LABGLOM >60 03/01/2023    LABGLOM >60 02/23/2023    LABGLOM >60 02/13/2023     CrCl cannot be calculated (Patient's most recent lab result is older than the maximum 180 days allowed. ).   Lab Results
fair plus

## 2023-10-18 ENCOUNTER — OFFICE VISIT (OUTPATIENT)
Dept: CARDIOLOGY CLINIC | Age: 68
End: 2023-10-18
Payer: MEDICARE

## 2023-10-18 VITALS — BODY MASS INDEX: 37.44 KG/M2 | HEART RATE: 85 BPM | OXYGEN SATURATION: 97 % | HEIGHT: 71 IN | WEIGHT: 267.4 LBS

## 2023-10-18 DIAGNOSIS — R00.0 TACHYCARDIA: Primary | ICD-10-CM

## 2023-10-18 PROCEDURE — 3017F COLORECTAL CA SCREEN DOC REV: CPT | Performed by: INTERNAL MEDICINE

## 2023-10-18 PROCEDURE — 1123F ACP DISCUSS/DSCN MKR DOCD: CPT | Performed by: INTERNAL MEDICINE

## 2023-10-18 PROCEDURE — 1036F TOBACCO NON-USER: CPT | Performed by: INTERNAL MEDICINE

## 2023-10-18 PROCEDURE — G8427 DOCREV CUR MEDS BY ELIG CLIN: HCPCS | Performed by: INTERNAL MEDICINE

## 2023-10-18 PROCEDURE — 93000 ELECTROCARDIOGRAM COMPLETE: CPT | Performed by: INTERNAL MEDICINE

## 2023-10-18 PROCEDURE — G8417 CALC BMI ABV UP PARAM F/U: HCPCS | Performed by: INTERNAL MEDICINE

## 2023-10-18 PROCEDURE — 99214 OFFICE O/P EST MOD 30 MIN: CPT | Performed by: INTERNAL MEDICINE

## 2023-10-18 PROCEDURE — G8484 FLU IMMUNIZE NO ADMIN: HCPCS | Performed by: INTERNAL MEDICINE
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
## 2023-10-18 NOTE — PROGRESS NOTES
[address] [facility]  Cardiology Consult    Arlyn Balderrama  1955    October 18, 2023      Reason for Referral: Aortic stenosis    Referring physician: Dr Haseeb Starks    CC: TAVR/ CABG      Subjective:     History of Present Illness:    Arlyn Balderrama is a 80 y.o. patient with a PMH significant for coronary artery disease/prior PCI/CABG, hypertension, hyperlipidemia, diabetes and sleep apnea. He was seen in 2019 with severe indigestion and found to have RCA occlusion that underwent stenting. He undrwent urgent CABG x 5 on 3/26/2019 by Dr. Ally Magaña ((LIMA-LAD, [id number], SVG-PDA) LAD endarterectomy, sternal stabilization ([employer] SternaLock). Today, he is her for follow up s/p TAVR. He is feeling better since the procedure. He had his echocardiogram today. Patient denies exertional chest pain/pressure, dyspnea at rest, NAVARRETE, PND, orthopnea, palpitations, lightheadedness, weight changes, changes in LE edema, and syncope. Patient reports compliance to his medications. Past Medical History:   has a past medical history of Anxiety, Aortic valve sclerosis, Arthritis, CAD (coronary artery disease), Cerebral infarct ([address]), Chronic active viral hepatitis B ([address]), Chronic back pain, Constipation, Diabetes mellitus, type 2 ([address]), Esophageal varices ([address]), Fatty liver, Heart attack ([address]), Hepatitis B immune- pos HBSAg, History of blood transfusion, HTN (hypertension), Hyperlipidemia LDL goal < 100, Hyperlipidemia with target LDL less than 100, Obesity, Psoriasis, S/P TAVR (transcatheter aortic valve replacement) 2023, Sleep apnea, and STEMI (ST elevation myocardial infarction) ([address]). Surgical History:   has a past surgical history that includes Coronary angioplasty with stent (11/16/2011); Appendectomy (age 17); Tonsillectomy and adenoidectomy (1980's); Total knee arthroplasty (Left, 2005); Biceps tendon repair; Skull fracture elevation (teen); Colonoscopy; Coronary angioplasty with stent (06/01/2009);  Coronary

## 2023-10-23 ENCOUNTER — TELEPHONE (OUTPATIENT)
Dept: CARDIOLOGY CLINIC | Age: 68
End: 2023-10-23

## 2023-10-23 NOTE — TELEPHONE ENCOUNTER
TEST/LAB RESULTS      WHICH RESULTS ARE YOU CALLING ABOUT: Labs     WHEN WAS THIS DONE: 10/18/23    WHERE DID YOU HAVE THIS DONE: [facility]      CALL BACK NUMBER:  [phone]

## 2023-11-04 DIAGNOSIS — E11.9 TYPE 2 DIABETES MELLITUS WITHOUT COMPLICATION, WITHOUT LONG-TERM CURRENT USE OF INSULIN (HCC): ICD-10-CM

## 2023-11-06 ENCOUNTER — TELEPHONE (OUTPATIENT)
Dept: FAMILY MEDICINE CLINIC | Age: 68
End: 2023-11-06

## 2023-11-06 DIAGNOSIS — M54.41 CHRONIC BILATERAL LOW BACK PAIN WITH BILATERAL SCIATICA: ICD-10-CM

## 2023-11-06 DIAGNOSIS — G89.29 CHRONIC BILATERAL LOW BACK PAIN WITH BILATERAL SCIATICA: ICD-10-CM

## 2023-11-06 DIAGNOSIS — G89.4 CHRONIC PAIN SYNDROME: ICD-10-CM

## 2023-11-06 DIAGNOSIS — M54.42 CHRONIC BILATERAL LOW BACK PAIN WITH BILATERAL SCIATICA: ICD-10-CM

## 2023-11-06 RX ORDER — GABAPENTIN 400 MG/1
400 CAPSULE ORAL 3 TIMES DAILY
Qty: 90 CAPSULE | Refills: 5 | Status: SHIPPED | OUTPATIENT
Start: 2023-11-06 | End: 2024-05-04

## 2023-11-06 RX ORDER — BLOOD SUGAR DIAGNOSTIC
STRIP MISCELLANEOUS
Qty: 200 STRIP | Refills: 3 | Status: SHIPPED | OUTPATIENT
Start: 2023-11-06

## 2023-11-24 DIAGNOSIS — F51.01 PRIMARY INSOMNIA: ICD-10-CM

## 2023-11-27 RX ORDER — QUETIAPINE FUMARATE 25 MG/1
TABLET, FILM COATED ORAL
Qty: 180 TABLET | Refills: 3 | OUTPATIENT
Start: 2023-11-27

## 2023-11-28 ENCOUNTER — CLINICAL DOCUMENTATION (OUTPATIENT)
Dept: PSYCHIATRY | Age: 68
End: 2023-11-28

## 2023-11-28 ENCOUNTER — TELEPHONE (OUTPATIENT)
Dept: PSYCHIATRY | Age: 68
End: 2023-11-28

## 2023-11-28 NOTE — PROGRESS NOTES
Patient had cancelled four ( 4)  for new patient appointment on 9/12/23, 10/17/23, 11/14/23, and 11/28/23. with integrated behavioral health services. Per policy for new patient subsequent appt cancellation for initial psych evaluation- I will dismiss this patient and block from making appointment with me in the future. Patient dismissal was communicated to the patient via telephone call.

## 2023-11-28 NOTE — TELEPHONE ENCOUNTER
Called Pt to discuss we would no longer be seeing him as a Psych PT, Pt continues to cancel and reschedule without following through with a single appointment. Pt didn't answer, left voice-mail, advising PT calls back so we can explain why he is being dismissed.

## 2023-12-11 RX ORDER — ASPIRIN 81 MG/1
81 TABLET ORAL DAILY
Qty: 90 TABLET | Refills: 1 | Status: SHIPPED | OUTPATIENT
Start: 2023-12-11

## 2023-12-12 ENCOUNTER — TELEPHONE (OUTPATIENT)
Dept: CARDIOLOGY CLINIC | Age: 68
End: 2023-12-12

## 2023-12-12 DIAGNOSIS — Z95.2 S/P TAVR (TRANSCATHETER AORTIC VALVE REPLACEMENT): Primary | ICD-10-CM

## 2023-12-12 NOTE — TELEPHONE ENCOUNTER
Lillian, can you schedule pt for an echo and OV with Dr. Simpson at [facility] in Feb or March 2024 for his 1 year post TAVR check? Thanks, Irving RADFORD

## 2023-12-13 ENCOUNTER — OFFICE VISIT (OUTPATIENT)
Dept: FAMILY MEDICINE CLINIC | Age: 68
End: 2023-12-13
Payer: MEDICARE

## 2023-12-13 VITALS
HEIGHT: 71 IN | WEIGHT: 264 LBS | OXYGEN SATURATION: 98 % | SYSTOLIC BLOOD PRESSURE: 110 MMHG | HEART RATE: 83 BPM | BODY MASS INDEX: 36.96 KG/M2 | DIASTOLIC BLOOD PRESSURE: 76 MMHG

## 2023-12-13 DIAGNOSIS — E11.51 TYPE 2 DIABETES MELLITUS WITH DIABETIC PERIPHERAL ANGIOPATHY WITHOUT GANGRENE, WITH LONG-TERM CURRENT USE OF INSULIN (HCC): ICD-10-CM

## 2023-12-13 DIAGNOSIS — I10 HYPERTENSION, ESSENTIAL: ICD-10-CM

## 2023-12-13 DIAGNOSIS — E11.42 DIABETIC POLYNEUROPATHY ASSOCIATED WITH TYPE 2 DIABETES MELLITUS (HCC): ICD-10-CM

## 2023-12-13 DIAGNOSIS — Z79.4 TYPE 2 DIABETES MELLITUS WITH DIABETIC PERIPHERAL ANGIOPATHY WITHOUT GANGRENE, WITH LONG-TERM CURRENT USE OF INSULIN (HCC): ICD-10-CM

## 2023-12-13 DIAGNOSIS — E78.5 HYPERLIPIDEMIA LDL GOAL <70: Primary | ICD-10-CM

## 2023-12-13 DIAGNOSIS — C43.62 MALIGNANT MELANOMA OF LEFT UPPER EXTREMITY INCLUDING SHOULDER (HCC): ICD-10-CM

## 2023-12-13 DIAGNOSIS — I25.10 CORONARY ARTERY DISEASE INVOLVING NATIVE CORONARY ARTERY OF NATIVE HEART WITHOUT ANGINA PECTORIS: ICD-10-CM

## 2023-12-13 PROCEDURE — 3078F DIAST BP <80 MM HG: CPT | Performed by: FAMILY MEDICINE

## 2023-12-13 PROCEDURE — 3051F HG A1C>EQUAL 7.0%<8.0%: CPT | Performed by: FAMILY MEDICINE

## 2023-12-13 PROCEDURE — 1036F TOBACCO NON-USER: CPT | Performed by: FAMILY MEDICINE

## 2023-12-13 PROCEDURE — 3074F SYST BP LT 130 MM HG: CPT | Performed by: FAMILY MEDICINE

## 2023-12-13 PROCEDURE — G8417 CALC BMI ABV UP PARAM F/U: HCPCS | Performed by: FAMILY MEDICINE

## 2023-12-13 PROCEDURE — 99213 OFFICE O/P EST LOW 20 MIN: CPT | Performed by: FAMILY MEDICINE

## 2023-12-13 PROCEDURE — 3017F COLORECTAL CA SCREEN DOC REV: CPT | Performed by: FAMILY MEDICINE

## 2023-12-13 PROCEDURE — G8427 DOCREV CUR MEDS BY ELIG CLIN: HCPCS | Performed by: FAMILY MEDICINE

## 2023-12-13 PROCEDURE — G8484 FLU IMMUNIZE NO ADMIN: HCPCS | Performed by: FAMILY MEDICINE

## 2023-12-13 PROCEDURE — 1123F ACP DISCUSS/DSCN MKR DOCD: CPT | Performed by: FAMILY MEDICINE

## 2023-12-13 PROCEDURE — 2022F DILAT RTA XM EVC RTNOPTHY: CPT | Performed by: FAMILY MEDICINE

## 2023-12-13 RX ORDER — FOLIC ACID 1 MG/1
TABLET ORAL
COMMUNITY
Start: 2023-11-21

## 2023-12-13 RX ORDER — METHOTREXATE 2.5 MG/1
TABLET ORAL
COMMUNITY
Start: 2023-12-13

## 2023-12-13 NOTE — PROGRESS NOTES
Never     Passive exposure: Past    Smokeless tobacco: Never    Tobacco comments:     advised not to start   Vaping Use    Vaping Use: Never used   Substance Use Topics    Alcohol use: Not Currently     Comment: rare    Drug use: No      Current Outpatient Medications   Medication Instructions    aspirin (ASPIRIN LOW DOSE) 81 mg, Oral, DAILY    blood glucose monitor kit and supplies Test 2 times a day & as needed for symptoms of irregular blood glucose. blood glucose test strips (ACCU-CHEK GUIDE) strip TEST 2 TIMES A DAY & AS NEEDED FOR SYMPTOMS OF IRREGULAR BLOOD GLUCOSE.    carvedilol (COREG) 6.25 mg, Oral, 2 TIMES DAILY    clopidogrel (PLAVIX) 75 mg, Oral, DAILY    entecavir (BARACLUDE) 1 mg, Oral, DAILY    folic acid (FOLVITE) 1 MG tablet TAKE 1 TABLET BY MOUTH EVERY DAY. EXCEPT FOR DAY TAKING METHOTREXATE    gabapentin (NEURONTIN) 400 mg, Oral, 3 TIMES DAILY    glucose blood VI test strips (ASCENSIA AUTODISC VI;ONE TOUCH ULTRA TEST VI) strip 1 each, Topical, 3 TIMES DAILY, Onetouch Ultra 2 test strips  Dx: 250.00    HYDROcodone-acetaminophen  MG TABS 10 mg of opioid, Oral, EVERY 8 HOURS PRN    insulin glargine (LANTUS) 100 UNIT/ML injection vial INJECT 75 UNITS UNDER THE SKIN EVERY NIGHT    Insulin Syringe-Needle U-100 ([employer] INSULIN SYR 1CC/30G) 30G X 5/16\" 1 ML MISC 1 each, Does not apply, DAILY    INVOKANA 300 MG TABS tablet TAKE 1 TABLET BY MOUTH EVERY DAY BEFORE BREAKFAST    Lancets MISC 1 each, Does not apply, 2 TIMES DAILY    lidocaine (XYLOCAINE) 5 % ointment Apply topically as needed. meclizine (ANTIVERT) 12.5 MG tablet TAKE 1 TABLET BY MOUTH 3 TIMES A DAY AS NEEDED FOR DIZZINESS    metFORMIN (GLUCOPHAGE-XR) 500 MG extended release tablet 2 a day    methotrexate (RHEUMATREX) 2.5 MG chemo tablet No dose, route, or frequency recorded.     ONETOUCH DELICA LANCETS MISC 1 each, Does not apply, 3 TIMES DAILY    pantoprazole (PROTONIX) 40 mg, Oral, DAILY    rosuvastatin (CRESTOR) 40 MG tablet TAKE

## 2023-12-13 NOTE — PATIENT INSTRUCTIONS
INSTRUCTIONS  NEXT APPOINTMENT: Please schedule annual complete physical (30 minutes) March 1 or after   with Dr. Manfred Sinclair or her NP, Jeffrey Duncan. PLEASE TAKE THIS FORM TO CHECK-OUT WINDOW TO SCHEDULE NEXT VISIT. If possible, it would be good idea to get blood work 2-10 working days BEFORE next visit. This way we can discuss results. HOWEVER, if having any new symptoms please wait until seen in case other tests are needed. Please get annual dilated eye exam to screen for diabetic retinopathy which can lead to vision loss. Ask for report to be faxed to [phone].

## 2024-01-02 ENCOUNTER — TELEPHONE (OUTPATIENT)
Dept: CARDIOLOGY CLINIC | Age: 69
End: 2024-01-02

## 2024-01-02 NOTE — TELEPHONE ENCOUNTER
Rich called in to see if he could get an earlier appointment with Dr. Simpson. He reports he has increased shortness of breath, he reports that he thinks he has a \"cold or something\"He has been busy putting Josue decorations away and has been going up and down steps. He did report he did get \"a little chest pain\" \" I am just tired\" He has an appointment with Calvin in March and is requesting one sooner. I did inform him that if his shortness of breath does not subside or chest pain gets worse, he needs to report to ED. He verbalizes understanding. I made appointment for him with Dr. Simpson, his request, 1/19/2024 at 0920

## 2024-01-16 ENCOUNTER — TELEPHONE (OUTPATIENT)
Dept: FAMILY MEDICINE CLINIC | Age: 69
End: 2024-01-16

## 2024-01-16 DIAGNOSIS — E11.51 TYPE 2 DIABETES MELLITUS WITH DIABETIC PERIPHERAL ANGIOPATHY WITHOUT GANGRENE, WITH LONG-TERM CURRENT USE OF INSULIN (HCC): ICD-10-CM

## 2024-01-16 DIAGNOSIS — Z79.4 TYPE 2 DIABETES MELLITUS WITH DIABETIC PERIPHERAL ANGIOPATHY WITHOUT GANGRENE, WITH LONG-TERM CURRENT USE OF INSULIN (HCC): ICD-10-CM

## 2024-01-16 RX ORDER — INSULIN GLARGINE 100 [IU]/ML
INJECTION, SOLUTION SUBCUTANEOUS
Qty: 60 ML | Refills: 1 | Status: SHIPPED | OUTPATIENT
Start: 2024-01-16

## 2024-01-16 NOTE — TELEPHONE ENCOUNTER
Called and informed patient that Naren was called in to [facility].  No further questions.

## 2024-01-16 NOTE — TELEPHONE ENCOUNTER
Patient is calling in asking for a weeks worth of his LANTUS as he will be out tonight     He ordered from his mail in pharmacy but because of the holiday they are running behind    Please send to [facility]    Please Advise

## 2024-01-17 RX ORDER — ROSUVASTATIN CALCIUM 40 MG/1
TABLET, COATED ORAL
Qty: 90 TABLET | Refills: 3 | Status: SHIPPED | OUTPATIENT
Start: 2024-01-17
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
## 2024-01-25 ENCOUNTER — OFFICE VISIT (OUTPATIENT)
Dept: CARDIOLOGY CLINIC | Age: 69
End: 2024-01-25
Payer: MEDICARE

## 2024-01-25 VITALS
WEIGHT: 270.4 LBS | OXYGEN SATURATION: 98 % | HEART RATE: 83 BPM | BODY MASS INDEX: 37.85 KG/M2 | HEIGHT: 71 IN | DIASTOLIC BLOOD PRESSURE: 78 MMHG | SYSTOLIC BLOOD PRESSURE: 114 MMHG

## 2024-01-25 DIAGNOSIS — Z95.2 S/P TAVR (TRANSCATHETER AORTIC VALVE REPLACEMENT): ICD-10-CM

## 2024-01-25 DIAGNOSIS — I25.10 CORONARY ARTERY DISEASE INVOLVING NATIVE CORONARY ARTERY OF NATIVE HEART WITHOUT ANGINA PECTORIS: Primary | ICD-10-CM

## 2024-01-25 DIAGNOSIS — E78.5 HYPERLIPIDEMIA LDL GOAL <70: ICD-10-CM

## 2024-01-25 PROCEDURE — 1036F TOBACCO NON-USER: CPT | Performed by: INTERNAL MEDICINE

## 2024-01-25 PROCEDURE — 3017F COLORECTAL CA SCREEN DOC REV: CPT | Performed by: INTERNAL MEDICINE

## 2024-01-25 PROCEDURE — 99215 OFFICE O/P EST HI 40 MIN: CPT | Performed by: INTERNAL MEDICINE

## 2024-01-25 PROCEDURE — 3074F SYST BP LT 130 MM HG: CPT | Performed by: INTERNAL MEDICINE

## 2024-01-25 PROCEDURE — 1123F ACP DISCUSS/DSCN MKR DOCD: CPT | Performed by: INTERNAL MEDICINE

## 2024-01-25 PROCEDURE — G8427 DOCREV CUR MEDS BY ELIG CLIN: HCPCS | Performed by: INTERNAL MEDICINE

## 2024-01-25 PROCEDURE — G8484 FLU IMMUNIZE NO ADMIN: HCPCS | Performed by: INTERNAL MEDICINE

## 2024-01-25 PROCEDURE — G8417 CALC BMI ABV UP PARAM F/U: HCPCS | Performed by: INTERNAL MEDICINE

## 2024-01-25 PROCEDURE — 3078F DIAST BP <80 MM HG: CPT | Performed by: INTERNAL MEDICINE

## 2024-01-25 RX ORDER — TADALAFIL 20 MG/1
20 TABLET ORAL DAILY PRN
Qty: 10 TABLET | Refills: 1 | Status: SHIPPED | OUTPATIENT
Start: 2024-01-25

## 2024-01-25 RX ORDER — TADALAFIL 5 MG/1
5 TABLET ORAL DAILY
Qty: 90 TABLET | Refills: 5 | Status: SHIPPED | OUTPATIENT
Start: 2024-01-25

## 2024-01-31 ENCOUNTER — TELEPHONE (OUTPATIENT)
Dept: CARDIOLOGY CLINIC | Age: 69
End: 2024-01-31

## 2024-01-31 NOTE — TELEPHONE ENCOUNTER
Rich's callback: [phone]    Donitacandy called the office wanting to know how long the Cialis medication will take effect? He shared that he picked up the medication yesterday and said that he should have asked the pharmacist then.    Please advise.

## 2024-01-31 NOTE — TELEPHONE ENCOUNTER
Patient may start to feel effect after about 30 minutes but can take up to 2 hours for its full effect to kick in.

## 2024-02-07 NOTE — TELEPHONE ENCOUNTER
Talked to pt and he said he wants to know what he can do for pain until he sees the ortho doctor he said he has a hard time walking, sitting, and doing anything. He said the pain is real bad.   Pt would like some type of pain  Med till he sees ortho.       [facility]   Please advise EKG - see results section for interpretation

## 2024-03-13 RX ORDER — CANAGLIFLOZIN 300 MG/1
TABLET, FILM COATED ORAL
Qty: 90 TABLET | Refills: 0 | Status: SHIPPED | OUTPATIENT
Start: 2024-03-13

## 2024-03-15 RX ORDER — CLOPIDOGREL BISULFATE 75 MG/1
75 TABLET ORAL DAILY
Qty: 90 TABLET | Refills: 3 | Status: SHIPPED | OUTPATIENT
Start: 2024-03-15

## 2024-03-16 DIAGNOSIS — E11.51 TYPE 2 DIABETES MELLITUS WITH DIABETIC PERIPHERAL ANGIOPATHY WITHOUT GANGRENE, WITH LONG-TERM CURRENT USE OF INSULIN (HCC): ICD-10-CM

## 2024-03-16 DIAGNOSIS — Z79.4 TYPE 2 DIABETES MELLITUS WITH DIABETIC PERIPHERAL ANGIOPATHY WITHOUT GANGRENE, WITH LONG-TERM CURRENT USE OF INSULIN (HCC): ICD-10-CM

## 2024-03-18 ENCOUNTER — HOSPITAL ENCOUNTER (OUTPATIENT)
Dept: NON INVASIVE DIAGNOSTICS | Age: 69
Discharge: HOME | End: 2024-03-18
Payer: MEDICARE

## 2024-03-18 DIAGNOSIS — Z95.2 S/P TAVR (TRANSCATHETER AORTIC VALVE REPLACEMENT): ICD-10-CM

## 2024-03-18 PROCEDURE — 93306 TTE W/DOPPLER COMPLETE: CPT

## 2024-03-18 RX ORDER — NAPROXEN SODIUM 220 MG
TABLET ORAL
Qty: 100 EACH | Refills: 2 | Status: SHIPPED | OUTPATIENT
Start: 2024-03-18

## 2024-03-22 DIAGNOSIS — Z79.4 TYPE 2 DIABETES MELLITUS WITH DIABETIC PERIPHERAL ANGIOPATHY WITHOUT GANGRENE, WITH LONG-TERM CURRENT USE OF INSULIN (HCC): ICD-10-CM

## 2024-03-22 DIAGNOSIS — E11.51 TYPE 2 DIABETES MELLITUS WITH DIABETIC PERIPHERAL ANGIOPATHY WITHOUT GANGRENE, WITH LONG-TERM CURRENT USE OF INSULIN (HCC): ICD-10-CM

## 2024-03-22 RX ORDER — INSULIN GLARGINE 100 [IU]/ML
INJECTION, SOLUTION SUBCUTANEOUS
Qty: 70 ML | Refills: 3 | Status: SHIPPED | OUTPATIENT
Start: 2024-03-22

## 2024-03-25 RX ORDER — SEMAGLUTIDE 1.34 MG/ML
INJECTION, SOLUTION SUBCUTANEOUS
Qty: 3 ML | Refills: 3 | Status: SHIPPED | OUTPATIENT
Start: 2024-03-25

## 2024-03-25 RX ORDER — INSULIN GLARGINE 100 [IU]/ML
INJECTION, SOLUTION SUBCUTANEOUS
Qty: 60 ML | Refills: 1 | OUTPATIENT
Start: 2024-03-25

## 2024-03-26 RX ORDER — SEMAGLUTIDE 1.34 MG/ML
INJECTION, SOLUTION SUBCUTANEOUS
Refills: 3 | OUTPATIENT
Start: 2024-03-26

## 2024-04-01 RX ORDER — ASPIRIN 81 MG/1
81 TABLET, COATED ORAL DAILY
Qty: 90 TABLET | Refills: 1 | Status: SHIPPED | OUTPATIENT
Start: 2024-04-01

## 2024-04-15 ENCOUNTER — OFFICE VISIT (OUTPATIENT)
Dept: FAMILY MEDICINE CLINIC | Age: 69
End: 2024-04-15
Payer: MEDICARE

## 2024-04-15 VITALS
SYSTOLIC BLOOD PRESSURE: 122 MMHG | BODY MASS INDEX: 37.15 KG/M2 | RESPIRATION RATE: 18 BRPM | HEART RATE: 78 BPM | HEIGHT: 71 IN | DIASTOLIC BLOOD PRESSURE: 76 MMHG | OXYGEN SATURATION: 96 % | TEMPERATURE: 97.8 F | WEIGHT: 265.4 LBS

## 2024-04-15 DIAGNOSIS — I25.10 CORONARY ARTERY DISEASE INVOLVING NATIVE CORONARY ARTERY OF NATIVE HEART WITHOUT ANGINA PECTORIS: ICD-10-CM

## 2024-04-15 DIAGNOSIS — I10 HYPERTENSION, ESSENTIAL: ICD-10-CM

## 2024-04-15 DIAGNOSIS — G89.29 CHRONIC BILATERAL LOW BACK PAIN WITH BILATERAL SCIATICA: ICD-10-CM

## 2024-04-15 DIAGNOSIS — M54.42 CHRONIC BILATERAL LOW BACK PAIN WITH BILATERAL SCIATICA: ICD-10-CM

## 2024-04-15 DIAGNOSIS — Z79.4 TYPE 2 DIABETES MELLITUS WITH DIABETIC PERIPHERAL ANGIOPATHY WITHOUT GANGRENE, WITH LONG-TERM CURRENT USE OF INSULIN (HCC): Primary | ICD-10-CM

## 2024-04-15 DIAGNOSIS — E11.51 TYPE 2 DIABETES MELLITUS WITH DIABETIC PERIPHERAL ANGIOPATHY WITHOUT GANGRENE, WITH LONG-TERM CURRENT USE OF INSULIN (HCC): Primary | ICD-10-CM

## 2024-04-15 DIAGNOSIS — E11.51 TYPE 2 DIABETES MELLITUS WITH DIABETIC PERIPHERAL ANGIOPATHY WITHOUT GANGRENE, WITH LONG-TERM CURRENT USE OF INSULIN (HCC): ICD-10-CM

## 2024-04-15 DIAGNOSIS — Z79.4 TYPE 2 DIABETES MELLITUS WITH DIABETIC PERIPHERAL ANGIOPATHY WITHOUT GANGRENE, WITH LONG-TERM CURRENT USE OF INSULIN (HCC): ICD-10-CM

## 2024-04-15 DIAGNOSIS — M54.41 CHRONIC BILATERAL LOW BACK PAIN WITH BILATERAL SCIATICA: ICD-10-CM

## 2024-04-15 LAB
ALBUMIN SERPL-MCNC: 4.3 G/DL (ref 3.4–5)
ALBUMIN/GLOB SERPL: 1.6 {RATIO} (ref 1.1–2.2)
ALP SERPL-CCNC: 86 U/L (ref 40–129)
ALT SERPL-CCNC: 13 U/L (ref 10–40)
ANION GAP SERPL CALCULATED.3IONS-SCNC: 14 MMOL/L (ref 3–16)
AST SERPL-CCNC: 18 U/L (ref 15–37)
BASOPHILS # BLD: 0 K/UL (ref 0–0.2)
BASOPHILS NFR BLD: 0.6 %
BILIRUB SERPL-MCNC: 0.4 MG/DL (ref 0–1)
BUN SERPL-MCNC: 19 MG/DL (ref 7–20)
CALCIUM SERPL-MCNC: 9.2 MG/DL (ref 8.3–10.6)
CHLORIDE SERPL-SCNC: 104 MMOL/L (ref 99–110)
CO2 SERPL-SCNC: 23 MMOL/L (ref 21–32)
CREAT SERPL-MCNC: 1 MG/DL (ref 0.8–1.3)
CREAT UR-MCNC: 98.4 MG/DL (ref 39–259)
DEPRECATED RDW RBC AUTO: 14.8 % (ref 12.4–15.4)
EOSINOPHIL # BLD: 0.1 K/UL (ref 0–0.6)
EOSINOPHIL NFR BLD: 1.8 %
GFR SERPLBLD CREATININE-BSD FMLA CKD-EPI: 82 ML/MIN/{1.73_M2}
GLUCOSE SERPL-MCNC: 144 MG/DL (ref 70–99)
HBA1C MFR BLD: 5.8 %
HCT VFR BLD AUTO: 47.9 % (ref 40.5–52.5)
HGB BLD-MCNC: 16.2 G/DL (ref 13.5–17.5)
LYMPHOCYTES # BLD: 1.7 K/UL (ref 1–5.1)
LYMPHOCYTES NFR BLD: 27.7 %
MCH RBC QN AUTO: 32.4 PG (ref 26–34)
MCHC RBC AUTO-ENTMCNC: 33.7 G/DL (ref 31–36)
MCV RBC AUTO: 96 FL (ref 80–100)
MICROALBUMIN UR DL<=1MG/L-MCNC: 1.3 MG/DL
MICROALBUMIN/CREAT UR: 13.2 MG/G (ref 0–30)
MONOCYTES # BLD: 0.4 K/UL (ref 0–1.3)
MONOCYTES NFR BLD: 6.4 %
NEUTROPHILS # BLD: 3.8 K/UL (ref 1.7–7.7)
NEUTROPHILS NFR BLD: 63.5 %
PLATELET # BLD AUTO: 163 K/UL (ref 135–450)
PMV BLD AUTO: 8.4 FL (ref 5–10.5)
POTASSIUM SERPL-SCNC: 4.3 MMOL/L (ref 3.5–5.1)
PROT SERPL-MCNC: 7 G/DL (ref 6.4–8.2)
RBC # BLD AUTO: 4.99 M/UL (ref 4.2–5.9)
SODIUM SERPL-SCNC: 141 MMOL/L (ref 136–145)
WBC # BLD AUTO: 6 K/UL (ref 4–11)

## 2024-04-15 PROCEDURE — 3017F COLORECTAL CA SCREEN DOC REV: CPT | Performed by: FAMILY MEDICINE

## 2024-04-15 PROCEDURE — G8417 CALC BMI ABV UP PARAM F/U: HCPCS | Performed by: FAMILY MEDICINE

## 2024-04-15 PROCEDURE — 3074F SYST BP LT 130 MM HG: CPT | Performed by: FAMILY MEDICINE

## 2024-04-15 PROCEDURE — G8427 DOCREV CUR MEDS BY ELIG CLIN: HCPCS | Performed by: FAMILY MEDICINE

## 2024-04-15 PROCEDURE — 3078F DIAST BP <80 MM HG: CPT | Performed by: FAMILY MEDICINE

## 2024-04-15 PROCEDURE — 2022F DILAT RTA XM EVC RTNOPTHY: CPT | Performed by: FAMILY MEDICINE

## 2024-04-15 PROCEDURE — 3044F HG A1C LEVEL LT 7.0%: CPT | Performed by: FAMILY MEDICINE

## 2024-04-15 PROCEDURE — 1123F ACP DISCUSS/DSCN MKR DOCD: CPT | Performed by: FAMILY MEDICINE

## 2024-04-15 PROCEDURE — 1036F TOBACCO NON-USER: CPT | Performed by: FAMILY MEDICINE

## 2024-04-15 PROCEDURE — 83036 HEMOGLOBIN GLYCOSYLATED A1C: CPT | Performed by: FAMILY MEDICINE

## 2024-04-15 PROCEDURE — 99213 OFFICE O/P EST LOW 20 MIN: CPT | Performed by: FAMILY MEDICINE

## 2024-04-15 RX ORDER — INSULIN GLARGINE 100 [IU]/ML
INJECTION, SOLUTION SUBCUTANEOUS
Qty: 70 ML | Refills: 3 | COMMUNITY
Start: 2024-04-15

## 2024-04-15 SDOH — ECONOMIC STABILITY: FOOD INSECURITY: WITHIN THE PAST 12 MONTHS, THE FOOD YOU BOUGHT JUST DIDN'T LAST AND YOU DIDN'T HAVE MONEY TO GET MORE.: NEVER TRUE

## 2024-04-15 SDOH — ECONOMIC STABILITY: FOOD INSECURITY: WITHIN THE PAST 12 MONTHS, YOU WORRIED THAT YOUR FOOD WOULD RUN OUT BEFORE YOU GOT MONEY TO BUY MORE.: NEVER TRUE

## 2024-04-15 SDOH — ECONOMIC STABILITY: INCOME INSECURITY: HOW HARD IS IT FOR YOU TO PAY FOR THE VERY BASICS LIKE FOOD, HOUSING, MEDICAL CARE, AND HEATING?: NOT HARD AT ALL

## 2024-04-15 ASSESSMENT — PATIENT HEALTH QUESTIONNAIRE - PHQ9
2. FEELING DOWN, DEPRESSED OR HOPELESS: NOT AT ALL
6. FEELING BAD ABOUT YOURSELF - OR THAT YOU ARE A FAILURE OR HAVE LET YOURSELF OR YOUR FAMILY DOWN: NOT AT ALL
SUM OF ALL RESPONSES TO PHQ9 QUESTIONS 1 & 2: 0
9. THOUGHTS THAT YOU WOULD BE BETTER OFF DEAD, OR OF HURTING YOURSELF: NOT AT ALL
3. TROUBLE FALLING OR STAYING ASLEEP: NOT AT ALL
8. MOVING OR SPEAKING SO SLOWLY THAT OTHER PEOPLE COULD HAVE NOTICED. OR THE OPPOSITE, BEING SO FIGETY OR RESTLESS THAT YOU HAVE BEEN MOVING AROUND A LOT MORE THAN USUAL: NOT AT ALL
1. LITTLE INTEREST OR PLEASURE IN DOING THINGS: NOT AT ALL
SUM OF ALL RESPONSES TO PHQ QUESTIONS 1-9: 0
4. FEELING TIRED OR HAVING LITTLE ENERGY: NOT AT ALL
7. TROUBLE CONCENTRATING ON THINGS, SUCH AS READING THE NEWSPAPER OR WATCHING TELEVISION: NOT AT ALL
5. POOR APPETITE OR OVEREATING: NOT AT ALL
10. IF YOU CHECKED OFF ANY PROBLEMS, HOW DIFFICULT HAVE THESE PROBLEMS MADE IT FOR YOU TO DO YOUR WORK, TAKE CARE OF THINGS AT HOME, OR GET ALONG WITH OTHER PEOPLE: NOT DIFFICULT AT ALL

## 2024-04-15 NOTE — PROGRESS NOTES
CHRONIC CONDITION FOLLOW-UP     Assessment and Plan:      Diagnosis Orders   1. Type 2 diabetes mellitus with diabetic peripheral angiopathy without gangrene, with long-term current use of insulin ([facility])  POCT glycosylated hemoglobin (Hb A1C)    CBC with Auto Differential    Microalbumin / Creatinine Urine Ratio    insulin glargine (LANTUS) 100 UNIT/ML injection vial      2. Coronary artery disease involving native coronary artery of native heart without angina pectoris  CBC with Auto Differential      3. Hypertension, essential  Comprehensive Metabolic Panel      4. Chronic bilateral low back pain with bilateral sciatica        better     Continue current Tx plan. Any changes marked below.    INSTRUCTIONS  NEXT APPOINTMENT: Please schedule annual complete physical (30 minutes) in 4 months    PLEASE TAKE THIS FORM TO CHECK-OUT WINDOW TO SCHEDULE NEXT VISIT.   PLEASE GET BLOODWORK DRAWN TODAY ON FIRST FLOOR in 170.  Take orders with you.  RESULTS- most blood tests back in couple days.  We will call you if any problems.  If bloodwork good, you will get letter in mail or notified thru [employer] (if signed up) within 2 weeks.  If you do not, please call office.   Please get annual dilated eye exam to screen for diabetic retinopathy which can lead to vision loss.  Ask for report to be faxed to [phone].   A1c great at 5.8.  DECREASE lantus to 90 units  If getting low sugars, we can decrease Lantus by another 10 units.  When you need refill for ozempic we can increase to 2 mg if you like. Let us know.    Subjective:      Chief Complaint   Patient presents with    Diabetes     Sugar has been good.      Rich Stover is an 69 y.o. male who presents for follow up    Complaints:   Quit going to pain specialist. Doing fair. Getting new mattress. Occasionally needs OTC meds but not every day.  Been able to get ozempic thru [facility]  No side effects unless takes ozempic too close to MTX weekly dose due to diarrhea. Good at

## 2024-04-15 NOTE — PATIENT INSTRUCTIONS
INSTRUCTIONS  NEXT APPOINTMENT: Please schedule annual complete physical (30 minutes) in 4 months    PLEASE TAKE THIS FORM TO CHECK-OUT WINDOW TO SCHEDULE NEXT VISIT.   PLEASE GET BLOODWORK DRAWN TODAY ON FIRST FLOOR in 170.  Take orders with you.  RESULTS- most blood tests back in couple days.  We will call you if any problems.  If bloodwork good, you will get letter in mail or notified thru MyChart (if signed up) within 2 weeks.  If you do not, please call office.   Please get annual dilated eye exam to screen for diabetic retinopathy which can lead to vision loss.  Ask for report to be faxed to [phone].   A1c great at 5.8.  DECREASE lantus to 90 units  If getting low sugars, we can decrease Lantus by another 10 units.  When you need refill for ozempic we can increase to 2 mg if you like. Let us know.

## 2024-04-24 DIAGNOSIS — G89.29 CHRONIC BILATERAL LOW BACK PAIN WITH BILATERAL SCIATICA: ICD-10-CM

## 2024-04-24 DIAGNOSIS — G89.4 CHRONIC PAIN SYNDROME: ICD-10-CM

## 2024-04-24 DIAGNOSIS — M54.42 CHRONIC BILATERAL LOW BACK PAIN WITH BILATERAL SCIATICA: ICD-10-CM

## 2024-04-24 DIAGNOSIS — M54.41 CHRONIC BILATERAL LOW BACK PAIN WITH BILATERAL SCIATICA: ICD-10-CM

## 2024-04-24 RX ORDER — GABAPENTIN 400 MG/1
400 CAPSULE ORAL 3 TIMES DAILY
Qty: 270 CAPSULE | Refills: 1 | Status: SHIPPED | OUTPATIENT
Start: 2024-04-24 | End: 2024-10-21

## 2024-04-24 RX ORDER — TADALAFIL 20 MG/1
20 TABLET ORAL DAILY PRN
Qty: 10 TABLET | Refills: 3 | Status: SHIPPED | OUTPATIENT
Start: 2024-04-24

## 2024-04-24 NOTE — TELEPHONE ENCOUNTER
Last OV:1/25/2024  Last Labs:4/15/2024  Last Refills:3/30/2024   Next Appt:in 1 year   Last EKG: 10/18/2023

## 2024-04-25 RX ORDER — GABAPENTIN 400 MG/1
400 CAPSULE ORAL 3 TIMES DAILY
Qty: 90 CAPSULE | Refills: 5 | OUTPATIENT
Start: 2024-04-25

## 2024-05-13 ENCOUNTER — TELEPHONE (OUTPATIENT)
Dept: FAMILY MEDICINE CLINIC | Age: 69
End: 2024-05-13

## 2024-05-13 NOTE — TELEPHONE ENCOUNTER
Pt is calling to get a handicap placard  He went to the [facility] and they told him that they now do the placard as a permanent  The letter/script has to state that the letter is permanent   Please call pt once the letter script has been written so he can pick it up at the office

## 2024-05-29 ENCOUNTER — TELEPHONE (OUTPATIENT)
Dept: FAMILY MEDICINE CLINIC | Age: 69
End: 2024-05-29

## 2024-05-29 DIAGNOSIS — J20.9 ACUTE BRONCHITIS, UNSPECIFIED ORGANISM: ICD-10-CM

## 2024-05-30 RX ORDER — ALBUTEROL SULFATE 90 UG/1
2 AEROSOL, METERED RESPIRATORY (INHALATION) EVERY 4 HOURS PRN
Qty: 18 G | Refills: 3 | OUTPATIENT
Start: 2024-05-30 | End: 2025-05-30

## 2024-05-31 RX ORDER — ALBUTEROL SULFATE 90 UG/1
2 AEROSOL, METERED RESPIRATORY (INHALATION) EVERY 4 HOURS PRN
Qty: 18 G | Refills: 3 | Status: SHIPPED | OUTPATIENT
Start: 2024-05-31 | End: 2025-05-31

## 2024-06-06 DIAGNOSIS — E11.51 TYPE 2 DIABETES MELLITUS WITH DIABETIC PERIPHERAL ANGIOPATHY WITHOUT GANGRENE, WITH LONG-TERM CURRENT USE OF INSULIN (HCC): ICD-10-CM

## 2024-06-06 DIAGNOSIS — Z79.4 TYPE 2 DIABETES MELLITUS WITH DIABETIC PERIPHERAL ANGIOPATHY WITHOUT GANGRENE, WITH LONG-TERM CURRENT USE OF INSULIN (HCC): ICD-10-CM

## 2024-06-07 ENCOUNTER — TELEPHONE (OUTPATIENT)
Dept: FAMILY MEDICINE CLINIC | Age: 69
End: 2024-06-07

## 2024-06-07 DIAGNOSIS — E11.51 TYPE 2 DIABETES MELLITUS WITH DIABETIC PERIPHERAL ANGIOPATHY WITHOUT GANGRENE, WITH LONG-TERM CURRENT USE OF INSULIN (HCC): ICD-10-CM

## 2024-06-07 DIAGNOSIS — Z79.4 TYPE 2 DIABETES MELLITUS WITH DIABETIC PERIPHERAL ANGIOPATHY WITHOUT GANGRENE, WITH LONG-TERM CURRENT USE OF INSULIN (HCC): ICD-10-CM

## 2024-06-07 RX ORDER — METFORMIN HYDROCHLORIDE 500 MG/1
TABLET, EXTENDED RELEASE ORAL
Qty: 360 TABLET | Refills: 1 | Status: SHIPPED | OUTPATIENT
Start: 2024-06-07 | End: 2024-06-07 | Stop reason: SDUPTHER

## 2024-06-07 RX ORDER — METFORMIN HYDROCHLORIDE 500 MG/1
1000 TABLET, EXTENDED RELEASE ORAL 2 TIMES DAILY
Qty: 360 TABLET | Refills: 1 | Status: SHIPPED | OUTPATIENT
Start: 2024-06-07 | End: 2024-12-04

## 2024-06-07 NOTE — TELEPHONE ENCOUNTER
Pharmacy called in needs to clarify how many times a day the pt is supposed to take metformin ?  Pt told pharmacy he takes 4 a day ?      [facility] [phone]

## 2024-06-11 ENCOUNTER — TELEPHONE (OUTPATIENT)
Dept: FAMILY MEDICINE CLINIC | Age: 69
End: 2024-06-11

## 2024-06-11 DIAGNOSIS — E11.51 TYPE 2 DIABETES MELLITUS WITH DIABETIC PERIPHERAL ANGIOPATHY WITHOUT GANGRENE, WITH LONG-TERM CURRENT USE OF INSULIN (HCC): ICD-10-CM

## 2024-06-11 DIAGNOSIS — Z79.4 TYPE 2 DIABETES MELLITUS WITH DIABETIC PERIPHERAL ANGIOPATHY WITHOUT GANGRENE, WITH LONG-TERM CURRENT USE OF INSULIN (HCC): ICD-10-CM

## 2024-06-11 RX ORDER — SEMAGLUTIDE 1.34 MG/ML
INJECTION, SOLUTION SUBCUTANEOUS
Qty: 3 ML | Refills: 3 | Status: CANCELLED | OUTPATIENT
Start: 2024-06-11

## 2024-06-11 NOTE — TELEPHONE ENCOUNTER
Pt calling in, stated Dr. Anderson said she would change dose to 2 MG on next refill for ozempic    Please advise

## 2024-06-12 RX ORDER — SEMAGLUTIDE 2.68 MG/ML
2 INJECTION, SOLUTION SUBCUTANEOUS
Qty: 3 ML | Refills: 3 | Status: SHIPPED | OUTPATIENT
Start: 2024-06-12

## 2024-06-12 RX ORDER — CANAGLIFLOZIN 300 MG/1
TABLET, FILM COATED ORAL
Qty: 90 TABLET | Refills: 0 | Status: SHIPPED | OUTPATIENT
Start: 2024-06-12

## 2024-06-28 DIAGNOSIS — F51.01 PRIMARY INSOMNIA: ICD-10-CM

## 2024-07-01 RX ORDER — QUETIAPINE FUMARATE 25 MG/1
TABLET, FILM COATED ORAL
Qty: 180 TABLET | Refills: 2 | OUTPATIENT
Start: 2024-07-01

## 2024-07-02 ENCOUNTER — TELEPHONE (OUTPATIENT)
Dept: PULMONOLOGY | Age: 69
End: 2024-07-02

## 2024-07-05 DIAGNOSIS — E11.51 TYPE 2 DIABETES MELLITUS WITH DIABETIC PERIPHERAL ANGIOPATHY WITHOUT GANGRENE, WITH LONG-TERM CURRENT USE OF INSULIN (HCC): ICD-10-CM

## 2024-07-05 DIAGNOSIS — Z79.4 TYPE 2 DIABETES MELLITUS WITH DIABETIC PERIPHERAL ANGIOPATHY WITHOUT GANGRENE, WITH LONG-TERM CURRENT USE OF INSULIN (HCC): ICD-10-CM

## 2024-07-05 RX ORDER — NAPROXEN SODIUM 220 MG
TABLET ORAL
Qty: 100 EACH | Refills: 2 | Status: SHIPPED | OUTPATIENT
Start: 2024-07-05

## 2024-07-05 RX ORDER — INSULIN GLARGINE 100 [IU]/ML
INJECTION, SOLUTION SUBCUTANEOUS
Qty: 70 ML | Refills: 0 | Status: SHIPPED | OUTPATIENT
Start: 2024-07-05

## 2024-07-24 ENCOUNTER — TELEPHONE (OUTPATIENT)
Dept: FAMILY MEDICINE CLINIC | Age: 69
End: 2024-07-24

## 2024-07-24 DIAGNOSIS — B18.1 CHRONIC ACTIVE VIRAL HEPATITIS B (HCC): ICD-10-CM

## 2024-07-24 RX ORDER — ENTECAVIR 1 MG/1
1 TABLET, FILM COATED ORAL DAILY
Qty: 30 TABLET | Refills: 3 | OUTPATIENT
Start: 2024-07-24

## 2024-08-01 ENCOUNTER — HOSPITAL ENCOUNTER (INPATIENT)
Age: 69
LOS: 1 days | Discharge: HOME | End: 2024-08-02
Attending: STUDENT IN AN ORGANIZED HEALTH CARE EDUCATION/TRAINING PROGRAM | Admitting: STUDENT IN AN ORGANIZED HEALTH CARE EDUCATION/TRAINING PROGRAM
Payer: MEDICARE

## 2024-08-01 ENCOUNTER — APPOINTMENT (OUTPATIENT)
Age: 69
End: 2024-08-01
Attending: INTERNAL MEDICINE
Payer: MEDICARE

## 2024-08-01 ENCOUNTER — APPOINTMENT (OUTPATIENT)
Dept: GENERAL RADIOLOGY | Age: 69
End: 2024-08-01
Attending: STUDENT IN AN ORGANIZED HEALTH CARE EDUCATION/TRAINING PROGRAM
Payer: MEDICARE

## 2024-08-01 DIAGNOSIS — E11.51 TYPE 2 DIABETES MELLITUS WITH DIABETIC PERIPHERAL ANGIOPATHY WITHOUT GANGRENE, WITH LONG-TERM CURRENT USE OF INSULIN (HCC): ICD-10-CM

## 2024-08-01 DIAGNOSIS — Z79.4 TYPE 2 DIABETES MELLITUS WITH DIABETIC PERIPHERAL ANGIOPATHY WITHOUT GANGRENE, WITH LONG-TERM CURRENT USE OF INSULIN (HCC): ICD-10-CM

## 2024-08-01 DIAGNOSIS — R07.9 CHEST PAIN, UNSPECIFIED TYPE: Primary | ICD-10-CM

## 2024-08-01 DIAGNOSIS — I21.4 NSTEMI (NON-ST ELEVATED MYOCARDIAL INFARCTION) (HCC): ICD-10-CM

## 2024-08-01 DIAGNOSIS — E87.6 HYPOKALEMIA: ICD-10-CM

## 2024-08-01 PROBLEM — R06.02 SHORTNESS OF BREATH: Status: ACTIVE | Noted: 2024-08-01

## 2024-08-01 LAB
ANION GAP SERPL CALCULATED.3IONS-SCNC: 12 MMOL/L (ref 3–16)
ANION GAP SERPL CALCULATED.3IONS-SCNC: 13 MMOL/L (ref 3–16)
ANION GAP SERPL CALCULATED.3IONS-SCNC: 14 MMOL/L (ref 3–16)
ANTI-XA UNFRAC HEPARIN: 0.26 IU/ML (ref 0.3–0.7)
ANTI-XA UNFRAC HEPARIN: <0.1 IU/ML (ref 0.3–0.7)
ANTI-XA UNFRAC HEPARIN: <0.1 IU/ML (ref 0.3–0.7)
APTT BLD: 35.3 SEC (ref 22.1–36.4)
BUN SERPL-MCNC: 11 MG/DL (ref 7–20)
BUN SERPL-MCNC: 12 MG/DL (ref 7–20)
BUN SERPL-MCNC: 14 MG/DL (ref 7–20)
CALCIUM SERPL-MCNC: 8.4 MG/DL (ref 8.3–10.6)
CALCIUM SERPL-MCNC: 8.5 MG/DL (ref 8.3–10.6)
CALCIUM SERPL-MCNC: 9.1 MG/DL (ref 8.3–10.6)
CHLORIDE SERPL-SCNC: 101 MMOL/L (ref 99–110)
CHLORIDE SERPL-SCNC: 105 MMOL/L (ref 99–110)
CHLORIDE SERPL-SCNC: 99 MMOL/L (ref 99–110)
CO2 SERPL-SCNC: 24 MMOL/L (ref 21–32)
CO2 SERPL-SCNC: 24 MMOL/L (ref 21–32)
CO2 SERPL-SCNC: 26 MMOL/L (ref 21–32)
CREAT SERPL-MCNC: 0.8 MG/DL (ref 0.8–1.3)
CREAT SERPL-MCNC: 1 MG/DL (ref 0.8–1.3)
CREAT SERPL-MCNC: 1 MG/DL (ref 0.8–1.3)
DEPRECATED RDW RBC AUTO: 15.5 % (ref 12.4–15.4)
DEPRECATED RDW RBC AUTO: 15.6 % (ref 12.4–15.4)
ECHO BSA: 2.44 M2
GFR SERPLBLD CREATININE-BSD FMLA CKD-EPI: 82 ML/MIN/{1.73_M2}
GFR SERPLBLD CREATININE-BSD FMLA CKD-EPI: 82 ML/MIN/{1.73_M2}
GFR SERPLBLD CREATININE-BSD FMLA CKD-EPI: >90 ML/MIN/{1.73_M2}
GLUCOSE BLD-MCNC: 106 MG/DL (ref 70–99)
GLUCOSE BLD-MCNC: 120 MG/DL (ref 70–99)
GLUCOSE BLD-MCNC: 137 MG/DL (ref 70–99)
GLUCOSE BLD-MCNC: 81 MG/DL (ref 70–99)
GLUCOSE BLD-MCNC: 89 MG/DL (ref 70–99)
GLUCOSE SERPL-MCNC: 101 MG/DL (ref 70–99)
GLUCOSE SERPL-MCNC: 127 MG/DL (ref 70–99)
GLUCOSE SERPL-MCNC: 90 MG/DL (ref 70–99)
HCT VFR BLD AUTO: 40.5 % (ref 40.5–52.5)
HCT VFR BLD AUTO: 42.5 % (ref 40.5–52.5)
HCT VFR BLD AUTO: 42.8 % (ref 40.5–52.5)
HGB BLD-MCNC: 13.3 G/DL (ref 13.5–17.5)
HGB BLD-MCNC: 14.4 G/DL (ref 13.5–17.5)
HGB BLD-MCNC: 14.4 G/DL (ref 13.5–17.5)
MAGNESIUM SERPL-MCNC: 2.2 MG/DL (ref 1.8–2.4)
MAGNESIUM SERPL-MCNC: 2.2 MG/DL (ref 1.8–2.4)
MCH RBC QN AUTO: 32.2 PG (ref 26–34)
MCH RBC QN AUTO: 33.3 PG (ref 26–34)
MCHC RBC AUTO-ENTMCNC: 32.9 G/DL (ref 31–36)
MCHC RBC AUTO-ENTMCNC: 33.9 G/DL (ref 31–36)
MCV RBC AUTO: 97.8 FL (ref 80–100)
MCV RBC AUTO: 98.2 FL (ref 80–100)
NT-PROBNP SERPL-MCNC: 2427 PG/ML (ref 0–124)
NUC STRESS EJECTION FRACTION: 41 %
NUC STRESS LV EDV: 194 ML (ref 67–155)
NUC STRESS LV ESV: 115 ML (ref 22–58)
NUC STRESS LV MASS: 192 G
PERFORMED ON: ABNORMAL
PERFORMED ON: NORMAL
PERFORMED ON: NORMAL
PLATELET # BLD AUTO: 133 K/UL (ref 135–450)
PLATELET # BLD AUTO: 139 K/UL (ref 135–450)
PMV BLD AUTO: 8.2 FL (ref 5–10.5)
PMV BLD AUTO: 8.5 FL (ref 5–10.5)
POTASSIUM SERPL-SCNC: 3.2 MMOL/L (ref 3.5–5.1)
POTASSIUM SERPL-SCNC: 3.2 MMOL/L (ref 3.5–5.1)
POTASSIUM SERPL-SCNC: 3.8 MMOL/L (ref 3.5–5.1)
POTASSIUM SERPL-SCNC: 3.9 MMOL/L (ref 3.5–5.1)
RBC # BLD AUTO: 4.14 M/UL (ref 4.2–5.9)
RBC # BLD AUTO: 4.33 M/UL (ref 4.2–5.9)
SODIUM SERPL-SCNC: 137 MMOL/L (ref 136–145)
SODIUM SERPL-SCNC: 140 MMOL/L (ref 136–145)
SODIUM SERPL-SCNC: 141 MMOL/L (ref 136–145)
STRESS BASELINE DIAS BP: 66 MMHG
STRESS BASELINE HR: 78 BPM
STRESS BASELINE SYS BP: 149 MMHG
STRESS ESTIMATED WORKLOAD: 1 METS
STRESS EXERCISE DUR MIN: 4 MIN
STRESS EXERCISE DUR SEC: 0 SEC
STRESS O2 SAT PEAK: 97 %
STRESS O2 SAT REST: 97 %
STRESS PEAK DIAS BP: 59 MMHG
STRESS PEAK SYS BP: 161 MMHG
STRESS PERCENT HR ACHIEVED: 59 %
STRESS POST PEAK HR: 89 BPM
STRESS RATE PRESSURE PRODUCT: ABNORMAL BPM*MMHG
STRESS TARGET HR: 152 BPM
TID: 0.99
TROPONIN, HIGH SENSITIVITY: 27 NG/L (ref 0–22)
TROPONIN, HIGH SENSITIVITY: 31 NG/L (ref 0–22)
WBC # BLD AUTO: 6.1 K/UL (ref 4–11)
WBC # BLD AUTO: 7.5 K/UL (ref 4–11)

## 2024-08-01 PROCEDURE — 93459 L HRT ART/GRFT ANGIO: CPT | Performed by: STUDENT IN AN ORGANIZED HEALTH CARE EDUCATION/TRAINING PROGRAM

## 2024-08-01 PROCEDURE — 78452 HT MUSCLE IMAGE SPECT MULT: CPT

## 2024-08-01 PROCEDURE — 6360000002 HC RX W HCPCS: Performed by: INTERNAL MEDICINE

## 2024-08-01 PROCEDURE — 99223 1ST HOSP IP/OBS HIGH 75: CPT | Performed by: STUDENT IN AN ORGANIZED HEALTH CARE EDUCATION/TRAINING PROGRAM

## 2024-08-01 PROCEDURE — 93017 CV STRESS TEST TRACING ONLY: CPT

## 2024-08-01 PROCEDURE — 84132 ASSAY OF SERUM POTASSIUM: CPT

## 2024-08-01 PROCEDURE — 85027 COMPLETE CBC AUTOMATED: CPT

## 2024-08-01 PROCEDURE — 6370000000 HC RX 637 (ALT 250 FOR IP): Performed by: STUDENT IN AN ORGANIZED HEALTH CARE EDUCATION/TRAINING PROGRAM

## 2024-08-01 PROCEDURE — 36415 COLL VENOUS BLD VENIPUNCTURE: CPT

## 2024-08-01 PROCEDURE — 85018 HEMOGLOBIN: CPT

## 2024-08-01 PROCEDURE — 93018 CV STRESS TEST I&R ONLY: CPT | Performed by: INTERNAL MEDICINE

## 2024-08-01 PROCEDURE — 2500000003 HC RX 250 WO HCPCS: Performed by: NURSE PRACTITIONER

## 2024-08-01 PROCEDURE — 78452 HT MUSCLE IMAGE SPECT MULT: CPT | Performed by: INTERNAL MEDICINE

## 2024-08-01 PROCEDURE — 93005 ELECTROCARDIOGRAM TRACING: CPT | Performed by: STUDENT IN AN ORGANIZED HEALTH CARE EDUCATION/TRAINING PROGRAM

## 2024-08-01 PROCEDURE — 85730 THROMBOPLASTIN TIME PARTIAL: CPT

## 2024-08-01 PROCEDURE — 80048 BASIC METABOLIC PNL TOTAL CA: CPT

## 2024-08-01 PROCEDURE — 71045 X-RAY EXAM CHEST 1 VIEW: CPT

## 2024-08-01 PROCEDURE — 1200000000 HC SEMI PRIVATE

## 2024-08-01 PROCEDURE — 6370000000 HC RX 637 (ALT 250 FOR IP): Performed by: NURSE PRACTITIONER

## 2024-08-01 PROCEDURE — 83880 ASSAY OF NATRIURETIC PEPTIDE: CPT

## 2024-08-01 PROCEDURE — 93016 CV STRESS TEST SUPVJ ONLY: CPT | Performed by: INTERNAL MEDICINE

## 2024-08-01 PROCEDURE — 85014 HEMATOCRIT: CPT

## 2024-08-01 PROCEDURE — 84484 ASSAY OF TROPONIN QUANT: CPT

## 2024-08-01 PROCEDURE — 85520 HEPARIN ASSAY: CPT

## 2024-08-01 PROCEDURE — 2580000003 HC RX 258: Performed by: STUDENT IN AN ORGANIZED HEALTH CARE EDUCATION/TRAINING PROGRAM

## 2024-08-01 PROCEDURE — 83735 ASSAY OF MAGNESIUM: CPT

## 2024-08-01 PROCEDURE — 6360000002 HC RX W HCPCS: Performed by: STUDENT IN AN ORGANIZED HEALTH CARE EDUCATION/TRAINING PROGRAM

## 2024-08-01 PROCEDURE — A9502 TC99M TETROFOSMIN: HCPCS | Performed by: INTERNAL MEDICINE

## 2024-08-01 PROCEDURE — 3430000000 HC RX DIAGNOSTIC RADIOPHARMACEUTICAL: Performed by: INTERNAL MEDICINE

## 2024-08-01 RX ORDER — GLUCAGON 1 MG/ML
1 KIT INJECTION PRN
Status: DISCONTINUED | OUTPATIENT
Start: 2024-08-01 | End: 2024-08-02 | Stop reason: HOSPADM

## 2024-08-01 RX ORDER — CARVEDILOL 3.12 MG/1
3.12 TABLET ORAL 2 TIMES DAILY WITH MEALS
Status: DISCONTINUED | OUTPATIENT
Start: 2024-08-01 | End: 2024-08-02

## 2024-08-01 RX ORDER — SODIUM CHLORIDE 0.9 % (FLUSH) 0.9 %
5-40 SYRINGE (ML) INJECTION PRN
Status: DISCONTINUED | OUTPATIENT
Start: 2024-08-01 | End: 2024-08-02 | Stop reason: HOSPADM

## 2024-08-01 RX ORDER — ASPIRIN 81 MG/1
81 TABLET, CHEWABLE ORAL DAILY
Status: DISCONTINUED | OUTPATIENT
Start: 2024-08-02 | End: 2024-08-01

## 2024-08-01 RX ORDER — SODIUM CHLORIDE 0.9 % (FLUSH) 0.9 %
5-40 SYRINGE (ML) INJECTION EVERY 12 HOURS SCHEDULED
Status: DISCONTINUED | OUTPATIENT
Start: 2024-08-01 | End: 2024-08-02 | Stop reason: HOSPADM

## 2024-08-01 RX ORDER — HEPARIN SODIUM 1000 [USP'U]/ML
4000 INJECTION, SOLUTION INTRAVENOUS; SUBCUTANEOUS ONCE
Status: DISCONTINUED | OUTPATIENT
Start: 2024-08-01 | End: 2024-08-01 | Stop reason: ALTCHOICE

## 2024-08-01 RX ORDER — ONDANSETRON 4 MG/1
4 TABLET, ORALLY DISINTEGRATING ORAL EVERY 8 HOURS PRN
Status: DISCONTINUED | OUTPATIENT
Start: 2024-08-01 | End: 2024-08-02 | Stop reason: HOSPADM

## 2024-08-01 RX ORDER — REGADENOSON 0.08 MG/ML
0.4 INJECTION, SOLUTION INTRAVENOUS
Status: COMPLETED | OUTPATIENT
Start: 2024-08-01 | End: 2024-08-01

## 2024-08-01 RX ORDER — ASPIRIN 81 MG/1
81 TABLET ORAL DAILY
Status: DISCONTINUED | OUTPATIENT
Start: 2024-08-01 | End: 2024-08-02 | Stop reason: HOSPADM

## 2024-08-01 RX ORDER — CARVEDILOL 6.25 MG/1
6.25 TABLET ORAL 2 TIMES DAILY
Status: DISCONTINUED | OUTPATIENT
Start: 2024-08-01 | End: 2024-08-01

## 2024-08-01 RX ORDER — INSULIN GLARGINE 100 [IU]/ML
35 INJECTION, SOLUTION SUBCUTANEOUS NIGHTLY
Status: DISCONTINUED | OUTPATIENT
Start: 2024-08-01 | End: 2024-08-02 | Stop reason: HOSPADM

## 2024-08-01 RX ORDER — ACETAMINOPHEN 650 MG/1
650 SUPPOSITORY RECTAL EVERY 6 HOURS PRN
Status: DISCONTINUED | OUTPATIENT
Start: 2024-08-01 | End: 2024-08-02 | Stop reason: HOSPADM

## 2024-08-01 RX ORDER — CLOPIDOGREL 300 MG/1
300 TABLET, FILM COATED ORAL ONCE
Status: COMPLETED | OUTPATIENT
Start: 2024-08-01 | End: 2024-08-01

## 2024-08-01 RX ORDER — HEPARIN SODIUM 1000 [USP'U]/ML
2000 INJECTION, SOLUTION INTRAVENOUS; SUBCUTANEOUS PRN
Status: DISCONTINUED | OUTPATIENT
Start: 2024-08-01 | End: 2024-08-02 | Stop reason: HOSPADM

## 2024-08-01 RX ORDER — ROSUVASTATIN CALCIUM 40 MG/1
40 TABLET, COATED ORAL DAILY
Status: DISCONTINUED | OUTPATIENT
Start: 2024-08-01 | End: 2024-08-02 | Stop reason: HOSPADM

## 2024-08-01 RX ORDER — SODIUM CHLORIDE 9 MG/ML
INJECTION, SOLUTION INTRAVENOUS PRN
Status: DISCONTINUED | OUTPATIENT
Start: 2024-08-01 | End: 2024-08-02 | Stop reason: HOSPADM

## 2024-08-01 RX ORDER — POTASSIUM CHLORIDE 7.45 MG/ML
10 INJECTION INTRAVENOUS PRN
Status: DISCONTINUED | OUTPATIENT
Start: 2024-08-01 | End: 2024-08-02 | Stop reason: HOSPADM

## 2024-08-01 RX ORDER — ONDANSETRON 2 MG/ML
4 INJECTION INTRAMUSCULAR; INTRAVENOUS EVERY 6 HOURS PRN
Status: DISCONTINUED | OUTPATIENT
Start: 2024-08-01 | End: 2024-08-02 | Stop reason: HOSPADM

## 2024-08-01 RX ORDER — HEPARIN SODIUM 1000 [USP'U]/ML
4000 INJECTION, SOLUTION INTRAVENOUS; SUBCUTANEOUS PRN
Status: DISCONTINUED | OUTPATIENT
Start: 2024-08-01 | End: 2024-08-02 | Stop reason: HOSPADM

## 2024-08-01 RX ORDER — POLYETHYLENE GLYCOL 3350 17 G/17G
17 POWDER, FOR SOLUTION ORAL DAILY PRN
Status: DISCONTINUED | OUTPATIENT
Start: 2024-08-01 | End: 2024-08-02 | Stop reason: HOSPADM

## 2024-08-01 RX ORDER — POTASSIUM CHLORIDE 20 MEQ/1
40 TABLET, EXTENDED RELEASE ORAL 2 TIMES DAILY WITH MEALS
Status: DISCONTINUED | OUTPATIENT
Start: 2024-08-01 | End: 2024-08-02 | Stop reason: HOSPADM

## 2024-08-01 RX ORDER — INSULIN LISPRO 100 [IU]/ML
0-4 INJECTION, SOLUTION INTRAVENOUS; SUBCUTANEOUS NIGHTLY
Status: DISCONTINUED | OUTPATIENT
Start: 2024-08-01 | End: 2024-08-02 | Stop reason: HOSPADM

## 2024-08-01 RX ORDER — POTASSIUM CHLORIDE 20 MEQ/1
40 TABLET, EXTENDED RELEASE ORAL PRN
Status: DISCONTINUED | OUTPATIENT
Start: 2024-08-01 | End: 2024-08-02 | Stop reason: HOSPADM

## 2024-08-01 RX ORDER — DEXTROSE MONOHYDRATE 100 MG/ML
INJECTION, SOLUTION INTRAVENOUS CONTINUOUS PRN
Status: DISCONTINUED | OUTPATIENT
Start: 2024-08-01 | End: 2024-08-02 | Stop reason: HOSPADM

## 2024-08-01 RX ORDER — MAGNESIUM SULFATE IN WATER 40 MG/ML
2000 INJECTION, SOLUTION INTRAVENOUS PRN
Status: DISCONTINUED | OUTPATIENT
Start: 2024-08-01 | End: 2024-08-02 | Stop reason: HOSPADM

## 2024-08-01 RX ORDER — ALBUTEROL SULFATE 90 UG/1
2 AEROSOL, METERED RESPIRATORY (INHALATION) EVERY 4 HOURS PRN
Status: DISCONTINUED | OUTPATIENT
Start: 2024-08-01 | End: 2024-08-02 | Stop reason: HOSPADM

## 2024-08-01 RX ORDER — INSULIN LISPRO 100 [IU]/ML
0-8 INJECTION, SOLUTION INTRAVENOUS; SUBCUTANEOUS
Status: DISCONTINUED | OUTPATIENT
Start: 2024-08-01 | End: 2024-08-02 | Stop reason: HOSPADM

## 2024-08-01 RX ORDER — HEPARIN SODIUM 1000 [USP'U]/ML
4000 INJECTION, SOLUTION INTRAVENOUS; SUBCUTANEOUS ONCE
Status: COMPLETED | OUTPATIENT
Start: 2024-08-01 | End: 2024-08-01

## 2024-08-01 RX ORDER — HEPARIN SODIUM 10000 [USP'U]/100ML
5-30 INJECTION, SOLUTION INTRAVENOUS CONTINUOUS
Status: DISCONTINUED | OUTPATIENT
Start: 2024-08-01 | End: 2024-08-02 | Stop reason: HOSPADM

## 2024-08-01 RX ORDER — FUROSEMIDE 10 MG/ML
20 INJECTION INTRAMUSCULAR; INTRAVENOUS 2 TIMES DAILY
Status: DISCONTINUED | OUTPATIENT
Start: 2024-08-01 | End: 2024-08-02 | Stop reason: HOSPADM

## 2024-08-01 RX ORDER — ACETAMINOPHEN 325 MG/1
650 TABLET ORAL EVERY 6 HOURS PRN
Status: DISCONTINUED | OUTPATIENT
Start: 2024-08-01 | End: 2024-08-02 | Stop reason: HOSPADM

## 2024-08-01 RX ADMIN — SODIUM CHLORIDE, PRESERVATIVE FREE 10 ML: 5 INJECTION INTRAVENOUS at 08:48

## 2024-08-01 RX ADMIN — HEPARIN SODIUM 4000 UNITS: 1000 INJECTION INTRAVENOUS; SUBCUTANEOUS at 01:48

## 2024-08-01 RX ADMIN — TETROFOSMIN 12.1 MILLICURIE: 1.38 INJECTION, POWDER, LYOPHILIZED, FOR SOLUTION INTRAVENOUS at 14:03

## 2024-08-01 RX ADMIN — HEPARIN SODIUM 2000 UNITS: 1000 INJECTION INTRAVENOUS; SUBCUTANEOUS at 16:51

## 2024-08-01 RX ADMIN — HEPARIN SODIUM 4000 UNITS: 1000 INJECTION INTRAVENOUS; SUBCUTANEOUS at 08:54

## 2024-08-01 RX ADMIN — CLOPIDOGREL BISULFATE 300 MG: 300 TABLET, FILM COATED ORAL at 16:54

## 2024-08-01 RX ADMIN — MICONAZOLE NITRATE: 20 POWDER TOPICAL at 20:25

## 2024-08-01 RX ADMIN — REGADENOSON 0.4 MG: 0.08 INJECTION, SOLUTION INTRAVENOUS at 14:31

## 2024-08-01 RX ADMIN — TETROFOSMIN 32.4 MILLICURIE: 1.38 INJECTION, POWDER, LYOPHILIZED, FOR SOLUTION INTRAVENOUS at 14:35

## 2024-08-01 RX ADMIN — ASPIRIN 81 MG: 81 TABLET, COATED ORAL at 08:47

## 2024-08-01 RX ADMIN — MICONAZOLE NITRATE: 20 POWDER TOPICAL at 09:37

## 2024-08-01 RX ADMIN — HEPARIN SODIUM 8 UNITS/KG/HR: 10000 INJECTION, SOLUTION INTRAVENOUS at 01:52

## 2024-08-01 RX ADMIN — CARVEDILOL 3.12 MG: 3.12 TABLET, FILM COATED ORAL at 16:54

## 2024-08-01 RX ADMIN — SODIUM CHLORIDE, PRESERVATIVE FREE 10 ML: 5 INJECTION INTRAVENOUS at 20:25

## 2024-08-01 RX ADMIN — FUROSEMIDE 20 MG: 10 INJECTION, SOLUTION INTRAMUSCULAR; INTRAVENOUS at 16:57

## 2024-08-01 RX ADMIN — FUROSEMIDE 20 MG: 10 INJECTION, SOLUTION INTRAMUSCULAR; INTRAVENOUS at 08:48

## 2024-08-01 RX ADMIN — POTASSIUM CHLORIDE 40 MEQ: 1500 TABLET, EXTENDED RELEASE ORAL at 16:54

## 2024-08-01 RX ADMIN — ROSUVASTATIN CALCIUM 40 MG: 40 TABLET, COATED ORAL at 08:48

## 2024-08-01 RX ADMIN — POTASSIUM CHLORIDE 40 MEQ: 1500 TABLET, EXTENDED RELEASE ORAL at 08:47

## 2024-08-01 RX ADMIN — HEPARIN SODIUM 14 UNITS/KG/HR: 10000 INJECTION, SOLUTION INTRAVENOUS at 21:31

## 2024-08-01 RX ADMIN — CARVEDILOL 6.25 MG: 6.25 TABLET, FILM COATED ORAL at 01:55

## 2024-08-01 ASSESSMENT — PAIN DESCRIPTION - LOCATION: LOCATION: BACK

## 2024-08-01 ASSESSMENT — PAIN SCALES - GENERAL
PAINLEVEL_OUTOF10: 0
PAINLEVEL_OUTOF10: 6
PAINLEVEL_OUTOF10: 0

## 2024-08-01 NOTE — CONSULTS
CHF Nutrition Education    Consult received for heart failure diet education.  Nutrition therapy included low sodium diet guidelines, fluid restriction, foods to choose and foods to avoid, and ways to add flavor to food.  Patient does not add salt to food. All questions answered and patient voiced understanding.  Time spent with patient: 10 minutes    Electronically signed by Rin Lorenzo MS, RD, LD on 8/1/2024 at 10:33 AM     Contact: [phone]

## 2024-08-01 NOTE — CONSULTS
[facility] [place]  Dr Akshat Duckworthcandy Starkralph  [birth year]        Chest pain     History of Present Illness:  Rich Stover is a 69 y.o. patient who presented to the hospital with complaints of chest pain and shortness of breath and elevated troponin. Patient has history of aortic stenosis status post TAVR and presented with NSTEMI.  Patient was transferred here from [facility].  I have been asked to provide consultation regarding further management and testing.    Reports to me over the weekend had several meals with a very high salt load - was hypertensive and with ambulation and carrying things from the car developed profound chest pain.  Symptoms were classical anginal symptoms.  After presentation and diuresis he feels considerably better    Biomarkers mildly positive    Past Medical History:   has a past medical history of Anxiety, Aortic valve sclerosis, Arthritis, CAD (coronary artery disease), Cerebral infarct (HCC), Chronic active viral hepatitis B (HCC), Chronic back pain, Constipation, Diabetes mellitus, type 2 (HCC), Esophageal varices (HCC), Fatty liver, Heart attack (HCC), Hepatitis B immune- pos HBSAg, History of blood transfusion, HTN (hypertension), Hyperlipidemia LDL goal < 100, Hyperlipidemia with target LDL less than 100, Malignant melanoma of left upper extremity including shoulder (HCC)- excised 12/2023, Obesity, Psoriasis, S/P TAVR (transcatheter aortic valve replacement) 2023, Sleep apnea, and STEMI (ST elevation myocardial infarction) (HCC).    Surgical History:   has a past surgical history that includes Coronary angioplasty with stent (11/16/2011); Appendectomy (age 18); Tonsillectomy and adenoidectomy (1980's); Total knee arthroplasty (Left, 2005); Biceps tendon repair; Skull fracture elevation (teen); Colonoscopy; Coronary angioplasty with stent (06/01/2009); Coronary angioplasty with stent (11/25/2008); Coronary angioplasty with stent (11/24/2008);  upon the patient's clinical course and testing results.    All questions and concerns were addressed to the patient/family. Alternatives to my treatment were discussed. The note was completed using EMR. Every effort was made to ensure accuracy; however, inadvertent computerized transcription errors may be present.    Akshat Lewis MD  Interventional Cardiology  8/1/2024  10:23 AM    [facility]  [address], [address]   [place] [address]  Ph: [phone]  Fax: [phone]    NOTE: This report was transcribed using voice recognition software. Every effort was made to ensure accuracy, however, inadvertent computerized transcription errors may be present.

## 2024-08-01 NOTE — CARE COORDINATION
08/01/24 1103   IMM Letter   IMM Letter given to Patient/Family/Significant other/Guardian/POA/by: IMM given by CM   IMM Letter date given: 08/01/24   IMM Letter time given: 1009

## 2024-08-01 NOTE — PROGRESS NOTES
[facility]    8/1/2024 8:02 AM        Name: Rich Stover .              Admitted: 8/1/2024  Primary Care Provider: Liane Anderson MD (Tel: [phone])      Subjective:  .  Admitted this am.  H & P reviewed   Pt sought care due to increased shortness of breath     Seen this am while laying flat in the bed  Reports that his previous dyspnea is much improved.    No current reports of chest pain.    He is diuresing with IV lasix.    Will replace K       Reviewed interval ancillary notes    Current Medications  albuterol sulfate HFA (PROVENTIL;VENTOLIN;PROAIR) 108 (90 Base) MCG/ACT inhaler 2 puff, Q4H PRN  aspirin EC tablet 81 mg, Daily  carvedilol (COREG) tablet 6.25 mg, BID  insulin glargine (LANTUS) injection vial 35 Units, Nightly  rosuvastatin (CRESTOR) tablet 40 mg, Daily  sodium chloride flush 0.9 % injection 5-40 mL, 2 times per day  sodium chloride flush 0.9 % injection 5-40 mL, PRN  0.9 % sodium chloride infusion, PRN  potassium chloride (KLOR-CON M) extended release tablet 40 mEq, PRN   Or  potassium bicarb-citric acid (EFFER-K) effervescent tablet 40 mEq, PRN   Or  potassium chloride 10 mEq/100 mL IVPB (Peripheral Line), PRN  magnesium sulfate 2000 mg in 50 mL IVPB premix, PRN  ondansetron (ZOFRAN-ODT) disintegrating tablet 4 mg, Q8H PRN   Or  ondansetron (ZOFRAN) injection 4 mg, Q6H PRN  acetaminophen (TYLENOL) tablet 650 mg, Q6H PRN   Or  acetaminophen (TYLENOL) suppository 650 mg, Q6H PRN  polyethylene glycol (GLYCOLAX) packet 17 g, Daily PRN  heparin (porcine) injection 4,000 Units, PRN  heparin (porcine) injection 2,000 Units, PRN  heparin 25,000 units in dextrose 5% 250 mL (premix) infusion, Continuous  dextrose bolus 10% 125 mL, PRN   Or  dextrose bolus 10% 250 mL, PRN  glucagon injection 1 mg, PRN  dextrose 10 % infusion, Continuous PRN  insulin lispro (HUMALOG,ADMELOG) injection vial 0-8 Units, TID WC  insulin lispro (HUMALOG,ADMELOG) injection vial 0-4

## 2024-08-01 NOTE — PROGRESS NOTES
08/01/24 0033   RT Protocol   History Pulmonary Disease 0   Respiratory pattern 0   Breath sounds 0   Cough 0   Indications for Bronchodilator Therapy On home bronchodilators   Bronchodilator Assessment Score 0
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
## 2024-08-02 VITALS
OXYGEN SATURATION: 94 % | DIASTOLIC BLOOD PRESSURE: 74 MMHG | RESPIRATION RATE: 16 BRPM | HEIGHT: 70 IN | SYSTOLIC BLOOD PRESSURE: 137 MMHG | WEIGHT: 257.9 LBS | BODY MASS INDEX: 36.92 KG/M2 | HEART RATE: 76 BPM | TEMPERATURE: 98.5 F

## 2024-08-02 LAB
ANION GAP SERPL CALCULATED.3IONS-SCNC: 13 MMOL/L (ref 3–16)
ANTI-XA UNFRAC HEPARIN: 0.29 IU/ML (ref 0.3–0.7)
ANTI-XA UNFRAC HEPARIN: 0.43 IU/ML (ref 0.3–0.7)
ANTI-XA UNFRAC HEPARIN: 0.51 IU/ML (ref 0.3–0.7)
BUN SERPL-MCNC: 14 MG/DL (ref 7–20)
CALCIUM SERPL-MCNC: 8.7 MG/DL (ref 8.3–10.6)
CHLORIDE SERPL-SCNC: 103 MMOL/L (ref 99–110)
CHOLEST SERPL-MCNC: 115 MG/DL (ref 0–199)
CO2 SERPL-SCNC: 25 MMOL/L (ref 21–32)
CREAT SERPL-MCNC: 0.9 MG/DL (ref 0.8–1.3)
DEPRECATED RDW RBC AUTO: 14.9 % (ref 12.4–15.4)
ECHO BSA: 2.44 M2
GFR SERPLBLD CREATININE-BSD FMLA CKD-EPI: >90 ML/MIN/{1.73_M2}
GLUCOSE BLD-MCNC: 127 MG/DL (ref 70–99)
GLUCOSE BLD-MCNC: 129 MG/DL (ref 70–99)
GLUCOSE BLD-MCNC: 201 MG/DL (ref 70–99)
GLUCOSE SERPL-MCNC: 119 MG/DL (ref 70–99)
HCT VFR BLD AUTO: 40.9 % (ref 40.5–52.5)
HDLC SERPL-MCNC: 37 MG/DL (ref 40–60)
HGB BLD-MCNC: 13.7 G/DL (ref 13.5–17.5)
LDLC SERPL CALC-MCNC: 61 MG/DL
MAGNESIUM SERPL-MCNC: 2.2 MG/DL (ref 1.8–2.4)
MCH RBC QN AUTO: 32.2 PG (ref 26–34)
MCHC RBC AUTO-ENTMCNC: 33.5 G/DL (ref 31–36)
MCV RBC AUTO: 96.1 FL (ref 80–100)
PERFORMED ON: ABNORMAL
PLATELET # BLD AUTO: 165 K/UL (ref 135–450)
PMV BLD AUTO: 7.9 FL (ref 5–10.5)
POTASSIUM SERPL-SCNC: 3.9 MMOL/L (ref 3.5–5.1)
RBC # BLD AUTO: 4.26 M/UL (ref 4.2–5.9)
SODIUM SERPL-SCNC: 141 MMOL/L (ref 136–145)
TRIGL SERPL-MCNC: 84 MG/DL (ref 0–150)
VLDLC SERPL CALC-MCNC: 17 MG/DL
WBC # BLD AUTO: 5.4 K/UL (ref 4–11)

## 2024-08-02 PROCEDURE — 80061 LIPID PANEL: CPT

## 2024-08-02 PROCEDURE — 93455 CORONARY ART/GRFT ANGIO S&I: CPT | Performed by: STUDENT IN AN ORGANIZED HEALTH CARE EDUCATION/TRAINING PROGRAM

## 2024-08-02 PROCEDURE — 99153 MOD SED SAME PHYS/QHP EA: CPT | Performed by: STUDENT IN AN ORGANIZED HEALTH CARE EDUCATION/TRAINING PROGRAM

## 2024-08-02 PROCEDURE — B2111ZZ FLUOROSCOPY OF MULTIPLE CORONARY ARTERIES USING LOW OSMOLAR CONTRAST: ICD-10-PCS | Performed by: STUDENT IN AN ORGANIZED HEALTH CARE EDUCATION/TRAINING PROGRAM

## 2024-08-02 PROCEDURE — 80048 BASIC METABOLIC PNL TOTAL CA: CPT

## 2024-08-02 PROCEDURE — 6360000002 HC RX W HCPCS: Performed by: STUDENT IN AN ORGANIZED HEALTH CARE EDUCATION/TRAINING PROGRAM

## 2024-08-02 PROCEDURE — 36415 COLL VENOUS BLD VENIPUNCTURE: CPT

## 2024-08-02 PROCEDURE — 85520 HEPARIN ASSAY: CPT

## 2024-08-02 PROCEDURE — 4A023N7 MEASUREMENT OF CARDIAC SAMPLING AND PRESSURE, LEFT HEART, PERCUTANEOUS APPROACH: ICD-10-PCS | Performed by: STUDENT IN AN ORGANIZED HEALTH CARE EDUCATION/TRAINING PROGRAM

## 2024-08-02 PROCEDURE — 83735 ASSAY OF MAGNESIUM: CPT

## 2024-08-02 PROCEDURE — 85027 COMPLETE CBC AUTOMATED: CPT

## 2024-08-02 PROCEDURE — 2709999900 HC NON-CHARGEABLE SUPPLY: Performed by: STUDENT IN AN ORGANIZED HEALTH CARE EDUCATION/TRAINING PROGRAM

## 2024-08-02 PROCEDURE — 6370000000 HC RX 637 (ALT 250 FOR IP): Performed by: NURSE PRACTITIONER

## 2024-08-02 PROCEDURE — 99152 MOD SED SAME PHYS/QHP 5/>YRS: CPT | Performed by: STUDENT IN AN ORGANIZED HEALTH CARE EDUCATION/TRAINING PROGRAM

## 2024-08-02 PROCEDURE — C1769 GUIDE WIRE: HCPCS | Performed by: STUDENT IN AN ORGANIZED HEALTH CARE EDUCATION/TRAINING PROGRAM

## 2024-08-02 PROCEDURE — C1894 INTRO/SHEATH, NON-LASER: HCPCS | Performed by: STUDENT IN AN ORGANIZED HEALTH CARE EDUCATION/TRAINING PROGRAM

## 2024-08-02 PROCEDURE — 6370000000 HC RX 637 (ALT 250 FOR IP): Performed by: STUDENT IN AN ORGANIZED HEALTH CARE EDUCATION/TRAINING PROGRAM

## 2024-08-02 PROCEDURE — 2580000003 HC RX 258: Performed by: STUDENT IN AN ORGANIZED HEALTH CARE EDUCATION/TRAINING PROGRAM

## 2024-08-02 PROCEDURE — B2131ZZ FLUOROSCOPY OF MULTIPLE CORONARY ARTERY BYPASS GRAFTS USING LOW OSMOLAR CONTRAST: ICD-10-PCS | Performed by: STUDENT IN AN ORGANIZED HEALTH CARE EDUCATION/TRAINING PROGRAM

## 2024-08-02 PROCEDURE — 6360000004 HC RX CONTRAST MEDICATION: Performed by: STUDENT IN AN ORGANIZED HEALTH CARE EDUCATION/TRAINING PROGRAM

## 2024-08-02 PROCEDURE — 2500000003 HC RX 250 WO HCPCS: Performed by: STUDENT IN AN ORGANIZED HEALTH CARE EDUCATION/TRAINING PROGRAM

## 2024-08-02 RX ORDER — MIDAZOLAM HYDROCHLORIDE 1 MG/ML
INJECTION INTRAMUSCULAR; INTRAVENOUS PRN
Status: DISCONTINUED | OUTPATIENT
Start: 2024-08-02 | End: 2024-08-02 | Stop reason: HOSPADM

## 2024-08-02 RX ORDER — DIPHENHYDRAMINE HYDROCHLORIDE 50 MG/ML
INJECTION INTRAMUSCULAR; INTRAVENOUS PRN
Status: DISCONTINUED | OUTPATIENT
Start: 2024-08-02 | End: 2024-08-02 | Stop reason: HOSPADM

## 2024-08-02 RX ORDER — POTASSIUM CHLORIDE 20 MEQ/1
20 TABLET, EXTENDED RELEASE ORAL DAILY
Qty: 30 TABLET | Refills: 0 | Status: SHIPPED | OUTPATIENT
Start: 2024-08-02

## 2024-08-02 RX ORDER — FENTANYL CITRATE 50 UG/ML
INJECTION, SOLUTION INTRAMUSCULAR; INTRAVENOUS PRN
Status: DISCONTINUED | OUTPATIENT
Start: 2024-08-02 | End: 2024-08-02 | Stop reason: HOSPADM

## 2024-08-02 RX ORDER — FUROSEMIDE 40 MG/1
40 TABLET ORAL DAILY
Qty: 30 TABLET | Refills: 0 | Status: SHIPPED | OUTPATIENT
Start: 2024-08-02

## 2024-08-02 RX ORDER — LIDOCAINE HYDROCHLORIDE 10 MG/ML
INJECTION, SOLUTION INFILTRATION; PERINEURAL PRN
Status: DISCONTINUED | OUTPATIENT
Start: 2024-08-02 | End: 2024-08-02 | Stop reason: HOSPADM

## 2024-08-02 RX ORDER — CARVEDILOL 6.25 MG/1
6.25 TABLET ORAL 2 TIMES DAILY WITH MEALS
Status: DISCONTINUED | OUTPATIENT
Start: 2024-08-02 | End: 2024-08-02 | Stop reason: HOSPADM

## 2024-08-02 RX ADMIN — SODIUM CHLORIDE, PRESERVATIVE FREE 10 ML: 5 INJECTION INTRAVENOUS at 09:09

## 2024-08-02 RX ADMIN — ASPIRIN 81 MG: 81 TABLET, COATED ORAL at 09:09

## 2024-08-02 RX ADMIN — ROSUVASTATIN CALCIUM 40 MG: 40 TABLET, COATED ORAL at 09:08

## 2024-08-02 RX ADMIN — MICONAZOLE NITRATE: 20 POWDER TOPICAL at 09:15

## 2024-08-02 RX ADMIN — POTASSIUM CHLORIDE 40 MEQ: 1500 TABLET, EXTENDED RELEASE ORAL at 09:08

## 2024-08-02 RX ADMIN — HEPARIN SODIUM 2000 UNITS: 1000 INJECTION INTRAVENOUS; SUBCUTANEOUS at 00:16

## 2024-08-02 RX ADMIN — HEPARIN SODIUM 16 UNITS/KG/HR: 10000 INJECTION, SOLUTION INTRAVENOUS at 12:34

## 2024-08-02 RX ADMIN — CARVEDILOL 6.25 MG: 6.25 TABLET, FILM COATED ORAL at 09:08

## 2024-08-02 ASSESSMENT — PAIN SCALES - GENERAL
PAINLEVEL_OUTOF10: 0
PAINLEVEL_OUTOF10: 0

## 2024-08-02 NOTE — PROGRESS NOTES
[facility]      Cardiology Consult    Rich Stover  1955  August 2, 2024    Referring Physician: Liane Anderson MD  Reason for Referral: Follow up Cath     CC: \"I feel great.\"    HPI:  The patient is 69 y.o. male with a past medical history significant for Anxiety, Aortic valve sclerosis, Arthritis, CAD (coronary artery disease), Cerebral infarct (HCC), Chronic active viral hepatitis B (HCC), Chronic back pain, Constipation, Diabetes mellitus, type 2 (HCC), Esophageal varices (HCC), Fatty liver, Heart attack (HCC), Hepatitis B immune- pos HBSAg, History of blood transfusion, HTN (hypertension), Hyperlipidemia LDL goal < 100, Hyperlipidemia with target LDL less than 100, Malignant melanoma of left upper extremity including shoulder (HCC)- excised 12/2023, Obesity, Psoriasis, S/P TAVR (transcatheter aortic valve replacement) 2023, Sleep apnea, and STEMI (ST elevation myocardial infarction) (HCC).  Angiogram completed 8/2/24 Patent SVG grafts to the RCA and Lcx/Diag system  LIMA-LAD known to be atretic.  Native LAD diffusely disease and not a candidate for revascularization.  Patient stated feeling 100% better no comparison prior to 8/2/24.    Today he presents for follow up and states that overall he is feeling well. He denies any new sounding cardiac complaints. He denies any chest pains or worsening shortness of breath. He reports medication compliance and is tolerating. He denies any abnormal bleeding or bruising. He denies exertional chest pain/pressure, dyspnea at rest, worsening NAVARRETE, PND, orthopnea, palpitations, lightheadedness, weight changes, changes in LE edema, and syncope.   No chest pain or shortness of breath.        Past Medical History:   Diagnosis Date    Anxiety     Aortic valve sclerosis 3/3019    Arthritis     CAD (coronary artery disease)     PCI/stents RCA, LAD, diag, LCx    Cerebral infarct (HCC) 04/10/2016    bilat basal ganglia    Chronic active viral hepatitis B

## 2024-08-02 NOTE — DISCHARGE INSTRUCTIONS
HOLD YOUR METFORMIN ( GLUCOPHAGE FOR 48 HOURS AFTER YOUR CATH) OK TO RESUME ON MONDAY AM    .  Radial Angiogram      Care of your puncture site:  Remove clear bandage 24 hours after the procedure.  May shower in 24 hours  Inspect the site daily and gently clean using soap and water.  Dry thoroughly and apply a Band-Aid.    Normal Observations:  Soreness or tenderness which may last one week.  Mild oozing from the incision site.  Possible bruising that could last 2 weeks.    Activity:  You may resume driving 24 hours following the procedure.  You may resume normal activity in 3 days or after the wound heals.  Avoid lifting more than 10 pounds for 3 days with affected arm.    Nutrition:  Regular diet or cardiac diet.  Drink at least 8 to 10 glasses of decaffeinated, non-alcoholic fluid for the next 24 hours to flush the x-ray dye used for your angiogram out of your body.    Call your doctor immediately if your condition worsens, for any other concerns, for a follow-up appointment or if you experience any of the following:  Significant bleeding that does not stop after 10 minutes of applying firm pressure on the puncture site.  Increased swelling of the wrist.  Unusual pain, numbness, or tingling of the wrist/arm.   Any signs of infection such as: redness, yellow drainage at the site, swelling or pain.    Other Instructions:  Hold Metformin or Metformin containing drugs for 48 hours after procedure.

## 2024-08-02 NOTE — PROGRESS NOTES
20 mg, BID  miconazole (MICOTIN) 2 % powder, BID  carvedilol (COREG) tablet 3.125 mg, BID WC  potassium chloride (KLOR-CON M) extended release tablet 40 mEq, BID WC  sodium chloride flush 0.9 % injection 5-40 mL, 2 times per day  sodium chloride flush 0.9 % injection 5-40 mL, PRN  0.9 % sodium chloride infusion, PRN        Objective:  BP (!) 159/92   Pulse 71   Temp 98.2 °F (36.8 °C) (Temporal)   Resp 16   Ht 1.778 m (5' 10\")   Wt 117 kg (257 lb 14.4 oz)   SpO2 96%   BMI 37.00 kg/m²     Intake/Output Summary (Last 24 hours) at 8/2/2024 0713  Last data filed at 8/2/2024 0016  Gross per 24 hour   Intake 90 ml   Output 2900 ml   Net -2810 ml        Wt Readings from Last 3 Encounters:   08/02/24 117 kg (257 lb 14.4 oz)   04/15/24 120.4 kg (265 lb 6.4 oz)   01/25/24 122.7 kg (270 lb 6.4 oz)       General appearance:  Appears comfortable, alert and pleasant.  Talking in full sentences   Eyes: Sclera clear. Pupils equal.  ENT: Moist oral mucosa. Trachea midline, no adenopathy.  Cardiovascular: Regular rhythm, normal S1, S2. No murmur. No edema in lower extremities  Respiratory: Not using accessory muscles. Good inspiratory effort. Clear to auscultation bilaterally, no wheeze or crackles.   GI: Abdomen softly distended and obese, bowel sounds present.  No tenderness   Musculoskeletal: No cyanosis in digits, neck supple  Neurology: CN 2-12 grossly intact. No speech or motor deficits  Psych: Normal affect. Alert and oriented in time, place and person  Skin: Warm, dry, normal turgor    Labs and Tests:  CBC:   Recent Labs     08/01/24  0120 08/01/24  0741 08/01/24 2339 08/02/24  0618   WBC 7.5 6.1  --  5.4   HGB 14.4 13.3* 14.4 13.7    133*  --  165       BMP:    Recent Labs     08/01/24  0741 08/01/24  2339 08/02/24  0617    137 141   K 3.2*  3.2* 3.8 3.9    99 103   CO2 24 24 25   BUN 11 14 14   CREATININE 0.8 1.0 0.9   GLUCOSE 90 127* 119*       Hepatic: No results for input(s): \"AST\", \"ALT\",

## 2024-08-02 NOTE — PLAN OF CARE
Problem: Chronic Conditions and Co-morbidities  Goal: Patient's chronic conditions and co-morbidity symptoms are monitored and maintained or improved  Outcome: Progressing     Problem: Discharge Planning  Goal: Discharge to home or other facility with appropriate resources  Outcome: Progressing     Problem: Pain  Goal: Verbalizes/displays adequate comfort level or baseline comfort level  8/2/2024 1039 by Robe Llamas RN  Outcome: Progressing  8/2/2024 0444 by Reyna Woodard RN  Outcome: Progressing     Problem: Safety - Adult  Goal: Free from fall injury  8/2/2024 1039 by Robe Llamas RN  Outcome: Progressing  8/2/2024 0444 by Reyna Woodard RN  Outcome: Progressing     Problem: ABCDS Injury Assessment  Goal: Absence of physical injury  8/2/2024 1039 by Robe Llamas RN  Outcome: Progressing  8/2/2024 0444 by Reyna Woodard RN  Outcome: Progressing

## 2024-08-02 NOTE — DISCHARGE SUMMARY
[facility] DISCHARGE SUMMARY    Patient Demographics    Patient. Rich Stover  Date of Birth. 1955  MRN. [id number]     Primary care provider. Liane Anderson MD  (Tel: [phone])    Admit date: 8/1/2024    Discharge date 8/2/2024  Note Date: 8/2/2024     Reason for Hospitalization.   No chief complaint on file.        Significant Findings.   Principal Problem:    NSTEMI (non-ST elevated myocardial infarction) ([facility])  Active Problems:    Shortness of breath  Resolved Problems:    * No resolved hospital problems. *       Problems and results from this hospitalization that need follow up.  Follow up with cardiology on Monday 8/5/24    Significant test results and incidental findings.  BNP 2427  Troponin 31    AIC 5.8     Stress test 8/1/2024  Stress Combined Conclusion: The study is most consistent with myocardial infarction. Findings suggest a moderate risk of cardiac events.    Perfusion Comments: LV perfusion is abnormal.  There is a moderate size inferior and apical fixed defect consistent with scar.  There is also a small anterior septal fixed defect near the apex also consistent with scar.  I do not see any significant ischemia    Stress Function: Left ventricular function post-stress is abnormal. Global function is mild-moderately reduced.  Global hypokinesis.  Post-stress ejection fraction is 41%. Stress end diastolic volume: 194 mL. Stress end systolic volume: 115 mL. LV mass: 192.0 g.    Perfusion Conclusion: TID ratio is 0.99.         Echo March 2024     Summary   Normal left ventricle size, wall thickness and systolic function with an   estimated ejection fraction of 50-55%.   No regional wall motion abnormalities are seen.   Diastolic filling parameters suggests grade II diastolic dysfunction.   The left atrium is mildly dilated.   Mild mitral regurgitation is present.   A bioprosthetic

## 2024-08-02 NOTE — PROGRESS NOTES
[facility]    Admit Date: 2024    PCP: Liane Anderson MD                  : 1955  MRN: [id number]    Subjective:   Interval History:   Patient seen and examined. Clinical notes reviewed.   Telemetry reviewed. No new complaint today.   No major events overnight.   Denies having chest pain, shortness of breath, dyspnea on exertion, Orthopnea, PND at the time of this visit.    Review of System:  Pertinent positive and negatives are in the HPI and interval above, the rest are negative.     Data:   Scheduled Meds:   carvedilol  6.25 mg Oral BID WC    aspirin  81 mg Oral Daily    insulin glargine  35 Units SubCUTAneous Nightly    rosuvastatin  40 mg Oral Daily    sodium chloride flush  5-40 mL IntraVENous 2 times per day    insulin lispro  0-8 Units SubCUTAneous TID WC    insulin lispro  0-4 Units SubCUTAneous Nightly    furosemide  20 mg IntraVENous BID    miconazole   Topical BID    potassium chloride  40 mEq Oral BID WC    sodium chloride flush  5-40 mL IntraVENous 2 times per day     PRN Meds:albuterol sulfate HFA, sodium chloride flush, sodium chloride, potassium chloride **OR** potassium alternative oral replacement **OR** potassium chloride, magnesium sulfate, ondansetron **OR** ondansetron, acetaminophen **OR** acetaminophen, polyethylene glycol, heparin (porcine), heparin (porcine), dextrose bolus **OR** dextrose bolus, glucagon (rDNA), dextrose, sodium chloride flush, sodium chloride  I/O last 3 completed shifts:  In: 90 [P.O.:90]  Out: 3200 [Urine:3200]  No intake/output data recorded.    Intake/Output Summary (Last 24 hours) at 2024 0944  Last data filed at 2024 0016  Gross per 24 hour   Intake no documentation   Output 2100 ml   Net -2100 ml           Objective:     Vitals: Blood Pressure (Abnormal) 142/90   Pulse 73   Temperature 98 °F (36.7 °C) (Temporal)   Respiration 16   Height 1.778 m (5' 10\")   Weight 117 kg (257 lb 14.4 oz)   Oxygen Saturation 97%   Body Mass Index
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
## 2024-08-02 NOTE — PROGRESS NOTES
[facility] [place]    Admit Date: 2024    PCP: Liane Anderson MD                  : 1955  MRN: [id number]    Subjective:   Interval History:   Patient seen and examined. Clinical notes reviewed.   Telemetry reviewed. No new complaint today.   No major events overnight.   Denies having chest pain, shortness of breath, dyspnea on exertion, Orthopnea, PND at the time of this visit.    Review of System:  Patient completed stress test yesterday.  Suggestive of MI.  Rich Stover is a 69 y.o. patient who presented to the hospital with complaints of chest pain and shortness of breath and elevated troponin. Patient has history of aortic stenosis status post TAVR and presented with NSTEMI.  Patient was transferred here from [facility].  I have been asked to provide consultation regarding further management and testing.     Reports to me over the weekend had several meals with a very high salt load - was hypertensive and with ambulation and carrying things from the car developed profound chest pain.  Symptoms were classical anginal symptoms.  After presentation and diuresis he feels considerably better    Data:   Scheduled Meds:   aspirin  81 mg Oral Daily    insulin glargine  35 Units SubCUTAneous Nightly    rosuvastatin  40 mg Oral Daily    sodium chloride flush  5-40 mL IntraVENous 2 times per day    insulin lispro  0-8 Units SubCUTAneous TID WC    insulin lispro  0-4 Units SubCUTAneous Nightly    furosemide  20 mg IntraVENous BID    miconazole   Topical BID    carvedilol  3.125 mg Oral BID WC    potassium chloride  40 mEq Oral BID WC    sodium chloride flush  5-40 mL IntraVENous 2 times per day     PRN Meds:albuterol sulfate HFA, sodium chloride flush, sodium chloride, potassium chloride **OR** potassium alternative oral replacement **OR** potassium chloride, magnesium sulfate, ondansetron **OR** ondansetron, acetaminophen **OR** acetaminophen, polyethylene glycol, heparin (porcine), heparin (porcine),  transcription errors may be present.

## 2024-08-02 NOTE — PLAN OF CARE
Data- discharge order received, pt verbalized agreement to discharge, disposition to previous residence, no needs for HHC/DME.     Action- discharge instructions prepared and given to pt, pt verbalized understanding. Medication information packet given r/t NEW and/or CHANGED prescriptions emphasizing name/purpose/side effects, pt verbalized understanding. Discharge instruction summary: Diet- cardiac low sodium, Activity- as tolerated with restrictions, Primary Care Physician as follows: Liane Anderson -[phone] f/u appointment in one week, immunizations reviewed, prescription medications filled at pt's preferred pharmacy. Inpatient surgical procedure precautions reviewed with pt and pt's wife. CHF Education reviewed. Pt/ Family has had a total of 60 minutes CHF education this admission encounter.     1. WEIGHT: Admit Weight - Scale: 120.5 kg (265 lb 9.6 oz) (08/01/24 0025)        Today  Weight - Scale: 117 kg (257 lb 14.4 oz) (08/02/24 0019)       2. O2 SAT.: SpO2: 94 % (08/02/24 1445)    Response- Pt belongings gathered, IV removed. Disposition is home (no HHC/DME needs), transported with wife, taken to lobby via w/c w/ PCA, no complications.

## 2024-08-03 LAB
EKG ATRIAL RATE: 79 BPM
EKG DIAGNOSIS: NORMAL
EKG P AXIS: 40 DEGREES
EKG P-R INTERVAL: 172 MS
EKG Q-T INTERVAL: 382 MS
EKG QRS DURATION: 98 MS
EKG QTC CALCULATION (BAZETT): 438 MS
EKG R AXIS: -3 DEGREES
EKG T AXIS: 52 DEGREES
EKG VENTRICULAR RATE: 79 BPM

## 2024-08-03 PROCEDURE — 93010 ELECTROCARDIOGRAM REPORT: CPT | Performed by: INTERNAL MEDICINE

## 2024-08-05 ENCOUNTER — TELEPHONE (OUTPATIENT)
Dept: FAMILY MEDICINE CLINIC | Age: 69
End: 2024-08-05

## 2024-08-05 ENCOUNTER — HOSPITAL ENCOUNTER (OUTPATIENT)
Age: 69
Discharge: HOME | End: 2024-08-05

## 2024-08-05 ENCOUNTER — OFFICE VISIT (OUTPATIENT)
Dept: CARDIOLOGY CLINIC | Age: 69
End: 2024-08-05
Payer: MEDICARE

## 2024-08-05 VITALS
BODY MASS INDEX: 36.06 KG/M2 | HEART RATE: 71 BPM | OXYGEN SATURATION: 97 % | DIASTOLIC BLOOD PRESSURE: 68 MMHG | HEIGHT: 70 IN | SYSTOLIC BLOOD PRESSURE: 126 MMHG | WEIGHT: 251.9 LBS

## 2024-08-05 DIAGNOSIS — Z95.2 S/P TAVR (TRANSCATHETER AORTIC VALVE REPLACEMENT): ICD-10-CM

## 2024-08-05 DIAGNOSIS — I35.0 AORTIC VALVE STENOSIS, NONRHEUMATIC: ICD-10-CM

## 2024-08-05 DIAGNOSIS — E87.6 HYPOKALEMIA: ICD-10-CM

## 2024-08-05 DIAGNOSIS — I25.10 CORONARY ARTERY DISEASE DUE TO LIPID RICH PLAQUE: ICD-10-CM

## 2024-08-05 DIAGNOSIS — I25.83 CORONARY ARTERY DISEASE DUE TO LIPID RICH PLAQUE: ICD-10-CM

## 2024-08-05 DIAGNOSIS — I25.10 CORONARY ARTERY DISEASE INVOLVING NATIVE CORONARY ARTERY OF NATIVE HEART WITHOUT ANGINA PECTORIS: ICD-10-CM

## 2024-08-05 DIAGNOSIS — I10 ESSENTIAL HYPERTENSION: Primary | ICD-10-CM

## 2024-08-05 LAB
REASON FOR REJECTION: NORMAL
REJECTED TEST: NORMAL

## 2024-08-05 PROCEDURE — 3074F SYST BP LT 130 MM HG: CPT | Performed by: STUDENT IN AN ORGANIZED HEALTH CARE EDUCATION/TRAINING PROGRAM

## 2024-08-05 PROCEDURE — 1111F DSCHRG MED/CURRENT MED MERGE: CPT | Performed by: STUDENT IN AN ORGANIZED HEALTH CARE EDUCATION/TRAINING PROGRAM

## 2024-08-05 PROCEDURE — G8417 CALC BMI ABV UP PARAM F/U: HCPCS | Performed by: STUDENT IN AN ORGANIZED HEALTH CARE EDUCATION/TRAINING PROGRAM

## 2024-08-05 PROCEDURE — G8427 DOCREV CUR MEDS BY ELIG CLIN: HCPCS | Performed by: STUDENT IN AN ORGANIZED HEALTH CARE EDUCATION/TRAINING PROGRAM

## 2024-08-05 PROCEDURE — 3078F DIAST BP <80 MM HG: CPT | Performed by: STUDENT IN AN ORGANIZED HEALTH CARE EDUCATION/TRAINING PROGRAM

## 2024-08-05 PROCEDURE — 1123F ACP DISCUSS/DSCN MKR DOCD: CPT | Performed by: STUDENT IN AN ORGANIZED HEALTH CARE EDUCATION/TRAINING PROGRAM

## 2024-08-05 PROCEDURE — 3017F COLORECTAL CA SCREEN DOC REV: CPT | Performed by: STUDENT IN AN ORGANIZED HEALTH CARE EDUCATION/TRAINING PROGRAM

## 2024-08-05 PROCEDURE — 1036F TOBACCO NON-USER: CPT | Performed by: STUDENT IN AN ORGANIZED HEALTH CARE EDUCATION/TRAINING PROGRAM

## 2024-08-05 PROCEDURE — 99215 OFFICE O/P EST HI 40 MIN: CPT | Performed by: STUDENT IN AN ORGANIZED HEALTH CARE EDUCATION/TRAINING PROGRAM

## 2024-08-05 NOTE — TELEPHONE ENCOUNTER
Care Transitions Initial Follow Up Call    Outreach made within 2 business days of discharge: Yes    Patient: Rich Stover Patient : 1955   MRN: [id number]  Reason for Admission: CHF fluid overload    Discharge Date: 24       Spoke with: patient     Discharge department/facility: [facility] Patient Contact:  Was patient able to fill all prescriptions: Yes  Was patient instructed to bring all medications to the follow-up visit: Yes  Is patient taking all medications as directed in the discharge summary? Yes  Does patient understand their discharge instructions: Yes  Does patient have questions or concerns that need addressed prior to 7-14 day follow up office visit: no    Additional needs identified to be addressed with provider  No needs identified             Scheduled appointment with PCP within 7-14 days    Follow Up  Future Appointments   Date Time Provider Department Center   2024  1:00 PM Akshat Lewis MD  Cardio [facility]   2024  1:00 PM Nav Herron MD  PULM [facility]       Leslie Wilson MA

## 2024-08-05 NOTE — PATIENT INSTRUCTIONS
Daily weights if weight increases 2-3 pounds per day for 2 consecutive days call the office and make Dr. Simpson aware     Continue to follow with Dr. Simpson     Call if any issues or concerns

## 2024-08-06 RX ORDER — FUROSEMIDE 40 MG/1
40 TABLET ORAL DAILY
Qty: 90 TABLET | Refills: 3 | Status: SHIPPED | OUTPATIENT
Start: 2024-08-06

## 2024-08-06 NOTE — TELEPHONE ENCOUNTER
Received refill request for Lasix 40mg from [facility] pharmacy. Patient requesting 90 day supply.     Last OV: 8/5/2024 CBM    Next OV:     Last Labs: 8/2/2024 BMP    Last Filled: 8/2/2024 KARYN Ortiz

## 2024-08-06 NOTE — TELEPHONE ENCOUNTER
Medication Refill    Medication needing refilled:furosemide (LASIX) 40 MG     Dosage of the medication:    How are you taking this medication (QD, BID, TID, QID, PRN): 1 tab daily    30 or 90 day supply called in: 90 day supply    When will you run out of your medication:    Which Pharmacy are we sending the medication to?: [id number] - [address] - P [phone] - F [phone]

## 2024-08-12 ENCOUNTER — OFFICE VISIT (OUTPATIENT)
Dept: PULMONOLOGY | Age: 69
End: 2024-08-12
Payer: MEDICARE

## 2024-08-12 VITALS
RESPIRATION RATE: 18 BRPM | TEMPERATURE: 98.1 F | OXYGEN SATURATION: 95 % | BODY MASS INDEX: 37.65 KG/M2 | HEIGHT: 70 IN | HEART RATE: 71 BPM | DIASTOLIC BLOOD PRESSURE: 68 MMHG | SYSTOLIC BLOOD PRESSURE: 130 MMHG | WEIGHT: 263 LBS

## 2024-08-12 DIAGNOSIS — G47.33 OSA (OBSTRUCTIVE SLEEP APNEA): ICD-10-CM

## 2024-08-12 DIAGNOSIS — F51.01 PRIMARY INSOMNIA: Primary | ICD-10-CM

## 2024-08-12 PROCEDURE — 3078F DIAST BP <80 MM HG: CPT | Performed by: INTERNAL MEDICINE

## 2024-08-12 PROCEDURE — G8417 CALC BMI ABV UP PARAM F/U: HCPCS | Performed by: INTERNAL MEDICINE

## 2024-08-12 PROCEDURE — 99214 OFFICE O/P EST MOD 30 MIN: CPT | Performed by: INTERNAL MEDICINE

## 2024-08-12 PROCEDURE — G8427 DOCREV CUR MEDS BY ELIG CLIN: HCPCS | Performed by: INTERNAL MEDICINE

## 2024-08-12 PROCEDURE — 3075F SYST BP GE 130 - 139MM HG: CPT | Performed by: INTERNAL MEDICINE

## 2024-08-12 PROCEDURE — 1123F ACP DISCUSS/DSCN MKR DOCD: CPT | Performed by: INTERNAL MEDICINE

## 2024-08-12 PROCEDURE — 1111F DSCHRG MED/CURRENT MED MERGE: CPT | Performed by: INTERNAL MEDICINE

## 2024-08-12 PROCEDURE — 3017F COLORECTAL CA SCREEN DOC REV: CPT | Performed by: INTERNAL MEDICINE

## 2024-08-12 PROCEDURE — 1036F TOBACCO NON-USER: CPT | Performed by: INTERNAL MEDICINE

## 2024-08-12 RX ORDER — QUETIAPINE FUMARATE 25 MG/1
25 TABLET, FILM COATED ORAL NIGHTLY
Qty: 30 TABLET | Refills: 3 | Status: SHIPPED | OUTPATIENT
Start: 2024-08-12

## 2024-08-12 ASSESSMENT — SLEEP AND FATIGUE QUESTIONNAIRES
HOW LIKELY ARE YOU TO NOD OFF OR FALL ASLEEP IN A CAR, WHILE STOPPED FOR A FEW MINUTES IN TRAFFIC: WOULD NEVER DOZE
HOW LIKELY ARE YOU TO NOD OFF OR FALL ASLEEP WHEN YOU ARE A PASSENGER IN A CAR FOR AN HOUR WITHOUT A BREAK: WOULD NEVER DOZE
HOW LIKELY ARE YOU TO NOD OFF OR FALL ASLEEP WHILE SITTING AND TALKING TO SOMEONE: WOULD NEVER DOZE
HOW LIKELY ARE YOU TO NOD OFF OR FALL ASLEEP WHILE LYING DOWN TO REST IN THE AFTERNOON WHEN CIRCUMSTANCES PERMIT: WOULD NEVER DOZE
HOW LIKELY ARE YOU TO NOD OFF OR FALL ASLEEP WHILE SITTING AND READING: WOULD NEVER DOZE
ESS TOTAL SCORE: 0
HOW LIKELY ARE YOU TO NOD OFF OR FALL ASLEEP WHILE SITTING QUIETLY AFTER LUNCH WITHOUT ALCOHOL: WOULD NEVER DOZE
HOW LIKELY ARE YOU TO NOD OFF OR FALL ASLEEP WHILE WATCHING TV: WOULD NEVER DOZE
HOW LIKELY ARE YOU TO NOD OFF OR FALL ASLEEP WHILE SITTING INACTIVE IN A PUBLIC PLACE: WOULD NEVER DOZE

## 2024-08-12 NOTE — PROGRESS NOTES
Pulmonary progress note          REASON FOR CONSULTATION:  Chief Complaint   Patient presents with    Follow-up    Sleep Apnea            Consult at request of Liane Anderson MD     PCP: Liane Anderson MD        Assessment and Plan:   Diagnosis Orders   1. Primary insomnia  QUEtiapine (SEROQUEL) 25 MG tablet      2. KAYLA (obstructive sleep apnea)                    Plan:  Encouraged to use CPAP 8-12, we will obtain his download in couple of months to determine if any titration needed  Advised about weight loss  Continue seroquel Qhs  for insomnia  Advised to lose wt.               HISTORY OF PRESENT ILLNESS: Rich Stover is very pleasant 69 y.o. year old gentleman with medical history stated below significant for h/o CAD s/p PCI/CABG, hypertension, hyperlipidemia, diabetes and sleep apnea.   Was referred to us for pre op risk assessment for AVR  He has been c/o worsening SOB over the last couple of weeks and associated with lightheadedness, no syncope.no LE edema  He also c/o fatigue, none refreshing sleep    PFT 2019 showed:  PFT does not demonstrates obstruction with FEV1 of 74 %  without bronchodilator response.      There is Mild restriction by Total lung capacity assessment with associated decrease in diffusion capacity.  Air trapping is not present.  Hyperinflation is not present.  Apnea-hypopnea index of 70.5.  Currently on CPAP 6-12.    8/12/2024  Has not been using his CPAP due to bilateral upper extremity pain due to cervical spine disease.  C/o sob with exertion         Past Medical History:   Diagnosis Date    Anxiety     Aortic valve sclerosis 3/3019    Arthritis     CAD (coronary artery disease)     PCI/stents RCA, LAD, diag, LCx    Cerebral infarct (HCC) 04/10/2016    bilat basal ganglia    Chronic active viral hepatitis B (HCC) 11/16/2017    likely since very young    Chronic back pain 2008

## 2024-08-13 ENCOUNTER — TELEPHONE (OUTPATIENT)
Dept: PULMONOLOGY | Age: 69
End: 2024-08-13

## 2024-08-13 NOTE — TELEPHONE ENCOUNTER
Order,  demographic, sleep study, ins card OVN faxed to [facility].    The patient has been notified of this information and all questions answered.

## 2024-08-28 ENCOUNTER — TELEPHONE (OUTPATIENT)
Dept: CARDIOLOGY CLINIC | Age: 69
End: 2024-08-28

## 2024-08-28 RX ORDER — FUROSEMIDE 40 MG
40 TABLET ORAL DAILY
Qty: 90 TABLET | Refills: 3 | Status: CANCELLED | OUTPATIENT
Start: 2024-08-28

## 2024-08-28 RX ORDER — FUROSEMIDE 40 MG
40 TABLET ORAL DAILY
Qty: 90 TABLET | Refills: 3 | Status: SHIPPED | OUTPATIENT
Start: 2024-08-28

## 2024-08-28 NOTE — TELEPHONE ENCOUNTER
Pt called stating their last RX the received was 30 day supply pt is stated did not have the 90 days supply. Pt is getting low and is requesting 90 day.    furosemide (LASIX) 40 MG tablet   40 MG  90 day supply  Take 1 tablet by mouth daily    [facility] #[id number] - [place], [place] - [address] - P [phone] - F [phone]  [address] [address]  Phone: [phone]  Fax: [phone]

## 2024-09-03 NOTE — TELEPHONE ENCOUNTER
Medication Refill    Medication needing refilled:  potassium chloride (KLOR-CON M) 20 MEQ extended release tablet     Dosage of the medication:  Take 1 tablet by mouth daily    How are you taking this medication (QD, BID, TID, QID, PRN):    30 or 90 day supply called in:  90 days    When will you run out of your medication:    Which Pharmacy are we sending the medication to?:  [facility] #[id number] - [address] - P [phone] - F [phone]

## 2024-09-03 NOTE — TELEPHONE ENCOUNTER
Received refill request for Potassium 20 meq from [facility] pharmacy.     Last OV: 8/5/2024 CBM    Next OV: Recall list.     Last Labs: 8/2/2024 BMP    Last Filled: 8/2/2024 KARYN Pimentel

## 2024-09-04 ENCOUNTER — TELEPHONE (OUTPATIENT)
Dept: FAMILY MEDICINE CLINIC | Age: 69
End: 2024-09-04

## 2024-09-04 RX ORDER — POTASSIUM CHLORIDE 1500 MG/1
20 TABLET, EXTENDED RELEASE ORAL DAILY
Qty: 90 TABLET | Refills: 3 | Status: SHIPPED | OUTPATIENT
Start: 2024-09-04

## 2024-09-04 NOTE — TELEPHONE ENCOUNTER
PTs derm that put in a referral for a Rheumatologist \ but the rheum office called stating that the referral needed to come from his PCP     Dr. Rubio is the Rheumatologist     Can we please put in a referral

## 2024-09-05 NOTE — TELEPHONE ENCOUNTER
Who is his dermatologist?  What is the diagnosis to see rheumatology?  Lat note I can find from dermatology is in [place] from 2012.    Also, he was due for physical in August.

## 2024-09-06 NOTE — TELEPHONE ENCOUNTER
[facility] is the pt's dermatologist will call to fax over notes Monday.

## 2024-09-19 ENCOUNTER — CARE COORDINATION (OUTPATIENT)
Dept: CARE COORDINATION | Age: 69
End: 2024-09-19

## 2024-09-25 ENCOUNTER — CARE COORDINATION (OUTPATIENT)
Dept: CARE COORDINATION | Age: 69
End: 2024-09-25

## 2024-09-27 DIAGNOSIS — E11.51 TYPE 2 DIABETES MELLITUS WITH DIABETIC PERIPHERAL ANGIOPATHY WITHOUT GANGRENE, WITH LONG-TERM CURRENT USE OF INSULIN (HCC): ICD-10-CM

## 2024-09-27 DIAGNOSIS — Z79.4 TYPE 2 DIABETES MELLITUS WITH DIABETIC PERIPHERAL ANGIOPATHY WITHOUT GANGRENE, WITH LONG-TERM CURRENT USE OF INSULIN (HCC): ICD-10-CM

## 2024-09-27 RX ORDER — INSULIN GLARGINE 100 [IU]/ML
70 INJECTION, SOLUTION SUBCUTANEOUS NIGHTLY
Qty: 70 ML | Refills: 0 | Status: SHIPPED | OUTPATIENT
Start: 2024-09-27

## 2024-09-30 ENCOUNTER — CARE COORDINATION (OUTPATIENT)
Dept: CARE COORDINATION | Age: 69
End: 2024-09-30

## 2024-09-30 NOTE — CARE COORDINATION
Ambulatory Care Coordination Note     9/30/2024 3:00 PM     patient outreach attempt by this ACM today to offer care management services. ACM was unable to reach the patient by telephone today; left voice message requesting a return phone call to this ACM.     Patient closed (unable to reach patient) from the High Risk Care Management program on 9/30/2024.  Patient  utr .  Care management goals have been completed. No further Ambulatory Care Manager follow up scheduled.

## 2024-10-02 DIAGNOSIS — E11.9 TYPE 2 DIABETES MELLITUS WITHOUT COMPLICATION, WITHOUT LONG-TERM CURRENT USE OF INSULIN (HCC): ICD-10-CM

## 2024-10-03 DIAGNOSIS — E11.9 TYPE 2 DIABETES MELLITUS WITHOUT COMPLICATION, WITHOUT LONG-TERM CURRENT USE OF INSULIN (HCC): ICD-10-CM

## 2024-10-03 RX ORDER — BLOOD SUGAR DIAGNOSTIC
STRIP MISCELLANEOUS
Qty: 300 STRIP | Refills: 5 | Status: SHIPPED | OUTPATIENT
Start: 2024-10-03 | End: 2024-10-03 | Stop reason: SDUPTHER

## 2024-10-03 RX ORDER — BLOOD SUGAR DIAGNOSTIC
STRIP MISCELLANEOUS
Qty: 300 STRIP | Refills: 5 | Status: SHIPPED | OUTPATIENT
Start: 2024-10-03

## 2024-10-08 ENCOUNTER — TELEPHONE (OUTPATIENT)
Dept: FAMILY MEDICINE CLINIC | Age: 69
End: 2024-10-08

## 2024-10-08 NOTE — TELEPHONE ENCOUNTER
Pt called in having a issue with the [facility] he was told by the pharmacy there is a insurance issue     [facility]

## 2024-10-09 ENCOUNTER — TELEPHONE (OUTPATIENT)
Dept: FAMILY MEDICINE CLINIC | Age: 69
End: 2024-10-09

## 2024-10-09 DIAGNOSIS — E11.9 TYPE 2 DIABETES MELLITUS WITHOUT COMPLICATION, WITHOUT LONG-TERM CURRENT USE OF INSULIN (HCC): ICD-10-CM

## 2024-10-09 NOTE — TELEPHONE ENCOUNTER
Patient is requesting a ACCU CHECK meter kit     Please send to [facility]    Please Advise

## 2024-10-14 ENCOUNTER — TELEPHONE (OUTPATIENT)
Dept: PULMONOLOGY | Age: 69
End: 2024-10-14

## 2024-10-14 NOTE — TELEPHONE ENCOUNTER
[facility] Patient has been notified

## 2024-10-14 NOTE — TELEPHONE ENCOUNTER
Has a mask that goes in the nostrils only, wants an order for a full face mask to see if it works better for him    DME : [facility]

## 2024-10-16 DIAGNOSIS — G89.29 CHRONIC BILATERAL LOW BACK PAIN WITH BILATERAL SCIATICA: ICD-10-CM

## 2024-10-16 DIAGNOSIS — M54.42 CHRONIC BILATERAL LOW BACK PAIN WITH BILATERAL SCIATICA: ICD-10-CM

## 2024-10-16 DIAGNOSIS — G89.4 CHRONIC PAIN SYNDROME: ICD-10-CM

## 2024-10-16 DIAGNOSIS — M54.41 CHRONIC BILATERAL LOW BACK PAIN WITH BILATERAL SCIATICA: ICD-10-CM

## 2024-10-17 RX ORDER — GABAPENTIN 400 MG/1
400 CAPSULE ORAL 3 TIMES DAILY PRN
Qty: 270 CAPSULE | Refills: 0 | Status: SHIPPED | OUTPATIENT
Start: 2024-10-17 | End: 2025-01-15

## 2024-10-29 LAB — DIABETIC RETINOPATHY: NEGATIVE

## 2024-10-30 ENCOUNTER — OFFICE VISIT (OUTPATIENT)
Dept: FAMILY MEDICINE CLINIC | Age: 69
End: 2024-10-30

## 2024-10-30 VITALS
HEART RATE: 73 BPM | OXYGEN SATURATION: 98 % | SYSTOLIC BLOOD PRESSURE: 128 MMHG | RESPIRATION RATE: 16 BRPM | HEIGHT: 70 IN | DIASTOLIC BLOOD PRESSURE: 63 MMHG | BODY MASS INDEX: 35.39 KG/M2 | WEIGHT: 247.2 LBS

## 2024-10-30 DIAGNOSIS — L40.50 PSORIATIC ARTHRITIS (HCC): ICD-10-CM

## 2024-10-30 DIAGNOSIS — E11.9 TYPE 2 DIABETES MELLITUS WITHOUT COMPLICATION, WITHOUT LONG-TERM CURRENT USE OF INSULIN (HCC): ICD-10-CM

## 2024-10-30 DIAGNOSIS — B18.1 CHRONIC ACTIVE VIRAL HEPATITIS B (HCC): ICD-10-CM

## 2024-10-30 DIAGNOSIS — I10 HYPERTENSION, ESSENTIAL: ICD-10-CM

## 2024-10-30 DIAGNOSIS — Z00.00 MEDICARE ANNUAL WELLNESS VISIT, SUBSEQUENT: Primary | ICD-10-CM

## 2024-10-30 DIAGNOSIS — C43.62 MALIGNANT MELANOMA OF LEFT UPPER EXTREMITY INCLUDING SHOULDER (HCC): ICD-10-CM

## 2024-10-30 DIAGNOSIS — E66.01 MORBID (SEVERE) OBESITY DUE TO EXCESS CALORIES: ICD-10-CM

## 2024-10-30 DIAGNOSIS — H35.3190 NONEXUDATIVE AGE-RELATED MACULAR DEGENERATION, UNSPECIFIED LATERALITY, UNSPECIFIED STAGE: ICD-10-CM

## 2024-10-30 DIAGNOSIS — F33.41 RECURRENT MAJOR DEPRESSIVE DISORDER, IN PARTIAL REMISSION (HCC): ICD-10-CM

## 2024-10-30 LAB — HBA1C MFR BLD: 5.8 %

## 2024-10-30 ASSESSMENT — PATIENT HEALTH QUESTIONNAIRE - PHQ9
2. FEELING DOWN, DEPRESSED OR HOPELESS: NOT AT ALL
3. TROUBLE FALLING OR STAYING ASLEEP: NOT AT ALL
SUM OF ALL RESPONSES TO PHQ QUESTIONS 1-9: 0
6. FEELING BAD ABOUT YOURSELF - OR THAT YOU ARE A FAILURE OR HAVE LET YOURSELF OR YOUR FAMILY DOWN: NOT AT ALL
4. FEELING TIRED OR HAVING LITTLE ENERGY: NOT AT ALL
SUM OF ALL RESPONSES TO PHQ QUESTIONS 1-9: 0
7. TROUBLE CONCENTRATING ON THINGS, SUCH AS READING THE NEWSPAPER OR WATCHING TELEVISION: NOT AT ALL
SUM OF ALL RESPONSES TO PHQ9 QUESTIONS 1 & 2: 0
SUM OF ALL RESPONSES TO PHQ QUESTIONS 1-9: 0
SUM OF ALL RESPONSES TO PHQ QUESTIONS 1-9: 0
10. IF YOU CHECKED OFF ANY PROBLEMS, HOW DIFFICULT HAVE THESE PROBLEMS MADE IT FOR YOU TO DO YOUR WORK, TAKE CARE OF THINGS AT HOME, OR GET ALONG WITH OTHER PEOPLE: NOT DIFFICULT AT ALL
5. POOR APPETITE OR OVEREATING: NOT AT ALL
1. LITTLE INTEREST OR PLEASURE IN DOING THINGS: NOT AT ALL
9. THOUGHTS THAT YOU WOULD BE BETTER OFF DEAD, OR OF HURTING YOURSELF: NOT AT ALL
8. MOVING OR SPEAKING SO SLOWLY THAT OTHER PEOPLE COULD HAVE NOTICED. OR THE OPPOSITE, BEING SO FIGETY OR RESTLESS THAT YOU HAVE BEEN MOVING AROUND A LOT MORE THAN USUAL: NOT AT ALL

## 2024-10-30 ASSESSMENT — LIFESTYLE VARIABLES
HOW OFTEN DO YOU HAVE A DRINK CONTAINING ALCOHOL: NEVER
HOW MANY STANDARD DRINKS CONTAINING ALCOHOL DO YOU HAVE ON A TYPICAL DAY: PATIENT DOES NOT DRINK

## 2024-10-30 NOTE — PATIENT INSTRUCTIONS
INSTRUCTIONS  NEXT APPOINTMENT: Please schedule check-up in 3 months     PLEASE TAKE THIS FORM TO CHECK-OUT WINDOW TO SCHEDULE NEXT VISIT.  A1c great at 5.8.  Look into making living will and medical POA. Info included in back of this packet. We would like a notarized copy for our records.  Please get annual covid vaccine at stores such as [employer] and [employer].  Keep up the weight loss.  Patient Education           Learning About Vision Tests  What are vision tests?     The four most common vision tests are visual acuity tests, refraction, visual field tests, and color vision tests.  Visual acuity (sharpness) tests  These tests are used:  To see if you need glasses or contact lenses.  To monitor an eye problem.  To check an eye injury.  Visual acuity tests are done as part of routine exams. You may also have this test when you get your 's license or apply for some types of jobs.  Visual field tests  These tests are used:  To check for vision loss in any area of your range of vision.  To screen for certain eye diseases.  To look for nerve damage after a stroke, head injury, or other problem that could reduce blood flow to the brain.  Refraction and color tests  A refraction test is done to find the right prescription for glasses and contact lenses.  A color vision test is done to check for color blindness.  Color vision is often tested as part of a routine exam. You may also have this test when you apply for a job where recognizing different colors is important, such as , electronics, or the .  How are vision tests done?  Visual acuity test   You cover one eye at a time.  You read aloud from a wall chart across the room.  You read aloud from a small card that you hold in your hand.  Refraction   You look into a special device.  The device puts lenses of different strengths in front of each eye to see how strong your glasses or contact lenses need to be.  Visual field tests   Your doctor

## 2024-10-30 NOTE — PROGRESS NOTES
40 MG tablet TAKE 1 TABLET EVERY DAY    tadalafil (CIALIS) 5 mg, Oral, DAILY    tadalafil (CIALIS) 20 mg, Oral, DAILY PRN, for erectile dysfunction      Family History   Problem Relation Age of Onset    Diabetes Mother     Cancer Mother         pancreatic    Heart Failure Father         began age 74,  age 79     Social History     Tobacco Use    Smoking status: Never     Passive exposure: Past    Smokeless tobacco: Never    Tobacco comments:     advised not to start   Vaping Use    Vaping status: Never Used   Substance Use Topics    Alcohol use: Not Currently     Comment: rare    Drug use: No      Immunization History   Administered Date(s) Administered    COVID-19, MODERNA BLUE border, Primary or Immunocompromised, (age 12y+), IM, 100 mcg/0.5mL 2021, 2021, 11/10/2021    COVID-19, PFIZER, (age 12y+), IM, 30mcg/0.3mL 10/09/2023    Hepatitis A Adult ([place]) 08/10/2017, 2019    INFLUENZA, INTRADERMAL, QUADRIVALENT, PRESERVATIVE FREE 2016, 2017    Influenza Vaccine, unspecified formulation 10/19/2011, 10/15/2012    Influenza Virus Vaccine 2010, 2020    Influenza, FLUAD, (age 65 y+), IM, Quadv, 0.5mL 2021, 10/10/2022    Influenza, FLUARIX, FLULAVAL, FLUZONE (age 6 mo+) and AFLURIA, (age 3 y+), Quadv PF, 0.5mL 2019    Influenza, FLUBLOK, (age 18 y+), Quadv PF, 0.5mL 10/03/2018    Influenza, Intradermal, Preservative free 2013    Pneumococcal, PCV20, PREVNAR 20, (age 6w+), IM, 0.5mL 2023    Pneumococcal, PPSV23, PNEUMOVAX 23, (age 2y+), SC/IM, 0.5mL 2008, 10/19/2011, 2021    TDaP, ADACEL (age 10y-64y), BOOSTRIX (age 10y+), IM, 0.5mL 2006, 2017    Td, unspecified formulation 2006     LAST LABS  Lab Results   Component Value Date    CHOL 115 2024    TRIG 84 2024    HDL 37 (L) 2024    LDL 61 2024     Lab Results   Component Value Date     2024    K 3.9 2024    CREATININE 0.9 2024

## 2024-11-05 ENCOUNTER — TELEPHONE (OUTPATIENT)
Dept: CARDIOLOGY CLINIC | Age: 69
End: 2024-11-05

## 2024-11-05 DIAGNOSIS — N52.1 ERECTILE DYSFUNCTION DUE TO DISEASES CLASSIFIED ELSEWHERE: Primary | ICD-10-CM

## 2024-11-05 NOTE — TELEPHONE ENCOUNTER
Medication Refill    When was your last appointment with cardiology?    (If 1 yr or longer, please schedule appointment)    (If patient has been told they do not need to follow-up - medications should be filled by PCP)  08/2024    When did you last have labs drawn?     Medication needing refilled?  tadalafil (CIALIS)      Dosage of the medication?  20 MG tablet     How are you taking this medication (QD, BID, TID, QID, PRN)?  TAKE 1 TABLET BY MOUTH DAILY AS NEEDED FOR ERECTILE DYSFUNCTION     Do you want a 30 or 90 day supply?  90 day supply    When will you run out of your medication?     Which Pharmacy are we sending this medication to?  [facility] #[id number] - [address]   Pharmacy Phone:[phone]  Pharmacy Fax:[phone]

## 2024-11-05 NOTE — TELEPHONE ENCOUNTER
Last OV: 8/5/24 with Dr Lewis   Next OV: X due yearly  Last refill: 4/24/24 #10 3 R/F  Most recent Labs: 8/2/24  Last EKG (if needed): 8/1/24

## 2024-11-06 RX ORDER — ROSUVASTATIN CALCIUM 40 MG/1
TABLET, COATED ORAL
Qty: 90 TABLET | Refills: 3 | Status: SHIPPED | OUTPATIENT
Start: 2024-11-06

## 2024-11-06 RX ORDER — TADALAFIL 20 MG/1
20 TABLET ORAL DAILY PRN
Qty: 10 TABLET | Refills: 3 | Status: SHIPPED | OUTPATIENT
Start: 2024-11-06

## 2024-11-13 ENCOUNTER — OFFICE VISIT (OUTPATIENT)
Dept: PULMONOLOGY | Age: 69
End: 2024-11-13

## 2024-11-13 VITALS
DIASTOLIC BLOOD PRESSURE: 73 MMHG | SYSTOLIC BLOOD PRESSURE: 133 MMHG | HEIGHT: 71 IN | HEART RATE: 79 BPM | BODY MASS INDEX: 34.83 KG/M2 | WEIGHT: 248.8 LBS | OXYGEN SATURATION: 97 % | TEMPERATURE: 97.4 F | RESPIRATION RATE: 18 BRPM

## 2024-11-13 DIAGNOSIS — G47.33 OSA (OBSTRUCTIVE SLEEP APNEA): Primary | ICD-10-CM

## 2024-11-13 DIAGNOSIS — F51.01 PRIMARY INSOMNIA: ICD-10-CM

## 2024-11-13 NOTE — PROGRESS NOTES
VW)    Narrative  EXAMINATION:  TWO XRAY VIEWS OF THE CHEST    1/18/2022 12:28 pm    COMPARISON:  10/31/2019    HISTORY:  ORDERING SYSTEM PROVIDED HISTORY: Pneumonia due to infectious organism,  unspecified laterality, unspecified part of lung  TECHNOLOGIST PROVIDED HISTORY:  Reason for Exam: Pneumonia due to infectious organism    FINDINGS:  The lungs are without acute focal process.  There is no effusion or  pneumothorax. The cardiomediastinal silhouette is stable. The osseous  structures are stable.    Impression  No acute process.        Pulmonary function testing  As above.         This note was transcribed using Dragon Dictation software. Please disregard any translational errors.    Miguelseagnes Herron MD  [facility] Pulmonary, Sleep and Critical Care

## 2024-11-27 ENCOUNTER — HOSPITAL ENCOUNTER (EMERGENCY)
Age: 69
Discharge: LEFT BEFORE DISCHARGE | End: 2024-11-27
Attending: EMERGENCY MEDICINE
Payer: MEDICARE

## 2024-11-27 ENCOUNTER — APPOINTMENT (OUTPATIENT)
Dept: CT IMAGING | Age: 69
End: 2024-11-27
Payer: MEDICARE

## 2024-11-27 VITALS
HEIGHT: 71 IN | TEMPERATURE: 97.7 F | RESPIRATION RATE: 18 BRPM | SYSTOLIC BLOOD PRESSURE: 143 MMHG | OXYGEN SATURATION: 97 % | WEIGHT: 242 LBS | BODY MASS INDEX: 33.88 KG/M2 | HEART RATE: 79 BPM | DIASTOLIC BLOOD PRESSURE: 98 MMHG
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
## 2024-11-27 DIAGNOSIS — M54.50 LOW BACK PAIN, UNSPECIFIED BACK PAIN LATERALITY, UNSPECIFIED CHRONICITY, UNSPECIFIED WHETHER SCIATICA PRESENT: ICD-10-CM

## 2024-11-27 PROCEDURE — 72131 CT LUMBAR SPINE W/O DYE: CPT

## 2024-11-27 PROCEDURE — 72125 CT NECK SPINE W/O DYE: CPT

## 2024-11-27 PROCEDURE — 70450 CT HEAD/BRAIN W/O DYE: CPT

## 2024-11-27 PROCEDURE — 72128 CT CHEST SPINE W/O DYE: CPT

## 2024-11-27 PROCEDURE — 99284 EMERGENCY DEPT VISIT MOD MDM: CPT

## 2024-11-27 RX ORDER — LIDOCAINE 4 G/G
1 PATCH TOPICAL DAILY
Qty: 30 PATCH | Refills: 0 | Status: SHIPPED | OUTPATIENT
Start: 2024-11-27 | End: 2024-11-27

## 2024-11-27 RX ORDER — CYCLOBENZAPRINE HCL 5 MG
5 TABLET ORAL 2 TIMES DAILY PRN
Qty: 10 TABLET | Refills: 0 | Status: SHIPPED | OUTPATIENT
Start: 2024-11-27 | End: 2024-11-27

## 2024-11-27 RX ORDER — CYCLOBENZAPRINE HCL 10 MG
10 TABLET ORAL ONCE
Status: DISCONTINUED | OUTPATIENT
Start: 2024-11-27 | End: 2024-11-27 | Stop reason: HOSPADM

## 2024-11-27 ASSESSMENT — ENCOUNTER SYMPTOMS
WHEEZING: 0
COUGH: 0
DIARRHEA: 0
RHINORRHEA: 0
NAUSEA: 0
SHORTNESS OF BREATH: 0
VOMITING: 0
ABDOMINAL PAIN: 0
BACK PAIN: 1

## 2024-11-27 ASSESSMENT — PAIN DESCRIPTION - LOCATION: LOCATION: BACK;SHOULDER;NECK

## 2024-11-27 ASSESSMENT — PAIN SCALES - GENERAL: PAINLEVEL_OUTOF10: 9

## 2024-11-27 NOTE — ED PROVIDER NOTES
I have personally performed a face to face diagnostic evaluation on this patient. I have fully participated in the care of this patient I personally saw the patient and performed a substantive portion of the visit including all aspects of the medical decision making.  I have reviewed and agree with all pertinent clinical information including history, physical exam, diagnostic tests, and the plan.      HPI: Rich Stover presented with a fall that occurred yesterday evening.  Having neck and back pain was walking up a grassy hill lost his balance and fell forward.  He is on blood thinners no loss of consciousness.  Having severe neck and back pain.  No numbness or weakness no bowel or bladder incontinence no saddle anesthesia.  All of this history was obtained from my discussion with the DIXON.  Patient had eloped from the ER prior to my evaluation.  Chief Complaint   Patient presents with    Fall     Came in from home for a fall that happened yesterday around 830p. Was walking up a small grass hill and his feet slipped. Reports that he fell on his side. History of degenerative back disease and 5 herniated disks. Reports that he is on blood thinners.       Review of Systems: See DIXON note  Vital Signs: BP (!) 143/98   Pulse 79   Temp 97.7 °F (36.5 °C) (Oral)   Resp 18   Ht 1.803 m (5' 11\")   Wt 109.8 kg (242 lb)   SpO2 97%   BMI 33.75 kg/m²     Physical exam  Patient had eloped from the ER prior to my obtaining a physical exam      Medical Decision Making and Plan:  Pertinent Labs & Imaging studies reviewed. (See DIXON chart for details)  I agree with DIXON assessment and plan.  69-year-old male presents for a fall.  From a DIXON report fully alert and oriented.  Per DIXON, there is some reproducible thoracic and lumbar tenderness CT scan of the head cervical spine thoracic spine and lumbar spine were obtained given patient is on blood thinners all are unremarkable for acute bony abnormalities.  However when I went to

## 2024-11-27 NOTE — ED PROVIDER NOTES
[facility] EMERGENCY DEPARTMENT  EMERGENCY DEPARTMENT ENCOUNTER        Pt Name: Rich Stover  MRN: [id number]  Birthdate 1955  Date of evaluation: 11/27/2024  Provider: Corinna Romero PA-C  PCP: Liane Anderson MD  Note Started: 3:24 PM EST 11/27/24       I have seen and evaluated this patient with my supervising physician Phu Chen MD.      CHIEF COMPLAINT       Chief Complaint   Patient presents with    Fall     Came in from home for a fall that happened yesterday around 830p. Was walking up a small grass hill and his feet slipped. Reports that he fell on his side. History of degenerative back disease and 5 herniated disks. Reports that he is on blood thinners.        HISTORY OF PRESENT ILLNESS: 1 or more Elements     History From: patient   Limitations to history : None    Rich Stover is a 69 y.o. male who presents for evaluation of neck and back pain status post mechanical fall that occurred yesterday evening.  Patient was walking up a grassy hill when he lost his balance and fell.  He denies hitting his head but states that he is on blood thinners.  No loss of consciousness.  Reports severe back pain but also having mid and upper back and neck pain as well.  No numbness tingling or weakness distally.  No saddle anesthesia, bladder retention or bowel incontinence.  He had taken Yolanda prior to arrival.  He denies any chest pain or shortness of breath.  No abdominal pain.  He has no other complaints or concerns at this time.    Nursing Notes were all reviewed and agreed with or any disagreements were addressed in the HPI.    REVIEW OF SYSTEMS :      Review of Systems   Constitutional:  Negative for appetite change, chills and fever.   HENT:  Negative for congestion and rhinorrhea.    Respiratory:  Negative for cough, shortness of breath and wheezing.    Cardiovascular:  Negative for chest pain.   Gastrointestinal:  Negative for abdominal pain, diarrhea, nausea and vomiting.

## 2024-11-29 ENCOUNTER — CARE COORDINATION (OUTPATIENT)
Dept: CARE COORDINATION | Age: 69
End: 2024-11-29

## 2024-11-29 NOTE — CARE COORDINATION
Ambulatory Care Coordination Note     2024 4:00 PM     Patient Current Location:  Home: [id number]  [place] [address]     This patient was received as a referral from Ambulatory Care Manager .    ACM contacted the patient by telephone. Verified name and  with patient as identifiers. Provided introduction to self, and explanation of the ACM role.   Patient accepted care management services at this time.          ACM: Evelin Levi RN     Challenges to be reviewed by the provider   Additional needs identified to be addressed with provider Yes  FYI:  Pt seen in ED for back pain r/t a fall on .  Pt did not stay for full evaluation.  Pt states he is in extreme pain, feeling SOB at times.  I educated him that he needed to go to the emergency room.  I spoke to his wife and told her the same.  He declined making a PCP appt, but has an appt w/[facility] on Monday.               Method of communication with provider: chart routing.    Utilization: N/A - Initial Call     Care Summary Note: Pt seen in ED for back pain r/t a fall on .  Pt did not stay for full evaluation.  Pt states he is in extreme pain, feeling SOB at times, not able to check O2.  I educated him that he needed to go to the emergency room.  I spoke to his wife and told her the same.  He declined making a PCP appt, but has an appt w/[facility] on Monday.  ACM will continue to follow and will assess pt fully once he has returned home.  Pt reporting that his BG levels are at 190.      Offered patient enrollment in the Remote Patient Monitoring (RPM) program for in-home monitoring: Yes, but did not enroll at this time: Pt needs to go to ED to be evaluated for pain. .       PCP/Specialist follow up:   Future Appointments         Provider Specialty Dept Phone    2025 12:20 PM Liane Anderson MD Family Medicine [phone]    2025 1:20 PM Nav Herron MD Pulmonology [phone]            Follow Up:

## 2024-11-29 NOTE — CARE COORDINATION
Ambulatory Care Coordination Note     11/29/2024 10:41 AM     ACM outreach attempt by this ACM today to offer care management services. ACM was unable to reach the patient by telephone today;   left voice message requesting a return phone call to this ACM.     ACM: Evelin Levi RN     Care Summary Note: ed f/u [id number]    PCP/Specialist follow up:   Future Appointments         Provider Specialty Dept Phone    1/30/2025 12:20 PM Liane Anderson MD Family Medicine [phone]    5/14/2025 1:20 PM Nav Herron MD Pulmonology [phone]            Follow Up:   Plan for next ACM outreach in approximately 1-2 days  to complete:  - outreach attempt to offer care management services  - RPM.

## 2024-12-04 ENCOUNTER — CARE COORDINATION (OUTPATIENT)
Dept: CARE COORDINATION | Age: 69
End: 2024-12-04

## 2024-12-04 NOTE — CARE COORDINATION
do you think you are about to fall and you do NOT fall? For example, you grab something to stabilize yourself or hold onto a wall/furniture?: Occasionally  Use of a Mobility Aid: No  Difficulty walking/impaired gait: No  Issues with feet or shoes like numbness, edema, shoes not fitting: Yes  Changes in vision, poor vision or poor lighting in environment: No  Dizziness: No  Other Fall Risk: No     Frequent urination at night?: No  Do you use rails/bars?: Yes  Do you have a non-slip tub mat?: Yes        Thinking about your patient's physical health needs, are there any symptoms or problems (risk indicators) you are unsure about that require further investigation?: No identified areas of uncertainly or problems already being investigated   Are the patient’s physical health problems impacting on their mental well-being?: No identified areas of concern   Are there any problems with your patient’s lifestyle behaviors (alcohol, drugs, diet, exercise) that are impacting on physical or mental well-being?: No identified areas of concern   Do you have any other concerns about your patient’s mental well-being? How would you rate their severity and impact on the patient?: No identified areas of concern   How would you rate their home environment in terms of safety and stability (including domestic violence, insecure housing, neighbor harassment)?: Consistently safe, supportive, stable, no identified problems   How do daily activities impact on the patient's well-being? (include current or anticipated unemployment, work, caregiving, access to transportation or other): No identified problems or perceived positive benefits   How would you rate their social network (family, work, friends)?: Good participation with social networks   How would you rate their financial resources (including ability to afford all required medical care)?: Financially secure, resources adequate, no identified problems   How wells does the patient now

## 2024-12-11 ENCOUNTER — TELEPHONE (OUTPATIENT)
Dept: CARDIOLOGY CLINIC | Age: 69
End: 2024-12-11

## 2024-12-11 DIAGNOSIS — F51.01 PRIMARY INSOMNIA: ICD-10-CM

## 2024-12-11 DIAGNOSIS — Z79.4 TYPE 2 DIABETES MELLITUS WITH DIABETIC PERIPHERAL ANGIOPATHY WITHOUT GANGRENE, WITH LONG-TERM CURRENT USE OF INSULIN (HCC): ICD-10-CM

## 2024-12-11 DIAGNOSIS — E11.51 TYPE 2 DIABETES MELLITUS WITH DIABETIC PERIPHERAL ANGIOPATHY WITHOUT GANGRENE, WITH LONG-TERM CURRENT USE OF INSULIN (HCC): ICD-10-CM

## 2024-12-11 RX ORDER — QUETIAPINE FUMARATE 25 MG/1
25 TABLET, FILM COATED ORAL NIGHTLY
Qty: 30 TABLET | Refills: 3 | Status: SHIPPED | OUTPATIENT
Start: 2024-12-11

## 2024-12-11 NOTE — TELEPHONE ENCOUNTER
Pts rheumatologist wants to know if ok to start Cosentyx for his arthritis (with his current cardiac meds). Irving RADFORD

## 2024-12-11 NOTE — TELEPHONE ENCOUNTER
Discussed with Dr Ruffin and cholo to take this medication with his cardiac medications. Called patient and made aware.

## 2024-12-12 RX ORDER — SEMAGLUTIDE 2.68 MG/ML
INJECTION, SOLUTION SUBCUTANEOUS
Qty: 3 ML | Refills: 3 | Status: SHIPPED | OUTPATIENT
Start: 2024-12-12

## 2024-12-17 ENCOUNTER — TELEPHONE (OUTPATIENT)
Dept: FAMILY MEDICINE CLINIC | Age: 69
End: 2024-12-17

## 2024-12-17 NOTE — TELEPHONE ENCOUNTER
Called and spoke with the patient.  He is concerned that he will not be able to take medicine for his Rheumatoid Arthritis because of his DX of Hepatitis B.  Patient was told by  that is was non existent??

## 2024-12-17 NOTE — TELEPHONE ENCOUNTER
Patient called and stated that Hep B showed up in his blood test ordered by his rheumatologist    He is taking TADALAFIL and thought that was to help with the Hep B    Please Advise

## 2024-12-18 ENCOUNTER — CARE COORDINATION (OUTPATIENT)
Dept: CARE COORDINATION | Age: 69
End: 2024-12-18

## 2024-12-18 SDOH — ECONOMIC STABILITY: FOOD INSECURITY: WITHIN THE PAST 12 MONTHS, YOU WORRIED THAT YOUR FOOD WOULD RUN OUT BEFORE YOU GOT MONEY TO BUY MORE.: NEVER TRUE

## 2024-12-18 SDOH — ECONOMIC STABILITY: INCOME INSECURITY: IN THE LAST 12 MONTHS, WAS THERE A TIME WHEN YOU WERE NOT ABLE TO PAY THE MORTGAGE OR RENT ON TIME?: NO

## 2024-12-18 SDOH — ECONOMIC STABILITY: INCOME INSECURITY: HOW HARD IS IT FOR YOU TO PAY FOR THE VERY BASICS LIKE FOOD, HOUSING, MEDICAL CARE, AND HEATING?: NOT HARD AT ALL

## 2024-12-18 SDOH — ECONOMIC STABILITY: FOOD INSECURITY: WITHIN THE PAST 12 MONTHS, THE FOOD YOU BOUGHT JUST DIDN'T LAST AND YOU DIDN'T HAVE MONEY TO GET MORE.: NEVER TRUE

## 2024-12-18 SDOH — HEALTH STABILITY: PHYSICAL HEALTH: ON AVERAGE, HOW MANY MINUTES DO YOU ENGAGE IN EXERCISE AT THIS LEVEL?: 30 MIN

## 2024-12-18 SDOH — HEALTH STABILITY: MENTAL HEALTH: HOW MANY STANDARD DRINKS CONTAINING ALCOHOL DO YOU HAVE ON A TYPICAL DAY?: PATIENT DOES NOT DRINK

## 2024-12-18 SDOH — HEALTH STABILITY: MENTAL HEALTH: HOW OFTEN DO YOU HAVE A DRINK CONTAINING ALCOHOL?: NEVER

## 2024-12-18 SDOH — HEALTH STABILITY: PHYSICAL HEALTH: ON AVERAGE, HOW MANY DAYS PER WEEK DO YOU ENGAGE IN MODERATE TO STRENUOUS EXERCISE (LIKE A BRISK WALK)?: 7 DAYS

## 2024-12-18 SDOH — ECONOMIC STABILITY: TRANSPORTATION INSECURITY
IN THE PAST 12 MONTHS, HAS LACK OF TRANSPORTATION KEPT YOU FROM MEETINGS, WORK, OR FROM GETTING THINGS NEEDED FOR DAILY LIVING?: NO

## 2024-12-18 SDOH — ECONOMIC STABILITY: TRANSPORTATION INSECURITY
IN THE PAST 12 MONTHS, HAS THE LACK OF TRANSPORTATION KEPT YOU FROM MEDICAL APPOINTMENTS OR FROM GETTING MEDICATIONS?: NO

## 2024-12-18 NOTE — CARE COORDINATION
Ambulatory Care Coordination Note     2024 3:21 PM     Patient Current Location:  Home: [id number]  [facility] [address]     ACM contacted the patient by telephone. Verified name and  with patient as identifiers.         ACM: Evelin Levi RN     Challenges to be reviewed by the provider   Additional needs identified to be addressed with provider No  none               Method of communication with provider: none.    Utilization: Patient has not had any utilization since our last call.     Care Summary Note: CC f/u completed.  Pt was pleasant and engaged with no s/s of disease exacerbation.  Pt taking medications as prescribed.   Pt continues to struggle with pain, has been to see rheumatology and Neuro.  Pt is exercising by taking walks and has gone from approx 260 lbs to 240 lbs.  Pt is trying to follow a low Na+ and diabetic diet, agreeable to RD referral.  Pt expressed concern over costs of medications.  ACM sent information to assist in cost of ozempic through their website.  Pt is weighing himself daily and checking BG levels.  Education sent via regular mail.    Offered patient enrollment in the Remote Patient Monitoring (RPM) program for in-home monitoring: Yes, but did not enroll at this time: declined to enroll in the program becausept feels he has too much on his plate and wants to use his own equipment .     Assessments Completed:   Diabetes Assessment    Medic Alert ID: No  Meal Planning: Avoidance of concentrated sweets   How often do you test your blood sugar?: Daily   Do you have barriers with adherence to non-pharmacologic self-management interventions? (Nutrition/Exercise/Self-Monitoring): No   Have you ever had to go to the ED for symptoms of low blood sugar?: No       No patient-reported symptoms   Do you have hyperglycemia symptoms?: No   Do you have hypoglycemia symptoms?: No   Last Blood Sugar Value: 175   Blood Sugar Monitoring Regimen: Once a Day   Blood Sugar Trends: No Change

## 2024-12-18 NOTE — TELEPHONE ENCOUNTER
He will always test positive for Hep B antibodies. But will test negative for the actual virus.    Let rheumatology know that you were treated already. They can get Hepatitis B virus DNA. He should see Dr. Petty to discuss before starting any RA therapy.

## 2024-12-20 ENCOUNTER — TELEPHONE (OUTPATIENT)
Dept: FAMILY MEDICINE CLINIC | Age: 69
End: 2024-12-20

## 2024-12-20 NOTE — TELEPHONE ENCOUNTER
Pt called in he said he was speaking to someone about a coupon on ozempic for 25.00 dollars off       Please advise

## 2024-12-23 ENCOUNTER — TELEPHONE (OUTPATIENT)
Dept: FAMILY MEDICINE CLINIC | Age: 69
End: 2024-12-23

## 2024-12-23 NOTE — TELEPHONE ENCOUNTER
Reactive for Hep B antigen.    Please forward paper result Paper on [facility] desk to Dr. Petty ().

## 2024-12-27 NOTE — TELEPHONE ENCOUNTER
Pt sees Dr Rubio  [facility] for Dr. Rubio's office to give me a call back so blood work can be refaxed

## 2025-01-02 ENCOUNTER — CARE COORDINATION (OUTPATIENT)
Dept: CARE COORDINATION | Age: 70
End: 2025-01-02

## 2025-01-02 NOTE — CARE COORDINATION
Contacted Rich Stover and left voicemail regarding Dietitian referral 2/2 DM, CHF. Left call back number and will follow up as appropriate.       Elsa Deutsch RDN, LD  [phone]

## 2025-01-03 ENCOUNTER — TELEPHONE (OUTPATIENT)
Dept: FAMILY MEDICINE CLINIC | Age: 70
End: 2025-01-03

## 2025-01-03 NOTE — TELEPHONE ENCOUNTER
AIDEN  Pt called and said his rheumatologist is working on trying to get a med to help with his arthritis     Pt is currently taking methotrexate but it only helps temporarily   Pt had blood work done @[facility] Friday will call to have it faxed over

## 2025-01-09 ENCOUNTER — CARE COORDINATION (OUTPATIENT)
Dept: CARE COORDINATION | Age: 70
End: 2025-01-09

## 2025-01-09 RX ORDER — ASPIRIN 81 MG/1
81 TABLET, COATED ORAL DAILY
Qty: 90 TABLET | Refills: 3 | Status: SHIPPED | OUTPATIENT
Start: 2025-01-09

## 2025-01-09 NOTE — CARE COORDINATION
Ambulatory Care Coordination Note     1/9/2025 10:03 AM     Patient outreach attempt by this ACM today to perform care management follow up . ACM was unable to reach the patient by telephone today;   left voice message requesting a return phone call to this ACM.     ACM: Eli Fink     PCP/Specialist follow up:   Future Appointments         Provider Specialty Dept Phone    1/30/2025 12:20 PM Liane Anderson MD Family Medicine [phone]    5/14/2025 1:20 PM Nav Herron MD Pulmonology [phone]            Follow Up:   Plan for next ACM outreach in approximately 1 week

## 2025-01-10 DIAGNOSIS — M54.42 CHRONIC BILATERAL LOW BACK PAIN WITH BILATERAL SCIATICA: ICD-10-CM

## 2025-01-10 DIAGNOSIS — G89.29 CHRONIC BILATERAL LOW BACK PAIN WITH BILATERAL SCIATICA: ICD-10-CM

## 2025-01-10 DIAGNOSIS — G89.4 CHRONIC PAIN SYNDROME: ICD-10-CM

## 2025-01-10 DIAGNOSIS — M54.41 CHRONIC BILATERAL LOW BACK PAIN WITH BILATERAL SCIATICA: ICD-10-CM

## 2025-01-10 RX ORDER — CARVEDILOL 6.25 MG/1
6.25 TABLET ORAL 2 TIMES DAILY
Qty: 180 TABLET | Refills: 3 | Status: SHIPPED | OUTPATIENT
Start: 2025-01-10

## 2025-01-13 DIAGNOSIS — N52.1 ERECTILE DYSFUNCTION DUE TO DISEASES CLASSIFIED ELSEWHERE: ICD-10-CM

## 2025-01-13 RX ORDER — CARVEDILOL 6.25 MG/1
6.25 TABLET ORAL 2 TIMES DAILY
Qty: 180 TABLET | Refills: 3 | Status: SHIPPED | OUTPATIENT
Start: 2025-01-13

## 2025-01-13 RX ORDER — GABAPENTIN 400 MG/1
CAPSULE ORAL
Qty: 270 CAPSULE | Refills: 0 | Status: SHIPPED | OUTPATIENT
Start: 2025-01-13 | End: 2025-04-13

## 2025-01-14 RX ORDER — TADALAFIL 20 MG/1
20 TABLET ORAL DAILY PRN
Qty: 10 TABLET | Refills: 3 | Status: SHIPPED | OUTPATIENT
Start: 2025-01-14

## 2025-01-14 NOTE — TELEPHONE ENCOUNTER
Last OV: 8/5/24  Next OV: X  Last refill:  11/6/24 #10 3 R/F  Most recent Labs: 8/2/24  Last EKG (if needed): 8/1/24

## 2025-01-16 ENCOUNTER — CARE COORDINATION (OUTPATIENT)
Dept: CARE COORDINATION | Age: 70
End: 2025-01-16

## 2025-01-16 NOTE — CARE COORDINATION
Ambulatory Care Coordination Note     2025 2:44 PM     Patient Current Location:  Home: [id number]  [facility] [address]     ACM contacted the patient by telephone. Verified name and  with patient as identifiers.         ACM: Evelin Levi RN     Challenges to be reviewed by the provider   Additional needs identified to be addressed with provider No  none               Method of communication with provider: none.    Utilization: Patient has not had any utilization since our last call.     Care Summary Note: CC f/u completed.  Pt was pleasant and engaged with no s/s of disease exacerbation.  Pt taking medications as prescribed.   Pt is not able to utilize the Ozempic coupons due to insurance.  ACM made pt aware of Medicare MPPP option and pt does not qualify.  Pt is going to discuss other medication options w/PCP at his upcoming appt on .  ACM will assist w/care plan following visit.     Offered patient enrollment in the Remote Patient Monitoring (RPM) program for in-home monitoring: Yes, but did not enroll at this time: declined to enroll in the program becausept feels he is self managing at this time. .     Assessments Completed:   Diabetes Assessment    Medic Alert ID: No  Meal Planning: Avoidance of concentrated sweets   How often do you test your blood sugar?: Daily   Do you have barriers with adherence to non-pharmacologic self-management interventions? (Nutrition/Exercise/Self-Monitoring): No   Have you ever had to go to the ED for symptoms of low blood sugar?: No       No patient-reported symptoms   Do you have hyperglycemia symptoms?: No   Do you have hypoglycemia symptoms?: No   Last Blood Sugar Value: 120   Blood Sugar Monitoring Regimen: Once a Day   Blood Sugar Trends: No Change         ,   Hypertension - Encounter Level    Symptom course: stable          Medications Reviewed:   Patient denies any changes with medications and reports taking all medications as prescribed.    [facility]

## 2025-01-23 ENCOUNTER — CARE COORDINATION (OUTPATIENT)
Dept: CARE COORDINATION | Age: 70
End: 2025-01-23

## 2025-01-23 NOTE — CARE COORDINATION
Contacted Rich Stover regarding Dietitian referral 2/2 DM (HgbA1C 5.8%, 10/30/24) and CHF. Patient answered, RD explained reason for call and role in care. Patient reports that he is babysitting his two year old grandson; requested RD send handouts in the mail and then follow up for nutrition assessment. RD verified patient's home address. RD will send the following handouts: Reading a Nutrition Facts Label, Managing Portions and Serving Sizes, Simple Swaps and Substitutions, Physical Activity and DM, Building a Better Meal, Dining out with DM, Know Your Numbers, Pro/CHO Snacks, Power of Protein for People with DM, Guide to Carbohydrates, CHF/DM Meal Planner, Heart Failure Nutrition Therapy, CHF - Following a Low Sodium Diet, Seasoning Your Food Without Salt.    RD will call patient in three weeks to follow up and make sure pt received handouts in mail. RD will answer any nutrition related questions at this time.       Elsa Deutsch RDN, LD   [phone]

## 2025-01-29 ENCOUNTER — CARE COORDINATION (OUTPATIENT)
Dept: CARE COORDINATION | Age: 70
End: 2025-01-29

## 2025-01-29 NOTE — CARE COORDINATION
Ambulatory Care Coordination Note     2025 2:00 PM     Patient Current Location:  Home: [id number]  [facility] [address]     ACM contacted the patient by telephone. Verified name and  with patient as identifiers.         ACM: Evelin Levi RN     Challenges to be reviewed by the provider   Additional needs identified to be addressed with provider No  none               Method of communication with provider: none.    Utilization: Patient has not had any utilization since our last call.     Care Summary Note: CC f/u completed.  Pt was pleasant and engaged with no s/s of disease exacerbation.  Pt taking medications as prescribed.   Pt has been to seen his rheumatologist and they are taking him off of his methotrexate.  Pt has been monitoring his BG levels, today was 119.  Pt requested f/u from dietician regarding meal plan sent to him in bold print.  ACM will sent message to RD.    Offered patient enrollment in the Remote Patient Monitoring (RPM) program for in-home monitoring: Yes, but did not enroll at this time: controlled chronic disease management.     Assessments Completed:   Diabetes Assessment    Medic Alert ID: No  Meal Planning: Avoidance of concentrated sweets   How often do you test your blood sugar?: Daily   Do you have barriers with adherence to non-pharmacologic self-management interventions? (Nutrition/Exercise/Self-Monitoring): No   Have you ever had to go to the ED for symptoms of low blood sugar?: No       No patient-reported symptoms   Do you have hyperglycemia symptoms?: No   Do you have hypoglycemia symptoms?: No   Last Blood Sugar Value: 119   Blood Sugar Monitoring Regimen: Once a Day   Blood Sugar Trends: No Change             Medications Reviewed:   Patient denies any changes with medications and reports taking all medications as prescribed.    Advance Care Planning:   Not on file; education provided  Health Care Decision maker confirmed     Care Planning:   Education

## 2025-01-29 NOTE — CARE COORDINATION
[facility] the [facility] on 1[facility]29[facility]25 at 1435:     \"The patient told me today that he has not yet received the dietary plan you were sending him in the mail that was in bold type.  He is asking if you can check and/or resend it.  He is anxious to get started.\"    RD called patient and explained that nutrition handouts get sent out via mail every Wednesday. BRIDGER further explained that since patient and RD spoke last Thursday, his handouts would not be sent out until today (Wednesday), 1/29/25. RD offered to send handouts via e-mail so patient could review sooner. Patient verbalized understanding and would like handouts sent to his e-mail address on file.     RD will plan to follow up in two weeks to answer any nutrition-related questions/concerns and to discuss the handouts.     Electronically signed by Elsa Deutsch RD on 1/29/2025 at 3:00 PM

## 2025-02-06 ENCOUNTER — OFFICE VISIT (OUTPATIENT)
Dept: FAMILY MEDICINE CLINIC | Age: 70
End: 2025-02-06
Payer: MEDICARE

## 2025-02-06 VITALS
RESPIRATION RATE: 16 BRPM | OXYGEN SATURATION: 97 % | SYSTOLIC BLOOD PRESSURE: 132 MMHG | WEIGHT: 254 LBS | HEART RATE: 70 BPM | HEIGHT: 71 IN | BODY MASS INDEX: 35.56 KG/M2 | DIASTOLIC BLOOD PRESSURE: 70 MMHG

## 2025-02-06 DIAGNOSIS — C43.62 MALIGNANT MELANOMA OF LEFT UPPER EXTREMITY INCLUDING SHOULDER (HCC): ICD-10-CM

## 2025-02-06 DIAGNOSIS — E11.51 TYPE 2 DIABETES MELLITUS WITH DIABETIC PERIPHERAL ANGIOPATHY WITHOUT GANGRENE, WITH LONG-TERM CURRENT USE OF INSULIN (HCC): Primary | ICD-10-CM

## 2025-02-06 DIAGNOSIS — Z12.5 SCREENING PSA (PROSTATE SPECIFIC ANTIGEN): ICD-10-CM

## 2025-02-06 DIAGNOSIS — L40.50 PSORIATIC ARTHRITIS (HCC): ICD-10-CM

## 2025-02-06 DIAGNOSIS — E66.01 MORBID (SEVERE) OBESITY DUE TO EXCESS CALORIES: ICD-10-CM

## 2025-02-06 DIAGNOSIS — Z79.4 TYPE 2 DIABETES MELLITUS WITH DIABETIC PERIPHERAL ANGIOPATHY WITHOUT GANGRENE, WITH LONG-TERM CURRENT USE OF INSULIN (HCC): Primary | ICD-10-CM

## 2025-02-06 DIAGNOSIS — B18.1 CHRONIC ACTIVE VIRAL HEPATITIS B (HCC): ICD-10-CM

## 2025-02-06 LAB — HBA1C MFR BLD: 5.9 %

## 2025-02-06 PROCEDURE — 3017F COLORECTAL CA SCREEN DOC REV: CPT | Performed by: FAMILY MEDICINE

## 2025-02-06 PROCEDURE — 3078F DIAST BP <80 MM HG: CPT | Performed by: FAMILY MEDICINE

## 2025-02-06 PROCEDURE — G8417 CALC BMI ABV UP PARAM F/U: HCPCS | Performed by: FAMILY MEDICINE

## 2025-02-06 PROCEDURE — 3044F HG A1C LEVEL LT 7.0%: CPT | Performed by: FAMILY MEDICINE

## 2025-02-06 PROCEDURE — 1123F ACP DISCUSS/DSCN MKR DOCD: CPT | Performed by: FAMILY MEDICINE

## 2025-02-06 PROCEDURE — 2022F DILAT RTA XM EVC RTNOPTHY: CPT | Performed by: FAMILY MEDICINE

## 2025-02-06 PROCEDURE — G8427 DOCREV CUR MEDS BY ELIG CLIN: HCPCS | Performed by: FAMILY MEDICINE

## 2025-02-06 PROCEDURE — 1159F MED LIST DOCD IN RCRD: CPT | Performed by: FAMILY MEDICINE

## 2025-02-06 PROCEDURE — 83036 HEMOGLOBIN GLYCOSYLATED A1C: CPT | Performed by: FAMILY MEDICINE

## 2025-02-06 PROCEDURE — 99213 OFFICE O/P EST LOW 20 MIN: CPT | Performed by: FAMILY MEDICINE

## 2025-02-06 PROCEDURE — 1036F TOBACCO NON-USER: CPT | Performed by: FAMILY MEDICINE

## 2025-02-06 PROCEDURE — G2211 COMPLEX E/M VISIT ADD ON: HCPCS | Performed by: FAMILY MEDICINE

## 2025-02-06 PROCEDURE — 3075F SYST BP GE 130 - 139MM HG: CPT | Performed by: FAMILY MEDICINE

## 2025-02-06 ASSESSMENT — PATIENT HEALTH QUESTIONNAIRE - PHQ9
8. MOVING OR SPEAKING SO SLOWLY THAT OTHER PEOPLE COULD HAVE NOTICED. OR THE OPPOSITE, BEING SO FIGETY OR RESTLESS THAT YOU HAVE BEEN MOVING AROUND A LOT MORE THAN USUAL: NOT AT ALL
3. TROUBLE FALLING OR STAYING ASLEEP: NOT AT ALL
6. FEELING BAD ABOUT YOURSELF - OR THAT YOU ARE A FAILURE OR HAVE LET YOURSELF OR YOUR FAMILY DOWN: NOT AT ALL
SUM OF ALL RESPONSES TO PHQ QUESTIONS 1-9: 0
9. THOUGHTS THAT YOU WOULD BE BETTER OFF DEAD, OR OF HURTING YOURSELF: NOT AT ALL
SUM OF ALL RESPONSES TO PHQ QUESTIONS 1-9: 0
SUM OF ALL RESPONSES TO PHQ QUESTIONS 1-9: 0
1. LITTLE INTEREST OR PLEASURE IN DOING THINGS: NOT AT ALL
10. IF YOU CHECKED OFF ANY PROBLEMS, HOW DIFFICULT HAVE THESE PROBLEMS MADE IT FOR YOU TO DO YOUR WORK, TAKE CARE OF THINGS AT HOME, OR GET ALONG WITH OTHER PEOPLE: NOT DIFFICULT AT ALL
SUM OF ALL RESPONSES TO PHQ9 QUESTIONS 1 & 2: 0
4. FEELING TIRED OR HAVING LITTLE ENERGY: NOT AT ALL
7. TROUBLE CONCENTRATING ON THINGS, SUCH AS READING THE NEWSPAPER OR WATCHING TELEVISION: NOT AT ALL
2. FEELING DOWN, DEPRESSED OR HOPELESS: NOT AT ALL
SUM OF ALL RESPONSES TO PHQ QUESTIONS 1-9: 0
5. POOR APPETITE OR OVEREATING: NOT AT ALL

## 2025-02-06 NOTE — PROGRESS NOTES
CHRONIC CONDITION FOLLOW-UP     Assessment and Plan:      Diagnosis Orders   1. Type 2 diabetes mellitus with diabetic peripheral angiopathy without gangrene, with long-term current use of insulin (HCC)  POCT glycosylated hemoglobin (Hb A1C)      2. Morbid (severe) obesity due to excess calories (E66.01)        3. Psoriatic arthritis (HCC)        4. Malignant melanoma of left upper extremity including shoulder (HCC)        5. Chronic active viral hepatitis B (HCC)          Plan as above and below.     Continue current Tx plan. Any changes marked below.    INSTRUCTIONS  NEXT APPOINTMENT: Please schedule annual complete physical (30 minutes) in 5 months   PLEASE TAKE THIS FORM TO CHECK-OUT WINDOW TO SCHEDULE NEXT VISIT.   If possible, it would be good idea to get blood work 2-10 working days BEFORE next visit. This way we can discuss results. HOWEVER, if having any new symptoms please wait until seen in case other tests are needed.  Keep working on portion control.  Check GoodRx at cash pay rate.    Subjective:      Chief Complaint   Patient presents with    Diabetes     Rich Stover is an 70 y.o. male who presents for follow up    Complaints:   Rheum put him back on MTX today because rash returned.    CHART REVIEW  Health Maintenance Due   Topic Date Due    Prostate Specific Antigen (PSA) Screening or Monitoring  02/05/2021    Flu vaccine (1) 08/01/2024    COVID-19 Vaccine (5 - 2024-25 season) 09/01/2024    Shingles vaccine (2 of 2) 11/25/2024    Diabetic foot exam  12/13/2024    Annual Wellness Visit (Medicare Advantage)  01/01/2025     Social History     Tobacco Use    Smoking status: Never     Passive exposure: Past    Smokeless tobacco: Never    Tobacco comments:     advised not to start   Vaping Use    Vaping status: Never Used   Substance Use Topics    Alcohol use: Not Currently     Comment: rare    Drug use: No     MEDS  Current Outpatient Medications   Medication Instructions    albuterol sulfate HFA

## 2025-02-06 NOTE — PATIENT INSTRUCTIONS
INSTRUCTIONS  NEXT APPOINTMENT: Please schedule annual complete physical (30 minutes) in 5 months   PLEASE TAKE THIS FORM TO CHECK-OUT WINDOW TO SCHEDULE NEXT VISIT.   If possible, it would be good idea to get blood work 2-10 working days BEFORE next visit. This way we can discuss results. HOWEVER, if having any new symptoms please wait until seen in case other tests are needed.  Keep working on portion control.  Check GoodRx at cash pay rate.

## 2025-02-13 ENCOUNTER — CARE COORDINATION (OUTPATIENT)
Dept: CARE COORDINATION | Age: 70
End: 2025-02-13

## 2025-02-13 NOTE — CARE COORDINATION
Contacted Rich Stover and left voicemail regarding Dietitian follow up 2/2 DM, CHF. Left call back number and will follow up as appropriate.       Elsa Deutsch RDN, LD  [phone]

## 2025-02-17 ENCOUNTER — TELEPHONE (OUTPATIENT)
Dept: FAMILY MEDICINE CLINIC | Age: 70
End: 2025-02-17

## 2025-02-17 NOTE — TELEPHONE ENCOUNTER
Pt called in wants 2 mg [facility]empic [facility]   Pt stated he discussed this with Dr Anderson at last appointment

## 2025-02-19 ENCOUNTER — CARE COORDINATION (OUTPATIENT)
Dept: CARE COORDINATION | Age: 70
End: 2025-02-19

## 2025-02-19 NOTE — CARE COORDINATION
Ambulatory Care Coordination Note     2/19/2025 2:42 PM     ACM outreach attempt by this ACM today to perform care management follow up . ACM was unable to reach the patient by telephone today;   left voice message requesting a return phone call to this ACM.     ACM: Eli Fink     PCP/Specialist follow up:   Future Appointments         Provider Specialty Dept Phone    3/10/2025 2:00 PM Juan C Simpson MD Cardiology [phone]    5/14/2025 1:20 PM Nav Herron MD Pulmonology [phone]    7/7/2025 3:00 PM Liane Anderson MD Family Medicine [phone]            Follow Up:   Plan for next ACM outreach in approximately 1 week

## 2025-02-20 ENCOUNTER — TELEPHONE (OUTPATIENT)
Dept: FAMILY MEDICINE CLINIC | Age: 70
End: 2025-02-20

## 2025-02-20 ENCOUNTER — CARE COORDINATION (OUTPATIENT)
Dept: CARE COORDINATION | Age: 70
End: 2025-02-20

## 2025-02-20 NOTE — CARE COORDINATION
Contacted Rich Stover and left voicemail regarding Dietitian follow up. Left call back number and will follow up in one week or as appropriate.       Elsa Deutsch RDN, LD  [phone]

## 2025-02-20 NOTE — TELEPHONE ENCOUNTER
Patient called for the 4th time and stated that his OZEMPIC DOES NOT NEED A PA. He said he spoke to his insurance company and they told him it does not need one. I told him I will send a message and he disconnected the call.     PLEASE ADVISE

## 2025-02-20 NOTE — TELEPHONE ENCOUNTER
Pt called in thought everything was ok with his ozempic now he is told he needs a PA ?    Please advise

## 2025-02-21 ENCOUNTER — TELEPHONE (OUTPATIENT)
Dept: FAMILY MEDICINE CLINIC | Age: 70
End: 2025-02-21

## 2025-02-21 DIAGNOSIS — E11.51 TYPE 2 DIABETES MELLITUS WITH DIABETIC PERIPHERAL ANGIOPATHY WITHOUT GANGRENE, WITH LONG-TERM CURRENT USE OF INSULIN (HCC): ICD-10-CM

## 2025-02-21 DIAGNOSIS — Z79.4 TYPE 2 DIABETES MELLITUS WITH DIABETIC PERIPHERAL ANGIOPATHY WITHOUT GANGRENE, WITH LONG-TERM CURRENT USE OF INSULIN (HCC): ICD-10-CM

## 2025-02-21 RX ORDER — SEMAGLUTIDE 2.68 MG/ML
2 INJECTION, SOLUTION SUBCUTANEOUS
Qty: 3 ML | Refills: 3 | Status: SHIPPED | OUTPATIENT
Start: 2025-02-21 | End: 2025-02-21 | Stop reason: SDUPTHER

## 2025-02-21 RX ORDER — SEMAGLUTIDE 2.68 MG/ML
2 INJECTION, SOLUTION SUBCUTANEOUS
Qty: 3 ML | Refills: 3 | Status: SHIPPED | OUTPATIENT
Start: 2025-02-21

## 2025-02-21 NOTE — TELEPHONE ENCOUNTER
Patient would like to know the status of his OZEMPIC since no PA is needed.    Will it be sent over to the pharmacy today?    Please Advise

## 2025-02-21 NOTE — TELEPHONE ENCOUNTER
The prescription was sent to [facility] by mistake.  Can prescription be re-sent to [facility]?  The did receive the prescription the first time it was sent.

## 2025-02-26 ENCOUNTER — CARE COORDINATION (OUTPATIENT)
Dept: CARE COORDINATION | Age: 70
End: 2025-02-26

## 2025-02-26 NOTE — CARE COORDINATION
Ambulatory Care Coordination Note     2/26/2025 2:11 PM     Patient outreach attempt by this ACM today to perform care management follow up . ACM was unable to reach the patient by telephone today;   left voice message requesting a return phone call to this ACM.     ACM: Evelin Levi RN     Care Summary Note: UTRx2    PCP/Specialist follow up:   Future Appointments         Provider Specialty Dept Phone    3/10/2025 2:00 PM Juan C Simpson MD Cardiology [phone]    5/14/2025 1:20 PM Nav Herron MD Pulmonology [phone]    7/7/2025 3:00 PM Liane Anderson MD Family Medicine [phone]            Follow Up:   Plan for next ACM outreach in approximately 3 weeks to complete:  - goal progression  - education .

## 2025-02-27 ENCOUNTER — CARE COORDINATION (OUTPATIENT)
Dept: CARE COORDINATION | Age: 70
End: 2025-02-27

## 2025-02-27 NOTE — CARE COORDINATION
Contacted Rich Stover and left voicemail regarding Dietitian follow up.  RD has attempted to call patient three times. Provided call back number. RD sign off at this time and notify ACM. RD will follow/assist patient should phone call be returned.      Elsa Deutsch RDN, LD   [phone]'

## 2025-03-06 DIAGNOSIS — E11.51 TYPE 2 DIABETES MELLITUS WITH DIABETIC PERIPHERAL ANGIOPATHY WITHOUT GANGRENE, WITH LONG-TERM CURRENT USE OF INSULIN (HCC): ICD-10-CM

## 2025-03-06 DIAGNOSIS — Z79.4 TYPE 2 DIABETES MELLITUS WITH DIABETIC PERIPHERAL ANGIOPATHY WITHOUT GANGRENE, WITH LONG-TERM CURRENT USE OF INSULIN (HCC): ICD-10-CM

## 2025-03-06 DIAGNOSIS — B18.1 CHRONIC ACTIVE VIRAL HEPATITIS B (HCC): ICD-10-CM

## 2025-03-06 DIAGNOSIS — Z12.5 SCREENING PSA (PROSTATE SPECIFIC ANTIGEN): ICD-10-CM

## 2025-03-06 LAB
ALBUMIN SERPL-MCNC: 4.1 G/DL (ref 3.4–5)
ALBUMIN/GLOB SERPL: 1.6 {RATIO} (ref 1.1–2.2)
ALP SERPL-CCNC: 66 U/L (ref 40–129)
ALT SERPL-CCNC: 18 U/L (ref 10–40)
ANION GAP SERPL CALCULATED.3IONS-SCNC: 9 MMOL/L (ref 3–16)
AST SERPL-CCNC: 25 U/L (ref 15–37)
BILIRUB SERPL-MCNC: <0.2 MG/DL (ref 0–1)
BUN SERPL-MCNC: 23 MG/DL (ref 7–20)
CALCIUM SERPL-MCNC: 8.9 MG/DL (ref 8.3–10.6)
CHLORIDE SERPL-SCNC: 103 MMOL/L (ref 99–110)
CHOLEST SERPL-MCNC: 140 MG/DL (ref 0–199)
CO2 SERPL-SCNC: 29 MMOL/L (ref 21–32)
CREAT SERPL-MCNC: 1.1 MG/DL (ref 0.8–1.3)
EST. AVERAGE GLUCOSE BLD GHB EST-MCNC: 142.7 MG/DL
GFR SERPLBLD CREATININE-BSD FMLA CKD-EPI: 73 ML/MIN/{1.73_M2}
GLUCOSE SERPL-MCNC: 168 MG/DL (ref 70–99)
HBA1C MFR BLD: 6.6 %
HDLC SERPL-MCNC: 50 MG/DL (ref 40–60)
LDLC SERPL CALC-MCNC: 72 MG/DL
POTASSIUM SERPL-SCNC: 4.3 MMOL/L (ref 3.5–5.1)
PROT SERPL-MCNC: 6.6 G/DL (ref 6.4–8.2)
PSA SERPL DL<=0.01 NG/ML-MCNC: 3.69 NG/ML (ref 0–4)
SODIUM SERPL-SCNC: 141 MMOL/L (ref 136–145)
TRIGL SERPL-MCNC: 89 MG/DL (ref 0–150)
VLDLC SERPL CALC-MCNC: 18 MG/DL
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
## 2025-03-10 ENCOUNTER — OFFICE VISIT (OUTPATIENT)
Dept: CARDIOLOGY CLINIC | Age: 70
End: 2025-03-10
Payer: MEDICARE

## 2025-03-10 VITALS
OXYGEN SATURATION: 97 % | DIASTOLIC BLOOD PRESSURE: 60 MMHG | BODY MASS INDEX: 36.85 KG/M2 | SYSTOLIC BLOOD PRESSURE: 120 MMHG | HEART RATE: 72 BPM | WEIGHT: 264.2 LBS

## 2025-03-10 DIAGNOSIS — Z95.2 S/P TAVR (TRANSCATHETER AORTIC VALVE REPLACEMENT): Primary | ICD-10-CM

## 2025-03-10 DIAGNOSIS — I42.9 CARDIOMYOPATHY, UNSPECIFIED TYPE (HCC): ICD-10-CM

## 2025-03-10 DIAGNOSIS — Z95.1 HX OF CORONARY ARTERY BYPASS SURGERY: ICD-10-CM

## 2025-03-10 DIAGNOSIS — I10 HYPERTENSION, UNSPECIFIED TYPE: ICD-10-CM

## 2025-03-10 PROCEDURE — 1159F MED LIST DOCD IN RCRD: CPT | Performed by: INTERNAL MEDICINE

## 2025-03-10 PROCEDURE — G2211 COMPLEX E/M VISIT ADD ON: HCPCS | Performed by: INTERNAL MEDICINE

## 2025-03-10 PROCEDURE — 1123F ACP DISCUSS/DSCN MKR DOCD: CPT | Performed by: INTERNAL MEDICINE

## 2025-03-10 PROCEDURE — 3074F SYST BP LT 130 MM HG: CPT | Performed by: INTERNAL MEDICINE

## 2025-03-10 PROCEDURE — 3078F DIAST BP <80 MM HG: CPT | Performed by: INTERNAL MEDICINE

## 2025-03-10 PROCEDURE — 93000 ELECTROCARDIOGRAM COMPLETE: CPT | Performed by: INTERNAL MEDICINE

## 2025-03-10 PROCEDURE — 99214 OFFICE O/P EST MOD 30 MIN: CPT | Performed by: INTERNAL MEDICINE

## 2025-03-11 ENCOUNTER — TELEPHONE (OUTPATIENT)
Dept: CARDIOLOGY CLINIC | Age: 70
End: 2025-03-11

## 2025-03-11 NOTE — TELEPHONE ENCOUNTER
Patient called in.   He saw Dr. Simpson yesterday 3/10.     He states that Dr. Simpson mentioned starting him on a new medication- couldn't remember the name but states nothing was sent to the pharmacy.     Please advise.     Callback: [phone]

## 2025-03-12 NOTE — TELEPHONE ENCOUNTER
Patient called to f/u.   Relayed that the medication is not Cialis, relayed that Dr. Simpson did not mention starting any new medication.     Please call and discuss with patient.

## 2025-03-12 NOTE — TELEPHONE ENCOUNTER
ALEJO Maria  See new message and please call patient to further discuss so that we can fully update Dr. Simpson with patient needs. Thanks.

## 2025-03-12 NOTE — TELEPHONE ENCOUNTER
3/10/25 visit with Dr Simpson     Only change I see in chart.  Erectile dysfunction  Cialis 5 mg daily  Cialis 20 mg as needed  Cautioned him about never using any kind of nitroglycerin when on these drug    Patient takes Cialis 20 mg, Is patient supposed to be taking 5 mg daily.  Please advise.

## 2025-03-13 NOTE — TELEPHONE ENCOUNTER
Called patient again left message on what medication he is thinking. Dr Simpson did not mention starting any new medications in last office visit 3/10/25.    PRABHAKAR Hernadez  Can you check to see what medication Dr Simpson wanted to start patient on if any. I'm not seeing any documentation.  I have left couple messages for patient to return call.    Thanks

## 2025-03-14 ENCOUNTER — HOSPITAL ENCOUNTER (EMERGENCY)
Age: 70
Discharge: HOME | End: 2025-03-14
Payer: MEDICARE

## 2025-03-14 ENCOUNTER — APPOINTMENT (OUTPATIENT)
Dept: GENERAL RADIOLOGY | Age: 70
End: 2025-03-14
Payer: MEDICARE

## 2025-03-14 VITALS
SYSTOLIC BLOOD PRESSURE: 149 MMHG | RESPIRATION RATE: 18 BRPM | HEART RATE: 87 BPM | HEIGHT: 71 IN | DIASTOLIC BLOOD PRESSURE: 91 MMHG | TEMPERATURE: 98.2 F | BODY MASS INDEX: 36.96 KG/M2 | OXYGEN SATURATION: 95 % | WEIGHT: 264 LBS

## 2025-03-14 DIAGNOSIS — M19.049 CMC ARTHRITIS: Primary | ICD-10-CM

## 2025-03-14 DIAGNOSIS — M79.641 RIGHT HAND PAIN: ICD-10-CM

## 2025-03-14 PROCEDURE — 29125 APPL SHORT ARM SPLINT STATIC: CPT

## 2025-03-14 PROCEDURE — 6370000000 HC RX 637 (ALT 250 FOR IP): Performed by: PHYSICIAN ASSISTANT

## 2025-03-14 PROCEDURE — 73130 X-RAY EXAM OF HAND: CPT

## 2025-03-14 PROCEDURE — 99283 EMERGENCY DEPT VISIT LOW MDM: CPT

## 2025-03-14 RX ORDER — INDOMETHACIN 50 MG/1
50 CAPSULE ORAL 2 TIMES DAILY WITH MEALS
Qty: 20 CAPSULE | Refills: 0 | Status: SHIPPED | OUTPATIENT
Start: 2025-03-14

## 2025-03-14 RX ORDER — OXYCODONE AND ACETAMINOPHEN 5; 325 MG/1; MG/1
1 TABLET ORAL ONCE
Refills: 0 | Status: COMPLETED | OUTPATIENT
Start: 2025-03-14 | End: 2025-03-14

## 2025-03-14 RX ORDER — HYDROCODONE BITARTRATE AND ACETAMINOPHEN 5; 325 MG/1; MG/1
1 TABLET ORAL EVERY 6 HOURS PRN
Qty: 12 TABLET | Refills: 0 | Status: SHIPPED | OUTPATIENT
Start: 2025-03-14 | End: 2025-03-17

## 2025-03-14 RX ADMIN — OXYCODONE HYDROCHLORIDE AND ACETAMINOPHEN 1 TABLET: 5; 325 TABLET ORAL at 20:49

## 2025-03-14 ASSESSMENT — PAIN SCALES - GENERAL: PAINLEVEL_OUTOF10: 8

## 2025-03-14 ASSESSMENT — ENCOUNTER SYMPTOMS
DIARRHEA: 0
SHORTNESS OF BREATH: 0
NAUSEA: 0
ABDOMINAL PAIN: 0
VOMITING: 0
CHEST TIGHTNESS: 0

## 2025-03-14 NOTE — TELEPHONE ENCOUNTER
Rich called back relaying he just missed a call.    Please advise.   I will START or STAY ON the medications listed below when I get home from the hospital:  None

## 2025-03-15 NOTE — ED PROVIDER NOTES
[facility] EMERGENCY DEPARTMENT  EMERGENCY DEPARTMENT ENCOUNTER        Pt Name: Rich Stover  MRN: [id number]  Birthdate 1955  Date of evaluation: 3/14/2025  Provider: Gm Mkcenzie PA-C  PCP: Liane Anderson MD  Note Started: 8:57 PM EDT 3/14/25      DIXON. I have evaluated this patient.        CHIEF COMPLAINT       Chief Complaint   Patient presents with    Hand Pain     Pt came in from home, pt repors right hand pain that's been getting worse the past week, pt denies injury to hand, pt reports the pain radiates from palm to the wrist,       HISTORY OF PRESENT ILLNESS: 1 or more Elements     History From: Patient    Limitations to history : None    Social Determinants Significantly Affecting Health : None    Chief Complaint: Right hand pain    Rich Stover is a 70 y.o. male with a history of psoriasis, arthritis and chronic pain who presents to the emergency department today complaining of right hand pain.  He states this pain has been intermittent for several years.  He does have a appointment with orthopedic surgery at [place] on Monday but states he would like something for pain until then.  He denies swelling, redness or warmth of the right hand.  He denies any recent injury to the right hand.  He denies pain in the right wrist, elbow or shoulder.  He denies numbness or tingling in the right arm.  He states he has seen his rheumatologist regarding his right hand pain several times and has tried several different medications without relief.        Nursing Notes were all reviewed and agreed with or any disagreements were addressed in the HPI.    REVIEW OF SYSTEMS :      Review of Systems   Constitutional:  Negative for chills and fever.   Respiratory:  Negative for chest tightness and shortness of breath.    Cardiovascular:  Negative for chest pain.   Gastrointestinal:  Negative for abdominal pain, diarrhea, nausea and vomiting.   Genitourinary:  Negative for dysuria.   Musculoskeletal:   examination of patient's right hand there is no obvious swelling or joint effusion.  There is no erythema, induration or warmth.  He has tenderness on palpation over the thenar eminence and right thumb.  He has full range of motion of the right thumb and all fingers.  He has full range of motion of the right wrist.  He has 2+ distal pulses.  Capillary refill  <2 seconds.  Patient denies sensory deficits    Right hand x-rays show no acute fracture or dislocation.  He does have advanced degenerative changes at the first CMC joint.    Patient given Percocet here for pain.  He is fitted for a thumb spica splint    Disposition Considerations (tests considered but not done, Admit vs D/C, Shared Decision Making, Pt Expectation of Test or Tx.): I had a lengthy discussion with the patient regarding testing completed in the emergency department today as well as the results.  Patient will be discharged with indomethacin as well as a very short prescription of Norco.  He has an appointment with his orthopedic surgeon on Monday.  He will rest, ice and elevate the right hand.  He is given strict return precautions.    I estimate there is LOW risk for FRACTURE, COMPARTMENT SYNDROME, DEEP VENOUS THROMBOSIS, SEPTIC ARTHRITIS, TENDON OR NEUROVASCULAR INJURY, thus I consider the discharge disposition reasonable.         The patient tolerated their visit well.  The patient and / or the family were informed of the results of any tests, a time was given to answer questions, a plan was proposed and they agreed with plan.    I am the Primary Clinician of Record.  FINAL IMPRESSION      1. CMC arthritis    2. Right hand pain          DISPOSITION/PLAN     DISPOSITION Decision To Discharge 03/14/2025 09:30:57 PM   DISPOSITION CONDITION Stable           PATIENT REFERRED TO:  Your Ortho surgeon    Schedule an appointment as soon as possible for a visit         DISCHARGE MEDICATIONS:  Discharge Medication List as of 3/14/2025  9:35 PM

## 2025-03-18 ENCOUNTER — CARE COORDINATION (OUTPATIENT)
Dept: CARE COORDINATION | Age: 70
End: 2025-03-18

## 2025-03-18 NOTE — CARE COORDINATION
Ambulatory Care Coordination Note     3/18/2025 2:10 PM     Patient outreach attempt by this ACM today to perform care management follow up . ACM was unable to reach the patient by telephone today;   left voice message requesting a return phone call to this ACM.     ACM: Evelin Levi RN     Care Summary Note: UTRx1    PCP/Specialist follow up:   Future Appointments         Provider Specialty Dept Phone    4/10/2025 7:30 AM (Arrive by 7:15 AM) [facility] ECHO 1 Cardiology [phone]    4/10/2025 11:20 AM Liane Anderson MD Family Medicine [phone]    5/14/2025 1:20 PM Nav Herron MD Pulmonology [phone]    7/7/2025 3:00 PM Liane Anderson MD Family Medicine [phone]            Follow Up:   Plan for next AC outreach in approximately 1 week to complete:  - disease specific assessments  - medication review  - advance care planning  - goal progression  - education .

## 2025-03-21 DIAGNOSIS — N52.1 ERECTILE DYSFUNCTION DUE TO DISEASES CLASSIFIED ELSEWHERE: ICD-10-CM

## 2025-03-23 NOTE — ACP (ADVANCE CARE PLANNING)
Advance Care Planning   General Advance Care Planning (ACP) Conversation    Date of Conversation: 1/29/2025  Conducted with: Patient with Decision Making Capacity  Other persons present: None    Healthcare Decision Maker:    Primary Decision Maker: Brandy Stover - Spouse - [phone]    Today we documented Decision Maker(s) consistent with Legal Next of Kin hierarchy.  Content/Action Overview:  Has ACP document(s) NOT on file - requested patient to provide  Reviewed DNR/DNI and patient elects Full Code (Attempt Resuscitation)        Length of Voluntary ACP Conversation in minutes:  <16 minutes (Non-Billable)    Evelin Levi RN                  18

## 2025-03-24 RX ORDER — TADALAFIL 20 MG/1
20 TABLET ORAL DAILY PRN
Qty: 10 TABLET | Refills: 3 | Status: SHIPPED | OUTPATIENT
Start: 2025-03-24

## 2025-03-24 NOTE — TELEPHONE ENCOUNTER
Requested Prescriptions     Pending Prescriptions Disp Refills    tadalafil (CIALIS) 20 MG tablet [Pharmacy Med Name: TADALAFIL 20MG TABLETS] 10 tablet 3     Sig: TAKE 1 TABLET BY MOUTH DAILY AS NEEDED FOR ERECTILE DYSFUNCTION     Last OV: 3/10/2025  Next OV: X  Last refill:1/14/2025    Please advise, Should pt be taking 5mg daily?  \"Erectile dysfunction  Cialis 5 mg daily  Cialis 20 mg as needed  Cautioned him about never using any kind of nitroglycerin when on these drug\"

## 2025-03-24 NOTE — TELEPHONE ENCOUNTER
Called pt he is not taking the 5 mg of Cialis anymore only taking the 20 mg PRN. Pt is asking for a refill.

## 2025-03-25 ENCOUNTER — OFFICE VISIT (OUTPATIENT)
Dept: ORTHOPEDIC SURGERY | Age: 70
End: 2025-03-25
Payer: MEDICARE

## 2025-03-25 VITALS — WEIGHT: 264 LBS | HEIGHT: 71 IN | BODY MASS INDEX: 36.96 KG/M2

## 2025-03-25 DIAGNOSIS — M18.11 ARTHRITIS OF CARPOMETACARPAL (CMC) JOINT OF RIGHT THUMB: Primary | ICD-10-CM

## 2025-03-25 PROCEDURE — 1159F MED LIST DOCD IN RCRD: CPT | Performed by: ORTHOPAEDIC SURGERY

## 2025-03-25 PROCEDURE — 1125F AMNT PAIN NOTED PAIN PRSNT: CPT | Performed by: ORTHOPAEDIC SURGERY

## 2025-03-25 PROCEDURE — 99204 OFFICE O/P NEW MOD 45 MIN: CPT | Performed by: ORTHOPAEDIC SURGERY

## 2025-03-25 PROCEDURE — 1123F ACP DISCUSS/DSCN MKR DOCD: CPT | Performed by: ORTHOPAEDIC SURGERY

## 2025-03-25 RX ORDER — NAPROXEN 500 MG/1
500 TABLET ORAL 2 TIMES DAILY WITH MEALS
Qty: 60 TABLET | Refills: 0 | Status: SHIPPED | OUTPATIENT
Start: 2025-03-25 | End: 2025-04-24
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
## 2025-03-27 ENCOUNTER — CARE COORDINATION (OUTPATIENT)
Dept: CASE MANAGEMENT | Age: 70
End: 2025-03-27

## 2025-03-27 NOTE — CARE COORDINATION
Ambulatory Care Coordination Note     3/27/2025 4:19 PM     Patient Current Location:  Home: [id number]  [facility] [facility] [place] [address]     LPN CC contacted the patient by telephone. Verified name and  with patient as identifiers.         ACM: Natasha Burch LPN     Challenges to be reviewed by the provider   Additional needs identified to be addressed with provider No  none               Method of communication with provider: none.    Utilization: Has the patient been seen in the ED since your last call? Yes,   Discharge Date: 3/14/25   Discharge Facility: [facility]  Reason for ED Visit: Hand Pain   Visit Diagnosis: CMC arthritis     Number of ED visits in the last 6 months: 2      Do you have any ongoing symptoms? Yes, there has been no change in my symptoms.   Current symptoms: pain.    Did you call your PCP prior to going to the ED? No, did not call the PCP office.     Review of Discharge Instructions:   [] AVS discharge instructions  [] Right Care, Right Place, Right Time document  [] Medication changes  [] Follow up appointments  [] Referral follow up   []        Care Summary Note:   Patient reports he is doing okay. Still has pain in his hand. He is going to see Dr. Small next week. He has numbness in his hand and keeps dropping things. Good appetite and fluid intake. No elimination problems. No needs at this time.    Offered patient enrollment in the Remote Patient Monitoring (RPM) program for in-home monitoring: future call.     Assessments Completed:       Medications Reviewed:   Patient denies any changes with medications and reports taking all medications as prescribed.    Advance Care Planning:   Not on file     Care Planning:       PCP/Specialist follow up:   Future Appointments         Provider Specialty Dept Phone    2025 11:00 AM Leslie Small MD Orthopedic Surgery [phone]    4/10/2025 7:30 AM (Arrive by 7:15 AM) [facility] ECHO 1 Cardiology [phone]    4/10/2025 11:20

## 2025-04-04 ENCOUNTER — OFFICE VISIT (OUTPATIENT)
Dept: ORTHOPEDIC SURGERY | Age: 70
End: 2025-04-04

## 2025-04-04 VITALS — HEIGHT: 71 IN | WEIGHT: 264 LBS | BODY MASS INDEX: 36.96 KG/M2

## 2025-04-04 DIAGNOSIS — G56.01 RIGHT CARPAL TUNNEL SYNDROME: Primary | ICD-10-CM

## 2025-04-04 DIAGNOSIS — M18.11 ARTHRITIS OF CARPOMETACARPAL (CMC) JOINT OF RIGHT THUMB: ICD-10-CM
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
## 2025-04-07 ENCOUNTER — CARE COORDINATION (OUTPATIENT)
Dept: CASE MANAGEMENT | Age: 70
End: 2025-04-07

## 2025-04-07 NOTE — CARE COORDINATION
Ambulatory Care Coordination Note     2025 2:20 PM     Patient Current Location:  [facility] CC contacted the patient by telephone. Verified name and  with patient as identifiers.         ACM: Natasha Burch LPN     Challenges to be reviewed by the provider   Additional needs identified to be addressed with provider No  none               Method of communication with provider: none.    Utilization: Has the patient been seen in the ED since your last call? no    Care Summary Note:   Spoke with Rich. He stated his hand has gotten worse . He woke up with his first 3 fingers on his right hand were numb. He says this is worse than it has been. He had a visit with Dr. Small, hand surgeon. He denies swelling, dizziness, ha or lh. Good appetite and fluid intake. No needs.     Offered patient enrollment in the Remote Patient Monitoring (RPM) program for in-home monitoring: Deferred at this time because not home; will discuss at next outreach.     Assessments Completed:       Medications Reviewed:   Not completed during this call:      Advance Care Planning:   Not on file     Care Planning:       PCP/Specialist follow up:   Future Appointments         Provider Specialty Dept Phone    4/10/2025 7:30 AM (Arrive by 7:15 AM) [facility] ECHO 1 Cardiology [phone]    4/10/2025 11:20 AM Liane Anderson MD Family Medicine [phone]    2025 1:20 PM Nav Herron MD Pulmonology [phone]    2025 3:00 PM Liane Anderson MD Family Medicine [phone]            Follow Up:   Plan for next AC outreach in approximately 2 weeks to complete:  - disease specific assessments  - medication review   - advance care planning   - goal progression  - education .   Patient  is agreeable to this plan.

## 2025-04-10 ENCOUNTER — HOSPITAL ENCOUNTER (OUTPATIENT)
Age: 70
Discharge: HOME | End: 2025-04-12
Attending: INTERNAL MEDICINE
Payer: MEDICARE

## 2025-04-10 ENCOUNTER — OFFICE VISIT (OUTPATIENT)
Dept: FAMILY MEDICINE CLINIC | Age: 70
End: 2025-04-10

## 2025-04-10 ENCOUNTER — TELEPHONE (OUTPATIENT)
Dept: CARDIOLOGY CLINIC | Age: 70
End: 2025-04-10

## 2025-04-10 VITALS
BODY MASS INDEX: 36.96 KG/M2 | DIASTOLIC BLOOD PRESSURE: 79 MMHG | WEIGHT: 264 LBS | HEIGHT: 71 IN | SYSTOLIC BLOOD PRESSURE: 161 MMHG

## 2025-04-10 VITALS
BODY MASS INDEX: 36.96 KG/M2 | SYSTOLIC BLOOD PRESSURE: 120 MMHG | OXYGEN SATURATION: 98 % | HEIGHT: 71 IN | HEART RATE: 64 BPM | WEIGHT: 264 LBS | DIASTOLIC BLOOD PRESSURE: 60 MMHG

## 2025-04-10 DIAGNOSIS — E11.51 TYPE 2 DIABETES MELLITUS WITH DIABETIC PERIPHERAL ANGIOPATHY WITHOUT GANGRENE, WITH LONG-TERM CURRENT USE OF INSULIN (HCC): ICD-10-CM

## 2025-04-10 DIAGNOSIS — G89.29 CHRONIC BILATERAL LOW BACK PAIN WITH BILATERAL SCIATICA: ICD-10-CM

## 2025-04-10 DIAGNOSIS — G56.01 CARPAL TUNNEL SYNDROME OF RIGHT WRIST: ICD-10-CM

## 2025-04-10 DIAGNOSIS — F51.01 PRIMARY INSOMNIA: ICD-10-CM

## 2025-04-10 DIAGNOSIS — M54.41 CHRONIC BILATERAL LOW BACK PAIN WITH BILATERAL SCIATICA: ICD-10-CM

## 2025-04-10 DIAGNOSIS — G89.4 CHRONIC PAIN SYNDROME: ICD-10-CM

## 2025-04-10 DIAGNOSIS — Z01.810 PREOP CARDIOVASCULAR EXAM: Primary | ICD-10-CM

## 2025-04-10 DIAGNOSIS — M54.42 CHRONIC BILATERAL LOW BACK PAIN WITH BILATERAL SCIATICA: ICD-10-CM

## 2025-04-10 DIAGNOSIS — Z79.4 TYPE 2 DIABETES MELLITUS WITH DIABETIC PERIPHERAL ANGIOPATHY WITHOUT GANGRENE, WITH LONG-TERM CURRENT USE OF INSULIN (HCC): ICD-10-CM

## 2025-04-10 DIAGNOSIS — Z95.2 S/P TAVR (TRANSCATHETER AORTIC VALVE REPLACEMENT): ICD-10-CM

## 2025-04-10 DIAGNOSIS — I42.9 CARDIOMYOPATHY, UNSPECIFIED TYPE (HCC): ICD-10-CM

## 2025-04-10 LAB
ECHO AO ASC DIAM: 3.9 CM
ECHO AO ASCENDING AORTA INDEX: 1.65 CM/M2
ECHO AO ROOT DIAM: 3.5 CM
ECHO AO ROOT INDEX: 1.48 CM/M2
ECHO AR MAX VEL PISA: 1.8 M/S
ECHO AV ACCELERATION TIME: 67 MS
ECHO AV AREA PEAK VELOCITY: 1.7 CM2
ECHO AV AREA VTI: 1.6 CM2
ECHO AV AREA/BSA PEAK VELOCITY: 0.7 CM2/M2
ECHO AV AREA/BSA VTI: 0.7 CM2/M2
ECHO AV MEAN GRADIENT: 11 MMHG
ECHO AV MEAN VELOCITY: 1.6 M/S
ECHO AV PEAK GRADIENT: 19 MMHG
ECHO AV PEAK VELOCITY: 2.2 M/S
ECHO AV REGURGITANT PHT: 475 MS
ECHO AV VELOCITY RATIO: 0.36
ECHO AV VTI: 49.5 CM
ECHO BSA: 2.45 M2
ECHO IVC INSP: 0.7 CM
ECHO IVC PROX: 2 CM
ECHO LA AREA 2C: 28.2 CM2
ECHO LA AREA 4C: 18.3 CM2
ECHO LA DIAMETER INDEX: 2.19 CM/M2
ECHO LA DIAMETER: 5.2 CM
ECHO LA MAJOR AXIS: 5.4 CM
ECHO LA MINOR AXIS: 6.1 CM
ECHO LA TO AORTIC ROOT RATIO: 1.49
ECHO LA VOL BP: 76 ML (ref 18–58)
ECHO LA VOL MOD A2C: 102 ML (ref 18–58)
ECHO LA VOL MOD A4C: 50 ML (ref 18–58)
ECHO LA VOL/BSA BIPLANE: 32 ML/M2 (ref 16–34)
ECHO LA VOLUME INDEX MOD A2C: 43 ML/M2 (ref 16–34)
ECHO LA VOLUME INDEX MOD A4C: 21 ML/M2 (ref 16–34)
ECHO LV E' LATERAL VELOCITY: 5.22 CM/S
ECHO LV E' SEPTAL VELOCITY: 4.57 CM/S
ECHO LV EDV A2C: 134 ML
ECHO LV EDV A4C: 161 ML
ECHO LV EDV INDEX A4C: 68 ML/M2
ECHO LV EDV NDEX A2C: 57 ML/M2
ECHO LV EF PHYSICIAN: 43 %
ECHO LV EJECTION FRACTION A2C: 42 %
ECHO LV EJECTION FRACTION A4C: 39 %
ECHO LV EJECTION FRACTION BIPLANE: 40 % (ref 55–100)
ECHO LV ESV A2C: 78 ML
ECHO LV ESV A4C: 99 ML
ECHO LV ESV INDEX A2C: 33 ML/M2
ECHO LV ESV INDEX A4C: 42 ML/M2
ECHO LV FRACTIONAL SHORTENING: 17 % (ref 28–44)
ECHO LV INTERNAL DIMENSION DIASTOLE INDEX: 2.53 CM/M2
ECHO LV INTERNAL DIMENSION DIASTOLIC: 6 CM (ref 4.2–5.9)
ECHO LV INTERNAL DIMENSION SYSTOLIC INDEX: 2.11 CM/M2
ECHO LV INTERNAL DIMENSION SYSTOLIC: 5 CM
ECHO LV IVSD: 1.1 CM (ref 0.6–1)
ECHO LV MASS 2D: 279.6 G (ref 88–224)
ECHO LV MASS INDEX 2D: 118 G/M2 (ref 49–115)
ECHO LV POSTERIOR WALL DIASTOLIC: 1.1 CM (ref 0.6–1)
ECHO LV RELATIVE WALL THICKNESS RATIO: 0.37
ECHO LVOT AREA: 4.5 CM2
ECHO LVOT AV VTI INDEX: 0.36
ECHO LVOT DIAM: 2.4 CM
ECHO LVOT MEAN GRADIENT: 2 MMHG
ECHO LVOT PEAK GRADIENT: 3 MMHG
ECHO LVOT PEAK VELOCITY: 0.8 M/S
ECHO LVOT STROKE VOLUME INDEX: 34.3 ML/M2
ECHO LVOT SV: 81.4 ML
ECHO LVOT VTI: 18 CM
ECHO MV A VELOCITY: 0.48 M/S
ECHO MV AREA VTI: 2 CM2
ECHO MV E DECELERATION TIME (DT): 180 MS
ECHO MV E VELOCITY: 1.42 M/S
ECHO MV E/A RATIO: 2.96
ECHO MV E/E' LATERAL: 27.2
ECHO MV E/E' RATIO (AVERAGED): 29.14
ECHO MV E/E' SEPTAL: 31.07
ECHO MV LVOT VTI INDEX: 2.25
ECHO MV MAX VELOCITY: 1.3 M/S
ECHO MV MEAN GRADIENT: 3 MMHG
ECHO MV MEAN VELOCITY: 0.7 M/S
ECHO MV PEAK GRADIENT: 7 MMHG
ECHO MV REGURGITANT PEAK GRADIENT: 49 MMHG
ECHO MV REGURGITANT PEAK VELOCITY: 3.5 M/S
ECHO MV VTI: 40.5 CM
ECHO PV MAX VELOCITY: 0.8 M/S
ECHO PV MEAN GRADIENT: 2 MMHG
ECHO PV MEAN VELOCITY: 0.6 M/S
ECHO PV PEAK GRADIENT: 3 MMHG
ECHO PV VTI: 19.5 CM
ECHO RA AREA 4C: 20.7 CM2
ECHO RA END SYSTOLIC VOLUME APICAL 4 CHAMBER INDEX BSA: 24 ML/M2
ECHO RA VOLUME: 58 ML
ECHO RV BASAL DIMENSION: 4.1 CM
ECHO RV FREE WALL PEAK S': 11 CM/S
ECHO RV LONGITUDINAL DIMENSION: 7.8 CM
ECHO RV MID DIMENSION: 2.9 CM
ECHO RV TAPSE: 1.2 CM (ref 1.7–?)
ECHO TV REGURGITANT MAX VELOCITY: 1.57 M/S
ECHO TV REGURGITANT PEAK GRADIENT: 10 MMHG

## 2025-04-10 PROCEDURE — 93306 TTE W/DOPPLER COMPLETE: CPT

## 2025-04-10 RX ORDER — QUETIAPINE FUMARATE 25 MG/1
25 TABLET, FILM COATED ORAL NIGHTLY
Qty: 30 TABLET | Refills: 3 | Status: SHIPPED | OUTPATIENT
Start: 2025-04-10

## 2025-04-10 RX ORDER — MELOXICAM 15 MG/1
15 TABLET ORAL DAILY
COMMUNITY
Start: 2025-03-19

## 2025-04-10 RX ORDER — PEN NEEDLE, DIABETIC 29 G X1/2"
NEEDLE, DISPOSABLE MISCELLANEOUS
Qty: 100 EACH | Refills: 5 | Status: SHIPPED | OUTPATIENT
Start: 2025-04-10

## 2025-04-10 RX ORDER — GABAPENTIN 400 MG/1
CAPSULE ORAL
Qty: 270 CAPSULE | Refills: 0 | Status: SHIPPED | OUTPATIENT
Start: 2025-04-10 | End: 2025-07-09

## 2025-04-10 RX ORDER — INSULIN GLARGINE 100 [IU]/ML
INJECTION, SOLUTION SUBCUTANEOUS
Qty: 70 ML | Refills: 0 | Status: SHIPPED | OUTPATIENT
Start: 2025-04-10

## 2025-04-10 NOTE — PROGRESS NOTES
release tablet Take 1 tablet by mouth daily Yes Akshat Lewis MD   furosemide (LASIX) 40 MG tablet Take 1 tablet by mouth daily Yes Akshat Lewis MD   Insulin Syringe-Needle U-100 (INSULIN SYRINGE 1CC/30GX5/16\") 30G X 5/16\" 1 ML MISC USE DAILY Yes Suzan Castillo, APRN - CNP   folic acid (FOLVITE) 1 MG tablet TAKE 1 TABLET BY MOUTH EVERY DAY. EXCEPT FOR DAY TAKING METHOTREXATE Yes August Goetz MD   methotrexate (RHEUMATREX) 2.5 MG chemo tablet  Yes August Goetz MD   Lancets MISC 1 each by Does not apply route 2 times daily Yes Liane Anderson MD   entecavir (BARACLUDE) 1 MG tablet Take 1 tablet by mouth daily Yes Liane Anderson MD   glucose blood VI test strips (ASCENSIA AUTODISC VI;ONE TOUCH ULTRA TEST VI) strip Apply 1 each topically 3 times daily. Onetouch Ultra 2 test strips  Dx: 250.00 Yes Liane Anderson MD   ONETOUCH DELICA LANCETS MISC 1 each by Does not apply route 3 times daily. Yes Liane Anderson MD   metFORMIN (GLUCOPHAGE-XR) 500 MG extended release tablet Take 2 tablets by mouth in the morning and at bedtime Indications: Restart 3/2/23 2 a day  Liane Anderson MD       HISTORY:  Patient's medications, allergies, past medical, surgical, social and family histories were reviewed and updated as appropriate (See above).     Objective:   PHYSICAL EXAM  /60 (BP Site: Left Upper Arm, Patient Position: Sitting, BP Cuff Size: Large Adult)   Pulse 64   Ht 1.803 m (5' 11\")   Wt 119.7 kg (264 lb)   SpO2 98%   BMI 36.82 kg/m²   BP Readings from Last 5 Encounters:   04/10/25 120/60   04/10/25 (!) 161/79   03/14/25 (!) 149/91   03/10/25 120/60   02/06/25 132/70     Wt Readings from Last 5 Encounters:   04/10/25 119.7 kg (264 lb)   04/10/25 119.7 kg (264 lb)   04/04/25 119.7 kg (264 lb)   03/25/25 119.7 kg (264 lb)   03/14/25 119.7 kg (264 lb)      GENERAL:   obese, alert, no distress.     EYES:   External findings: lids and lashes normal and conjunctivae and sclerae

## 2025-04-10 NOTE — TELEPHONE ENCOUNTER
Per Dr. Simpson, \"He may proceed with the hand surgery  I prefer him to remain on aspirin because he is status post TAVR\"    Please notify and prepare letter if necessary. Thank you.

## 2025-04-10 NOTE — TELEPHONE ENCOUNTER
Dr. Simpson, please review and advise for a cardiac clearance. Thank you.     Patient will be undergoing right hand surgery/carpal tunnel. Not yet scheduled. He is on ASA but they did not request to hold this. Please advise if it is okay to hold ASA for 5-7 days if necessary. History of CABG in 2019, [facility] 8/2024 with patent grafts, TAVR in 2022. Diastolic HF - had echocardiogram today that is currently in process. Last OV 3/10/25.

## 2025-04-10 NOTE — TELEPHONE ENCOUNTER
CARDIAC CLEARANCE REQUEST    What type of procedure are you having:  Right hand surgery - maybe carpel tunnel     Are you taking any blood thinners:  no blood thinners     When is your procedure scheduled for:  not yet schedule until after a more tests are done    What physician is performing your procedure:  Dr. Leslie Small with [facility]     Phone Number:  [phone]    Fax number to send the letter:  [phone]    Saw Dr. Simpson on 3/10/2025

## 2025-04-10 NOTE — PATIENT INSTRUCTIONS
INSTRUCTIONS  HOLD meloxicam and cialis for one week before surgery.  Night before surgery only talk half of usual Lantus dose.  AM of surgery take the following with a sip of water: carvedilol  Get cardiac clearance from cardiology.

## 2025-04-14 ENCOUNTER — CARE COORDINATION (OUTPATIENT)
Dept: CASE MANAGEMENT | Age: 70
End: 2025-04-14

## 2025-04-14 NOTE — CARE COORDINATION
Ambulatory Care Coordination Note     2025 9:14 AM     Patient Current Location:  [place]: [id number]  [place] [address]     LPN CC contacted the patient by telephone. Verified name and  with patient as identifiers.         ACM: Natasha Burch LPN     Challenges to be reviewed by the provider   Additional needs identified to be addressed with provider No  none               Method of communication with provider: none.    Utilization: Has the patient been seen in the ED since your last call? no    Care Summary Note:   Patient reports he is wearing a brace on his right hand. His thumb is numb all the time. He has lost  in his hand. Patient has had an ECHO done for surgery clearance. He will have nerve testing done next. Denies ha, lh, dizziness, sob or swelling. Good appetite and fluid intake. . No urinary or bowel issues. No questions, needs or concerns at this time.      Offered patient enrollment in the Remote Patient Monitoring (RPM) program for in-home monitoring: Yes, but did not enroll at this time: controlled chronic disease management.     Assessments Completed:       Medications Reviewed:   Patient denies any changes with medications and reports taking all medications as prescribed.    Advance Care Planning:   Not on file     Care Planning:     PCP/Specialist follow up:   Future Appointments         Provider Specialty Dept Phone    2025 1:20 PM Nav Herron MD Pulmonology [phone]    2025 3:00 PM Liane Anderson MD Family Medicine [phone]            Follow Up:   Plan for next [facility] outreach in approximately 1 week to complete:  - disease specific assessments  - advance care planning   - goal progression  - education .   Patient  is agreeable to this plan.

## 2025-04-16 ENCOUNTER — TELEPHONE (OUTPATIENT)
Dept: FAMILY MEDICINE CLINIC | Age: 70
End: 2025-04-16

## 2025-04-16 DIAGNOSIS — E11.51 TYPE 2 DIABETES MELLITUS WITH DIABETIC PERIPHERAL ANGIOPATHY WITHOUT GANGRENE, WITH LONG-TERM CURRENT USE OF INSULIN (HCC): ICD-10-CM

## 2025-04-16 DIAGNOSIS — Z79.4 TYPE 2 DIABETES MELLITUS WITH DIABETIC PERIPHERAL ANGIOPATHY WITHOUT GANGRENE, WITH LONG-TERM CURRENT USE OF INSULIN (HCC): ICD-10-CM

## 2025-04-16 RX ORDER — SEMAGLUTIDE 2.68 MG/ML
2 INJECTION, SOLUTION SUBCUTANEOUS
Qty: 3 ML | Refills: 3 | Status: SHIPPED | OUTPATIENT
Start: 2025-04-16 | End: 2025-04-16 | Stop reason: DRUGHIGH

## 2025-04-16 RX ORDER — SEMAGLUTIDE 1.34 MG/ML
INJECTION, SOLUTION SUBCUTANEOUS
Qty: 1 ADJUSTABLE DOSE PRE-FILLED PEN SYRINGE | Refills: 0 | Status: SHIPPED | OUTPATIENT
Start: 2025-04-16 | End: 2025-05-21

## 2025-04-16 NOTE — TELEPHONE ENCOUNTER
Pt calling in, would like to restart med. Needs a PA and sent into [email or web address].    Please advise  Pt can be reached at [phone]

## 2025-04-18 ENCOUNTER — TELEPHONE (OUTPATIENT)
Dept: ORTHOPEDIC SURGERY | Age: 70
End: 2025-04-18

## 2025-04-18 DIAGNOSIS — M18.11 ARTHRITIS OF CARPOMETACARPAL (CMC) JOINT OF RIGHT THUMB: Primary | ICD-10-CM

## 2025-04-18 DIAGNOSIS — G56.01 RIGHT CARPAL TUNNEL SYNDROME: ICD-10-CM

## 2025-04-18 RX ORDER — MELOXICAM 15 MG/1
15 TABLET ORAL DAILY
Qty: 30 TABLET | Refills: 0 | Status: SHIPPED | OUTPATIENT
Start: 2025-04-18

## 2025-04-18 NOTE — TELEPHONE ENCOUNTER
Prescription Refill      Medication Name: meloxicam  [facility]: [facility]   Patient Contact Number: [phone]

## 2025-04-18 NOTE — TELEPHONE ENCOUNTER
Starter dose of what medication? Please advise.     If this requires a response please respond to the pool. (P [facility] MEDICINE Pre-Auth).    Please advise patient thank you.

## 2025-04-21 ENCOUNTER — OFFICE VISIT (OUTPATIENT)
Dept: ORTHOPEDIC SURGERY | Age: 70
End: 2025-04-21
Payer: MEDICARE

## 2025-04-21 ENCOUNTER — PREP FOR PROCEDURE (OUTPATIENT)
Dept: ORTHOPEDIC SURGERY | Age: 70
End: 2025-04-21

## 2025-04-21 VITALS — HEIGHT: 71 IN | BODY MASS INDEX: 36.96 KG/M2 | WEIGHT: 264 LBS

## 2025-04-21 DIAGNOSIS — G56.01 CARPAL TUNNEL SYNDROME ON RIGHT: ICD-10-CM

## 2025-04-21 DIAGNOSIS — M19.041 DEGENERATIVE ARTHRITIS OF METACARPOPHALANGEAL JOINT OF RIGHT THUMB: ICD-10-CM

## 2025-04-21 DIAGNOSIS — G56.01 RIGHT CARPAL TUNNEL SYNDROME: Primary | ICD-10-CM

## 2025-04-21 DIAGNOSIS — M18.11 ARTHRITIS OF CARPOMETACARPAL (CMC) JOINT OF RIGHT THUMB: ICD-10-CM

## 2025-04-21 PROCEDURE — 1125F AMNT PAIN NOTED PAIN PRSNT: CPT | Performed by: STUDENT IN AN ORGANIZED HEALTH CARE EDUCATION/TRAINING PROGRAM

## 2025-04-21 PROCEDURE — 99214 OFFICE O/P EST MOD 30 MIN: CPT | Performed by: STUDENT IN AN ORGANIZED HEALTH CARE EDUCATION/TRAINING PROGRAM

## 2025-04-21 PROCEDURE — 1159F MED LIST DOCD IN RCRD: CPT | Performed by: STUDENT IN AN ORGANIZED HEALTH CARE EDUCATION/TRAINING PROGRAM

## 2025-04-21 PROCEDURE — 1123F ACP DISCUSS/DSCN MKR DOCD: CPT | Performed by: STUDENT IN AN ORGANIZED HEALTH CARE EDUCATION/TRAINING PROGRAM

## 2025-04-21 NOTE — PROGRESS NOTES
Hand, Upper Extremity and Reconstructive Surgery                Leslie Small MD                                             History of Present Illness  Interval update 4/21/25: Patient presents for EMG review and follow-up.  He continues to have severe thumb pain.  He also has numbness in his whole hand.  It was originally just in the thumb, index and long finger but now it is extending to his small and ring finger as well.    History-  The patient is a 70-year-old right-hand dominant male presenting with right thumb arthralgia.  Patient has had right thumb pain for at least 5 years.  He has tried a brace.  He is doing conservative management with naproxen and meloxicam.  He has history of rheumatoid arthritis on methotrexate.  He also has a history of psoriasis.  Patient reports numbness and tingling in a median nerve distribution most prominent late at night.  Has had negative reactions to steroid injections in the past with worsening of his psoriasis the patient reports.  Patient is seen and managed by rheumatology as well.  Patient has a history of diabetes with last hemoglobin A1c of 6.6 on 3/6/2025    SOCIAL HISTORY  Marital Status:   Occupations:  but currently retired                                                                                           Current Outpatient Medications   Medication Instructions    Aspirin Low Dose 81 mg, Oral, DAILY    blood glucose monitor kit and supplies Accu check. Dispense sufficient amount for four times daily  testing frequency plus additional to accommodate PRN testing needs. Dispense all needed supplies to include: monitor, strips, lancing device, lancets, control solutions, alcohol swabs.    blood glucose test strips (ACCU-CHEK GUIDE) strip Test 4 times a day    carvedilol (COREG) 6.25 mg, Oral, 2 TIMES DAILY    entecavir (BARACLUDE) 1 mg, DAILY    folic acid (FOLVITE) 1 MG tablet TAKE 1 TABLET BY MOUTH EVERY

## 2025-04-21 NOTE — TELEPHONE ENCOUNTER
Submitted PA for Ozempic (0.25 or 0.5 MG/DOSE) 2MG/3ML pen-injectors  Via CMM  Key: [id number] STATUS: CMM erased my case.    RESubmitted PA for Ozempic (0.25 or 0.5 MG/DOSE) 2MG/3ML pen-injectors , via Phone Call to [facility].  Status:  No PA required. Patient picked up medication on 04/19/2025.    If this requires a response please respond to the pool. (P [facility] MEDICINE Pre-Auth).    Please advise patient thank you.

## 2025-04-22 ENCOUNTER — CARE COORDINATION (OUTPATIENT)
Dept: CASE MANAGEMENT | Age: 70
End: 2025-04-22

## 2025-04-22 NOTE — CARE COORDINATION
Ambulatory Care Coordination Note     4/22/2025 1:26 PM     Patient outreach attempt by this ACM today to perform care management follow up . ACM was unable to reach the patient by telephone today;   left voice message requesting a return phone call to this ACM.     ACM: Natasha Burch LPN     Care Summary Note:     PCP/Specialist follow up:   Future Appointments         Provider Specialty Dept Phone    5/14/2025 10:15 AM Leslie Small MD Orthopedic Surgery [phone]    5/14/2025 1:20 PM Nav Herron MD Pulmonology [phone]    7/7/2025 3:00 PM Liane Anderson MD Family Medicine [phone]            Follow Up:   Plan for next AC outreach in approximately 1 week to complete:  - disease specific assessments  - advance care planning  - goal progression  - education .

## 2025-04-23 NOTE — PROGRESS NOTES
Rich Stover    Age 70 y.o.    male    1955    N [id number]    5/1/2025  Arrival Time_____________  OR Time____________170 Min     Procedure(s):  RIGHT CARPAL TUNNEL RELEASE  RIGHT THUMB METACARPOPHALANGEAL ARTHRODESIS, TRAPEZIECTOMY AND SUSPENSIONPLASTY  NOTE: BLOCK                      Monitor Anesthesia Care    Surgeon(s):  Leslie Small, MD       Phone [phone] ([place])     [facility]  Date  Info Source  Home  Cell         Work  _____________________________________________________________________  _____________________________________________________________________  _____________________________________________________________________  _____________________________________________________________________  _____________________________________________________________________    PCP _____________________________ Phone_________________     H&P  ________________  Bringing      Chart              Epic      DOS      Called________  EKG ________________   Bringing      Chart              Epic      DOS      Called________  LABS________________   Bringing     Chart              Epic      DOS      Called________  Cardiac Clearance ______ Bringing      Chart              Epic      DOS      Called________  Pulmonary Clearance____ Bringing      Chart              Epic      DOS      Called________    Cardiologist________________________ Phone___________________________  Pulmonologist_______________________Phone___________________________    ? Advance Directives   ? Mormon concerns / Waiver on Chart            PAT Communications________________  ? Pre-op Instructions Given /Understood          _________________________________  ? Directions to Surgery Center                          _________________________________  ? Transportation Home_______________      __________________________________  ? Crutches/Walker__________________        __________________________________    Orders: Hard copy/ EPIC

## 2025-04-25 ENCOUNTER — ANESTHESIA EVENT (OUTPATIENT)
Dept: OPERATING ROOM | Age: 70
End: 2025-04-25
Payer: MEDICARE

## 2025-04-28 ENCOUNTER — TELEPHONE (OUTPATIENT)
Dept: ORTHOPEDIC SURGERY | Age: 70
End: 2025-04-28

## 2025-04-28 NOTE — TELEPHONE ENCOUNTER
PT WANTS TO KNOW IF ITS OK TO TAKE OZEMPIC, AND methotrexate PRIOR TO SURGERY. PT CAN BE REACHED [phone]

## 2025-04-29 ENCOUNTER — CARE COORDINATION (OUTPATIENT)
Dept: CARE COORDINATION | Age: 70
End: 2025-04-29

## 2025-04-29 NOTE — CARE COORDINATION
Ambulatory Care Coordination Note     4/29/2025 12:01 PM     ACM outreach attempt by this ACM today to perform care management follow up . ACM was unable to reach the patient by telephone today;   Pt was working in the yard.     ACM: Evelin Levi, RN     Care Summary Note: ACM spoke to spouse who states that pt is doing well.  Pt to have surgery on hand on 05/01.  ACM will f/u post surgery and if pt is doing well, ACM will sign off.    PCP/Specialist follow up:   Future Appointments         Provider Specialty Dept Phone    5/14/2025 10:15 AM Leslie Small MD Orthopedic Surgery [phone]    5/14/2025 1:20 PM Nav Herron MD Pulmonology [phone]    7/7/2025 3:00 PM Liane Anderson MD Family Medicine [phone]            Follow Up:   Plan for next ACM outreach in approximately 3 weeks to complete:  F/u post hand surgery on 05/01; graduation if pt doing well. .

## 2025-04-29 NOTE — PROGRESS NOTES
Date and time of surgery : 05/01/2025 10:05 am             Arrival Time:  8:05 am     Bring Picture ID and insurance card.  Please wear simple, loose fitting clothing to the hospital.   Do not bring valuables (money, credit cards, checkbooks, etc.)   Do not wear any makeup (including  eye makeup) and no nail polish or artificial nails on your fingers or toes.  DO NOT wear any jewelry or piercings on day of surgery.  All body piercing jewelry must be removed.  If you have dentures, they will be removed before going to the OR; we will provide you a container.  If you wear contact lenses or glasses, they will be removed; please bring a case for them.  Shower the evening before or morning of surgery with antibacterial soap.  Nothing to eat or drink after midnight the day before surgery.   You may brush your teeth and gargle the morning of surgery.  DO NOT SWALLOW WATER.   Do not take any morning meds the day of your surgery.  Ok to take carvedilol, rosuvastatin, and aspirin (per cardiology) with a sip of water morning of surgery.    Ibuprofen, Advil, Naproxen, Vitamin E and other Anti-inflammatory products and supplements should be stopped for 5 -7days before surgery or as directed by your physician.  Continue to hold semaglutide until directed to restart.   Do not smoke or drink any alcoholic beverages 24 hours prior to surgery.  This includes NA Beer. Refrain from the usage of any recreational drugs, including non-prescribed prescription drugs.   You MUST plan for a responsible adult to stay on site while you are here and take you home after your surgery. You will not be allowed to leave alone or drive yourself home. It is strongly suggested someone stay with you the first 24 hrs. Your surgery will be cancelled if you do not have a ride home.  To help prevent infection, change your sheets the night before surgery.   If you  have a Living Will and Durable Power of  for Healthcare, please bring in a

## 2025-05-01 ENCOUNTER — ANESTHESIA (OUTPATIENT)
Dept: OPERATING ROOM | Age: 70
End: 2025-05-01
Payer: MEDICARE

## 2025-05-01 ENCOUNTER — HOSPITAL ENCOUNTER (OUTPATIENT)
Age: 70
Setting detail: OUTPATIENT SURGERY
Discharge: HOME | End: 2025-05-01
Attending: STUDENT IN AN ORGANIZED HEALTH CARE EDUCATION/TRAINING PROGRAM | Admitting: STUDENT IN AN ORGANIZED HEALTH CARE EDUCATION/TRAINING PROGRAM
Payer: MEDICARE

## 2025-05-01 ENCOUNTER — APPOINTMENT (OUTPATIENT)
Dept: GENERAL RADIOLOGY | Age: 70
End: 2025-05-01
Attending: STUDENT IN AN ORGANIZED HEALTH CARE EDUCATION/TRAINING PROGRAM
Payer: MEDICARE

## 2025-05-01 VITALS
HEIGHT: 71 IN | DIASTOLIC BLOOD PRESSURE: 56 MMHG | OXYGEN SATURATION: 95 % | WEIGHT: 258 LBS | BODY MASS INDEX: 36.12 KG/M2 | RESPIRATION RATE: 14 BRPM | SYSTOLIC BLOOD PRESSURE: 118 MMHG | HEART RATE: 64 BPM | TEMPERATURE: 98.2 F

## 2025-05-01 DIAGNOSIS — M18.11 PRIMARY OSTEOARTHRITIS OF FIRST CARPOMETACARPAL JOINT OF RIGHT HAND: Primary | ICD-10-CM

## 2025-05-01 LAB
GLUCOSE BLD-MCNC: 80 MG/DL (ref 70–99)
GLUCOSE BLD-MCNC: 96 MG/DL (ref 70–99)
PERFORMED ON: NORMAL
PERFORMED ON: NORMAL

## 2025-05-01 PROCEDURE — 3600000004 HC SURGERY LEVEL 4 BASE: Performed by: STUDENT IN AN ORGANIZED HEALTH CARE EDUCATION/TRAINING PROGRAM

## 2025-05-01 PROCEDURE — 3700000001 HC ADD 15 MINUTES (ANESTHESIA): Performed by: STUDENT IN AN ORGANIZED HEALTH CARE EDUCATION/TRAINING PROGRAM

## 2025-05-01 PROCEDURE — 7100000011 HC PHASE II RECOVERY - ADDTL 15 MIN: Performed by: STUDENT IN AN ORGANIZED HEALTH CARE EDUCATION/TRAINING PROGRAM

## 2025-05-01 PROCEDURE — 25448 ARTHRP NTRCRPL/CRP/MTCRP SSP: CPT | Performed by: STUDENT IN AN ORGANIZED HEALTH CARE EDUCATION/TRAINING PROGRAM

## 2025-05-01 PROCEDURE — 2720000010 HC SURG SUPPLY STERILE: Performed by: STUDENT IN AN ORGANIZED HEALTH CARE EDUCATION/TRAINING PROGRAM

## 2025-05-01 PROCEDURE — 2709999900 HC NON-CHARGEABLE SUPPLY: Performed by: STUDENT IN AN ORGANIZED HEALTH CARE EDUCATION/TRAINING PROGRAM

## 2025-05-01 PROCEDURE — 2580000003 HC RX 258: Performed by: NURSE ANESTHETIST, CERTIFIED REGISTERED

## 2025-05-01 PROCEDURE — 73130 X-RAY EXAM OF HAND: CPT

## 2025-05-01 PROCEDURE — 26850 FUSION OF KNUCKLE: CPT | Performed by: STUDENT IN AN ORGANIZED HEALTH CARE EDUCATION/TRAINING PROGRAM

## 2025-05-01 PROCEDURE — 6360000002 HC RX W HCPCS: Performed by: NURSE ANESTHETIST, CERTIFIED REGISTERED

## 2025-05-01 PROCEDURE — C1713 ANCHOR/SCREW BN/BN,TIS/BN: HCPCS | Performed by: STUDENT IN AN ORGANIZED HEALTH CARE EDUCATION/TRAINING PROGRAM

## 2025-05-01 PROCEDURE — 64415 NJX AA&/STRD BRCH PLXS IMG: CPT | Performed by: ANESTHESIOLOGY

## 2025-05-01 PROCEDURE — 2580000003 HC RX 258: Performed by: ANESTHESIOLOGY

## 2025-05-01 PROCEDURE — 6360000002 HC RX W HCPCS: Performed by: STUDENT IN AN ORGANIZED HEALTH CARE EDUCATION/TRAINING PROGRAM

## 2025-05-01 PROCEDURE — C1776 JOINT DEVICE (IMPLANTABLE): HCPCS | Performed by: STUDENT IN AN ORGANIZED HEALTH CARE EDUCATION/TRAINING PROGRAM

## 2025-05-01 PROCEDURE — 2580000003 HC RX 258: Performed by: STUDENT IN AN ORGANIZED HEALTH CARE EDUCATION/TRAINING PROGRAM

## 2025-05-01 PROCEDURE — 2500000003 HC RX 250 WO HCPCS: Performed by: NURSE ANESTHETIST, CERTIFIED REGISTERED

## 2025-05-01 PROCEDURE — 3600000014 HC SURGERY LEVEL 4 ADDTL 15MIN: Performed by: STUDENT IN AN ORGANIZED HEALTH CARE EDUCATION/TRAINING PROGRAM

## 2025-05-01 PROCEDURE — 6370000000 HC RX 637 (ALT 250 FOR IP): Performed by: ANESTHESIOLOGY

## 2025-05-01 PROCEDURE — 64721 CARPAL TUNNEL SURGERY: CPT | Performed by: STUDENT IN AN ORGANIZED HEALTH CARE EDUCATION/TRAINING PROGRAM

## 2025-05-01 PROCEDURE — 6370000000 HC RX 637 (ALT 250 FOR IP): Performed by: STUDENT IN AN ORGANIZED HEALTH CARE EDUCATION/TRAINING PROGRAM

## 2025-05-01 PROCEDURE — 7100000010 HC PHASE II RECOVERY - FIRST 15 MIN: Performed by: STUDENT IN AN ORGANIZED HEALTH CARE EDUCATION/TRAINING PROGRAM

## 2025-05-01 PROCEDURE — 3700000000 HC ANESTHESIA ATTENDED CARE: Performed by: STUDENT IN AN ORGANIZED HEALTH CARE EDUCATION/TRAINING PROGRAM

## 2025-05-01 PROCEDURE — C1769 GUIDE WIRE: HCPCS | Performed by: STUDENT IN AN ORGANIZED HEALTH CARE EDUCATION/TRAINING PROGRAM

## 2025-05-01 DEVICE — 2.1MM X 16.0MM CRUCIFORM SCREW
Type: IMPLANTABLE DEVICE | Site: THUMB | Status: FUNCTIONAL
Brand: ACUMED

## 2025-05-01 DEVICE — 2.7MM X 12.0MM CRUCIFORM SCREW
Type: IMPLANTABLE DEVICE | Site: THUMB | Status: FUNCTIONAL
Brand: ACUMED

## 2025-05-01 DEVICE — 22MM, 2.5 MICRO ACUTRAK 3® BONE SCREW
Type: IMPLANTABLE DEVICE | Site: THUMB | Status: FUNCTIONAL
Brand: ACUMED

## 2025-05-01 DEVICE — 2.7MM X 14.0MM CRUCIFORM SCREW
Type: IMPLANTABLE DEVICE | Site: THUMB | Status: FUNCTIONAL
Brand: ACUMED

## 2025-05-01 DEVICE — 1ST MCP FUSION PLATE, RIGHT
Type: IMPLANTABLE DEVICE | Site: THUMB | Status: FUNCTIONAL
Brand: ACUMED

## 2025-05-01 DEVICE — H/W INTERNALBRACE LGMNT AUGMNT REPR KIT
Type: IMPLANTABLE DEVICE | Site: THUMB | Status: FUNCTIONAL
Brand: ARTHREX®

## 2025-05-01 DEVICE — 2.1MM X 8.0MM CRUCIFORM SCREW
Type: IMPLANTABLE DEVICE | Site: THUMB | Status: FUNCTIONAL
Brand: ACUMED

## 2025-05-01 RX ORDER — SODIUM CHLORIDE 0.9 % (FLUSH) 0.9 %
5-40 SYRINGE (ML) INJECTION EVERY 12 HOURS SCHEDULED
Status: DISCONTINUED | OUTPATIENT
Start: 2025-05-01 | End: 2025-05-01 | Stop reason: HOSPADM

## 2025-05-01 RX ORDER — SODIUM CHLORIDE 0.9 % (FLUSH) 0.9 %
5-40 SYRINGE (ML) INJECTION PRN
Status: DISCONTINUED | OUTPATIENT
Start: 2025-05-01 | End: 2025-05-01 | Stop reason: HOSPADM

## 2025-05-01 RX ORDER — ONDANSETRON 2 MG/ML
4 INJECTION INTRAMUSCULAR; INTRAVENOUS
Status: DISCONTINUED | OUTPATIENT
Start: 2025-05-01 | End: 2025-05-01 | Stop reason: HOSPADM

## 2025-05-01 RX ORDER — OXYCODONE HYDROCHLORIDE 5 MG/1
10 TABLET ORAL PRN
Status: COMPLETED | OUTPATIENT
Start: 2025-05-01 | End: 2025-05-01

## 2025-05-01 RX ORDER — DIPHENHYDRAMINE HYDROCHLORIDE 50 MG/ML
12.5 INJECTION, SOLUTION INTRAMUSCULAR; INTRAVENOUS
Status: DISCONTINUED | OUTPATIENT
Start: 2025-05-01 | End: 2025-05-01 | Stop reason: HOSPADM

## 2025-05-01 RX ORDER — PHENYLEPHRINE HCL IN 0.9% NACL 1 MG/10 ML
SYRINGE (ML) INTRAVENOUS
Status: DISCONTINUED | OUTPATIENT
Start: 2025-05-01 | End: 2025-05-01 | Stop reason: SDUPTHER

## 2025-05-01 RX ORDER — OXYCODONE HYDROCHLORIDE 5 MG/1
5 TABLET ORAL PRN
Status: COMPLETED | OUTPATIENT
Start: 2025-05-01 | End: 2025-05-01

## 2025-05-01 RX ORDER — EPHEDRINE SULFATE 50 MG/ML
INJECTION, SOLUTION INTRAVENOUS
Status: DISCONTINUED | OUTPATIENT
Start: 2025-05-01 | End: 2025-05-01 | Stop reason: SDUPTHER

## 2025-05-01 RX ORDER — MIDAZOLAM HYDROCHLORIDE 1 MG/ML
INJECTION, SOLUTION INTRAMUSCULAR; INTRAVENOUS
Status: COMPLETED | OUTPATIENT
Start: 2025-05-01 | End: 2025-05-01

## 2025-05-01 RX ORDER — DROPERIDOL 2.5 MG/ML
0.62 INJECTION, SOLUTION INTRAMUSCULAR; INTRAVENOUS
Status: DISCONTINUED | OUTPATIENT
Start: 2025-05-01 | End: 2025-05-01 | Stop reason: HOSPADM

## 2025-05-01 RX ORDER — BUPIVACAINE HYDROCHLORIDE 5 MG/ML
INJECTION, SOLUTION EPIDURAL; INTRACAUDAL; PERINEURAL
Status: COMPLETED | OUTPATIENT
Start: 2025-05-01 | End: 2025-05-01

## 2025-05-01 RX ORDER — LABETALOL HYDROCHLORIDE 5 MG/ML
10 INJECTION, SOLUTION INTRAVENOUS
Status: DISCONTINUED | OUTPATIENT
Start: 2025-05-01 | End: 2025-05-01 | Stop reason: HOSPADM

## 2025-05-01 RX ORDER — SODIUM CHLORIDE, SODIUM LACTATE, POTASSIUM CHLORIDE, CALCIUM CHLORIDE 600; 310; 30; 20 MG/100ML; MG/100ML; MG/100ML; MG/100ML
INJECTION, SOLUTION INTRAVENOUS CONTINUOUS
Status: DISCONTINUED | OUTPATIENT
Start: 2025-05-01 | End: 2025-05-01 | Stop reason: HOSPADM

## 2025-05-01 RX ORDER — MIDAZOLAM HYDROCHLORIDE 1 MG/ML
2 INJECTION, SOLUTION INTRAMUSCULAR; INTRAVENOUS
Status: DISCONTINUED | OUTPATIENT
Start: 2025-05-01 | End: 2025-05-01 | Stop reason: HOSPADM

## 2025-05-01 RX ORDER — OXYCODONE AND ACETAMINOPHEN 5; 325 MG/1; MG/1
1 TABLET ORAL EVERY 6 HOURS PRN
Qty: 24 TABLET | Refills: 0 | Status: SHIPPED | OUTPATIENT
Start: 2025-05-01 | End: 2025-05-08

## 2025-05-01 RX ORDER — NALOXONE HYDROCHLORIDE 0.4 MG/ML
INJECTION, SOLUTION INTRAMUSCULAR; INTRAVENOUS; SUBCUTANEOUS PRN
Status: DISCONTINUED | OUTPATIENT
Start: 2025-05-01 | End: 2025-05-01 | Stop reason: HOSPADM

## 2025-05-01 RX ORDER — FENTANYL CITRATE 50 UG/ML
INJECTION, SOLUTION INTRAMUSCULAR; INTRAVENOUS
Status: DISCONTINUED | OUTPATIENT
Start: 2025-05-01 | End: 2025-05-01 | Stop reason: SDUPTHER

## 2025-05-01 RX ORDER — SODIUM CHLORIDE 9 MG/ML
INJECTION, SOLUTION INTRAVENOUS PRN
Status: DISCONTINUED | OUTPATIENT
Start: 2025-05-01 | End: 2025-05-01 | Stop reason: HOSPADM

## 2025-05-01 RX ORDER — LIDOCAINE HYDROCHLORIDE 10 MG/ML
INJECTION, SOLUTION INFILTRATION; PERINEURAL
Status: COMPLETED | OUTPATIENT
Start: 2025-05-01 | End: 2025-05-01

## 2025-05-01 RX ORDER — LIDOCAINE HYDROCHLORIDE 10 MG/ML
1 INJECTION, SOLUTION EPIDURAL; INFILTRATION; INTRACAUDAL; PERINEURAL
Status: DISCONTINUED | OUTPATIENT
Start: 2025-05-01 | End: 2025-05-01 | Stop reason: HOSPADM

## 2025-05-01 RX ORDER — PROPOFOL 10 MG/ML
INJECTION, EMULSION INTRAVENOUS
Status: DISCONTINUED | OUTPATIENT
Start: 2025-05-01 | End: 2025-05-01 | Stop reason: SDUPTHER

## 2025-05-01 RX ADMIN — DEXMEDETOMIDINE HYDROCHLORIDE 4 MCG: 100 INJECTION, SOLUTION INTRAVENOUS at 10:37

## 2025-05-01 RX ADMIN — Medication 100 MCG: at 11:30

## 2025-05-01 RX ADMIN — OXYCODONE 10 MG: 5 TABLET ORAL at 15:23

## 2025-05-01 RX ADMIN — BUPIVACAINE HYDROCHLORIDE 30 ML: 5 INJECTION, SOLUTION EPIDURAL; INTRACAUDAL; PERINEURAL at 09:00

## 2025-05-01 RX ADMIN — Medication 20 MG: at 10:37

## 2025-05-01 RX ADMIN — Medication 200 MCG: at 11:14

## 2025-05-01 RX ADMIN — EPHEDRINE SULFATE 10 MG: 50 INJECTION, SOLUTION INTRAVENOUS at 11:30

## 2025-05-01 RX ADMIN — SODIUM CHLORIDE: 9 INJECTION, SOLUTION INTRAVENOUS at 10:29

## 2025-05-01 RX ADMIN — Medication 2 AMPULE: at 08:43

## 2025-05-01 RX ADMIN — FENTANYL CITRATE 25 MCG: 50 INJECTION, SOLUTION INTRAMUSCULAR; INTRAVENOUS at 10:42

## 2025-05-01 RX ADMIN — DEXMEDETOMIDINE HYDROCHLORIDE 4 MCG: 100 INJECTION, SOLUTION INTRAVENOUS at 13:14

## 2025-05-01 RX ADMIN — FENTANYL CITRATE 25 MCG: 50 INJECTION, SOLUTION INTRAMUSCULAR; INTRAVENOUS at 12:49

## 2025-05-01 RX ADMIN — Medication 10 MG: at 10:49

## 2025-05-01 RX ADMIN — CEFAZOLIN 2000 MG: 2 INJECTION, POWDER, FOR SOLUTION INTRAVENOUS at 10:30

## 2025-05-01 RX ADMIN — DEXMEDETOMIDINE HYDROCHLORIDE 4 MCG: 100 INJECTION, SOLUTION INTRAVENOUS at 13:09

## 2025-05-01 RX ADMIN — SODIUM CHLORIDE: 9 INJECTION, SOLUTION INTRAVENOUS at 11:37

## 2025-05-01 RX ADMIN — Medication 100 MCG: at 10:54

## 2025-05-01 RX ADMIN — DEXMEDETOMIDINE HYDROCHLORIDE 4 MCG: 100 INJECTION, SOLUTION INTRAVENOUS at 10:41

## 2025-05-01 RX ADMIN — LIDOCAINE HYDROCHLORIDE 15 ML: 10 INJECTION, SOLUTION INFILTRATION; PERINEURAL at 10:10

## 2025-05-01 RX ADMIN — FENTANYL CITRATE 25 MCG: 50 INJECTION, SOLUTION INTRAMUSCULAR; INTRAVENOUS at 12:39

## 2025-05-01 RX ADMIN — MIDAZOLAM 2 MG: 1 INJECTION INTRAMUSCULAR; INTRAVENOUS at 09:00

## 2025-05-01 RX ADMIN — FENTANYL CITRATE 25 MCG: 50 INJECTION, SOLUTION INTRAMUSCULAR; INTRAVENOUS at 12:09

## 2025-05-01 RX ADMIN — PROPOFOL 150 MCG/KG/MIN: 10 INJECTION, EMULSION INTRAVENOUS at 10:32

## 2025-05-01 RX ADMIN — SODIUM CHLORIDE: 9 INJECTION, SOLUTION INTRAVENOUS at 08:19

## 2025-05-01 RX ADMIN — Medication 100 MCG: at 11:08

## 2025-05-01 RX ADMIN — Medication 50 MCG: at 11:21

## 2025-05-01 RX ADMIN — DEXMEDETOMIDINE HYDROCHLORIDE 4 MCG: 100 INJECTION, SOLUTION INTRAVENOUS at 13:05

## 2025-05-01 RX ADMIN — Medication 100 MCG: at 13:45

## 2025-05-01 RX ADMIN — EPHEDRINE SULFATE 5 MG: 50 INJECTION, SOLUTION INTRAVENOUS at 11:21

## 2025-05-01 ASSESSMENT — PAIN SCALES - GENERAL
PAINLEVEL_OUTOF10: 7
PAINLEVEL_OUTOF10: 6
PAINLEVEL_OUTOF10: 5
PAINLEVEL_OUTOF10: 6

## 2025-05-01 ASSESSMENT — PAIN - FUNCTIONAL ASSESSMENT
PAIN_FUNCTIONAL_ASSESSMENT: NONE - DENIES PAIN
PAIN_FUNCTIONAL_ASSESSMENT: 0-10
PAIN_FUNCTIONAL_ASSESSMENT: ACTIVITIES ARE NOT PREVENTED

## 2025-05-01 ASSESSMENT — PAIN DESCRIPTION - DESCRIPTORS
DESCRIPTORS: ACHING
DESCRIPTORS: CRAMPING

## 2025-05-01 ASSESSMENT — PAIN DESCRIPTION - PAIN TYPE: TYPE: ACUTE PAIN

## 2025-05-01 ASSESSMENT — PAIN DESCRIPTION - FREQUENCY: FREQUENCY: CONTINUOUS

## 2025-05-01 NOTE — PROGRESS NOTES
Wife to bedside updated with no questions. Discharge instructions reviewed with patient and responsible adult. Discharge instructions given with no additional questions. Patient to be discharged home with belongings. Script sent to pharmacy per Md.

## 2025-05-01 NOTE — H&P
Preoperative H&P Update    The patient's History and Physical in the medical record was reviewed by me today.  I reviewed the HPI, medications, allergies, reason for surgery, diagnosis and treatment plan and there has been no interval change.    The patient was examined by me today. Physical exam findings for this update include:    BP (!) 125/59   Pulse 65   Temp 97.8 °F (36.6 °C) (Oral)   Resp 16   Ht 1.803 m (5' 11\")   Wt 117 kg (258 lb)   SpO2 98%   BMI 35.98 kg/m²   Airway is intact  Chest: breathing comfortably  Heart: regular rate  Findings on exam of the body region where surgery is to be performed include: see last office and/or consult note      Electronically signed by Leslie Small MD on 5/1/2025 at 9:26 AM

## 2025-05-01 NOTE — PROGRESS NOTES
Block Time Out      Verified   Correct Pt.  Correct   Correct Procedure  Correct Site  Correct Extremity      Done per SHANNA Rowe RN and Dr. Cabello

## 2025-05-01 NOTE — PROGRESS NOTES
Patient awake alert ready to go home. Discharged in no distress accompanied to passenger side of car with family or significant other driving car. Assessment unchanged. Patient states pain tolerable.

## 2025-05-01 NOTE — PROGRESS NOTES
Pt awake, alert, Dr. Cabello out to see pt and pt is able to move right arm and still feel sensation .  Dr. Cabello is going to add to the block he already did - add more numbing medicine. Pt verbalized understanding. VSS. Pt remains on oxygen.    Block Time Out      Verified   Correct Pt.  Correct   Correct Procedure  Correct Site  Correct Extremity    Done again per Dr. Cabello and this RN.

## 2025-05-01 NOTE — DISCHARGE INSTRUCTIONS
ORTHOPAEDICS AND SPORTS MEDICINE           HAND SURGERY - AUSTEN SEPULVEDA MD                              POST-OPERATIVE DISCHARGE INSTRUCTIONS    PRESCRIPTIONS:  You have been given a prescription to be taken as directed for post-operative pain control.  Take over-the-counter Colace, 100mg by mouth twice a day while taking narcotic pain medications to help prevent constipation.  You may also take over the counter ibuprofen/aleve and tylenol for pain. Take this as directed on the packaging. Do not exceed 3000 mg tylenol/acetaminophen in 24 hours.     Ibuprofen 600-800 mg (3-4) tablets by mouth every 6 hours as needed for pain.      OR   Aleve 2 tablets by mouth every 12 hours (twice daily) as needed for pain.      AND/OR   Tylenol 1000 mg (2 tablets) every 8 hours as needed for pain.    4. Please use your pain medication carefully, as refills are limited and you may not be provided with one.   5. Prescriptions are only filled in person during a clinic visit.   6. As stated above, please  use over the counter pain medicine - it will also be helpful with decreasing your swelling.       ANESTHESIA:  1.After your surgery, post-surgical discomfort or pain is likely. This discomfort can last several days to a few weeks. At certain times of the day your discomfort may be more intense.     2. Did you receive a nerve block? A nerve block can provide pain relief for one hour to two days after your surgery. As long as the nerve block is working, you will experience little or no sensation in the area the surgeon operated on. As the nerve block wears off, you will begin to experience pain or discomfort. It is very important that you begin taking your prescribed pain medication before the nerve block fully wears off. Treating your pain at the first sign of the block wearing off will ensure your pain is better controlled and more tolerable when full-sensation returns. Do not wait until the pain is intolerable, as the  shoulder block, you may experience:  Mild shortness of breath (may be relieved by sitting up in a chair or recliner)  Hoarse voice  Blurry vision  Unequal pupils  Drooping of your face (eye or lip) on the same side as the nerve block  Swelling at the injection site on the side of your neck  These side effects should resolve as the block wears off!   IF you have severe or prolonged shortness of breath-  GO to the nearest Emergency Room!   If you had a nerve block of your arm or leg:  Protect the arm or leg from extreme hot or cold temperatures  Protect the arm or leg from obstructing blood flow by frequent position changes- Make sure fingers/ toes stay pink and warm. Call surgeon with any changes  For leg block, get assistance walking until the block wears off, ie:  crutches, walker, support person   If you continue to feel the effects of the nerve block for longer than 72 hours- call Lena Sanabria at [phone] and ask to speak with the Anesthesiologist on call.        What is a Surgical Site Infection or  (SSI)?        A surgical site infection (SSI) is an infection that occurs after surgery in the part of the body where the surgery took place. Most patients who have surgery do not develop an infection. However, infections can develop in about 1-3 cases for every 100 patients who have had surgery.  Our goal is for you to NOT experience any complications and be completely satisfied with your care!    However, some signs or symptoms to look for and report immediately to your doctor are:   1. Fever above 101 degrees    2. Redness and increasing pain around the area  where you had surgery   3. Drainage of cloudy fluid or pus coming from the surgical area    Some of the things we/ you can do to prevent SSI's are:   1. Clean hands with soap and water or an alcohol-based hand rub before and after caring for the operative area. This occurs the day of surgery and for the next 2 weeks.   2.Sometimes you receive an appropriate

## 2025-05-01 NOTE — ANESTHESIA PROCEDURE NOTES
Peripheral Block    Patient location during procedure: pre-op  Reason for block: post-op pain management and at surgeon's request  Start time: 5/1/2025 10:10 AM  End time: 5/1/2025 10:20 AM  Staffing  Performed: anesthesiologist   Anesthesiologist: Yohan Cabello MD  Performed by: Yohan Cabello MD  Authorized by: Yohan Cbaello MD    Preanesthetic Checklist  Completed: patient identified, IV checked, site marked, risks and benefits discussed, surgical/procedural consents, equipment checked, pre-op evaluation, timeout performed, anesthesia consent given, oxygen available, monitors applied/VS acknowledged, fire risk safety assessment completed and verbalized and blood product R/B/A discussed and consented  Peripheral Block   Patient position: supine  Prep: ChloraPrep  Provider prep: sterile gloves  Patient monitoring: cardiac monitor, continuous pulse ox, frequent blood pressure checks, IV access, oxygen and responsive to questions  Block type: Brachial plexus  Supraclavicular  Laterality: right  Injection technique: single-shot  Guidance: ultrasound guided  Local infiltration: lidocaine  Infiltration strength: 1 %  Local infiltration: lidocaine  Dose: 3 mL    Needle   Needle type: insulated echogenic nerve stimulator needle   Needle gauge: 21 G  Needle localization: ultrasound guidance  Needle length: 10 cm  Assessment   Injection assessment: negative aspiration for heme, no paresthesia on injection, local visualized surrounding nerve on ultrasound and no intravascular symptoms  Slow fractionated injection: yes  Hemodynamics: stable  Outcomes: uncomplicated and patient tolerated procedure well    Medications Administered  lidocaine injection 1% - Perineural   15 mL - 5/1/2025 10:10:00 AM

## 2025-05-01 NOTE — ANESTHESIA PRE PROCEDURE
Department of Anesthesiology  Preprocedure Note       Name:  Rich Stover   Age:  70 y.o.  :  1955                                          MRN:  [id number]         Date:  2025      Surgeon: Surgeon(s):  Leslie Small MD    Procedure: Procedure(s):  RIGHT CARPAL TUNNEL RELEASE  RIGHT THUMB METACARPOPHALANGEAL ARTHRODESIS, TRAPEZIECTOMY AND SUSPENSIONPLASTY  NOTE: BLOCK    Medications prior to admission:   Prior to Admission medications    Medication Sig Start Date End Date Taking? Authorizing Provider   Aspirin-Caffeine (ANGEL BACK & BODY PO) Take by mouth   Yes ProviderAugust MD   meloxicam (MOBIC) 15 MG tablet Take 1 tablet by mouth daily 25  Yes Leslie Small MD   gabapentin (NEURONTIN) 400 MG capsule TAKE 1 CAPSULES BY MOUTH THREE TIMES DAILY AS NEEDED FOR PAIN 4/10/25 7/9/25 Yes Liane Anderson MD   QUEtiapine (SEROQUEL) 25 MG tablet TAKE 1 TABLET BY MOUTH AT BEDTIME  Patient taking differently: Take 1 tablet by mouth nightly as needed 4/10/25  Yes Nav Herron MD   Insulin Syringe-Needle U-100 (B-D INS SYR ULTRAFINE 1CC/30G) 30G X 1/2\" 1 ML MISC USE DAILY 4/10/25   Liane Anderson MD   LANTUS 100 UNIT/ML injection vial 70 UNITS UNDER THE SKIN EVERY NIGHT 4/10/25  Yes Liane Anderson MD   carvedilol (COREG) 6.25 MG tablet TAKE 1 TABLET BY MOUTH TWICE DAILY 25  Yes Liane Anderson MD   aspirin (ASPIRIN LOW DOSE) 81 MG EC tablet TAKE 1 TABLET BY MOUTH DAILY 25  Yes Liane Anderson MD   rosuvastatin (CRESTOR) 40 MG tablet TAKE 1 TABLET EVERY DAY  Patient taking differently: Take 1 tablet by mouth every morning 24  Yes Liane Anderson MD   potassium chloride (KLOR-CON M) 20 MEQ extended release tablet Take 1 tablet by mouth daily 24  Yes Akshat Lewis MD   furosemide (LASIX) 40 MG tablet Take 1 tablet by mouth daily 24  Yes Akshat Lewis MD   metFORMIN (GLUCOPHAGE-XR) 500 MG extended release tablet Take 2 tablets by mouth in the morning and

## 2025-05-01 NOTE — ANESTHESIA PROCEDURE NOTES
Peripheral Block    Patient location during procedure: pre-op  Reason for block: post-op pain management and at surgeon's request  Start time: 5/1/2025 9:00 AM  End time: 5/1/2025 9:10 AM  Staffing  Performed: anesthesiologist   Anesthesiologist: Yohan Cabello MD  Performed by: Yohan Cabello MD  Authorized by: Yohan Cabello MD    Preanesthetic Checklist  Completed: patient identified, IV checked, site marked, risks and benefits discussed, surgical/procedural consents, equipment checked, pre-op evaluation, timeout performed, anesthesia consent given, oxygen available, monitors applied/VS acknowledged, fire risk safety assessment completed and verbalized and blood product R/B/A discussed and consented  Peripheral Block   Patient position: supine  Prep: ChloraPrep  Provider prep: sterile gloves  Patient monitoring: cardiac monitor, continuous pulse ox, frequent blood pressure checks, IV access, oxygen and responsive to questions  Block type: Brachial plexus  Supraclavicular  Laterality: right  Injection technique: single-shot  Guidance: ultrasound guided  Local infiltration: lidocaine  Infiltration strength: 1 %  Local infiltration: lidocaine  Dose: 3 mL    Needle   Needle type: insulated echogenic nerve stimulator needle   Needle gauge: 21 G  Needle localization: ultrasound guidance  Needle length: 8 cm  Assessment   Injection assessment: negative aspiration for heme, no paresthesia on injection, local visualized surrounding nerve on ultrasound and no intravascular symptoms  Slow fractionated injection: yes  Hemodynamics: stable  Outcomes: uncomplicated and patient tolerated procedure well    Medications Administered  midazolam (VERSED) injection 2 mg/2mL - IntraVENous   2 mg - 5/1/2025 9:00:00 AM  BUPivacaine (MARCAINE) PF injection 0.5% - Perineural   30 mL - 5/1/2025 9:00:00 AM

## 2025-05-01 NOTE — PROGRESS NOTES
Assessed peripheral nerve block and patient is not adequately numb. Reports that his arm feels different than prior to the block but that he has had issues in the past with local anesthetic agents setting up and working. Full motor is still present. There is slight diminished sensation to alcohol swabs on the right vs left but maintained sensory to pain. Discussed with patient the potential need for General anesthesia if the block does not set up. After discussing the risks and benefits with the patient, it was decided to supplement the block prior to going back to the OR.

## 2025-05-02 NOTE — OP NOTE
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 male who has a history of CMC and MCP arthritis who has failed conservative management.  The patient has significant pain that is disrupting their activities of daily living and quality of life.  He desires surgical treatment for their CMC arthritis this time. Surgical procedure of CMC arthroplasty including trapeziectomy and suspensionplasty was discussed with the patient. Patient also had clinical and EMG evidence of carpal tunnel syndrome for which he desired carpal tunnel release. Risks of the surgery were discussed including nerve injury, persistent pain, need for revision, bleeding, infection, pain and prolonged immobilization.  The patient expressed understanding of this and agrees to proceed with surgery.    OPERATIVE PROCEDURE: The patient was seen in the preoperative holding area. The operative procedure confirmed and the operative site was marked with an indelible marker. A block was then performed by the anesthesiology team.  The patient was brought to the operating room and placed supine on the table. A hand table was placed on the patient's side.  A tourniquet was placed on the patient's upper arm.  Next, the arm was prepped and draped in a sterile fashion.  A timeout was performed which correctly identified the team members, the

## 2025-05-12 DIAGNOSIS — M18.11 PRIMARY OSTEOARTHRITIS OF FIRST CARPOMETACARPAL JOINT OF RIGHT HAND: ICD-10-CM

## 2025-05-13 RX ORDER — OXYCODONE AND ACETAMINOPHEN 5; 325 MG/1; MG/1
1 TABLET ORAL EVERY 6 HOURS PRN
Qty: 24 TABLET | Refills: 0 | Status: SHIPPED | OUTPATIENT
Start: 2025-05-13 | End: 2025-05-20

## 2025-05-14 ENCOUNTER — OFFICE VISIT (OUTPATIENT)
Dept: ORTHOPEDIC SURGERY | Age: 70
End: 2025-05-14

## 2025-05-14 VITALS — BODY MASS INDEX: 36.12 KG/M2 | HEIGHT: 71 IN | WEIGHT: 258 LBS

## 2025-05-14 DIAGNOSIS — M79.644 PAIN OF RIGHT THUMB: Primary | ICD-10-CM

## 2025-05-14 NOTE — PROGRESS NOTES
Hand, Upper Extremity and Reconstructive Surgery                Leslie Small MD                                         Surgery-right carpal tunnel release, trapeziectomy with suspension plasty and MCP arthrodesis on 5/1/2025    History of Present Illness  Interval update 5/14/25: Patient presents for follow-up of the above surgery.  His pain has been reasonably well-controlled.  He has been in a splint since surgery.  He has been off of his methotrexate for his psoriasis.  Has not been doing any lifting with his hand    History-  The patient is a 70-year-old right-hand dominant male presenting with right thumb arthralgia.  Patient has had right thumb pain for at least 5 years.  He has tried a brace.  He is doing conservative management with naproxen and meloxicam.  He has history of rheumatoid arthritis on methotrexate.  He also has a history of psoriasis.  Patient reports numbness and tingling in a median nerve distribution most prominent late at night.  Has had negative reactions to steroid injections in the past with worsening of his psoriasis the patient reports.  Patient is seen and managed by rheumatology as well.  Patient has a history of diabetes with last hemoglobin A1c of 6.6 on 3/6/2025    SOCIAL HISTORY  Marital Status:   Occupations:  but currently retired                                                                                           Current Outpatient Medications   Medication Instructions    Aspirin Low Dose 81 mg, Oral, DAILY    Aspirin-Caffeine (ANGEL BACK & BODY PO) Take by mouth    blood glucose monitor kit and supplies Accu check. Dispense sufficient amount for four times daily  testing frequency plus additional to accommodate PRN testing needs. Dispense all needed supplies to include: monitor, strips, lancing device, lancets, control solutions, alcohol swabs.    blood glucose test strips (ACCU-CHEK GUIDE) strip Test 4 times a day

## 2025-05-18 ENCOUNTER — TELEPHONE (OUTPATIENT)
Dept: FAMILY MEDICINE CLINIC | Age: 70
End: 2025-05-18

## 2025-05-18 DIAGNOSIS — E11.51 TYPE 2 DIABETES MELLITUS WITH DIABETIC PERIPHERAL ANGIOPATHY WITHOUT GANGRENE, WITH LONG-TERM CURRENT USE OF INSULIN (HCC): ICD-10-CM

## 2025-05-18 DIAGNOSIS — Z79.4 TYPE 2 DIABETES MELLITUS WITH DIABETIC PERIPHERAL ANGIOPATHY WITHOUT GANGRENE, WITH LONG-TERM CURRENT USE OF INSULIN (HCC): ICD-10-CM

## 2025-05-21 ENCOUNTER — CARE COORDINATION (OUTPATIENT)
Dept: CARE COORDINATION | Age: 70
End: 2025-05-21

## 2025-05-21 NOTE — CARE COORDINATION
Ambulatory Care Coordination Note     5/21/2025 2:31 PM     Patient outreach attempt by this ACM today to perform care management follow up . ACM was unable to reach the patient by telephone today;   left voice message requesting a return phone call to this ACM.     ACM: Jihan Chua LPN     PCP/Specialist follow up:   Future Appointments         Provider Specialty Dept Phone    6/13/2025 10:15 AM Leslie Small MD Orthopedic Surgery [phone]    7/7/2025 3:00 PM Liane Anderson MD Family Medicine [phone]            Follow Up:   Plan for next AC outreach in approximately 1 week to complete:  F/u post hand surgery on 05/01; graduation if pt doing well. .  .

## 2025-05-23 DIAGNOSIS — G56.01 RIGHT CARPAL TUNNEL SYNDROME: ICD-10-CM

## 2025-05-23 DIAGNOSIS — Z79.4 TYPE 2 DIABETES MELLITUS WITH DIABETIC PERIPHERAL ANGIOPATHY WITHOUT GANGRENE, WITH LONG-TERM CURRENT USE OF INSULIN (HCC): ICD-10-CM

## 2025-05-23 DIAGNOSIS — E11.51 TYPE 2 DIABETES MELLITUS WITH DIABETIC PERIPHERAL ANGIOPATHY WITHOUT GANGRENE, WITH LONG-TERM CURRENT USE OF INSULIN (HCC): ICD-10-CM

## 2025-05-23 DIAGNOSIS — M18.11 ARTHRITIS OF CARPOMETACARPAL (CMC) JOINT OF RIGHT THUMB: ICD-10-CM

## 2025-05-23 RX ORDER — METFORMIN HYDROCHLORIDE 500 MG/1
1000 TABLET, EXTENDED RELEASE ORAL 2 TIMES DAILY
Qty: 360 TABLET | Refills: 1 | Status: SHIPPED | OUTPATIENT
Start: 2025-05-23

## 2025-05-23 RX ORDER — MELOXICAM 15 MG/1
15 TABLET ORAL DAILY
Qty: 30 TABLET | Refills: 0 | Status: SHIPPED | OUTPATIENT
Start: 2025-05-23

## 2025-05-29 ENCOUNTER — CARE COORDINATION (OUTPATIENT)
Dept: CASE MANAGEMENT | Age: 70
End: 2025-05-29

## 2025-05-29 ENCOUNTER — CARE COORDINATION (OUTPATIENT)
Dept: CARE COORDINATION | Age: 70
End: 2025-05-29

## 2025-05-29 DIAGNOSIS — N52.1 ERECTILE DYSFUNCTION DUE TO DISEASES CLASSIFIED ELSEWHERE: ICD-10-CM

## 2025-05-29 NOTE — CARE COORDINATION
Ambulatory Care Coordination Note     2025 4:10 PM     Patient Current Location:  [facility] CC contacted the patient by telephone. Verified name and  with patient as identifiers.         ACM: Natasha Burch LPN     Challenges to be reviewed by the provider   Additional needs identified to be addressed with provider No  none               Method of communication with provider: none.    Utilization: Has the patient been seen in the ED since your last call? no    Care Summary Note:   Patient stated he is doing good. BS running around 110. Good appetite and fluid intake. No bowel or bladder problems. He is due to have cast removed . Denies swelling or numbness in hand. No lh, ha or dizziness. He was in his car traveling at this time. No needs.     Offered patient enrollment in the Remote Patient Monitoring (RPM) program for in-home monitoring: Deferred at this time because traveling; will discuss at next outreach.     Assessments Completed:       Medications Reviewed:   Not completed during this call:      Advance Care Planning:   Not on file     Care Planning:     PCP/Specialist follow up:   Future Appointments         Provider Specialty Dept Phone    2025 10:15 AM Leslie Small MD Orthopedic Surgery [phone]    2025 3:00 PM Liane Anderson MD Family Medicine [phone]            Follow Up:   Plan for next AC outreach in approximately 2 weeks to complete:  - disease specific assessments  - advance care planning   - goal progression  - education   - cast removed? .   Patient  is agreeable to this plan.

## 2025-05-29 NOTE — TELEPHONE ENCOUNTER
Last OV: 3/10/25  Next OV: X  Last refill:3/24/25 #10 3 R/F  Most recent Labs: 3/6/25  Last EKG (if needed): 3/10/25

## 2025-05-30 RX ORDER — TADALAFIL 20 MG/1
20 TABLET ORAL DAILY PRN
Qty: 10 TABLET | Refills: 3 | Status: SHIPPED | OUTPATIENT
Start: 2025-05-30

## 2025-06-02 ENCOUNTER — TELEPHONE (OUTPATIENT)
Dept: CARDIOLOGY CLINIC | Age: 70
End: 2025-06-02

## 2025-06-02 NOTE — TELEPHONE ENCOUNTER
Attempted to reach patient, LVM to return call to office    Please ask patient how often he is needing to take an extra Lasix and further assess symptoms

## 2025-06-02 NOTE — TELEPHONE ENCOUNTER
Medication Question/Concern    What is the name of the medication you need to speak with someone about?  furosemide (LASIX)     Was this prescribed by your cardiologist?   Yes  Dosage of the medication:  40 MG tablet     How are you taking this medication (QD, BID, TID, QID, PRN):  Take 1 tablet by mouth daily     What issues/concerns are you having with this medication?  Donitacandy called the office to request that the directions for this medication be changed to 2x a day instead of 1x a day. He states that he was told if he notices more water retention to take two. He relayed that when he does this, he tends to run out of his medication too early.    Please advise.    Rich's callback: [phone]

## 2025-06-03 NOTE — TELEPHONE ENCOUNTER
Patient returned call. States he takes Lasix 40 mg once daily.   States he takes another Lasix when his weight increases. States doesn't notice any swelling, states swelling is in his belly.   States he does exercise daily.   States no other symptoms.    Patient would like to get Lasix written as 2 tablets daily as needed.

## 2025-06-04 RX ORDER — FUROSEMIDE 40 MG/1
40 TABLET ORAL 2 TIMES DAILY PRN
Qty: 180 TABLET | Refills: 3 | Status: SHIPPED | OUTPATIENT
Start: 2025-06-04

## 2025-06-04 NOTE — TELEPHONE ENCOUNTER
Attempted to call patient. No option to [facility] @ [phone]. If patient calls back, please let them know that prescription for furosemide 2x daily as needed has been sent in to pharmacy

## 2025-06-11 ENCOUNTER — CARE COORDINATION (OUTPATIENT)
Dept: CASE MANAGEMENT | Age: 70
End: 2025-06-11

## 2025-06-11 NOTE — CARE COORDINATION
Ambulatory Care Coordination Note     6/11/2025 1:50 PM     Patient outreach attempt by this ACM today to perform care management follow up . ACM was unable to reach the patient by telephone today;   left voice message requesting a return phone call to this ACM.     ACM: Natasha Burch LPN     Care Summary Note:     PCP/Specialist follow up:   Future Appointments         Provider Specialty Dept Phone    6/13/2025 10:15 AM Leslie Small MD Orthopedic Surgery [phone]    7/7/2025 3:00 PM Liane Anderson MD Family Medicine [phone]            Follow Up:   Plan for next AC outreach in approximately 2 weeks to complete:  - disease specific assessments  - advance care planning  - goal progression  - education   Cast removed? .

## 2025-06-13 ENCOUNTER — OFFICE VISIT (OUTPATIENT)
Dept: ORTHOPEDIC SURGERY | Age: 70
End: 2025-06-13

## 2025-06-13 VITALS — BODY MASS INDEX: 36.12 KG/M2 | WEIGHT: 258 LBS | HEIGHT: 71 IN

## 2025-06-13 DIAGNOSIS — M18.11 ARTHRITIS OF CARPOMETACARPAL (CMC) JOINT OF RIGHT THUMB: Primary | ICD-10-CM

## 2025-06-13 NOTE — PROGRESS NOTES
time.        No orders of the defined types were placed in this encounter.      The patient (or guardian, if applicable) and other individuals in attendance with the patient were advised that Artificial Intelligence will be utilized during this visit to record, process the conversation to generate a clinical note, and support improvement of the AI technology. The patient (or guardian, if applicable) and other individuals in attendance at the appointment consented to the use of AI, including the recording.

## 2025-06-20 ENCOUNTER — CARE COORDINATION (OUTPATIENT)
Dept: CARE COORDINATION | Age: 70
End: 2025-06-20

## 2025-06-25 DIAGNOSIS — G56.01 RIGHT CARPAL TUNNEL SYNDROME: ICD-10-CM

## 2025-06-25 DIAGNOSIS — M18.11 ARTHRITIS OF CARPOMETACARPAL (CMC) JOINT OF RIGHT THUMB: ICD-10-CM

## 2025-06-26 RX ORDER — MELOXICAM 15 MG/1
15 TABLET ORAL DAILY
Qty: 30 TABLET | Refills: 0 | Status: SHIPPED | OUTPATIENT
Start: 2025-06-26

## 2025-06-27 ENCOUNTER — OFFICE VISIT (OUTPATIENT)
Dept: ORTHOPEDIC SURGERY | Age: 70
End: 2025-06-27

## 2025-06-27 VITALS — HEIGHT: 71 IN | BODY MASS INDEX: 36.12 KG/M2 | WEIGHT: 258 LBS

## 2025-06-27 DIAGNOSIS — M18.11 ARTHRITIS OF CARPOMETACARPAL (CMC) JOINT OF RIGHT THUMB: Primary | ICD-10-CM

## 2025-06-27 NOTE — PROGRESS NOTES
Hand, Upper Extremity and Reconstructive Surgery                eLslie Small MD                                         Surgery-right carpal tunnel release, trapeziectomy with suspension plasty and MCP arthrodesis on 5/1/2025    History of Present Illness  Interval update 6/27/25: Patient presents for follow-up of the above surgery.  He is currently 8 weeks postop.  A thumb spica brace was provided at his last visit with instruction for him to wear it until this visit.  However, the patient took the brace off on Monday and not been wearing any kind of immobilization in his hand.  He states he has been using his hand and did  a watering can with that hand to water the plants.  Pains been well-controlled    History-  The patient is a 70-year-old right-hand dominant male presenting with right thumb arthralgia.  Patient has had right thumb pain for at least 5 years.  He has tried a brace.  He is doing conservative management with naproxen and meloxicam.  He has history of rheumatoid arthritis on methotrexate.  He also has a history of psoriasis.  Patient reports numbness and tingling in a median nerve distribution most prominent late at night.  Has had negative reactions to steroid injections in the past with worsening of his psoriasis the patient reports.  Patient is seen and managed by rheumatology as well.  Patient has a history of diabetes with last hemoglobin A1c of 6.6 on 3/6/2025    SOCIAL HISTORY  Marital Status:   Occupations:  but currently retired                                                                                    Current Outpatient Medications   Medication Instructions    Aspirin Low Dose 81 mg, Oral, DAILY    Aspirin-Caffeine (ANGEL BACK & BODY PO) Take by mouth    blood glucose monitor kit and supplies Accu check. Dispense sufficient amount for four times daily  testing frequency plus additional to accommodate PRN testing needs.

## 2025-06-27 NOTE — PLAN OF CARE
Portions of this note have been templated and/or copied from initial evaluation, reassessments and prior notes for documentation efficiency.

## 2025-06-30 ENCOUNTER — HOSPITAL ENCOUNTER (OUTPATIENT)
Dept: OCCUPATIONAL THERAPY | Age: 70
Setting detail: THERAPIES SERIES
Discharge: HOME | End: 2025-06-30
Payer: MEDICARE

## 2025-06-30 PROCEDURE — L3906 WHO W/O JOINTS CF: HCPCS

## 2025-06-30 PROCEDURE — 97530 THERAPEUTIC ACTIVITIES: CPT

## 2025-07-07 ENCOUNTER — HOSPITAL ENCOUNTER (OUTPATIENT)
Dept: OCCUPATIONAL THERAPY | Age: 70
Setting detail: THERAPIES SERIES
Discharge: HOME | End: 2025-07-07
Payer: MEDICARE

## 2025-07-07 ENCOUNTER — OFFICE VISIT (OUTPATIENT)
Dept: FAMILY MEDICINE CLINIC | Age: 70
End: 2025-07-07

## 2025-07-07 VITALS
HEART RATE: 72 BPM | HEIGHT: 71 IN | DIASTOLIC BLOOD PRESSURE: 60 MMHG | SYSTOLIC BLOOD PRESSURE: 132 MMHG | OXYGEN SATURATION: 98 % | BODY MASS INDEX: 37.24 KG/M2 | WEIGHT: 266 LBS

## 2025-07-07 DIAGNOSIS — M54.41 CHRONIC BILATERAL LOW BACK PAIN WITH BILATERAL SCIATICA: ICD-10-CM

## 2025-07-07 DIAGNOSIS — G89.4 CHRONIC PAIN SYNDROME: ICD-10-CM

## 2025-07-07 DIAGNOSIS — G89.29 CHRONIC BILATERAL LOW BACK PAIN WITH BILATERAL SCIATICA: ICD-10-CM

## 2025-07-07 DIAGNOSIS — M54.42 CHRONIC BILATERAL LOW BACK PAIN WITH BILATERAL SCIATICA: ICD-10-CM

## 2025-07-07 DIAGNOSIS — E11.51 TYPE 2 DIABETES MELLITUS WITH DIABETIC PERIPHERAL ANGIOPATHY WITHOUT GANGRENE, WITH LONG-TERM CURRENT USE OF INSULIN (HCC): ICD-10-CM

## 2025-07-07 DIAGNOSIS — Z00.00 MEDICARE ANNUAL WELLNESS VISIT, SUBSEQUENT: Primary | ICD-10-CM

## 2025-07-07 DIAGNOSIS — Z79.4 TYPE 2 DIABETES MELLITUS WITH DIABETIC PERIPHERAL ANGIOPATHY WITHOUT GANGRENE, WITH LONG-TERM CURRENT USE OF INSULIN (HCC): ICD-10-CM

## 2025-07-07 LAB
CREAT UR-MCNC: 90.5 MG/DL (ref 39–259)
HBA1C MFR BLD: 5.8 %
MICROALBUMIN UR DL<=1MG/L-MCNC: <1.2 MG/DL
MICROALBUMIN/CREAT UR: NORMAL MG/G (ref 0–30)

## 2025-07-07 PROCEDURE — 97110 THERAPEUTIC EXERCISES: CPT

## 2025-07-07 RX ORDER — GABAPENTIN 400 MG/1
CAPSULE ORAL
Qty: 270 CAPSULE | Refills: 0 | OUTPATIENT
Start: 2025-07-07

## 2025-07-07 RX ORDER — GABAPENTIN 400 MG/1
400 CAPSULE ORAL 3 TIMES DAILY
Qty: 270 CAPSULE | Refills: 1 | Status: SHIPPED | OUTPATIENT
Start: 2025-07-07 | End: 2025-10-05

## 2025-07-07 ASSESSMENT — PATIENT HEALTH QUESTIONNAIRE - PHQ9
3. TROUBLE FALLING OR STAYING ASLEEP: NOT AT ALL
5. POOR APPETITE OR OVEREATING: NOT AT ALL
10. IF YOU CHECKED OFF ANY PROBLEMS, HOW DIFFICULT HAVE THESE PROBLEMS MADE IT FOR YOU TO DO YOUR WORK, TAKE CARE OF THINGS AT HOME, OR GET ALONG WITH OTHER PEOPLE: NOT DIFFICULT AT ALL
SUM OF ALL RESPONSES TO PHQ QUESTIONS 1-9: 0
8. MOVING OR SPEAKING SO SLOWLY THAT OTHER PEOPLE COULD HAVE NOTICED. OR THE OPPOSITE, BEING SO FIGETY OR RESTLESS THAT YOU HAVE BEEN MOVING AROUND A LOT MORE THAN USUAL: NOT AT ALL
SUM OF ALL RESPONSES TO PHQ QUESTIONS 1-9: 0
2. FEELING DOWN, DEPRESSED OR HOPELESS: NOT AT ALL
4. FEELING TIRED OR HAVING LITTLE ENERGY: NOT AT ALL
SUM OF ALL RESPONSES TO PHQ QUESTIONS 1-9: 0
9. THOUGHTS THAT YOU WOULD BE BETTER OFF DEAD, OR OF HURTING YOURSELF: NOT AT ALL
SUM OF ALL RESPONSES TO PHQ QUESTIONS 1-9: 0
6. FEELING BAD ABOUT YOURSELF - OR THAT YOU ARE A FAILURE OR HAVE LET YOURSELF OR YOUR FAMILY DOWN: NOT AT ALL
1. LITTLE INTEREST OR PLEASURE IN DOING THINGS: NOT AT ALL
7. TROUBLE CONCENTRATING ON THINGS, SUCH AS READING THE NEWSPAPER OR WATCHING TELEVISION: NOT AT ALL

## 2025-07-07 NOTE — FLOWSHEET NOTE
[facility] - Outpatient Rehabilitation and Therapy: [address], [place] [address] office: [phone] fax: [phone]           Occupational Therapy: TREATMENT/PROGRESS NOTE   Patient: Rich Stover (70 y.o. male)   Examination Date: 2025   :  1955 MRN: [id number]   Visit #:   Insurance Allowable Auth Needed   Dates this response applies to: 2025 - 2025  Number of visits: 12 Visit(s)  [x]Yes    []No    Insurance: Payor: [facility] MEDICARE / Plan: [facility] MEDICARE ALTERNATE / Product Type: *No Product type* /   Insurance ID: [id number] - (Medicare Managed)  Secondary Insurance (if applicable):    Treatment Diagnosis: left hand pain M79.642  No diagnosis found.   Medical Diagnosis:  Arthritis of carpometacarpal (CMC) joint of right thumb [M18.11]   Referring Physician: Leslie Small MD  PCP: Liane Anderson MD     Plan of care signed (Y/N): sent on evaluation    Date of Patient follow up with Physician: 2025     Plan of Care Report: EVAL today  POC update due: (10 visits /OR AUTH LIMITS, whichever is less) 2025                                             Medical History:Interval update 25: Patient presents for follow-up of the above surgery.  He is currently 8 weeks postop.  A thumb spica brace was provided at his last visit with instruction for him to wear it until this visit.  However, the patient took the brace off on Monday and not been wearing any kind of immobilization in his hand.  He states he has been using his hand and did  a watering can with that hand to water the plants.  Pains been well-controlled     History-  The patient is a 70-year-old right-hand dominant male presenting with right thumb arthralgia.  Patient has had right thumb pain for at least 5 years.  He has tried a brace.  He is doing conservative management with naproxen and meloxicam.  He has history of rheumatoid arthritis on methotrexate.  He also has a history of

## 2025-07-07 NOTE — PROGRESS NOTES
Medicare Annual Wellness Visit     Assessment and Plan:      Diagnosis Orders   1. Medicare annual wellness visit, subsequent        2. Chronic pain syndrome  gabapentin (NEURONTIN) 400 MG capsule      3. Chronic bilateral low back pain with bilateral sciatica  gabapentin (NEURONTIN) 400 MG capsule      4. Type 2 diabetes mellitus with diabetic peripheral angiopathy without gangrene, with long-term current use of insulin (HCC)  POCT glycosylated hemoglobin (Hb A1C)    Albumin/Creatinine Ratio, Urine        Plan as above and below.    FYI: While Medicare provides you with a FREE ANNUAL PREVENTIVE PHYSICAL, this visit does NOT include management of chronic medical problems or physical examination.  Dr. Anderson usually does a combination visit if you have other medical problems so you don't have to come back for another visit.  However, this means that there will be a co-pay.    INSTRUCTIONS  NEXT APPOINTMENT: Please schedule check-up in 4 months     PLEASE TAKE THIS FORM TO CHECK-OUT WINDOW TO SCHEDULE NEXT VISIT.  Please get annual flu and covid vaccine when available in fall.  Can get at stores such as [employer] and [employer].  Look into making living will and medical POA. Info included in back of this packet. We would like a notarized copy for our records.   Medicare part D patients:  Get Shingrix shingles vaccine #2 at pharmacy (such as [employer] or [employer]).       Subjective:   Name: Rich Stover  YOB: 1955  Age: 70 y.o.  Sex: male  MRN: [id number]     Date of Service:  7/7/2025    Chief Complaint:   Rich Stover is a 70 y.o. male who presents for Medicare Annual Wellness Visit and check-up for:  1. Medicare annual wellness visit, subsequent    2. Chronic pain syndrome    3. Chronic bilateral low back pain with bilateral sciatica    4. Type 2 diabetes mellitus with diabetic peripheral angiopathy without gangrene, with long-term current use of insulin (HCC)      HPI  Chief Complaint   Patient

## 2025-07-07 NOTE — PATIENT INSTRUCTIONS
reviewing your medical record and screening and assessments performed today your provider may have ordered immunizations, labs, imaging, and/or referrals for you.  A list of these orders (if applicable) as well as your Preventive Care list are included within your After Visit Summary for your review.

## 2025-07-08 ENCOUNTER — RESULTS FOLLOW-UP (OUTPATIENT)
Dept: FAMILY MEDICINE CLINIC | Age: 70
End: 2025-07-08

## 2025-07-09 ENCOUNTER — APPOINTMENT (OUTPATIENT)
Dept: OCCUPATIONAL THERAPY | Age: 70
End: 2025-07-09
Payer: MEDICARE

## 2025-07-14 ENCOUNTER — APPOINTMENT (OUTPATIENT)
Dept: OCCUPATIONAL THERAPY | Age: 70
End: 2025-07-14
Payer: MEDICARE

## 2025-07-16 ENCOUNTER — HOSPITAL ENCOUNTER (OUTPATIENT)
Dept: OCCUPATIONAL THERAPY | Age: 70
Setting detail: THERAPIES SERIES
Discharge: HOME | End: 2025-07-16
Payer: MEDICARE

## 2025-07-16 PROCEDURE — 97110 THERAPEUTIC EXERCISES: CPT

## 2025-07-16 PROCEDURE — 97140 MANUAL THERAPY 1/> REGIONS: CPT

## 2025-07-16 NOTE — FLOWSHEET NOTE
[facility] - Outpatient Rehabilitation and Therapy: [address], [place] [address] office: [phone] fax: [phone]       Occupational Therapy: TREATMENT/PROGRESS NOTE   Patient: Rich Stover (70 y.o. male)   Examination Date: 2025   :  1955 MRN: [id number]   Visit #: 3/12  Insurance Allowable Auth Needed   2025 - 2025  Number of visits: 12 Visit(s)  [x]Yes    []No    Insurance: Payor: [facility] MEDICARE / Plan: [facility] MEDICARE ALTERNATE / Product Type: *No Product type* /   Insurance ID: [id number] - (Medicare Managed)  Secondary Insurance (if applicable):    Treatment Diagnosis: left hand pain M79.642  No diagnosis found.   Medical Diagnosis:  Arthritis of carpometacarpal (CMC) joint of right thumb [M18.11]   Referring Physician: Leslie Small MD  PCP: Liane Anderson MD     Plan of care signed (Y/N): sent on evaluation    Date of Patient follow up with Physician: 2025     Plan of Care Report: NO  POC update due: (10 visits /OR AUTH LIMITS, whichever is less) 2025                                             Medical History:Interval update 25: Patient presents for follow-up of the above surgery.  He is currently 8 weeks postop.  A thumb spica brace was provided at his last visit with instruction for him to wear it until this visit.  However, the patient took the brace off on Monday and not been wearing any kind of immobilization in his hand.  He states he has been using his hand and did  a watering can with that hand to water the plants.  Pains been well-controlled     History-  The patient is a 70-year-old right-hand dominant male presenting with right thumb arthralgia.  Patient has had right thumb pain for at least 5 years.  He has tried a brace.  He is doing conservative management with naproxen and meloxicam.  He has history of rheumatoid arthritis on methotrexate.  He also has a history of psoriasis.  Patient reports numbness and

## 2025-07-18 DIAGNOSIS — N52.1 ERECTILE DYSFUNCTION DUE TO DISEASES CLASSIFIED ELSEWHERE: ICD-10-CM

## 2025-07-18 NOTE — PATIENT INSTRUCTIONS
[facility]  Interventional Radiology  [phone]        Thoracentesis   WHAT YOU NEED TO KNOW:   A thoracentesis is a procedure to remove extra fluid or air from between your lungs and your inner chest wall. Air or fluid buildup may make it hard for you to breathe. A thoracentesis allows your lungs to expand fully so you can breathe more easily.    DISCHARGE INSTRUCTIONS:     Small amount of shoulder pain and bloody sputum is normal after a Thoracentesis.     Rest:  Rest when you feel it is needed. Slowly start to do more each day. Return to your daily activities as directed.     Resume your normal diet. Small sips of flat soda will help mild nausea.    Do not smoke:  If you smoke, it is never too late to quit. Ask for information about how to stop smoking if you need help.    Contact Interventional Radiology at [phone] (LIAT PATIENTS: Contact Interventional Radiology at [phone]) (VIDAL PATIENTS: Contact Interventional Radiology at [phone]) if:   You have a fever.    Your puncture site is red, warm, swollen, or draining pus.    You have questions or concerns about your procedure, medicine, or care.    Seek care immediately or call 911 if:   Severe chest pain with inspiration and shortness of breath    Large amounts of blood in your sputum    Follow up with your healthcare provider as directed.   
[email or web address] and sign in to your [employer] account. Enter L029 in the [employer] box to learn more about \"Back Stretches: Exercises. \"     If you do not have an account, please click on the \"Sign Up Now\" link. Current as of: March 21, 2017  Content Version: 11.4  © 4963-6480 Healthwise, [employer]. Care instructions adapted under license by [facility] If you have questions about a medical condition or this instruction, always ask your healthcare professional. [address] any warranty or liability for your use of this information.

## 2025-07-18 NOTE — TELEPHONE ENCOUNTER
Last OV: 3/10/25  Next OV: X  Last refill: 5/30/25 #10 3 R/F  Most recent Labs: 3/6/25  Last EKG (if needed): 3/10/25

## 2025-07-20 DIAGNOSIS — E11.51 TYPE 2 DIABETES MELLITUS WITH DIABETIC PERIPHERAL ANGIOPATHY WITHOUT GANGRENE, WITH LONG-TERM CURRENT USE OF INSULIN (HCC): ICD-10-CM

## 2025-07-20 DIAGNOSIS — Z79.4 TYPE 2 DIABETES MELLITUS WITH DIABETIC PERIPHERAL ANGIOPATHY WITHOUT GANGRENE, WITH LONG-TERM CURRENT USE OF INSULIN (HCC): ICD-10-CM

## 2025-07-21 ENCOUNTER — APPOINTMENT (OUTPATIENT)
Dept: OCCUPATIONAL THERAPY | Age: 70
End: 2025-07-21
Payer: MEDICARE

## 2025-07-21 RX ORDER — SEMAGLUTIDE 1.34 MG/ML
INJECTION, SOLUTION SUBCUTANEOUS
Qty: 9 ML | Refills: 3 | Status: SHIPPED | OUTPATIENT
Start: 2025-07-21

## 2025-07-22 RX ORDER — TADALAFIL 20 MG/1
20 TABLET ORAL DAILY PRN
Qty: 10 TABLET | Refills: 5 | Status: SHIPPED | OUTPATIENT
Start: 2025-07-22

## 2025-07-23 ENCOUNTER — APPOINTMENT (OUTPATIENT)
Dept: OCCUPATIONAL THERAPY | Age: 70
End: 2025-07-23
Payer: MEDICARE

## 2025-07-25 DIAGNOSIS — Z79.4 TYPE 2 DIABETES MELLITUS WITH DIABETIC PERIPHERAL ANGIOPATHY WITHOUT GANGRENE, WITH LONG-TERM CURRENT USE OF INSULIN (HCC): ICD-10-CM

## 2025-07-25 DIAGNOSIS — E11.51 TYPE 2 DIABETES MELLITUS WITH DIABETIC PERIPHERAL ANGIOPATHY WITHOUT GANGRENE, WITH LONG-TERM CURRENT USE OF INSULIN (HCC): ICD-10-CM

## 2025-07-26 ENCOUNTER — HOSPITAL ENCOUNTER (EMERGENCY)
Age: 70
Discharge: HOME | End: 2025-07-26
Payer: MEDICARE

## 2025-07-26 VITALS
WEIGHT: 250 LBS | RESPIRATION RATE: 20 BRPM | SYSTOLIC BLOOD PRESSURE: 149 MMHG | BODY MASS INDEX: 34.87 KG/M2 | OXYGEN SATURATION: 95 % | HEART RATE: 79 BPM | TEMPERATURE: 97.5 F | DIASTOLIC BLOOD PRESSURE: 83 MMHG

## 2025-07-26 DIAGNOSIS — M54.50 ACUTE BILATERAL LOW BACK PAIN WITHOUT SCIATICA: Primary | ICD-10-CM

## 2025-07-26 PROCEDURE — 99283 EMERGENCY DEPT VISIT LOW MDM: CPT

## 2025-07-26 RX ORDER — LIDOCAINE 50 MG/G
1 PATCH TOPICAL DAILY
Qty: 30 PATCH | Refills: 0 | Status: SHIPPED | OUTPATIENT
Start: 2025-07-26

## 2025-07-26 RX ORDER — HYDROCODONE BITARTRATE AND ACETAMINOPHEN 5; 325 MG/1; MG/1
1 TABLET ORAL EVERY 6 HOURS PRN
Qty: 12 TABLET | Refills: 0 | Status: SHIPPED | OUTPATIENT
Start: 2025-07-26 | End: 2025-07-29

## 2025-07-26 ASSESSMENT — ENCOUNTER SYMPTOMS
COLOR CHANGE: 0
VOMITING: 0
CONSTIPATION: 0
DIARRHEA: 0
CHEST TIGHTNESS: 0
RESPIRATORY NEGATIVE: 1
ABDOMINAL DISTENTION: 0
BACK PAIN: 1
SHORTNESS OF BREATH: 0
COUGH: 0
NAUSEA: 0
ABDOMINAL PAIN: 0

## 2025-07-26 ASSESSMENT — PAIN DESCRIPTION - DESCRIPTORS: DESCRIPTORS: STABBING

## 2025-07-26 ASSESSMENT — PAIN DESCRIPTION - FREQUENCY: FREQUENCY: CONTINUOUS

## 2025-07-26 ASSESSMENT — PAIN DESCRIPTION - PAIN TYPE: TYPE: ACUTE PAIN

## 2025-07-26 ASSESSMENT — PAIN - FUNCTIONAL ASSESSMENT: PAIN_FUNCTIONAL_ASSESSMENT: 0-10

## 2025-07-26 ASSESSMENT — PAIN DESCRIPTION - ORIENTATION: ORIENTATION: LEFT;LOWER

## 2025-07-26 ASSESSMENT — PAIN DESCRIPTION - LOCATION: LOCATION: BACK

## 2025-07-26 ASSESSMENT — PAIN SCALES - GENERAL: PAINLEVEL_OUTOF10: 8

## 2025-07-26 NOTE — ED NOTES
Pt left without paperwork, also forgot his shoes in triage. Called his number, spoke with his wife who states she will let him know to come back for AVS and belongings.

## 2025-07-26 NOTE — ED PROVIDER NOTES
[facility] EMERGENCY DEPARTMENT  EMERGENCY DEPARTMENT ENCOUNTER        Pt Name: Rich Stover  MRN: [id number]  Birthdate 1955  Date of evaluation: 7/26/2025  Provider: TIANNA Ramirez  PCP: Liane Anderson MD  Note Started: 9:13 AM EDT 7/26/25      DIXON. I have evaluated this patient.        CHIEF COMPLAINT       Chief Complaint   Patient presents with    Lower Back Pain     History of herniated discs, pain acutely worse since stepping off garage step funny last night. Lower right sided pain.        HISTORY OF PRESENT ILLNESS: 1 or more Elements     History From: Patient  Limitations to history : None    Rich Stover is a 70 y.o. male with past medical history of CAD, diabetes, hyperlipidemia, obstructive sleep apnea, hypertension, thoracic lumbar generative disc disease who presents ED with complaint of back pain.  Patient reports last night he was stepping down off a step in his garage.  He reports he \"tweaked\" his back.  He reports a long history of back problems.  He follows up with a back specialist at [facility].  He reports he is on gabapentin and meloxicam.  He denies any fall to the ground.  Denies any direct injury to the back.  He reports was having difficulty sleeping last night due to pain.  He became concerned and came to the ED for further evaluation treatment.  Reports pain diffusely throughout his low back but mainly to the right side.  Denies any radiation down the legs.  Denies bowel/bladder incontinence, urinary retention or saddle anesthesia.  Denies numbness or tingling.  Denies fever or chills.  Denies any rashes or lesions.  No abdominal pain, nausea/vomiting, urinary symptoms or changes in bowel movements.  He has been able to ambulate with associated pain.  His pain rated as an 8/10 worsened with standing from seated position and movement.  No surgeries or injections on his back in the past.  He is on aspirin but no other blood thinners.  He is on methotrexate  symptoms.  Do not believe emergent MRI indicated at this time.  Patient is already on meloxicam and gabapentin.  Given age hesitant to start on muscle relaxer.  Will try small prescription for pain medication with Norco for home.  See if this helps with his pain and help him sleep.  Continue the meloxicam and gabapentin.  Order Lidoderm patches as well.  Follow-up with his back specialist.  Return to the ED for any new or worsening symptoms.  Low suspicion for fracture, dislocation, cauda equina, epidural abscess, epidural hematoma, spinal stenosis, significant cord compression, AAA, dissection, retroperitoneal bleed, pyelonephritis, nephrolithiasis, surgical abdomen, zoster or other emergent etiology at this time.       I am the Primary Clinician of Record.  FINAL IMPRESSION      1. Acute bilateral low back pain without sciatica          DISPOSITION/PLAN     DISPOSITION Decision To Discharge 07/26/2025 08:46:57 AM   DISPOSITION CONDITION Stable           PATIENT REFERRED TO:  Liane Anderson MD  [address]  [address]  [facility] [address]  [phone]    Schedule an appointment as soon as possible for a visit   As needed, If symptoms worsen    your back specialist    Schedule an appointment as soon as possible for a visit   For a Re-check in  3-5    days.      DISCHARGE MEDICATIONS:  Discharge Medication List as of 7/26/2025  9:05 AM        START taking these medications    Details   HYDROcodone-acetaminophen (NORCO) 5-325 MG per tablet Take 1 tablet by mouth every 6 hours as needed for Pain for up to 3 days. Intended supply: 3 days. Take lowest dose possible to manage pain Max Daily Amount: 4 tablets, Disp-12 tablet, R-0Normal      lidocaine (LIDODERM) 5 % Place 1 patch onto the skin daily 12 hours on, 12 hours off., Disp-30 patch, R-0Normal             DISCONTINUED MEDICATIONS:  Discharge Medication List as of 7/26/2025  9:05 AM                 (Please note that portions of this note were completed

## 2025-07-28 RX ORDER — METFORMIN HYDROCHLORIDE 500 MG/1
1000 TABLET, EXTENDED RELEASE ORAL 2 TIMES DAILY
Qty: 360 TABLET | Refills: 1 | Status: SHIPPED | OUTPATIENT
Start: 2025-07-28

## 2025-07-30 DIAGNOSIS — M18.11 ARTHRITIS OF CARPOMETACARPAL (CMC) JOINT OF RIGHT THUMB: ICD-10-CM

## 2025-07-30 DIAGNOSIS — G56.01 RIGHT CARPAL TUNNEL SYNDROME: ICD-10-CM

## 2025-07-31 RX ORDER — MELOXICAM 15 MG/1
15 TABLET ORAL DAILY
Qty: 30 TABLET | Refills: 0 | Status: SHIPPED | OUTPATIENT
Start: 2025-07-31

## 2025-08-01 ENCOUNTER — OFFICE VISIT (OUTPATIENT)
Dept: ORTHOPEDIC SURGERY | Age: 70
End: 2025-08-01

## 2025-08-01 VITALS — WEIGHT: 250 LBS | BODY MASS INDEX: 35 KG/M2 | HEIGHT: 71 IN

## 2025-08-01 DIAGNOSIS — M18.11 ARTHRITIS OF CARPOMETACARPAL (CMC) JOINT OF RIGHT THUMB: Primary | ICD-10-CM

## 2025-08-01 DIAGNOSIS — M25.512 LEFT SHOULDER PAIN, UNSPECIFIED CHRONICITY: ICD-10-CM

## 2025-08-01 PROCEDURE — 99024 POSTOP FOLLOW-UP VISIT: CPT | Performed by: STUDENT IN AN ORGANIZED HEALTH CARE EDUCATION/TRAINING PROGRAM

## 2025-08-01 NOTE — PROGRESS NOTES
Hand, Upper Extremity and Reconstructive Surgery                Leslie Small MD                                         Surgery-right carpal tunnel release, trapeziectomy with suspension plasty and MCP arthrodesis on 5/1/2025    History of Present Illness  Interval update 8/1/25: Patient presents for follow-up of the above surgery.  He is 3 months postop.  He is doing well.  He has no pain.  Wears the brace when out of the house but does not wear it otherwise.  Has been able to do lifting around the house lifting up things like ounce of milk watering cans.  Has not had any painful symptoms.     History-  The patient is a 70-year-old right-hand dominant male presenting with right thumb arthralgia.  Patient has had right thumb pain for at least 5 years.  He has tried a brace.  He is doing conservative management with naproxen and meloxicam.  He has history of rheumatoid arthritis on methotrexate.  He also has a history of psoriasis.  Patient reports numbness and tingling in a median nerve distribution most prominent late at night.  Has had negative reactions to steroid injections in the past with worsening of his psoriasis the patient reports.  Patient is seen and managed by rheumatology as well.  Patient has a history of diabetes with last hemoglobin A1c of 6.6 on 3/6/2025    SOCIAL HISTORY  Marital Status:   Occupations:  but currently retired                                                                                      Current Outpatient Medications   Medication Instructions    Aspirin Low Dose 81 mg, Oral, DAILY    Aspirin-Caffeine (ANGEL BACK & BODY PO) Take by mouth    blood glucose monitor kit and supplies Accu check. Dispense sufficient amount for four times daily  testing frequency plus additional to accommodate PRN testing needs. Dispense all needed supplies to include: monitor, strips, lancing device, lancets, control solutions, alcohol swabs.

## 2025-08-05 DIAGNOSIS — Z79.4 TYPE 2 DIABETES MELLITUS WITH DIABETIC PERIPHERAL ANGIOPATHY WITHOUT GANGRENE, WITH LONG-TERM CURRENT USE OF INSULIN (HCC): ICD-10-CM

## 2025-08-05 DIAGNOSIS — E11.51 TYPE 2 DIABETES MELLITUS WITH DIABETIC PERIPHERAL ANGIOPATHY WITHOUT GANGRENE, WITH LONG-TERM CURRENT USE OF INSULIN (HCC): ICD-10-CM

## 2025-08-05 DIAGNOSIS — F51.01 PRIMARY INSOMNIA: ICD-10-CM

## 2025-08-06 DIAGNOSIS — F51.01 PRIMARY INSOMNIA: ICD-10-CM

## 2025-08-06 RX ORDER — INSULIN GLARGINE 100 [IU]/ML
INJECTION, SOLUTION SUBCUTANEOUS
Qty: 70 ML | Refills: 0 | Status: SHIPPED | OUTPATIENT
Start: 2025-08-06

## 2025-08-06 RX ORDER — QUETIAPINE FUMARATE 25 MG/1
25 TABLET, FILM COATED ORAL NIGHTLY
Qty: 30 TABLET | Refills: 3 | OUTPATIENT
Start: 2025-08-06

## 2025-08-07 RX ORDER — QUETIAPINE FUMARATE 25 MG/1
25 TABLET, FILM COATED ORAL NIGHTLY
Qty: 30 TABLET | Refills: 0 | Status: SHIPPED | OUTPATIENT
Start: 2025-08-07

## 2025-08-18 ENCOUNTER — OFFICE VISIT (OUTPATIENT)
Dept: ORTHOPEDIC SURGERY | Age: 70
End: 2025-08-18

## 2025-08-18 VITALS — WEIGHT: 250 LBS | HEIGHT: 71 IN | BODY MASS INDEX: 35 KG/M2

## 2025-08-18 DIAGNOSIS — M67.912 DYSFUNCTION OF LEFT ROTATOR CUFF: ICD-10-CM

## 2025-08-18 DIAGNOSIS — M25.512 LEFT SHOULDER PAIN, UNSPECIFIED CHRONICITY: Primary | ICD-10-CM

## 2025-08-18 DIAGNOSIS — S46.211A BICEPS RUPTURE, PROXIMAL, RIGHT, INITIAL ENCOUNTER: ICD-10-CM

## 2025-08-18 RX ORDER — HYDROCODONE BITARTRATE AND ACETAMINOPHEN 5; 325 MG/1; MG/1
1 TABLET ORAL EVERY 8 HOURS PRN
Qty: 10 TABLET | Refills: 0 | Status: SHIPPED | OUTPATIENT
Start: 2025-08-18 | End: 2025-08-23

## 2025-08-26 ENCOUNTER — OFFICE VISIT (OUTPATIENT)
Dept: ORTHOPEDIC SURGERY | Age: 70
End: 2025-08-26

## 2025-08-26 VITALS — BODY MASS INDEX: 34.87 KG/M2 | HEIGHT: 71 IN

## 2025-08-26 DIAGNOSIS — S46.212A BICEPS RUPTURE, PROXIMAL, LEFT, INITIAL ENCOUNTER: Primary | ICD-10-CM

## 2025-08-26 DIAGNOSIS — M25.512 LEFT SHOULDER PAIN, UNSPECIFIED CHRONICITY: ICD-10-CM

## 2025-08-26 DIAGNOSIS — M25.812 IMPINGEMENT OF LEFT SHOULDER: ICD-10-CM

## 2025-08-27 RX ORDER — HYDROCODONE BITARTRATE AND ACETAMINOPHEN 5; 325 MG/1; MG/1
1 TABLET ORAL NIGHTLY
Qty: 14 TABLET | Refills: 0 | Status: SHIPPED | OUTPATIENT
Start: 2025-08-27 | End: 2025-09-10

## 2025-08-28 ENCOUNTER — TELEPHONE (OUTPATIENT)
Dept: ORTHOPEDIC SURGERY | Age: 70
End: 2025-08-28

## 2025-08-29 PROBLEM — M25.512 LEFT SHOULDER PAIN: Status: ACTIVE | Noted: 2025-08-29

## 2025-09-02 DIAGNOSIS — G56.01 RIGHT CARPAL TUNNEL SYNDROME: ICD-10-CM

## 2025-09-02 DIAGNOSIS — M18.11 ARTHRITIS OF CARPOMETACARPAL (CMC) JOINT OF RIGHT THUMB: ICD-10-CM

## 2025-09-03 ENCOUNTER — OFFICE VISIT (OUTPATIENT)
Dept: PULMONOLOGY | Age: 70
End: 2025-09-03
Payer: MEDICARE

## 2025-09-03 VITALS
SYSTOLIC BLOOD PRESSURE: 140 MMHG | HEIGHT: 71 IN | DIASTOLIC BLOOD PRESSURE: 80 MMHG | RESPIRATION RATE: 18 BRPM | TEMPERATURE: 97.3 F | HEART RATE: 69 BPM | WEIGHT: 265 LBS | BODY MASS INDEX: 37.1 KG/M2 | OXYGEN SATURATION: 96 %

## 2025-09-03 DIAGNOSIS — F51.01 PRIMARY INSOMNIA: ICD-10-CM

## 2025-09-03 DIAGNOSIS — G47.33 OSA (OBSTRUCTIVE SLEEP APNEA): Primary | ICD-10-CM

## 2025-09-03 PROCEDURE — 3079F DIAST BP 80-89 MM HG: CPT | Performed by: INTERNAL MEDICINE

## 2025-09-03 PROCEDURE — 1159F MED LIST DOCD IN RCRD: CPT | Performed by: INTERNAL MEDICINE

## 2025-09-03 PROCEDURE — 1123F ACP DISCUSS/DSCN MKR DOCD: CPT | Performed by: INTERNAL MEDICINE

## 2025-09-03 PROCEDURE — 99214 OFFICE O/P EST MOD 30 MIN: CPT | Performed by: INTERNAL MEDICINE

## 2025-09-03 PROCEDURE — G2211 COMPLEX E/M VISIT ADD ON: HCPCS | Performed by: INTERNAL MEDICINE

## 2025-09-03 PROCEDURE — 3077F SYST BP >= 140 MM HG: CPT | Performed by: INTERNAL MEDICINE

## 2025-09-03 RX ORDER — QUETIAPINE FUMARATE 50 MG/1
50 TABLET, FILM COATED ORAL NIGHTLY
Qty: 30 TABLET | Refills: 3 | Status: SHIPPED | OUTPATIENT
Start: 2025-09-03

## 2025-09-04 RX ORDER — MELOXICAM 15 MG/1
15 TABLET ORAL DAILY
Qty: 30 TABLET | Refills: 0 | Status: SHIPPED | OUTPATIENT
Start: 2025-09-04

## (undated) DEVICE — SUTURE ABSORBABLE L 18 IN SZ 4-0 NDL L 19 MM POLYGLACTIN 910 36/BX

## (undated) DEVICE — 260 CM J TIP WIRE .035

## (undated) DEVICE — SYRINGE MED 10ML LUERLOCK TIP W/O SFTY DISP

## (undated) DEVICE — YANKAUER,BULB TIP,W/O VENT,RIGID,STERILE: Brand: MEDLINE

## (undated) DEVICE — ENDOSCOPY KIT: Brand: MEDLINE INDUSTRIES, INC.

## (undated) DEVICE — GAUZE SPONGES,12 PLY: Brand: CURITY

## (undated) DEVICE — KIT OR ROOM TURNOVER W/STRAP

## (undated) DEVICE — SUPPORT WRST AD W3.5XL9IN DIA14.5IN ART SFT ADJ HK AND LOOP

## (undated) DEVICE — BNDG,ELSTC,MATRIX,STRL,3"X5YD,LF,HOOK&LP: Brand: MEDLINE

## (undated) DEVICE — BANDAGE COMPR W2INXL5YD WHT BGE POLY COT M E WRP WV HK AND

## (undated) DEVICE — ELECTRODE PT RET AD L9FT HI MOIST COND ADH HYDRGEL CORDED

## (undated) DEVICE — SKIN PREP TRAY W/CHG: Brand: MEDLINE INDUSTRIES, INC.

## (undated) DEVICE — NEEDLE HYPO 25GA L1.5IN BLU POLYPR HUB S STL REG BVL STR

## (undated) DEVICE — 3M™ STERI-STRIP™ REINFORCED ADHESIVE SKIN CLOSURES, R1547, 1/2 IN X 4 IN (12 MM X 100 MM), 6 STRIPS/ENVELOPE: Brand: 3M™ STERI-STRIP™

## (undated) DEVICE — OVAL BUR, MEDIUM, 4 X 8 MM: Brand: CONMED

## (undated) DEVICE — STERILE LATEX POWDER-FREE SURGICAL GLOVESWITH NITRILE COATING: Brand: PROTEXIS

## (undated) DEVICE — COVER LT HNDL BLU PLAS

## (undated) DEVICE — SYRINGE MED 5ML STD CLR PLAS N CTRL SLIP TIP DISP

## (undated) DEVICE — SOLUTION IV IRRIG POUR BRL 0.9% SODIUM CHL 2F7124

## (undated) DEVICE — UNDERPAD HOSP W30XL36IN WHT SUP ABSRB POLYMER AIR PERM DISP

## (undated) DEVICE — DUAL STAGE VENOUS RETURN CANNULA: Brand: THIN-FLEX DUAL STAGE VENOUS DRAINAGE CANNULA

## (undated) DEVICE — SUTURE NONABSORBABLE MONOFILAMENT 4-0 RB-1 36 IN BLU PROLENE 8557H

## (undated) DEVICE — SET CATH 20GA L1.75IN RAD ART POLYUR RADPQ W/ INTEGR

## (undated) DEVICE — STRIP,CLOSURE,WOUND,MEDI-STRIP,1/2X4: Brand: MEDLINE

## (undated) DEVICE — NEEDLE HYPO 18GA L1.5IN PNK POLYPR HUB S STL REG BVL STR

## (undated) DEVICE — BLADE OPHTH 180DEG CUT SURF BLU STR SHRP DBL BVL GRINDLESS

## (undated) DEVICE — EVERGRIP INSERT SET 86MM: Brand: FOGARTY EVERGRIP

## (undated) DEVICE — SURGICAL PROCEDURE PACK PERF TBNG

## (undated) DEVICE — DRESSING,GAUZE,XEROFORM,CURAD,1"X8",ST: Brand: CURAD

## (undated) DEVICE — TOWEL,OR,DSP,ST,WHITE,DLX,XR,4/PK,20PK/C: Brand: MEDLINE

## (undated) DEVICE — CATHETER DIAG L100CM DIA5.2FR 0.038IN POLYUR COR JR4 STD

## (undated) DEVICE — SUTURE S STL SZ 6 L18IN NONABSORBABLE SIL L48MM CCS 1/2 CIR M654G

## (undated) DEVICE — BANDAGE COMPR W6INXL11YD E SGL LAYERED VELC CLSR SHUR-BAND

## (undated) DEVICE — Device

## (undated) DEVICE — CANNULA PERF L125IN OD15FR POLYVI CHL VEN RG AUTO INFL SMOOT

## (undated) DEVICE — CANNULA PERF 7FR L5.5IN AORT ROOT RADPQ STD TIP W/ VENT LN

## (undated) DEVICE — Device: Brand: MEDEX

## (undated) DEVICE — DRESSING WND SM W2.5XL4IN BRTH W/ CATH SECUREMENT AND

## (undated) DEVICE — MICRO TIP WIPE: Brand: DEVON

## (undated) DEVICE — HYPODERMIC SAFETY NEEDLE: Brand: MAGELLAN

## (undated) DEVICE — SUTURE NONABSORBABLE MONOFILAMENT 5-0 C-1 1X24 IN PROLENE 8725H

## (undated) DEVICE — PADDING CAST W4INXL4YD NONSTERILE COT RAYON MICROPLEATED

## (undated) DEVICE — DRAPE,ANGIO,BRACH,STERILE,38X44: Brand: MEDLINE

## (undated) DEVICE — 3M™ TEGADERM™ TRANSPARENT FILM DRESSING FRAME STYLE, 1626W, 4 IN X 4-3/4 IN (10 CM X 12 CM), 50/CT 4CT/CASE: Brand: 3M™ TEGADERM™

## (undated) DEVICE — PROCEDURE PACK FOR CABG ACCS CUST

## (undated) DEVICE — Z DUP USE 2537558 SYSTEM ENDOSCP VES HARV VASO VW HEMOPRO

## (undated) DEVICE — SUTURE MCRYL SZ 4-0 L27IN ABSRB UD L19MM PS-2 1/2 CIR PRIM Y426H

## (undated) DEVICE — TRAY KIT 2 APPL 2 FLAT TIP

## (undated) DEVICE — FAIRFIELD CABG ACCESSARY PK

## (undated) DEVICE — NEEDLE SPNL 22GA L5IN BLK HUB S STL W/ QNCKE PNT W/OUT

## (undated) DEVICE — SUTURE VICRYL + SZ 3-0 L27IN ABSRB UD L26MM SH 1/2 CIR VCP416H

## (undated) DEVICE — CONNECTOR IV TB L28MM CLR VLV ACCS NDLLSS DISP MAXPLUS

## (undated) DEVICE — X-RAY DETECTABLE SPONGES,12 PLY: Brand: VISTEC

## (undated) DEVICE — AORTIC PNCH 4.0MM 8IN BX 10 DISP

## (undated) DEVICE — SUTURE PROL SZ 3-0 L36IN NONABSORBABLE BLU L17MM RB-1 1/2 8558H

## (undated) DEVICE — DISPOSABLE EXTENSION CABLE: Brand: ATAR D-RL

## (undated) DEVICE — SUTURE GOR TX SZ 2-0 L36IN NONABSORBABLE L18MM TH-18 1/2 3N04B

## (undated) DEVICE — SUTURE PERMAHAND SZ 2-0 L30IN NONABSORBABLE BLK SILK W/O A305H

## (undated) DEVICE — DRAPE SLUSH DISC W44XL66IN ST FOR RND BSIN HUSH SLUSH SYS

## (undated) DEVICE — BLADE CLIPPER GEN PURP NS

## (undated) DEVICE — SUTURE PDS II SZ 3-0 L27IN ABSRB VLT L26MM SH 1/2 CIR Z316H

## (undated) DEVICE — GLOVE SURG SZ 65 L12IN FNGR THK79MIL GRN LTX FREE

## (undated) DEVICE — SUTURE VCRL SZ 0 L27IN ABSRB UD L36MM CT-1 1/2 CIR J260H

## (undated) DEVICE — TRAY URIN CATH PED 16FR BLLN 5CC 2 CONN SIL STR TIP W/ URIN

## (undated) DEVICE — DRESSING GRMCDL 6 12FR D1N CNTR HOLE 4MM ANTMCRBL PRTCTVE DI

## (undated) DEVICE — SET ADMIN PRIMING 12ML L36IN 2ND INCL RLER CLMP SPIN M

## (undated) DEVICE — AT3 MICRO DRILL: Brand: ACUMED

## (undated) DEVICE — SUTURE VCRL SZ 0 L18IN ABSRB UD L36MM CT-1 1/2 CIR J840D

## (undated) DEVICE — ZIMMER® STERILE DISPOSABLE TOURNIQUET CUFF WITH PLC, DUAL PORT, SINGLE BLADDER, 18 IN. (46 CM)

## (undated) DEVICE — CONNECTOR PERF L5 IN OD1 2 IN SAT HCT ST FEATURING B CARE 5

## (undated) DEVICE — PROCEDURE KIT COMP 5Y10R8

## (undated) DEVICE — CATHETER EXT URIN DIA35MM STD M SELF ADH WATERTIGHT SEAL

## (undated) DEVICE — CATHETER 5FR JL3.5 CORDIS 100CM

## (undated) DEVICE — NEEDLE HYPO 18GA L1.5IN THN WALL PIVOTING SHLD BVL ORIENTED

## (undated) DEVICE — PAD THRM M SPL TORSO

## (undated) DEVICE — THORACIC CATHETER, RIGHT ANGLE, SILICONE, WITH CLOTSTOP®: Brand: AXIOM® ATRAUM™ WITH CLOTSTOP®

## (undated) DEVICE — PRESSURE MONITORING SET: Brand: TRUWAVE

## (undated) DEVICE — CORD ES L12FT BPLR FRCP

## (undated) DEVICE — GLOVE SURG SZ 75 L12IN FNGR THK94MIL TRNSLUC YEL LTX

## (undated) DEVICE — 3M™ TEGADERM™ TRANSPARENT FILM DRESSING FRAME STYLE, 1624W, 2-3/8 IN X 2-3/4 IN (6 CM X 7 CM), 100/CT 4CT/CASE: Brand: 3M™ TEGADERM™

## (undated) DEVICE — CAP TBNG ACC CHEMO SFTY LUER FEM OR M TEXIUM

## (undated) DEVICE — TIP PERF 10 IN

## (undated) DEVICE — KIT,ANTI FOG,W/SPONGE & FLUID,SOFT PACK: Brand: MEDLINE

## (undated) DEVICE — Z INACTIVE USE 2540311 LEAD PACE L475MM CHN A OR V MYOCARDIAL STEROID ELUT SIL

## (undated) DEVICE — SUTURE PERMA-HAND SZ 4-0 L144IN NONABSORBABLE BLK LIGAPAK LA53G

## (undated) DEVICE — SUTURE N ABSRB L 18 IN SZ 4-0 NDL L 19 MM NYL MONOFILAMENT

## (undated) DEVICE — TOWEL,OR,DSP,ST,BLUE,STD,4/PK,20PK/CS: Brand: MEDLINE

## (undated) DEVICE — 60 ML SYRINGE,LUER-LOCK TIP: Brand: MONOJECT

## (undated) DEVICE — SUTURE PROL SZ 7-0 L24IN NONABSORBABLE BLU L8MM BV175-6 3/8 8735H

## (undated) DEVICE — OPTIFOAM GENTLE LIQUITRAP, SACRUM, 7"X7": Brand: MEDLINE

## (undated) DEVICE — 1.5MM X 5" QUICK RELEASE DRILL: Brand: ACUMED

## (undated) DEVICE — [HIGH FLOW INSUFFLATOR,  DO NOT USE IF PACKAGE IS DAMAGED,  KEEP DRY,  KEEP AWAY FROM SUNLIGHT,  PROTECT FROM HEAT AND RADIOACTIVE SOURCES.]: Brand: PNEUMOSURE

## (undated) DEVICE — DRAINBAG,ANTI-REFLUX TOWER,L/F,2000ML,LL: Brand: MEDLINE

## (undated) DEVICE — LABEL MED CUST CVR

## (undated) DEVICE — DRAPE EQUIP C ARM MINI 10000100] TIDI PRODUCTS INC]

## (undated) DEVICE — Z CONVERTED USE 2275207 CLOTH PREP W7.5XL7.5IN 2% CHG SKIN ALC AND RNS FREE

## (undated) DEVICE — BNDG,ELSTC,MATRIX,STRL,4"X5YD,LF,HOOK&LP: Brand: MEDLINE

## (undated) DEVICE — GLOVE ORANGE PI 7   MSG9070

## (undated) DEVICE — SUTURE ETHBND EXCEL SZ 2-0 L36IN NONABSORBABLE GRN L26MM SH X523H

## (undated) DEVICE — MONITOR LN W LUER LOK 72

## (undated) DEVICE — CATHETER THOR 36FR L23CM DIA12MM POLYVI CHL TAPR CONN TIP

## (undated) DEVICE — SKIN MARKER REGULAR TIP WITH RULER CAP AND LABELS: Brand: DEVON

## (undated) DEVICE — BANDAGE GZ W2XL75IN ST RAYON POLY CNFRM STRTCH LTWT

## (undated) DEVICE — MATERIAL PD W2XL4YD ST COT CAST SPLNT NONADHESIVE SPEC

## (undated) DEVICE — 36" (91 CM) ARTERIAL PRESSURE TUBING: Brand: ICU MEDICAL

## (undated) DEVICE — X-RAY CASSETTE COVER: Brand: CONVERTORS

## (undated) DEVICE — Ø0.7 X 150MM GUIDE WIRE, SINGLE TROCAR: Brand: ACUMED

## (undated) DEVICE — Ø0.9 X 150MM GUIDE WIRE, SINGLE TROCAR: Brand: ACUMED

## (undated) DEVICE — PLATELET CONCENTRATION PACK PROC 14-20 ML SMARTPREP 2

## (undated) DEVICE — AVA HIGH FLOW BUNDLE: Brand: MEDLINE

## (undated) DEVICE — TOWEL,OR,DSP,ST,GREEN,DLX,4/PK,20PK/CS: Brand: MEDLINE

## (undated) DEVICE — .059 X 5" ST GUIDE WIRE: Brand: ACUMED

## (undated) DEVICE — TR BAND RADIAL ARTERY COMPRESSION DEVICE: Brand: TR BAND

## (undated) DEVICE — GOWN,SURGICAL,AURORA,SLEEVE: Brand: MEDLINE

## (undated) DEVICE — GOWN,SIRUS,NON REINFRCD,LARGE,SET IN SL: Brand: MEDLINE

## (undated) DEVICE — STERNUM BLADE, OFFSET (31.7 X 0.64 X 6.3MM)

## (undated) DEVICE — BLOOD TRANSFUSION FILTER: Brand: HAEMONETICS

## (undated) DEVICE — SHEET,DRAPE,53X77,STERILE: Brand: MEDLINE

## (undated) DEVICE — BLADE,STAINLESS-STEEL,15,STRL,DISPOSABLE: Brand: MEDLINE

## (undated) DEVICE — EZ GLIDE AORTIC CANNULA: Brand: EDWARDS LIFESCIENCES EZ GLIDE AORTIC CANNULA

## (undated) DEVICE — SET PERF L15IN BLU CLMP MULT FEM LUER ON SGL INLET LEG DLP

## (undated) DEVICE — DRAIN SURG SGL COLL PT TB FOR ATS BG OASIS

## (undated) DEVICE — CATHETER ANGIO INFIN 038IN AMPLATZ 534545T

## (undated) DEVICE — MEDI-VAC NON-CONDUCTIVE SUCTION TUBING: Brand: CARDINAL HEALTH

## (undated) DEVICE — CLIP SM RED INTERN HMOCLP TITAN LIGATING

## (undated) DEVICE — BATTERY DRVR W/ SELF DRL TAP FOR THINFLAP PWR DRVR

## (undated) DEVICE — SOLUTION IV 500ML 0.9% SOD BOTTLE CHL LTWT DURABLE SHATTERPROOF

## (undated) DEVICE — AEGIS 1" DISK 4MM HOLE, PEEL OPEN: Brand: MEDLINE

## (undated) DEVICE — CATHETER 5FR CORDIS MPA 2 100CM

## (undated) DEVICE — Z INACTIVE USE 2582933 BAG BLD TRNSF 1 CPLR IV ST 600ML TERUFLEX

## (undated) DEVICE — GLOVE SURG SZ 65 L12IN FNGR THK94MIL STD WHT LTX FREE

## (undated) DEVICE — SET ADMIN PRIMING 67ML L105IN NVENT 180UM FLTR 3 RLER CLMP

## (undated) DEVICE — SUTURE NONABSORBABLE MONOFILAMENT 6-0 C-1 1X30 IN PROLENE 8706H

## (undated) DEVICE — DRESSING TRNSPAR W FAB BORD SORBAVIEW ULT 475X425IN

## (undated) DEVICE — NERVE BLOCK TRAY: Brand: MEDLINE INDUSTRIES, INC.

## (undated) DEVICE — RESERVOIR AUTOTRANSFUSION 225/120 CC GS FILTERED XTRA

## (undated) DEVICE — SWAN-GANZ CCOMBO THERMODILUTION CATHETER: Brand: SWAN-GANZ CCOMBO

## (undated) DEVICE — MARKER SURG SKIN UTIL REGULAR/FINE 2 TIP W/ RUL AND 9 LBL

## (undated) DEVICE — SUTURE VCRL SZ 2-0 L36IN ABSRB UD L36MM CT-1 1/2 CIR J945H

## (undated) DEVICE — TOOL TRAPEZIECTOMY CRPL W HNDL FOR BNE EXCISION

## (undated) DEVICE — PAD, DEFIB, ADULT, RADIOTRANS, PHYSIO: Brand: MEDLINE

## (undated) DEVICE — BASIC SINGLE BASIN 1-LF: Brand: MEDLINE INDUSTRIES, INC.

## (undated) DEVICE — SUTURE NONABSORBABLE MONOFILAMENT 7-0 BV-1 1X24 IN PROLENE 8702H

## (undated) DEVICE — STERILE LATEX POWDER-FREE SURGICAL GLOVESWITH NITRILE AND EMOLLIENT COATINGS: Brand: PROTEXIS

## (undated) DEVICE — BITE BLOCK ENDOSCP AD 60 FR W/ ADJ STRP PLAS GRN BLOX

## (undated) DEVICE — COVER LT HNDL CAM BLU DISP W/ SURG CTRL

## (undated) DEVICE — DISCONTINUED USE 394516 DRESSING MEPILEX SACRUM 7.2X7.2

## (undated) DEVICE — DECANTER: Brand: UNBRANDED

## (undated) DEVICE — Device: Brand: NOMOLINE™ LH ADULT NASAL CO2 CANNULA WITH O2 4M

## (undated) DEVICE — PRESSURE MONITORING SET: Brand: TRUWAVE, VAMP PLUS

## (undated) DEVICE — 2.0MM X 5" QUICK RELEASE DRILL: Brand: ACUMED

## (undated) DEVICE — SET EXTN L41IN 2 NDL FREE VALVES FREE INJ PRT

## (undated) DEVICE — STERILE POLYISOPRENE POWDER-FREE SURGICAL GLOVES: Brand: PROTEXIS

## (undated) DEVICE — 48" PROBE COVER W/GEL, ULTRASOUND, STERILE: Brand: SITE-RITE

## (undated) DEVICE — 14MM CONCAVE MTP REAMER: Brand: ACUMED

## (undated) DEVICE — KIT AT-X65 ANGIOTOUCH HAND CONTROLLER

## (undated) DEVICE — GAUZE,SPONGE,4"X4",16PLY,XRAY,STRL,LF: Brand: MEDLINE

## (undated) DEVICE — APPLICATOR PREP 26ML 0.7% IOD POVACRYLEX 74% ISO ALC ST

## (undated) DEVICE — KIT ART LN 20GA L12CM FEP RADPQ 0.025X13.75IN SPR GWIRE

## (undated) DEVICE — GLIDESHEATH SLENDER ACCESS KIT: Brand: GLIDESHEATH SLENDER

## (undated) DEVICE — Z DISCONTINUED USE 2516375 APPLICATOR MEDICATED 3 CC CLR STRL CHLORAPREP

## (undated) DEVICE — 14MM CONVEX MTP REAMER: Brand: ACUMED

## (undated) DEVICE — SYRINGE MED 20ML STD CLR PLAS LUERLOCK TIP N CTRL DISP

## (undated) DEVICE — CATH LAB PACK: Brand: MEDLINE INDUSTRIES, INC.

## (undated) DEVICE — 3M™ IOBAN™ 2 ANTIMICROBIAL INCISE DRAPE 6650EZ: Brand: IOBAN™ 2

## (undated) DEVICE — DEVICE SECUREMENT AD W/ TRICOT ANCHR PD FOR F LTX SIL CATH

## (undated) DEVICE — 2.1MM X 10.0MM CRUCIFORM SCREW
Type: IMPLANTABLE DEVICE | Site: THUMB | Status: NON-FUNCTIONAL
Brand: ACUMED
Removed: 2025-05-01

## (undated) DEVICE — 12 FOOT DISPOSABLE EXTENSION CABLE WITH SAFE CONNECT / SCREW-DOWN

## (undated) DEVICE — OPEN HRT BASIC PK